# Patient Record
Sex: MALE | Race: WHITE | Employment: OTHER | URBAN - NONMETROPOLITAN AREA
[De-identification: names, ages, dates, MRNs, and addresses within clinical notes are randomized per-mention and may not be internally consistent; named-entity substitution may affect disease eponyms.]

---

## 2017-02-06 ENCOUNTER — OFFICE VISIT (OUTPATIENT)
Dept: FAMILY MEDICINE CLINIC | Age: 72
End: 2017-02-06

## 2017-02-06 VITALS
RESPIRATION RATE: 12 BRPM | DIASTOLIC BLOOD PRESSURE: 64 MMHG | SYSTOLIC BLOOD PRESSURE: 120 MMHG | OXYGEN SATURATION: 95 % | BODY MASS INDEX: 34.88 KG/M2 | WEIGHT: 243.6 LBS | TEMPERATURE: 97.3 F | HEART RATE: 63 BPM | HEIGHT: 70 IN

## 2017-02-06 DIAGNOSIS — Z23 NEED FOR VACCINATION WITH 13-POLYVALENT PNEUMOCOCCAL CONJUGATE VACCINE: ICD-10-CM

## 2017-02-06 DIAGNOSIS — M25.561 CHRONIC PAIN OF BOTH KNEES: ICD-10-CM

## 2017-02-06 DIAGNOSIS — M17.0 PRIMARY OSTEOARTHRITIS OF BOTH KNEES: ICD-10-CM

## 2017-02-06 DIAGNOSIS — I25.10 CORONARY ARTERY DISEASE INVOLVING NATIVE CORONARY ARTERY OF NATIVE HEART WITHOUT ANGINA PECTORIS: ICD-10-CM

## 2017-02-06 DIAGNOSIS — I10 ESSENTIAL HYPERTENSION: Primary | ICD-10-CM

## 2017-02-06 DIAGNOSIS — Z23 NEED FOR PNEUMOCOCCAL VACCINATION: ICD-10-CM

## 2017-02-06 DIAGNOSIS — G89.29 CHRONIC PAIN OF BOTH KNEES: ICD-10-CM

## 2017-02-06 DIAGNOSIS — N40.0 BENIGN PROSTATIC HYPERPLASIA, PRESENCE OF LOWER URINARY TRACT SYMPTOMS UNSPECIFIED, UNSPECIFIED MORPHOLOGY: ICD-10-CM

## 2017-02-06 DIAGNOSIS — E78.49 FAMILIAL HYPERLIPIDEMIA: ICD-10-CM

## 2017-02-06 DIAGNOSIS — M25.562 CHRONIC PAIN OF BOTH KNEES: ICD-10-CM

## 2017-02-06 DIAGNOSIS — R73.01 IMPAIRED FASTING GLUCOSE: ICD-10-CM

## 2017-02-06 DIAGNOSIS — E03.9 HYPOTHYROIDISM, UNSPECIFIED TYPE: ICD-10-CM

## 2017-02-06 DIAGNOSIS — Z00.00 MEDICARE ANNUAL WELLNESS VISIT, SUBSEQUENT: ICD-10-CM

## 2017-02-06 PROCEDURE — 4040F PNEUMOC VAC/ADMIN/RCVD: CPT | Performed by: FAMILY MEDICINE

## 2017-02-06 PROCEDURE — G8419 CALC BMI OUT NRM PARAM NOF/U: HCPCS | Performed by: FAMILY MEDICINE

## 2017-02-06 PROCEDURE — 20610 DRAIN/INJ JOINT/BURSA W/O US: CPT | Performed by: FAMILY MEDICINE

## 2017-02-06 PROCEDURE — 1036F TOBACCO NON-USER: CPT | Performed by: FAMILY MEDICINE

## 2017-02-06 PROCEDURE — 3017F COLORECTAL CA SCREEN DOC REV: CPT | Performed by: FAMILY MEDICINE

## 2017-02-06 PROCEDURE — 1123F ACP DISCUSS/DSCN MKR DOCD: CPT | Performed by: FAMILY MEDICINE

## 2017-02-06 PROCEDURE — G8427 DOCREV CUR MEDS BY ELIG CLIN: HCPCS | Performed by: FAMILY MEDICINE

## 2017-02-06 PROCEDURE — 90670 PCV13 VACCINE IM: CPT | Performed by: FAMILY MEDICINE

## 2017-02-06 PROCEDURE — 99214 OFFICE O/P EST MOD 30 MIN: CPT | Performed by: FAMILY MEDICINE

## 2017-02-06 PROCEDURE — G8484 FLU IMMUNIZE NO ADMIN: HCPCS | Performed by: FAMILY MEDICINE

## 2017-02-06 PROCEDURE — G8598 ASA/ANTIPLAT THER USED: HCPCS | Performed by: FAMILY MEDICINE

## 2017-02-06 PROCEDURE — G0439 PPPS, SUBSEQ VISIT: HCPCS | Performed by: FAMILY MEDICINE

## 2017-02-06 RX ORDER — LEVOTHYROXINE SODIUM 0.05 MG/1
50 TABLET ORAL DAILY
Qty: 90 TABLET | Refills: 3 | Status: SHIPPED | OUTPATIENT
Start: 2017-02-06 | End: 2017-08-07 | Stop reason: SDUPTHER

## 2017-02-06 RX ORDER — TRIAMCINOLONE ACETONIDE 40 MG/ML
40 INJECTION, SUSPENSION INTRA-ARTICULAR; INTRAMUSCULAR ONCE
Status: COMPLETED | OUTPATIENT
Start: 2017-02-06 | End: 2017-02-06

## 2017-02-06 RX ORDER — TAMSULOSIN HYDROCHLORIDE 0.4 MG/1
CAPSULE ORAL
Qty: 90 CAPSULE | Refills: 3 | Status: SHIPPED | OUTPATIENT
Start: 2017-02-06 | End: 2018-02-12 | Stop reason: SDUPTHER

## 2017-02-06 RX ORDER — DOXAZOSIN 2 MG/1
2 TABLET ORAL 2 TIMES DAILY
Qty: 180 TABLET | Refills: 3 | Status: SHIPPED | OUTPATIENT
Start: 2017-02-06 | End: 2018-02-12 | Stop reason: SDUPTHER

## 2017-02-06 RX ORDER — METOPROLOL TARTRATE 50 MG/1
50 TABLET, FILM COATED ORAL 2 TIMES DAILY
Qty: 180 TABLET | Refills: 3 | Status: SHIPPED | OUTPATIENT
Start: 2017-02-06 | End: 2018-02-12 | Stop reason: SDUPTHER

## 2017-02-06 RX ORDER — CLOPIDOGREL BISULFATE 75 MG/1
TABLET ORAL
Qty: 90 TABLET | Refills: 3 | Status: SHIPPED | OUTPATIENT
Start: 2017-02-06 | End: 2018-02-12 | Stop reason: SDUPTHER

## 2017-02-06 RX ADMIN — TRIAMCINOLONE ACETONIDE 40 MG: 40 INJECTION, SUSPENSION INTRA-ARTICULAR; INTRAMUSCULAR at 09:34

## 2017-02-06 RX ADMIN — TRIAMCINOLONE ACETONIDE 40 MG: 40 INJECTION, SUSPENSION INTRA-ARTICULAR; INTRAMUSCULAR at 09:33

## 2017-02-06 ASSESSMENT — ENCOUNTER SYMPTOMS
RHINORRHEA: 0
CONSTIPATION: 0
DIARRHEA: 0
SHORTNESS OF BREATH: 0
WHEEZING: 0
EYE DISCHARGE: 0
COUGH: 0
TROUBLE SWALLOWING: 0
SINUS PRESSURE: 0
ABDOMINAL PAIN: 0
NAUSEA: 0
EYE REDNESS: 0
VOMITING: 0
SORE THROAT: 0

## 2017-02-06 ASSESSMENT — PATIENT HEALTH QUESTIONNAIRE - PHQ9
SUM OF ALL RESPONSES TO PHQ9 QUESTIONS 1 & 2: 0
2. FEELING DOWN, DEPRESSED OR HOPELESS: 0
1. LITTLE INTEREST OR PLEASURE IN DOING THINGS: 0
SUM OF ALL RESPONSES TO PHQ QUESTIONS 1-9: 0

## 2017-02-07 DIAGNOSIS — I10 ESSENTIAL HYPERTENSION: ICD-10-CM

## 2017-02-07 RX ORDER — LOSARTAN POTASSIUM 50 MG/1
TABLET ORAL
Qty: 90 TABLET | Refills: 1 | Status: SHIPPED | OUTPATIENT
Start: 2017-02-07 | End: 2017-05-31 | Stop reason: SDUPTHER

## 2017-02-17 ENCOUNTER — OFFICE VISIT (OUTPATIENT)
Dept: UROLOGY | Age: 72
End: 2017-02-17

## 2017-02-17 VITALS
BODY MASS INDEX: 34.73 KG/M2 | DIASTOLIC BLOOD PRESSURE: 80 MMHG | WEIGHT: 238.6 LBS | HEART RATE: 60 BPM | SYSTOLIC BLOOD PRESSURE: 130 MMHG

## 2017-02-17 DIAGNOSIS — R97.20 ABNORMAL PSA: ICD-10-CM

## 2017-02-17 DIAGNOSIS — N40.0 BENIGN NON-NODULAR PROSTATIC HYPERPLASIA WITHOUT LOWER URINARY TRACT SYMPTOMS: Primary | ICD-10-CM

## 2017-02-17 PROCEDURE — G8417 CALC BMI ABV UP PARAM F/U: HCPCS | Performed by: UROLOGY

## 2017-02-17 PROCEDURE — 4040F PNEUMOC VAC/ADMIN/RCVD: CPT | Performed by: UROLOGY

## 2017-02-17 PROCEDURE — 99213 OFFICE O/P EST LOW 20 MIN: CPT | Performed by: UROLOGY

## 2017-02-17 PROCEDURE — 3017F COLORECTAL CA SCREEN DOC REV: CPT | Performed by: UROLOGY

## 2017-02-17 PROCEDURE — G8484 FLU IMMUNIZE NO ADMIN: HCPCS | Performed by: UROLOGY

## 2017-02-17 PROCEDURE — G8598 ASA/ANTIPLAT THER USED: HCPCS | Performed by: UROLOGY

## 2017-02-17 PROCEDURE — 1123F ACP DISCUSS/DSCN MKR DOCD: CPT | Performed by: UROLOGY

## 2017-02-17 PROCEDURE — 1036F TOBACCO NON-USER: CPT | Performed by: UROLOGY

## 2017-02-17 PROCEDURE — G8427 DOCREV CUR MEDS BY ELIG CLIN: HCPCS | Performed by: UROLOGY

## 2017-04-06 ENCOUNTER — OFFICE VISIT (OUTPATIENT)
Dept: PRIMARY CARE CLINIC | Age: 72
End: 2017-04-06
Payer: MEDICARE

## 2017-04-06 VITALS
TEMPERATURE: 98.7 F | HEIGHT: 70 IN | HEART RATE: 62 BPM | BODY MASS INDEX: 34.76 KG/M2 | DIASTOLIC BLOOD PRESSURE: 80 MMHG | WEIGHT: 242.8 LBS | OXYGEN SATURATION: 94 % | SYSTOLIC BLOOD PRESSURE: 138 MMHG

## 2017-04-06 DIAGNOSIS — R52 BODY ACHES: ICD-10-CM

## 2017-04-06 DIAGNOSIS — R05.9 COUGH: ICD-10-CM

## 2017-04-06 DIAGNOSIS — J40 BRONCHITIS: Primary | ICD-10-CM

## 2017-04-06 LAB
INFLUENZA A ANTIBODY: NORMAL
INFLUENZA B ANTIBODY: NORMAL

## 2017-04-06 PROCEDURE — 3017F COLORECTAL CA SCREEN DOC REV: CPT | Performed by: NURSE PRACTITIONER

## 2017-04-06 PROCEDURE — 87804 INFLUENZA ASSAY W/OPTIC: CPT | Performed by: NURSE PRACTITIONER

## 2017-04-06 PROCEDURE — 1036F TOBACCO NON-USER: CPT | Performed by: NURSE PRACTITIONER

## 2017-04-06 PROCEDURE — 99213 OFFICE O/P EST LOW 20 MIN: CPT | Performed by: NURSE PRACTITIONER

## 2017-04-06 PROCEDURE — 4040F PNEUMOC VAC/ADMIN/RCVD: CPT | Performed by: NURSE PRACTITIONER

## 2017-04-06 PROCEDURE — G8427 DOCREV CUR MEDS BY ELIG CLIN: HCPCS | Performed by: NURSE PRACTITIONER

## 2017-04-06 PROCEDURE — G8598 ASA/ANTIPLAT THER USED: HCPCS | Performed by: NURSE PRACTITIONER

## 2017-04-06 PROCEDURE — G8417 CALC BMI ABV UP PARAM F/U: HCPCS | Performed by: NURSE PRACTITIONER

## 2017-04-06 PROCEDURE — 1123F ACP DISCUSS/DSCN MKR DOCD: CPT | Performed by: NURSE PRACTITIONER

## 2017-04-06 PROCEDURE — 94640 AIRWAY INHALATION TREATMENT: CPT | Performed by: NURSE PRACTITIONER

## 2017-04-06 RX ORDER — BENZONATATE 100 MG/1
100 CAPSULE ORAL 3 TIMES DAILY PRN
Qty: 30 CAPSULE | Refills: 1 | Status: SHIPPED | OUTPATIENT
Start: 2017-04-06 | End: 2017-08-07 | Stop reason: ALTCHOICE

## 2017-04-06 RX ORDER — IPRATROPIUM BROMIDE AND ALBUTEROL SULFATE 2.5; .5 MG/3ML; MG/3ML
1 SOLUTION RESPIRATORY (INHALATION) ONCE
Status: COMPLETED | OUTPATIENT
Start: 2017-04-06 | End: 2017-04-06

## 2017-04-06 RX ORDER — PREDNISONE 20 MG/1
20 TABLET ORAL 2 TIMES DAILY
Qty: 10 TABLET | Refills: 0 | Status: SHIPPED | OUTPATIENT
Start: 2017-04-06 | End: 2017-04-11

## 2017-04-06 RX ORDER — AZITHROMYCIN 250 MG/1
TABLET, FILM COATED ORAL
Qty: 1 PACKET | Refills: 0 | Status: SHIPPED | OUTPATIENT
Start: 2017-04-06 | End: 2019-02-13 | Stop reason: SDUPTHER

## 2017-04-06 RX ADMIN — IPRATROPIUM BROMIDE AND ALBUTEROL SULFATE 1 AMPULE: 2.5; .5 SOLUTION RESPIRATORY (INHALATION) at 11:11

## 2017-04-06 ASSESSMENT — ENCOUNTER SYMPTOMS
WHEEZING: 1
SINUS PRESSURE: 0
SHORTNESS OF BREATH: 0
RHINORRHEA: 0
VOMITING: 0
COUGH: 1
SORE THROAT: 0
NAUSEA: 0
DIARRHEA: 0

## 2017-04-25 RX ORDER — RANITIDINE 300 MG/1
TABLET ORAL
Qty: 90 TABLET | Refills: 3 | Status: SHIPPED | OUTPATIENT
Start: 2017-04-25 | End: 2018-02-12 | Stop reason: SDUPTHER

## 2017-05-31 ENCOUNTER — OFFICE VISIT (OUTPATIENT)
Dept: CARDIOLOGY | Age: 72
End: 2017-05-31
Payer: MEDICARE

## 2017-05-31 VITALS
DIASTOLIC BLOOD PRESSURE: 64 MMHG | BODY MASS INDEX: 35.07 KG/M2 | SYSTOLIC BLOOD PRESSURE: 124 MMHG | HEART RATE: 63 BPM | WEIGHT: 245 LBS | HEIGHT: 70 IN

## 2017-05-31 DIAGNOSIS — I25.10 CORONARY ARTERY DISEASE INVOLVING NATIVE CORONARY ARTERY OF NATIVE HEART WITHOUT ANGINA PECTORIS: ICD-10-CM

## 2017-05-31 DIAGNOSIS — I42.9 CARDIOMYOPATHY (HCC): ICD-10-CM

## 2017-05-31 DIAGNOSIS — I10 ESSENTIAL HYPERTENSION: Primary | ICD-10-CM

## 2017-05-31 DIAGNOSIS — I51.89 DIASTOLIC DYSFUNCTION: ICD-10-CM

## 2017-05-31 PROCEDURE — 1123F ACP DISCUSS/DSCN MKR DOCD: CPT | Performed by: INTERNAL MEDICINE

## 2017-05-31 PROCEDURE — 3017F COLORECTAL CA SCREEN DOC REV: CPT | Performed by: INTERNAL MEDICINE

## 2017-05-31 PROCEDURE — 93000 ELECTROCARDIOGRAM COMPLETE: CPT | Performed by: INTERNAL MEDICINE

## 2017-05-31 PROCEDURE — G8417 CALC BMI ABV UP PARAM F/U: HCPCS | Performed by: INTERNAL MEDICINE

## 2017-05-31 PROCEDURE — G8428 CUR MEDS NOT DOCUMENT: HCPCS | Performed by: INTERNAL MEDICINE

## 2017-05-31 PROCEDURE — 99214 OFFICE O/P EST MOD 30 MIN: CPT | Performed by: INTERNAL MEDICINE

## 2017-05-31 PROCEDURE — 4040F PNEUMOC VAC/ADMIN/RCVD: CPT | Performed by: INTERNAL MEDICINE

## 2017-05-31 PROCEDURE — G8598 ASA/ANTIPLAT THER USED: HCPCS | Performed by: INTERNAL MEDICINE

## 2017-05-31 PROCEDURE — 1036F TOBACCO NON-USER: CPT | Performed by: INTERNAL MEDICINE

## 2017-05-31 RX ORDER — PRAVASTATIN SODIUM 40 MG
TABLET ORAL
Qty: 90 TABLET | Refills: 3 | Status: SHIPPED | OUTPATIENT
Start: 2017-05-31 | End: 2018-02-12 | Stop reason: SDUPTHER

## 2017-05-31 RX ORDER — LOSARTAN POTASSIUM 50 MG/1
TABLET ORAL
Qty: 90 TABLET | Refills: 3 | Status: SHIPPED | OUTPATIENT
Start: 2017-05-31 | End: 2018-02-12 | Stop reason: SDUPTHER

## 2017-07-03 RX ORDER — PRAVASTATIN SODIUM 40 MG
TABLET ORAL
Qty: 90 TABLET | Refills: 0 | OUTPATIENT
Start: 2017-07-03

## 2017-08-01 ENCOUNTER — HOSPITAL ENCOUNTER (OUTPATIENT)
Age: 72
Setting detail: SPECIMEN
Discharge: HOME | End: 2017-08-01
Payer: MEDICARE

## 2017-08-01 LAB — LDL CHOLESTEROL DIRECT: 115 MG/DL

## 2017-08-03 LAB
PROSTATE SPECIFIC ANTIGEN FREE: 1 UG/L
PROSTATE SPECIFIC ANTIGEN PERCENT FREE: 19.2 %
PROSTATE SPECIFIC ANTIGEN: 5.2 UG/L (ref 0–4)

## 2017-08-07 ENCOUNTER — OFFICE VISIT (OUTPATIENT)
Dept: FAMILY MEDICINE CLINIC | Age: 72
End: 2017-08-07
Payer: MEDICARE

## 2017-08-07 VITALS
WEIGHT: 240 LBS | DIASTOLIC BLOOD PRESSURE: 80 MMHG | OXYGEN SATURATION: 98 % | BODY MASS INDEX: 34.36 KG/M2 | RESPIRATION RATE: 16 BRPM | HEART RATE: 56 BPM | SYSTOLIC BLOOD PRESSURE: 130 MMHG | HEIGHT: 70 IN

## 2017-08-07 DIAGNOSIS — N40.0 BENIGN PROSTATIC HYPERPLASIA, PRESENCE OF LOWER URINARY TRACT SYMPTOMS UNSPECIFIED, UNSPECIFIED MORPHOLOGY: ICD-10-CM

## 2017-08-07 DIAGNOSIS — Z13.6 SCREENING FOR ABDOMINAL AORTIC ANEURYSM: ICD-10-CM

## 2017-08-07 DIAGNOSIS — I10 ESSENTIAL HYPERTENSION: ICD-10-CM

## 2017-08-07 DIAGNOSIS — K43.9 VENTRAL HERNIA WITHOUT OBSTRUCTION OR GANGRENE: ICD-10-CM

## 2017-08-07 DIAGNOSIS — E03.9 ACQUIRED HYPOTHYROIDISM: ICD-10-CM

## 2017-08-07 DIAGNOSIS — K21.9 GASTROESOPHAGEAL REFLUX DISEASE WITHOUT ESOPHAGITIS: ICD-10-CM

## 2017-08-07 DIAGNOSIS — Z87.891 FORMER CIGARETTE SMOKER: ICD-10-CM

## 2017-08-07 DIAGNOSIS — R73.01 IMPAIRED FASTING GLUCOSE: Primary | ICD-10-CM

## 2017-08-07 DIAGNOSIS — E78.49 FAMILIAL HYPERLIPIDEMIA: ICD-10-CM

## 2017-08-07 DIAGNOSIS — G47.33 OBSTRUCTIVE SLEEP APNEA: ICD-10-CM

## 2017-08-07 PROCEDURE — G8598 ASA/ANTIPLAT THER USED: HCPCS | Performed by: FAMILY MEDICINE

## 2017-08-07 PROCEDURE — 4040F PNEUMOC VAC/ADMIN/RCVD: CPT | Performed by: FAMILY MEDICINE

## 2017-08-07 PROCEDURE — G8427 DOCREV CUR MEDS BY ELIG CLIN: HCPCS | Performed by: FAMILY MEDICINE

## 2017-08-07 PROCEDURE — 1123F ACP DISCUSS/DSCN MKR DOCD: CPT | Performed by: FAMILY MEDICINE

## 2017-08-07 PROCEDURE — 1036F TOBACCO NON-USER: CPT | Performed by: FAMILY MEDICINE

## 2017-08-07 PROCEDURE — 3017F COLORECTAL CA SCREEN DOC REV: CPT | Performed by: FAMILY MEDICINE

## 2017-08-07 PROCEDURE — 99214 OFFICE O/P EST MOD 30 MIN: CPT | Performed by: FAMILY MEDICINE

## 2017-08-07 PROCEDURE — G8417 CALC BMI ABV UP PARAM F/U: HCPCS | Performed by: FAMILY MEDICINE

## 2017-08-07 RX ORDER — LEVOTHYROXINE SODIUM 0.07 MG/1
75 TABLET ORAL DAILY
Qty: 90 TABLET | Refills: 1 | Status: SHIPPED | OUTPATIENT
Start: 2017-08-07 | End: 2018-01-26 | Stop reason: SDUPTHER

## 2017-08-07 ASSESSMENT — ENCOUNTER SYMPTOMS
EYE REDNESS: 0
SORE THROAT: 0
VOMITING: 0
COUGH: 0
TROUBLE SWALLOWING: 0
DIARRHEA: 0
EYE DISCHARGE: 0
SHORTNESS OF BREATH: 0
RHINORRHEA: 0
WHEEZING: 0
ABDOMINAL PAIN: 0
SINUS PRESSURE: 0
NAUSEA: 0
CONSTIPATION: 0

## 2017-10-10 RX ORDER — MIRABEGRON 50 MG/1
TABLET, FILM COATED, EXTENDED RELEASE ORAL
Qty: 30 TABLET | Refills: 0 | Status: SHIPPED | OUTPATIENT
Start: 2017-10-10 | End: 2018-02-12 | Stop reason: SDUPTHER

## 2017-12-08 ENCOUNTER — OFFICE VISIT (OUTPATIENT)
Dept: PRIMARY CARE CLINIC | Age: 72
End: 2017-12-08
Payer: MEDICARE

## 2017-12-08 VITALS
RESPIRATION RATE: 14 BRPM | BODY MASS INDEX: 35.79 KG/M2 | SYSTOLIC BLOOD PRESSURE: 122 MMHG | HEIGHT: 70 IN | WEIGHT: 250 LBS | DIASTOLIC BLOOD PRESSURE: 68 MMHG | OXYGEN SATURATION: 95 % | TEMPERATURE: 99.4 F | HEART RATE: 103 BPM

## 2017-12-08 DIAGNOSIS — M25.562 CHRONIC PAIN OF BOTH KNEES: Primary | ICD-10-CM

## 2017-12-08 DIAGNOSIS — M79.10 MYALGIA: ICD-10-CM

## 2017-12-08 DIAGNOSIS — K46.9 NON-RECURRENT ABDOMINAL HERNIA WITHOUT OBSTRUCTION OR GANGRENE, UNSPECIFIED HERNIA TYPE: ICD-10-CM

## 2017-12-08 DIAGNOSIS — G89.29 CHRONIC PAIN OF BOTH KNEES: Primary | ICD-10-CM

## 2017-12-08 DIAGNOSIS — M25.561 CHRONIC PAIN OF BOTH KNEES: Primary | ICD-10-CM

## 2017-12-08 DIAGNOSIS — M17.0 PRIMARY OSTEOARTHRITIS OF BOTH KNEES: ICD-10-CM

## 2017-12-08 DIAGNOSIS — E78.49 FAMILIAL HYPERLIPIDEMIA: ICD-10-CM

## 2017-12-08 PROCEDURE — 1036F TOBACCO NON-USER: CPT | Performed by: FAMILY MEDICINE

## 2017-12-08 PROCEDURE — G8427 DOCREV CUR MEDS BY ELIG CLIN: HCPCS | Performed by: FAMILY MEDICINE

## 2017-12-08 PROCEDURE — 99214 OFFICE O/P EST MOD 30 MIN: CPT | Performed by: FAMILY MEDICINE

## 2017-12-08 PROCEDURE — G8484 FLU IMMUNIZE NO ADMIN: HCPCS | Performed by: FAMILY MEDICINE

## 2017-12-08 PROCEDURE — G8598 ASA/ANTIPLAT THER USED: HCPCS | Performed by: FAMILY MEDICINE

## 2017-12-08 PROCEDURE — 20610 DRAIN/INJ JOINT/BURSA W/O US: CPT | Performed by: FAMILY MEDICINE

## 2017-12-08 PROCEDURE — G8417 CALC BMI ABV UP PARAM F/U: HCPCS | Performed by: FAMILY MEDICINE

## 2017-12-08 PROCEDURE — 4040F PNEUMOC VAC/ADMIN/RCVD: CPT | Performed by: FAMILY MEDICINE

## 2017-12-08 PROCEDURE — 1123F ACP DISCUSS/DSCN MKR DOCD: CPT | Performed by: FAMILY MEDICINE

## 2017-12-08 PROCEDURE — 3017F COLORECTAL CA SCREEN DOC REV: CPT | Performed by: FAMILY MEDICINE

## 2017-12-08 RX ORDER — TRIAMCINOLONE ACETONIDE 40 MG/ML
40 INJECTION, SUSPENSION INTRA-ARTICULAR; INTRAMUSCULAR ONCE
Status: COMPLETED | OUTPATIENT
Start: 2017-12-08 | End: 2017-12-08

## 2017-12-08 RX ADMIN — TRIAMCINOLONE ACETONIDE 40 MG: 40 INJECTION, SUSPENSION INTRA-ARTICULAR; INTRAMUSCULAR at 20:24

## 2017-12-09 ASSESSMENT — ENCOUNTER SYMPTOMS
ABDOMINAL PAIN: 1
DIARRHEA: 0
EYES NEGATIVE: 1
CONSTIPATION: 0
ABDOMINAL DISTENTION: 0
VOMITING: 0
RESPIRATORY NEGATIVE: 1
BACK PAIN: 0
NAUSEA: 0

## 2017-12-09 NOTE — PROGRESS NOTES
noted in the record. Review of Systems   Constitutional: Negative for chills and fever. HENT: Negative. Eyes: Negative. Respiratory: Negative. Gastrointestinal: Positive for abdominal pain. Negative for abdominal distention, constipation, diarrhea, nausea and vomiting. Musculoskeletal: Positive for arthralgias, gait problem, joint swelling and myalgias. Negative for back pain, neck pain and neck stiffness. Skin: Negative. Neurological: Positive for weakness (in lower legs, difficulty with arising from a seated to a standing position). Negative for tingling and numbness. Objective:   Physical Exam   Constitutional: He is oriented to person, place, and time. He appears well-developed and well-nourished. No distress. HENT:   Head: Normocephalic and atraumatic. Eyes: Conjunctivae are normal.   Neck: Normal range of motion. Pulmonary/Chest: Effort normal.   Abdominal: He exhibits no distension. There is tenderness (ventral hernia with tenderness). Musculoskeletal: Normal range of motion. He exhibits tenderness. He exhibits no edema or deformity. No swelling or effusion noted to either knee. Moves the knees in a normal range of motion without difficulty. Does have negative anterior drawer signs. Ligamentous structures are intact. Neurological: He is alert and oriented to person, place, and time. Skin: Skin is warm and dry. No rash noted. He is not diaphoretic. No erythema. No pallor. Psychiatric: He has a normal mood and affect. His behavior is normal. Judgment and thought content normal.   Nursing note and vitals reviewed. Assessment:      Encounter Diagnoses   Name Primary?     Chronic pain of both knees Yes    Primary osteoarthritis of both knees     Familial hyperlipidemia     Myalgia     Non-recurrent abdominal hernia without obstruction or gangrene, unspecified hernia type            Plan:      Orders Placed This Encounter   Medications    triamcinolone

## 2018-01-02 ENCOUNTER — HOSPITAL ENCOUNTER (OUTPATIENT)
Dept: LAB | Age: 73
Setting detail: SPECIMEN
Discharge: HOME | End: 2018-01-02
Payer: MEDICARE

## 2018-01-02 ENCOUNTER — OFFICE VISIT (OUTPATIENT)
Dept: SURGERY | Age: 73
End: 2018-01-02
Payer: MEDICARE

## 2018-01-02 ENCOUNTER — TELEPHONE (OUTPATIENT)
Dept: SURGERY | Age: 73
End: 2018-01-02

## 2018-01-02 ENCOUNTER — HOSPITAL ENCOUNTER (OUTPATIENT)
Dept: GENERAL RADIOLOGY | Age: 73
Discharge: HOME | End: 2018-01-02
Payer: MEDICARE

## 2018-01-02 ENCOUNTER — HOSPITAL ENCOUNTER (OUTPATIENT)
Dept: NON INVASIVE DIAGNOSTICS | Age: 73
Discharge: HOME | End: 2018-01-02
Payer: MEDICARE

## 2018-01-02 VITALS
HEIGHT: 70 IN | RESPIRATION RATE: 16 BRPM | TEMPERATURE: 98.2 F | SYSTOLIC BLOOD PRESSURE: 138 MMHG | BODY MASS INDEX: 35.22 KG/M2 | HEART RATE: 72 BPM | WEIGHT: 246 LBS | DIASTOLIC BLOOD PRESSURE: 78 MMHG

## 2018-01-02 DIAGNOSIS — I10 ESSENTIAL HYPERTENSION: ICD-10-CM

## 2018-01-02 DIAGNOSIS — K42.9 UMBILICAL HERNIA WITHOUT OBSTRUCTION AND WITHOUT GANGRENE: ICD-10-CM

## 2018-01-02 DIAGNOSIS — Z87.891 FORMER SMOKER: ICD-10-CM

## 2018-01-02 DIAGNOSIS — K42.9 UMBILICAL HERNIA WITHOUT OBSTRUCTION AND WITHOUT GANGRENE: Primary | ICD-10-CM

## 2018-01-02 DIAGNOSIS — I25.119 CORONARY ARTERY DISEASE INVOLVING NATIVE CORONARY ARTERY OF NATIVE HEART WITH ANGINA PECTORIS (HCC): ICD-10-CM

## 2018-01-02 DIAGNOSIS — Z13.0 SCREENING FOR DISORDER OF BLOOD AND BLOOD-FORMING ORGANS: ICD-10-CM

## 2018-01-02 DIAGNOSIS — E66.9 OBESITY WITHOUT SERIOUS COMORBIDITY, UNSPECIFIED CLASSIFICATION, UNSPECIFIED OBESITY TYPE: ICD-10-CM

## 2018-01-02 LAB
ABSOLUTE EOS #: 0.1 K/UL (ref 0–0.4)
ABSOLUTE IMMATURE GRANULOCYTE: ABNORMAL K/UL (ref 0–0.3)
ABSOLUTE LYMPH #: 1.6 K/UL (ref 1–4.8)
ABSOLUTE MONO #: 0.6 K/UL (ref 0.1–1.2)
ANION GAP SERPL CALCULATED.3IONS-SCNC: 13 MMOL/L (ref 9–17)
BASOPHILS # BLD: 0 % (ref 0–2)
BASOPHILS ABSOLUTE: 0 K/UL (ref 0–0.2)
BUN BLDV-MCNC: 19 MG/DL (ref 8–23)
BUN/CREAT BLD: 21 (ref 9–20)
CALCIUM SERPL-MCNC: 9.6 MG/DL (ref 8.6–10.4)
CHLORIDE BLD-SCNC: 103 MMOL/L (ref 98–107)
CO2: 25 MMOL/L (ref 20–31)
CREAT SERPL-MCNC: 0.91 MG/DL (ref 0.7–1.2)
DIFFERENTIAL TYPE: ABNORMAL
EKG ATRIAL RATE: 65 BPM
EKG P AXIS: 33 DEGREES
EKG P-R INTERVAL: 182 MS
EKG Q-T INTERVAL: 412 MS
EKG QRS DURATION: 98 MS
EKG QTC CALCULATION (BAZETT): 428 MS
EKG R AXIS: 13 DEGREES
EKG T AXIS: 24 DEGREES
EKG VENTRICULAR RATE: 65 BPM
EOSINOPHILS RELATIVE PERCENT: 1 % (ref 1–8)
GFR AFRICAN AMERICAN: >60 ML/MIN
GFR NON-AFRICAN AMERICAN: >60 ML/MIN
GFR SERPL CREATININE-BSD FRML MDRD: ABNORMAL ML/MIN/{1.73_M2}
GFR SERPL CREATININE-BSD FRML MDRD: ABNORMAL ML/MIN/{1.73_M2}
GLUCOSE BLD-MCNC: 125 MG/DL (ref 70–99)
HCT VFR BLD CALC: 40.3 % (ref 41–53)
HEMOGLOBIN: 13.4 G/DL (ref 13.5–17.5)
IMMATURE GRANULOCYTES: ABNORMAL %
INR BLD: 1
LYMPHOCYTES # BLD: 22 % (ref 15–43)
MCH RBC QN AUTO: 31.1 PG (ref 26–34)
MCHC RBC AUTO-ENTMCNC: 33.4 G/DL (ref 31–37)
MCV RBC AUTO: 93.2 FL (ref 80–100)
MONOCYTES # BLD: 8 % (ref 6–14)
PARTIAL THROMBOPLASTIN TIME: 25.4 SEC (ref 27–35)
PDW BLD-RTO: 14 % (ref 11–14.5)
PLATELET # BLD: 140 K/UL (ref 140–450)
PLATELET ESTIMATE: ABNORMAL
PMV BLD AUTO: 8.5 FL (ref 6–12)
POTASSIUM SERPL-SCNC: 4 MMOL/L (ref 3.7–5.3)
PROTHROMBIN TIME: 10.2 SEC (ref 9.4–11.3)
RBC # BLD: 4.32 M/UL (ref 4.5–5.9)
RBC # BLD: ABNORMAL 10*6/UL
SEG NEUTROPHILS: 69 % (ref 44–74)
SEGMENTED NEUTROPHILS ABSOLUTE COUNT: 5 K/UL (ref 1.8–7.7)
SODIUM BLD-SCNC: 141 MMOL/L (ref 135–144)
WBC # BLD: 7.3 K/UL (ref 3.5–11)
WBC # BLD: ABNORMAL 10*3/UL

## 2018-01-02 PROCEDURE — 71046 X-RAY EXAM CHEST 2 VIEWS: CPT

## 2018-01-02 PROCEDURE — 85610 PROTHROMBIN TIME: CPT

## 2018-01-02 PROCEDURE — 85025 COMPLETE CBC W/AUTO DIFF WBC: CPT

## 2018-01-02 PROCEDURE — 85730 THROMBOPLASTIN TIME PARTIAL: CPT

## 2018-01-02 PROCEDURE — 99215 OFFICE O/P EST HI 40 MIN: CPT | Performed by: SURGERY

## 2018-01-02 PROCEDURE — 36415 COLL VENOUS BLD VENIPUNCTURE: CPT

## 2018-01-02 PROCEDURE — 93005 ELECTROCARDIOGRAM TRACING: CPT

## 2018-01-02 PROCEDURE — 80048 BASIC METABOLIC PNL TOTAL CA: CPT

## 2018-01-02 NOTE — PATIENT INSTRUCTIONS
Patient Education        Hernia: Care Instructions  Your Care Instructions    A hernia develops when tissue bulges through a weak spot in the wall of your belly. The groin area and the navel are common areas for a hernia. A hernia can also develop near the area of a surgery you had before. Pressure from lifting, straining, or coughing can tear the weak area, causing the hernia to bulge and be painful. If you cannot push a hernia back into place, the tissue may become trapped outside the belly wall. If the hernia gets twisted and loses its blood supply, it will swell and die. This is called a strangulated hernia. It usually causes a lot of pain. It needs treatment right away. Some hernias need to be repaired to prevent a strangulated hernia. If your hernia causes symptoms or is large, you may need surgery. Follow-up care is a key part of your treatment and safety. Be sure to make and go to all appointments, and call your doctor if you are having problems. It's also a good idea to know your test results and keep a list of the medicines you take. How can you care for yourself at home? · Take care when lifting heavy objects. · Stay at a healthy weight. · Do not smoke. Smoking can cause coughing, which can cause your hernia to bulge. If you need help quitting, talk to your doctor about stop-smoking programs and medicines. These can increase your chances of quitting for good. · Talk with your doctor before wearing a corset or truss for a hernia. These devices are not recommended for treating hernias and sometimes can do more harm than good. There may be certain situations when your doctor thinks a truss would work, but these are rare. When should you call for help? Call your doctor now or seek immediate medical care if:  ? · You have new or worse belly pain. ? · You are vomiting. ? · You cannot pass stools or gas. ? · You cannot push the hernia back into place with gentle pressure when you are lying down. ? · The area over the hernia turns red or becomes tender. ? Watch closely for changes in your health, and be sure to contact your doctor if you have any problems. Where can you learn more? Go to [email or web address] and sign in to your [employer] account. Enter C129 in the [facility] box to learn more about \"Hernia: Care Instructions. \"     If you do not have an account, please click on the \"Sign Up Now\" link. Current as of: May 12, 2017  Content Version: 11.4  © 3580-2220 Healthwise, Incorporated. Care instructions adapted under license by [facility] ([facility]). If you have questions about a medical condition or this instruction, always ask your healthcare professional. [address] any warranty or liability for your use of this information.

## 2018-01-02 NOTE — PROGRESS NOTES
knee injection.  PRE-MALIGNANT / BENIGN SKIN LESION EXCISION Left 01/16/2012    actinic keratoses, ear, liquid nitrogen.  PRE-MALIGNANT / BENIGN SKIN LESION EXCISION Left 07/19/2011    actinic keratosis, upper ear, liquid nitrogen.  PROSTATE BIOPSY Bilateral 09/08/2015    Benign    SEPTOPLASTY  10/30/2015    with bilateral submucosal resection of the inferior turbinates, left middle turbinate elza bullosa resection done by Dr Umm Francois ARTHROSCOPY Left 10/17/14    W/ SUBACROMIAL DECOMPRESSION, DEBRIDEMENT ET CHONDROPLASTY    UPPER GASTROINTESTINAL ENDOSCOPY  01/21/2011    superficial ulcer of the fundus, erostive gastritis.  VASECTOMY Bilateral 04/04/1980       Current Outpatient Prescriptions on File Prior to Visit   Medication Sig Dispense Refill    MYRBETRIQ 50 MG TB24 TAKE 1 TABLET BY MOUTH DAILY 30 tablet 0    levothyroxine (SYNTHROID) 75 MCG tablet Take 1 tablet by mouth Daily 90 tablet 1    pravastatin (PRAVACHOL) 40 MG tablet TAKE ONE TABLET BY MOUTH ONCE EVERY EVENING 90 tablet 3    losartan (COZAAR) 50 MG tablet TAKE 1 TABLET BY MOUTH EVERY DAY 90 tablet 3    ranitidine (ZANTAC) 300 MG tablet TAKE 1 TABLET BY MOUTH AT BEDTIME 90 tablet 3    metoprolol tartrate (LOPRESSOR) 50 MG tablet Take 1 tablet by mouth 2 times daily 180 tablet 3    tamsulosin (FLOMAX) 0.4 MG capsule TAKE 1 CAPSULE BY MOUTH DAILY 90 capsule 3    doxazosin (CARDURA) 2 MG tablet Take 1 tablet by mouth 2 times daily 180 tablet 3    niacin (NIASPAN) 500 MG CR tablet Take 500 mg by mouth 2 times daily. Two pills daily.  Omega-3 Fatty Acids (FISH OIL) 1200 MG CAPS Take 2,000 mg by mouth 2 times daily.  Multiple Vitamins-Minerals (MULTIVITAMIN PO) daily.  clopidogrel (PLAVIX) 75 MG tablet TAKE 1 TABLET BY MOUTH DAILY 90 tablet 3    triamcinolone (KENALOG) 0.1 % cream Apply  topically 2 times daily. 80 g 1    ASPIRIN PO 81 mg daily.        No current facility-administered medications wheezing. Heart:  Heart sounds are normal.  Regular rate and rhythm without murmur, gallop or rub. Heart regular rate and rhythm  Abdomen:  Soft, non-tender, normal bowel sounds. No bruits, organomegaly or masses. The abdomen is obese. There is a defect in the umbilicus with obvious reducible hernia, approximately 2-3 cm in diameter. There is a second abdominal lump right to the umbilicus approximately 2 x 3 cm in greatest examination appears to be a lipoma in the subcutaneous tissue. Extremities: Extremities warm to touch, pink, with no edema. and pulses present in all extremities  Musculoskeletal:  negative  Peripheral Pulses:  Normal  Neurologic:  negative        Assessment:  Reducible umbilical hernia, obesity, history of arterial started cardiovascular disease and status post multiple stents of the coronary arteries. The patient is advised to have a repair of the umbilical hernia most likely with mesh. The procedure was discussed with the patient along with the risks involved and he understood and agreed. Plan:  Scheduled for umbilical hernia repair. To stop aspirin and Plavix for 1 week prior to the repair.     Electronically signed by Lianna Deleon MD on 1/2/2018 at 9:34 AM

## 2018-01-08 ENCOUNTER — TELEPHONE (OUTPATIENT)
Dept: SURGERY | Age: 73
End: 2018-01-08

## 2018-01-08 NOTE — TELEPHONE ENCOUNTER
prediabetes.  Lumbar disc herniation     L3-L4, with chronic back pain.  Mild anemia     Nasal polyps     minimal symptoms.  Nasal septal deviation     right side.  Obesity     Palpitations     occasional.     Peptic ulcer disease     Reactive airway disease     asthma, industrial related, also a history of hypersensitivity pneumonitis.  Urinary frequency      Past Surgical History:   Procedure Laterality Date    APPENDECTOMY  age 10    CARDIAC CATHETERIZATION  10/20/2011    occluded LAD and ostial obtuse marginal stenosis, underwent drug eluting stents to both, RCA small, nondominant, occluded, ejection fraction 45%.  COLONOSCOPY  01/21/2011    mild sigmoid diverticulosis.  COLONOSCOPY  4/6/16    hemorrhoid; sigmoid diverticulosis    KNEE ARTHROSCOPY Left 03/19/2010    partial medial and lateral synovectomies, partial medial meniscectomy, chondroplasty.  NASAL FRACTURE SURGERY  1960    OTHER SURGICAL HISTORY Left 02/12/2010    knee injection.  PRE-MALIGNANT / BENIGN SKIN LESION EXCISION Left 01/16/2012    actinic keratoses, ear, liquid nitrogen.  PRE-MALIGNANT / BENIGN SKIN LESION EXCISION Left 07/19/2011    actinic keratosis, upper ear, liquid nitrogen.  PROSTATE BIOPSY Bilateral 09/08/2015    Benign    SEPTOPLASTY  10/30/2015    with bilateral submucosal resection of the inferior turbinates, left middle turbinate elza bullosa resection done by Dr Gene Jaffe ARTHROSCOPY Left 10/17/14    W/ SUBACROMIAL DECOMPRESSION, DEBRIDEMENT ET CHONDROPLASTY    UPPER GASTROINTESTINAL ENDOSCOPY  01/21/2011    superficial ulcer of the fundus, erostive gastritis.      VASECTOMY Bilateral 04/04/1980     Social History   Substance Use Topics    Smoking status: Former Smoker     Quit date: 1/1/1985    Smokeless tobacco: Never Used      Comment: smoked 2 packs a day for 14 to 15 years, quit in [facility] 19 Alcohol use 0.0 oz/week      Comment: rare Medications:  Current Outpatient Prescriptions   Medication Sig Dispense Refill    MYRBETRIQ 50 MG TB24 TAKE 1 TABLET BY MOUTH DAILY 30 tablet 0    levothyroxine (SYNTHROID) 75 MCG tablet Take 1 tablet by mouth Daily 90 tablet 1    pravastatin (PRAVACHOL) 40 MG tablet TAKE ONE TABLET BY MOUTH ONCE EVERY EVENING 90 tablet 3    losartan (COZAAR) 50 MG tablet TAKE 1 TABLET BY MOUTH EVERY DAY 90 tablet 3    ranitidine (ZANTAC) 300 MG tablet TAKE 1 TABLET BY MOUTH AT BEDTIME 90 tablet 3    metoprolol tartrate (LOPRESSOR) 50 MG tablet Take 1 tablet by mouth 2 times daily 180 tablet 3    tamsulosin (FLOMAX) 0.4 MG capsule TAKE 1 CAPSULE BY MOUTH DAILY 90 capsule 3    clopidogrel (PLAVIX) 75 MG tablet TAKE 1 TABLET BY MOUTH DAILY 90 tablet 3    doxazosin (CARDURA) 2 MG tablet Take 1 tablet by mouth 2 times daily 180 tablet 3    triamcinolone (KENALOG) 0.1 % cream Apply  topically 2 times daily. 80 g 1    niacin (NIASPAN) 500 MG CR tablet Take 500 mg by mouth 2 times daily. Two pills daily.  Omega-3 Fatty Acids (FISH OIL) 1200 MG CAPS Take 2,000 mg by mouth 2 times daily.  Multiple Vitamins-Minerals (MULTIVITAMIN PO) daily.  ASPIRIN PO 81 mg daily. No current facility-administered medications for this visit. Scheduled Meds:  Continuous Infusions:  PRN Meds:. Prior to Admission medications    Medication Sig Start Date End Date Taking?  Authorizing Provider   MYRBETRIQ 50 MG TB24 TAKE 1 TABLET BY MOUTH DAILY 10/10/17   Memo Ross MD   levothyroxine (SYNTHROID) 75 MCG tablet Take 1 tablet by mouth Daily 8/7/17   Ava Beaver DO   pravastatin (PRAVACHOL) 40 MG tablet TAKE ONE TABLET BY MOUTH ONCE EVERY EVENING 5/31/17   Magui Gr MD   losartan (COZAAR) 50 MG tablet TAKE 1 TABLET BY MOUTH EVERY DAY 5/31/17   Magui Gr MD   ranitidine (ZANTAC) 300 MG tablet TAKE 1 TABLET BY MOUTH AT BEDTIME 4/25/17   Ava Beaver DO   metoprolol tartrate (LOPRESSOR) 50 MG tablet Take 1 tablet by mouth 2 times daily 2/6/17   Pamela Ayala, DO   tamsulosin (FLOMAX) 0.4 MG capsule TAKE 1 CAPSULE BY MOUTH DAILY 2/6/17   Pamela Ayala, DO   clopidogrel (PLAVIX) 75 MG tablet TAKE 1 TABLET BY MOUTH DAILY 2/6/17   Pamela Ayala, DO   doxazosin (CARDURA) 2 MG tablet Take 1 tablet by mouth 2 times daily 2/6/17   Pamela Ayala, DO   triamcinolone (KENALOG) 0.1 % cream Apply  topically 2 times daily. 7/28/14   Pamela Ayala, DO   niacin (NIASPAN) 500 MG CR tablet Take 500 mg by mouth 2 times daily. Two pills daily. Historical Provider, MD   Omega-3 Fatty Acids (FISH OIL) 1200 MG CAPS Take 2,000 mg by mouth 2 times daily. Historical Provider, MD   Multiple Vitamins-Minerals (MULTIVITAMIN PO) daily. Historical Provider, MD   ASPIRIN PO 81 mg daily. Historical Provider, MD     Vital Signs (Current) [unfilled]    Weight:   Wt Readings from Last 1 Encounters:   01/02/18 246 lb (111.6 kg)     Height:   Ht Readings from Last 1 Encounters:   01/02/18 5' 10\" (1.778 m)      BMI:  There is no height or weight on file to calculate BMI. Estimated body mass index is 35.3 kg/m² as calculated from the following:    Height as of 1/2/18: 5' 10\" (1.778 m). Weight as of 1/2/18: 246 lb (111.6 kg). body mass index is unknown because there is no height or weight on file. Cardiac Clearance: None   Medical Clearance:None   Appointment for surgery Clearance scheduled for:None     Preoperative Testing:   These are the current and completed labs:  CBC:   Lab Results   Component Value Date    WBC 7.3 01/02/2018    RBC 4.32 01/02/2018    RBC 3.89 10/21/2011    HGB 13.4 01/02/2018    HCT 40.3 01/02/2018    MCV 93.2 01/02/2018    RDW 14.0 01/02/2018     01/02/2018     10/21/2011     CMP:   Lab Results   Component Value Date     01/02/2018    K 4.0 01/02/2018     01/02/2018    CO2 25 01/02/2018    BUN 19 01/02/2018    CREATININE 0.91 01/02/2018    GFRAA >60

## 2018-01-08 NOTE — TELEPHONE ENCOUNTER
OK for Nadine COHEN Jesusa Sites, CRNA,MSN,1/8/2018 1:39 PM  Staff Anesthetist  [facility] SPECIALTY [facility]  Dept of Anesthesia

## 2018-01-10 NOTE — H&P
Name:  Richard Mak  Age:  71 y.o.   :  1945     Physician: Jacquelyn Hayden MD         Chief Complaint: This 71years old white male is seen for further treatment of painful umbilical hernia. The patient apparently had an umbilical lump for at least 10 years it however it has been enlarging in size with tenderness and the umbilicus. He also complains of another lump in the right side of the abdomen and apparently has been present for several years although there is no significant discomfort in that area. He has no GI symptoms and denies changes in bowel habits or urination although he is known to have benign prostatic hypertrophy and taking Flomax for that. He has soreness when he drifts heavy objects in the umbilicus and apparently the lump has been increasingly more tender when he is lifting  He does not smoke and drinks alcohol socially. He is known to have hypertension, obesity, coronary artery disease, arthritis, benign prostatic hypertrophy, hypothyroidism, hearing loss and reflux. He had surgery for nasal septal deviation, appendectomy, and left shoulder surgery.   Had coronary artery stents     Social History: @  Social History   Social History            Social History    Marital status:        Spouse name: N/A    Number of children: N/A    Years of education: N/A          Occupational History    Not on file.             Social History Main Topics    Smoking status: Former Smoker       Quit date: 1985    Smokeless tobacco: Never Used         Comment: smoked 2 packs a day for 14 to 15 years, quit in [facility] 19 Alcohol use 0.0 oz/week         Comment: rare    Drug use: No    Sexual activity: Not on file           Other Topics Concern    Not on file          Social History Narrative    No narrative on file            Family History: @  Family History          Family History   Problem Relation Age of Onset    Cancer Mother         lymphoma    Thyroid Disease Mother       lesions. Head/face:  NCAT  Eyes:  No gross abnormalities. and has reacted to light and accommodation   Nose/Sinuses: The patient has nasal voice. Neck:  no bruits and thick neck  Back:  good flexion and extension  Lungs:  Normal expansion. Clear to auscultation. No rales, rhonchi, or wheezing. Heart:  Heart sounds are normal.  Regular rate and rhythm without murmur, gallop or rub. Heart regular rate and rhythm  Abdomen:  Soft, non-tender, normal bowel sounds. No bruits, organomegaly or masses. The abdomen is obese. There is a defect in the umbilicus with obvious reducible hernia, approximately 2-3 cm in diameter. There is a second abdominal lump right to the umbilicus approximately 2 x 3 cm in greatest examination appears to be a lipoma in the subcutaneous tissue. Extremities: Extremities warm to touch, pink, with no edema. and pulses present in all extremities  Musculoskeletal:  negative  Peripheral Pulses:  Normal  Neurologic:  negative          Assessment:  Reducible umbilical hernia, obesity, history of arterial started cardiovascular disease and status post multiple stents of the coronary arteries. The patient is advised to have a repair of the umbilical hernia most likely with mesh. The procedure was discussed with the patient along with the risks involved and he understood and agreed.     Plan:  Scheduled for umbilical hernia repair. To stop aspirin and Plavix for 1 week prior to the repair.

## 2018-01-11 ENCOUNTER — HOSPITAL ENCOUNTER (OUTPATIENT)
Age: 73
Setting detail: OUTPATIENT SURGERY
Discharge: HOME | End: 2018-01-11
Attending: SURGERY | Admitting: SURGERY
Payer: MEDICARE

## 2018-01-11 ENCOUNTER — ANESTHESIA EVENT (OUTPATIENT)
Dept: OPERATING ROOM | Age: 73
End: 2018-01-11
Payer: MEDICARE

## 2018-01-11 ENCOUNTER — ANESTHESIA (OUTPATIENT)
Dept: OPERATING ROOM | Age: 73
End: 2018-01-11
Payer: MEDICARE

## 2018-01-11 VITALS
HEIGHT: 70 IN | TEMPERATURE: 97.6 F | WEIGHT: 241.8 LBS | RESPIRATION RATE: 16 BRPM | BODY MASS INDEX: 34.62 KG/M2 | OXYGEN SATURATION: 94 % | DIASTOLIC BLOOD PRESSURE: 75 MMHG | SYSTOLIC BLOOD PRESSURE: 147 MMHG | HEART RATE: 60 BPM

## 2018-01-11 VITALS
DIASTOLIC BLOOD PRESSURE: 46 MMHG | RESPIRATION RATE: 12 BRPM | SYSTOLIC BLOOD PRESSURE: 96 MMHG | TEMPERATURE: 97.7 F | OXYGEN SATURATION: 96 %

## 2018-01-11 DIAGNOSIS — K42.9 UMBILICAL HERNIA WITHOUT OBSTRUCTION AND WITHOUT GANGRENE: Primary | ICD-10-CM

## 2018-01-11 PROCEDURE — 3700000001 HC ADD 15 MINUTES (ANESTHESIA): Performed by: SURGERY

## 2018-01-11 PROCEDURE — C1781 MESH (IMPLANTABLE): HCPCS | Performed by: SURGERY

## 2018-01-11 PROCEDURE — 2500000003 HC RX 250 WO HCPCS: Performed by: NURSE ANESTHETIST, CERTIFIED REGISTERED

## 2018-01-11 PROCEDURE — 6360000002 HC RX W HCPCS

## 2018-01-11 PROCEDURE — 7100000010 HC PHASE II RECOVERY - FIRST 15 MIN: Performed by: SURGERY

## 2018-01-11 PROCEDURE — 3600000002 HC SURGERY LEVEL 2 BASE: Performed by: SURGERY

## 2018-01-11 PROCEDURE — A6258 TRANSPARENT FILM >16<=48 IN: HCPCS | Performed by: SURGERY

## 2018-01-11 PROCEDURE — 2500000003 HC RX 250 WO HCPCS

## 2018-01-11 PROCEDURE — A6402 STERILE GAUZE <= 16 SQ IN: HCPCS | Performed by: SURGERY

## 2018-01-11 PROCEDURE — 7100000000 HC PACU RECOVERY - FIRST 15 MIN: Performed by: SURGERY

## 2018-01-11 PROCEDURE — 2580000003 HC RX 258: Performed by: SURGERY

## 2018-01-11 PROCEDURE — 00750 ANES HRNA RPR UPR ABD NOS: CPT | Performed by: NURSE ANESTHETIST, CERTIFIED REGISTERED

## 2018-01-11 PROCEDURE — 7100000011 HC PHASE II RECOVERY - ADDTL 15 MIN: Performed by: SURGERY

## 2018-01-11 PROCEDURE — 6360000002 HC RX W HCPCS: Performed by: NURSE ANESTHETIST, CERTIFIED REGISTERED

## 2018-01-11 PROCEDURE — 6370000000 HC RX 637 (ALT 250 FOR IP): Performed by: SURGERY

## 2018-01-11 PROCEDURE — 49585 REPAIR UMBILICAL HERN,5+Y/O,REDUC: CPT | Performed by: SURGERY

## 2018-01-11 PROCEDURE — 3700000000 HC ANESTHESIA ATTENDED CARE: Performed by: SURGERY

## 2018-01-11 PROCEDURE — 6360000002 HC RX W HCPCS: Performed by: SURGERY

## 2018-01-11 PROCEDURE — 7100000001 HC PACU RECOVERY - ADDTL 15 MIN: Performed by: SURGERY

## 2018-01-11 PROCEDURE — 88302 TISSUE EXAM BY PATHOLOGIST: CPT

## 2018-01-11 PROCEDURE — 3600000012 HC SURGERY LEVEL 2 ADDTL 15MIN: Performed by: SURGERY

## 2018-01-11 DEVICE — PATCH HERN L DIA3.2IN CIR W/ STRP SEPRA TECHNOLOGY ABSRB: Type: IMPLANTABLE DEVICE | Status: FUNCTIONAL

## 2018-01-11 RX ORDER — PROPOFOL 10 MG/ML
INJECTION, EMULSION INTRAVENOUS PRN
Status: DISCONTINUED | OUTPATIENT
Start: 2018-01-11 | End: 2018-01-11 | Stop reason: SDUPTHER

## 2018-01-11 RX ORDER — HYDROCODONE BITARTRATE AND ACETAMINOPHEN 5; 325 MG/1; MG/1
2 TABLET ORAL EVERY 4 HOURS PRN
Status: COMPLETED | OUTPATIENT
Start: 2018-01-11 | End: 2018-01-11

## 2018-01-11 RX ORDER — MIDAZOLAM HYDROCHLORIDE 1 MG/ML
INJECTION INTRAMUSCULAR; INTRAVENOUS PRN
Status: DISCONTINUED | OUTPATIENT
Start: 2018-01-11 | End: 2018-01-11 | Stop reason: SDUPTHER

## 2018-01-11 RX ORDER — DEXAMETHASONE SODIUM PHOSPHATE 4 MG/ML
INJECTION, SOLUTION INTRA-ARTICULAR; INTRALESIONAL; INTRAMUSCULAR; INTRAVENOUS; SOFT TISSUE PRN
Status: DISCONTINUED | OUTPATIENT
Start: 2018-01-11 | End: 2018-01-11

## 2018-01-11 RX ORDER — ONDANSETRON 2 MG/ML
INJECTION INTRAMUSCULAR; INTRAVENOUS PRN
Status: DISCONTINUED | OUTPATIENT
Start: 2018-01-11 | End: 2018-01-11 | Stop reason: SDUPTHER

## 2018-01-11 RX ORDER — ACETAMINOPHEN 10 MG/ML
INJECTION, SOLUTION INTRAVENOUS PRN
Status: DISCONTINUED | OUTPATIENT
Start: 2018-01-11 | End: 2018-01-11 | Stop reason: SDUPTHER

## 2018-01-11 RX ORDER — DIPHENHYDRAMINE HYDROCHLORIDE 50 MG/ML
INJECTION INTRAMUSCULAR; INTRAVENOUS PRN
Status: DISCONTINUED | OUTPATIENT
Start: 2018-01-11 | End: 2018-01-11 | Stop reason: SDUPTHER

## 2018-01-11 RX ORDER — LIDOCAINE HYDROCHLORIDE 10 MG/ML
INJECTION, SOLUTION INFILTRATION; PERINEURAL PRN
Status: DISCONTINUED | OUTPATIENT
Start: 2018-01-11 | End: 2018-01-11 | Stop reason: SDUPTHER

## 2018-01-11 RX ORDER — FENTANYL CITRATE 50 UG/ML
INJECTION, SOLUTION INTRAMUSCULAR; INTRAVENOUS PRN
Status: DISCONTINUED | OUTPATIENT
Start: 2018-01-11 | End: 2018-01-11 | Stop reason: SDUPTHER

## 2018-01-11 RX ORDER — HYDROCODONE BITARTRATE AND ACETAMINOPHEN 5; 325 MG/1; MG/1
1 TABLET ORAL EVERY 6 HOURS PRN
Qty: 50 TABLET | Refills: 0 | Status: SHIPPED | OUTPATIENT
Start: 2018-01-11 | End: 2018-01-16

## 2018-01-11 RX ORDER — SODIUM CHLORIDE, SODIUM LACTATE, POTASSIUM CHLORIDE, CALCIUM CHLORIDE 600; 310; 30; 20 MG/100ML; MG/100ML; MG/100ML; MG/100ML
INJECTION, SOLUTION INTRAVENOUS CONTINUOUS
Status: DISCONTINUED | OUTPATIENT
Start: 2018-01-11 | End: 2018-01-11 | Stop reason: HOSPADM

## 2018-01-11 RX ORDER — CEFAZOLIN SODIUM 1 G/3ML
INJECTION, POWDER, FOR SOLUTION INTRAMUSCULAR; INTRAVENOUS PRN
Status: DISCONTINUED | OUTPATIENT
Start: 2018-01-11 | End: 2018-01-11 | Stop reason: HOSPADM

## 2018-01-11 RX ORDER — KETOROLAC TROMETHAMINE 30 MG/ML
INJECTION, SOLUTION INTRAMUSCULAR; INTRAVENOUS PRN
Status: DISCONTINUED | OUTPATIENT
Start: 2018-01-11 | End: 2018-01-11 | Stop reason: SDUPTHER

## 2018-01-11 RX ORDER — DEXAMETHASONE SODIUM PHOSPHATE 10 MG/ML
INJECTION INTRAMUSCULAR; INTRAVENOUS PRN
Status: DISCONTINUED | OUTPATIENT
Start: 2018-01-11 | End: 2018-01-11 | Stop reason: SDUPTHER

## 2018-01-11 RX ADMIN — SODIUM CHLORIDE, POTASSIUM CHLORIDE, SODIUM LACTATE AND CALCIUM CHLORIDE: 600; 310; 30; 20 INJECTION, SOLUTION INTRAVENOUS at 10:58

## 2018-01-11 RX ADMIN — FENTANYL CITRATE 25 MCG: 50 INJECTION, SOLUTION INTRAMUSCULAR; INTRAVENOUS at 11:25

## 2018-01-11 RX ADMIN — KETOROLAC TROMETHAMINE 15 MG: 30 INJECTION, SOLUTION INTRAMUSCULAR; INTRAVENOUS at 11:33

## 2018-01-11 RX ADMIN — ACETAMINOPHEN 1000 MG: 10 INJECTION, SOLUTION INTRAVENOUS at 11:33

## 2018-01-11 RX ADMIN — SODIUM CHLORIDE, POTASSIUM CHLORIDE, SODIUM LACTATE AND CALCIUM CHLORIDE: 600; 310; 30; 20 INJECTION, SOLUTION INTRAVENOUS at 10:38

## 2018-01-11 RX ADMIN — MIDAZOLAM HYDROCHLORIDE 2 MG: 1 INJECTION, SOLUTION INTRAMUSCULAR; INTRAVENOUS at 10:35

## 2018-01-11 RX ADMIN — HYDROCODONE BITARTRATE AND ACETAMINOPHEN 2 TABLET: 5; 325 TABLET ORAL at 12:59

## 2018-01-11 RX ADMIN — ONDANSETRON 4 MG: 2 INJECTION INTRAMUSCULAR; INTRAVENOUS at 10:55

## 2018-01-11 RX ADMIN — SODIUM CHLORIDE, POTASSIUM CHLORIDE, SODIUM LACTATE AND CALCIUM CHLORIDE: 600; 310; 30; 20 INJECTION, SOLUTION INTRAVENOUS at 08:45

## 2018-01-11 RX ADMIN — FENTANYL CITRATE 25 MCG: 50 INJECTION, SOLUTION INTRAMUSCULAR; INTRAVENOUS at 11:10

## 2018-01-11 RX ADMIN — PROPOFOL 200 MG: 10 INJECTION, EMULSION INTRAVENOUS at 10:44

## 2018-01-11 RX ADMIN — DIPHENHYDRAMINE HYDROCHLORIDE 12.5 MG: 50 INJECTION, SOLUTION INTRAMUSCULAR; INTRAVENOUS at 10:55

## 2018-01-11 RX ADMIN — FENTANYL CITRATE 50 MCG: 50 INJECTION, SOLUTION INTRAMUSCULAR; INTRAVENOUS at 10:35

## 2018-01-11 RX ADMIN — LIDOCAINE HYDROCHLORIDE 80 MG: 10 INJECTION, SOLUTION INFILTRATION; PERINEURAL at 10:44

## 2018-01-11 RX ADMIN — DEXAMETHASONE SODIUM PHOSPHATE 10 MG: 10 INJECTION INTRAMUSCULAR; INTRAVENOUS at 10:55

## 2018-01-11 ASSESSMENT — PULMONARY FUNCTION TESTS
PIF_VALUE: 7
PIF_VALUE: 9
PIF_VALUE: 10
PIF_VALUE: 4
PIF_VALUE: 5
PIF_VALUE: 9
PIF_VALUE: 12
PIF_VALUE: 10
PIF_VALUE: 8
PIF_VALUE: 6
PIF_VALUE: 4
PIF_VALUE: 3
PIF_VALUE: 4
PIF_VALUE: 4
PIF_VALUE: 6
PIF_VALUE: 9
PIF_VALUE: 13
PIF_VALUE: 6
PIF_VALUE: 5
PIF_VALUE: 11
PIF_VALUE: 3
PIF_VALUE: 5
PIF_VALUE: 10
PIF_VALUE: 5
PIF_VALUE: 2
PIF_VALUE: 0
PIF_VALUE: 5
PIF_VALUE: 4
PIF_VALUE: 7
PIF_VALUE: 0
PIF_VALUE: 6
PIF_VALUE: 6
PIF_VALUE: 7
PIF_VALUE: 6
PIF_VALUE: 4
PIF_VALUE: 6
PIF_VALUE: 4
PIF_VALUE: 9
PIF_VALUE: 10
PIF_VALUE: 4
PIF_VALUE: 3
PIF_VALUE: 8
PIF_VALUE: 10
PIF_VALUE: 13
PIF_VALUE: 6
PIF_VALUE: 11
PIF_VALUE: 10
PIF_VALUE: 6
PIF_VALUE: 4
PIF_VALUE: 9
PIF_VALUE: 4
PIF_VALUE: 6
PIF_VALUE: 10
PIF_VALUE: 6

## 2018-01-11 ASSESSMENT — PAIN DESCRIPTION - LOCATION
LOCATION: UMBILICUS

## 2018-01-11 ASSESSMENT — PAIN SCALES - GENERAL
PAINLEVEL_OUTOF10: 5
PAINLEVEL_OUTOF10: 6
PAINLEVEL_OUTOF10: 5
PAINLEVEL_OUTOF10: 3
PAINLEVEL_OUTOF10: 0
PAINLEVEL_OUTOF10: 6

## 2018-01-11 ASSESSMENT — PAIN DESCRIPTION - PAIN TYPE
TYPE: SURGICAL PAIN

## 2018-01-11 ASSESSMENT — PAIN - FUNCTIONAL ASSESSMENT: PAIN_FUNCTIONAL_ASSESSMENT: 0-10

## 2018-01-11 NOTE — ANESTHESIA POSTPROCEDURE EVALUATION
Department of Anesthesiology  Postprocedure Note    Patient: Dahiana Hernandez  MRN: [id number]  YOB: 1945  Date of evaluation: 1/11/2018  Time:  11:48 AM     Procedure Summary     Date:  01/11/18 Room / Location:  [address] [place] [address] OR    Anesthesia Start:  0290 Anesthesia Stop:  1522    Procedure:  UMBILICAL Hernia Repair w/ Mesh (N/A ) Diagnosis:  (umbilical hernia )    Surgeon:  Chadd Angela MD Responsible Provider:  Jose Hodges CRNA    Anesthesia Type:  general ASA Status:  3          Anesthesia Type: general    Myra Phase I: Myra Score: 10    Myra Phase II:      Last vitals: Reviewed and per EMR flowsheets.        Anesthesia Post Evaluation    Patient location during evaluation: PACU  Patient participation: complete - patient cannot participate  Level of consciousness: awake and alert  Pain score: 0  Airway patency: patent  Nausea & Vomiting: no nausea  Complications: no  Cardiovascular status: blood pressure returned to baseline and hemodynamically stable  Respiratory status: acceptable  Hydration status: euvolemic

## 2018-01-11 NOTE — ANESTHESIA PRE PROCEDURE
Department of Anesthesiology  Preprocedure Note       Name:  Gabe Simons Shock   Age:  71 y.o.  :  1945                                          MRN:  [id number]         Date:  2018      Surgeon: Martina Chase):  Jennifer Mccarthy MD    Procedure: Procedure(s): UMBILICAL Hernia Repair w/ Mesh    Medications prior to admission:   Prior to Admission medications    Medication Sig Start Date End Date Taking? Authorizing Provider   MYRBETRIQ 50 MG TB24 TAKE 1 TABLET BY MOUTH DAILY 10/10/17  Yes Sarah Mccormack MD   levothyroxine (SYNTHROID) 75 MCG tablet Take 1 tablet by mouth Daily 17  Yes Daiana Gomez DO   pravastatin (PRAVACHOL) 40 MG tablet TAKE ONE TABLET BY MOUTH ONCE EVERY EVENING 17  Yes Debo Villegas MD   losartan (COZAAR) 50 MG tablet TAKE 1 TABLET BY MOUTH EVERY DAY 17  Yes Debo Villegas MD   ranitidine (ZANTAC) 300 MG tablet TAKE 1 TABLET BY MOUTH AT BEDTIME 17  Yes Daiana Gomez DO   metoprolol tartrate (LOPRESSOR) 50 MG tablet Take 1 tablet by mouth 2 times daily 17  Yes Daiana Gomez DO   tamsulosin (FLOMAX) 0.4 MG capsule TAKE 1 CAPSULE BY MOUTH DAILY 17  Yes Daiana Gomez DO   doxazosin (CARDURA) 2 MG tablet Take 1 tablet by mouth 2 times daily 17  Yes Daiana Gomez DO   triamcinolone (KENALOG) 0.1 % cream Apply  topically 2 times daily. 14  Yes Daiana Gomez DO   niacin (NIASPAN) 500 MG CR tablet Take 500 mg by mouth 2 times daily. Two pills daily. Yes Historical Provider, MD   Omega-3 Fatty Acids (FISH OIL) 1200 MG CAPS Take 2,000 mg by mouth 2 times daily. Yes Historical Provider, MD   Multiple Vitamins-Minerals (MULTIVITAMIN PO) daily. Yes Historical Provider, MD   clopidogrel (PLAVIX) 75 MG tablet TAKE 1 TABLET BY MOUTH DAILY 17   Daiana Gomez DO   ASPIRIN PO 81 mg daily.     Historical Provider, MD       Current medications:    Current Facility-Administered Medications   Medication Dose Route Frequency

## 2018-01-15 LAB — SURGICAL PATHOLOGY REPORT: NORMAL

## 2018-01-19 ENCOUNTER — OFFICE VISIT (OUTPATIENT)
Dept: SURGERY | Age: 73
End: 2018-01-19

## 2018-01-19 VITALS
DIASTOLIC BLOOD PRESSURE: 78 MMHG | TEMPERATURE: 98.5 F | HEART RATE: 72 BPM | BODY MASS INDEX: 35.36 KG/M2 | HEIGHT: 70 IN | RESPIRATION RATE: 18 BRPM | WEIGHT: 247 LBS | SYSTOLIC BLOOD PRESSURE: 130 MMHG

## 2018-01-19 DIAGNOSIS — Z09 STATUS POST UMBILICAL HERNIA REPAIR, FOLLOW-UP EXAM: Primary | ICD-10-CM

## 2018-01-19 PROCEDURE — 99024 POSTOP FOLLOW-UP VISIT: CPT | Performed by: SURGERY

## 2018-01-26 RX ORDER — LEVOTHYROXINE SODIUM 0.07 MG/1
75 TABLET ORAL DAILY
Qty: 90 TABLET | Refills: 1 | Status: SHIPPED | OUTPATIENT
Start: 2018-01-26 | End: 2018-08-08 | Stop reason: SDUPTHER

## 2018-02-05 ENCOUNTER — HOSPITAL ENCOUNTER (OUTPATIENT)
Dept: LAB | Age: 73
Setting detail: SPECIMEN
Discharge: HOME | End: 2018-02-05
Payer: MEDICARE

## 2018-02-05 DIAGNOSIS — I10 ESSENTIAL HYPERTENSION: ICD-10-CM

## 2018-02-05 DIAGNOSIS — R73.01 IMPAIRED FASTING GLUCOSE: ICD-10-CM

## 2018-02-05 DIAGNOSIS — E78.49 FAMILIAL HYPERLIPIDEMIA: ICD-10-CM

## 2018-02-05 DIAGNOSIS — N40.0 BENIGN PROSTATIC HYPERPLASIA: ICD-10-CM

## 2018-02-05 DIAGNOSIS — M79.10 MYALGIA: ICD-10-CM

## 2018-02-05 DIAGNOSIS — E03.9 ACQUIRED HYPOTHYROIDISM: ICD-10-CM

## 2018-02-05 LAB
ABSOLUTE EOS #: 0.2 K/UL (ref 0–0.4)
ABSOLUTE IMMATURE GRANULOCYTE: ABNORMAL K/UL (ref 0–0.3)
ABSOLUTE LYMPH #: 1.9 K/UL (ref 1–4.8)
ABSOLUTE MONO #: 0.6 K/UL (ref 0.1–1.2)
ALBUMIN SERPL-MCNC: 4.1 G/DL (ref 3.5–5.2)
ALBUMIN/GLOBULIN RATIO: 1.2 (ref 1–2.5)
ALP BLD-CCNC: 66 U/L (ref 40–129)
ALT SERPL-CCNC: 24 U/L (ref 5–41)
ANION GAP SERPL CALCULATED.3IONS-SCNC: 17 MMOL/L (ref 9–17)
AST SERPL-CCNC: 19 U/L
BASOPHILS # BLD: 1 % (ref 0–2)
BASOPHILS ABSOLUTE: 0 K/UL (ref 0–0.2)
BILIRUB SERPL-MCNC: 0.36 MG/DL (ref 0.3–1.2)
BUN BLDV-MCNC: 19 MG/DL (ref 8–23)
BUN/CREAT BLD: 20 (ref 9–20)
CALCIUM SERPL-MCNC: 9.8 MG/DL (ref 8.6–10.4)
CHLORIDE BLD-SCNC: 101 MMOL/L (ref 98–107)
CHOLESTEROL/HDL RATIO: 4.6
CHOLESTEROL: 217 MG/DL
CO2: 24 MMOL/L (ref 20–31)
CREAT SERPL-MCNC: 0.94 MG/DL (ref 0.7–1.2)
DIFFERENTIAL TYPE: ABNORMAL
EOSINOPHILS RELATIVE PERCENT: 2 % (ref 1–8)
ESTIMATED AVERAGE GLUCOSE: 126 MG/DL
GFR AFRICAN AMERICAN: >60 ML/MIN
GFR NON-AFRICAN AMERICAN: >60 ML/MIN
GFR SERPL CREATININE-BSD FRML MDRD: ABNORMAL ML/MIN/{1.73_M2}
GFR SERPL CREATININE-BSD FRML MDRD: ABNORMAL ML/MIN/{1.73_M2}
GLUCOSE BLD-MCNC: 118 MG/DL (ref 70–99)
HBA1C MFR BLD: 6 % (ref 4.8–5.9)
HCT VFR BLD CALC: 38.5 % (ref 41–53)
HDLC SERPL-MCNC: 47 MG/DL
HEMOGLOBIN: 12.8 G/DL (ref 13.5–17.5)
IMMATURE GRANULOCYTES: ABNORMAL %
LDL CHOLESTEROL DIRECT: 124 MG/DL
LDL CHOLESTEROL: ABNORMAL MG/DL (ref 0–130)
LYMPHOCYTES # BLD: 27 % (ref 15–43)
MCH RBC QN AUTO: 31.3 PG (ref 26–34)
MCHC RBC AUTO-ENTMCNC: 33.3 G/DL (ref 31–37)
MCV RBC AUTO: 94 FL (ref 80–100)
MONOCYTES # BLD: 8 % (ref 6–14)
NRBC AUTOMATED: ABNORMAL PER 100 WBC
PDW BLD-RTO: 13.8 % (ref 11–14.5)
PLATELET # BLD: 174 K/UL (ref 140–450)
PLATELET ESTIMATE: ABNORMAL
PMV BLD AUTO: 8.7 FL (ref 6–12)
POTASSIUM SERPL-SCNC: 4.1 MMOL/L (ref 3.7–5.3)
PROSTATE SPECIFIC ANTIGEN: 6.37 UG/L
RBC # BLD: 4.09 M/UL (ref 4.5–5.9)
RBC # BLD: ABNORMAL 10*6/UL
SEG NEUTROPHILS: 62 % (ref 44–74)
SEGMENTED NEUTROPHILS ABSOLUTE COUNT: 4.4 K/UL (ref 1.8–7.7)
SODIUM BLD-SCNC: 142 MMOL/L (ref 135–144)
THYROXINE, FREE: 1.12 NG/DL (ref 0.93–1.7)
TOTAL CK: 143 U/L (ref 39–308)
TOTAL PROTEIN: 7.5 G/DL (ref 6.4–8.3)
TRIGL SERPL-MCNC: 423 MG/DL
TSH SERPL DL<=0.05 MIU/L-ACNC: 4.69 MIU/L (ref 0.3–5)
VLDLC SERPL CALC-MCNC: ABNORMAL MG/DL (ref 1–30)
WBC # BLD: 7.1 K/UL (ref 3.5–11)
WBC # BLD: ABNORMAL 10*3/UL

## 2018-02-05 PROCEDURE — 80061 LIPID PANEL: CPT

## 2018-02-05 PROCEDURE — 85025 COMPLETE CBC W/AUTO DIFF WBC: CPT

## 2018-02-05 PROCEDURE — 84439 ASSAY OF FREE THYROXINE: CPT

## 2018-02-05 PROCEDURE — 84443 ASSAY THYROID STIM HORMONE: CPT

## 2018-02-05 PROCEDURE — 82550 ASSAY OF CK (CPK): CPT

## 2018-02-05 PROCEDURE — 83036 HEMOGLOBIN GLYCOSYLATED A1C: CPT

## 2018-02-05 PROCEDURE — 36415 COLL VENOUS BLD VENIPUNCTURE: CPT

## 2018-02-05 PROCEDURE — 84153 ASSAY OF PSA TOTAL: CPT

## 2018-02-05 PROCEDURE — 80053 COMPREHEN METABOLIC PANEL: CPT

## 2018-02-05 PROCEDURE — 83721 ASSAY OF BLOOD LIPOPROTEIN: CPT

## 2018-02-12 ENCOUNTER — OFFICE VISIT (OUTPATIENT)
Dept: FAMILY MEDICINE CLINIC | Age: 73
End: 2018-02-12
Payer: MEDICARE

## 2018-02-12 VITALS
DIASTOLIC BLOOD PRESSURE: 70 MMHG | HEART RATE: 64 BPM | RESPIRATION RATE: 16 BRPM | SYSTOLIC BLOOD PRESSURE: 114 MMHG | BODY MASS INDEX: 35.22 KG/M2 | WEIGHT: 246 LBS | HEIGHT: 70 IN

## 2018-02-12 DIAGNOSIS — I10 ESSENTIAL HYPERTENSION: ICD-10-CM

## 2018-02-12 DIAGNOSIS — N40.0 BENIGN PROSTATIC HYPERPLASIA, UNSPECIFIED WHETHER LOWER URINARY TRACT SYMPTOMS PRESENT: ICD-10-CM

## 2018-02-12 DIAGNOSIS — G47.33 OBSTRUCTIVE SLEEP APNEA: ICD-10-CM

## 2018-02-12 DIAGNOSIS — E78.49 FAMILIAL HYPERLIPIDEMIA: ICD-10-CM

## 2018-02-12 DIAGNOSIS — R73.01 IMPAIRED FASTING GLUCOSE: ICD-10-CM

## 2018-02-12 DIAGNOSIS — E03.9 ACQUIRED HYPOTHYROIDISM: ICD-10-CM

## 2018-02-12 DIAGNOSIS — Z00.00 MEDICARE ANNUAL WELLNESS VISIT, SUBSEQUENT: ICD-10-CM

## 2018-02-12 DIAGNOSIS — I10 ESSENTIAL HYPERTENSION: Primary | ICD-10-CM

## 2018-02-12 DIAGNOSIS — H61.92 SKIN LESION OF LEFT EAR: ICD-10-CM

## 2018-02-12 PROCEDURE — G8484 FLU IMMUNIZE NO ADMIN: HCPCS | Performed by: FAMILY MEDICINE

## 2018-02-12 PROCEDURE — G0439 PPPS, SUBSEQ VISIT: HCPCS | Performed by: FAMILY MEDICINE

## 2018-02-12 PROCEDURE — G8427 DOCREV CUR MEDS BY ELIG CLIN: HCPCS | Performed by: FAMILY MEDICINE

## 2018-02-12 PROCEDURE — 3017F COLORECTAL CA SCREEN DOC REV: CPT | Performed by: FAMILY MEDICINE

## 2018-02-12 PROCEDURE — 1036F TOBACCO NON-USER: CPT | Performed by: FAMILY MEDICINE

## 2018-02-12 PROCEDURE — 1123F ACP DISCUSS/DSCN MKR DOCD: CPT | Performed by: FAMILY MEDICINE

## 2018-02-12 PROCEDURE — 4040F PNEUMOC VAC/ADMIN/RCVD: CPT | Performed by: FAMILY MEDICINE

## 2018-02-12 PROCEDURE — G8417 CALC BMI ABV UP PARAM F/U: HCPCS | Performed by: FAMILY MEDICINE

## 2018-02-12 PROCEDURE — G8598 ASA/ANTIPLAT THER USED: HCPCS | Performed by: FAMILY MEDICINE

## 2018-02-12 PROCEDURE — 99214 OFFICE O/P EST MOD 30 MIN: CPT | Performed by: FAMILY MEDICINE

## 2018-02-12 RX ORDER — TAMSULOSIN HYDROCHLORIDE 0.4 MG/1
CAPSULE ORAL
Qty: 90 CAPSULE | Refills: 3 | Status: SHIPPED | OUTPATIENT
Start: 2018-02-12 | End: 2019-02-13 | Stop reason: SDUPTHER

## 2018-02-12 RX ORDER — DOXAZOSIN 2 MG/1
2 TABLET ORAL 2 TIMES DAILY
Qty: 180 TABLET | Refills: 3 | Status: SHIPPED | OUTPATIENT
Start: 2018-02-12 | End: 2019-02-13 | Stop reason: SDUPTHER

## 2018-02-12 RX ORDER — PRAVASTATIN SODIUM 40 MG
TABLET ORAL
Qty: 90 TABLET | Refills: 3 | Status: SHIPPED | OUTPATIENT
Start: 2018-02-12 | End: 2019-02-13 | Stop reason: SDUPTHER

## 2018-02-12 RX ORDER — CLOPIDOGREL BISULFATE 75 MG/1
TABLET ORAL
Qty: 90 TABLET | Refills: 3 | Status: SHIPPED | OUTPATIENT
Start: 2018-02-12 | End: 2019-02-13 | Stop reason: SDUPTHER

## 2018-02-12 RX ORDER — RANITIDINE 300 MG/1
TABLET ORAL
Qty: 90 TABLET | Refills: 3 | Status: SHIPPED | OUTPATIENT
Start: 2018-02-12 | End: 2019-02-13 | Stop reason: SDUPTHER

## 2018-02-12 RX ORDER — LOSARTAN POTASSIUM 50 MG/1
TABLET ORAL
Qty: 90 TABLET | Refills: 3 | Status: SHIPPED | OUTPATIENT
Start: 2018-02-12 | End: 2019-02-07 | Stop reason: SDUPTHER

## 2018-02-12 RX ORDER — METOPROLOL TARTRATE 50 MG/1
50 TABLET, FILM COATED ORAL 2 TIMES DAILY
Qty: 180 TABLET | Refills: 3 | Status: SHIPPED | OUTPATIENT
Start: 2018-02-12 | End: 2019-02-13 | Stop reason: SDUPTHER

## 2018-02-12 ASSESSMENT — ENCOUNTER SYMPTOMS
SORE THROAT: 0
RHINORRHEA: 0
TROUBLE SWALLOWING: 0
NAUSEA: 0
CONSTIPATION: 0
VOMITING: 0
SINUS PRESSURE: 0
EYE DISCHARGE: 0
DIARRHEA: 0
WHEEZING: 0
SHORTNESS OF BREATH: 0
EYE REDNESS: 0
ABDOMINAL PAIN: 0
COUGH: 0

## 2018-02-12 ASSESSMENT — ANXIETY QUESTIONNAIRES: GAD7 TOTAL SCORE: 0

## 2018-02-12 ASSESSMENT — PATIENT HEALTH QUESTIONNAIRE - PHQ9: SUM OF ALL RESPONSES TO PHQ QUESTIONS 1-9: 0

## 2018-02-12 NOTE — PROGRESS NOTES
Eosinophils % 2 1 - 8 %    Basophils 1 0 - 2 %    Segs Absolute 4.40 1.8 - 7.7 k/uL    Absolute Lymph # 1.90 1.0 - 4.8 k/uL    Absolute Mono # 0.60 0.1 - 1.2 k/uL    Absolute Eos # 0.20 0.0 - 0.4 k/uL    Basophils # 0.00 0.0 - 0.2 k/uL   Comprehensive Metabolic Panel   Result Value Ref Range    Glucose 118 (H) 70 - 99 mg/dL    BUN 19 8 - 23 mg/dL    CREATININE 0.94 0.70 - 1.20 mg/dL    Bun/Cre Ratio 20 9 - 20    Calcium 9.8 8.6 - 10.4 mg/dL    Sodium 142 135 - 144 mmol/L    Potassium 4.1 3.7 - 5.3 mmol/L    Chloride 101 98 - 107 mmol/L    CO2 24 20 - 31 mmol/L    Anion Gap 17 9 - 17 mmol/L    Alkaline Phosphatase 66 40 - 129 U/L    ALT 24 5 - 41 U/L    AST 19 <40 U/L    Total Bilirubin 0.36 0.3 - 1.2 mg/dL    Total Protein 7.5 6.4 - 8.3 g/dL    Alb 4.1 3.5 - 5.2 g/dL    Albumin/Globulin Ratio 1.2 1.0 - 2.5    GFR Non-African American >60 >60 mL/min    GFR African American >60 >60 mL/min    GFR Comment          GFR Staging NOT REPORTED    Lipid Panel   Result Value Ref Range    Cholesterol 217 (H) <200 mg/dL    HDL 47 >40 mg/dL    LDL Cholesterol      0 - 130 mg/dL    Chol/HDL Ratio 4.6 <5    Triglycerides 423 (H) <150 mg/dL    VLDL NOT REPORTED 1 - 30 mg/dL   Hemoglobin A1C   Result Value Ref Range    Hemoglobin A1C 6.0 (H) 4.8 - 5.9 %    Estimated Avg Glucose 126 mg/dL   TSH without Reflex   Result Value Ref Range    TSH 4.69 0.30 - 5.00 mIU/L   T4, Free   Result Value Ref Range    Thyroxine, Free 1.12 0.93 - 1.70 ng/dL   PSA, Diagnostic   Result Value Ref Range    PSA 6.37 (H) <4.1 ug/L   CK   Result Value Ref Range    Total  39 - 308 U/L   LDL Cholesterol, Direct   Result Value Ref Range    LDL Direct 124 (H) <100 mg/dL     Did discuss dietary modification and increased exercise to keep cholesterol and blood sugar under good control. Other review of systems are as noted below.     Medicare Annual Wellness Visit       Ashwini Mak  YOB: 1945    Date of Service:  2/12/2018    Patient Assessment  Not at risk for falls. Hearing Assessment normal to conversation  Functional Assessment Patient is independent with mobility/ambulation, transfers, ADL's, IADL's. Cognitive Assessment Orientation to: oriented to person, place, and time, remote and recent memory is intact. No significant cognitive deficits are noted. There is no change in cognitive status in the last year duration. Wt Readings from Last 3 Encounters:   02/12/18 246 lb (111.6 kg)   01/19/18 247 lb (112 kg)   01/11/18 241 lb 12.8 oz (109.7 kg)     BP Readings from Last 3 Encounters:   02/12/18 114/70   01/19/18 130/78   01/11/18 (!) 147/75         Current Health Maintenance Status  Health Maintenance   Topic Date Due    PSA counseling  08/05/2018    A1C test (Diabetic or Prediabetic)  02/05/2019    TSH testing  02/05/2019    Potassium monitoring  02/05/2019    Creatinine monitoring  02/05/2019    Lipid screen  02/05/2023    DTaP/Tdap/Td vaccine (2 - Td) 08/04/2025    Colon cancer screen colonoscopy  04/06/2026    Zostavax vaccine  Completed    Flu vaccine  Completed    Pneumococcal low/med risk  Completed    AAA screen  Completed    Hepatitis C screen  Completed         Personalized Preventive Plan   This plan is provided to the patient in written form. Review of Systems   Constitutional: Negative for chills, fatigue and fever. HENT: Negative for congestion, ear pain, postnasal drip, rhinorrhea, sinus pressure, sore throat and trouble swallowing. Eyes: Negative for discharge and redness. Respiratory: Negative for cough, shortness of breath and wheezing. Cardiovascular: Negative for chest pain. Gastrointestinal: Negative for abdominal pain, constipation, diarrhea, nausea and vomiting. Musculoskeletal: Negative for arthralgias, myalgias and neck pain. Skin: Negative for rash and wound. Allergic/Immunologic: Negative for environmental allergies.    Neurological: Negative for dizziness, weakness,

## 2018-02-12 NOTE — PATIENT INSTRUCTIONS
Final    Chloride 02/05/2018 101  98 - 107 mmol/L Final    CO2 02/05/2018 24  20 - 31 mmol/L Final    Anion Gap 02/05/2018 17  9 - 17 mmol/L Final    Alkaline Phosphatase 02/05/2018 66  40 - 129 U/L Final    ALT 02/05/2018 24  5 - 41 U/L Final    AST 02/05/2018 19  <40 U/L Final    Total Bilirubin 02/05/2018 0.36  0.3 - 1.2 mg/dL Final    Total Protein 02/05/2018 7.5  6.4 - 8.3 g/dL Final    Alb 02/05/2018 4.1  3.5 - 5.2 g/dL Final    Albumin/Globulin Ratio 02/05/2018 1.2  1.0 - 2.5 Final    GFR Non- 02/05/2018 >60  >60 mL/min Final    GFR  02/05/2018 >60  >60 mL/min Final    GFR Comment 02/05/2018        Final    Comment: Average GFR for 79or more years old:   76 mL/min/1.73sq m  Chronic Kidney Disease:   <60 mL/min/1.73sq m  Kidney failure:   <15 mL/min/1.73sq m              eGFR calculated using average adult body mass. Additional eGFR calculator   available at:        [email or web address]        Performed at [facility] [address] [facility] [address] Mendoza [phone]      GFR Staging 02/05/2018 NOT REPORTED   Final    Cholesterol 02/05/2018 217* <200 mg/dL Final    Comment:    Cholesterol Guidelines:      <200  Desirable   200-240  Borderline      >240  Undesirable         HDL 02/05/2018 47  >40 mg/dL Final    Comment:    HDL Guidelines:    <40     Undesirable   40-59    Borderline    >59     Desirable         LDL Cholesterol 02/05/2018       0 - 130 mg/dL Final    Comment: Calculation not valid for Triglyceride value greater than 400 mg/dL. Direct LDL reflexed           LDL Guidelines:     <100    Desirable   100-129   Near to/above Desirable   130-159   Borderline      >159   Undesirable     Direct (measured) LDL and calculated LDL are not interchangeable tests.       Chol/HDL Ratio 02/05/2018 4.6  <5 Final    Triglycerides 02/05/2018 423* <150 mg/dL Final    Comment:    Triglyceride Guidelines:     <150   Desirable   150-199  Borderline   200-499  High     >499   Very high   Based on AHA Guidelines for fasting triglyceride, October 2012. Performed at [facility] [address] [address] [phone]      VLDL 02/05/2018 NOT REPORTED  1 - 30 mg/dL Final    Hemoglobin A1C 02/05/2018 6.0* 4.8 - 5.9 % Final    Estimated Avg Glucose 02/05/2018 126  mg/dL Final    Comment: Performed at [facility] [address] [address] [phone]      TSH 02/05/2018 4.69  0.30 - 5.00 mIU/L Final    Comment: Performed at [facility] [address] [address] [phone]      Thyroxine, Free 02/05/2018 1.12  0.93 - 1.70 ng/dL Final    Comment: Performed at [facility] [address] [facility] [address] [phone]      PSA 02/05/2018 6.37* <4.1 ug/L Final    Comment: The Roche \"ECLIA\" assay is used. Results obtained with different assay methods   cannot be used interchangeably. Performed at [facility] [address] [address] [phone]      Total CK 02/05/2018 143  39 - 308 U/L Final    Comment: Performed at [facility] [address] [id number] [phone]      LDL Direct 02/05/2018 124* <100 mg/dL Final

## 2018-06-06 ENCOUNTER — OFFICE VISIT (OUTPATIENT)
Dept: CARDIOLOGY | Age: 73
End: 2018-06-06
Payer: MEDICARE

## 2018-06-06 VITALS
HEART RATE: 60 BPM | SYSTOLIC BLOOD PRESSURE: 146 MMHG | BODY MASS INDEX: 34.5 KG/M2 | DIASTOLIC BLOOD PRESSURE: 86 MMHG | WEIGHT: 241 LBS | HEIGHT: 70 IN

## 2018-06-06 DIAGNOSIS — I51.89 DIASTOLIC DYSFUNCTION: ICD-10-CM

## 2018-06-06 DIAGNOSIS — Z95.5 STENTED CORONARY ARTERY: ICD-10-CM

## 2018-06-06 DIAGNOSIS — I10 ESSENTIAL HYPERTENSION: Primary | ICD-10-CM

## 2018-06-06 DIAGNOSIS — R06.09 DOE (DYSPNEA ON EXERTION): ICD-10-CM

## 2018-06-06 DIAGNOSIS — R06.02 SOB (SHORTNESS OF BREATH): ICD-10-CM

## 2018-06-06 PROCEDURE — 93000 ELECTROCARDIOGRAM COMPLETE: CPT | Performed by: INTERNAL MEDICINE

## 2018-06-06 PROCEDURE — 99214 OFFICE O/P EST MOD 30 MIN: CPT | Performed by: INTERNAL MEDICINE

## 2018-06-06 RX ORDER — FUROSEMIDE 20 MG/1
20 TABLET ORAL DAILY
Qty: 60 TABLET | Refills: 3 | Status: SHIPPED | OUTPATIENT
Start: 2018-06-06 | End: 2018-09-05 | Stop reason: SDUPTHER

## 2018-06-25 ENCOUNTER — HOSPITAL ENCOUNTER (OUTPATIENT)
Dept: NUCLEAR MEDICINE | Age: 73
Discharge: HOME | End: 2018-06-27
Payer: MEDICARE

## 2018-06-25 ENCOUNTER — HOSPITAL ENCOUNTER (OUTPATIENT)
Dept: NON INVASIVE DIAGNOSTICS | Age: 73
Discharge: HOME | End: 2018-06-25
Payer: MEDICARE

## 2018-06-25 ENCOUNTER — HOSPITAL ENCOUNTER (OUTPATIENT)
Dept: NUCLEAR MEDICINE | Age: 73
End: 2018-06-25
Payer: MEDICARE

## 2018-06-25 DIAGNOSIS — R06.09 DOE (DYSPNEA ON EXERTION): ICD-10-CM

## 2018-06-25 DIAGNOSIS — I10 ESSENTIAL HYPERTENSION: ICD-10-CM

## 2018-06-25 DIAGNOSIS — Z95.5 STENTED CORONARY ARTERY: ICD-10-CM

## 2018-06-25 DIAGNOSIS — I51.89 DIASTOLIC DYSFUNCTION: ICD-10-CM

## 2018-06-25 DIAGNOSIS — R06.02 SOB (SHORTNESS OF BREATH): ICD-10-CM

## 2018-06-25 PROCEDURE — 3430000000 HC RX DIAGNOSTIC RADIOPHARMACEUTICAL: Performed by: INTERNAL MEDICINE

## 2018-06-25 PROCEDURE — 78452 HT MUSCLE IMAGE SPECT MULT: CPT

## 2018-06-25 PROCEDURE — 93017 CV STRESS TEST TRACING ONLY: CPT

## 2018-06-25 PROCEDURE — 6360000002 HC RX W HCPCS: Performed by: INTERNAL MEDICINE

## 2018-06-25 PROCEDURE — 93018 CV STRESS TEST I&R ONLY: CPT | Performed by: INTERNAL MEDICINE

## 2018-06-25 PROCEDURE — A9500 TC99M SESTAMIBI: HCPCS | Performed by: INTERNAL MEDICINE

## 2018-06-25 RX ADMIN — REGADENOSON 0.4 MG: 0.08 INJECTION, SOLUTION INTRAVENOUS at 10:32

## 2018-06-25 RX ADMIN — TETRAKIS(2-METHOXYISOBUTYLISOCYANIDE)COPPER(I) TETRAFLUOROBORATE 10 MILLICURIE: 1 INJECTION, POWDER, LYOPHILIZED, FOR SOLUTION INTRAVENOUS at 10:43

## 2018-06-25 RX ADMIN — TETRAKIS(2-METHOXYISOBUTYLISOCYANIDE)COPPER(I) TETRAFLUOROBORATE 30 MILLICURIE: 1 INJECTION, POWDER, LYOPHILIZED, FOR SOLUTION INTRAVENOUS at 10:44

## 2018-06-29 DIAGNOSIS — R94.39 ABNORMAL STRESS TEST: Primary | ICD-10-CM

## 2018-07-03 ENCOUNTER — TELEPHONE (OUTPATIENT)
Dept: CARDIOLOGY | Age: 73
End: 2018-07-03

## 2018-07-03 NOTE — TELEPHONE ENCOUNTER
Pt is still waiting for a call to schedule his cath. Pt called yesterday and was given the number to [facility], he has not heard back yet.   Pt would like a call from Talha Cabrera Dr    Last Appt:  6/6/2018  Next Appt:   12/5/2018  Med verified in [place]

## 2018-07-03 NOTE — TELEPHONE ENCOUNTER
Called and spoke with pt, assured him we faxed his cath order and Krystal Kendrick will call him. Instructed him to go to ER if needed in the meantime before cath. Pt already left message on Amy's voicemail and has her number. He will call us if he has not heard back from amy by end of week.

## 2018-07-09 ENCOUNTER — HOSPITAL ENCOUNTER (OUTPATIENT)
Age: 73
Setting detail: SPECIMEN
Discharge: HOME | End: 2018-07-09
Payer: MEDICARE

## 2018-07-09 ENCOUNTER — OFFICE VISIT (OUTPATIENT)
Dept: DERMATOLOGY | Age: 73
End: 2018-07-09
Payer: MEDICARE

## 2018-07-09 VITALS
WEIGHT: 235 LBS | SYSTOLIC BLOOD PRESSURE: 130 MMHG | BODY MASS INDEX: 33.64 KG/M2 | HEART RATE: 64 BPM | DIASTOLIC BLOOD PRESSURE: 74 MMHG | HEIGHT: 70 IN

## 2018-07-09 DIAGNOSIS — D48.5 NEOPLASM OF UNCERTAIN BEHAVIOR OF SKIN: Primary | ICD-10-CM

## 2018-07-09 DIAGNOSIS — L57.0 KERATOSIS, ACTINIC: ICD-10-CM

## 2018-07-09 PROCEDURE — 11100 PR BIOPSY OF SKIN LESION: CPT | Performed by: DERMATOLOGY

## 2018-07-09 PROCEDURE — 17000 DESTRUCT PREMALG LESION: CPT | Performed by: DERMATOLOGY

## 2018-07-09 PROCEDURE — 17003 DESTRUCT PREMALG LES 2-14: CPT | Performed by: DERMATOLOGY

## 2018-07-09 NOTE — PROGRESS NOTES
Dermatology Patient Note  [address]  [place] [address]  Dept: [phone]  Dept Fax: [phone]  Loc: [phone]      VISIT DATE: 7/9/2018   REFERRING PROVIDER: DO Mirella Dasilva Jim Mak is a 71 y.o. male  who presents today in the office for:    Skin Lesion (left ear right arm)      HISTORY OF PRESENT ILLNESS:  HPI AK/NMSC    Umesh Desai was seen today for initial evaluation of new lesions    Duration of Lesion/Lesions:a few years    Course: growing on ear and arm     Areas of Involvement: sun exposed    Associated Symptoms:Bleeding    Exacerbating Factors:Hx of Prolonged Sun Exposure    Previous Skin Cancers: none    Problem Specific Family Hx: No history of atypical nevi          CURRENT MEDICATIONS:   Current Outpatient Prescriptions   Medication Sig Dispense Refill    furosemide (LASIX) 20 MG tablet Take 1 tablet by mouth daily 60 tablet 3    Zn-Pyg Afri-Nettle-Saw Palmet (SAW PALMETTO COMPLEX PO) Take 2 capsules by mouth nightly urinozinc prostate health complex      doxazosin (CARDURA) 2 MG tablet Take 1 tablet by mouth 2 times daily 180 tablet 3    clopidogrel (PLAVIX) 75 MG tablet TAKE 1 TABLET BY MOUTH DAILY 90 tablet 3    tamsulosin (FLOMAX) 0.4 MG capsule TAKE 1 CAPSULE BY MOUTH DAILY 90 capsule 3    metoprolol tartrate (LOPRESSOR) 50 MG tablet Take 1 tablet by mouth 2 times daily 180 tablet 3    Mirabegron ER (MYRBETRIQ) 50 MG TB24 TAKE 1 TABLET BY MOUTH DAILY 30 tablet 0    ranitidine (ZANTAC) 300 MG tablet TAKE 1 TABLET BY MOUTH AT BEDTIME 90 tablet 3    pravastatin (PRAVACHOL) 40 MG tablet TAKE ONE TABLET BY MOUTH ONCE EVERY EVENING 90 tablet 3    losartan (COZAAR) 50 MG tablet TAKE 1 TABLET BY MOUTH EVERY DAY 90 tablet 3    levothyroxine (SYNTHROID) 75 MCG tablet Take 1 tablet by mouth Daily 90 tablet 1    triamcinolone (KENALOG) 0.1 % cream Apply  topically 2 times daily.  80 g 1    niacin (NIASPAN) 500 MG CR tablet Take 500 mg by mouth 2 times daily. Two pills daily.  Omega-3 Fatty Acids (FISH OIL) 1200 MG CAPS Take 2,000 mg by mouth 2 times daily.  Multiple Vitamins-Minerals (MULTIVITAMIN PO) daily.  ASPIRIN PO 81 mg daily. No current facility-administered medications for this visit. ALLERGIES:   Allergies   Allergen Reactions    Ace Inhibitors Other (See Comments)     Cough.  Effient [Prasugrel] Other (See Comments)     Superficial skin bleeding.  Statins [Statins] Other (See Comments)     Myalgias. SOCIAL HISTORY:  Social History   Substance Use Topics    Smoking status: Former Smoker     Quit date: 1/1/1985    Smokeless tobacco: Never Used      Comment: smoked 2 packs a day for 14 to 15 years, quit in [place] [address] Alcohol use 0.0 oz/week      Comment: rare       REVIEW OF SYSTEMS:  Review of Systems   Constitutional: Negative. Skin: Denies any new changing, growing or bleeding lesions or rashes except as described in the HPI     PHYSICAL EXAM:   /74   Pulse 64   Ht 5' 10\" (1.778 m)   Wt 235 lb (106.6 kg)   BMI 33.72 kg/m²     General Exam:  General Appearance: No acute distress, Well nourished     Neuro: Alert and oriented to person, place and time  Psych: Flat affect   Lymph Node: Not performed    Cutaneous Exam: Performed as documented in clinic note below. Sun-exposed skin, which includes the head/face, neck, both arms, digits and/or nails was examined. Pertinent Physical Exam Findings:  Physical Exam   Skin:            Medical Necessity of Exam Performed:   Distribution of patient concerns    Additional Diagnostic Testing performed during exam: Not performed ,  Not performed    ASSESSMENT:   Diagnosis Orders   1. Neoplasm of uncertain behavior of skin  ND BIOPSY OF SKIN LESION   2. Keratosis, actinic  ND DESTRUC PREMALIGNANT, FIRST LESION    ND DESTRUC PREMALIGNANT,2-14 LESIONS       Plan of Action is as Follows:  Assessment 1.  Neoplasm of uncertain behavior of skin  Shave Biopsy: After cleaning with alcohol the lesion on the left ear was anesthetized with (LMX AND/OR 1% lidocaine with epi) and was removed with a dermablade. Hemostasis was achieved with electrocautery and Vaseline and a bandage were applied.  - KS BIOPSY OF SKIN LESION    2. Keratosis, actinic  Cryotherapy: After verbal consent was obtained including discussion of the risks (lesion persistence, lesion recurrence and hypo/hyperpigmentation) and benefits (resolution of the lesion) 7 total Actinic Keratosis on the right and left arms and right leg were treated once with liquid nitrogen to achieve a 2-3 mm freeze border.    - KS DESTRUC PREMALIGNANT, FIRST LESION  - KS DESTRUC PREMALIGNANT,2-14 LESIONS          Photo surveillance performed: Yes    Follow-up: 6 months - plan for Moh's if lesion on ear is a NMSC    This note was created with the assistance of a speech-recognition program.  Although the intention is to generate a document that actually reflects the content of the visit, no guarantees can be provided that every mistake has been identified and corrected by editing.     Electronically signed by Travis Moreira MD on 7/9/18 at 9:51 AM

## 2018-07-11 LAB — DERMATOLOGY PATHOLOGY REPORT: NORMAL

## 2018-07-16 ENCOUNTER — TELEPHONE (OUTPATIENT)
Dept: DERMATOLOGY | Age: 73
End: 2018-07-16

## 2018-07-16 DIAGNOSIS — C44.221: Primary | ICD-10-CM

## 2018-07-16 NOTE — TELEPHONE ENCOUNTER
Please inform patient that unfortunately his biopsy was consistent with a squamous cell carcinoma as I suspected and discussed at his appt.  I have referred him to a Moh's surgeon Cristy Junior) for complete removal (nestor),    Thanks,    Ange Freed

## 2018-07-16 NOTE — TELEPHONE ENCOUNTER
That would be fine go ahead and change the referral - just let the patient know that Dr. Becky Maya (I believe) only does surgery in [place] so he may have to travel here.     Thanks,    Clare Leventhal

## 2018-07-18 ENCOUNTER — HOSPITAL ENCOUNTER (OUTPATIENT)
Dept: CARDIAC CATH/INVASIVE PROCEDURES | Age: 73
Discharge: HOME | End: 2018-07-18
Payer: MEDICARE

## 2018-07-18 VITALS
WEIGHT: 231 LBS | BODY MASS INDEX: 33.07 KG/M2 | DIASTOLIC BLOOD PRESSURE: 75 MMHG | HEIGHT: 70 IN | RESPIRATION RATE: 17 BRPM | OXYGEN SATURATION: 97 % | TEMPERATURE: 98.2 F | SYSTOLIC BLOOD PRESSURE: 122 MMHG | HEART RATE: 68 BPM

## 2018-07-18 LAB
GFR NON-AFRICAN AMERICAN: >60 ML/MIN
GFR SERPL CREATININE-BSD FRML MDRD: >60 ML/MIN
GFR SERPL CREATININE-BSD FRML MDRD: NORMAL ML/MIN/{1.73_M2}
GLUCOSE BLD-MCNC: 103 MG/DL (ref 74–100)
PLATELET # BLD: 143 K/UL (ref 138–453)
POC CHLORIDE: 105 MMOL/L (ref 98–107)
POC CREATININE: 1.02 MG/DL (ref 0.51–1.19)
POC HEMATOCRIT: 38 % (ref 41–53)
POC HEMOGLOBIN: 13 G/DL (ref 13.5–17.5)
POC POTASSIUM: 4.1 MMOL/L (ref 3.5–4.5)
POC SODIUM: 142 MMOL/L (ref 138–146)

## 2018-07-18 PROCEDURE — 6360000004 HC RX CONTRAST MEDICATION

## 2018-07-18 PROCEDURE — 85014 HEMATOCRIT: CPT

## 2018-07-18 PROCEDURE — 84295 ASSAY OF SERUM SODIUM: CPT

## 2018-07-18 PROCEDURE — C1894 INTRO/SHEATH, NON-LASER: HCPCS

## 2018-07-18 PROCEDURE — 84132 ASSAY OF SERUM POTASSIUM: CPT

## 2018-07-18 PROCEDURE — 82435 ASSAY OF BLOOD CHLORIDE: CPT

## 2018-07-18 PROCEDURE — C1887 CATHETER, GUIDING: HCPCS

## 2018-07-18 PROCEDURE — 2500000003 HC RX 250 WO HCPCS

## 2018-07-18 PROCEDURE — 93458 L HRT ARTERY/VENTRICLE ANGIO: CPT

## 2018-07-18 PROCEDURE — 85049 AUTOMATED PLATELET COUNT: CPT

## 2018-07-18 PROCEDURE — 2709999900 HC NON-CHARGEABLE SUPPLY

## 2018-07-18 PROCEDURE — 82947 ASSAY GLUCOSE BLOOD QUANT: CPT

## 2018-07-18 PROCEDURE — 93571 IV DOP VEL&/PRESS C FLO 1ST: CPT

## 2018-07-18 PROCEDURE — C1769 GUIDE WIRE: HCPCS

## 2018-07-18 PROCEDURE — 82565 ASSAY OF CREATININE: CPT

## 2018-07-18 PROCEDURE — 7100000011 HC PHASE II RECOVERY - ADDTL 15 MIN

## 2018-07-18 PROCEDURE — 6360000002 HC RX W HCPCS

## 2018-07-18 PROCEDURE — 7100000010 HC PHASE II RECOVERY - FIRST 15 MIN

## 2018-07-18 RX ORDER — SODIUM CHLORIDE 0.9 % (FLUSH) 0.9 %
10 SYRINGE (ML) INJECTION PRN
Status: CANCELLED | OUTPATIENT
Start: 2018-07-18

## 2018-07-18 RX ORDER — ACETAMINOPHEN 325 MG/1
650 TABLET ORAL EVERY 4 HOURS PRN
Status: CANCELLED | OUTPATIENT
Start: 2018-07-18

## 2018-07-18 RX ORDER — ONDANSETRON 2 MG/ML
4 INJECTION INTRAMUSCULAR; INTRAVENOUS EVERY 6 HOURS PRN
Status: CANCELLED | OUTPATIENT
Start: 2018-07-18

## 2018-07-18 RX ORDER — SODIUM CHLORIDE 9 MG/ML
INJECTION, SOLUTION INTRAVENOUS CONTINUOUS
Status: DISCONTINUED | OUTPATIENT
Start: 2018-07-18 | End: 2018-07-19 | Stop reason: HOSPADM

## 2018-07-18 RX ORDER — ISOSORBIDE MONONITRATE 30 MG/1
30 TABLET, EXTENDED RELEASE ORAL DAILY
Qty: 30 TABLET | Refills: 3 | Status: SHIPPED | OUTPATIENT
Start: 2018-07-18 | End: 2018-08-13 | Stop reason: SINTOL

## 2018-07-18 RX ORDER — SODIUM CHLORIDE 0.9 % (FLUSH) 0.9 %
10 SYRINGE (ML) INJECTION EVERY 12 HOURS SCHEDULED
Status: CANCELLED | OUTPATIENT
Start: 2018-07-18

## 2018-07-18 RX ADMIN — SODIUM CHLORIDE: 9 INJECTION, SOLUTION INTRAVENOUS at 13:12

## 2018-07-18 NOTE — H&P
(ZANTAC) 300 MG tablet TAKE 1 TABLET BY MOUTH AT BEDTIME 2/12/18   Elyse Cloud,    pravastatin (PRAVACHOL) 40 MG tablet TAKE ONE TABLET BY MOUTH ONCE EVERY EVENING 2/12/18   Elyse Cloud, DO   losartan (COZAAR) 50 MG tablet TAKE 1 TABLET BY MOUTH EVERY DAY 2/12/18   Elyse Cloud, DO   levothyroxine (SYNTHROID) 75 MCG tablet Take 1 tablet by mouth Daily 1/26/18   Elyse Cloud, DO   triamcinolone (KENALOG) 0.1 % cream Apply  topically 2 times daily. 7/28/14   Elyse Cloud,    niacin (NIASPAN) 500 MG CR tablet Take 500 mg by mouth 2 times daily. Two pills daily. Historical Provider, MD   Omega-3 Fatty Acids (FISH OIL) 1200 MG CAPS Take 2,000 mg by mouth 2 times daily. Historical Provider, MD   Multiple Vitamins-Minerals (MULTIVITAMIN PO) daily. Historical Provider, MD   ASPIRIN PO 81 mg daily. Historical Provider, MD      No current facility-administered medications for this encounter. Allergies:  Ace inhibitors; Effient [prasugrel]; and Statins [statins]    Social History:   reports that he quit smoking about 33 years ago. He has never used smokeless tobacco. He reports that he drinks alcohol. He reports that he does not use drugs. Family History: family history includes Cancer in his mother; Coronary Art Dis in his father; Crohn's Disease in his sister; Emphysema in his father; Heart Attack in his mother; Heart Disease in his maternal grandfather, maternal grandmother, and mother; Heart Failure in his father; High Blood Pressure in his mother; High Cholesterol in his child and sister; Stroke in his mother; Thyroid Disease in his mother. No h/o sudden cardiac death. No for premature CAD    REVIEW OF SYSTEMS:    · Constitutional: tired    · Eyes: No visual changes or diplopia. No scleral icterus. · ENT: No Headaches  · Cardiovascular: mentioned above  · Respiratory: dyspnea on exertion. · Gastrointestinal: No abdominal pain.   No change in bowel or bladder results for input(s): APTT in the last 72 hours. CARDIAC ENZYMES:No results for input(s): CKTOTAL, CKMB, CKMBINDEX, TROPONINI in the last 72 hours. FASTING LIPID PANEL:  Lab Results   Component Value Date    HDL 47 02/05/2018    LDLDIRECT 124 02/05/2018    TRIG 423 02/05/2018     LIVER PROFILE:No results for input(s): AST, ALT, LABALBU in the last 72 hours. IMPRESSION:    Patient Active Problem List   Diagnosis    HTN (hypertension)    Peptic ulcer disease    Coronary artery disease    GERD (gastroesophageal reflux disease)    Impaired fasting glucose    Benign prostatic hyperplasia    Erectile dysfunction    Familial hyperlipidemia    Left rotator cuff tear    Benign prostatic hyperplasia    Encounter for screening colonoscopy    Obstructive sleep apnea    Acquired hypothyroidism    Ventral hernia without obstruction or gangrene    Umbilical hernia without obstruction and without gangrene     1 H/O CAD S/P TAM in LAD & OM( 2010)  2 Angina with abnormal stress test  3 PUD  4 GRISEL  5. Skin cancer on left ear    RECOMMENDATIONS:  1. Will recommend cardiac cath after discussing with Dr. Jennifer Conteh  2. Post cath orders to follow      Discussed with patient and Nurse.     Electronically signed by Ismael March MD on 7/18/2018 at 8:52 AM  [facility]

## 2018-07-18 NOTE — PROCEDURES
non-significant lesions.   Normal LV systolic function. Recommendation:     Medical therapy as needed.   Risk factor modification.   Routine Post Diagnostic Cath orders.   Add imdur 30mg po qday. History and Risk Factors    [x] Hypertension     [] Family history of CAD  [x] Hyperlipidemia     [] Cerebrovascular Disease   [] Prior MI       [] Peripheral Vascular disease   [x] Prior PCI              [] Diabetes Mellitus    [] Left Main PCI. [] Currently on Dialysis. [] Prior CABG. [] Currently smoker. Former smoker  [] Cardiac Arrest outside of healthcare facility. [] Yes    [x] No        Witnessed     [] Yes   [] No     Arrest after arrival of EMS  [] Yes   [] No     [] Cardiac Arrest at other Facility. [] Yes   [x] No    Pre-Procedure Information. Heart Failure       [] Yes    [x] No        Class  [] I      [] II  [] III    [] IV. New Diagnosis    [] Yes  [] No    HF Type      [] Systolic   [] Diastolic          [] Unknown. Diagnostic Test:   EKG       [] Normal   [x] Abnormal    New antiarrhythmia medications:    [] Yes   [x] No   New onset atrial fibrillation / Flutter     [] Yes   [x] No   ECG Abnormalities:      [] V. Fib   [] [place]. Tach           [x] NS V. T   [] New LBBB           [] T. Inv  []  ST dev > 0.5 mm         [] PVC's freq  [] PVC's infrequent    Stress Test Performed:   [x] Yes    [] No     Type:     [] Stress Echo   [] Exercise Stress Test (no imaging)      [x] Stress Nuclear  [] Stress Imaging     Results   [] Negative   [x] Positive        [] Indeterminate  [] Unavailable     If Positive/ Risk / Extent of Ischemia:       [] Low  [x] Intermediate         [] High  [] Unavailable      Cardiac CTA Performed:  [] Yes    [x] No      Results   [] CAD   [] Non obstructive CAD      [] No CAD   [] Uncertain      [] Unknown   [] Structural Disease.      Pre Procedure Medications: [] Yes    [] No         [x] ASA  [x] Beta Blockers      [] Nitrate  [] Ca Channel Blockers      []

## 2018-07-18 NOTE — PROGRESS NOTES
Patient to room . Post cath recovery initiated. Side rails up times 2, call light in . Family at bedside. Art line intact to right groin. Good wave from noted. No drainage or hematoma noted to right groin. patient denies any pain at this time.

## 2018-08-06 ENCOUNTER — HOSPITAL ENCOUNTER (OUTPATIENT)
Dept: LAB | Age: 73
Setting detail: SPECIMEN
Discharge: HOME | End: 2018-08-06
Payer: MEDICARE

## 2018-08-06 DIAGNOSIS — N40.0 BENIGN PROSTATIC HYPERPLASIA, UNSPECIFIED WHETHER LOWER URINARY TRACT SYMPTOMS PRESENT: ICD-10-CM

## 2018-08-06 DIAGNOSIS — E03.9 ACQUIRED HYPOTHYROIDISM: ICD-10-CM

## 2018-08-06 DIAGNOSIS — I10 ESSENTIAL HYPERTENSION: ICD-10-CM

## 2018-08-06 DIAGNOSIS — E78.49 FAMILIAL HYPERLIPIDEMIA: ICD-10-CM

## 2018-08-06 DIAGNOSIS — R73.01 IMPAIRED FASTING GLUCOSE: ICD-10-CM

## 2018-08-06 LAB
ABSOLUTE EOS #: 0.1 K/UL (ref 0–0.4)
ABSOLUTE IMMATURE GRANULOCYTE: ABNORMAL K/UL (ref 0–0.3)
ABSOLUTE LYMPH #: 1 K/UL (ref 1–4.8)
ABSOLUTE MONO #: 0.6 K/UL (ref 0.1–1.2)
ALBUMIN SERPL-MCNC: 4 G/DL (ref 3.5–5.2)
ALBUMIN/GLOBULIN RATIO: 1.2 (ref 1–2.5)
ALP BLD-CCNC: 63 U/L (ref 40–129)
ALT SERPL-CCNC: 23 U/L (ref 5–41)
ANION GAP SERPL CALCULATED.3IONS-SCNC: 11 MMOL/L (ref 9–17)
AST SERPL-CCNC: 19 U/L
BASOPHILS # BLD: 1 % (ref 0–2)
BASOPHILS ABSOLUTE: 0 K/UL (ref 0–0.2)
BILIRUB SERPL-MCNC: 0.59 MG/DL (ref 0.3–1.2)
BUN BLDV-MCNC: 18 MG/DL (ref 8–23)
BUN/CREAT BLD: 17 (ref 9–20)
CALCIUM SERPL-MCNC: 9.6 MG/DL (ref 8.6–10.4)
CHLORIDE BLD-SCNC: 103 MMOL/L (ref 98–107)
CHOLESTEROL/HDL RATIO: 5.1
CHOLESTEROL: 173 MG/DL
CO2: 27 MMOL/L (ref 20–31)
CREAT SERPL-MCNC: 1.09 MG/DL (ref 0.7–1.2)
DIFFERENTIAL TYPE: ABNORMAL
EOSINOPHILS RELATIVE PERCENT: 2 % (ref 1–8)
ESTIMATED AVERAGE GLUCOSE: 123 MG/DL
GFR AFRICAN AMERICAN: >60 ML/MIN
GFR NON-AFRICAN AMERICAN: >60 ML/MIN
GFR SERPL CREATININE-BSD FRML MDRD: ABNORMAL ML/MIN/{1.73_M2}
GFR SERPL CREATININE-BSD FRML MDRD: ABNORMAL ML/MIN/{1.73_M2}
GLUCOSE BLD-MCNC: 123 MG/DL (ref 70–99)
HBA1C MFR BLD: 5.9 % (ref 4.8–5.9)
HCT VFR BLD CALC: 36.1 % (ref 41–53)
HDLC SERPL-MCNC: 34 MG/DL
HEMOGLOBIN: 12.4 G/DL (ref 13.5–17.5)
IMMATURE GRANULOCYTES: ABNORMAL %
LDL CHOLESTEROL DIRECT: 96 MG/DL
LDL CHOLESTEROL: 62 MG/DL (ref 0–130)
LYMPHOCYTES # BLD: 19 % (ref 15–43)
MCH RBC QN AUTO: 32.6 PG (ref 26–34)
MCHC RBC AUTO-ENTMCNC: 34.4 G/DL (ref 31–37)
MCV RBC AUTO: 94.8 FL (ref 80–100)
MONOCYTES # BLD: 12 % (ref 6–14)
NRBC AUTOMATED: ABNORMAL PER 100 WBC
PDW BLD-RTO: 14 % (ref 11–14.5)
PLATELET # BLD: 134 K/UL (ref 140–450)
PLATELET ESTIMATE: ABNORMAL
PMV BLD AUTO: 9.6 FL (ref 6–12)
POTASSIUM SERPL-SCNC: 4.6 MMOL/L (ref 3.7–5.3)
PROSTATE SPECIFIC ANTIGEN: 6.16 UG/L
RBC # BLD: 3.8 M/UL (ref 4.5–5.9)
RBC # BLD: ABNORMAL 10*6/UL
SEG NEUTROPHILS: 66 % (ref 44–74)
SEGMENTED NEUTROPHILS ABSOLUTE COUNT: 3.7 K/UL (ref 1.8–7.7)
SODIUM BLD-SCNC: 141 MMOL/L (ref 135–144)
THYROXINE, FREE: 1.11 NG/DL (ref 0.93–1.7)
TOTAL PROTEIN: 7.4 G/DL (ref 6.4–8.3)
TRIGL SERPL-MCNC: 383 MG/DL
TSH SERPL DL<=0.05 MIU/L-ACNC: 3.78 MIU/L (ref 0.3–5)
VLDLC SERPL CALC-MCNC: ABNORMAL MG/DL (ref 1–30)
WBC # BLD: 5.4 K/UL (ref 3.5–11)
WBC # BLD: ABNORMAL 10*3/UL

## 2018-08-06 PROCEDURE — 84439 ASSAY OF FREE THYROXINE: CPT

## 2018-08-06 PROCEDURE — 85025 COMPLETE CBC W/AUTO DIFF WBC: CPT

## 2018-08-06 PROCEDURE — 36415 COLL VENOUS BLD VENIPUNCTURE: CPT

## 2018-08-06 PROCEDURE — 84153 ASSAY OF PSA TOTAL: CPT

## 2018-08-06 PROCEDURE — 83036 HEMOGLOBIN GLYCOSYLATED A1C: CPT

## 2018-08-06 PROCEDURE — 83721 ASSAY OF BLOOD LIPOPROTEIN: CPT

## 2018-08-06 PROCEDURE — 84443 ASSAY THYROID STIM HORMONE: CPT

## 2018-08-06 PROCEDURE — 80061 LIPID PANEL: CPT

## 2018-08-06 PROCEDURE — 80053 COMPREHEN METABOLIC PANEL: CPT

## 2018-08-08 ENCOUNTER — OFFICE VISIT (OUTPATIENT)
Dept: CARDIOLOGY | Age: 73
End: 2018-08-08
Payer: MEDICARE

## 2018-08-08 VITALS
DIASTOLIC BLOOD PRESSURE: 70 MMHG | HEART RATE: 60 BPM | HEIGHT: 70 IN | BODY MASS INDEX: 32.93 KG/M2 | SYSTOLIC BLOOD PRESSURE: 104 MMHG | WEIGHT: 230 LBS

## 2018-08-08 DIAGNOSIS — R06.09 DOE (DYSPNEA ON EXERTION): ICD-10-CM

## 2018-08-08 DIAGNOSIS — Z95.5 STENTED CORONARY ARTERY: ICD-10-CM

## 2018-08-08 DIAGNOSIS — I10 ESSENTIAL HYPERTENSION: ICD-10-CM

## 2018-08-08 DIAGNOSIS — I51.89 DIASTOLIC DYSFUNCTION: ICD-10-CM

## 2018-08-08 DIAGNOSIS — R94.39 ABNORMAL STRESS TEST: Primary | ICD-10-CM

## 2018-08-08 PROCEDURE — 99214 OFFICE O/P EST MOD 30 MIN: CPT | Performed by: INTERNAL MEDICINE

## 2018-08-08 RX ORDER — LEVOTHYROXINE SODIUM 0.07 MG/1
TABLET ORAL
Qty: 90 TABLET | Refills: 1 | Status: SHIPPED | OUTPATIENT
Start: 2018-08-08 | End: 2019-02-08

## 2018-08-08 NOTE — PROGRESS NOTES
Today's Date: 8/8/2018  Patient's Name: Yojana Mak  Patient's age: 68 y.o., 1945    Subjective: The patient is a 68y.o. year old, , male is in the office for F/U on SOB. Despite using his CPAP machine after initial improvement he is back now feeling more SOB with activity and feeling tired with lack of energy during the day. He noted also increased lower ext edema. He tami any chest pain or discomfort. No orthopnea or PND. No syncope. 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 Past Surgical History:   has a past surgical history that includes Nasal fracture surgery (1960); Vasectomy (Bilateral, 04/04/1980); Upper gastrointestinal endoscopy (01/21/2011); Colonoscopy (01/21/2011); Cardiac catheterization (10/20/2011); pre-malignant / benign skin lesion excision (Left, 01/16/2012); pre-malignant / benign skin lesion excision (Left, 07/19/2011); Appendectomy (age 11); Knee arthroscopy (Left, 03/19/2010); other surgical history (Left, 02/12/2010); Shoulder arthroscopy (Left, 10/17/14); Septoplasty (10/30/2015); Colonoscopy (4/6/16); Prostate Biopsy (Bilateral, 81/71/7338); repair umbilical ODVP,9+Q/U,PVZLY (N/A, 1/11/2018); and Cataract removal (Bilateral, 03/2018). Home Medications:  Prior to Admission medications    Medication Sig Start Date End Date Taking?  Authorizing Provider   isosorbide mononitrate (IMDUR) 30 MG extended release tablet Take 1 tablet by mouth daily 7/18/18  Yes Corwin Grace MD   furosemide (LASIX) there has been no unanticipated weight loss. There's been No change in energy level, No change in activity level. · Eyes: No visual changes or diplopia. No scleral icterus. · ENT: No Headaches, hearing loss or vertigo. No mouth sores or sore throat. · Cardiovascular: As above. · Respiratory: As above. · Gastrointestinal: No abdominal pain, appetite loss, blood in stools. No change in bowel or bladder habits. · Genitourinary: No dysuria, trouble voiding, or hematuria. · Musculoskeletal:  No gait disturbance, No weakness or joint complaints. · Psychiatric: No anxiety, or depression. · Endocrine: No temperature intolerance. No excessive thirst, fluid intake, or urination. No tremor. · Hematologic/Lymphatic: No abnormal bruising or bleeding, blood clots or swollen lymph nodes. · Allergic/Immunologic: No nasal congestion or hives. Physical Exam:  /70   Pulse 60   Ht 5' 10\" (1.778 m)   Wt 230 lb (104.3 kg)   BMI 33.00 kg/m²   Constitutional and General Appearance: alert, cooperative, no distress and appears stated age  [de-identified]: PERRL, no cervical lymphadenopathy. No masses palpable. Normal oral mucosa  Respiratory:  · Normal excursion and expansion without use of accessory muscles  · Resp Auscultation: Good respiratory effort. No for increased work of breathing. On auscultation: clear to auscultation bilaterally  Cardiovascular:  · The apical impulse is not displaced  · Heart tones are crisp and normal. regular S1 and S2.  · Jugular venous pulsation Normal  · The carotid upstroke is normal in amplitude and contour without delay or bruit  · Peripheral pulses are symmetrical and full   Abdomen:   · No masses or tenderness  · Bowel sounds present  Extremities:  ·  No Cyanosis or Clubbing  ·  Lower extremity edema: No  ·  Skin: Warm and dry  Neurological:  · Alert and oriented.   · Moves all extremities well  · No abnormalities of mood, affect, memory, mentation, or behavior are noted    Cardiac Data:  Diagnostics:    EKG: Normal sinus rhythm, incomplete RBBB, unchanged from previous tracings. Labs:     FASTING LIPID PANEL:  Lab Results   Component Value Date    HDL 34 08/06/2018    LDLDIRECT 96 08/06/2018    TRIG 383 08/06/2018     Nuclear stress test 11/2013:  Small area of photopenia with no significant ischemi. IMPRESSION:    Patient Active Problem List   Diagnosis    HTN (hypertension)    Peptic ulcer disease    Coronary artery disease    GERD (gastroesophageal reflux disease)    Impaired fasting glucose    Benign prostatic hyperplasia    Erectile dysfunction    Familial hyperlipidemia    Left rotator cuff tear    Benign prostatic hyperplasia    Encounter for screening colonoscopy    Obstructive sleep apnea    Acquired hypothyroidism    Ventral hernia without obstruction or gangrene    Umbilical hernia without obstruction and without gangrene     Echo 09/209/2016:  1. Normal left ventricular size with mild to moderate concentric left ventricular hypertrophy. 2. Normal systolic left ventricular function. Ejection fraction is 50 to 55%. 3. Grade I diastolic dysfunction. 4. Biatrial enlargement. 5. Mildly dilated right ventricle with mildly reduced function. 6. Mildly sclerotic aortic valve. 7. Mild to moderate tricuspid regurgitation. RVSP is 49 mm Hg. 8. No pericardial effusion. Event monitor 09/2016:  Sinus rhythm with short runs of PSVT. Nuclear stress test 06/2018:  1. Moderate sized mid and apical septal ischemia. 2. EF 59%. Cardiac cath 07/18/18:   Patent LAD and OM2 stents.   Occlusion of RCA (non-dominant vessel).  IFR of LAD showed non-significant lesions.   Normal LV systolic function.     Assessment / Acute Cardiac Problems:     1-CAD: with prior stenting to LAD and OM 10/2011: Repeat stress test and cardiac cath as above: continue to complain of significant symptoms despite medical treatment: will proceed with PCI to the LAD since his stress test was

## 2018-08-13 ENCOUNTER — OFFICE VISIT (OUTPATIENT)
Dept: FAMILY MEDICINE CLINIC | Age: 73
End: 2018-08-13
Payer: MEDICARE

## 2018-08-13 VITALS
BODY MASS INDEX: 32.5 KG/M2 | SYSTOLIC BLOOD PRESSURE: 120 MMHG | RESPIRATION RATE: 18 BRPM | HEIGHT: 70 IN | WEIGHT: 227 LBS | HEART RATE: 56 BPM | OXYGEN SATURATION: 96 % | DIASTOLIC BLOOD PRESSURE: 70 MMHG

## 2018-08-13 DIAGNOSIS — E78.49 FAMILIAL HYPERLIPIDEMIA: ICD-10-CM

## 2018-08-13 DIAGNOSIS — E03.9 ACQUIRED HYPOTHYROIDISM: ICD-10-CM

## 2018-08-13 DIAGNOSIS — N40.0 BENIGN PROSTATIC HYPERPLASIA, UNSPECIFIED WHETHER LOWER URINARY TRACT SYMPTOMS PRESENT: ICD-10-CM

## 2018-08-13 DIAGNOSIS — I10 ESSENTIAL HYPERTENSION: Primary | ICD-10-CM

## 2018-08-13 DIAGNOSIS — R73.01 IMPAIRED FASTING GLUCOSE: ICD-10-CM

## 2018-08-13 DIAGNOSIS — I25.119 CORONARY ARTERY DISEASE INVOLVING NATIVE CORONARY ARTERY OF NATIVE HEART WITH ANGINA PECTORIS (HCC): ICD-10-CM

## 2018-08-13 DIAGNOSIS — G47.33 OBSTRUCTIVE SLEEP APNEA: ICD-10-CM

## 2018-08-13 PROCEDURE — 1036F TOBACCO NON-USER: CPT | Performed by: FAMILY MEDICINE

## 2018-08-13 PROCEDURE — 3017F COLORECTAL CA SCREEN DOC REV: CPT | Performed by: FAMILY MEDICINE

## 2018-08-13 PROCEDURE — G8598 ASA/ANTIPLAT THER USED: HCPCS | Performed by: FAMILY MEDICINE

## 2018-08-13 PROCEDURE — G8417 CALC BMI ABV UP PARAM F/U: HCPCS | Performed by: FAMILY MEDICINE

## 2018-08-13 PROCEDURE — 99214 OFFICE O/P EST MOD 30 MIN: CPT | Performed by: FAMILY MEDICINE

## 2018-08-13 PROCEDURE — 1123F ACP DISCUSS/DSCN MKR DOCD: CPT | Performed by: FAMILY MEDICINE

## 2018-08-13 PROCEDURE — G8427 DOCREV CUR MEDS BY ELIG CLIN: HCPCS | Performed by: FAMILY MEDICINE

## 2018-08-13 PROCEDURE — 4040F PNEUMOC VAC/ADMIN/RCVD: CPT | Performed by: FAMILY MEDICINE

## 2018-08-13 PROCEDURE — 1101F PT FALLS ASSESS-DOCD LE1/YR: CPT | Performed by: FAMILY MEDICINE

## 2018-08-13 ASSESSMENT — ENCOUNTER SYMPTOMS
WHEEZING: 0
SORE THROAT: 0
VOMITING: 0
COUGH: 0
CONSTIPATION: 0
ABDOMINAL PAIN: 0
NAUSEA: 0
RHINORRHEA: 0
TROUBLE SWALLOWING: 0
SHORTNESS OF BREATH: 1
EYE DISCHARGE: 0
SINUS PRESSURE: 0
EYE REDNESS: 0
DIARRHEA: 0

## 2018-08-13 ASSESSMENT — PATIENT HEALTH QUESTIONNAIRE - PHQ9
1. LITTLE INTEREST OR PLEASURE IN DOING THINGS: 0
2. FEELING DOWN, DEPRESSED OR HOPELESS: 0
SUM OF ALL RESPONSES TO PHQ QUESTIONS 1-9: 0
SUM OF ALL RESPONSES TO PHQ9 QUESTIONS 1 & 2: 0
SUM OF ALL RESPONSES TO PHQ QUESTIONS 1-9: 0

## 2018-08-13 NOTE — PATIENT INSTRUCTIONS
17 mmol/L Final    Alkaline Phosphatase 08/06/2018 63  40 - 129 U/L Final    ALT 08/06/2018 23  5 - 41 U/L Final    AST 08/06/2018 19  <40 U/L Final    Total Bilirubin 08/06/2018 0.59  0.3 - 1.2 mg/dL Final    Total Protein 08/06/2018 7.4  6.4 - 8.3 g/dL Final    Alb 08/06/2018 4.0  3.5 - 5.2 g/dL Final    Albumin/Globulin Ratio 08/06/2018 1.2  1.0 - 2.5 Final    GFR Non- 08/06/2018 >60  >60 mL/min Final    GFR  08/06/2018 >60  >60 mL/min Final    GFR Comment 08/06/2018        Final    Comment: Average GFR for 79or more years old:   76 mL/min/1.73sq m  Chronic Kidney Disease:   <60 mL/min/1.73sq m  Kidney failure:   <15 mL/min/1.73sq m              eGFR calculated using average adult body mass. Additional eGFR calculator   available at:        GiftMe.[employer]            GFR Staging 08/06/2018 NOT REPORTED   Final    Cholesterol 08/06/2018 173  <200 mg/dL Final    Comment:    Cholesterol Guidelines:      <200  Desirable   200-240  Borderline      >240  Undesirable         HDL 08/06/2018 34* >40 mg/dL Final    Comment:    HDL Guidelines:    <40     Undesirable   40-59    Borderline    >59     Desirable         LDL Cholesterol 08/06/2018 62  0 - 130 mg/dL Final    Comment:    LDL Guidelines:     <100    Desirable   100-129   Near to/above Desirable   130-159   Borderline      >159   Undesirable     Direct (measured) LDL and calculated LDL are not interchangeable tests.  Chol/HDL Ratio 08/06/2018 5.1* <5 Final            Triglycerides 08/06/2018 383* <150 mg/dL Final    Comment:    Triglyceride Guidelines:     <150   Desirable   150-199  Borderline   200-499  High     >499   Very high   Based on AHA Guidelines for fasting triglyceride, October 2012.          VLDL 08/06/2018 NOT REPORTED  1 - 30 mg/dL Final    Hemoglobin A1C 08/06/2018 5.9  4.8 - 5.9 % Final    Estimated Avg Glucose 08/06/2018 123  mg/dL Final    TSH 08/06/2018 3.78 everyone, because it can cause serious bleeding. · See your doctor regularly. You may need to see the doctor more often at first or until your blood pressure comes down. · If you are taking blood pressure medicine, talk to your doctor before you take decongestants or anti-inflammatory medicine, such as ibuprofen. Some of these medicines can raise blood pressure. · Learn how to check your blood pressure at home. Lifestyle changes  · Stay at a healthy weight. This is especially important if you put on weight around the waist. Losing even 10 pounds can help you lower your blood pressure. · If your doctor recommends it, get more exercise. Walking is a good choice. Bit by bit, increase the amount you walk every day. Try for at least 30 minutes on most days of the week. You also may want to swim, bike, or do other activities. · Avoid or limit alcohol. Talk to your doctor about whether you can drink any alcohol. · Try to limit how much sodium you eat to less than 2,300 milligrams (mg) a day. Your doctor may ask you to try to eat less than 1,500 mg a day. · Eat plenty of fruits (such as bananas and oranges), vegetables, legumes, whole grains, and low-fat dairy products. · Lower the amount of saturated fat in your diet. Saturated fat is found in animal products such as milk, cheese, and meat. Limiting these foods may help you lose weight and also lower your risk for heart disease. · Do not smoke. Smoking increases your risk for heart attack and stroke. If you need help quitting, talk to your doctor about stop-smoking programs and medicines. These can increase your chances of quitting for good. When should you call for help? Call 911 anytime you think you may need emergency care. This may mean having symptoms that suggest that your blood pressure is causing a serious heart or blood vessel problem. Your blood pressure may be over 180/110.   For example, call 911 if:    · You have symptoms of a heart attack.  These may include:  ¨ Chest pain or pressure, or a strange feeling in the chest.  ¨ Sweating. ¨ Shortness of breath. ¨ Nausea or vomiting. ¨ Pain, pressure, or a strange feeling in the back, neck, jaw, or upper belly or in one or both shoulders or arms. ¨ Lightheadedness or sudden weakness. ¨ A fast or irregular heartbeat.     · You have symptoms of a stroke. These may include:  ¨ Sudden numbness, tingling, weakness, or loss of movement in your face, arm, or leg, especially on only one side of your body. ¨ Sudden vision changes. ¨ Sudden trouble speaking. ¨ Sudden confusion or trouble understanding simple statements. ¨ Sudden problems with walking or balance. ¨ A sudden, severe headache that is different from past headaches.     · You have severe back or belly pain.    Do not wait until your blood pressure comes down on its own. Get help right away.   Call your doctor now or seek immediate care if:    · Your blood pressure is much higher than normal (such as 180/110 or higher), but you don't have symptoms.     · You think high blood pressure is causing symptoms, such as:  ¨ Severe headache. ¨ Blurry vision.    Watch closely for changes in your health, and be sure to contact your doctor if:    · Your blood pressure measures 140/90 or higher at least 2 times. That means the top number is 140 or higher or the bottom number is 90 or higher, or both.     · You think you may be having side effects from your blood pressure medicine.     · Your blood pressure is usually normal, but it goes above normal at least 2 times. Where can you learn more? Go to [email or web address] and sign in to your Sendmybag account. Enter L574 in the Divvyshot box to learn more about \"High Blood Pressure: Care Instructions. \"     If you do not have an account, please click on the \"Sign Up Now\" link. Current as of: December 6, 2017  Content Version: 11.7  © 2914-2604 [employer]  Care

## 2018-08-13 NOTE — PROGRESS NOTES
2018     Magdaleno Mak (:  1945) is a 68 y.o. male, here for evaluation of the following medical concerns:    HPI   Patient comes in today for follow up of chronic health issues   Chief Complaint   Patient presents with    Hypertension     6 mo f/u; planned visit    Hyperlipidemia     6 mo f/u    Hypothyroidism     6 mo f/u    Blood Sugar Problem     IFG; 6 mo f/u    Arthritis     OA; 6 mo f/u    Gastroesophageal Reflux     6 mo f/u    Benign Prostatic Hypertrophy     6 mo f/u    Discuss Labs     drawn 18    Coronary Artery Disease     having 1-2 stents this Thursday for 2 blockages   . Patient Does have known underlying coronary artery disease and he was recently found to have some potential blockage to his LAD. Did try to medically manage this but was unable to tolerate Imdur therapy. Ultimately they do plan to have him undergo cardiac catheterization this week for probable stent placement. He does note his only complaint is that he does get short of breath when he exerts himself. He hasn't had any pain or discomfort in his chest.  He notes however previously when he had coronary artery blockage his main issue was dyspnea with exertion. Hopefully this will fix the issue. I did make note that he is slightly anemic and we did discuss adding iron supplemental therapy to his regimen. He is agreeable with this plan. Does have known impaired fasting glucose and his fasting blood sugars running 123 but his hemoglobin A1c shows that his blood sugar averages are staying within an acceptable range. Does have known BPH and his PSA has persistently been high but his PSA did drop compared to last check. He is asymptomatic at this time. Does have a known history of hypertension which is stable and controlled on his current medical therapy. He has known hyperlipidemia and his triglycerides continue to run high with a lower HDL.   Likely his HDL is lower than previous check in the fact Bun/Cre Ratio 17 9 - 20    Calcium 9.6 8.6 - 10.4 mg/dL    Sodium 141 135 - 144 mmol/L    Potassium 4.6 3.7 - 5.3 mmol/L    Chloride 103 98 - 107 mmol/L    CO2 27 20 - 31 mmol/L    Anion Gap 11 9 - 17 mmol/L    Alkaline Phosphatase 63 40 - 129 U/L    ALT 23 5 - 41 U/L    AST 19 <40 U/L    Total Bilirubin 0.59 0.3 - 1.2 mg/dL    Total Protein 7.4 6.4 - 8.3 g/dL    Alb 4.0 3.5 - 5.2 g/dL    Albumin/Globulin Ratio 1.2 1.0 - 2.5    GFR Non-African American >60 >60 mL/min    GFR African American >60 >60 mL/min    GFR Comment          GFR Staging NOT REPORTED    Lipid Panel   Result Value Ref Range    Cholesterol 173 <200 mg/dL    HDL 34 (L) >40 mg/dL    LDL Cholesterol 62 0 - 130 mg/dL    Chol/HDL Ratio 5.1 (H) <5    Triglycerides 383 (H) <150 mg/dL    VLDL NOT REPORTED 1 - 30 mg/dL   Hemoglobin A1C   Result Value Ref Range    Hemoglobin A1C 5.9 4.8 - 5.9 %    Estimated Avg Glucose 123 mg/dL   TSH without Reflex   Result Value Ref Range    TSH 3.78 0.30 - 5.00 mIU/L   T4, Free   Result Value Ref Range    Thyroxine, Free 1.11 0.93 - 1.70 ng/dL   PSA, Diagnostic   Result Value Ref Range    PSA 6.16 (H) <4.1 ug/L   LDL Cholesterol, Direct   Result Value Ref Range    LDL Direct 96 <100 mg/dL     Did discuss dietary modification and increased exercise to keep cholesterol and blood sugar under good control. Other review of systems are as noted below. Did review patient's med list, allergies, social history, fam history, pmhx and pshx today as noted in the record. Preventative measures are reviewed today. See health maintenance section for complete preventative plan of care. Review of Systems   Constitutional: Negative for chills, fatigue and fever. HENT: Negative for congestion, ear pain, postnasal drip, rhinorrhea, sinus pressure, sore throat and trouble swallowing. Eyes: Negative for discharge and redness. Respiratory: Positive for shortness of breath. Negative for cough and wheezing.     Cardiovascular: (MYRBETRIQ) 50 MG TB24 TAKE 1 TABLET BY MOUTH DAILY  Anali Spring,    triamcinolone (KENALOG) 0.1 % cream Apply  topically 2 times daily. Anali Spring DO        Social History   Substance Use Topics    Smoking status: Former Smoker     Packs/day: 2.00     Years: 15.00     Quit date: 1/1/1985    Smokeless tobacco: Never Used      Comment: smoked 2 packs a day for 14 to 15 years, quit in [facility] 19 Alcohol use No      Comment: rare        Vitals:    08/13/18 0933   BP: 120/70   Site: Left Arm   Position: Sitting   Cuff Size: Large Adult   Pulse: 56   Resp: 18   SpO2: 96%   Weight: 227 lb (103 kg)   Height: 5' 10\" (1.778 m)     Estimated body mass index is 32.57 kg/m² as calculated from the following:    Height as of this encounter: 5' 10\" (1.778 m). Weight as of this encounter: 227 lb (103 kg). Physical Exam   Constitutional: He is oriented to person, place, and time. He appears well-developed and well-nourished. No distress. HENT:   Head: Normocephalic and atraumatic. Right Ear: External ear normal.   Left Ear: External ear normal.   Nose: Nose normal.   Mouth/Throat: Oropharynx is clear and moist. No oropharyngeal exudate. Eyes: Pupils are equal, round, and reactive to light. Conjunctivae and EOM are normal. Right eye exhibits no discharge. Left eye exhibits no discharge. Neck: Normal range of motion. Neck supple. No thyromegaly present. Cardiovascular: Normal rate, regular rhythm and normal heart sounds. Pulmonary/Chest: Effort normal and breath sounds normal. He has no wheezes. He has no rales. Abdominal: Soft. Bowel sounds are normal.   Musculoskeletal: He exhibits no edema. Lymphadenopathy:     He has no cervical adenopathy. Neurological: He is alert and oriented to person, place, and time. Skin: Skin is warm and dry. No rash noted. He is not diaphoretic. Psychiatric: He has a normal mood and affect.  His behavior is normal. Judgment and thought content normal.   Nursing

## 2018-08-13 NOTE — PROGRESS NOTES
Grace Steven received counseling on the following healthy behaviors: nutrition and exercise  Reviewed prior labs and health maintenance  Continue current medications, diet and exercise. Discussed use, benefit, and side effects of prescribed medications. Barriers to medication compliance addressed. Patient given educational materials - see patient instructions  Was a self-tracking handout given in paper form or via WebActionhart? Yes    Requested Prescriptions      No prescriptions requested or ordered in this encounter       All patient questions answered. Patient voiced understanding. Quality Measures    Body mass index is 32.57 kg/m². Elevated. Weight control planned discussed Healthy diet and regular exercise. BP: 120/70. Blood pressure is normal. Treatment plan consists of No treatment change needed. Fall Risk 2/12/2018 2/6/2017 2/5/2016 8/4/2015 7/28/2014   2 or more falls in past year? no no no no no   Fall with injury in past year? no no no no no     The patient does not have a history of falls. I did not - not indicated , complete a risk assessment for falls.  A plan of care for falls No Treatment plan indicated    Lab Results   Component Value Date    LDLCHOLESTEROL 62 08/06/2018    LDLDIRECT 96 08/06/2018    (goal LDL reduction with dx if diabetes is 50% LDL reduction)    PHQ Scores 8/13/2018 2/12/2018 2/6/2017 8/5/2016 8/4/2015 1/16/2014   PHQ2 Score 0 0 0 0 0 0   PHQ9 Score 0 0 0 0 0 0     Interpretation of Total Score Depression Severity: 1-4 = Minimal depression, 5-9 = Mild depression, 10-14 = Moderate depression, 15-19 = Moderately severe depression, 20-27 = Severe depression

## 2018-08-16 ENCOUNTER — HOSPITAL ENCOUNTER (OUTPATIENT)
Dept: CARDIAC CATH/INVASIVE PROCEDURES | Age: 73
Setting detail: OBSERVATION
Discharge: HOME | End: 2018-08-17
Attending: INTERNAL MEDICINE | Admitting: INTERNAL MEDICINE
Payer: MEDICARE

## 2018-08-16 DIAGNOSIS — Z98.61 S/P PTCA (PERCUTANEOUS TRANSLUMINAL CORONARY ANGIOPLASTY): ICD-10-CM

## 2018-08-16 PROCEDURE — C1874 STENT, COATED/COV W/DEL SYS: HCPCS

## 2018-08-16 PROCEDURE — 2580000003 HC RX 258: Performed by: INTERNAL MEDICINE

## 2018-08-16 PROCEDURE — G0378 HOSPITAL OBSERVATION PER HR: HCPCS

## 2018-08-16 PROCEDURE — 7100000011 HC PHASE II RECOVERY - ADDTL 15 MIN

## 2018-08-16 PROCEDURE — 2500000003 HC RX 250 WO HCPCS

## 2018-08-16 PROCEDURE — 6360000004 HC RX CONTRAST MEDICATION

## 2018-08-16 PROCEDURE — C1887 CATHETER, GUIDING: HCPCS

## 2018-08-16 PROCEDURE — 7100000010 HC PHASE II RECOVERY - FIRST 15 MIN

## 2018-08-16 PROCEDURE — C1769 GUIDE WIRE: HCPCS

## 2018-08-16 PROCEDURE — 2709999900 HC NON-CHARGEABLE SUPPLY

## 2018-08-16 PROCEDURE — 6360000002 HC RX W HCPCS

## 2018-08-16 PROCEDURE — C1894 INTRO/SHEATH, NON-LASER: HCPCS

## 2018-08-16 PROCEDURE — C1725 CATH, TRANSLUMIN NON-LASER: HCPCS

## 2018-08-16 PROCEDURE — C9600 PERC DRUG-EL COR STENT SING: HCPCS | Performed by: INTERNAL MEDICINE

## 2018-08-16 RX ORDER — FUROSEMIDE 20 MG/1
20 TABLET ORAL DAILY
Status: DISCONTINUED | OUTPATIENT
Start: 2018-08-16 | End: 2018-08-17 | Stop reason: HOSPADM

## 2018-08-16 RX ORDER — SODIUM CHLORIDE 0.9 % (FLUSH) 0.9 %
10 SYRINGE (ML) INJECTION PRN
Status: DISCONTINUED | OUTPATIENT
Start: 2018-08-16 | End: 2018-08-17 | Stop reason: HOSPADM

## 2018-08-16 RX ORDER — SODIUM CHLORIDE 0.9 % (FLUSH) 0.9 %
10 SYRINGE (ML) INJECTION EVERY 12 HOURS SCHEDULED
Status: DISCONTINUED | OUTPATIENT
Start: 2018-08-16 | End: 2018-08-17 | Stop reason: HOSPADM

## 2018-08-16 RX ORDER — ACETAMINOPHEN 325 MG/1
650 TABLET ORAL EVERY 4 HOURS PRN
Status: DISCONTINUED | OUTPATIENT
Start: 2018-08-16 | End: 2018-08-17 | Stop reason: HOSPADM

## 2018-08-16 RX ORDER — LEVOTHYROXINE SODIUM 0.07 MG/1
75 TABLET ORAL DAILY
Status: DISCONTINUED | OUTPATIENT
Start: 2018-08-16 | End: 2018-08-17 | Stop reason: HOSPADM

## 2018-08-16 RX ORDER — FAMOTIDINE 20 MG/1
40 TABLET, FILM COATED ORAL EVERY EVENING
Status: DISCONTINUED | OUTPATIENT
Start: 2018-08-16 | End: 2018-08-17 | Stop reason: HOSPADM

## 2018-08-16 RX ORDER — ONDANSETRON 2 MG/ML
4 INJECTION INTRAMUSCULAR; INTRAVENOUS EVERY 6 HOURS PRN
Status: DISCONTINUED | OUTPATIENT
Start: 2018-08-16 | End: 2018-08-17 | Stop reason: HOSPADM

## 2018-08-16 RX ORDER — METOPROLOL TARTRATE 50 MG/1
50 TABLET, FILM COATED ORAL 2 TIMES DAILY
Status: DISCONTINUED | OUTPATIENT
Start: 2018-08-16 | End: 2018-08-17 | Stop reason: HOSPADM

## 2018-08-16 RX ORDER — ASPIRIN 81 MG/1
81 TABLET, CHEWABLE ORAL DAILY
Status: DISCONTINUED | OUTPATIENT
Start: 2018-08-16 | End: 2018-08-17 | Stop reason: HOSPADM

## 2018-08-16 RX ORDER — ASPIRIN 81 MG/1
81 TABLET, CHEWABLE ORAL DAILY
Status: DISCONTINUED | OUTPATIENT
Start: 2018-08-17 | End: 2018-08-16 | Stop reason: SDUPTHER

## 2018-08-16 RX ORDER — NIACIN 500 MG/1
500 TABLET, EXTENDED RELEASE ORAL 2 TIMES DAILY
Status: DISCONTINUED | OUTPATIENT
Start: 2018-08-16 | End: 2018-08-17 | Stop reason: HOSPADM

## 2018-08-16 RX ORDER — CHLORAL HYDRATE 500 MG
2000 CAPSULE ORAL 2 TIMES DAILY
Status: DISCONTINUED | OUTPATIENT
Start: 2018-08-16 | End: 2018-08-17 | Stop reason: HOSPADM

## 2018-08-16 RX ORDER — TAMSULOSIN HYDROCHLORIDE 0.4 MG/1
0.4 CAPSULE ORAL DAILY
Status: DISCONTINUED | OUTPATIENT
Start: 2018-08-17 | End: 2018-08-17 | Stop reason: HOSPADM

## 2018-08-16 RX ORDER — DOXAZOSIN 2 MG/1
2 TABLET ORAL 2 TIMES DAILY
Status: DISCONTINUED | OUTPATIENT
Start: 2018-08-16 | End: 2018-08-17 | Stop reason: HOSPADM

## 2018-08-16 RX ORDER — PRAVASTATIN SODIUM 20 MG
40 TABLET ORAL NIGHTLY
Status: DISCONTINUED | OUTPATIENT
Start: 2018-08-16 | End: 2018-08-17 | Stop reason: HOSPADM

## 2018-08-16 RX ORDER — SODIUM CHLORIDE 9 MG/ML
INJECTION, SOLUTION INTRAVENOUS CONTINUOUS
Status: DISCONTINUED | OUTPATIENT
Start: 2018-08-16 | End: 2018-08-17 | Stop reason: HOSPADM

## 2018-08-16 RX ORDER — CLOPIDOGREL BISULFATE 75 MG/1
75 TABLET ORAL DAILY
Status: DISCONTINUED | OUTPATIENT
Start: 2018-08-16 | End: 2018-08-17 | Stop reason: HOSPADM

## 2018-08-16 RX ORDER — LOSARTAN POTASSIUM 50 MG/1
50 TABLET ORAL DAILY
Status: DISCONTINUED | OUTPATIENT
Start: 2018-08-16 | End: 2018-08-17 | Stop reason: HOSPADM

## 2018-08-16 RX ADMIN — Medication 10 ML: at 20:46

## 2018-08-16 RX ADMIN — SODIUM CHLORIDE: 9 INJECTION, SOLUTION INTRAVENOUS at 12:43

## 2018-08-16 NOTE — H&P
[facility]   Admission Note                 Date:   8/16/2018  Patient name: Kiko Mak  Date of admission:  8/16/2018 11:51 AM  MRN:   [id number]  YOB: 1945    Reason for Admission: Coronary angiography     CHIEF COMPLAINT:  68 y.o. y/o male presented with fatigue and exertional dyspnea. Nuclear stress 6/2018 showed moderate sized mid and apical septal ischemia with LVEF 59%. He underwent cardiac cath 07/18/2018 which finding as mentioned below, due non significant IFR it was not intervened and he was started in Imdur. His symptoms have not improved and continues to complains of SOB with exertion     [X] Please see the original hard copy H&P by Dr Conor Michaud from 8/8/18 in the short chart . 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 Past Surgical History:   has a past surgical history that includes Nasal fracture surgery (1960); Vasectomy (Bilateral, 04/04/1980); Upper gastrointestinal endoscopy (01/21/2011); Colonoscopy (01/21/2011); Cardiac catheterization (10/20/2011); pre-malignant / benign skin lesion excision (Left, 01/16/2012); pre-malignant / benign skin lesion excision (Left, 07/19/2011); Appendectomy (age 11); Knee arthroscopy (Left, 03/19/2010); other surgical history (Left, 02/12/2010); Shoulder arthroscopy (Left, 10/17/14); Septoplasty (10/30/2015); Colonoscopy (4/6/16);  Prostate Biopsy (Bilateral, 72/08/7484); repair umbilical faisal,6+y/o,reduc (N/A, 1/11/2018); and Cataract removal (Bilateral, 03/2018). Home Medications:    Prior to Admission medications    Medication Sig Start Date End Date Taking? Authorizing Provider   levothyroxine (SYNTHROID) 75 MCG tablet TAKE 1 TABLET BY MOUTH ONE TIME A DAY  8/8/18  Yes Jacqueline Urbina DO   furosemide (LASIX) 20 MG tablet Take 1 tablet by mouth daily 6/6/18  Yes Bambi Collins MD   Zn-Pyg Afri-Nettle-Saw Palmet (SAW PALMETTO COMPLEX PO) Take 2 capsules by [facility]   Yes Historical Provider, MD   doxazosin (CARDURA) 2 MG tablet Take 1 tablet by mouth 2 times daily 2/12/18  Yes Jacqueline Urbina DO   clopidogrel (PLAVIX) 75 MG tablet TAKE 1 TABLET BY MOUTH DAILY 2/12/18  Yes Jacqueline Urbina DO   tamsulosin (FLOMAX) 0.4 MG capsule TAKE 1 CAPSULE BY MOUTH DAILY 2/12/18  Yes Jacqueline Urbina DO   metoprolol tartrate (LOPRESSOR) 50 MG tablet Take 1 tablet by mouth 2 times daily 2/12/18  Yes Jacqueline Urbina DO   Mirabegron ER (MYRBETRIQ) 50 MG TB24 TAKE 1 TABLET BY MOUTH DAILY 2/12/18  Yes Jacqueline Urbina DO   ranitidine (ZANTAC) 300 MG tablet TAKE 1 TABLET BY MOUTH AT BEDTIME 2/12/18  Yes aJcqueline Urbina DO   pravastatin (PRAVACHOL) 40 MG tablet TAKE ONE TABLET BY MOUTH ONCE EVERY EVENING 2/12/18  Yes Jacqueline Urbina DO   losartan (COZAAR) 50 MG tablet TAKE 1 TABLET BY MOUTH EVERY DAY 2/12/18  Yes Jacqueline Urbina DO   niacin (NIASPAN) 500 MG CR tablet Take 500 mg by mouth 2 times daily. Two pills daily. Yes Historical Provider, MD   Omega-3 Fatty Acids (FISH OIL) 1200 MG CAPS Take 2,000 mg by mouth 2 times daily. Yes Historical Provider, MD   Multiple Vitamins-Minerals (MULTIVITAMIN PO) daily. Yes Historical Provider, MD   ASPIRIN PO 81 mg daily. Yes Historical Provider, MD       Allergies:  Ace inhibitors; Effient [prasugrel]; and Statins [statins]    Social History:   reports that he quit smoking about 33 years ago.  He has a

## 2018-08-16 NOTE — OP NOTE
[facility]    CARDIAC CATHETERIZATION    Date:   8/16/2018  Patient name: Shea Shultz Shock  Date of admission:  No admission date for patient encounter. MRN:   [id number]  YOB: 1945    Operators:  Primary: Dr. Salma Toure MD   CV Fellow: Gabby Garcias MD    Pre Procedure Conscious Sedation Data:    ASA Class:    [] I [x] II [] III [] IV    Mallampati Class:  [] I [x] II [] III [] IV     Indication:  [] STEMI      [x] + Stress test  [] ACS      [] + EKG Changes  [] Non Q MI       [] Significant Risk Factors  [x] Recurrent Angina             [] Diabetes Mellitus    [] New LBBB      [] Uncontrolled HTN. [] CHF / Low EF changes     [] Abnormal CTA / Ca Score    Procedure:  Access:  [] Femoral  [x] Radial  artery       [x] Right  [] Left    Procedure: After informed consent was obtained with explanation of the risks and benefits, patient was brought to the cath lab. The access area was prepped and draped in sterile fashion. 1% lidocaine was used for local block. The artery was cannulated with 6  Fr sheath with brisk arterial blood return. The side port was frequently flushed and aspirated with normal saline. Findings:  Left main: NL  LAD:  mid LAD - 75 % stenosis reduced to 0 % PTCA/TAM with CARINA III flow  LCX:  Dominant with 10% stenosis    OM1:  Small with 10% stenosis    OM2 has patent previously placed stent     RCA:  Not injected due to recent cath and known to be occluded    Conclusions:  1. Mid LAD 75 % stenosis reduced to 0% with PTCA-TAM with CARINA III flow      Recommendation:  1. Continue DAPT  2. Risk Factor Modification           History and Risk Factors    [x] Hypertension     [] Family history of CAD  [] Hyperlipidemia     [] Cerebrovascular Disease   [] Prior MI       [] Peripheral Vascular disease   [x] Prior PCI              [] Diabetes Mellitus    [] Left Main PCI. [] Currently on Dialysis. [] Prior CABG. [] Currently smoker.     [] Cardiac Arrest outside of healthcare facility. [] Yes    [x] No        Witnessed     [] Yes   [] No     Arrest after arrival of EMS  [] Yes   [] No     [] Cardiac Arrest at other Facility. [] Yes   [x] No    Pre-Procedure Information. Heart Failure       [] Yes    [x] No        Class  [] I      [] II  [] III    [] IV. New Diagnosis    [] Yes  [] No    HF Type      [] Systolic   [] Diastolic          [] Unknown. Diagnostic Test:   EKG       [] Normal   [] Abnormal    New antiarrhythmia medications:    [] Yes   [] No   New onset atrial fibrillation / Flutter     [] Yes   [] No   ECG Abnormalities:      [] V. Fib   [] [place]           [] NS V. T   [] New LBBB           [] T. Inv  []  ST dev > 0.5 mm         [] PVC's freq  [] PVC's infrequent    Stress Test Performed:   [x] Yes    [] No     Type:     [] Stress Echo   [] Exercise Stress Test (no imaging)      [x] Stress Nuclear  [] Stress Imaging     Results   [] Negative   [x] Positive        [] Indeterminate  [] Unavailable     If Positive/ Risk / Extent of Ischemia:       [] Low  [] Intermediate         [] High  [] Unavailable      Cardiac CTA Performed:  [] Yes    [x] No      Results   [] CAD   [] Non obstructive CAD      [] No CAD   [] Uncertain      [] Unknown   [] Structural Disease.      Pre Procedure Medications: [] Yes    [x] No         [] ASA  [] Beta Blockers      [] Nitrate  [] Ca Channel Blockers      [] Ranolazine  [] Statin       [] Plavix/Others antiplatelets        Electronically signed by Kiko Hernandez MD on 8/16/2018 at 5:42 PM      Electronically signed by Ernest Dakins, MD on 8/17/2018 at 10:16 AM      [facility]  [phone]

## 2018-08-16 NOTE — PROGRESS NOTES
Remaining air removed from TR band, no bleeding or hematoma noted. Radial pulse palpable. Pressure dressing applied with arm board in place.

## 2018-08-17 VITALS
TEMPERATURE: 98.6 F | WEIGHT: 225 LBS | HEART RATE: 67 BPM | DIASTOLIC BLOOD PRESSURE: 69 MMHG | SYSTOLIC BLOOD PRESSURE: 134 MMHG | HEIGHT: 70 IN | BODY MASS INDEX: 32.21 KG/M2 | OXYGEN SATURATION: 96 % | RESPIRATION RATE: 16 BRPM

## 2018-08-17 PROCEDURE — 94762 N-INVAS EAR/PLS OXIMTRY CONT: CPT

## 2018-08-17 PROCEDURE — 96372 THER/PROPH/DIAG INJ SC/IM: CPT

## 2018-08-17 PROCEDURE — G0378 HOSPITAL OBSERVATION PER HR: HCPCS

## 2018-08-17 PROCEDURE — 6360000002 HC RX W HCPCS: Performed by: INTERNAL MEDICINE

## 2018-08-17 RX ORDER — NITROGLYCERIN 0.4 MG/1
0.4 TABLET SUBLINGUAL EVERY 5 MIN PRN
Qty: 25 TABLET | Refills: 3 | Status: SHIPPED | OUTPATIENT
Start: 2018-08-17 | End: 2019-11-20 | Stop reason: SDUPTHER

## 2018-08-17 RX ADMIN — LOSARTAN POTASSIUM 50 MG: 50 TABLET ORAL at 09:23

## 2018-08-17 RX ADMIN — DOXAZOSIN 2 MG: 2 TABLET ORAL at 09:22

## 2018-08-17 RX ADMIN — LEVOTHYROXINE SODIUM 75 MCG: 0.07 TABLET ORAL at 09:24

## 2018-08-17 RX ADMIN — ENOXAPARIN SODIUM 40 MG: 40 INJECTION SUBCUTANEOUS at 09:28

## 2018-08-17 RX ADMIN — METOPROLOL TARTRATE 50 MG: 50 TABLET, FILM COATED ORAL at 09:22

## 2018-08-17 RX ADMIN — FUROSEMIDE 20 MG: 20 TABLET ORAL at 09:23

## 2018-08-17 RX ADMIN — CLOPIDOGREL BISULFATE 75 MG: 75 TABLET ORAL at 09:22

## 2018-08-17 NOTE — DISCHARGE SUMMARY
Medication Instructions [facility]:[id number]    Printed on:08/17/18 9992   Medication Information                      ASPIRIN PO  81 mg daily. clopidogrel (PLAVIX) 75 MG tablet  TAKE 1 TABLET BY MOUTH DAILY             doxazosin (CARDURA) 2 MG tablet  Take 1 tablet by mouth 2 times daily             furosemide (LASIX) 20 MG tablet  Take 1 tablet by mouth daily             levothyroxine (SYNTHROID) 75 MCG tablet  TAKE 1 TABLET BY MOUTH ONE TIME A DAY              losartan (COZAAR) 50 MG tablet  TAKE 1 TABLET BY MOUTH EVERY DAY             metoprolol tartrate (LOPRESSOR) 50 MG tablet  Take 1 tablet by mouth 2 times daily             Mirabegron ER (MYRBETRIQ) 50 MG TB24  TAKE 1 TABLET BY MOUTH DAILY             Multiple Vitamins-Minerals (MULTIVITAMIN PO)  daily. niacin (NIASPAN) 500 MG CR tablet  Take 500 mg by mouth 2 times daily. Two pills daily. nitroGLYCERIN (NITROSTAT) 0.4 MG SL tablet  Place 1 tablet under the tongue every 5 minutes as needed for Chest pain up to max of 3 total doses. If no relief after 1 dose, call 911. Omega-3 Fatty Acids (FISH OIL) 1200 MG CAPS  Take 2,000 mg by mouth 2 times daily. pravastatin (PRAVACHOL) 40 MG tablet  TAKE ONE TABLET BY MOUTH ONCE EVERY EVENING             ranitidine (ZANTAC) 300 MG tablet  TAKE 1 TABLET BY MOUTH AT BEDTIME             tamsulosin (FLOMAX) 0.4 MG capsule  TAKE 1 CAPSULE BY MOUTH DAILY                Findings:  Left main: NL  LAD:    mid LAD - 75 % stenosis reduced to 0 % PTCA/TAM with CARINA III flow  LCX:    Dominant with 10% stenosis               OM1:  Small with 10% stenosis               OM2 has patent previously placed stent                RCA:    Not injected due to recent cath and known to be occluded     Conclusions:  1. Mid LAD 75 % stenosis reduced to 0% with PTCA-TAM with CARINA III flow        Recommendation:  1. Continue DAPT  2.  Risk Factor Modification     Coronary Discharge Core Measure:

## 2018-08-17 NOTE — CARE COORDINATION
Case Management Initial Discharge Plan  Bang H Shock,         Readmission Risk              Risk of Unplanned Readmission:        10               Met with:patient to discuss discharge plans.    Information verified: address, contacts, phone number, , insurance Yes  PCP: Noam Rea DO  Date of last visit: Aug 2018    Insurance Provider: Medicare    Discharge Planning    Living Arrangements:  Spouse/Significant Other   Support Systems:  Spouse/Significant Other, Children    Home has one story  2 stairs to climb to get into front door    Patient able to perform ADL's:Independent    Current Services (outpatient & in home) none  DME equipment: CPAP  DME provider:     Pharmacy: [facility] in [address] [place] [address] Medications:  No  Does patient want to participate in local refill/ meds to beds program?  No    Potential Assistance Needed:  N/A    Patient agreeable to home care: No  [place] of choice provided:  n/a    Prior SNF/Rehab Placement and Facility: N/A  Agreeable to SNF/Rehab: No  [place] of choice provided: n/a   Evaluation: n/a    Expected Discharge date:  18  Patient expects to be discharged to:  home  Follow Up Appointment: Best Day/ Time:      Transportation provider: wife  Transportation arrangements needed for discharge: No    Discharge Plan: home with wife        Electronically signed by Gurpreet Ward RN on 18 at 8:31 AM

## 2018-08-17 NOTE — PROGRESS NOTES
Smoking Cessation - topics covered   []  Health Risks  []  Benefits of Quitting   []  Smoking Cessation  []  Patient has no history of tobacco use  [x]  Patient is former smoker. Patient quit in 1985. [x]  No need for tobacco cessation education. []  Booklet given  []  Patient verbalizes understanding. []  Patient denies need for tobacco cessation education.   Kim Nagy  9:34 AM

## 2018-08-17 NOTE — CARE COORDINATION
Discharge [address] [facility] [place], [place] Case Management Department  Written by: Lennie Ngo RN    Patient Name: Edgar Alejandro Shock  Attending Provider: Socorro Becker MD  Admit Date: 2018  8:17 PM  MRN: [id number]  Account: [de-identified]                     : 1945  Discharge Date: 18      Disposition: home    Lennie Ngo RN

## 2018-08-28 ENCOUNTER — HOSPITAL ENCOUNTER (OUTPATIENT)
Dept: CARDIAC REHAB | Age: 73
Setting detail: THERAPIES SERIES
Discharge: HOME | End: 2018-08-28
Payer: MEDICARE

## 2018-08-28 PROCEDURE — 93798 PHYS/QHP OP CAR RHAB W/ECG: CPT

## 2018-08-30 ENCOUNTER — HOSPITAL ENCOUNTER (OUTPATIENT)
Dept: CARDIAC REHAB | Age: 73
Setting detail: THERAPIES SERIES
Discharge: HOME | End: 2018-08-30
Payer: MEDICARE

## 2018-08-30 PROCEDURE — 93798 PHYS/QHP OP CAR RHAB W/ECG: CPT

## 2018-09-04 ENCOUNTER — HOSPITAL ENCOUNTER (OUTPATIENT)
Dept: CARDIAC REHAB | Age: 73
Setting detail: THERAPIES SERIES
Discharge: HOME | End: 2018-09-04
Payer: MEDICARE

## 2018-09-04 PROCEDURE — 93798 PHYS/QHP OP CAR RHAB W/ECG: CPT

## 2018-09-05 ENCOUNTER — OFFICE VISIT (OUTPATIENT)
Dept: CARDIOLOGY | Age: 73
End: 2018-09-05
Payer: MEDICARE

## 2018-09-05 VITALS
SYSTOLIC BLOOD PRESSURE: 132 MMHG | DIASTOLIC BLOOD PRESSURE: 70 MMHG | BODY MASS INDEX: 32.78 KG/M2 | HEART RATE: 58 BPM | HEIGHT: 70 IN | WEIGHT: 229 LBS

## 2018-09-05 DIAGNOSIS — I51.89 DIASTOLIC DYSFUNCTION: ICD-10-CM

## 2018-09-05 DIAGNOSIS — Z95.5 STENTED CORONARY ARTERY: ICD-10-CM

## 2018-09-05 DIAGNOSIS — I10 ESSENTIAL HYPERTENSION: Primary | ICD-10-CM

## 2018-09-05 DIAGNOSIS — R00.2 PALPITATION: ICD-10-CM

## 2018-09-05 PROCEDURE — 93000 ELECTROCARDIOGRAM COMPLETE: CPT | Performed by: INTERNAL MEDICINE

## 2018-09-05 PROCEDURE — 99214 OFFICE O/P EST MOD 30 MIN: CPT | Performed by: INTERNAL MEDICINE

## 2018-09-05 RX ORDER — FUROSEMIDE 20 MG/1
20 TABLET ORAL DAILY
Qty: 90 TABLET | Refills: 3 | Status: SHIPPED | OUTPATIENT
Start: 2018-09-05 | End: 2019-08-14 | Stop reason: SDUPTHER

## 2018-09-05 NOTE — PROGRESS NOTES
There's been No change in energy level, No change in activity level. · Eyes: No visual changes or diplopia. No scleral icterus. · ENT: No Headaches, hearing loss or vertigo. No mouth sores or sore throat. · Cardiovascular: As above. · Respiratory: As above. · Gastrointestinal: No abdominal pain, appetite loss, blood in stools. No change in bowel or bladder habits. · Genitourinary: No dysuria, trouble voiding, or hematuria. · Musculoskeletal:  No gait disturbance, No weakness or joint complaints. · Psychiatric: No anxiety, or depression. · Endocrine: No temperature intolerance. No excessive thirst, fluid intake, or urination. No tremor. · Hematologic/Lymphatic: No abnormal bruising or bleeding, blood clots or swollen lymph nodes. · Allergic/Immunologic: No nasal congestion or hives. Physical Exam:  /70   Pulse 58   Ht 5' 10\" (1.778 m)   Wt 229 lb (103.9 kg)   BMI 32.86 kg/m²   Constitutional and General Appearance: alert, cooperative, no distress and appears stated age  [de-identified]: PERRL, no cervical lymphadenopathy. No masses palpable. Normal oral mucosa  Respiratory:  · Normal excursion and expansion without use of accessory muscles  · Resp Auscultation: Good respiratory effort. No for increased work of breathing. On auscultation: clear to auscultation bilaterally  Cardiovascular:  · The apical impulse is not displaced  · Heart tones are crisp and normal. regular S1 and S2.  · Jugular venous pulsation Normal  · The carotid upstroke is normal in amplitude and contour without delay or bruit  · Peripheral pulses are symmetrical and full   Abdomen:   · No masses or tenderness  · Bowel sounds present  Extremities:  ·  No Cyanosis or Clubbing  ·  Lower extremity edema: No  ·  Skin: Warm and dry  Neurological:  · Alert and oriented.   · Moves all extremities well  · No abnormalities of mood, affect, memory, mentation, or behavior are noted    Cardiac Data:  Diagnostics:    EKG: Normal sinus rhythm, RCA:    Not injected due to recent cath and known to be occluded    Assessment / Acute Cardiac Problems:     1-CAD: with prior stenting to LAD and OM 10/2011: Repeat LAD stenting as above: currently stable with no signs of symptoms of ischemia: felt much better after recent stenting to the LAD. 2-HTN: well controlled. 3-HLP: on statin  4-Mild LV dysfunction on cardiac cath and EF of 53% on nuclear stress test and 50-55% on echo. 5-Obesity. 6-Recurrent palpitations with only short nonsustained PSVT on event monitor: symptoms resolved since he was started on CPAP  7-Sleep apnea started on CPAP  8-Grade I diastolic dysfunction. 9-Palpitations and irregular heart beat: will evaluate with event monitor. Plan of Treatment:     1-Continue current medical treatment with ASA, Plavix, BB, ARB, Diuretics, Niacin and statin. 2-Diet, exercise and loose weight. 3-Proceed event monitor. 4-F/U in 3 months.     Electronically signed by Edward Bender MD on 9/5/2018 at 9:21 [facility]  [phone]

## 2018-09-06 ENCOUNTER — HOSPITAL ENCOUNTER (OUTPATIENT)
Dept: CARDIAC REHAB | Age: 73
Setting detail: THERAPIES SERIES
Discharge: HOME | End: 2018-09-06
Payer: MEDICARE

## 2018-09-06 PROCEDURE — 93798 PHYS/QHP OP CAR RHAB W/ECG: CPT

## 2018-09-11 ENCOUNTER — HOSPITAL ENCOUNTER (OUTPATIENT)
Dept: CARDIAC REHAB | Age: 73
Setting detail: THERAPIES SERIES
Discharge: HOME | End: 2018-09-11
Payer: MEDICARE

## 2018-09-11 PROCEDURE — 93798 PHYS/QHP OP CAR RHAB W/ECG: CPT

## 2018-09-13 ENCOUNTER — HOSPITAL ENCOUNTER (OUTPATIENT)
Dept: CARDIAC REHAB | Age: 73
Setting detail: THERAPIES SERIES
Discharge: HOME | End: 2018-09-13
Payer: MEDICARE

## 2018-09-13 ENCOUNTER — HOSPITAL ENCOUNTER (OUTPATIENT)
Dept: NON INVASIVE DIAGNOSTICS | Age: 73
Discharge: HOME | End: 2018-09-13
Payer: MEDICARE

## 2018-09-13 DIAGNOSIS — I10 ESSENTIAL HYPERTENSION: ICD-10-CM

## 2018-09-13 DIAGNOSIS — R00.2 PALPITATION: ICD-10-CM

## 2018-09-13 DIAGNOSIS — Z95.5 STENTED CORONARY ARTERY: ICD-10-CM

## 2018-09-13 PROCEDURE — 93798 PHYS/QHP OP CAR RHAB W/ECG: CPT

## 2018-09-13 PROCEDURE — 93271 ECG/MONITORING AND ANALYSIS: CPT

## 2018-09-13 PROCEDURE — 93270 REMOTE 30 DAY ECG REV/REPORT: CPT

## 2018-09-18 ENCOUNTER — HOSPITAL ENCOUNTER (OUTPATIENT)
Dept: CARDIAC REHAB | Age: 73
Setting detail: THERAPIES SERIES
Discharge: HOME | End: 2018-09-18
Payer: MEDICARE

## 2018-09-18 PROCEDURE — 93798 PHYS/QHP OP CAR RHAB W/ECG: CPT

## 2018-09-20 ENCOUNTER — HOSPITAL ENCOUNTER (OUTPATIENT)
Dept: CARDIAC REHAB | Age: 73
Setting detail: THERAPIES SERIES
Discharge: HOME | End: 2018-09-20
Payer: MEDICARE

## 2018-09-20 PROCEDURE — 93798 PHYS/QHP OP CAR RHAB W/ECG: CPT

## 2018-09-25 ENCOUNTER — HOSPITAL ENCOUNTER (OUTPATIENT)
Dept: CARDIAC REHAB | Age: 73
Setting detail: THERAPIES SERIES
Discharge: HOME | End: 2018-09-25
Payer: MEDICARE

## 2018-09-25 PROCEDURE — 93798 PHYS/QHP OP CAR RHAB W/ECG: CPT

## 2018-09-27 ENCOUNTER — HOSPITAL ENCOUNTER (OUTPATIENT)
Dept: CARDIAC REHAB | Age: 73
Setting detail: THERAPIES SERIES
Discharge: HOME | End: 2018-09-27
Payer: MEDICARE

## 2018-09-27 PROCEDURE — 93798 PHYS/QHP OP CAR RHAB W/ECG: CPT

## 2018-10-02 ENCOUNTER — HOSPITAL ENCOUNTER (OUTPATIENT)
Dept: CARDIAC REHAB | Age: 73
Setting detail: THERAPIES SERIES
Discharge: HOME | End: 2018-10-02
Payer: MEDICARE

## 2018-10-02 PROCEDURE — 93798 PHYS/QHP OP CAR RHAB W/ECG: CPT

## 2018-10-04 ENCOUNTER — HOSPITAL ENCOUNTER (OUTPATIENT)
Dept: CARDIAC REHAB | Age: 73
Setting detail: THERAPIES SERIES
Discharge: HOME | End: 2018-10-04
Payer: MEDICARE

## 2018-10-04 PROCEDURE — 93798 PHYS/QHP OP CAR RHAB W/ECG: CPT

## 2018-10-09 ENCOUNTER — HOSPITAL ENCOUNTER (OUTPATIENT)
Dept: CARDIAC REHAB | Age: 73
Setting detail: THERAPIES SERIES
Discharge: HOME | End: 2018-10-09
Payer: MEDICARE

## 2018-10-09 PROCEDURE — 93798 PHYS/QHP OP CAR RHAB W/ECG: CPT

## 2018-10-11 ENCOUNTER — HOSPITAL ENCOUNTER (OUTPATIENT)
Dept: CARDIAC REHAB | Age: 73
Setting detail: THERAPIES SERIES
Discharge: HOME | End: 2018-10-11
Payer: MEDICARE

## 2018-10-11 PROCEDURE — 93798 PHYS/QHP OP CAR RHAB W/ECG: CPT

## 2018-10-16 ENCOUNTER — HOSPITAL ENCOUNTER (OUTPATIENT)
Dept: CARDIAC REHAB | Age: 73
Setting detail: THERAPIES SERIES
Discharge: HOME | End: 2018-10-16
Payer: MEDICARE

## 2018-10-16 PROCEDURE — 93798 PHYS/QHP OP CAR RHAB W/ECG: CPT

## 2018-10-18 ENCOUNTER — HOSPITAL ENCOUNTER (OUTPATIENT)
Dept: CARDIAC REHAB | Age: 73
Setting detail: THERAPIES SERIES
Discharge: HOME | End: 2018-10-18
Payer: MEDICARE

## 2018-10-18 PROCEDURE — 93798 PHYS/QHP OP CAR RHAB W/ECG: CPT

## 2018-10-23 ENCOUNTER — HOSPITAL ENCOUNTER (OUTPATIENT)
Dept: CARDIAC REHAB | Age: 73
Setting detail: THERAPIES SERIES
Discharge: HOME | End: 2018-10-23
Payer: MEDICARE

## 2018-10-23 PROCEDURE — 93798 PHYS/QHP OP CAR RHAB W/ECG: CPT

## 2018-10-25 ENCOUNTER — HOSPITAL ENCOUNTER (OUTPATIENT)
Dept: CARDIAC REHAB | Age: 73
Setting detail: THERAPIES SERIES
Discharge: HOME | End: 2018-10-25
Payer: MEDICARE

## 2018-10-25 PROCEDURE — 93798 PHYS/QHP OP CAR RHAB W/ECG: CPT

## 2018-10-30 ENCOUNTER — HOSPITAL ENCOUNTER (OUTPATIENT)
Dept: CARDIAC REHAB | Age: 73
Setting detail: THERAPIES SERIES
Discharge: HOME | End: 2018-10-30
Payer: MEDICARE

## 2018-10-30 PROCEDURE — 93798 PHYS/QHP OP CAR RHAB W/ECG: CPT

## 2018-11-01 ENCOUNTER — HOSPITAL ENCOUNTER (OUTPATIENT)
Dept: CARDIAC REHAB | Age: 73
Setting detail: THERAPIES SERIES
Discharge: HOME | End: 2018-11-01
Payer: MEDICARE

## 2018-11-01 PROCEDURE — 93798 PHYS/QHP OP CAR RHAB W/ECG: CPT

## 2018-11-06 ENCOUNTER — HOSPITAL ENCOUNTER (OUTPATIENT)
Dept: CARDIAC REHAB | Age: 73
Setting detail: THERAPIES SERIES
Discharge: HOME | End: 2018-11-06
Payer: MEDICARE

## 2018-11-06 PROCEDURE — 93798 PHYS/QHP OP CAR RHAB W/ECG: CPT

## 2018-11-07 ENCOUNTER — OFFICE VISIT (OUTPATIENT)
Dept: CARDIOLOGY | Age: 73
End: 2018-11-07
Payer: MEDICARE

## 2018-11-07 VITALS
HEART RATE: 70 BPM | WEIGHT: 236 LBS | HEIGHT: 70 IN | DIASTOLIC BLOOD PRESSURE: 74 MMHG | BODY MASS INDEX: 33.79 KG/M2 | SYSTOLIC BLOOD PRESSURE: 130 MMHG

## 2018-11-07 DIAGNOSIS — I10 ESSENTIAL HYPERTENSION: Primary | ICD-10-CM

## 2018-11-07 DIAGNOSIS — Z95.5 STENTED CORONARY ARTERY: ICD-10-CM

## 2018-11-07 DIAGNOSIS — R00.2 PALPITATION: ICD-10-CM

## 2018-11-07 DIAGNOSIS — I51.89 DIASTOLIC DYSFUNCTION: ICD-10-CM

## 2018-11-07 PROCEDURE — 99214 OFFICE O/P EST MOD 30 MIN: CPT | Performed by: INTERNAL MEDICINE

## 2018-11-07 NOTE — PROGRESS NOTES
OM2 has patent previously placed stent            RCA:    Not injected due to recent cath and known to be occluded    Assessment / Acute Cardiac Problems:     1-CAD: with prior stenting to LAD and OM 10/2011: Repeat LAD stenting as above: currently stable with no signs of symptoms of ischemia: felt much better after recent stenting to the LAD. 2-HTN: well controlled. 3-HLP: on statin  4-Mild LV dysfunction on cardiac cath and EF of 53% on nuclear stress test and 50-55% on echo. 5-Obesity. 6-Recurrent palpitations with only short nonsustained PSVT on event monitor: symptoms resolved since he was started on CPAP  7-Sleep apnea started on CPAP  8-Grade I diastolic dysfunction. 9-Palpitations rare with event monitor showing only PVCs at the time of his symptoms    Plan of Treatment:     1-Continue current medical treatment with ASA, Plavix, BB, ARB, Diuretics, Niacin and statin. 2-Diet, exercise and loose weight. 3-Risk factors modification. 4-Continue with CPAP  5-F/U in 6 months.     Electronically signed by Liz Weems MD on 11/7/2018 at 9:21 [facility]  [phone]

## 2018-11-08 ENCOUNTER — HOSPITAL ENCOUNTER (OUTPATIENT)
Dept: CARDIAC REHAB | Age: 73
Setting detail: THERAPIES SERIES
Discharge: HOME | End: 2018-11-08
Payer: MEDICARE

## 2018-11-08 PROCEDURE — 93798 PHYS/QHP OP CAR RHAB W/ECG: CPT

## 2018-11-20 ENCOUNTER — OFFICE VISIT (OUTPATIENT)
Dept: PRIMARY CARE CLINIC | Age: 73
End: 2018-11-20
Payer: MEDICARE

## 2018-11-20 VITALS
WEIGHT: 238 LBS | SYSTOLIC BLOOD PRESSURE: 126 MMHG | OXYGEN SATURATION: 98 % | HEART RATE: 60 BPM | TEMPERATURE: 98 F | DIASTOLIC BLOOD PRESSURE: 74 MMHG | BODY MASS INDEX: 34.15 KG/M2

## 2018-11-20 DIAGNOSIS — M25.561 CHRONIC PAIN OF BOTH KNEES: Primary | ICD-10-CM

## 2018-11-20 DIAGNOSIS — M17.0 PRIMARY OSTEOARTHRITIS OF BOTH KNEES: ICD-10-CM

## 2018-11-20 DIAGNOSIS — G89.29 CHRONIC PAIN OF BOTH KNEES: Primary | ICD-10-CM

## 2018-11-20 DIAGNOSIS — M25.562 CHRONIC PAIN OF BOTH KNEES: Primary | ICD-10-CM

## 2018-11-20 PROCEDURE — G8598 ASA/ANTIPLAT THER USED: HCPCS | Performed by: FAMILY MEDICINE

## 2018-11-20 PROCEDURE — 1036F TOBACCO NON-USER: CPT | Performed by: FAMILY MEDICINE

## 2018-11-20 PROCEDURE — 1101F PT FALLS ASSESS-DOCD LE1/YR: CPT | Performed by: FAMILY MEDICINE

## 2018-11-20 PROCEDURE — 20610 DRAIN/INJ JOINT/BURSA W/O US: CPT | Performed by: FAMILY MEDICINE

## 2018-11-20 PROCEDURE — G8417 CALC BMI ABV UP PARAM F/U: HCPCS | Performed by: FAMILY MEDICINE

## 2018-11-20 PROCEDURE — G8427 DOCREV CUR MEDS BY ELIG CLIN: HCPCS | Performed by: FAMILY MEDICINE

## 2018-11-20 PROCEDURE — 1123F ACP DISCUSS/DSCN MKR DOCD: CPT | Performed by: FAMILY MEDICINE

## 2018-11-20 PROCEDURE — 3017F COLORECTAL CA SCREEN DOC REV: CPT | Performed by: FAMILY MEDICINE

## 2018-11-20 PROCEDURE — G8482 FLU IMMUNIZE ORDER/ADMIN: HCPCS | Performed by: FAMILY MEDICINE

## 2018-11-20 PROCEDURE — 99213 OFFICE O/P EST LOW 20 MIN: CPT | Performed by: FAMILY MEDICINE

## 2018-11-20 PROCEDURE — 4040F PNEUMOC VAC/ADMIN/RCVD: CPT | Performed by: FAMILY MEDICINE

## 2018-11-20 RX ORDER — TRIAMCINOLONE ACETONIDE 40 MG/ML
40 INJECTION, SUSPENSION INTRA-ARTICULAR; INTRAMUSCULAR ONCE
Status: COMPLETED | OUTPATIENT
Start: 2018-11-20 | End: 2018-11-20

## 2018-11-20 RX ADMIN — TRIAMCINOLONE ACETONIDE 40 MG: 40 INJECTION, SUSPENSION INTRA-ARTICULAR; INTRAMUSCULAR at 11:00

## 2018-11-20 RX ADMIN — TRIAMCINOLONE ACETONIDE 40 MG: 40 INJECTION, SUSPENSION INTRA-ARTICULAR; INTRAMUSCULAR at 10:59

## 2018-11-20 ASSESSMENT — ENCOUNTER SYMPTOMS
RESPIRATORY NEGATIVE: 1
GASTROINTESTINAL NEGATIVE: 1
EYES NEGATIVE: 1

## 2018-11-20 NOTE — PROGRESS NOTES
TAKE 1 CAPSULE BY MOUTH DAILY Yes Alberto Brink DO   metoprolol tartrate (LOPRESSOR) 50 MG tablet Take 1 tablet by mouth 2 times daily Yes Alberto Brink DO   Mirabegron ER (MYRBETRIQ) 50 MG TB24 TAKE 1 TABLET BY MOUTH DAILY Yes Alberto Brink DO   ranitidine (ZANTAC) 300 MG tablet TAKE 1 TABLET BY MOUTH AT BEDTIME Yes Alberto Brink DO   pravastatin (PRAVACHOL) 40 MG tablet TAKE ONE TABLET BY MOUTH ONCE EVERY EVENING Yes Alberto Brink DO   losartan (COZAAR) 50 MG tablet TAKE 1 TABLET BY MOUTH EVERY DAY Yes Alberto Brink DO   niacin (NIASPAN) 500 MG CR tablet Take 500 mg by mouth 2 times daily. Two pills daily. Yes Historical Provider, MD   Omega-3 Fatty Acids (FISH OIL) 1200 MG CAPS Take 2,000 mg by mouth 2 times daily. Yes Historical Provider, MD   Multiple Vitamins-Minerals (MULTIVITAMIN PO) daily. Yes Historical Provider, MD   ASPIRIN PO 81 mg daily. Yes Historical Provider, MD        Social History   Substance Use Topics    Smoking status: Former Smoker     Packs/day: 2.00     Years: 15.00     Quit date: 1/1/1985    Smokeless tobacco: Never Used      Comment: smoked 2 packs a day for 14 to 15 years, quit in [place] [address] Alcohol use No      Comment: rare        Vitals:    11/20/18 0954   BP: 126/74   Site: Left Upper Arm   Position: Sitting   Cuff Size: Large Adult   Pulse: 60  Comment: irreg   Temp: 98 °F (36.7 °C)   TempSrc: Tympanic   SpO2: 98%   Weight: 238 lb (108 kg)     Estimated body mass index is 34.15 kg/m² as calculated from the following:    Height as of 11/7/18: 5' 10\" (1.778 m). Weight as of this encounter: 238 lb (108 kg). Physical Exam   Constitutional: He is oriented to person, place, and time. He appears well-developed and well-nourished. No distress. HENT:   Head: Normocephalic and atraumatic. Eyes: Conjunctivae are normal.   Neck: Normal range of motion. Pulmonary/Chest: Effort normal.   Musculoskeletal: Normal range of motion. He exhibits tenderness.

## 2018-12-10 ENCOUNTER — OFFICE VISIT (OUTPATIENT)
Dept: DERMATOLOGY | Age: 73
End: 2018-12-10
Payer: MEDICARE

## 2018-12-10 ENCOUNTER — HOSPITAL ENCOUNTER (OUTPATIENT)
Age: 73
Setting detail: SPECIMEN
Discharge: HOME | End: 2018-12-10
Payer: MEDICARE

## 2018-12-10 VITALS
HEART RATE: 56 BPM | SYSTOLIC BLOOD PRESSURE: 132 MMHG | WEIGHT: 237.2 LBS | DIASTOLIC BLOOD PRESSURE: 66 MMHG | BODY MASS INDEX: 33.96 KG/M2 | HEIGHT: 70 IN

## 2018-12-10 DIAGNOSIS — L57.0 KERATOSIS, ACTINIC: ICD-10-CM

## 2018-12-10 DIAGNOSIS — L82.1 SEBORRHEIC KERATOSES: ICD-10-CM

## 2018-12-10 DIAGNOSIS — Z85.828 H/O NONMELANOMA SKIN CANCER: ICD-10-CM

## 2018-12-10 DIAGNOSIS — L57.8 ACTINIC SKIN DAMAGE: ICD-10-CM

## 2018-12-10 DIAGNOSIS — D48.5 NEOPLASM OF UNCERTAIN BEHAVIOR OF SKIN: Primary | ICD-10-CM

## 2018-12-10 PROCEDURE — 3017F COLORECTAL CA SCREEN DOC REV: CPT | Performed by: DERMATOLOGY

## 2018-12-10 PROCEDURE — G8482 FLU IMMUNIZE ORDER/ADMIN: HCPCS | Performed by: DERMATOLOGY

## 2018-12-10 PROCEDURE — 17003 DESTRUCT PREMALG LES 2-14: CPT | Performed by: DERMATOLOGY

## 2018-12-10 PROCEDURE — 1123F ACP DISCUSS/DSCN MKR DOCD: CPT | Performed by: DERMATOLOGY

## 2018-12-10 PROCEDURE — 17000 DESTRUCT PREMALG LESION: CPT | Performed by: DERMATOLOGY

## 2018-12-10 PROCEDURE — G8598 ASA/ANTIPLAT THER USED: HCPCS | Performed by: DERMATOLOGY

## 2018-12-10 PROCEDURE — 11100 PR BIOPSY OF SKIN LESION: CPT | Performed by: DERMATOLOGY

## 2018-12-10 PROCEDURE — 99213 OFFICE O/P EST LOW 20 MIN: CPT | Performed by: DERMATOLOGY

## 2018-12-10 PROCEDURE — 1101F PT FALLS ASSESS-DOCD LE1/YR: CPT | Performed by: DERMATOLOGY

## 2018-12-10 PROCEDURE — 1036F TOBACCO NON-USER: CPT | Performed by: DERMATOLOGY

## 2018-12-10 PROCEDURE — 4040F PNEUMOC VAC/ADMIN/RCVD: CPT | Performed by: DERMATOLOGY

## 2018-12-10 PROCEDURE — G8427 DOCREV CUR MEDS BY ELIG CLIN: HCPCS | Performed by: DERMATOLOGY

## 2018-12-10 PROCEDURE — G8417 CALC BMI ABV UP PARAM F/U: HCPCS | Performed by: DERMATOLOGY

## 2018-12-10 ASSESSMENT — PATIENT HEALTH QUESTIONNAIRE - PHQ9
2. FEELING DOWN, DEPRESSED OR HOPELESS: 0
1. LITTLE INTEREST OR PLEASURE IN DOING THINGS: 0
SUM OF ALL RESPONSES TO PHQ QUESTIONS 1-9: 0
SUM OF ALL RESPONSES TO PHQ QUESTIONS 1-9: 0
SUM OF ALL RESPONSES TO PHQ9 QUESTIONS 1 & 2: 0

## 2018-12-10 NOTE — PROGRESS NOTES
Dermatology Patient Note  [facility]  [address] [facility] [address]  [address] [address]  Dept: [phone]  Dept Fax: [phone]: [phone]      VISIT DATE: 12/10/2018   REFERRING PROVIDER: No ref. provider found      Leanna García is a 68 y.o. male  who presents today in the office for:    Follow-up (6 month follow up, spot of left ear and left leg)      HISTORY OF PRESENT ILLNESS:  HPI AK/NMSC    Margaux Espino was seen today for follow-up evaluation of History of non melanoma skin cancer    Course: new lesions on the ear and left leg     Areas of Involvement: ear and left leg    Associated Symptoms:Bleeding    Exacerbating Factors:Hx of Prolonged Sun Exposure    Previous Skin Cancers: squamous cell carcinoma      CURRENT MEDICATIONS:   Current Outpatient Prescriptions   Medication Sig Dispense Refill    furosemide (LASIX) 20 MG tablet Take 1 tablet by mouth daily 90 tablet 3    nitroGLYCERIN (NITROSTAT) 0.4 MG SL tablet Place 1 tablet under the tongue every 5 minutes as needed for Chest pain up to max of 3 total doses.  If no relief after 1 dose, call 911. 25 tablet 3    levothyroxine (SYNTHROID) 75 MCG tablet TAKE 1 TABLET BY MOUTH ONE TIME A DAY  90 tablet 1    doxazosin (CARDURA) 2 MG tablet Take 1 tablet by mouth 2 times daily 180 tablet 3    clopidogrel (PLAVIX) 75 MG tablet TAKE 1 TABLET BY MOUTH DAILY 90 tablet 3    tamsulosin (FLOMAX) 0.4 MG capsule TAKE 1 CAPSULE BY MOUTH DAILY 90 capsule 3    metoprolol tartrate (LOPRESSOR) 50 MG tablet Take 1 tablet by mouth 2 times daily 180 tablet 3    Mirabegron ER (MYRBETRIQ) 50 MG TB24 TAKE 1 TABLET BY MOUTH DAILY 30 tablet 0    ranitidine (ZANTAC) 300 MG tablet TAKE 1 TABLET BY MOUTH AT BEDTIME 90 tablet 3    pravastatin (PRAVACHOL) 40 MG tablet TAKE ONE TABLET BY MOUTH ONCE EVERY EVENING 90 tablet 3    losartan (COZAAR) 50 MG tablet TAKE 1 TABLET BY MOUTH EVERY DAY 90 tablet 3    niacin (NIASPAN) 500 MG CR tablet Take 500 mg by mouth 2 times daily. Two pills daily.  Omega-3 Fatty Acids (FISH OIL) 1200 MG CAPS Take 2,000 mg by mouth 2 times daily.  Multiple Vitamins-Minerals (MULTIVITAMIN PO) daily.  ASPIRIN PO 81 mg daily. No current facility-administered medications for this visit. ALLERGIES:   Allergies   Allergen Reactions    Ace Inhibitors Other (See Comments)     Cough.  Effient [Prasugrel] Other (See Comments)     Superficial skin bleeding.  Statins [Statins] Other (See Comments)     Myalgias. SOCIAL HISTORY:  Social History   Substance Use Topics    Smoking status: Former Smoker     Packs/day: 2.00     Years: 15.00     Quit date: 1/1/1985    Smokeless tobacco: Never Used      Comment: smoked 2 packs a day for 14 to 15 years, quit in [place] [address] Alcohol use No      Comment: rare       REVIEW OF SYSTEMS:  Review of Systems   Constitutional: Negative. Skin:Denies any new changing, growing or bleeding lesions or rashes except as described in the HPI     PHYSICAL EXAM:   /66   Pulse 56   Ht 5' 10\" (1.778 m)   Wt 237 lb 3.2 oz (107.6 kg)   BMI 34.03 kg/m²     General Exam:  General Appearance: No acute distress, Well nourished     Neuro: Alert and oriented to person, place and time  Psych: Normal affect   Lymph Node: Not performed    Cutaneous Exam: Performed as documented in clinic note below. Total skin excluding undergarment areas, which includes the head/face, neck, both arms, chest, back, abdomen, both legs, digits and/or nails, was examined. Pertinent Physical Exam Findings:  Physical Exam   Skin:        Actinic damage of the face, neck, trunk and upper and lower extremities    Scattered SK's on the trunk       Medical Necessity of Exam Performed:   History of Skin Cancer    Additional Diagnostic Testing performed during exam: Not performed ,  Not performed    ASSESSMENT:   Diagnosis Orders   1. Neoplasm of uncertain behavior of skin  TN BIOPSY OF SKIN LESION   2. Keratosis, actinic  NE DESTRUC PREMALIGNANT, FIRST LESION    NE DESTRUC PREMALIGNANT,2-14 LESIONS   3. Actinic skin damage     4. Seborrheic keratoses     5. H/O nonmelanoma skin cancer         Plan of Action is as Follows:  Assessment 1. Neoplasm of uncertain behavior of skin  Shave Biopsy: After cleaning with alcohol the lesion was anesthetized with (LMX AND/OR 1% lidocaine) and was removed with a dermablade. Hemostasis was achieved with aluminum chloride and Vaseline and a bandage were applied.  - NE BIOPSY OF SKIN LESION    2. Keratosis, actinic  Cryotherapy: After verbal consent was obtained including discussion of the risks (lesion persistence, lesion recurrence and hypo/hyperpigmentation) and benefits (resolution of the lesion) 3 total Actinic Keratosis on the left and right ear and left arm were treated once with liquid nitrogen to achieve a 2-3 mm freeze border.  - NE DESTRUC PREMALIGNANT, FIRST LESION  - NE DESTRUC PREMALIGNANT,2-14 LESIONS    3. Actinic skin damage  Discussed sunscreen and sun protection - recommend SPF 30 or greater sunscreen applied every 2-3 hours, sun protective clothing and avoidance of peak sun. 4. Seborrheic keratoses  reassurance    5. H/O nonmelanoma skin cancer  No evidence of recurrence          Photo surveillance performed: Yes    Follow-up: Return in about 1 year (around 12/10/2019). This note was created with the assistance of aspeech-recognition program.  Although the intention is to generate a document that actually reflects thecontent of the visit, no guarantees can be provided that every mistake has been identified and corrected by editing.     Electronically signed by Devon Jackson MD on 12/10/18 at 10:36 AM

## 2018-12-12 LAB — DERMATOLOGY PATHOLOGY REPORT: NORMAL

## 2019-02-06 ENCOUNTER — HOSPITAL ENCOUNTER (OUTPATIENT)
Dept: LAB | Age: 74
Discharge: HOME | End: 2019-02-06
Payer: MEDICARE

## 2019-02-06 DIAGNOSIS — N40.0 BENIGN PROSTATIC HYPERPLASIA, UNSPECIFIED WHETHER LOWER URINARY TRACT SYMPTOMS PRESENT: ICD-10-CM

## 2019-02-06 DIAGNOSIS — I10 ESSENTIAL HYPERTENSION: ICD-10-CM

## 2019-02-06 DIAGNOSIS — E78.49 FAMILIAL HYPERLIPIDEMIA: ICD-10-CM

## 2019-02-06 DIAGNOSIS — E03.9 ACQUIRED HYPOTHYROIDISM: ICD-10-CM

## 2019-02-06 DIAGNOSIS — R73.01 IMPAIRED FASTING GLUCOSE: ICD-10-CM

## 2019-02-06 LAB
ABSOLUTE EOS #: 0.1 K/UL (ref 0–0.4)
ABSOLUTE IMMATURE GRANULOCYTE: ABNORMAL K/UL (ref 0–0.3)
ABSOLUTE LYMPH #: 1.6 K/UL (ref 1–4.8)
ABSOLUTE MONO #: 0.6 K/UL (ref 0.1–1.2)
ALBUMIN SERPL-MCNC: 4.4 G/DL (ref 3.5–5.2)
ALBUMIN/GLOBULIN RATIO: 1.4 (ref 1–2.5)
ALP BLD-CCNC: 67 U/L (ref 40–129)
ALT SERPL-CCNC: 25 U/L (ref 5–41)
ANION GAP SERPL CALCULATED.3IONS-SCNC: 14 MMOL/L (ref 9–17)
AST SERPL-CCNC: 21 U/L
BASOPHILS # BLD: 1 % (ref 0–2)
BASOPHILS ABSOLUTE: 0 K/UL (ref 0–0.2)
BILIRUB SERPL-MCNC: 0.35 MG/DL (ref 0.3–1.2)
BUN BLDV-MCNC: 18 MG/DL (ref 8–23)
BUN/CREAT BLD: 17 (ref 9–20)
CALCIUM SERPL-MCNC: 9.7 MG/DL (ref 8.6–10.4)
CHLORIDE BLD-SCNC: 101 MMOL/L (ref 98–107)
CHOLESTEROL/HDL RATIO: 6.3
CHOLESTEROL: 238 MG/DL
CO2: 27 MMOL/L (ref 20–31)
CREAT SERPL-MCNC: 1.06 MG/DL (ref 0.7–1.2)
DIFFERENTIAL TYPE: ABNORMAL
EOSINOPHILS RELATIVE PERCENT: 1 % (ref 1–8)
ESTIMATED AVERAGE GLUCOSE: 123 MG/DL
GFR AFRICAN AMERICAN: >60 ML/MIN
GFR NON-AFRICAN AMERICAN: >60 ML/MIN
GFR SERPL CREATININE-BSD FRML MDRD: ABNORMAL ML/MIN/{1.73_M2}
GFR SERPL CREATININE-BSD FRML MDRD: ABNORMAL ML/MIN/{1.73_M2}
GLUCOSE BLD-MCNC: 117 MG/DL (ref 70–99)
HBA1C MFR BLD: 5.9 % (ref 4.8–5.9)
HCT VFR BLD CALC: 40.6 % (ref 41–53)
HDLC SERPL-MCNC: 38 MG/DL
HEMOGLOBIN: 13.5 G/DL (ref 13.5–17.5)
IMMATURE GRANULOCYTES: ABNORMAL %
LDL CHOLESTEROL DIRECT: 110 MG/DL
LDL CHOLESTEROL: ABNORMAL MG/DL (ref 0–130)
LYMPHOCYTES # BLD: 27 % (ref 15–43)
MCH RBC QN AUTO: 31.4 PG (ref 26–34)
MCHC RBC AUTO-ENTMCNC: 33.4 G/DL (ref 31–37)
MCV RBC AUTO: 94.2 FL (ref 80–100)
MONOCYTES # BLD: 9 % (ref 6–14)
NRBC AUTOMATED: ABNORMAL PER 100 WBC
PDW BLD-RTO: 13.9 % (ref 11–14.5)
PLATELET # BLD: 137 K/UL (ref 140–450)
PLATELET ESTIMATE: ABNORMAL
PMV BLD AUTO: 8.7 FL (ref 6–12)
POTASSIUM SERPL-SCNC: 4.2 MMOL/L (ref 3.7–5.3)
PROSTATE SPECIFIC ANTIGEN: 5.27 UG/L
RBC # BLD: 4.31 M/UL (ref 4.5–5.9)
RBC # BLD: ABNORMAL 10*6/UL
SEG NEUTROPHILS: 62 % (ref 44–74)
SEGMENTED NEUTROPHILS ABSOLUTE COUNT: 3.8 K/UL (ref 1.8–7.7)
SODIUM BLD-SCNC: 142 MMOL/L (ref 135–144)
THYROXINE, FREE: 1.22 NG/DL (ref 0.93–1.7)
TOTAL PROTEIN: 7.5 G/DL (ref 6.4–8.3)
TRIGL SERPL-MCNC: 716 MG/DL
TSH SERPL DL<=0.05 MIU/L-ACNC: 5.79 MIU/L (ref 0.3–5)
VLDLC SERPL CALC-MCNC: ABNORMAL MG/DL (ref 1–30)
WBC # BLD: 6.1 K/UL (ref 3.5–11)
WBC # BLD: ABNORMAL 10*3/UL

## 2019-02-06 PROCEDURE — 85025 COMPLETE CBC W/AUTO DIFF WBC: CPT

## 2019-02-06 PROCEDURE — 84443 ASSAY THYROID STIM HORMONE: CPT

## 2019-02-06 PROCEDURE — 36415 COLL VENOUS BLD VENIPUNCTURE: CPT

## 2019-02-06 PROCEDURE — 83721 ASSAY OF BLOOD LIPOPROTEIN: CPT

## 2019-02-06 PROCEDURE — 84153 ASSAY OF PSA TOTAL: CPT

## 2019-02-06 PROCEDURE — 84439 ASSAY OF FREE THYROXINE: CPT

## 2019-02-06 PROCEDURE — 80061 LIPID PANEL: CPT

## 2019-02-06 PROCEDURE — 83036 HEMOGLOBIN GLYCOSYLATED A1C: CPT

## 2019-02-06 PROCEDURE — 80053 COMPREHEN METABOLIC PANEL: CPT

## 2019-02-07 DIAGNOSIS — I10 ESSENTIAL HYPERTENSION: ICD-10-CM

## 2019-02-07 RX ORDER — LEVOTHYROXINE SODIUM 0.07 MG/1
TABLET ORAL
Qty: 90 TABLET | Refills: 1 | Status: CANCELLED | OUTPATIENT
Start: 2019-02-07

## 2019-02-08 RX ORDER — LEVOTHYROXINE SODIUM 0.1 MG/1
100 TABLET ORAL DAILY
Qty: 90 TABLET | Refills: 1 | Status: SHIPPED | OUTPATIENT
Start: 2019-02-08 | End: 2019-08-09 | Stop reason: SDUPTHER

## 2019-02-08 RX ORDER — LOSARTAN POTASSIUM 50 MG/1
TABLET ORAL
Qty: 90 TABLET | Refills: 3 | Status: SHIPPED | OUTPATIENT
Start: 2019-02-08 | End: 2020-02-10

## 2019-02-13 ENCOUNTER — OFFICE VISIT (OUTPATIENT)
Dept: FAMILY MEDICINE CLINIC | Age: 74
End: 2019-02-13
Payer: MEDICARE

## 2019-02-13 VITALS
SYSTOLIC BLOOD PRESSURE: 120 MMHG | WEIGHT: 239 LBS | RESPIRATION RATE: 16 BRPM | BODY MASS INDEX: 34.22 KG/M2 | HEART RATE: 64 BPM | HEIGHT: 70 IN | DIASTOLIC BLOOD PRESSURE: 84 MMHG

## 2019-02-13 DIAGNOSIS — G47.33 OBSTRUCTIVE SLEEP APNEA: ICD-10-CM

## 2019-02-13 DIAGNOSIS — I10 ESSENTIAL HYPERTENSION: Primary | ICD-10-CM

## 2019-02-13 DIAGNOSIS — E03.9 ACQUIRED HYPOTHYROIDISM: ICD-10-CM

## 2019-02-13 DIAGNOSIS — K21.9 GASTROESOPHAGEAL REFLUX DISEASE WITHOUT ESOPHAGITIS: ICD-10-CM

## 2019-02-13 DIAGNOSIS — R73.01 IMPAIRED FASTING GLUCOSE: ICD-10-CM

## 2019-02-13 DIAGNOSIS — E78.49 FAMILIAL HYPERLIPIDEMIA: ICD-10-CM

## 2019-02-13 DIAGNOSIS — Z00.00 MEDICARE ANNUAL WELLNESS VISIT, SUBSEQUENT: ICD-10-CM

## 2019-02-13 DIAGNOSIS — J06.9 VIRAL URI: ICD-10-CM

## 2019-02-13 DIAGNOSIS — J45.20 MILD INTERMITTENT OCCUPATIONAL ASTHMA WITHOUT COMPLICATION: ICD-10-CM

## 2019-02-13 DIAGNOSIS — Z57.9 MILD INTERMITTENT OCCUPATIONAL ASTHMA WITHOUT COMPLICATION: ICD-10-CM

## 2019-02-13 DIAGNOSIS — R97.20 ELEVATED PSA: ICD-10-CM

## 2019-02-13 DIAGNOSIS — N40.0 BENIGN PROSTATIC HYPERPLASIA, UNSPECIFIED WHETHER LOWER URINARY TRACT SYMPTOMS PRESENT: ICD-10-CM

## 2019-02-13 PROBLEM — J45.909 OCCUPATIONAL ASTHMA: Status: ACTIVE | Noted: 2019-02-13

## 2019-02-13 PROCEDURE — G8417 CALC BMI ABV UP PARAM F/U: HCPCS | Performed by: FAMILY MEDICINE

## 2019-02-13 PROCEDURE — G8427 DOCREV CUR MEDS BY ELIG CLIN: HCPCS | Performed by: FAMILY MEDICINE

## 2019-02-13 PROCEDURE — 4040F PNEUMOC VAC/ADMIN/RCVD: CPT | Performed by: FAMILY MEDICINE

## 2019-02-13 PROCEDURE — 1101F PT FALLS ASSESS-DOCD LE1/YR: CPT | Performed by: FAMILY MEDICINE

## 2019-02-13 PROCEDURE — 1036F TOBACCO NON-USER: CPT | Performed by: FAMILY MEDICINE

## 2019-02-13 PROCEDURE — 1123F ACP DISCUSS/DSCN MKR DOCD: CPT | Performed by: FAMILY MEDICINE

## 2019-02-13 PROCEDURE — G0439 PPPS, SUBSEQ VISIT: HCPCS | Performed by: FAMILY MEDICINE

## 2019-02-13 PROCEDURE — 99214 OFFICE O/P EST MOD 30 MIN: CPT | Performed by: FAMILY MEDICINE

## 2019-02-13 PROCEDURE — 3017F COLORECTAL CA SCREEN DOC REV: CPT | Performed by: FAMILY MEDICINE

## 2019-02-13 PROCEDURE — G8598 ASA/ANTIPLAT THER USED: HCPCS | Performed by: FAMILY MEDICINE

## 2019-02-13 PROCEDURE — G8482 FLU IMMUNIZE ORDER/ADMIN: HCPCS | Performed by: FAMILY MEDICINE

## 2019-02-13 RX ORDER — METOPROLOL TARTRATE 50 MG/1
50 TABLET, FILM COATED ORAL 2 TIMES DAILY
Qty: 180 TABLET | Refills: 3 | Status: SHIPPED | OUTPATIENT
Start: 2019-02-13 | End: 2020-02-17 | Stop reason: SDUPTHER

## 2019-02-13 RX ORDER — AZITHROMYCIN 250 MG/1
TABLET, FILM COATED ORAL
Qty: 1 PACKET | Refills: 0 | Status: SHIPPED | OUTPATIENT
Start: 2019-02-13 | End: 2019-02-18

## 2019-02-13 RX ORDER — RANITIDINE 300 MG/1
TABLET ORAL
Qty: 90 TABLET | Refills: 3 | Status: SHIPPED | OUTPATIENT
Start: 2019-02-13 | End: 2020-02-17 | Stop reason: SINTOL

## 2019-02-13 RX ORDER — PRAVASTATIN SODIUM 40 MG
TABLET ORAL
Qty: 90 TABLET | Refills: 3 | Status: SHIPPED | OUTPATIENT
Start: 2019-02-13 | End: 2020-02-17 | Stop reason: SDUPTHER

## 2019-02-13 RX ORDER — ALBUTEROL SULFATE 90 UG/1
2 AEROSOL, METERED RESPIRATORY (INHALATION) EVERY 6 HOURS PRN
Qty: 1 INHALER | Refills: 6 | Status: SHIPPED | OUTPATIENT
Start: 2019-02-13

## 2019-02-13 RX ORDER — CLOPIDOGREL BISULFATE 75 MG/1
TABLET ORAL
Qty: 90 TABLET | Refills: 3 | Status: SHIPPED | OUTPATIENT
Start: 2019-02-13 | End: 2020-02-17 | Stop reason: SDUPTHER

## 2019-02-13 RX ORDER — DOXAZOSIN 2 MG/1
2 TABLET ORAL 2 TIMES DAILY
Qty: 180 TABLET | Refills: 3 | Status: SHIPPED | OUTPATIENT
Start: 2019-02-13 | End: 2020-02-17 | Stop reason: SDUPTHER

## 2019-02-13 RX ORDER — TAMSULOSIN HYDROCHLORIDE 0.4 MG/1
CAPSULE ORAL
Qty: 90 CAPSULE | Refills: 3 | Status: SHIPPED | OUTPATIENT
Start: 2019-02-13 | End: 2020-02-17 | Stop reason: SDUPTHER

## 2019-02-13 SDOH — HEALTH STABILITY - PHYSICAL HEALTH: OCCUPATIONAL EXPOSURE TO UNSPECIFIED RISK FACTOR: Z57.9

## 2019-02-13 ASSESSMENT — PATIENT HEALTH QUESTIONNAIRE - PHQ9
SUM OF ALL RESPONSES TO PHQ QUESTIONS 1-9: 0
SUM OF ALL RESPONSES TO PHQ QUESTIONS 1-9: 0

## 2019-02-13 ASSESSMENT — ENCOUNTER SYMPTOMS
SINUS PRESSURE: 0
SINUS PAIN: 0
EYE DISCHARGE: 0
SHORTNESS OF BREATH: 1
NAUSEA: 0
EYE REDNESS: 0
DIARRHEA: 0
CONSTIPATION: 0
TROUBLE SWALLOWING: 0
ABDOMINAL PAIN: 0
SORE THROAT: 0
WHEEZING: 0
COUGH: 1
RHINORRHEA: 1
VOMITING: 0

## 2019-02-13 ASSESSMENT — LIFESTYLE VARIABLES: HOW OFTEN DO YOU HAVE A DRINK CONTAINING ALCOHOL: 0

## 2019-02-13 ASSESSMENT — ANXIETY QUESTIONNAIRES: GAD7 TOTAL SCORE: 0

## 2019-05-02 LAB
CHOLESTEROL, TOTAL: 222 MG/DL
CHOLESTEROL/HDL RATIO: 6.2
HDLC SERPL-MCNC: 36 MG/DL (ref 35–70)
LDL CHOLESTEROL CALCULATED: NORMAL MG/DL (ref 0–160)
TRIGL SERPL-MCNC: 514 MG/DL
VLDLC SERPL CALC-MCNC: NORMAL MG/DL

## 2019-05-15 ENCOUNTER — OFFICE VISIT (OUTPATIENT)
Dept: CARDIOLOGY | Age: 74
End: 2019-05-15
Payer: MEDICARE

## 2019-05-15 VITALS
HEIGHT: 70 IN | WEIGHT: 233 LBS | BODY MASS INDEX: 33.36 KG/M2 | DIASTOLIC BLOOD PRESSURE: 64 MMHG | HEART RATE: 70 BPM | SYSTOLIC BLOOD PRESSURE: 124 MMHG

## 2019-05-15 DIAGNOSIS — R06.09 DYSPNEA ON EXERTION: ICD-10-CM

## 2019-05-15 DIAGNOSIS — I10 ESSENTIAL HYPERTENSION: Primary | ICD-10-CM

## 2019-05-15 DIAGNOSIS — I25.10 CORONARY ARTERY DISEASE INVOLVING NATIVE CORONARY ARTERY OF NATIVE HEART WITHOUT ANGINA PECTORIS: ICD-10-CM

## 2019-05-15 DIAGNOSIS — E78.1 PURE HYPERGLYCERIDEMIA: ICD-10-CM

## 2019-05-15 PROCEDURE — 4040F PNEUMOC VAC/ADMIN/RCVD: CPT | Performed by: INTERNAL MEDICINE

## 2019-05-15 PROCEDURE — G8428 CUR MEDS NOT DOCUMENT: HCPCS | Performed by: INTERNAL MEDICINE

## 2019-05-15 PROCEDURE — G8417 CALC BMI ABV UP PARAM F/U: HCPCS | Performed by: INTERNAL MEDICINE

## 2019-05-15 PROCEDURE — 1036F TOBACCO NON-USER: CPT | Performed by: INTERNAL MEDICINE

## 2019-05-15 PROCEDURE — 93000 ELECTROCARDIOGRAM COMPLETE: CPT | Performed by: INTERNAL MEDICINE

## 2019-05-15 PROCEDURE — 3017F COLORECTAL CA SCREEN DOC REV: CPT | Performed by: INTERNAL MEDICINE

## 2019-05-15 PROCEDURE — 1123F ACP DISCUSS/DSCN MKR DOCD: CPT | Performed by: INTERNAL MEDICINE

## 2019-05-15 PROCEDURE — G8598 ASA/ANTIPLAT THER USED: HCPCS | Performed by: INTERNAL MEDICINE

## 2019-05-15 PROCEDURE — 99214 OFFICE O/P EST MOD 30 MIN: CPT | Performed by: INTERNAL MEDICINE

## 2019-05-15 RX ORDER — OMEGA-3-ACID ETHYL ESTERS 1 G/1
2 CAPSULE, LIQUID FILLED ORAL 2 TIMES DAILY
Qty: 60 CAPSULE | Refills: 6 | Status: SHIPPED | OUTPATIENT
Start: 2019-05-15 | End: 2019-07-23

## 2019-05-15 NOTE — PROGRESS NOTES
(FLOMAX) 0.4 MG capsule TAKE 1 CAPSULE BY MOUTH DAILY 2/13/19 Valarie Mori, DO   ranitidine (ZANTAC) 300 MG tablet TAKE 1 TABLET BY MOUTH AT BEDTIME 2/13/19 Valarie Mori, DO   pravastatin (PRAVACHOL) 40 MG tablet TAKE ONE TABLET BY MOUTH ONCE EVERY EVENING 2/13/19 Valarie Mori, DO   clopidogrel (PLAVIX) 75 MG tablet TAKE 1 TABLET BY MOUTH DAILY 2/13/19 Valarie Mori, DO   doxazosin (CARDURA) 2 MG tablet Take 1 tablet by mouth 2 times daily 2/13/19 Valarie Mori, DO   metoprolol tartrate (LOPRESSOR) 50 MG tablet Take 1 tablet by mouth 2 times daily 2/13/19 Valarie Mori, DO   albuterol sulfate HFA (VENTOLIN HFA) 108 (90 Base) MCG/ACT inhaler Inhale 2 puffs into the lungs every 6 hours as needed for Wheezing or Shortness of Breath 2/13/19 Valarie Mori, DO   losartan (COZAAR) 50 MG tablet TAKE 1 TABLET BY MOUTH EVERY DAY 2/8/19 Valarie Mori, DO   levothyroxine (SYNTHROID) 100 MCG tablet Take 1 tablet by mouth daily 2/8/19 Valarie Mori, DO   furosemide (LASIX) 20 MG tablet Take 1 tablet by mouth daily 9/5/18   Isma Villarreal MD   nitroGLYCERIN (NITROSTAT) 0.4 MG SL tablet Place 1 tablet under the tongue every 5 minutes as needed for Chest pain up to max of 3 total doses. If no relief after 1 dose, call 911. 8/17/18   ILYA Wong - CNP   Mirabegron ER (MYRBETRIQ) 50 MG TB24 TAKE 1 TABLET BY MOUTH DAILY 2/12/18 Valarie Mori, DO   niacin (NIASPAN) 500 MG CR tablet Take 500 mg by mouth 2 times daily. Two pills daily. Historical Provider, MD   Omega-3 Fatty Acids (FISH OIL) 1200 MG CAPS Take 2,000 mg by mouth 2 times daily. Historical Provider, MD   Multiple Vitamins-Minerals (MULTIVITAMIN PO) daily. Historical Provider, MD   ASPIRIN PO 81 mg daily. Historical Provider, MD       Allergies:  Ace inhibitors; Effient [prasugrel]; and Statins [statins]    Social History:   reports that he quit smoking about 34 years ago.  He has a 30.00 pack-year smoking history. He has never used smokeless tobacco. He reports that he does not drink alcohol or use drugs. Family History: family history includes Cancer in his mother; Coronary Art Dis in his father; Crohn's Disease in his sister; Emphysema in his father; Heart Attack in his mother; Heart Disease in his maternal grandfather, maternal grandmother, and mother; Heart Failure in his father; High Blood Pressure in his mother; High Cholesterol in his child and sister; Stroke in his mother; Thyroid Disease in his mother. No h/o sudden cardiac death. No for premature CAD    REVIEW OF SYSTEMS:    · Constitutional: there has been no unanticipated weight loss. There's been No change in energy level, No change in activity level. · Eyes: No visual changes or diplopia. No scleral icterus. · ENT: No Headaches, hearing loss or vertigo. No mouth sores or sore throat. · Cardiovascular: see above  · Respiratory: see above  · Gastrointestinal: No abdominal pain, appetite loss, blood in stools. · Genitourinary: No dysuria, trouble voiding, or hematuria. · Musculoskeletal:  No gait disturbance, No weakness or joint complaints. · Integumentary: No rash or pruritis. · Neurological: No headache or diplopia. No tingling  · Psychiatric: No anxiety, or depression. · Endocrine: No temperature intolerance. · Hematologic/Lymphatic: No abnormal bruising or bleeding, blood clots or swollen lymph nodes. · Allergic/Immunologic: No nasal congestion or hives. PHYSICAL EXAM:      Vitals:    05/15/19 1003   BP: 124/64       Constitutional and General Appearance: alert, cooperative, no distress and appears stated age  HEENT: PERRL, no cervical lymphadenopathy. No masses palpable. Normal oral mucosa  Respiratory:  · Normal excursion and expansion without use of accessory muscles  · Resp Auscultation: Good respiratory effort. No for increased work of breathing.  On auscultation: clear to auscultation bilaterally  Cardiovascular:  · Heart tones are crisp and normal. regular S1 and S2.  · Jugular venous pulsation Normal  · The carotid upstroke is normal in amplitude and contour without delay or bruit   Abdomen:   · soft  · Bowel sounds present  Extremities:  ·  No edema  Neurological:  · Alert and oriented. Cardiac Data:  EKG: SR, PVC, IRBBB    Labs:     CBC: No results for input(s): WBC, HGB, HCT, PLT in the last 72 hours. BMP: No results for input(s): NA, K, CO2, BUN, CREATININE, LABGLOM, GLUCOSE in the last 72 hours. PT/INR: No results for input(s): PROTIME, INR in the last 72 hours. FASTING LIPID PANEL:  Lab Results   Component Value Date    HDL 38 02/06/2019    LDLDIRECT 110 02/06/2019    TRIG 716 02/06/2019     LIVER PROFILE:No results for input(s): AST, ALT, LABALBU in the last 72 hours. Echo 09/209/2016:  1. Normal left ventricular size with mild to moderate concentric left ventricular hypertrophy. 2. Normal systolic left ventricular function. Ejection fraction is 50 to 55%. 3. Grade I diastolic dysfunction. 4. Biatrial enlargement. 5. Mildly dilated right ventricle with mildly reduced function. 6. Mildly sclerotic aortic valve. 7. Mild to moderate tricuspid regurgitation. RVSP is 49 mm Hg. 8. No pericardial effusion.     Event monitor 09/2016:  Sinus rhythm with short runs of PSVT.     Nuclear stress test 06/2018:  1. Moderate sized mid and apical septal ischemia. 2. EF 59%.     Cardiac cath 07/18/18:   Patent LAD and OM2 stents.   Occlusion of RCA (non-dominant vessel).    IFR of LAD showed non-significant lesions.   Normal LV systolic function.     Cardiac cath 08/16/18:  Left main: NL  LAD:    mid LAD - 75 % stenosis reduced to 0 % PTCA/TAM with CARINA III flow  LCX:    Dominant with 10% stenosis               OM1:  Small with 10% stenosis               OM2 has patent previously placed stent            RCA:    Not injected due to recent cath and known to be occluded     Assessment / Acute Cardiac Problems:      1-CAD: with prior stenting to LAD and OM 10/2011: Repeat LAD stenting 8/16/18   2-HTN: well controlled. 3-HLP  4-Mild LV dysfunction on cardiac cath and EF of 53% on nuclear stress test and 50-55% on echo. 5-Obesity. 6-Recurrent palpitations with only short nonsustained PSVT on event monitor: symptoms resolved since he was started on CPAP  7-Sleep apnea started on CPAP  8-Grade I diastolic dysfunction. 9-Palpitations rare with event monitor showing only PVCs at the time of his symptoms     Plan of Treatment:      1-Continue current medical treatment with ASA, Plavix, BB, ARB, Diuretics, Niacin and statin. 2-Diet, exercise and loose weight. 3-Reduce carb intake for hypertriglyceridemia. He has been on OTC fish oil with still TG elevated. Will start lovaza 2gm po BID. 4-Continue with CPAP  5-Will refer to pulmonary for dyspnea.  5-F/U in 6 months.         Latesha [facility]           [phone]

## 2019-05-17 DIAGNOSIS — E03.9 ACQUIRED HYPOTHYROIDISM: ICD-10-CM

## 2019-05-17 DIAGNOSIS — E78.49 FAMILIAL HYPERLIPIDEMIA: ICD-10-CM

## 2019-05-17 DIAGNOSIS — I10 ESSENTIAL HYPERTENSION: ICD-10-CM

## 2019-06-24 DIAGNOSIS — R06.00 DYSPNEA, UNSPECIFIED TYPE: Primary | ICD-10-CM

## 2019-06-25 ENCOUNTER — HOSPITAL ENCOUNTER (OUTPATIENT)
Dept: GENERAL RADIOLOGY | Age: 74
Discharge: HOME | End: 2019-06-27
Payer: MEDICARE

## 2019-06-25 ENCOUNTER — HOSPITAL ENCOUNTER (OUTPATIENT)
Dept: PULMONOLOGY | Age: 74
Discharge: HOME | End: 2019-06-25
Payer: MEDICARE

## 2019-06-25 ENCOUNTER — OFFICE VISIT (OUTPATIENT)
Dept: PULMONOLOGY | Age: 74
End: 2019-06-25
Payer: MEDICARE

## 2019-06-25 VITALS
BODY MASS INDEX: 33.36 KG/M2 | HEIGHT: 70 IN | SYSTOLIC BLOOD PRESSURE: 136 MMHG | WEIGHT: 233 LBS | HEART RATE: 63 BPM | OXYGEN SATURATION: 96 % | TEMPERATURE: 97.8 F | DIASTOLIC BLOOD PRESSURE: 84 MMHG

## 2019-06-25 DIAGNOSIS — R06.00 DYSPNEA, UNSPECIFIED TYPE: ICD-10-CM

## 2019-06-25 DIAGNOSIS — Z57.9 MILD INTERMITTENT OCCUPATIONAL ASTHMA WITHOUT COMPLICATION: Primary | ICD-10-CM

## 2019-06-25 DIAGNOSIS — I27.20 PULMONARY HYPERTENSION (HCC): ICD-10-CM

## 2019-06-25 DIAGNOSIS — G47.33 OBSTRUCTIVE SLEEP APNEA: ICD-10-CM

## 2019-06-25 DIAGNOSIS — Z98.61 S/P PTCA (PERCUTANEOUS TRANSLUMINAL CORONARY ANGIOPLASTY): ICD-10-CM

## 2019-06-25 DIAGNOSIS — I25.119 CORONARY ARTERY DISEASE INVOLVING NATIVE CORONARY ARTERY OF NATIVE HEART WITH ANGINA PECTORIS (HCC): ICD-10-CM

## 2019-06-25 DIAGNOSIS — J45.20 MILD INTERMITTENT OCCUPATIONAL ASTHMA WITHOUT COMPLICATION: Primary | ICD-10-CM

## 2019-06-25 PROCEDURE — 94640 AIRWAY INHALATION TREATMENT: CPT

## 2019-06-25 PROCEDURE — 99204 OFFICE O/P NEW MOD 45 MIN: CPT | Performed by: INTERNAL MEDICINE

## 2019-06-25 PROCEDURE — 94726 PLETHYSMOGRAPHY LUNG VOLUMES: CPT

## 2019-06-25 PROCEDURE — G8598 ASA/ANTIPLAT THER USED: HCPCS | Performed by: INTERNAL MEDICINE

## 2019-06-25 PROCEDURE — 94729 DIFFUSING CAPACITY: CPT

## 2019-06-25 PROCEDURE — 3017F COLORECTAL CA SCREEN DOC REV: CPT | Performed by: INTERNAL MEDICINE

## 2019-06-25 PROCEDURE — 4040F PNEUMOC VAC/ADMIN/RCVD: CPT | Performed by: INTERNAL MEDICINE

## 2019-06-25 PROCEDURE — 1123F ACP DISCUSS/DSCN MKR DOCD: CPT | Performed by: INTERNAL MEDICINE

## 2019-06-25 PROCEDURE — 94060 EVALUATION OF WHEEZING: CPT

## 2019-06-25 PROCEDURE — G8427 DOCREV CUR MEDS BY ELIG CLIN: HCPCS | Performed by: INTERNAL MEDICINE

## 2019-06-25 PROCEDURE — 6360000002 HC RX W HCPCS: Performed by: INTERNAL MEDICINE

## 2019-06-25 PROCEDURE — G8417 CALC BMI ABV UP PARAM F/U: HCPCS | Performed by: INTERNAL MEDICINE

## 2019-06-25 PROCEDURE — 1036F TOBACCO NON-USER: CPT | Performed by: INTERNAL MEDICINE

## 2019-06-25 PROCEDURE — 71046 X-RAY EXAM CHEST 2 VIEWS: CPT

## 2019-06-25 RX ORDER — ALBUTEROL SULFATE 2.5 MG/3ML
2.5 SOLUTION RESPIRATORY (INHALATION) ONCE
Status: COMPLETED | OUTPATIENT
Start: 2019-06-25 | End: 2019-06-25

## 2019-06-25 RX ADMIN — ALBUTEROL SULFATE 2.5 MG: 2.5 SOLUTION RESPIRATORY (INHALATION) at 10:13

## 2019-06-25 SDOH — HEALTH STABILITY - PHYSICAL HEALTH: OCCUPATIONAL EXPOSURE TO UNSPECIFIED RISK FACTOR: Z57.9

## 2019-06-25 NOTE — PROGRESS NOTES
PULMONARY MEDICINE OUTPATIENT  CONSULT                                                                        Patient:  Mynor Nicholas Shock  YOB: 1945  MRN: [id number]     REFERRED BY: Valente Caballero DO   CHIEF COMPLIANT/REASON FOR CONSULTATION: Shortness of Breath    HISTORY     HISTORY OF PRESENT ILLNESS:    Hannah Rose is a 69y.o. year old male here for establishing care. Patient has a history of obesity, obstructive sleep apnea, coronary artery disease; status post recent PCI and was referred by Dr. China Corrigan for evaluation and treatment of shortness of breath on exertion. Onset: Symptoms began several years ago. Course: gradually worsening since that time. Other symptoms: Patient denies constant cough, post nasal drip and sputum production. Aggravating/Precipitating factors: moderate activity. Alleviating/Relieving factors: rest.   Associated symptoms: none  Allergies: Patient does not have history of environmental allergens. 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 Appetite: has been unchanged. Occupation/Environmental exposure:   Asbestos : No  Silica dust : No  Coal : No  Foundry : No  Quarry : No  [place] : No  Hot Tub : No  Pets: dogs, cats, turtles or exotic birds: No   Past history of TB or exposure to TB : No  Sick contacts : No  Travel history significant for any risk factors for pulmonary disease: No    Tobacco history: Patient  reports that he quit smoking about 34 years ago. He has a 30.00 pack-year smoking history.  He has never used smokeless

## 2019-07-05 NOTE — PROCEDURES
PULMONARY FUNCTION TEST      COMPONENTS: Following components pulmonary function tests were performed during this study:    [x] Spirometry with the Forced Vital Capacity maneuver; with pre-and post bronchodilator challenge  [] Spirometry with the Forced Vital Capacity maneuver; without pre-and post bronchodilator challenge  [x] Flow-volume loop  [x] Lung volumes (Body plethysmography, when applicable)  [x] Airway resistance  [x] Diffusion capacity  [x] Resting oxygen saturation  [] Maximum inspiratory and expiratory pressures  [] Inhalation challenge with methacholine     QUALITY: Based on technician observations and review of the raw data, show that the study is of good quality and the results seem to reflect true performance capacity, as the patient demonstrated good patient effort and cooperation. SPIROMETRY      Forced Vital Capacity maneuver: There is no demonstrable obstructive defect and FVC is mildly diminished, which in setting of normal TLC is suggestive of \"non-specific\" ventilatory impairment, which is typically seen in patient with asthma, obesity, CHF, chest wall limitations. Consider methacholine challenge test to evaluate for asthma. Flow-Volume Loop: is normal.     Following inhaled bronchodilator there was: no significant response (<8% improvement in the FEV1)     LUNG VOLUMES      Measurement by body plethysmography and/or gas diffusion shows:  Lung volumes are noraml except for decreased SVC and  ERV. Neither hyperinflation nor air trapping are present. Airway resistance, a measure of airway function is: Airway resistance is within normal limits.     DIFFUSION CAPACITY (DLCO)     The diffusing capacity is:  Mildly reduced (>60% and <75%)  However DLCO corrected for alveolar volume is normal; which suggests that reduction in the DLCO may be due to a reduced measured lung volume rather than parenchymal or vascular disease; this finding may be due to poor patient effort, previous lung resection, obstruction of a major bronchus or small stature of the patient    FINAL IMPRESSION     The overall study is:    ABNORMAL and the NONSPECIFIC pattern of ventilatory impairment is typically seen in patient with asthma, obesity, CHF, or chest wall limitations. Comments:    Please correlate clinically.

## 2019-07-16 ENCOUNTER — HOSPITAL ENCOUNTER (OUTPATIENT)
Dept: CT IMAGING | Age: 74
Discharge: HOME | End: 2019-07-18
Payer: MEDICARE

## 2019-07-16 DIAGNOSIS — Z57.9 MILD INTERMITTENT OCCUPATIONAL ASTHMA WITHOUT COMPLICATION: ICD-10-CM

## 2019-07-16 DIAGNOSIS — J45.20 MILD INTERMITTENT OCCUPATIONAL ASTHMA WITHOUT COMPLICATION: ICD-10-CM

## 2019-07-16 PROCEDURE — 71250 CT THORAX DX C-: CPT

## 2019-07-16 SDOH — HEALTH STABILITY - PHYSICAL HEALTH: OCCUPATIONAL EXPOSURE TO UNSPECIFIED RISK FACTOR: Z57.9

## 2019-07-21 PROBLEM — I27.20 PULMONARY HYPERTENSION (HCC): Status: ACTIVE | Noted: 2019-07-21

## 2019-07-23 ENCOUNTER — OFFICE VISIT (OUTPATIENT)
Dept: PULMONOLOGY | Age: 74
End: 2019-07-23
Payer: MEDICARE

## 2019-07-23 VITALS
DIASTOLIC BLOOD PRESSURE: 74 MMHG | SYSTOLIC BLOOD PRESSURE: 124 MMHG | HEART RATE: 58 BPM | TEMPERATURE: 97.6 F | WEIGHT: 235.8 LBS | OXYGEN SATURATION: 96 % | HEIGHT: 70 IN | BODY MASS INDEX: 33.76 KG/M2

## 2019-07-23 DIAGNOSIS — I10 ESSENTIAL HYPERTENSION: Primary | ICD-10-CM

## 2019-07-23 DIAGNOSIS — I27.20 PULMONARY HYPERTENSION (HCC): ICD-10-CM

## 2019-07-23 DIAGNOSIS — Z57.9: ICD-10-CM

## 2019-07-23 DIAGNOSIS — Z77.090 SUSPECTED EXPOSURE TO ASBESTOS: ICD-10-CM

## 2019-07-23 DIAGNOSIS — R91.8 MULTIPLE PULMONARY NODULES: Primary | ICD-10-CM

## 2019-07-23 DIAGNOSIS — J94.8 PLEURAL CALCIFICATION: ICD-10-CM

## 2019-07-23 DIAGNOSIS — J45.20: ICD-10-CM

## 2019-07-23 PROCEDURE — 4040F PNEUMOC VAC/ADMIN/RCVD: CPT | Performed by: INTERNAL MEDICINE

## 2019-07-23 PROCEDURE — G8417 CALC BMI ABV UP PARAM F/U: HCPCS | Performed by: INTERNAL MEDICINE

## 2019-07-23 PROCEDURE — G8427 DOCREV CUR MEDS BY ELIG CLIN: HCPCS | Performed by: INTERNAL MEDICINE

## 2019-07-23 PROCEDURE — 3017F COLORECTAL CA SCREEN DOC REV: CPT | Performed by: INTERNAL MEDICINE

## 2019-07-23 PROCEDURE — G8598 ASA/ANTIPLAT THER USED: HCPCS | Performed by: INTERNAL MEDICINE

## 2019-07-23 PROCEDURE — 1123F ACP DISCUSS/DSCN MKR DOCD: CPT | Performed by: INTERNAL MEDICINE

## 2019-07-23 PROCEDURE — 99214 OFFICE O/P EST MOD 30 MIN: CPT | Performed by: INTERNAL MEDICINE

## 2019-07-23 PROCEDURE — 1036F TOBACCO NON-USER: CPT | Performed by: INTERNAL MEDICINE

## 2019-07-23 SDOH — HEALTH STABILITY - PHYSICAL HEALTH: OCCUPATIONAL EXPOSURE TO UNSPECIFIED RISK FACTOR: Z57.9

## 2019-07-23 NOTE — PROGRESS NOTES
pain     occasional.     Coronary artery disease     status post drug eluting stent placement, LAD, ostial obtuse marginal.     Dysphagia     Erectile dysfunction     Familial hyperlipidemia     with hypertriglyceridemia.  Gastroesophageal reflux disease     Hearing loss, bilateral     hearing aid in the left ear only. 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     Hypertension     Impaired fasting glucose     prediabetes.  Lumbar disc herniation     L3-L4, with chronic back pain.  Mild anemia     Nasal polyps     minimal symptoms.  Nasal septal deviation     right side.  Obesity     Palpitations     occasional.     Peptic ulcer disease     Reactive airway disease     asthma, industrial related, also a history of hypersensitivity pneumonitis.  Urinary frequency      PAST SURGICAL HISTORY:    Past Surgical History:   Procedure Laterality Date    APPENDECTOMY  age 10    CARDIAC CATHETERIZATION  10/20/2011    occluded LAD and ostial obtuse marginal stenosis, underwent drug eluting stents to both, RCA small, nondominant, occluded, ejection fraction 45%.  CARDIAC CATHETERIZATION  08/2018    with stent placement    CATARACT REMOVAL Bilateral 03/2018    COLONOSCOPY  01/21/2011    mild sigmoid diverticulosis.  COLONOSCOPY  4/6/16    hemorrhoid; sigmoid diverticulosis    KNEE ARTHROSCOPY Left 03/19/2010    partial medial and lateral synovectomies, partial medial meniscectomy, chondroplasty.  NASAL FRACTURE SURGERY  1960    OTHER SURGICAL HISTORY Left 02/12/2010    knee injection.  PRE-MALIGNANT / BENIGN SKIN LESION EXCISION Left 01/16/2012    actinic keratoses, ear, liquid nitrogen.  PRE-MALIGNANT / BENIGN SKIN LESION EXCISION Left 07/19/2011    actinic keratosis, upper ear, liquid nitrogen.      PROSTATE BIOPSY Bilateral 09/08/2015    Benign    REPAIR UMBILICAL JJZP,8+M/J,BUIVB N/A 3/11/3341    UMBILICAL Hernia Repair w/ Mesh performed by Cecelia Corbin MD at [address] [place] [address] 8/14/2019) 25 tablet 3    [DISCONTINUED] Mirabegron ER (MYRBETRIQ) 50 MG TB24 TAKE 1 TABLET BY MOUTH DAILY 30 tablet 0    niacin (NIASPAN) 500 MG CR tablet Take 500 mg by mouth 2 times daily. Two pills daily.  Omega-3 Fatty Acids (FISH OIL) 1200 MG CAPS Take 2,000 mg by mouth 2 times daily.  Multiple Vitamins-Minerals (MULTIVITAMIN PO) daily.  ASPIRIN PO 81 mg daily.  [DISCONTINUED] omega-3 acid ethyl esters (LOVAZA) 1 g capsule Take 2 capsules by mouth 2 times daily 60 capsule 6     No facility-administered encounter medications on file as of 7/23/2019. OBJECTIVE     VITAL SIGNS:   /74 (Site: Right Upper Arm, Position: Sitting, Cuff Size: Medium Adult)   Pulse 58   Temp 97.6 °F (36.4 °C) (Tympanic)   Ht 5' 10\" (1.778 m)   Wt 235 lb 12.8 oz (107 kg)   SpO2 96%   BMI 33.83 kg/m²   Wt Readings from Last 3 Encounters:   08/14/19 233 lb (105.7 kg)   07/23/19 235 lb 12.8 oz (107 kg)   06/25/19 233 lb (105.7 kg)     SYSTEMIC EXAMINATION:  General appearance - well appearing, and in no distress  Mental status - alert, oriented to person, place, and time  Eyes - pupils equal and reactive, extraocular eye movements intact, sclera anicteric  Mouth - mucous membranes moist, pharynx normal without lesions and tonsils normal  Neck - supple, no significant adenopathy, carotids upstroke normal bilaterally, no bruits  Mallampati Airway Score - III (soft palate, base of uvula visible)  Chest - Chest was symmetrical without dullness to percussion. Breath sounds bilaterally were clear to auscultation. There were no wheezes, rhonchi or rales.   There is no intercostal recession or use of accessory muscles  Heart - normal rate, regular rhythm, normal S1, S2, no murmurs, rubs, clicks or gallops, no JVD  Abdomen - soft, nontender, nondistended, no masses or organomegaly  Neurological - alert, oriented, normal speech, no focal findings or movement disorder noted  Extremities - peripheral pulses

## 2019-08-08 ENCOUNTER — HOSPITAL ENCOUNTER (OUTPATIENT)
Dept: LAB | Age: 74
Discharge: HOME | End: 2019-08-08
Payer: MEDICARE

## 2019-08-08 DIAGNOSIS — R73.01 IMPAIRED FASTING GLUCOSE: ICD-10-CM

## 2019-08-08 DIAGNOSIS — R97.20 ELEVATED PSA: ICD-10-CM

## 2019-08-08 LAB
ESTIMATED AVERAGE GLUCOSE: 128 MG/DL
HBA1C MFR BLD: 6.1 % (ref 4.8–5.9)
PROSTATE SPECIFIC ANTIGEN: 5.62 UG/L

## 2019-08-08 PROCEDURE — 84153 ASSAY OF PSA TOTAL: CPT

## 2019-08-08 PROCEDURE — 83036 HEMOGLOBIN GLYCOSYLATED A1C: CPT

## 2019-08-08 PROCEDURE — 36415 COLL VENOUS BLD VENIPUNCTURE: CPT

## 2019-08-09 RX ORDER — LEVOTHYROXINE SODIUM 0.1 MG/1
TABLET ORAL
Qty: 90 TABLET | Refills: 1 | Status: SHIPPED | OUTPATIENT
Start: 2019-08-09 | End: 2020-02-10

## 2019-08-14 ENCOUNTER — OFFICE VISIT (OUTPATIENT)
Dept: FAMILY MEDICINE CLINIC | Age: 74
End: 2019-08-14
Payer: MEDICARE

## 2019-08-14 ENCOUNTER — HOSPITAL ENCOUNTER (OUTPATIENT)
Dept: GENERAL RADIOLOGY | Age: 74
Discharge: HOME | End: 2019-08-16
Payer: MEDICARE

## 2019-08-14 VITALS
HEIGHT: 70 IN | HEART RATE: 52 BPM | DIASTOLIC BLOOD PRESSURE: 80 MMHG | SYSTOLIC BLOOD PRESSURE: 130 MMHG | WEIGHT: 233 LBS | BODY MASS INDEX: 33.36 KG/M2 | RESPIRATION RATE: 18 BRPM

## 2019-08-14 DIAGNOSIS — E78.49 FAMILIAL HYPERLIPIDEMIA: ICD-10-CM

## 2019-08-14 DIAGNOSIS — G89.29 CHRONIC PAIN OF LEFT ANKLE: ICD-10-CM

## 2019-08-14 DIAGNOSIS — M17.0 PRIMARY OSTEOARTHRITIS OF BOTH KNEES: ICD-10-CM

## 2019-08-14 DIAGNOSIS — I10 ESSENTIAL HYPERTENSION: Primary | ICD-10-CM

## 2019-08-14 DIAGNOSIS — R73.01 IMPAIRED FASTING GLUCOSE: ICD-10-CM

## 2019-08-14 DIAGNOSIS — R97.20 ELEVATED PSA: ICD-10-CM

## 2019-08-14 DIAGNOSIS — M25.572 CHRONIC PAIN OF LEFT ANKLE: ICD-10-CM

## 2019-08-14 DIAGNOSIS — G89.29 CHRONIC PAIN OF BOTH KNEES: ICD-10-CM

## 2019-08-14 DIAGNOSIS — E03.9 ACQUIRED HYPOTHYROIDISM: ICD-10-CM

## 2019-08-14 DIAGNOSIS — M25.561 CHRONIC PAIN OF BOTH KNEES: ICD-10-CM

## 2019-08-14 DIAGNOSIS — M25.562 CHRONIC PAIN OF BOTH KNEES: ICD-10-CM

## 2019-08-14 DIAGNOSIS — G47.33 OBSTRUCTIVE SLEEP APNEA: ICD-10-CM

## 2019-08-14 PROCEDURE — 73610 X-RAY EXAM OF ANKLE: CPT

## 2019-08-14 PROCEDURE — 1123F ACP DISCUSS/DSCN MKR DOCD: CPT | Performed by: FAMILY MEDICINE

## 2019-08-14 PROCEDURE — G8417 CALC BMI ABV UP PARAM F/U: HCPCS | Performed by: FAMILY MEDICINE

## 2019-08-14 PROCEDURE — 99214 OFFICE O/P EST MOD 30 MIN: CPT | Performed by: FAMILY MEDICINE

## 2019-08-14 PROCEDURE — 4040F PNEUMOC VAC/ADMIN/RCVD: CPT | Performed by: FAMILY MEDICINE

## 2019-08-14 PROCEDURE — G8427 DOCREV CUR MEDS BY ELIG CLIN: HCPCS | Performed by: FAMILY MEDICINE

## 2019-08-14 PROCEDURE — G8598 ASA/ANTIPLAT THER USED: HCPCS | Performed by: FAMILY MEDICINE

## 2019-08-14 PROCEDURE — 1036F TOBACCO NON-USER: CPT | Performed by: FAMILY MEDICINE

## 2019-08-14 PROCEDURE — 20610 DRAIN/INJ JOINT/BURSA W/O US: CPT | Performed by: FAMILY MEDICINE

## 2019-08-14 PROCEDURE — 3017F COLORECTAL CA SCREEN DOC REV: CPT | Performed by: FAMILY MEDICINE

## 2019-08-14 RX ORDER — TRIAMCINOLONE ACETONIDE 40 MG/ML
40 INJECTION, SUSPENSION INTRA-ARTICULAR; INTRAMUSCULAR ONCE
Status: COMPLETED | OUTPATIENT
Start: 2019-08-14 | End: 2019-08-14

## 2019-08-14 RX ORDER — FUROSEMIDE 20 MG/1
20 TABLET ORAL DAILY
Qty: 90 TABLET | Refills: 1 | Status: SHIPPED | OUTPATIENT
Start: 2019-08-14 | End: 2020-02-17 | Stop reason: SDUPTHER

## 2019-08-14 RX ADMIN — TRIAMCINOLONE ACETONIDE 40 MG: 40 INJECTION, SUSPENSION INTRA-ARTICULAR; INTRAMUSCULAR at 09:17

## 2019-08-14 RX ADMIN — TRIAMCINOLONE ACETONIDE 40 MG: 40 INJECTION, SUSPENSION INTRA-ARTICULAR; INTRAMUSCULAR at 09:18

## 2019-08-14 ASSESSMENT — ENCOUNTER SYMPTOMS
COUGH: 0
EYE DISCHARGE: 0
ABDOMINAL PAIN: 0
DIARRHEA: 0
RHINORRHEA: 0
NAUSEA: 0
SORE THROAT: 0
SHORTNESS OF BREATH: 0
TROUBLE SWALLOWING: 0
EYE REDNESS: 0
VOMITING: 0
WHEEZING: 0
CONSTIPATION: 0
SINUS PRESSURE: 0

## 2019-08-14 ASSESSMENT — PATIENT HEALTH QUESTIONNAIRE - PHQ9
SUM OF ALL RESPONSES TO PHQ9 QUESTIONS 1 & 2: 0
1. LITTLE INTEREST OR PLEASURE IN DOING THINGS: 0
SUM OF ALL RESPONSES TO PHQ QUESTIONS 1-9: 0
SUM OF ALL RESPONSES TO PHQ QUESTIONS 1-9: 0
2. FEELING DOWN, DEPRESSED OR HOPELESS: 0

## 2019-08-14 NOTE — PROGRESS NOTES
Niki Morfin received counseling on the following healthy behaviors: nutrition and exercise  Reviewed prior labs and health maintenance  Continue current medications, diet and exercise. Discussed use, benefit, and side effects of prescribed medications. Barriers to medication compliance addressed. Patient given educational materials - see patient instructions  Was a self-tracking handout given in paper form or via [employer]? Yes    Requested Prescriptions     Pending Prescriptions Disp Refills    Mirabegron ER (MYRBETRIQ) 50 MG TB24 30 tablet 0     Sig: TAKE 1 TABLET BY MOUTH DAILY    furosemide (LASIX) 20 MG tablet 90 tablet 1     Sig: Take 1 tablet by mouth daily       All patient questions answered. Patient voiced understanding. Quality Measures    Body mass index is 33.43 kg/m². Elevated. Weight control planned discussed Healthy diet and regular exercise. BP: 130/80. Blood pressure is normal. Treatment plan consists of No treatment change needed. Fall Risk 2/13/2019 2/12/2018 2/6/2017 2/5/2016 8/4/2015 7/28/2014   2 or more falls in past year? no no no no no no   Fall with injury in past year? no no no no no no     The patient does not have a history of falls. I did not - not indicated , complete a risk assessment for falls.  A plan of care for falls No Treatment plan indicated    Lab Results   Component Value Date    LDLCHOLESTEROL      02/06/2019    LDLDIRECT 110 (H) 02/06/2019    (goal LDL reduction with dx if diabetes is 50% LDL reduction)    PHQ Scores 8/14/2019 2/13/2019 12/10/2018 8/13/2018 2/12/2018 2/6/2017 8/5/2016   PHQ2 Score 0 0 0 0 0 0 0   PHQ9 Score 0 0 0 0 0 0 0     Interpretation of Total Score Depression Severity: 1-4 = Minimal depression, 5-9 = Mild depression, 10-14 = Moderate depression, 15-19 = Moderately severe depression, 20-27 = Severe depression

## 2019-08-14 NOTE — PROGRESS NOTES
Negative for environmental allergies. Neurological: Negative for dizziness, weakness, light-headedness and headaches. Hematological: Negative for adenopathy. Psychiatric/Behavioral: Negative. Prior to Visit Medications    Medication Sig Taking? Authorizing Provider   Mirabegron ER (MYRBETRIQ) 50 MG TB24 TAKE 1 TABLET BY MOUTH DAILY Yes Dilip Urena DO   furosemide (LASIX) 20 MG tablet Take 1 tablet by mouth daily Yes Dilip Urena DO   levothyroxine (SYNTHROID) 100 MCG tablet TAKE 1 TABLET BY MOUTH ONE TIME A DAY  Yes Dilip Urena DO   Misc Natural Products (URINOZINC PO) Take 2 tablets by mouth daily Yes Historical Provider, MD   tamsulosin (FLOMAX) 0.4 MG capsule TAKE 1 CAPSULE BY MOUTH DAILY Yes Dilip Urena DO   ranitidine (ZANTAC) 300 MG tablet TAKE 1 TABLET BY MOUTH AT BEDTIME Yes Dilip Urena DO   pravastatin (PRAVACHOL) 40 MG tablet TAKE ONE TABLET BY MOUTH ONCE EVERY EVENING Yes Dilip Urena DO   clopidogrel (PLAVIX) 75 MG tablet TAKE 1 TABLET BY MOUTH DAILY Yes Dilip Urena DO   doxazosin (CARDURA) 2 MG tablet Take 1 tablet by mouth 2 times daily Yes Dilip Urena DO   metoprolol tartrate (LOPRESSOR) 50 MG tablet Take 1 tablet by mouth 2 times daily Yes Dilip Urena DO   losartan (COZAAR) 50 MG tablet TAKE 1 TABLET BY MOUTH EVERY DAY Yes Naty Lazcano DO   niacin (NIASPAN) 500 MG CR tablet Take 500 mg by mouth 2 times daily. Two pills daily. Yes Historical Provider, MD   Omega-3 Fatty Acids (FISH OIL) 1200 MG CAPS Take 2,000 mg by mouth 2 times daily. Yes Historical Provider, MD   Multiple Vitamins-Minerals (MULTIVITAMIN PO) daily. Yes Historical Provider, MD   ASPIRIN PO 81 mg daily.  Yes Historical Provider, MD   albuterol sulfate HFA (VENTOLIN HFA) 108 (90 Base) MCG/ACT inhaler Inhale 2 puffs into the lungs every 6 hours as needed for Wheezing or Shortness of Breath  Patient not taking: Reported on 8/14/2019  Dilip Urena DO nitroGLYCERIN (NITROSTAT) 0.4 MG SL tablet Place 1 tablet under the tongue every 5 minutes as needed for Chest pain up to max of 3 total doses. If no relief after 1 dose, call 911. Patient not taking: Reported on 2019  ILYA Cobb - DAVEY        Social History     Tobacco Use    Smoking status: Former Smoker     Packs/day: 2.00     Years: 15.00     Pack years: 30.00     Last attempt to quit: 1985     Years since quittin.6    Smokeless tobacco: Never Used    Tobacco comment: smoked 2 packs a day for 14 to 15 years, quit in    Substance Use Topics    Alcohol use: No     Alcohol/week: 0.0 standard drinks     Comment: rare        Vitals:    19 0850   BP: 130/80   Site: Left Upper Arm   Position: Sitting   Cuff Size: Large Adult   Pulse: 52   Resp: 18   Weight: 233 lb (105.7 kg)   Height: 5' 10\" (1.778 m)     Estimated body mass index is 33.43 kg/m² as calculated from the following:    Height as of this encounter: 5' 10\" (1.778 m). Weight as of this encounter: 233 lb (105.7 kg). Physical Exam   Constitutional: He is oriented to person, place, and time. He appears well-developed and well-nourished. No distress. HENT:   Head: Normocephalic and atraumatic. Right Ear: External ear normal.   Left Ear: External ear normal.   Nose: Nose normal.   Mouth/Throat: Oropharynx is clear and moist. No oropharyngeal exudate. Eyes: Pupils are equal, round, and reactive to light. Conjunctivae and EOM are normal. Right eye exhibits no discharge. Left eye exhibits no discharge. Neck: Normal range of motion. Neck supple. No thyromegaly present. Cardiovascular: Normal rate, regular rhythm and normal heart sounds. Pulmonary/Chest: Effort normal and breath sounds normal. He has no wheezes. He has no rales. Abdominal: Soft. Bowel sounds are normal.   Musculoskeletal: Normal range of motion. He exhibits tenderness. He exhibits no edema.    Bilateral knees show pain with palpation to Future     Standing Expiration Date:   8/13/2020    Hemoglobin A1C     To be done in 6 months     Standing Status:   Future     Standing Expiration Date:   8/13/2020    Lipid Panel     To be done in 6 months     Standing Status:   Future     Standing Expiration Date:   8/13/2020     Order Specific Question:   Is Patient Fasting?/# of Hours     Answer:   12 hours    T4, Free     To be done in 6 months     Standing Status:   Future     Standing Expiration Date:   8/13/2020    TSH without Reflex     To be done in 6 months     Standing Status:   Future     Standing Expiration Date:   8/13/2020    PSA, Diagnostic     Standing Status:   Future     Standing Expiration Date:   8/13/2020    KY ARTHROCENTESIS ASPIR&/INJ MAJOR JT/BURSA W/O US    [place] ARTHROCENTESIS ASPIR&/INJ MAJOR JT/BURSA W/O US     Procedure Note    PREOP DIAGNOSIS:  [] Right []  Left    [x] Bilateral Knee Pain    POSTOP DIAGNOSIS:  [] Right []  Left    [x] Bilateral Knee Pain    OPERATION:  [] Right []  Left    [x] Bilateral INTRA-ARTICULAR KNEE INJECTION      PROCEDURE:  After sterile prep, an Anterior and Lateral approach was used. [x] 40 mg Kenalog  [x]  2 cc 1% Xylocaine without Epi       []  12 mg of Celestone     Injected intra-articularly without incident. Pre- and post neurovascular status verified. No complications noted. Knee injections are performed as noted above. With regards to his left ankle pain we will do x-ray of his left ankle. Based on these results will plan to make further intervention as necessary. I did advise him to use a compression type brace which she does have at home as needed. Could consider podiatry referral for further opinion whether or not orthotics would help with his ongoing ankle pain. Patient is to continue to follow a low-fat/high-fiber diet as well as a low-carb/low sugar diet and increase exercise for optimal cholesterol and blood sugar control.     Patient is to continue on the rest of his current medical therapy. No additional changes are made at this time. Patient is to return to my office in 6 months duration or sooner if any further problems or symptoms arise. (Please note that portions of this note were completed with a voice recognition program. Efforts were made to edit the dictations but occasionally words are mis-transcribed.)        Return in about 6 months (around 2/14/2020). An electronic signature was used to authenticate this note.     --Cynthia Espinosa,  on 8/14/2019 at 9:21 AM

## 2019-09-08 RX ORDER — FUROSEMIDE 20 MG/1
20 TABLET ORAL DAILY
Qty: 90 TABLET | Refills: 1 | Status: CANCELLED | OUTPATIENT
Start: 2019-09-08

## 2019-11-20 ENCOUNTER — OFFICE VISIT (OUTPATIENT)
Dept: CARDIOLOGY | Age: 74
End: 2019-11-20
Payer: MEDICARE

## 2019-11-20 VITALS
HEART RATE: 58 BPM | BODY MASS INDEX: 34.79 KG/M2 | DIASTOLIC BLOOD PRESSURE: 70 MMHG | HEIGHT: 70 IN | WEIGHT: 243 LBS | SYSTOLIC BLOOD PRESSURE: 130 MMHG

## 2019-11-20 DIAGNOSIS — I51.89 DIASTOLIC DYSFUNCTION: ICD-10-CM

## 2019-11-20 DIAGNOSIS — Z95.5 STENTED CORONARY ARTERY: ICD-10-CM

## 2019-11-20 DIAGNOSIS — R00.2 PALPITATION: ICD-10-CM

## 2019-11-20 DIAGNOSIS — I10 ESSENTIAL HYPERTENSION: Primary | ICD-10-CM

## 2019-11-20 DIAGNOSIS — R06.09 DYSPNEA ON EXERTION: ICD-10-CM

## 2019-11-20 DIAGNOSIS — I25.10 CORONARY ARTERY DISEASE INVOLVING NATIVE CORONARY ARTERY OF NATIVE HEART WITHOUT ANGINA PECTORIS: ICD-10-CM

## 2019-11-20 PROCEDURE — 4040F PNEUMOC VAC/ADMIN/RCVD: CPT | Performed by: INTERNAL MEDICINE

## 2019-11-20 PROCEDURE — G8482 FLU IMMUNIZE ORDER/ADMIN: HCPCS | Performed by: INTERNAL MEDICINE

## 2019-11-20 PROCEDURE — 3017F COLORECTAL CA SCREEN DOC REV: CPT | Performed by: INTERNAL MEDICINE

## 2019-11-20 PROCEDURE — 1036F TOBACCO NON-USER: CPT | Performed by: INTERNAL MEDICINE

## 2019-11-20 PROCEDURE — G8417 CALC BMI ABV UP PARAM F/U: HCPCS | Performed by: INTERNAL MEDICINE

## 2019-11-20 PROCEDURE — 1123F ACP DISCUSS/DSCN MKR DOCD: CPT | Performed by: INTERNAL MEDICINE

## 2019-11-20 PROCEDURE — 93000 ELECTROCARDIOGRAM COMPLETE: CPT | Performed by: INTERNAL MEDICINE

## 2019-11-20 PROCEDURE — G8427 DOCREV CUR MEDS BY ELIG CLIN: HCPCS | Performed by: INTERNAL MEDICINE

## 2019-11-20 PROCEDURE — 99214 OFFICE O/P EST MOD 30 MIN: CPT | Performed by: INTERNAL MEDICINE

## 2019-11-20 PROCEDURE — G8598 ASA/ANTIPLAT THER USED: HCPCS | Performed by: INTERNAL MEDICINE

## 2019-11-20 RX ORDER — NITROGLYCERIN 0.4 MG/1
0.4 TABLET SUBLINGUAL EVERY 5 MIN PRN
Qty: 25 TABLET | Refills: 3 | Status: SHIPPED | OUTPATIENT
Start: 2019-11-20 | End: 2021-03-09 | Stop reason: ALTCHOICE

## 2019-12-20 ENCOUNTER — OFFICE VISIT (OUTPATIENT)
Dept: DERMATOLOGY | Age: 74
End: 2019-12-20
Payer: MEDICARE

## 2019-12-20 VITALS
HEART RATE: 71 BPM | SYSTOLIC BLOOD PRESSURE: 132 MMHG | DIASTOLIC BLOOD PRESSURE: 80 MMHG | WEIGHT: 242.95 LBS | HEIGHT: 70 IN | OXYGEN SATURATION: 98 % | BODY MASS INDEX: 34.78 KG/M2

## 2019-12-20 DIAGNOSIS — L57.8 ACTINIC SKIN DAMAGE: ICD-10-CM

## 2019-12-20 DIAGNOSIS — Z85.828 H/O NONMELANOMA SKIN CANCER: ICD-10-CM

## 2019-12-20 DIAGNOSIS — L57.0 ACTINIC KERATOSIS: Primary | ICD-10-CM

## 2019-12-20 PROCEDURE — G8482 FLU IMMUNIZE ORDER/ADMIN: HCPCS | Performed by: DERMATOLOGY

## 2019-12-20 PROCEDURE — G8598 ASA/ANTIPLAT THER USED: HCPCS | Performed by: DERMATOLOGY

## 2019-12-20 PROCEDURE — 17003 DESTRUCT PREMALG LES 2-14: CPT | Performed by: DERMATOLOGY

## 2019-12-20 PROCEDURE — 17000 DESTRUCT PREMALG LESION: CPT | Performed by: DERMATOLOGY

## 2019-12-20 PROCEDURE — G8427 DOCREV CUR MEDS BY ELIG CLIN: HCPCS | Performed by: DERMATOLOGY

## 2019-12-20 PROCEDURE — 99214 OFFICE O/P EST MOD 30 MIN: CPT | Performed by: DERMATOLOGY

## 2019-12-20 PROCEDURE — 1123F ACP DISCUSS/DSCN MKR DOCD: CPT | Performed by: DERMATOLOGY

## 2019-12-20 PROCEDURE — G8417 CALC BMI ABV UP PARAM F/U: HCPCS | Performed by: DERMATOLOGY

## 2019-12-20 PROCEDURE — 3017F COLORECTAL CA SCREEN DOC REV: CPT | Performed by: DERMATOLOGY

## 2019-12-20 PROCEDURE — 4040F PNEUMOC VAC/ADMIN/RCVD: CPT | Performed by: DERMATOLOGY

## 2019-12-20 PROCEDURE — 1036F TOBACCO NON-USER: CPT | Performed by: DERMATOLOGY

## 2020-02-08 ENCOUNTER — HOSPITAL ENCOUNTER (OUTPATIENT)
Dept: LAB | Age: 75
Discharge: HOME | End: 2020-02-08
Payer: MEDICARE

## 2020-02-08 LAB
ABSOLUTE EOS #: 0.07 K/UL (ref 0–0.44)
ABSOLUTE IMMATURE GRANULOCYTE: <0.03 K/UL (ref 0–0.3)
ABSOLUTE LYMPH #: 1.69 K/UL (ref 1.1–3.7)
ABSOLUTE MONO #: 0.58 K/UL (ref 0.1–1.2)
ALBUMIN SERPL-MCNC: 4.5 G/DL (ref 3.5–5.2)
ALBUMIN/GLOBULIN RATIO: 1.5 (ref 1–2.5)
ALP BLD-CCNC: 63 U/L (ref 40–129)
ALT SERPL-CCNC: 38 U/L (ref 5–41)
ANION GAP SERPL CALCULATED.3IONS-SCNC: 14 MMOL/L (ref 9–17)
AST SERPL-CCNC: 23 U/L
BASOPHILS # BLD: 1 % (ref 0–2)
BASOPHILS ABSOLUTE: 0.04 K/UL (ref 0–0.2)
BILIRUB SERPL-MCNC: 0.32 MG/DL (ref 0.3–1.2)
BUN BLDV-MCNC: 17 MG/DL (ref 8–23)
BUN/CREAT BLD: 19 (ref 9–20)
CALCIUM SERPL-MCNC: 9.6 MG/DL (ref 8.6–10.4)
CHLORIDE BLD-SCNC: 103 MMOL/L (ref 98–107)
CHOLESTEROL/HDL RATIO: 6.5
CHOLESTEROL: 228 MG/DL
CO2: 25 MMOL/L (ref 20–31)
CREAT SERPL-MCNC: 0.88 MG/DL (ref 0.7–1.2)
DIFFERENTIAL TYPE: ABNORMAL
EOSINOPHILS RELATIVE PERCENT: 1 % (ref 1–4)
ESTIMATED AVERAGE GLUCOSE: 137 MG/DL
GFR AFRICAN AMERICAN: >60 ML/MIN
GFR NON-AFRICAN AMERICAN: >60 ML/MIN
GFR SERPL CREATININE-BSD FRML MDRD: ABNORMAL ML/MIN/{1.73_M2}
GFR SERPL CREATININE-BSD FRML MDRD: ABNORMAL ML/MIN/{1.73_M2}
GLUCOSE BLD-MCNC: 127 MG/DL (ref 70–99)
HBA1C MFR BLD: 6.4 % (ref 4.8–5.9)
HCT VFR BLD CALC: 39.9 % (ref 40.7–50.3)
HDLC SERPL-MCNC: 35 MG/DL
HEMOGLOBIN: 13.3 G/DL (ref 13–17)
IMMATURE GRANULOCYTES: 0 %
LDL CHOLESTEROL DIRECT: 88 MG/DL
LDL CHOLESTEROL: ABNORMAL MG/DL (ref 0–130)
LYMPHOCYTES # BLD: 27 % (ref 24–43)
MCH RBC QN AUTO: 31.6 PG (ref 25.2–33.5)
MCHC RBC AUTO-ENTMCNC: 33.3 G/DL (ref 25.2–33.5)
MCV RBC AUTO: 94.8 FL (ref 82.6–102.9)
MONOCYTES # BLD: 9 % (ref 3–12)
NRBC AUTOMATED: 0 PER 100 WBC
PDW BLD-RTO: 12.8 % (ref 11.8–14.4)
PLATELET # BLD: 141 K/UL (ref 138–453)
PLATELET ESTIMATE: ABNORMAL
PMV BLD AUTO: 10.7 FL (ref 8.1–13.5)
POTASSIUM SERPL-SCNC: 4.3 MMOL/L (ref 3.7–5.3)
PROSTATE SPECIFIC ANTIGEN: 5.8 UG/L
RBC # BLD: 4.21 M/UL (ref 4.21–5.77)
RBC # BLD: ABNORMAL 10*6/UL
SEG NEUTROPHILS: 62 % (ref 36–65)
SEGMENTED NEUTROPHILS ABSOLUTE COUNT: 3.83 K/UL (ref 1.5–8.1)
SODIUM BLD-SCNC: 142 MMOL/L (ref 135–144)
THYROXINE, FREE: 1.08 NG/DL (ref 0.93–1.7)
TOTAL PROTEIN: 7.5 G/DL (ref 6.4–8.3)
TRIGL SERPL-MCNC: 680 MG/DL
TSH SERPL DL<=0.05 MIU/L-ACNC: 2.94 MIU/L (ref 0.3–5)
VLDLC SERPL CALC-MCNC: ABNORMAL MG/DL (ref 1–30)
WBC # BLD: 6.2 K/UL (ref 3.5–11.3)
WBC # BLD: ABNORMAL 10*3/UL

## 2020-02-08 PROCEDURE — 83721 ASSAY OF BLOOD LIPOPROTEIN: CPT

## 2020-02-08 PROCEDURE — 83036 HEMOGLOBIN GLYCOSYLATED A1C: CPT

## 2020-02-08 PROCEDURE — 84439 ASSAY OF FREE THYROXINE: CPT

## 2020-02-08 PROCEDURE — 85025 COMPLETE CBC W/AUTO DIFF WBC: CPT

## 2020-02-08 PROCEDURE — 80061 LIPID PANEL: CPT

## 2020-02-08 PROCEDURE — 36415 COLL VENOUS BLD VENIPUNCTURE: CPT

## 2020-02-08 PROCEDURE — 80053 COMPREHEN METABOLIC PANEL: CPT

## 2020-02-08 PROCEDURE — 84443 ASSAY THYROID STIM HORMONE: CPT

## 2020-02-08 PROCEDURE — 84153 ASSAY OF PSA TOTAL: CPT

## 2020-02-10 RX ORDER — LOSARTAN POTASSIUM 50 MG/1
TABLET ORAL
Qty: 90 TABLET | Refills: 1 | Status: SHIPPED | OUTPATIENT
Start: 2020-02-10 | End: 2020-07-02

## 2020-02-10 RX ORDER — LEVOTHYROXINE SODIUM 0.1 MG/1
TABLET ORAL
Qty: 90 TABLET | Refills: 1 | Status: SHIPPED | OUTPATIENT
Start: 2020-02-10 | End: 2020-07-02

## 2020-02-17 ENCOUNTER — OFFICE VISIT (OUTPATIENT)
Dept: FAMILY MEDICINE CLINIC | Age: 75
End: 2020-02-17
Payer: MEDICARE

## 2020-02-17 VITALS
BODY MASS INDEX: 36.22 KG/M2 | DIASTOLIC BLOOD PRESSURE: 70 MMHG | SYSTOLIC BLOOD PRESSURE: 112 MMHG | HEIGHT: 70 IN | HEART RATE: 92 BPM | WEIGHT: 253 LBS

## 2020-02-17 PROCEDURE — G0439 PPPS, SUBSEQ VISIT: HCPCS | Performed by: FAMILY MEDICINE

## 2020-02-17 PROCEDURE — 1036F TOBACCO NON-USER: CPT | Performed by: FAMILY MEDICINE

## 2020-02-17 PROCEDURE — 1123F ACP DISCUSS/DSCN MKR DOCD: CPT | Performed by: FAMILY MEDICINE

## 2020-02-17 PROCEDURE — 4040F PNEUMOC VAC/ADMIN/RCVD: CPT | Performed by: FAMILY MEDICINE

## 2020-02-17 PROCEDURE — 3017F COLORECTAL CA SCREEN DOC REV: CPT | Performed by: FAMILY MEDICINE

## 2020-02-17 PROCEDURE — G8417 CALC BMI ABV UP PARAM F/U: HCPCS | Performed by: FAMILY MEDICINE

## 2020-02-17 PROCEDURE — G8427 DOCREV CUR MEDS BY ELIG CLIN: HCPCS | Performed by: FAMILY MEDICINE

## 2020-02-17 PROCEDURE — 99214 OFFICE O/P EST MOD 30 MIN: CPT

## 2020-02-17 PROCEDURE — G8482 FLU IMMUNIZE ORDER/ADMIN: HCPCS | Performed by: FAMILY MEDICINE

## 2020-02-17 PROCEDURE — 99214 OFFICE O/P EST MOD 30 MIN: CPT | Performed by: FAMILY MEDICINE

## 2020-02-17 RX ORDER — PRAVASTATIN SODIUM 40 MG
TABLET ORAL
Qty: 90 TABLET | Refills: 3 | Status: SHIPPED | OUTPATIENT
Start: 2020-02-17 | End: 2021-03-03 | Stop reason: SDUPTHER

## 2020-02-17 RX ORDER — FUROSEMIDE 20 MG/1
20 TABLET ORAL DAILY
Qty: 90 TABLET | Refills: 1 | Status: SHIPPED | OUTPATIENT
Start: 2020-02-17 | End: 2020-07-02

## 2020-02-17 RX ORDER — TAMSULOSIN HYDROCHLORIDE 0.4 MG/1
CAPSULE ORAL
Qty: 90 CAPSULE | Refills: 3 | Status: SHIPPED | OUTPATIENT
Start: 2020-02-17 | End: 2021-02-08

## 2020-02-17 RX ORDER — DOXAZOSIN 2 MG/1
2 TABLET ORAL 2 TIMES DAILY
Qty: 180 TABLET | Refills: 3 | Status: SHIPPED | OUTPATIENT
Start: 2020-02-17 | End: 2021-02-08

## 2020-02-17 RX ORDER — CLOPIDOGREL BISULFATE 75 MG/1
TABLET ORAL
Qty: 90 TABLET | Refills: 3 | Status: SHIPPED | OUTPATIENT
Start: 2020-02-17 | End: 2021-03-03 | Stop reason: SDUPTHER

## 2020-02-17 RX ORDER — METOPROLOL TARTRATE 50 MG/1
50 TABLET, FILM COATED ORAL 2 TIMES DAILY
Qty: 180 TABLET | Refills: 3 | Status: SHIPPED | OUTPATIENT
Start: 2020-02-17 | End: 2021-03-03 | Stop reason: DRUGHIGH

## 2020-02-17 ASSESSMENT — LIFESTYLE VARIABLES
AUDIT-C TOTAL SCORE: 1
HOW OFTEN DO YOU HAVE A DRINK CONTAINING ALCOHOL: 1
HOW MANY STANDARD DRINKS CONTAINING ALCOHOL DO YOU HAVE ON A TYPICAL DAY: 0
HOW OFTEN DO YOU HAVE SIX OR MORE DRINKS ON ONE OCCASION: 0

## 2020-02-17 ASSESSMENT — PATIENT HEALTH QUESTIONNAIRE - PHQ9
SUM OF ALL RESPONSES TO PHQ QUESTIONS 1-9: 0
SUM OF ALL RESPONSES TO PHQ QUESTIONS 1-9: 0

## 2020-02-17 NOTE — PROGRESS NOTES
Medicare Annual Wellness Visit  Name: Wayne Cotto Date: 2020   MRN: [id number] Sex: Male   Age: 69 y.o. Ethnicity: Non-/Non    : 1945 Race: Rodger NELSON Shock is here for Medicare AWV; Hypertension; Hyperlipidemia; and Other (left ankle pain)    Screenings for behavioral, psychosocial and functional/safety risks, and cognitive dysfunction are all negative except as indicated below. These results, as well as other patient data from the [address] form, are documented in Flowsheets linked to this Encounter. Allergies   Allergen Reactions    Ace Inhibitors Other (See Comments)     Cough.  Effient [Prasugrel] Other (See Comments)     Superficial skin bleeding.  Statins [Statins] Other (See Comments)     Myalgias. Prior to Visit Medications    Medication Sig Taking? Authorizing Provider   clopidogrel (PLAVIX) 75 MG tablet TAKE 1 TABLET BY MOUTH DAILY Yes Ruben Agrawal DO   pravastatin (PRAVACHOL) 40 MG tablet TAKE ONE TABLET BY MOUTH ONCE EVERY EVENING Yes Ruben Agrawal DO   doxazosin (CARDURA) 2 MG tablet Take 1 tablet by mouth 2 times daily Yes Ruben Agrawal DO   furosemide (LASIX) 20 MG tablet Take 1 tablet by mouth daily Yes Ruben Agrawal DO   tamsulosin (FLOMAX) 0.4 MG capsule TAKE 1 CAPSULE BY MOUTH DAILY Yes Ruben Agrawal DO   metoprolol tartrate (LOPRESSOR) 50 MG tablet Take 1 tablet by mouth 2 times daily Yes Ruben Agrawal DO   levothyroxine (SYNTHROID) 100 MCG tablet TAKE 1 TABLET BY MOUTH ONE TIME A DAY  Yes Naty Lazcano DO   losartan (COZAAR) 50 MG tablet TAKE 1 TABLET BY MOUTH ONE TIME A DAY Yes Naty Lazcano DO   nitroGLYCERIN (NITROSTAT) 0.4 MG SL tablet Place 1 tablet under the tongue every 5 minutes as needed for Chest pain up to max of 3 total doses. If no relief after 1 dose, call 911.  Yes Richmond Ward MD   Misc Natural Products (URINOZINC PO) Take 2 tablets by mouth daily Yes RCA small, nondominant, occluded, ejection fraction 45%.  CARDIAC CATHETERIZATION  08/2018    with stent placement    CATARACT REMOVAL Bilateral 03/2018    COLONOSCOPY  01/21/2011    mild sigmoid diverticulosis.  COLONOSCOPY  4/6/16    hemorrhoid; sigmoid diverticulosis    KNEE ARTHROSCOPY Left 03/19/2010    partial medial and lateral synovectomies, partial medial meniscectomy, chondroplasty.  NASAL FRACTURE SURGERY  1960    OTHER SURGICAL HISTORY Left 02/12/2010    knee injection.  PRE-MALIGNANT / BENIGN SKIN LESION EXCISION Left 01/16/2012    actinic keratoses, ear, liquid nitrogen.  PRE-MALIGNANT / BENIGN SKIN LESION EXCISION Left 07/19/2011    actinic keratosis, upper ear, liquid nitrogen.  PROSTATE BIOPSY Bilateral 09/08/2015    Benign    REPAIR UMBILICAL FKDJ,4+N/J,BWJWG N/A 7/87/4407    UMBILICAL Hernia Repair w/ Mesh performed by Ruperto Ferreira MD at [address] [place] [address] SEPTOPLASTY  10/30/2015    with bilateral submucosal resection of the inferior turbinates, left middle turbinate elza bullosa resection done by Dr Lorne Opitz ARTHROSCOPY Left 10/17/14    W/ SUBACROMIAL DECOMPRESSION, DEBRIDEMENT ET CHONDROPLASTY    UPPER GASTROINTESTINAL ENDOSCOPY  01/21/2011    superficial ulcer of the fundus, erostive gastritis.      VASECTOMY Bilateral 04/04/1980       Family History   Problem Relation Age of Onset    Cancer Mother         lymphoma    Thyroid Disease Mother         hypothyroidism    Stroke Mother     Heart Disease Mother     High Blood Pressure Mother     Heart Attack Mother         in her 46s    Heart Failure Father         congestive    Coronary Art Dis Father     Emphysema Father         was a smoker    Heart Disease Maternal Grandmother     Heart Disease Maternal Grandfather     Crohn's Disease Sister     High Cholesterol Sister     High Cholesterol Child        CareTeam (Including outside providers/suppliers regularly involved in providing care): Yes  Are your doorways, halls and stairs free of clutter?: Yes  Do you always fasten your seatbelt when you are in a car?: Yes  Safety Interventions:  · Home safety tips provided    Personalized Preventive Plan   Current Health Maintenance Status  Immunization History   Administered Date(s) Administered    Influenza A (G1T3-82) Vaccine PF IM 01/11/2010    Influenza Virus Vaccine 12/10/2009, 10/26/2011, 11/24/2012    Influenza, High Dose (Fluzone 65 yrs and older) 10/14/2013, 11/10/2014, 11/09/2015, 10/14/2016, 11/03/2017, 10/18/2018, 10/08/2019    Pneumococcal Conjugate 13-valent ([id number]) 02/06/2017    Pneumococcal Polysaccharide ([id number]) 01/17/2012    Tdap (Boostrix, Adacel) 08/04/2015    Zoster Live (Zostavax) 07/31/2014    Zoster Recombinant (Shingrix) 03/01/2019, 05/08/2019        Health Maintenance   Topic Date Due    Annual Wellness Visit (AWV)  Done today    PSA counseling  08/08/2020    A1C test (Diabetic or Prediabetic)  02/08/2021    Lipid screen  02/08/2021    TSH testing  02/08/2021    Potassium monitoring  02/08/2021    Creatinine monitoring  02/08/2021    DTaP/Tdap/Td vaccine (2 - Td) 08/04/2025    Colon cancer screen colonoscopy  04/06/2026    Flu vaccine  Completed    Shingles Vaccine  Completed    Pneumococcal 65+ years Vaccine  Completed    AAA screen  Completed    Hepatitis C screen  Completed    Hepatitis A vaccine  Aged Out    Hepatitis B vaccine  Aged Out    Hib vaccine  Aged Out    Meningococcal (ACWY) vaccine  Aged Out     Recommendations for [facility] Due: see orders and patient instructions/AVS.  . Recommended screening schedule for the next 5-10 years is provided to the patient in written form: see Patient Instructions/AVS.    Nakul Wall was seen today for medicare awv, hypertension, hyperlipidemia and other.     Diagnoses and all orders for this visit:    Essential hypertension  -     CBC Auto Differential; Future  -     Comprehensive Metabolic Panel; Future  -     doxazosin (CARDURA) 2 MG tablet; Take 1 tablet by mouth 2 times daily    Impaired fasting glucose  -     Hemoglobin A1C; Future    Acquired hypothyroidism  -     TSH without Reflex; Future  -     T4, Free; Future    Familial hyperlipidemia  -     Lipid Panel; Future    Obstructive sleep apnea    Elevated PSA  -     PSA, Diagnostic; Future    Benign prostatic hyperplasia, unspecified whether lower urinary tract symptoms present    Gastroesophageal reflux disease without esophagitis    Pulmonary hypertension (HCC)    Coronary artery disease involving native coronary artery of native heart without angina pectoris    Occupational asthma without complication, unspecified asthma severity    Routine general medical examination at a health care facility    Other orders  -     clopidogrel (PLAVIX) 75 MG tablet; TAKE 1 TABLET BY MOUTH DAILY  -     pravastatin (PRAVACHOL) 40 MG tablet; TAKE ONE TABLET BY MOUTH ONCE EVERY EVENING  -     furosemide (LASIX) 20 MG tablet; Take 1 tablet by mouth daily  -     tamsulosin (FLOMAX) 0.4 MG capsule; TAKE 1 CAPSULE BY MOUTH DAILY  -     metoprolol tartrate (LOPRESSOR) 50 MG tablet;  Take 1 tablet by mouth 2 times daily

## 2020-02-17 NOTE — PATIENT INSTRUCTIONS
Personalized Preventive Plan for Giselle Feliz Shock - 2/17/2020  Medicare offers a range of preventive health benefits. Some of the tests and screenings are paid in full while other may be subject to a deductible, co-insurance, and/or copay. Some of these benefits include a comprehensive review of your medical history including lifestyle, illnesses that may run in your family, and various assessments and screenings as appropriate. After reviewing your medical record and screening and assessments performed today your provider may have ordered immunizations, labs, imaging, and/or referrals for you. A list of these orders (if applicable) as well as your Preventive Care list are included within your After Visit Summary for your review. Other Preventive Recommendations:    · A preventive eye exam performed by an eye specialist is recommended every 1-2 years to screen for glaucoma; cataracts, macular degeneration, and other eye disorders. · A preventive dental visit is recommended every 6 months. · Try to get at least 150 minutes of exercise per week or 10,000 steps per day on a pedometer . · Order or download the FREE \"Exercise & Physical Activity: Your Everyday Guide\" from [employer]. Call 5-503.981.7677 or search [employer] online. · You need 4188-2228 mg of calcium and 8546-2404 IU of vitamin D per day. It is possible to meet your calcium requirement with diet alone, but a vitamin D supplement is usually necessary to meet this goal.  · When exposed to the sun, use a sunscreen that protects against both UVA and UVB radiation with an SPF of 30 or greater. Reapply every 2 to 3 hours or after sweating, drying off with a towel, or swimming. · Always wear a seat belt when traveling in a car. Always wear a helmet when riding a bicycle or motorcycle.

## 2020-02-21 ASSESSMENT — ENCOUNTER SYMPTOMS
SINUS PRESSURE: 0
ABDOMINAL PAIN: 0
RHINORRHEA: 0
NAUSEA: 0
DIARRHEA: 0
SORE THROAT: 0
EYE REDNESS: 0
WHEEZING: 0
CONSTIPATION: 0
VOMITING: 0
SHORTNESS OF BREATH: 0
EYE DISCHARGE: 0
COUGH: 0
TROUBLE SWALLOWING: 0

## 2020-02-21 NOTE — PROGRESS NOTES
2020     Yossi Mak (:  1945) is a 69 y.o. male, here for evaluation of the following medical concerns:    HPI  Patient comes in today for follow up of chronic health issues Patient does note that he has had ongoing issues with pain intermittently in his left ankle. Notices it more when he is walking more consistently or standing for prolonged periods of time. Other times he does not seem to have pain. Pain seems to be more in the anterior aspect of the ankle. He does not necessarily take anything when he has the pain. Has not had any swelling to the area. With regards to his chronic health conditions he has a known history of hypertension which is stable and controlled on his current medical therapy. Has known impaired fasting glucose and his blood sugar level has elevated significantly since his last check. His wife notes that he eats a fair amount of sweets and really does not watch his diet closely. I did advise him that his current lab values are suggestive of early diabetes and we did discuss adding additional medical therapy. He did not really want to add additional medication so at this point he states he is going to really work hard on dietary measures in order to get his blood sugars under better control without needing additional medical therapy. He does workout consistently as his wife notes that go to the gym 2-3 times per week and work out for an hour to an hour and a half each time. Does have a known history of hypothyroidism and his thyroid levels are stable and adequately supplemented on his current thyroid dose. Has known hyperlipidemia and his cholesterol levels are suboptimally controlled with his current dose of pravastatin. He has tried to go up on the pravastatin in the past but has had increased myalgias and cannot tolerate higher dose statin medication.   With this in mind we did discuss the need for very strict dietary efforts as well in order to get his Misc Natural Products (URINOZINC PO) Take 2 tablets by mouth daily Yes Historical Provider, MD   niacin (NIASPAN) 500 MG CR tablet Take 500 mg by mouth 2 times daily. Two pills daily. Yes Historical Provider, MD   Omega-3 Fatty Acids (FISH OIL) 1200 MG CAPS Take 2,000 mg by mouth 2 times daily. Yes Historical Provider, MD   Multiple Vitamins-Minerals (MULTIVITAMIN PO) daily. Yes Historical Provider, MD   Mirabegron ER (MYRBETRIQ) 50 MG TB24 TAKE 1 TABLET BY MOUTH DAILY  Patient not taking: Reported on 2020  Zuhair Waddell DO   albuterol sulfate HFA (VENTOLIN HFA) 108 (90 Base) MCG/ACT inhaler Inhale 2 puffs into the lungs every 6 hours as needed for Wheezing or Shortness of Breath  Naty Lazcano DO   ASPIRIN PO 81 mg daily. Historical Provider, MD        Social History     Tobacco Use    Smoking status: Former Smoker     Packs/day: 2.00     Years: 15.00     Pack years: 30.00     Last attempt to quit: 1985     Years since quittin.1    Smokeless tobacco: Never Used    Tobacco comment: smoked 2 packs a day for 14 to 15 years, quit in    Substance Use Topics    Alcohol use: No     Alcohol/week: 0.0 standard drinks     Comment: rare        Vitals:    20 0954   BP: 112/70   Site: Left Upper Arm   Position: Sitting   Cuff Size: Large Adult   Pulse: 92   Weight: 253 lb (114.8 kg)   Height: 5' 10\" (1.778 m)     Estimated body mass index is 36.3 kg/m² as calculated from the following:    Height as of this encounter: 5' 10\" (1.778 m). Weight as of this encounter: 253 lb (114.8 kg). Physical Exam  Vitals signs and nursing note reviewed. Constitutional:       General: He is not in acute distress. Appearance: Normal appearance. He is well-developed. He is not diaphoretic. HENT:      Head: Normocephalic and atraumatic.       Right Ear: Tympanic membrane, ear canal and external ear normal.      Left Ear: Tympanic membrane, ear canal and external ear normal.      Nose: Nose This would also help improve his triglycerides and HDL levels. With regards to his ankle pain, patient is to use a neoprene or similar compression brace to the area. Patient can use ice and NSAIDs as needed. Patient does use a CPAP machine and is compliant with its use. Is getting ongoing medical benefit. Patient is offered referral to urology for opinion regarding the elevated PSA. At this point he declined and will plan to montor on a q 6 months basis. Patient is to continue on the rest of his current medical therapy. No additional changes are made. Patient is to return to my office in 6 months duration or sooner if any further problems or symptoms arise. (Please note that portions of this note were completed with a voice recognition program. Efforts were made to edit the dictations but occasionally words are mis-transcribed.)    . Total time spent face to face with patient in the office discussing medical issues, reviewing test reports. Discussing need for better dietary intake and exercise for weight  and discussing treatment options (in addition to the review of the annual wellness visit preventative protocols) was 25 minutes  and greater than 50 % of my time was spent counseling patient. Return in about 6 months (around 8/17/2020). An electronic signature was used to authenticate this note.     --Ruben Agrawal,  on 2/21/2020 at 7:13 AM

## 2020-07-02 RX ORDER — FUROSEMIDE 20 MG/1
TABLET ORAL
Qty: 90 TABLET | Refills: 1 | Status: SHIPPED | OUTPATIENT
Start: 2020-07-02 | End: 2021-03-03 | Stop reason: SDUPTHER

## 2020-07-02 RX ORDER — LEVOTHYROXINE SODIUM 0.1 MG/1
TABLET ORAL
Qty: 90 TABLET | Refills: 1 | Status: SHIPPED | OUTPATIENT
Start: 2020-07-02 | End: 2021-02-08

## 2020-07-02 RX ORDER — LOSARTAN POTASSIUM 50 MG/1
TABLET ORAL
Qty: 90 TABLET | Refills: 1 | Status: SHIPPED | OUTPATIENT
Start: 2020-07-02 | End: 2021-02-08

## 2020-07-02 NOTE — TELEPHONE ENCOUNTER
Jossie Boyle called requesting a refill of the below medication which has been pended for you:     Requested Prescriptions     Pending Prescriptions Disp Refills    losartan (COZAAR) 50 MG tablet [Pharmacy Med Name: Losartan Potassium Oral Tablet 50 MG] 90 tablet 1     Sig: TAKE 1 TABLET BY MOUTH ONE TIME A DAY    levothyroxine (SYNTHROID) 100 MCG tablet [Pharmacy Med Name: Levothyroxine Sodium Oral Tablet 100 MCG] 90 tablet 1     Sig: TAKE 1 TABLET BY MOUTH ONE TIME A DAY    furosemide (LASIX) 20 MG tablet [Pharmacy Med Name: Furosemide Oral Tablet 20 MG] 90 tablet 1     Sig: TAKE 1 TABLET BY MOUTH ONE TIME A DAY       Last Appointment Date: 2/17/2020  Next Appointment Date: 8/31/2020    Allergies   Allergen Reactions    Ace Inhibitors Other (See Comments)     Cough.  Effient [Prasugrel] Other (See Comments)     Superficial skin bleeding.  Statins [Statins] Other (See Comments)     Myalgias.

## 2020-07-15 ENCOUNTER — HOSPITAL ENCOUNTER (OUTPATIENT)
Dept: LAB | Age: 75
Discharge: HOME | End: 2020-07-15
Payer: MEDICARE

## 2020-07-15 LAB
CREAT SERPL-MCNC: 1.02 MG/DL (ref 0.7–1.2)
GFR AFRICAN AMERICAN: >60 ML/MIN
GFR NON-AFRICAN AMERICAN: >60 ML/MIN
GFR SERPL CREATININE-BSD FRML MDRD: NORMAL ML/MIN/{1.73_M2}
GFR SERPL CREATININE-BSD FRML MDRD: NORMAL ML/MIN/{1.73_M2}

## 2020-07-15 PROCEDURE — 82565 ASSAY OF CREATININE: CPT

## 2020-07-15 PROCEDURE — 36415 COLL VENOUS BLD VENIPUNCTURE: CPT

## 2020-07-21 ENCOUNTER — HOSPITAL ENCOUNTER (OUTPATIENT)
Dept: CT IMAGING | Age: 75
Discharge: HOME | End: 2020-07-23
Payer: MEDICARE

## 2020-07-21 PROCEDURE — 6360000004 HC RX CONTRAST MEDICATION: Performed by: INTERNAL MEDICINE

## 2020-07-21 PROCEDURE — 71260 CT THORAX DX C+: CPT

## 2020-07-21 RX ADMIN — IOPAMIDOL 75 ML: 755 INJECTION, SOLUTION INTRAVENOUS at 09:09

## 2020-08-11 ENCOUNTER — OFFICE VISIT (OUTPATIENT)
Dept: PULMONOLOGY | Age: 75
End: 2020-08-11
Payer: MEDICARE

## 2020-08-11 VITALS
SYSTOLIC BLOOD PRESSURE: 118 MMHG | OXYGEN SATURATION: 97 % | TEMPERATURE: 96.6 F | WEIGHT: 252 LBS | HEART RATE: 62 BPM | DIASTOLIC BLOOD PRESSURE: 72 MMHG | BODY MASS INDEX: 36.08 KG/M2 | HEIGHT: 70 IN

## 2020-08-11 PROCEDURE — 4040F PNEUMOC VAC/ADMIN/RCVD: CPT | Performed by: INTERNAL MEDICINE

## 2020-08-11 PROCEDURE — G8417 CALC BMI ABV UP PARAM F/U: HCPCS | Performed by: INTERNAL MEDICINE

## 2020-08-11 PROCEDURE — 99214 OFFICE O/P EST MOD 30 MIN: CPT | Performed by: INTERNAL MEDICINE

## 2020-08-11 PROCEDURE — 1036F TOBACCO NON-USER: CPT | Performed by: INTERNAL MEDICINE

## 2020-08-11 PROCEDURE — 1123F ACP DISCUSS/DSCN MKR DOCD: CPT | Performed by: INTERNAL MEDICINE

## 2020-08-11 PROCEDURE — G8427 DOCREV CUR MEDS BY ELIG CLIN: HCPCS | Performed by: INTERNAL MEDICINE

## 2020-08-11 PROCEDURE — 99213 OFFICE O/P EST LOW 20 MIN: CPT

## 2020-08-11 PROCEDURE — 3017F COLORECTAL CA SCREEN DOC REV: CPT | Performed by: INTERNAL MEDICINE

## 2020-08-11 PROCEDURE — 99214 OFFICE O/P EST MOD 30 MIN: CPT

## 2020-08-11 NOTE — PROGRESS NOTES
PULMONARY MEDICINE OUTPATIENT FOLLOW UP                                                                       Patient:  Nichol Mak  YOB: 1945  MRN: [id number]     REFERRED BY: Mela Garcia DO   CHIEF COMPLIANT/REASON FOR CONSULTATION: Pulmonary Nodule (annual)    SUBJECTIVE     HISTORY OF PRESENT ILLNESS:    Anthony Wallis is a 77y.o. year old male here for follow-up. Pulmonary nodules/lesions:    Imaging: patient reported that he had abnormal CT scan findings previously, however no previous films were unavailable for comparison at the first clinic visit. His HRCT (7/16/2019) showed right-sided pleural calcification/thickening raising the suspicion for asbestos exposure. There is no honeycombing or bronchiectasis. Patient also has multiple bilateral pulmonary nodules, largest one seen in the right lung base measuring about 1.5 into 1.3 cm. His most recent CT chest (7/21/2020) is reported to show an interval enlargement in the size of right lower lobe nodule to 2.1 x 1.7 cm. No other significant changes in other nodules or pleural thickening. Tobacco history: Patient  reports that he quit smoking about 35 years ago. He has a 30.00 pack-year smoking history. He has never used smokeless tobacco.   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 Pulmonary function test: PFT nonspecific ventilatory impairment  Current symptoms: Patient has cough with occasional sputum, grade 2 dyspnea. He reports improvement in his shortness of breath since his last about 20 pounds  Current Rx: Patient is currently only on as needed albuterol, and does not use it very often    GRISEL:  Patient also has history of obesity, coronary artery disease, status post PCI and has obstructive sleep apnea, for which he uses CPAP. Reports good compliance. Denies any symptoms of excessive daytime sleepiness. PAST MEDICAL HISTORY:       Diagnosis Date    Abdominal hernia     small, no significant symptomatology.  Abnormal PSA     with history of slight increase.  Allergic rhinitis     Benign prostatic hypertrophy     Chest pain     occasional.     Coronary artery disease     status post drug eluting stent placement, LAD, ostial obtuse marginal.     Dysphagia     Erectile dysfunction     Familial hyperlipidemia     with hypertriglyceridemia.  Gastroesophageal reflux disease     Hearing loss, bilateral     hearing aid in the left ear only. 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     Hypertension     Impaired fasting glucose     prediabetes.  Lumbar disc herniation     L3-L4, with chronic back pain.  Mild anemia     Nasal polyps     minimal symptoms.  Nasal septal deviation     right side.  Obesity     Palpitations     occasional.     Peptic ulcer disease     Reactive airway disease     asthma, industrial related, also a history of hypersensitivity pneumonitis.  Urinary frequency      PAST SURGICAL HISTORY:    Past Surgical History:   Procedure Laterality Date    APPENDECTOMY  age 10    CARDIAC CATHETERIZATION  10/20/2011    occluded LAD and ostial obtuse marginal stenosis, underwent drug eluting stents to both, RCA small, nondominant, occluded, ejection fraction 45%.  CARDIAC CATHETERIZATION  08/2018    with stent placement    CATARACT REMOVAL Bilateral 03/2018    COLONOSCOPY  01/21/2011    mild sigmoid diverticulosis.  COLONOSCOPY  4/6/16    hemorrhoid; sigmoid diverticulosis    KNEE ARTHROSCOPY Left 03/19/2010    partial medial and lateral synovectomies, partial medial meniscectomy, chondroplasty.  NASAL FRACTURE SURGERY  1960    OTHER SURGICAL HISTORY Left 02/12/2010    knee injection.      PRE-MALIGNANT / BENIGN SKIN LESION EXCISION Left 01/16/2012    actinic keratoses, ear, liquid nitrogen.  PRE-MALIGNANT / BENIGN SKIN LESION EXCISION Left 07/19/2011    actinic keratosis, upper ear, liquid nitrogen.  PROSTATE BIOPSY Bilateral 09/08/2015    Benign    REPAIR UMBILICAL JEYI,9+C/G,DMIMI N/A 6/95/1533    UMBILICAL Hernia Repair w/ Mesh performed by Gaviota Dodson MD at [address] [place] [address] SEPTOPLASTY  10/30/2015    with bilateral submucosal resection of the inferior turbinates, left middle turbinate elza bullosa resection done by Dr Jose Holland ARTHROSCOPY Left 10/17/14    W/ SUBACROMIAL DECOMPRESSION, DEBRIDEMENT ET CHONDROPLASTY    UPPER GASTROINTESTINAL ENDOSCOPY  01/21/2011    superficial ulcer of the fundus, erostive gastritis.  VASECTOMY Bilateral 04/04/1980     SOCIAL AND OCCUPATIONAL HISTORY:  TOBACCO: Patient  reports that he quit smoking about 35 years ago. He has a 30.00 pack-year smoking history. He has never used smokeless tobacco.  ETOH:   reports no history of alcohol use. DRUGS: reports no history of drug use.     IMMUNIZATION HISTORY:   Immunization History   Administered Date(s) Administered    Influenza A ([id number]) Vaccine PF IM 01/11/2010    Influenza Virus Vaccine 12/10/2009, 10/26/2011, 11/24/2012    Influenza, High Dose (Fluzone 65 yrs and older) 10/14/2013, 11/10/2014, 11/09/2015, 10/14/2016, 11/03/2017, 10/18/2018, 10/08/2019    Pneumococcal Conjugate 13-valent ([id number]) 02/06/2017    Pneumococcal Polysaccharide ([id number]) 01/17/2012    Tdap (Boostrix, Adacel) 08/04/2015    Zoster Live (Zostavax) 07/31/2014    Zoster Recombinant (Shingrix) 03/01/2019, 05/08/2019     REVIEW OF SYSTEMS:   General: negative for - chills, fever, night sweats, weight gain or weight loss  Psychological: negative for - anxiety or depression  Ophthalmic: negative for - blurry vision or decreased vision  ENT: negative  Allergy and Immunology: negative  Hematological and Lymphatic: negative for - bleeding problems, blood clots, bruising or swollen lymph nodes  Endocrine: negative for - polydipsia/polyuria or temperature intolerance  Respiratory: positive for - shortness of breath  Cardiovascular: negative for - chest pain, palpitations or paroxysmal nocturnal dyspnea  Gastrointestinal: negative for - abdominal pain, change in bowel habits or nausea/vomiting  Genito-Urinary: no dysuria, trouble voiding, or hematuria  Musculoskeletal: negative for - joint pain or joint swelling  Neurological: negative for - headaches, impaired coordination/balance, speech problems or weakness  Dermatological: negative for - rash or skin lesion changes     ALLERGIES:    Allergies   Allergen Reactions    Ace Inhibitors Other (See Comments)     Cough.  Effient [Prasugrel] Other (See Comments)     Superficial skin bleeding.  Statins [Statins] Other (See Comments)     Myalgias. MEDICATIONS:   Outpatient Encounter Medications as of 8/11/2020   Medication Sig Dispense Refill    losartan (COZAAR) 50 MG tablet TAKE 1 TABLET BY MOUTH ONE TIME A DAY  90 tablet 1    levothyroxine (SYNTHROID) 100 MCG tablet TAKE 1 TABLET BY MOUTH ONE TIME A DAY  90 tablet 1    furosemide (LASIX) 20 MG tablet TAKE 1 TABLET BY MOUTH ONE TIME A DAY  90 tablet 1    clopidogrel (PLAVIX) 75 MG tablet TAKE 1 TABLET BY MOUTH DAILY 90 tablet 3    pravastatin (PRAVACHOL) 40 MG tablet TAKE ONE TABLET BY MOUTH ONCE EVERY EVENING 90 tablet 3    doxazosin (CARDURA) 2 MG tablet Take 1 tablet by mouth 2 times daily 180 tablet 3    tamsulosin (FLOMAX) 0.4 MG capsule TAKE 1 CAPSULE BY MOUTH DAILY 90 capsule 3    metoprolol tartrate (LOPRESSOR) 50 MG tablet Take 1 tablet by mouth 2 times daily 180 tablet 3    Mirabegron ER (MYRBETRIQ) 50 MG TB24 TAKE 1 TABLET BY MOUTH DAILY 90 tablet 1    Misc Natural Products (URINOZINC PO) Take 2 tablets by mouth daily      niacin (NIASPAN) 500 MG CR tablet Take 500 mg by mouth 2 times daily. Two pills daily.       Omega-3 Fatty Acids (FISH OIL) 1200 MG CAPS Take 2,000 mg by mouth 2 times daily.  Multiple Vitamins-Minerals (MULTIVITAMIN PO) daily.  ASPIRIN PO 81 mg daily.  nitroGLYCERIN (NITROSTAT) 0.4 MG SL tablet Place 1 tablet under the tongue every 5 minutes as needed for Chest pain up to max of 3 total doses. If no relief after 1 dose, call 911. (Patient not taking: Reported on 8/11/2020) 25 tablet 3    albuterol sulfate HFA (VENTOLIN HFA) 108 (90 Base) MCG/ACT inhaler Inhale 2 puffs into the lungs every 6 hours as needed for Wheezing or Shortness of Breath (Patient not taking: Reported on 8/11/2020) 1 Inhaler 6     No facility-administered encounter medications on file as of 8/11/2020. OBJECTIVE     VITAL SIGNS:   /72   Pulse 62   Temp 96.6 °F (35.9 °C)   Ht 5' 10\" (1.778 m)   Wt 252 lb (114.3 kg)   SpO2 97%   BMI 36.16 kg/m²   Wt Readings from Last 3 Encounters:   08/11/20 252 lb (114.3 kg)   02/17/20 253 lb (114.8 kg)   12/20/19 242 lb 15.2 oz (110.2 kg)     SYSTEMIC EXAMINATION:  General appearance - well appearing, and in no distress  Mental status - alert, oriented to person, place, and time  Eyes - pupils equal and reactive, extraocular eye movements intact, sclera anicteric  Mouth - mucous membranes moist, pharynx normal without lesions and tonsils normal  Neck - supple, no significant adenopathy, carotids upstroke normal bilaterally, no bruits  Mallampati Airway Score - III (soft palate, base of uvula visible)  Chest - Chest was symmetrical without dullness to percussion. Breath sounds bilaterally were clear to auscultation. There were no wheezes, rhonchi or rales.   There is no intercostal recession or use of accessory muscles  Heart - normal rate, regular rhythm, normal S1, S2, no murmurs, rubs, clicks or gallops, no JVD  Abdomen - soft, nontender, nondistended, no masses or organomegaly  Neurological - alert, oriented, normal speech, no focal findings or movement disorder noted  Extremities - peripheral pulses normal, no pedal edema, nonenlarged. Mediastinal structures unchanged, again with mild elongation thoracic aorta. No localized pulmonary opacity suspicious for infiltrate or mass. Slight bibasilar atelectasis or scarring, similar by comparison. Costophrenic angles sharp. Bones and soft tissues appear intact. Moderate bridging osteophytes lower thoracic spine. No acute cardiopulmonary disease. Ct Chest W Contrast  Result Date: 7/21/2020  EXAMINATION: CT OF THE CHEST WITH CONTRAST 7/21/2020 8:59 am TECHNIQUE: CT of the chest was performed with the administration of intravenous contrast. Multiplanar reformatted images are provided for review. Dose modulation, iterative reconstruction, and/or weight based adjustment of the mA/kV was utilized to reduce the radiation dose to as low as reasonably achievable. COMPARISON: 07/16/2020 HISTORY: ORDERING SYSTEM PROVIDED HISTORY: Multiple pulmonary nodules TECHNOLOGIST PROVIDED HISTORY: FU Lung nodules FINDINGS: Chest Wall/Axilla/Supraclavicular fossa: No enlarged axillary or supraclavicular lymph nodes. Mild gynecomastia bilaterally. A few calcified granulomas in the skin and subcutaneous fat are evident. Bones: No acute osseous abnormality. Multilevel degenerative change. Mediastinum: Nonenlarged heart. Heavy coronary artery disease. No pericardial effusion. No lymphadenopathy. Airways: The airways are patent. No luminal filling defects. No airway obstruction. Lungs/pleura: Stable pleural-based nodules in dependent lungs bilaterally. No new or enlarging nodule in the pleura. Dependent changes. No consolidation or pneumothorax. Right lower lobe pulmonary nodule just above the right diaphragmatic leaflet measures 2.1 x 1.7 cm and previously measured 1.6 x 1.4 cm. Given interval enlargement, neoplasm is not excluded. Consider PET-CT. Subpleural right lower lobe pulmonary nodule (series 4, image 72) measures 0.4 cm and is stable.   Perifissural nodule right lower lobe (series 4, image 62) likely represents an intrapulmonary lymph node given location and appearance. Subpleural left lower lobe pulmonary nodule posteriorly measures 0.6 cm and is stable. Upper Abdomen: Fatty liver. No acute upper abdominal findings. Enlarging nodule just above the right diaphragm contiguous with the pleura may actually represent atelectasis but given increase in size from prior exam, an enlarging nodule is not excluded and PET-CT might be considered for further characterization. Otherwise multiple solid pulmonary nodules and pleural-based nodules are stable. CT chest 7/16/2019  Impression    1. Right-sided pleural calcifications with mild pleural thickening and mild    septal thickening bilaterally.  Findings likely sequela of prior asbestos    exposure.  No honeycombing or bronchiectasis. 2. Multiple solid noncalcified pulmonary nodules within both lungs, largest    seen within the right lung base measuring 1.5 x 1.3 cm.  Please see follow-up    recommendations below.           Echocardiogram:   Results for orders placed in visit on 08/24/16   ECHO Complete 2D W Doppler W Color    Impression FINDINGS:       1. Normal left ventricular size with mild to moderate concentric left ventricular hypertrophy. 2. Normal systolic left ventricular function. Ejection fraction is 50 to 55%. 3. Grade I diastolic dysfunction. 4. Biatrial enlargement. 5. Mildly dilated right ventricle with mildly reduced function. 6. Mildly sclerotic aortic valve. 7. Mild to moderate tricuspid regurgitation. RVSP is 49 mm Hg. 8. No pericardial effusion. Cardiac Catheterization:   No results found for this or any previous visit. Polysomnography: Unavailable    ASSESSMENT AND PLAN     Assessment:  1. Pulmonary nodules/lesions, multiple    2. Occupational asthma, mild intermittent, uncomplicated    3. Pleural calcification    4. Suspected exposure to asbestos    5.  Pulmonary hypertension ([address] Plan/Recommendations:    Pulmonary function tests obtained and reviewed, suggestive of known specific ventilatory impairment  Chest x-ray/CT chest reviewed and findings discussed with the patient in detail  In view of the enlarging size of right lower lobe nodule will get a PET scan  Patient to continue as needed albuterol  Barriers to medication compliance addressed. Patient was recommended to have prednisone and an antibiotic available for use during an exacerbation  Patient received counseling on the following healthy behaviors: nutrition, exercise and medication adherence  Maintain an active lifestyle  Continue smoking cessation  Lung Cancer Screening: After reviewing the patient's smoking history, age and other clinical criteria, the LOW DOSE CT is : NOT INDICATED; Quit smoking >15 years ago   Recommend influenza vaccination in the fall annually; Up-to-date   Recommendations were given regarding pneumococcal vaccination; previously administered after age 72- no need to repeat  Patient is not on home oxygen therapy. Pulmonary rehabilitation was discussed and written information provided regarding importance of pulmonary rehabilitation with regards to decreasing the hospitalization risk and also help with his dyspnea. Patient denies any excessive daytime sleepiness and reports refreshing and restorative sleep  CPAP compliance: compliant all of the time and using it as recommended  Explained importance of compliance with treatment of sleep apnea  Compliance data was not reviewed  Continue to use CPAP for at least 4 hours every night  Use humidifier if needed  Weight loss was recommended and discussed  Recommended good sleep hygiene and instructions provided  Patient instructed not to drive up or operate heavy machinery, if sleepy    All the questions that the patient had were answered to her satisfaction  We'll see the patient back in 6 weeks  Thank you for having us involved in the care of your patient. Please call us if you have any questions or concerns. Gilson Shaw MD    Pulmonary and Critical Care Medicine            Please note that this chart was generated using voice recognition Dragon dictation software. Although every effort was made to ensure the accuracy of this automated transcription, some errors in transcription may have occurred.

## 2020-08-22 ENCOUNTER — HOSPITAL ENCOUNTER (OUTPATIENT)
Dept: CT IMAGING | Age: 75
Discharge: HOME | End: 2020-08-24
Payer: MEDICARE

## 2020-08-22 PROCEDURE — 3430000000 HC RX DIAGNOSTIC RADIOPHARMACEUTICAL: Performed by: INTERNAL MEDICINE

## 2020-08-22 PROCEDURE — A9552 F18 FDG: HCPCS | Performed by: INTERNAL MEDICINE

## 2020-08-22 PROCEDURE — 78815 PET IMAGE W/CT SKULL-THIGH: CPT

## 2020-08-22 RX ORDER — FLUDEOXYGLUCOSE F 18 200 MCI/ML
13.6 INJECTION, SOLUTION INTRAVENOUS
Status: COMPLETED | OUTPATIENT
Start: 2020-08-22 | End: 2020-08-22

## 2020-08-22 RX ADMIN — FLUDEOXYGLUCOSE F 18 13.6 MILLICURIE: 200 INJECTION, SOLUTION INTRAVENOUS at 14:31

## 2020-08-25 ENCOUNTER — HOSPITAL ENCOUNTER (OUTPATIENT)
Dept: LAB | Age: 75
Discharge: HOME | End: 2020-08-25
Payer: MEDICARE

## 2020-08-25 LAB
ABSOLUTE EOS #: 0.07 K/UL (ref 0–0.44)
ABSOLUTE IMMATURE GRANULOCYTE: <0.03 K/UL (ref 0–0.3)
ABSOLUTE LYMPH #: 1.66 K/UL (ref 1.1–3.7)
ABSOLUTE MONO #: 0.47 K/UL (ref 0.1–1.2)
ALBUMIN SERPL-MCNC: 4.4 G/DL (ref 3.5–5.2)
ALBUMIN/GLOBULIN RATIO: 1.4 (ref 1–2.5)
ALP BLD-CCNC: 64 U/L (ref 40–129)
ALT SERPL-CCNC: 23 U/L (ref 5–41)
ANION GAP SERPL CALCULATED.3IONS-SCNC: 11 MMOL/L (ref 9–17)
AST SERPL-CCNC: 18 U/L
BASOPHILS # BLD: 1 % (ref 0–2)
BASOPHILS ABSOLUTE: 0.03 K/UL (ref 0–0.2)
BILIRUB SERPL-MCNC: 0.36 MG/DL (ref 0.3–1.2)
BUN BLDV-MCNC: 18 MG/DL (ref 8–23)
BUN/CREAT BLD: 18 (ref 9–20)
CALCIUM SERPL-MCNC: 9.5 MG/DL (ref 8.6–10.4)
CHLORIDE BLD-SCNC: 103 MMOL/L (ref 98–107)
CHOLESTEROL/HDL RATIO: 6.7
CHOLESTEROL: 227 MG/DL
CO2: 25 MMOL/L (ref 20–31)
CREAT SERPL-MCNC: 1.01 MG/DL (ref 0.7–1.2)
DIFFERENTIAL TYPE: ABNORMAL
EOSINOPHILS RELATIVE PERCENT: 1 % (ref 1–4)
ESTIMATED AVERAGE GLUCOSE: 123 MG/DL
GFR AFRICAN AMERICAN: >60 ML/MIN
GFR NON-AFRICAN AMERICAN: >60 ML/MIN
GFR SERPL CREATININE-BSD FRML MDRD: ABNORMAL ML/MIN/{1.73_M2}
GFR SERPL CREATININE-BSD FRML MDRD: ABNORMAL ML/MIN/{1.73_M2}
GLUCOSE BLD-MCNC: 127 MG/DL (ref 70–99)
HBA1C MFR BLD: 5.9 % (ref 4.8–5.9)
HCT VFR BLD CALC: 38.9 % (ref 40.7–50.3)
HDLC SERPL-MCNC: 34 MG/DL
HEMOGLOBIN: 12.9 G/DL (ref 13–17)
IMMATURE GRANULOCYTES: 0 %
LDL CHOLESTEROL DIRECT: 88 MG/DL
LDL CHOLESTEROL: ABNORMAL MG/DL (ref 0–130)
LYMPHOCYTES # BLD: 27 % (ref 24–43)
MCH RBC QN AUTO: 31.1 PG (ref 25.2–33.5)
MCHC RBC AUTO-ENTMCNC: 33.2 G/DL (ref 25.2–33.5)
MCV RBC AUTO: 93.7 FL (ref 82.6–102.9)
MONOCYTES # BLD: 8 % (ref 3–12)
NRBC AUTOMATED: 0 PER 100 WBC
PDW BLD-RTO: 12.7 % (ref 11.8–14.4)
PLATELET # BLD: 153 K/UL (ref 138–453)
PLATELET ESTIMATE: ABNORMAL
PMV BLD AUTO: 10.7 FL (ref 8.1–13.5)
POTASSIUM SERPL-SCNC: 4.2 MMOL/L (ref 3.7–5.3)
PROSTATE SPECIFIC ANTIGEN: 6.54 UG/L
RBC # BLD: 4.15 M/UL (ref 4.21–5.77)
RBC # BLD: ABNORMAL 10*6/UL
SEG NEUTROPHILS: 63 % (ref 36–65)
SEGMENTED NEUTROPHILS ABSOLUTE COUNT: 3.9 K/UL (ref 1.5–8.1)
SODIUM BLD-SCNC: 139 MMOL/L (ref 135–144)
THYROXINE, FREE: 1.05 NG/DL (ref 0.93–1.7)
TOTAL PROTEIN: 7.5 G/DL (ref 6.4–8.3)
TRIGL SERPL-MCNC: 740 MG/DL
TSH SERPL DL<=0.05 MIU/L-ACNC: 3.87 MIU/L (ref 0.3–5)
VLDLC SERPL CALC-MCNC: ABNORMAL MG/DL (ref 1–30)
WBC # BLD: 6.2 K/UL (ref 3.5–11.3)
WBC # BLD: ABNORMAL 10*3/UL

## 2020-08-25 PROCEDURE — 84439 ASSAY OF FREE THYROXINE: CPT

## 2020-08-25 PROCEDURE — 84443 ASSAY THYROID STIM HORMONE: CPT

## 2020-08-25 PROCEDURE — 80061 LIPID PANEL: CPT

## 2020-08-25 PROCEDURE — 36415 COLL VENOUS BLD VENIPUNCTURE: CPT

## 2020-08-25 PROCEDURE — 85025 COMPLETE CBC W/AUTO DIFF WBC: CPT

## 2020-08-25 PROCEDURE — 80053 COMPREHEN METABOLIC PANEL: CPT

## 2020-08-25 PROCEDURE — 83036 HEMOGLOBIN GLYCOSYLATED A1C: CPT

## 2020-08-25 PROCEDURE — 83721 ASSAY OF BLOOD LIPOPROTEIN: CPT

## 2020-08-25 PROCEDURE — 84153 ASSAY OF PSA TOTAL: CPT

## 2020-08-30 ASSESSMENT — ENCOUNTER SYMPTOMS
TROUBLE SWALLOWING: 0
EYE REDNESS: 0
COUGH: 0
RHINORRHEA: 0
WHEEZING: 0
EYE DISCHARGE: 0
SORE THROAT: 0
SHORTNESS OF BREATH: 0
DIARRHEA: 0
VOMITING: 0
CONSTIPATION: 0
SINUS PRESSURE: 0
ABDOMINAL PAIN: 0
NAUSEA: 0

## 2020-08-30 NOTE — PROGRESS NOTES
2020     Verónica Mak (:  1945) is a 77 y.o. male, here for evaluation of the following medical concerns:    HPI  Patient comes in today for follow up of chronic health issues Patient seems to be doing relatively well overall. Does have ongoing chronic pain in bilateral knees and would like injections today. Is been a year since we have done injections and that seems to provide him with benefit. He also notes that he has ongoing chronic pain in his left ankle. Seems to be more in the lateral aspect of the ankle but sometimes will feel it in the posterior Achilles region as well. We did do x-rays previously which did show some degenerative changes. I did suggest that we could set him up to see podiatry to see if there is anything else they would suggest as far as management of his ankle pain but at this time he is just going to monitor it. Does have a known history of hypertension his blood pressure is stable and controlled on his current medical therapy. Has known impaired fasting glucose and his blood sugar level remains stable and adequately controlled with dietary efforts. He has lost 20 pound since his last visit has really been working on diet and exercise. Does have a known history of hypothyroidism and his thyroid levels are stable and adequately supplemented on his current thyroid dose. Has known familial hyperlipidemia with primary hypertriglyceridemia. His triglycerides are quite elevated. Encourage continued lipid-lowering diet in order to keep this optimally controlled. Does have known obstructive sleep apnea and is consistent with his CPAP use. Is getting ongoing medical benefit. Has a known history of chronically elevated PSA which hovers between the high 5 region to mid 6 region. His PSA is a little bit more elevated than last check but he is asymptomatic. Has previously seen urology who did not recommend any further intervention other than continued monitoring. Patient otherwise has no other acute medical concerns to discuss. .  Patient's recent lab reports are as follows:    Results for orders placed or performed during the hospital encounter of 08/25/20   T4, Free   Result Value Ref Range    Thyroxine, Free 1.05 0.93 - 1.70 ng/dL   TSH without Reflex   Result Value Ref Range    TSH 3.87 0.30 - 5.00 mIU/L   PSA, Diagnostic   Result Value Ref Range    PSA 6.54 (H) <4.1 ug/L   Lipid Panel   Result Value Ref Range    Cholesterol 227 (H) <200 mg/dL    HDL 34 (L) >40 mg/dL    LDL Cholesterol      0 - 130 mg/dL    Chol/HDL Ratio 6.7 (H) <5    Triglycerides 740 (H) <150 mg/dL    VLDL NOT REPORTED 1 - 30 mg/dL   Hemoglobin A1C   Result Value Ref Range    Hemoglobin A1C 5.9 4.8 - 5.9 %    Estimated Avg Glucose 123 mg/dL   Comprehensive Metabolic Panel   Result Value Ref Range    Glucose 127 (H) 70 - 99 mg/dL    BUN 18 8 - 23 mg/dL    CREATININE 1.01 0.70 - 1.20 mg/dL    Bun/Cre Ratio 18 9 - 20    Calcium 9.5 8.6 - 10.4 mg/dL    Sodium 139 135 - 144 mmol/L    Potassium 4.2 3.7 - 5.3 mmol/L    Chloride 103 98 - 107 mmol/L    CO2 25 20 - 31 mmol/L    Anion Gap 11 9 - 17 mmol/L    Alkaline Phosphatase 64 40 - 129 U/L    ALT 23 5 - 41 U/L    AST 18 <40 U/L    Total Bilirubin 0.36 0.3 - 1.2 mg/dL    Total Protein 7.5 6.4 - 8.3 g/dL    Alb 4.4 3.5 - 5.2 g/dL    Albumin/Globulin Ratio 1.4 1.0 - 2.5    GFR Non-African American >60 >60 mL/min    GFR African American >60 >60 mL/min    GFR Comment          GFR Staging NOT REPORTED    CBC Auto Differential   Result Value Ref Range    WBC 6.2 3.5 - 11.3 k/uL    RBC 4.15 (L) 4.21 - 5.77 m/uL    Hemoglobin 12.9 (L) 13.0 - 17.0 g/dL    Hematocrit 38.9 (L) 40.7 - 50.3 %    MCV 93.7 82.6 - 102.9 fL    MCH 31.1 25.2 - 33.5 pg    MCHC 33.2 25.2 - 33.5 g/dL    RDW 12.7 11.8 - 14.4 %    Platelets 510 365 - 569 k/uL    MPV 10.7 8.1 - 13.5 fL    NRBC Automated 0.0 0.0 per 100 WBC    Differential Type NOT REPORTED     Seg Neutrophils 63 36 - 65 % levothyroxine (SYNTHROID) 100 MCG tablet TAKE 1 TABLET BY MOUTH ONE TIME A DAY   Naty Lazcano DO   furosemide (LASIX) 20 MG tablet TAKE 1 TABLET BY MOUTH ONE TIME A DAY   Naty Lazcano DO   clopidogrel (PLAVIX) 75 MG tablet TAKE 1 TABLET BY MOUTH DAILY  Isaac Cagle DO   pravastatin (PRAVACHOL) 40 MG tablet TAKE ONE TABLET BY MOUTH ONCE EVERY EVENING  Isaac Cagle DO   doxazosin (CARDURA) 2 MG tablet Take 1 tablet by mouth 2 times daily  Isaac Cagle DO   tamsulosin (FLOMAX) 0.4 MG capsule TAKE 1 CAPSULE BY MOUTH DAILY  Isaac Cagle DO   metoprolol tartrate (LOPRESSOR) 50 MG tablet Take 1 tablet by mouth 2 times daily  Naty Lazcano DO   nitroGLYCERIN (NITROSTAT) 0.4 MG SL tablet Place 1 tablet under the tongue every 5 minutes as needed for Chest pain up to max of 3 total doses. If no relief after 1 dose, call 911. Patient not taking: Reported on 2020  Thalia Leon MD   Mirabegron ER (MYRBETRIQ) 50 MG TB24 TAKE 1 TABLET BY MOUTH DAILY  Isaac Cagle DO   Misc Natural Products (URINOZINC PO) Take 2 tablets by mouth daily  Historical Provider, MD   albuterol sulfate HFA (VENTOLIN HFA) 108 (90 Base) MCG/ACT inhaler Inhale 2 puffs into the lungs every 6 hours as needed for Wheezing or Shortness of Breath  Patient not taking: Reported on 2020  Isaac Cagle DO   niacin (NIASPAN) 500 MG CR tablet Take 500 mg by mouth 2 times daily. Two pills daily. Historical Provider, MD   Omega-3 Fatty Acids (FISH OIL) 1200 MG CAPS Take 2,000 mg by mouth 2 times daily. Historical Provider, MD   Multiple Vitamins-Minerals (MULTIVITAMIN PO) daily. Historical Provider, MD   ASPIRIN PO 81 mg daily.   Historical Provider, MD        Social History     Tobacco Use    Smoking status: Former Smoker     Packs/day: 2.00     Years: 15.00     Pack years: 30.00     Last attempt to quit: 1985     Years since quittin.6    Smokeless tobacco: Never Used    Tobacco comment: smoked 2 packs a day for 14 to 15 years, quit in 1987   Substance Use Topics    Alcohol use: No     Alcohol/week: 0.0 standard drinks     Comment: rare        There were no vitals filed for this visit. Estimated body mass index is 36.16 kg/m² as calculated from the following:    Height as of 8/11/20: 5' 10\" (1.778 m). Weight as of 8/11/20: 252 lb (114.3 kg). Physical Exam  Vitals signs and nursing note reviewed. Constitutional:       General: He is not in acute distress. Appearance: Normal appearance. He is well-developed. He is not diaphoretic. HENT:      Head: Normocephalic and atraumatic. Right Ear: Tympanic membrane, ear canal and external ear normal.      Left Ear: Tympanic membrane, ear canal and external ear normal.      Nose: Nose normal.      Mouth/Throat:      Mouth: Mucous membranes are moist.      Pharynx: Oropharynx is clear. No oropharyngeal exudate. Eyes:      General:         Right eye: No discharge. Left eye: No discharge. Conjunctiva/sclera: Conjunctivae normal.      Pupils: Pupils are equal, round, and reactive to light. Neck:      Musculoskeletal: Normal range of motion and neck supple. Thyroid: No thyromegaly. Cardiovascular:      Rate and Rhythm: Normal rate and regular rhythm. Heart sounds: Normal heart sounds. Pulmonary:      Effort: Pulmonary effort is normal.      Breath sounds: Normal breath sounds. No wheezing or rales. Abdominal:      General: Bowel sounds are normal. There is no distension. Palpations: Abdomen is soft. Tenderness: There is no abdominal tenderness. Musculoskeletal: Normal range of motion. General: Swelling and tenderness present. Comments: Mild effusion to bilateral knees. Does move the knee joints in a normal range of motion without difficulty. Lymphadenopathy:      Cervical: No cervical adenopathy. Skin:     General: Skin is warm and dry. Findings: No rash.    Neurological: an Anterior and Medial approach was used. [x] 40 mg Kenalog  [x]  2 cc 1% Xylocaine without Epi       []  12 mg of Celestone     Injected intra-articularly without incident. Pre- and post neurovascular status verified. No complications noted. Injections are performed in the both knees. Patient tolerated this well. Patient is to continue to follow a low-carb/low sugar/low-fat diet and increase exercise for optimal blood sugar and cholesterol control. Patient did remark that his Myrbetriq does work well for his urinary frequency but notes that it is quite costly. He could potentially switch to Ditropan or Detrol LA so he is going to check with his formulary coverage to see if either of these would be more cost effective. Does not recall if he has been on either of these in the past.    Patient is to continue on the rest of his current medical therapy. No additional changes are made at this time. Patient is to return to my office in 6 months duration or sooner if any further problems or symptoms arise. (Please note that portions of this note were completed with a voice recognition program. Efforts were made to edit the dictations but occasionally words are mis-transcribed.)      No follow-ups on file. An electronic signature was used to authenticate this note.     --Mikhail Del Angel DO on 8/30/2020 at 6:58 PM

## 2020-08-31 ENCOUNTER — OFFICE VISIT (OUTPATIENT)
Dept: FAMILY MEDICINE CLINIC | Age: 75
End: 2020-08-31
Payer: MEDICARE

## 2020-08-31 ENCOUNTER — PATIENT MESSAGE (OUTPATIENT)
Dept: FAMILY MEDICINE CLINIC | Age: 75
End: 2020-08-31

## 2020-08-31 VITALS
BODY MASS INDEX: 33.36 KG/M2 | DIASTOLIC BLOOD PRESSURE: 70 MMHG | HEART RATE: 68 BPM | WEIGHT: 233 LBS | SYSTOLIC BLOOD PRESSURE: 120 MMHG | HEIGHT: 70 IN | RESPIRATION RATE: 18 BRPM

## 2020-08-31 PROCEDURE — 99214 OFFICE O/P EST MOD 30 MIN: CPT | Performed by: FAMILY MEDICINE

## 2020-08-31 PROCEDURE — G8427 DOCREV CUR MEDS BY ELIG CLIN: HCPCS | Performed by: FAMILY MEDICINE

## 2020-08-31 PROCEDURE — 3017F COLORECTAL CA SCREEN DOC REV: CPT | Performed by: FAMILY MEDICINE

## 2020-08-31 PROCEDURE — 1123F ACP DISCUSS/DSCN MKR DOCD: CPT | Performed by: FAMILY MEDICINE

## 2020-08-31 PROCEDURE — 1036F TOBACCO NON-USER: CPT | Performed by: FAMILY MEDICINE

## 2020-08-31 PROCEDURE — 20610 DRAIN/INJ JOINT/BURSA W/O US: CPT | Performed by: FAMILY MEDICINE

## 2020-08-31 PROCEDURE — G8417 CALC BMI ABV UP PARAM F/U: HCPCS | Performed by: FAMILY MEDICINE

## 2020-08-31 PROCEDURE — 4040F PNEUMOC VAC/ADMIN/RCVD: CPT | Performed by: FAMILY MEDICINE

## 2020-08-31 RX ORDER — TRIAMCINOLONE ACETONIDE 40 MG/ML
40 INJECTION, SUSPENSION INTRA-ARTICULAR; INTRAMUSCULAR ONCE
Status: COMPLETED | OUTPATIENT
Start: 2020-08-31 | End: 2020-08-31

## 2020-08-31 RX ADMIN — TRIAMCINOLONE ACETONIDE 40 MG: 40 INJECTION, SUSPENSION INTRA-ARTICULAR; INTRAMUSCULAR at 11:24

## 2020-08-31 RX ADMIN — TRIAMCINOLONE ACETONIDE 40 MG: 40 INJECTION, SUSPENSION INTRA-ARTICULAR; INTRAMUSCULAR at 11:22

## 2020-08-31 ASSESSMENT — PATIENT HEALTH QUESTIONNAIRE - PHQ9
SUM OF ALL RESPONSES TO PHQ QUESTIONS 1-9: 0
2. FEELING DOWN, DEPRESSED OR HOPELESS: 0
SUM OF ALL RESPONSES TO PHQ9 QUESTIONS 1 & 2: 0
SUM OF ALL RESPONSES TO PHQ QUESTIONS 1-9: 0
1. LITTLE INTEREST OR PLEASURE IN DOING THINGS: 0

## 2020-08-31 NOTE — PATIENT INSTRUCTIONS
Hospital Outpatient Visit on 08/25/2020   Component Date Value Ref Range Status    Thyroxine, Free 08/25/2020 1.05  0.93 - 1.70 ng/dL Final    TSH 08/25/2020 3.87  0.30 - 5.00 mIU/L Final    PSA 08/25/2020 6.54* <4.1 ug/L Final    Comment: The Roche \"ECLIA\" assay is used. Results obtained with different assay methods cannot be   used interchangeably.  Cholesterol 08/25/2020 227* <200 mg/dL Final    Comment:    Cholesterol Guidelines:      <200  Desirable   200-240  Borderline      >240  Undesirable         HDL 08/25/2020 34* >40 mg/dL Final    Comment:    HDL Guidelines:    <40     Undesirable   40-59    Borderline    >59     Desirable         LDL Cholesterol 08/25/2020       0 - 130 mg/dL Final    Comment: Calculation not valid for Triglyceride value greater than 400 mg/dL. Direct LDL reflexed           LDL Guidelines:     <100    Desirable   100-129   Near to/above Desirable   130-159   Borderline      >159   Undesirable     Direct (measured) LDL and calculated LDL are not interchangeable tests.  Chol/HDL Ratio 08/25/2020 6.7* <5 Final            Triglycerides 08/25/2020 740* <150 mg/dL Final    Comment:    Triglyceride Guidelines:     <150   Desirable   150-199  Borderline   200-499  High     >499   Very high   Based on AHA Guidelines for fasting triglyceride, October 2012.          VLDL 08/25/2020 NOT REPORTED  1 - 30 mg/dL Final    Hemoglobin A1C 08/25/2020 5.9  4.8 - 5.9 % Final    Estimated Avg Glucose 08/25/2020 123  mg/dL Final    Glucose 08/25/2020 127* 70 - 99 mg/dL Final    BUN 08/25/2020 18  8 - 23 mg/dL Final    CREATININE 08/25/2020 1.01  0.70 - 1.20 mg/dL Final    Bun/Cre Ratio 08/25/2020 18  9 - 20 Final    Calcium 08/25/2020 9.5  8.6 - 10.4 mg/dL Final    Sodium 08/25/2020 139  135 - 144 mmol/L Final    Potassium 08/25/2020 4.2  3.7 - 5.3 mmol/L Final    Chloride 08/25/2020 103  98 - 107 mmol/L Final    CO2 08/25/2020 25  20 - 31 mmol/L Final    Anion Gap 08/25/2020 11 9 - 17 mmol/L Final    Alkaline Phosphatase 08/25/2020 64  40 - 129 U/L Final    ALT 08/25/2020 23  5 - 41 U/L Final    AST 08/25/2020 18  <40 U/L Final    Total Bilirubin 08/25/2020 0.36  0.3 - 1.2 mg/dL Final    Total Protein 08/25/2020 7.5  6.4 - 8.3 g/dL Final    Alb 08/25/2020 4.4  3.5 - 5.2 g/dL Final    Albumin/Globulin Ratio 08/25/2020 1.4  1.0 - 2.5 Final    GFR Non- 08/25/2020 >60  >60 mL/min Final    GFR  08/25/2020 >60  >60 mL/min Final    GFR Comment 08/25/2020        Final    Comment: Average GFR for 79or more years old:   76 mL/min/1.73sq m  Chronic Kidney Disease:   <60 mL/min/1.73sq m  Kidney failure:   <15 mL/min/1.73sq m              eGFR calculated using average adult body mass.  Additional eGFR calculator available at:        [email or web address]            GFR Staging 08/25/2020 NOT REPORTED   Final    WBC 08/25/2020 6.2  3.5 - 11.3 k/uL Final    RBC 08/25/2020 4.15* 4.21 - 5.77 m/uL Final    Hemoglobin 08/25/2020 12.9* 13.0 - 17.0 g/dL Final    Hematocrit 08/25/2020 38.9* 40.7 - 50.3 % Final    MCV 08/25/2020 93.7  82.6 - 102.9 fL Final    MCH 08/25/2020 31.1  25.2 - 33.5 pg Final    MCHC 08/25/2020 33.2  25.2 - 33.5 g/dL Final    RDW 08/25/2020 12.7  11.8 - 14.4 % Final    Platelets 62/22/8211 153  138 - 453 k/uL Final    MPV 08/25/2020 10.7  8.1 - 13.5 fL Final    NRBC Automated 08/25/2020 0.0  0.0 per 100 WBC Final    Differential Type 08/25/2020 NOT REPORTED   Final    Seg Neutrophils 08/25/2020 63  36 - 65 % Final    Lymphocytes 08/25/2020 27  24 - 43 % Final    Monocytes 08/25/2020 8  3 - 12 % Final    Eosinophils % 08/25/2020 1  1 - 4 % Final    Basophils 08/25/2020 1  0 - 2 % Final    Immature Granulocytes 08/25/2020 0  0 % Final    Segs Absolute 08/25/2020 3.90  1.50 - 8.10 k/uL Final    Absolute Lymph # 08/25/2020 1.66  1.10 - 3.70 k/uL Final    Absolute Mono # 08/25/2020 0.47  0.10 - 1.20 k/uL Final    Absolute Eos # 08/25/2020 0.07  0.00 - 0.44 k/uL Final    Basophils Absolute 08/25/2020 0.03  0.00 - 0.20 k/uL Final    Absolute Immature Granulocyte 08/25/2020 <0.03  0.00 - 0.30 k/uL Final    WBC Morphology 08/25/2020 NOT REPORTED   Final    RBC Morphology 08/25/2020 NOT REPORTED   Final    Platelet Estimate 23/24/7489 NOT REPORTED   Final    LDL Direct 08/25/2020 88  <100 mg/dL Final

## 2020-09-01 NOTE — TELEPHONE ENCOUNTER
From: Arturo Cuadra Shock  To: Estelle Ortiz DO  Sent: 8/31/2020 5:58 PM EDT  Subject: Visit Follow-Up Question    I checked in my drug book and Oxybutynin er 10mg and Tolterodineer 4mg are a tier four drug so I guess we had better stay with Myrbetriq since it is a tier three.  Thanks for trying and have a good day

## 2020-09-02 ENCOUNTER — PATIENT MESSAGE (OUTPATIENT)
Dept: FAMILY MEDICINE CLINIC | Age: 75
End: 2020-09-02

## 2020-09-03 NOTE — TELEPHONE ENCOUNTER
From: Garry Mak  To: Hao Quintanilla DO  Sent: 9/2/2020 11:21 PM EDT  Subject: Visit Follow-Up Question    Thanks for the information. I think I will try the Oxybutynin ER. Can you write the script for 90 days because it is only $26 . Thanks again and have a good day.      ----- Message -----   [address] Nicolle Dumont DO   Sent:9/1/2020 12:35 PM EDT   To:Bang Mak   Subject:RE: Visit Follow-Up Question    [id number] [address] Can't believe that both of those which are generic are tier 4 compared to Myrbetriq. You may want to check with Cristy Merrill when you go for your next refill to see what the out of pocket expense would be with either one of those. The program [facility] says that using a Good [facility] coupon and paying cash price alone the Oxybutynin ER would be $16 at [facility]. Dr. Bethany Goins      ----- Message -----    From:Bang Mak   Sent:8/31/2020 5:58 PM EDT   To:Naty Adan DO   Subject:Visit Follow-Up Question    I checked in my drug book and Oxybutynin er 10mg and Tolterodineer 4mg are a tier four drug so I guess we had better stay with Myrbetriq since it is a tier three.  Thanks for trying and have a good day

## 2020-09-04 RX ORDER — OXYBUTYNIN CHLORIDE 10 MG/1
10 TABLET, EXTENDED RELEASE ORAL DAILY
Qty: 90 TABLET | Refills: 1 | Status: SHIPPED | OUTPATIENT
Start: 2020-09-04 | End: 2020-09-29

## 2020-09-08 ENCOUNTER — OFFICE VISIT (OUTPATIENT)
Dept: PULMONOLOGY | Age: 75
End: 2020-09-08
Payer: MEDICARE

## 2020-09-08 VITALS
HEIGHT: 70 IN | RESPIRATION RATE: 16 BRPM | DIASTOLIC BLOOD PRESSURE: 62 MMHG | WEIGHT: 233.4 LBS | TEMPERATURE: 97.2 F | BODY MASS INDEX: 33.41 KG/M2 | SYSTOLIC BLOOD PRESSURE: 118 MMHG | HEART RATE: 78 BPM | OXYGEN SATURATION: 96 %

## 2020-09-08 PROCEDURE — G8417 CALC BMI ABV UP PARAM F/U: HCPCS | Performed by: INTERNAL MEDICINE

## 2020-09-08 PROCEDURE — G8427 DOCREV CUR MEDS BY ELIG CLIN: HCPCS | Performed by: INTERNAL MEDICINE

## 2020-09-08 PROCEDURE — 99214 OFFICE O/P EST MOD 30 MIN: CPT | Performed by: INTERNAL MEDICINE

## 2020-09-08 PROCEDURE — 1036F TOBACCO NON-USER: CPT | Performed by: INTERNAL MEDICINE

## 2020-09-08 PROCEDURE — 1123F ACP DISCUSS/DSCN MKR DOCD: CPT | Performed by: INTERNAL MEDICINE

## 2020-09-08 PROCEDURE — 4040F PNEUMOC VAC/ADMIN/RCVD: CPT | Performed by: INTERNAL MEDICINE

## 2020-09-08 PROCEDURE — 3017F COLORECTAL CA SCREEN DOC REV: CPT | Performed by: INTERNAL MEDICINE

## 2020-09-08 NOTE — PROGRESS NOTES
PULMONARY MEDICINE OUTPATIENT FOLLOW UP                                                                       Patient:  Zechariah Mak  YOB: 1945  MRN: [id number]     REFERRED BY: Dayne Vargas DO   CHIEF COMPLIANT/REASON FOR CONSULTATION: Follow-up (follow up PET/CT)    SUBJECTIVE     HISTORY OF PRESENT ILLNESS:    Sam Black is a 77y.o. year old male here for follow-up. Pulmonary nodules/lesions:    Patient was initially seen after abnormal CT scan (7/16/2019) which showed right-sided pleural calcification/thickening raising the suspicion for asbestos exposure. There is no honeycombing or bronchiectasis. Patient also has multiple bilateral pulmonary nodules, largest one seen in the right lung base measuring about 1.5 into 1.3 cm. His follow-up CT chest (7/21/2020) is reported to show an interval enlargement in the size of right lower lobe nodule to 2.1 x 1.7 cm. No other significant changes in other nodules or pleural thickening. He was recommended a PET scan at the last clinic visit. The PET scan (8/22/2020) which showed PET avidity within a maximum SUV of 4.6 in the right lung base, additionally patient had FDG avid subcarinal and right hilar which are reported showing maximum SUV of 4.6  Tobacco history: Patient  reports that he quit smoking about 35 years ago. He has a 30.00 pack-year smoking history. He has never used smokeless tobacco.   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 Pulmonary function test: PFT nonspecific ventilatory impairment  Current symptoms: Patient has cough with occasional sputum, grade 2 dyspnea.   He reports improvement in his shortness of breath since his last about 20 pounds  Current Rx: Patient is currently only on as needed albuterol, and does not use it very often    GRISEL:  Patient also has history of obesity, coronary artery disease, status post PCI and has obstructive sleep apnea, for which he uses CPAP. Reports good compliance. Denies any symptoms of excessive daytime sleepiness. PAST MEDICAL HISTORY:       Diagnosis Date    Abdominal hernia     small, no significant symptomatology.  Abnormal PSA     with history of slight increase.  Allergic rhinitis     Benign prostatic hypertrophy     Chest pain     occasional.     Coronary artery disease     status post drug eluting stent placement, LAD, ostial obtuse marginal.     Dysphagia     Erectile dysfunction     Familial hyperlipidemia     with hypertriglyceridemia.  Gastroesophageal reflux disease     Hearing loss, bilateral     hearing aid in the left ear only. 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     Hypertension     Impaired fasting glucose     prediabetes.  Lumbar disc herniation     L3-L4, with chronic back pain.  Mild anemia     Nasal polyps     minimal symptoms.  Nasal septal deviation     right side.  Obesity     Palpitations     occasional.     Peptic ulcer disease     Reactive airway disease     asthma, industrial related, also a history of hypersensitivity pneumonitis.  Urinary frequency      PAST SURGICAL HISTORY:    Past Surgical History:   Procedure Laterality Date    APPENDECTOMY  age 10    CARDIAC CATHETERIZATION  10/20/2011    occluded LAD and ostial obtuse marginal stenosis, underwent drug eluting stents to both, RCA small, nondominant, occluded, ejection fraction 45%.  CARDIAC CATHETERIZATION  08/2018    with stent placement    CATARACT REMOVAL Bilateral 03/2018    COLONOSCOPY  01/21/2011    mild sigmoid diverticulosis.  COLONOSCOPY  4/6/16    hemorrhoid; sigmoid diverticulosis    KNEE ARTHROSCOPY Left 03/19/2010    partial medial and lateral synovectomies, partial medial meniscectomy, chondroplasty.     [address] [place] [address] 601  OTHER SURGICAL HISTORY Left 02/12/2010    knee injection.  PRE-MALIGNANT / BENIGN SKIN LESION EXCISION Left 01/16/2012    actinic keratoses, ear, liquid nitrogen.  PRE-MALIGNANT / BENIGN SKIN LESION EXCISION Left 07/19/2011    actinic keratosis, upper ear, liquid nitrogen.  PROSTATE BIOPSY Bilateral 09/08/2015    Benign    REPAIR UMBILICAL LBAQ,2+Y/D,NKOZS N/A 8/34/2755    UMBILICAL Hernia Repair w/ Mesh performed by Garcia Carrasco MD at [address] [place] [address] SEPTOPLASTY  10/30/2015    with bilateral submucosal resection of the inferior turbinates, left middle turbinate elza bullosa resection done by Dr Joshua Nicholas ARTHROSCOPY Left 10/17/14    W/ SUBACROMIAL DECOMPRESSION, DEBRIDEMENT ET CHONDROPLASTY    UPPER GASTROINTESTINAL ENDOSCOPY  01/21/2011    superficial ulcer of the fundus, erostive gastritis.  VASECTOMY Bilateral 04/04/1980     SOCIAL AND OCCUPATIONAL HISTORY:  TOBACCO: Patient  reports that he quit smoking about 35 years ago. He has a 30.00 pack-year smoking history. He has never used smokeless tobacco.  ETOH:   reports no history of alcohol use. DRUGS: reports no history of drug use.     IMMUNIZATION HISTORY:   Immunization History   Administered Date(s) Administered    Influenza A ([id number]) Vaccine PF IM 01/11/2010    Influenza Virus Vaccine 12/10/2009, 10/26/2011, 11/24/2012    Influenza, High Dose (Fluzone 65 yrs and older) 10/14/2013, 11/10/2014, 11/09/2015, 10/14/2016, 11/03/2017, 10/18/2018, 10/08/2019    Pneumococcal Conjugate 13-valent ([id number]) 02/06/2017    Pneumococcal Polysaccharide ([id number]) 01/17/2012    Tdap (Boostrix, Adacel) 08/04/2015    Zoster Live (Zostavax) 07/31/2014    Zoster Recombinant (Shingrix) 03/01/2019, 05/08/2019     REVIEW OF SYSTEMS:   General: negative for - chills, fever, night sweats, weight gain or weight loss  Psychological: negative for - anxiety or depression  Ophthalmic: negative for - blurry vision or decreased vision  ENT: negative  Allergy and Immunology: negative  Hematological and Lymphatic: negative for - bleeding problems, blood clots, bruising or swollen lymph nodes  Endocrine: negative for - polydipsia/polyuria or temperature intolerance  Respiratory: positive for - shortness of breath  Cardiovascular: negative for - chest pain, palpitations or paroxysmal nocturnal dyspnea  Gastrointestinal: negative for - abdominal pain, change in bowel habits or nausea/vomiting  Genito-Urinary: no dysuria, trouble voiding, or hematuria  Musculoskeletal: negative for - joint pain or joint swelling  Neurological: negative for - headaches, impaired coordination/balance, speech problems or weakness  Dermatological: negative for - rash or skin lesion changes     ALLERGIES:    Allergies   Allergen Reactions    Ace Inhibitors Other (See Comments)     Cough.  Effient [Prasugrel] Other (See Comments)     Superficial skin bleeding.  Statins [Statins] Other (See Comments)     Myalgias.       MEDICATIONS:   Outpatient Encounter Medications as of 9/8/2020   Medication Sig Dispense Refill    mirabegron (MYRBETRIQ) 50 MG TB24 TAKE 1 TABLET BY MOUTH DAILY 30 tablet 5    losartan (COZAAR) 50 MG tablet TAKE 1 TABLET BY MOUTH ONE TIME A DAY  90 tablet 1    levothyroxine (SYNTHROID) 100 MCG tablet TAKE 1 TABLET BY MOUTH ONE TIME A DAY  90 tablet 1    furosemide (LASIX) 20 MG tablet TAKE 1 TABLET BY MOUTH ONE TIME A DAY  90 tablet 1    clopidogrel (PLAVIX) 75 MG tablet TAKE 1 TABLET BY MOUTH DAILY 90 tablet 3    pravastatin (PRAVACHOL) 40 MG tablet TAKE ONE TABLET BY MOUTH ONCE EVERY EVENING 90 tablet 3    doxazosin (CARDURA) 2 MG tablet Take 1 tablet by mouth 2 times daily 180 tablet 3    tamsulosin (FLOMAX) 0.4 MG capsule TAKE 1 CAPSULE BY MOUTH DAILY 90 capsule 3    metoprolol tartrate (LOPRESSOR) 50 MG tablet Take 1 tablet by mouth 2 times daily 180 tablet 3    Misc Natural Products (URINOZINC PO) Take 2 tablets by mouth daily  niacin (NIASPAN) 500 MG CR tablet Take 500 mg by mouth 2 times daily. Two pills daily.  Omega-3 Fatty Acids (FISH OIL) 1200 MG CAPS Take 2,000 mg by mouth 2 times daily.  Multiple Vitamins-Minerals (MULTIVITAMIN PO) daily.  oxybutynin (DITROPAN XL) 10 MG extended release tablet Take 1 tablet by mouth daily (Patient not taking: Reported on 9/8/2020) 90 tablet 1    nitroGLYCERIN (NITROSTAT) 0.4 MG SL tablet Place 1 tablet under the tongue every 5 minutes as needed for Chest pain up to max of 3 total doses. If no relief after 1 dose, call 911. (Patient not taking: Reported on 8/11/2020) 25 tablet 3    albuterol sulfate HFA (VENTOLIN HFA) 108 (90 Base) MCG/ACT inhaler Inhale 2 puffs into the lungs every 6 hours as needed for Wheezing or Shortness of Breath (Patient not taking: Reported on 8/11/2020) 1 Inhaler 6    ASPIRIN PO 81 mg daily. No facility-administered encounter medications on file as of 9/8/2020. OBJECTIVE     VITAL SIGNS:   /62 (Site: Right Upper Arm, Position: Sitting, Cuff Size: Large Adult)   Pulse 78   Temp 97.2 °F (36.2 °C) (Temporal)   Resp 16   Ht 5' 10\" (1.778 m)   Wt 233 lb 6.4 oz (105.9 kg)   SpO2 96%   BMI 33.49 kg/m²   Wt Readings from Last 3 Encounters:   09/08/20 233 lb 6.4 oz (105.9 kg)   08/31/20 233 lb (105.7 kg)   08/11/20 252 lb (114.3 kg)     SYSTEMIC EXAMINATION:  General appearance - well appearing, and in no distress  Mental status - alert, oriented to person, place, and time  Eyes - pupils equal and reactive, extraocular eye movements intact, sclera anicteric  Mouth - mucous membranes moist, pharynx normal without lesions and tonsils normal  Neck - supple, no significant adenopathy, carotids upstroke normal bilaterally, no bruits  Mallampati Airway Score - III (soft palate, base of uvula visible)  Chest - Chest was symmetrical without dullness to percussion. Breath sounds bilaterally were clear to auscultation.   There were no wheezes, rhonchi or rales.   There is no intercostal recession or use of accessory muscles  Heart - normal rate, regular rhythm, normal S1, S2, no murmurs, rubs, clicks or gallops, no JVD  Abdomen - soft, nontender, nondistended, no masses or organomegaly  Neurological - alert, oriented, normal speech, no focal findings or movement disorder noted  Extremities - peripheral pulses normal, no pedal edema, no clubbing or cyanosis  Skin - normal coloration and turgor, no rashes, no suspicious skin lesions noted     Labs:  ABG:   No results found for: PH, PHART, PCO2, QFK0KYF, PO2, PO2ART, HCO3, HYT1QTG, BE, BEART, THGB, A1HGLJRJ  VBG:  No results found for: PHVEN, OYJ1WCW, BEVEN, G9QPEJUY  CBC:   Lab Results   Component Value Date    WBC 6.2 08/25/2020    RBC 4.15 (L) 08/25/2020    HGB 12.9 (L) 08/25/2020    HCT 38.9 (L) 08/25/2020     08/25/2020    MCV 93.7 08/25/2020    MCH 31.1 08/25/2020    MCHC 33.2 08/25/2020    RDW 12.7 08/25/2020    LYMPHOPCT 27 08/25/2020    MONOPCT 8 08/25/2020    BASOPCT 1 08/25/2020    MONOSABS 0.47 08/25/2020    LYMPHSABS 1.66 08/25/2020    EOSABS 0.07 08/25/2020    BASOSABS 0.03 08/25/2020    DIFFTYPE NOT REPORTED 08/25/2020     Eosinophils/IgE:   Lab Results   Component Value Date    EOSABS 0.07 08/25/2020     CMP:   Lab Results   Component Value Date     08/25/2020    K 4.2 08/25/2020     08/25/2020    CO2 25 08/25/2020    BUN 18 08/25/2020    CREATININE 1.01 08/25/2020    GLUCOSE 127 (H) 08/25/2020    CALCIUM 9.5 08/25/2020    PROT 7.5 08/25/2020    LABALBU 4.4 08/25/2020    BILITOT 0.36 08/25/2020    ALT 23 08/25/2020    AST 18 08/25/2020    ALKPHOS 64 08/25/2020     Coagulation Profile:   Lab Results   Component Value Date    INR 1.0 01/02/2018    PROTIME 10.2 01/02/2018    APTT 25.4 (L) 01/02/2018     BNP:   No results found for: BNP, PROBNP  D-Dimer/Fibrinogen:  No results found for: DDIMER  Others labs:  Lab Results   Component Value Date    TSH 3.87 08/25/2020     No lymphadenopathy. Airways: The airways are patent. No luminal filling defects. No airway obstruction. Lungs/pleura: Stable pleural-based nodules in dependent lungs bilaterally. No new or enlarging nodule in the pleura. Dependent changes. No consolidation or pneumothorax. Right lower lobe pulmonary nodule just above the right diaphragmatic leaflet measures 2.1 x 1.7 cm and previously measured 1.6 x 1.4 cm. Given interval enlargement, neoplasm is not excluded. Consider PET-CT. Subpleural right lower lobe pulmonary nodule (series 4, image 72) measures 0.4 cm and is stable. Perifissural nodule right lower lobe (series 4, image 62) likely represents an intrapulmonary lymph node given location and appearance. Subpleural left lower lobe pulmonary nodule posteriorly measures 0.6 cm and is stable. Upper Abdomen: Fatty liver. No acute upper abdominal findings. Enlarging nodule just above the right diaphragm contiguous with the pleura may actually represent atelectasis but given increase in size from prior exam, an enlarging nodule is not excluded and PET-CT might be considered for further characterization. Otherwise multiple solid pulmonary nodules and pleural-based nodules are stable. CT chest 7/16/2019  Impression    1. Right-sided pleural calcifications with mild pleural thickening and mild    septal thickening bilaterally.  Findings likely sequela of prior asbestos    exposure.  No honeycombing or bronchiectasis. 2. Multiple solid noncalcified pulmonary nodules within both lungs, largest    seen within the right lung base measuring 1.5 x 1.3 cm.  Please see follow-up    recommendations below.           Echocardiogram:   Results for orders placed in visit on 08/24/16   ECHO Complete 2D W Doppler W Color    Impression FINDINGS:       1. Normal left ventricular size with mild to moderate concentric left ventricular hypertrophy. 2. Normal systolic left ventricular function.  Ejection fraction is 50 to refreshing and restorative sleep  CPAP compliance: compliant all of the time and using it as recommended  Explained importance of compliance with treatment of sleep apnea  Compliance data was not reviewed  Continue to use CPAP for at least 4 hours every night  Use humidifier if needed  Weight loss was recommended and discussed  Recommended good sleep hygiene and instructions provided  Patient instructed not to drive up or operate heavy machinery, if sleepy    All the questions that the patient had were answered to her satisfaction  We'll see the patient back in 4 weeks  Thank you for having us involved in the care of your patient. Please call us if you have any questions or concerns. Matthew Herrera MD    Pulmonary and Critical Care Medicine            Please note that this chart was generated using voice recognition Dragon dictation software. Although every effort was made to ensure the accuracy of this automated transcription, some errors in transcription may have occurred.

## 2020-09-10 ENCOUNTER — TELEPHONE (OUTPATIENT)
Dept: PULMONOLOGY | Age: 75
End: 2020-09-10

## 2020-09-10 NOTE — TELEPHONE ENCOUNTER
Interventional radiologist looked over imaging and stated that his recommendation is a bronchoscopy. Please advise.

## 2020-09-14 ENCOUNTER — HOSPITAL ENCOUNTER (OUTPATIENT)
Dept: PREADMISSION TESTING | Age: 75
Setting detail: SPECIMEN
Discharge: HOME | End: 2020-09-18
Payer: MEDICARE

## 2020-09-14 ENCOUNTER — PRE-PROCEDURE TELEPHONE (OUTPATIENT)
Dept: PREADMISSION TESTING | Age: 75
End: 2020-09-14

## 2020-09-14 PROCEDURE — U0003 INFECTIOUS AGENT DETECTION BY NUCLEIC ACID (DNA OR RNA); SEVERE ACUTE RESPIRATORY SYNDROME CORONAVIRUS 2 (SARS-COV-2) (CORONAVIRUS DISEASE [COVID-19]), AMPLIFIED PROBE TECHNIQUE, MAKING USE OF HIGH THROUGHPUT TECHNOLOGIES AS DESCRIBED BY CMS-2020-01-R: HCPCS

## 2020-09-14 NOTE — TELEPHONE ENCOUNTER
Message from scheduling office from Friday Sept 11, to be scheduled for a Covid swab on Sept 14 at 0900.

## 2020-09-15 ENCOUNTER — TELEPHONE (OUTPATIENT)
Dept: PULMONOLOGY | Age: 75
End: 2020-09-15

## 2020-09-17 ENCOUNTER — HOSPITAL ENCOUNTER (OUTPATIENT)
Age: 75
Setting detail: OUTPATIENT SURGERY
Discharge: HOME | End: 2020-09-17
Attending: INTERNAL MEDICINE | Admitting: INTERNAL MEDICINE
Payer: MEDICARE

## 2020-09-17 ENCOUNTER — ANESTHESIA (OUTPATIENT)
Dept: OPERATING ROOM | Age: 75
End: 2020-09-17
Payer: MEDICARE

## 2020-09-17 ENCOUNTER — ANESTHESIA EVENT (OUTPATIENT)
Dept: OPERATING ROOM | Age: 75
End: 2020-09-17
Payer: MEDICARE

## 2020-09-17 VITALS — SYSTOLIC BLOOD PRESSURE: 126 MMHG | OXYGEN SATURATION: 98 % | DIASTOLIC BLOOD PRESSURE: 107 MMHG | TEMPERATURE: 95.8 F

## 2020-09-17 VITALS
OXYGEN SATURATION: 97 % | TEMPERATURE: 97.2 F | WEIGHT: 233 LBS | DIASTOLIC BLOOD PRESSURE: 92 MMHG | HEART RATE: 66 BPM | BODY MASS INDEX: 33.36 KG/M2 | RESPIRATION RATE: 25 BRPM | HEIGHT: 70 IN | SYSTOLIC BLOOD PRESSURE: 150 MMHG

## 2020-09-17 LAB
GLUCOSE BLD-MCNC: 84 MG/DL (ref 75–110)
GLUCOSE BLD-MCNC: 97 MG/DL (ref 74–100)
POC POTASSIUM: 4.2 MMOL/L (ref 3.5–4.5)
SARS-COV-2, NAA: NOT DETECTED

## 2020-09-17 PROCEDURE — 31652 BRONCH EBUS SAMPLNG 1/2 NODE: CPT | Performed by: INTERNAL MEDICINE

## 2020-09-17 PROCEDURE — 88342 IMHCHEM/IMCYTCHM 1ST ANTB: CPT

## 2020-09-17 PROCEDURE — 88172 CYTP DX EVAL FNA 1ST EA SITE: CPT

## 2020-09-17 PROCEDURE — 2500000003 HC RX 250 WO HCPCS: Performed by: NURSE ANESTHETIST, CERTIFIED REGISTERED

## 2020-09-17 PROCEDURE — 2720000010 HC SURG SUPPLY STERILE: Performed by: INTERNAL MEDICINE

## 2020-09-17 PROCEDURE — 31624 DX BRONCHOSCOPE/LAVAGE: CPT | Performed by: INTERNAL MEDICINE

## 2020-09-17 PROCEDURE — 7100000010 HC PHASE II RECOVERY - FIRST 15 MIN: Performed by: INTERNAL MEDICINE

## 2020-09-17 PROCEDURE — 6360000002 HC RX W HCPCS: Performed by: NURSE ANESTHETIST, CERTIFIED REGISTERED

## 2020-09-17 PROCEDURE — 88173 CYTOPATH EVAL FNA REPORT: CPT

## 2020-09-17 PROCEDURE — 7100000000 HC PACU RECOVERY - FIRST 15 MIN: Performed by: INTERNAL MEDICINE

## 2020-09-17 PROCEDURE — 7100000011 HC PHASE II RECOVERY - ADDTL 15 MIN: Performed by: INTERNAL MEDICINE

## 2020-09-17 PROCEDURE — 88305 TISSUE EXAM BY PATHOLOGIST: CPT

## 2020-09-17 PROCEDURE — 2709999900 HC NON-CHARGEABLE SUPPLY: Performed by: INTERNAL MEDICINE

## 2020-09-17 PROCEDURE — 3603165200 HC BRNCHSC EBUS GUIDED SAMPL 1/2 NODE STATION/STRUX: Performed by: INTERNAL MEDICINE

## 2020-09-17 PROCEDURE — 84132 ASSAY OF SERUM POTASSIUM: CPT

## 2020-09-17 PROCEDURE — 7100000001 HC PACU RECOVERY - ADDTL 15 MIN: Performed by: INTERNAL MEDICINE

## 2020-09-17 PROCEDURE — 6360000002 HC RX W HCPCS: Performed by: ANESTHESIOLOGY

## 2020-09-17 PROCEDURE — 82947 ASSAY GLUCOSE BLOOD QUANT: CPT

## 2020-09-17 PROCEDURE — 2580000003 HC RX 258: Performed by: ANESTHESIOLOGY

## 2020-09-17 PROCEDURE — 88112 CYTOPATH CELL ENHANCE TECH: CPT

## 2020-09-17 PROCEDURE — 3700000000 HC ANESTHESIA ATTENDED CARE: Performed by: INTERNAL MEDICINE

## 2020-09-17 PROCEDURE — 3700000001 HC ADD 15 MINUTES (ANESTHESIA): Performed by: INTERNAL MEDICINE

## 2020-09-17 PROCEDURE — 88341 IMHCHEM/IMCYTCHM EA ADD ANTB: CPT

## 2020-09-17 PROCEDURE — 88177 CYTP FNA EVAL EA ADDL: CPT

## 2020-09-17 RX ORDER — FENTANYL CITRATE 50 UG/ML
25 INJECTION, SOLUTION INTRAMUSCULAR; INTRAVENOUS EVERY 5 MIN PRN
Status: DISCONTINUED | OUTPATIENT
Start: 2020-09-17 | End: 2020-09-17 | Stop reason: HOSPADM

## 2020-09-17 RX ORDER — FENTANYL CITRATE 50 UG/ML
50 INJECTION, SOLUTION INTRAMUSCULAR; INTRAVENOUS EVERY 5 MIN PRN
Status: DISCONTINUED | OUTPATIENT
Start: 2020-09-17 | End: 2020-09-17 | Stop reason: HOSPADM

## 2020-09-17 RX ORDER — GLYCOPYRROLATE 1 MG/5 ML
SYRINGE (ML) INTRAVENOUS PRN
Status: DISCONTINUED | OUTPATIENT
Start: 2020-09-17 | End: 2020-09-17 | Stop reason: SDUPTHER

## 2020-09-17 RX ORDER — PROPOFOL 10 MG/ML
INJECTION, EMULSION INTRAVENOUS PRN
Status: DISCONTINUED | OUTPATIENT
Start: 2020-09-17 | End: 2020-09-17 | Stop reason: SDUPTHER

## 2020-09-17 RX ORDER — ROCURONIUM BROMIDE 10 MG/ML
INJECTION, SOLUTION INTRAVENOUS PRN
Status: DISCONTINUED | OUTPATIENT
Start: 2020-09-17 | End: 2020-09-17 | Stop reason: SDUPTHER

## 2020-09-17 RX ORDER — ONDANSETRON 2 MG/ML
INJECTION INTRAMUSCULAR; INTRAVENOUS PRN
Status: DISCONTINUED | OUTPATIENT
Start: 2020-09-17 | End: 2020-09-17 | Stop reason: SDUPTHER

## 2020-09-17 RX ORDER — SODIUM CHLORIDE, SODIUM LACTATE, POTASSIUM CHLORIDE, CALCIUM CHLORIDE 600; 310; 30; 20 MG/100ML; MG/100ML; MG/100ML; MG/100ML
INJECTION, SOLUTION INTRAVENOUS CONTINUOUS
Status: DISCONTINUED | OUTPATIENT
Start: 2020-09-17 | End: 2020-09-17 | Stop reason: HOSPADM

## 2020-09-17 RX ORDER — SUCCINYLCHOLINE CHLORIDE 20 MG/ML
INJECTION INTRAMUSCULAR; INTRAVENOUS PRN
Status: DISCONTINUED | OUTPATIENT
Start: 2020-09-17 | End: 2020-09-17 | Stop reason: SDUPTHER

## 2020-09-17 RX ORDER — LIDOCAINE HYDROCHLORIDE 10 MG/ML
INJECTION, SOLUTION INFILTRATION; PERINEURAL PRN
Status: DISCONTINUED | OUTPATIENT
Start: 2020-09-17 | End: 2020-09-17 | Stop reason: SDUPTHER

## 2020-09-17 RX ADMIN — ROCURONIUM BROMIDE 50 MG: 10 INJECTION INTRAVENOUS at 13:45

## 2020-09-17 RX ADMIN — SODIUM CHLORIDE, POTASSIUM CHLORIDE, SODIUM LACTATE AND CALCIUM CHLORIDE: 600; 310; 30; 20 INJECTION, SOLUTION INTRAVENOUS at 11:41

## 2020-09-17 RX ADMIN — FENTANYL CITRATE 100 MCG: 50 INJECTION INTRAMUSCULAR; INTRAVENOUS at 13:36

## 2020-09-17 RX ADMIN — SODIUM CHLORIDE, POTASSIUM CHLORIDE, SODIUM LACTATE AND CALCIUM CHLORIDE: 600; 310; 30; 20 INJECTION, SOLUTION INTRAVENOUS at 14:40

## 2020-09-17 RX ADMIN — PHENYLEPHRINE HYDROCHLORIDE 100 MCG: 10 INJECTION INTRAVENOUS at 14:46

## 2020-09-17 RX ADMIN — PHENYLEPHRINE HYDROCHLORIDE 50 MCG: 10 INJECTION INTRAVENOUS at 14:44

## 2020-09-17 RX ADMIN — PROPOFOL 150 MG: 10 INJECTION, EMULSION INTRAVENOUS at 13:43

## 2020-09-17 RX ADMIN — SODIUM CHLORIDE, POTASSIUM CHLORIDE, SODIUM LACTATE AND CALCIUM CHLORIDE: 600; 310; 30; 20 INJECTION, SOLUTION INTRAVENOUS at 13:40

## 2020-09-17 RX ADMIN — LIDOCAINE HYDROCHLORIDE 50 MG: 10 INJECTION, SOLUTION INFILTRATION; PERINEURAL at 13:43

## 2020-09-17 RX ADMIN — PROPOFOL 50 MG: 10 INJECTION, EMULSION INTRAVENOUS at 13:46

## 2020-09-17 RX ADMIN — SUCCINYLCHOLINE CHLORIDE 100 MG: 20 INJECTION, SOLUTION INTRAMUSCULAR; INTRAVENOUS at 13:48

## 2020-09-17 RX ADMIN — ONDANSETRON 4 MG: 2 INJECTION, SOLUTION INTRAMUSCULAR; INTRAVENOUS at 14:58

## 2020-09-17 RX ADMIN — ROCURONIUM BROMIDE 20 MG: 10 INJECTION INTRAVENOUS at 14:38

## 2020-09-17 RX ADMIN — SUGAMMADEX 500 MG: 100 INJECTION, SOLUTION INTRAVENOUS at 15:03

## 2020-09-17 RX ADMIN — PHENYLEPHRINE HYDROCHLORIDE 100 MCG: 10 INJECTION INTRAVENOUS at 14:54

## 2020-09-17 RX ADMIN — Medication 0.2 MG: at 13:45

## 2020-09-17 ASSESSMENT — PULMONARY FUNCTION TESTS
PIF_VALUE: 34
PIF_VALUE: 22
PIF_VALUE: 19
PIF_VALUE: 35
PIF_VALUE: 3
PIF_VALUE: 19
PIF_VALUE: 3
PIF_VALUE: 35
PIF_VALUE: 23
PIF_VALUE: 4
PIF_VALUE: 35
PIF_VALUE: 1
PIF_VALUE: 35
PIF_VALUE: 26
PIF_VALUE: 19
PIF_VALUE: 28
PIF_VALUE: 35
PIF_VALUE: 35
PIF_VALUE: 33
PIF_VALUE: 42
PIF_VALUE: 28
PIF_VALUE: 35
PIF_VALUE: 19
PIF_VALUE: 1
PIF_VALUE: 23
PIF_VALUE: 28
PIF_VALUE: 35
PIF_VALUE: 35
PIF_VALUE: 32
PIF_VALUE: 35
PIF_VALUE: 30
PIF_VALUE: 35
PIF_VALUE: 24
PIF_VALUE: 36
PIF_VALUE: 4
PIF_VALUE: 0
PIF_VALUE: 35
PIF_VALUE: 12
PIF_VALUE: 26
PIF_VALUE: 0
PIF_VALUE: 33
PIF_VALUE: 27
PIF_VALUE: 35
PIF_VALUE: 19
PIF_VALUE: 35
PIF_VALUE: 1
PIF_VALUE: 35
PIF_VALUE: 1
PIF_VALUE: 3
PIF_VALUE: 35
PIF_VALUE: 1
PIF_VALUE: 35
PIF_VALUE: 3
PIF_VALUE: 36
PIF_VALUE: 27
PIF_VALUE: 35
PIF_VALUE: 35
PIF_VALUE: 27
PIF_VALUE: 35
PIF_VALUE: 21
PIF_VALUE: 1
PIF_VALUE: 35
PIF_VALUE: 35
PIF_VALUE: 28
PIF_VALUE: 30
PIF_VALUE: 27
PIF_VALUE: 35
PIF_VALUE: 1
PIF_VALUE: 35
PIF_VALUE: 22
PIF_VALUE: 35
PIF_VALUE: 33
PIF_VALUE: 22
PIF_VALUE: 18
PIF_VALUE: 35
PIF_VALUE: 12
PIF_VALUE: 39
PIF_VALUE: 35
PIF_VALUE: 30
PIF_VALUE: 22

## 2020-09-17 ASSESSMENT — ENCOUNTER SYMPTOMS: SHORTNESS OF BREATH: 0

## 2020-09-17 ASSESSMENT — PAIN SCALES - GENERAL
PAINLEVEL_OUTOF10: 0

## 2020-09-17 ASSESSMENT — PAIN - FUNCTIONAL ASSESSMENT: PAIN_FUNCTIONAL_ASSESSMENT: 0-10

## 2020-09-17 NOTE — ANESTHESIA PRE PROCEDURE
Department of Anesthesiology  Preprocedure Note       Name:  Mini Mak   Age:  77 y.o.  :  1945                                          MRN:  [id number]         Date:  2020      Surgeon: Felisa Pandya):  Michela Britt MD    Procedure: Procedure(s):  EBUS- BRONCHOSCOPY ENDOBRONCHIAL ULTRASOUND, PATHOLOGY REQUESTED- CINDY NOTIFIED, GI UNIT SCHEDULED    Medications prior to admission:   Prior to Admission medications    Medication Sig Start Date End Date Taking? Authorizing Provider   ECHINACEA HERB PO Take by mouth daily   Yes Historical Provider, MD   mirabegron (MYRBETRIQ) 50 MG TB24 TAKE 1 TABLET BY MOUTH DAILY 20  Yes Rhianna Cruz,    losartan (COZAAR) 50 MG tablet TAKE 1 TABLET BY MOUTH ONE TIME A DAY  20  Yes Rhianna Cruz DO   levothyroxine (SYNTHROID) 100 MCG tablet TAKE 1 TABLET BY MOUTH ONE TIME A DAY  20  Yes Rhianna Cruz, DO   furosemide (LASIX) 20 MG tablet TAKE 1 TABLET BY MOUTH ONE TIME A DAY  20  Yes Rhianna Cruz, DO   pravastatin (PRAVACHOL) 40 MG tablet TAKE ONE TABLET BY MOUTH ONCE EVERY EVENING 20  Yes Rhianna Cruz, DO   doxazosin (CARDURA) 2 MG tablet Take 1 tablet by mouth 2 times daily 20  Yes Rhianna Cruz, DO   tamsulosin (FLOMAX) 0.4 MG capsule TAKE 1 CAPSULE BY MOUTH DAILY 20  Yes Rhianna Cruz, DO   metoprolol tartrate (LOPRESSOR) 50 MG tablet Take 1 tablet by mouth 2 times daily 20  Yes Rhianna Cruz,    niacin (NIASPAN) 500 MG CR tablet Take 500 mg by mouth 2 times daily. Two pills daily. Yes Historical Provider, MD   Omega-3 Fatty Acids (FISH OIL) 1200 MG CAPS Take 2,000 mg by mouth 2 times daily. Yes Historical Provider, MD   Multiple Vitamins-Minerals (MULTIVITAMIN PO) daily.    Yes Historical Provider, MD   oxybutynin (DITROPAN XL) 10 MG extended release tablet Take 1 tablet by mouth daily  Patient not taking: Reported on 2020   Rhianna Cruz DO   clopidogrel (PLAVIX) 75 MG tablet TAKE 1 TABLET BY MOUTH DAILY 2/17/20   Darrold Ny, DO   nitroGLYCERIN (NITROSTAT) 0.4 MG SL tablet Place 1 tablet under the tongue every 5 minutes as needed for Chest pain up to max of 3 total doses. If no relief after 1 dose, call 911. Patient not taking: Reported on 8/11/2020 11/20/19   Gennaro Lanier MD   [facility] Natural Products (URINOZINC PO) Take 2 tablets by mouth daily    Historical Provider, MD   albuterol sulfate HFA (VENTOLIN HFA) 108 (90 Base) MCG/ACT inhaler Inhale 2 puffs into the lungs every 6 hours as needed for Wheezing or Shortness of Breath  Patient not taking: Reported on 8/11/2020 2/13/19   Darmary Wallace, DO   ASPIRIN PO 81 mg daily. Historical Provider, MD       Current medications:    Current Facility-Administered Medications   Medication Dose Route Frequency Provider Last Rate Last Dose    lactated ringers infusion   Intravenous Continuous Sanjuana Cochran MD           Allergies: Allergies   Allergen Reactions    Ace Inhibitors Other (See Comments)     Cough.  Effient [Prasugrel] Other (See Comments)     Superficial skin bleeding.  Statins [Statins] Other (See Comments)     Myalgias.         Problem List:    Patient Active Problem List   Diagnosis Code    HTN (hypertension) I10    Peptic ulcer disease K27.9    Coronary artery disease I25.10    GERD (gastroesophageal reflux disease) K21.9    Impaired fasting glucose R73.01    Benign prostatic hyperplasia N40.0    Erectile dysfunction N52.9    Familial hyperlipidemia E78.49    Left rotator cuff tear M75.102    Benign prostatic hyperplasia N40.0    Obstructive sleep apnea G47.33    Acquired hypothyroidism E03.9    Ventral hernia without obstruction or gangrene P38.4    Umbilical hernia without obstruction and without gangrene K42.9    S/P PTCA (percutaneous transluminal coronary angioplasty) Z98.61    Occupational asthma J45.909, Z57.9    Pulmonary hypertension ([facility] [address] I27.20       Past Medical History:        Diagnosis Date    Abdominal hernia     small, no significant symptomatology.  Abnormal PSA     with history of slight increase.  Allergic rhinitis     Benign prostatic hypertrophy     Chest pain     occasional.     Coronary artery disease     status post drug eluting stent placement, LAD, ostial obtuse marginal.     Dysphagia     Erectile dysfunction     Familial hyperlipidemia     with hypertriglyceridemia.  Gastroesophageal reflux disease     Hearing loss, bilateral     hearing aid in the left ear only. 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     Hypertension     Impaired fasting glucose     prediabetes.  Lumbar disc herniation     L3-L4, with chronic back pain.  Mild anemia     Nasal polyps     minimal symptoms.  Nasal septal deviation     right side.  Obesity     Palpitations     occasional.     Peptic ulcer disease     Reactive airway disease     asthma, industrial related, also a history of hypersensitivity pneumonitis.  Urinary frequency        Past Surgical History:        Procedure Laterality Date    APPENDECTOMY  age 10    CARDIAC CATHETERIZATION  10/20/2011    occluded LAD and ostial obtuse marginal stenosis, underwent drug eluting stents to both, RCA small, nondominant, occluded, ejection fraction 45%.  CARDIAC CATHETERIZATION  08/2018    with stent placement    CATARACT REMOVAL Bilateral 03/2018    COLONOSCOPY  01/21/2011    mild sigmoid diverticulosis.  COLONOSCOPY  4/6/16    hemorrhoid; sigmoid diverticulosis    KNEE ARTHROSCOPY Left 03/19/2010    partial medial and lateral synovectomies, partial medial meniscectomy, chondroplasty.  NASAL FRACTURE SURGERY  1960    OTHER SURGICAL HISTORY Left 02/12/2010    knee injection.  PRE-MALIGNANT / BENIGN SKIN LESION EXCISION Left 01/16/2012    actinic keratoses, ear, liquid nitrogen.      PRE-MALIGNANT / BENIGN SKIN LESION EXCISION Left 07/19/2011    actinic keratosis, upper ear, 08/25/2020    RDW 12.7 08/25/2020     08/25/2020     10/21/2011       CMP:   Lab Results   Component Value Date     08/25/2020    K 4.2 08/25/2020     08/25/2020    CO2 25 08/25/2020    BUN 18 08/25/2020    CREATININE 1.01 08/25/2020    GFRAA >60 08/25/2020    LABGLOM >60 08/25/2020    GLUCOSE 127 08/25/2020    GLUCOSE 104 10/21/2011    PROT 7.5 08/25/2020    CALCIUM 9.5 08/25/2020    BILITOT 0.36 08/25/2020    ALKPHOS 64 08/25/2020    AST 18 08/25/2020    ALT 23 08/25/2020       POC Tests: No results for input(s): POCGLU, POCNA, POCK, POCCL, POCBUN, POCHEMO, POCHCT in the last 72 hours.     Coags:   Lab Results   Component Value Date    PROTIME 10.2 01/02/2018    INR 1.0 01/02/2018    APTT 25.4 01/02/2018       HCG (If Applicable): No results found for: PREGTESTUR, PREGSERUM, HCG, HCGQUANT     ABGs: No results found for: PHART, PO2ART, YFB2XVS, DIF9FGA, BEART, D9RKNYNY     Type & Screen (If Applicable):  No results found for: LABABO, LABRH    Drug/Infectious Status (If Applicable):  Lab Results   Component Value Date    HEPCAB NONREACTIVE 02/01/2017       COVID-19 Screening (If Applicable):   Lab Results   Component Value Date    COVID19 Not Detected 09/14/2020         Anesthesia Evaluation  Patient summary reviewed and Nursing notes reviewed no history of anesthetic complications:   Airway: Mallampati: II  TM distance: >3 FB   Neck ROM: full  Mouth opening: > = 3 FB Dental: normal exam         Pulmonary:normal exam  breath sounds clear to auscultation  (+) sleep apnea:  asthma:     (-) COPD, shortness of breath and recent URI                          ROS comment: R lung mass  Former smoker   Cardiovascular:  Exercise tolerance: good (>4 METS),   (+) hypertension:, CAD:, CABG/stent (8/16/2018 TAM LAD):, pulmonary hypertension:, hyperlipidemia    (-) past MI,  angina,  CHF, orthopnea and  KIM      Rhythm: regular  Rate: normal                    Neuro/Psych:   Negative Neuro/Psych ROS (-) seizures, TIA and CVA           GI/Hepatic/Renal:   (+) GERD:, PUD,           Endo/Other:    (+) hypothyroidism::., .    (-) diabetes mellitus               Abdominal:           Vascular: negative vascular ROS. Cath 8/16/2018  Conclusions      Procedure Summary      Successful PCI / Drug Eluting Stent of the mid LAD. Stress 6/25/2018  IMPRESSION:  1. Moderate sized mid and apical septal ischemia. 2. EF 59%. Anesthesia Plan      general     ASA 3     (GA ET)  Induction: intravenous. MIPS: Postoperative opioids intended and Prophylactic antiemetics administered. Anesthetic plan and risks discussed with patient.       Plan discussed with CRNA and surgical team.                  Paulo Moise MD   9/17/2020

## 2020-09-17 NOTE — ANESTHESIA POSTPROCEDURE EVALUATION
Department of Anesthesiology  Postprocedure Note    Patient: Dulce Joseph  MRN: [id number]  YOB: 1945  Date of evaluation: 9/17/2020  Time:  6:09 PM     Procedure Summary     Date:  09/17/20 Room / Location:  [address]    Anesthesia Start:  1488 Anesthesia Stop:  3568    Procedure:  EBUS- BRONCHOSCOPY ENDOBRONCHIAL ULTRASOUND, PATHOLOGY REQUESTED- CINDY NOTIFIED, GI UNIT SCHEDULED (N/A ) Diagnosis:  (RIGHT LUNG MASS)    Surgeon:  Celso Donovan MD Responsible Provider:  Rubina Haque MD    Anesthesia Type:  general ASA Status:  3          Anesthesia Type: general    Myra Phase I: Myra Score: 10    Myra Phase II: Myra Score: 10    Last vitals: Reviewed and per EMR flowsheets.        Anesthesia Post Evaluation    Patient location during evaluation: PACU  Patient participation: complete - patient participated  Level of consciousness: awake  Pain score: 3  Airway patency: patent  Nausea & Vomiting: no vomiting and no nausea  Complications: no  Cardiovascular status: blood pressure returned to baseline  Respiratory status: acceptable  Hydration status: euvolemic

## 2020-09-17 NOTE — H&P
History and Physical    Pt Name: Nancy Brown  MRN: [id number]  YOB: 1945  Date of evaluation: 9/17/2020  Primary Care Physician: Karlee Morris DO  Patient evaluated at the request of  Dr. Tete Fan    Reason for evaluation:  Lung mass   SUBJECTIVE:   History of Chief Complaint:      Vin Boogie is a 77 y.o. male  Who had a hx of SOB x 2 yrs , quit smoking 35 yrs ago , denies coughing up blood , was dx with a lung spots he reports , intentional wait loss of 20 lbs , hx of occasional hoarse voice . 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 Past Surgical History   has a past surgical history that includes Nasal fracture surgery (1960); Vasectomy (Bilateral, 04/04/1980); Upper gastrointestinal endoscopy (01/21/2011); Colonoscopy (01/21/2011); Cardiac catheterization (10/20/2011); pre-malignant / benign skin lesion excision (Left, 01/16/2012); pre-malignant / benign skin lesion excision (Left, 07/19/2011); Appendectomy (age 11); Knee arthroscopy (Left, 03/19/2010); other surgical history (Left, 02/12/2010); Shoulder arthroscopy (Left, 10/17/14); Septoplasty (10/30/2015); Colonoscopy (4/6/16); Prostate Biopsy (Bilateral, 00/42/5574); repair umbilical XUVD,9+L/B,LJNQA (N/A, 1/11/2018); Cataract removal (Bilateral, 03/2018); and Cardiac catheterization (08/2018). Medications   Scheduled Meds:  Continuous Infusions:   lactated ringers       PRN Meds:.   Allergies  is allergic to ace inhibitors; effient [prasugrel]; and statins [statins]. Family History    family history includes Cancer in his mother; Coronary Art Dis in his father; Crohn's Disease in his sister; Emphysema in his father; Heart Attack in his mother; Heart Disease in his maternal grandfather, maternal grandmother, and mother; Heart Failure in his father; High Blood Pressure in his mother; High Cholesterol in his child and sister; Stroke in his mother; Thyroid Disease in his mother.     Family Status   Relation Name Status    Mother   at age 61        complications of lymphoma    Father     Alexander Thornton MGM  (Not Specified)    MGF  (Not Specified)    Sister  (Not Specified)    Child  (Not Specified)         Social History  Social History     Socioeconomic History    Marital status:      Spouse name: Not on file    Number of children: Not on file    Years of education: Not on file    Highest education level: Not on file   Occupational History    Not on file   Social Needs    Financial resource strain: Not on file    Food insecurity     Worry: Not on file     Inability: Not on file    Transportation needs     Medical: Not on file     Non-medical: Not on file   Tobacco Use    Smoking status: Former Smoker     Packs/day: 2.00     Years: 15.00     Pack years: 30.00     Last attempt to quit: 1985     Years since quittin.7    Smokeless tobacco: Never Used    Tobacco comment: smoked 2 packs a day for 14 to 15 years, quit in    Substance and Sexual Activity    Alcohol use: No     Alcohol/week: 0.0 standard drinks     Comment: rare    Drug use: No    Sexual activity: Not on file   Lifestyle    Physical activity     Days per week: Not on file     Minutes per session: Not on file    Stress: Not on file   Relationships    Social connections     Talks on phone: Not on file     Gets together: Not on file     Attends Latter-day service: Not on file     Active member of club or organization: Not on file     Attends meetings of clubs or organizations: Not on file     Relationship status: Not on file    Intimate partner violence     Fear of current or ex partner: Not on file     Emotionally abused: Not on file     Physically abused: Not on file     Forced sexual activity: Not on file   Other Topics Concern    Not on file   Social History Narrative    Not on file        Service:Yes, [place]  [employer]         Hobbies:  Great grandfather     OBJECTIVE:   VITALS:  height is 5' 10\" (1.778 m) and weight is 233 lb (105.7 kg). His temporal temperature is 97.3 °F (36.3 °C). His blood pressure is 168/86 (abnormal) and his pulse is 51. His respiration is 20 and oxygen saturation is 100%. CONSTITUTIONAL: Alert and oriented times 3, no acute distress and cooperative to examination. friendly and pleasant , stocky build     SKIN: rash No    HEENT: Head is normocephalic, atraumatic. EOMI, PERRLA    Oral air way :slightly narrow Yes    NECK: neck supple, no lymphadenopathy noted, trachea midline and straight       2+ carotid, no bruit    LUNGS: Chest expands equally bilaterally upon respiration, no accessory muscle used. Ausculation reveals no adventitious breath sounds. CARDIOVASCULAR: \"Heart sounds are normal.  Regular rate and rhythm without murmur,    ABDOMEN: Bowel sounds are present in all four quadrants , over weight      GENATALIA:Deferred. NEUROLOGIC: \"CN II-XII are grossly intact.        EXTREMITIES: Pitting edema:  No,  Varicose veins: No     Dorsal pedal/posterior tibial pulses palpable: Yes         Strength:   Not tested       Patient Active Problem List   Diagnosis    HTN (hypertension)    Peptic ulcer disease    Coronary artery disease    GERD (gastroesophageal reflux disease)    Impaired fasting glucose    Benign prostatic hyperplasia    Erectile dysfunction    Familial hyperlipidemia    Left rotator cuff tear    Benign prostatic hyperplasia    Obstructive sleep apnea    Acquired

## 2020-09-18 LAB
CASE NUMBER:: NORMAL
SPECIMEN DESCRIPTION: NORMAL

## 2020-09-18 NOTE — OP NOTE
was reported to be positive for non-small cell lung cancer at station 7  [X] Additionally, following specimens were obtained via Fiberoptic Bronchoscopy:    Specimen Obtained Bronchoalveolar Lavage (mL Instilled/Lavaged) Bronchial Wash Bronchial [place] Transbronchial Biopsy Endobronchial Biopsy   Segment        RUL        RML        RLL 90/10       MARTI        [place]        LLL          Labs requested:  Specimens:   ID Type Source Tests Collected by Time Destination   A : BAL RIGHT LOWER LOBE Tissue Lung CYTOLOGY, NON-GYN Paige Bustos MD 9/17/2020 1402    B : STATION 7 Tissue Lymph Node SURGICAL PATHOLOGY Paige Bustos MD 9/17/2020 1412      [X] The bronchoscope was withdrawn without difficulty. [X] The patient tolerated the procedure well. Patient transferred to: recovery room    Patient condition: stable    Estimated blood loss: 5 mL    Complications: None    Chest radiograph:   [X] None  [] Pending  [] Reviewed        [] No pneumothorax       [] No change compared to prior    Pre-operative diagnosis: Mediastinal Lymphadenopathy    Post-operative diagnoses: Mediastinal Lymphadenopathy, no endobronchial mass     Comments:   Patient will follow up with me in clinic . Patient was instructed to call with concerns and if follow up has not already been scheduled. In the event of severe symptoms or if the patient is unable to reach my office, instructions are given to proceed to the emergency department.      Paige Bustos MD.  Pulmonary and Critical Care Medicine  09/17/20

## 2020-09-21 LAB — SURGICAL PATHOLOGY REPORT: NORMAL

## 2020-09-22 ENCOUNTER — TELEPHONE (OUTPATIENT)
Dept: PULMONOLOGY | Age: 75
End: 2020-09-22

## 2020-09-22 ENCOUNTER — TELEPHONE (OUTPATIENT)
Dept: CASE MANAGEMENT | Age: 75
End: 2020-09-22

## 2020-09-22 NOTE — TELEPHONE ENCOUNTER
Call patient and updated about EBUS- TBNA biopsy results, which was positive for adenocarcinoma. Order placed for oncology consult with Dr. Seema Buitrago in [place].

## 2020-09-29 ENCOUNTER — OFFICE VISIT (OUTPATIENT)
Dept: PULMONOLOGY | Age: 75
End: 2020-09-29
Payer: MEDICARE

## 2020-09-29 ENCOUNTER — OFFICE VISIT (OUTPATIENT)
Dept: ONCOLOGY | Age: 75
End: 2020-09-29
Payer: MEDICARE

## 2020-09-29 VITALS
WEIGHT: 236 LBS | BODY MASS INDEX: 33.79 KG/M2 | SYSTOLIC BLOOD PRESSURE: 126 MMHG | DIASTOLIC BLOOD PRESSURE: 72 MMHG | HEART RATE: 68 BPM | HEIGHT: 70 IN | TEMPERATURE: 97 F

## 2020-09-29 VITALS
DIASTOLIC BLOOD PRESSURE: 84 MMHG | WEIGHT: 236 LBS | HEART RATE: 62 BPM | HEIGHT: 70 IN | OXYGEN SATURATION: 96 % | BODY MASS INDEX: 33.79 KG/M2 | TEMPERATURE: 97 F | SYSTOLIC BLOOD PRESSURE: 128 MMHG

## 2020-09-29 PROBLEM — C34.91 ADENOCARCINOMA OF RIGHT LUNG (HCC): Status: ACTIVE | Noted: 2020-09-29

## 2020-09-29 PROCEDURE — 99214 OFFICE O/P EST MOD 30 MIN: CPT | Performed by: INTERNAL MEDICINE

## 2020-09-29 PROCEDURE — G8417 CALC BMI ABV UP PARAM F/U: HCPCS | Performed by: INTERNAL MEDICINE

## 2020-09-29 PROCEDURE — 1036F TOBACCO NON-USER: CPT | Performed by: INTERNAL MEDICINE

## 2020-09-29 PROCEDURE — 99205 OFFICE O/P NEW HI 60 MIN: CPT | Performed by: INTERNAL MEDICINE

## 2020-09-29 PROCEDURE — G8427 DOCREV CUR MEDS BY ELIG CLIN: HCPCS | Performed by: INTERNAL MEDICINE

## 2020-09-29 PROCEDURE — 4040F PNEUMOC VAC/ADMIN/RCVD: CPT | Performed by: INTERNAL MEDICINE

## 2020-09-29 PROCEDURE — 3017F COLORECTAL CA SCREEN DOC REV: CPT | Performed by: INTERNAL MEDICINE

## 2020-09-29 PROCEDURE — 99213 OFFICE O/P EST LOW 20 MIN: CPT | Performed by: INTERNAL MEDICINE

## 2020-09-29 PROCEDURE — G8428 CUR MEDS NOT DOCUMENT: HCPCS | Performed by: INTERNAL MEDICINE

## 2020-09-29 PROCEDURE — 1123F ACP DISCUSS/DSCN MKR DOCD: CPT | Performed by: INTERNAL MEDICINE

## 2020-09-29 RX ORDER — OXYBUTYNIN CHLORIDE 10 MG/1
10 TABLET, EXTENDED RELEASE ORAL DAILY
COMMUNITY
End: 2021-03-03 | Stop reason: SDUPTHER

## 2020-09-29 NOTE — PROGRESS NOTES
Brandon Fiore Shock                                                                                                                  9/29/2020  MRN:   [id number]  YOB: 1945  PCP:                           Carla Angulo DO  Referring Physician: Helena Fernando  Treating Physician Name: Davie Herndon MD      Reason for consultation:  Chief Complaint   Patient presents with    Lung Cancer     cough, SOB    Establish Care     Current problems:  Adenocarcinoma of right lung, clinical stage at least T1c, N2  (stage IIIA)  Subcarinal lymph node metastasis    Active and recent treatments:  Staging work-up in progress    Summary of Case/History:    Daphne Caba a 77 y.o.male is a patient with biopsy confirmed adenocarcinoma of right lung presents to the office to establish care and for further evaluation treatment recommendations. Patient was initially seen by pulmonary team for lung nodules. Patient CT scan from July 2019 showed a right-sided pleural calcification thickening concerning for asbestos exposure. Patient also noted to have multiple pulmonary nodules measuring between 1.5 x 1.3 cm. Follow-up CT chest from July 2020 showed increase in size of the right lower lobe lung nodule which now measured 2.1 cm. Subsequently patient underwent CT PET on 8/22 which showed FDG avid right lower lobe lung nodule with SUV of 4.6. Patient CT PET was also positive for subcarinal lymph node with SUV of 4.6. Patient underwent bronchoscopy with endobronchial ultrasound biopsy of the subcarinal lymph node confirmed adenocarcinoma. Patient has significant history of tobacco dependence around 30-pack-year however he quit about 25 years ago. Patient does have coronary artery disease status post stent placement. Patient also gives history of occupational asthma and exposure to bacteria.   Patient does give history of weight loss but describes it as intentional.  Denies headaches dizziness chest pain abdominal pain nausea bone pain     Past Medical History:   Past Medical History:   Diagnosis Date    Abdominal hernia     small, no significant symptomatology.  Abnormal PSA     with history of slight increase.  Allergic rhinitis     Benign prostatic hypertrophy     Chest pain     occasional.     Coronary artery disease     status post drug eluting stent placement, LAD, ostial obtuse marginal.     Dysphagia     Erectile dysfunction     Familial hyperlipidemia     with hypertriglyceridemia.  Gastroesophageal reflux disease     Hearing loss, bilateral     hearing aid in the left ear only. 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     Hypertension     Impaired fasting glucose     prediabetes.  Lumbar disc herniation     L3-L4, with chronic back pain.  Mild anemia     Nasal polyps     minimal symptoms.  Nasal septal deviation     right side.  Obesity     Palpitations     occasional.     Peptic ulcer disease     Reactive airway disease     asthma, industrial related, also a history of hypersensitivity pneumonitis.  Urinary frequency        Past Surgical History:     Past Surgical History:   Procedure Laterality Date    APPENDECTOMY  age 11    BRONCHOSCOPY  09/17/2020    BRONCHOSCOPY N/A 9/17/2020    EBUS- BRONCHOSCOPY ENDOBRONCHIAL ULTRASOUND, PATHOLOGY REQUESTED- CINDY NOTIFIED, GI UNIT SCHEDULED performed by Erich Jeffery MD at [address] [facility]  10/20/2011    occluded LAD and ostial obtuse marginal stenosis, underwent drug eluting stents to both, RCA small, nondominant, occluded, ejection fraction 45%.  CARDIAC CATHETERIZATION  08/2018    with stent placement    CATARACT REMOVAL Bilateral 03/2018    COLONOSCOPY  01/21/2011    mild sigmoid diverticulosis.  COLONOSCOPY  4/6/16    hemorrhoid; sigmoid diverticulosis    KNEE ARTHROSCOPY Left 03/19/2010    partial medial and lateral synovectomies, partial medial meniscectomy, chondroplasty.      NASAL FRACTURE SURGERY  1960    OTHER SURGICAL HISTORY Left 2010    knee injection.  PRE-MALIGNANT / BENIGN SKIN LESION EXCISION Left 2012    actinic keratoses, ear, liquid nitrogen.  PRE-MALIGNANT / BENIGN SKIN LESION EXCISION Left 2011    actinic keratosis, upper ear, liquid nitrogen.  PROSTATE BIOPSY Bilateral 2015    Benign    REPAIR UMBILICAL OWOS,9+F/D,SNYXN N/A 796    UMBILICAL Hernia Repair w/ Mesh performed by Ricky Noriega MD at [address] [place] [address] SEPTOPLASTY  10/30/2015    with bilateral submucosal resection of the inferior turbinates, left middle turbinate elza bullosa resection done by Dr Britton Duane ARTHROSCOPY Left 10/17/14    W/ SUBACROMIAL DECOMPRESSION, DEBRIDEMENT ET CHONDROPLASTY    UPPER GASTROINTESTINAL ENDOSCOPY  2011    superficial ulcer of the fundus, erostive gastritis.      VASECTOMY Bilateral 1980       Patient Family Social History:     Social History     Socioeconomic History    Marital status:      Spouse name: None    Number of children: None    Years of education: None    Highest education level: None   Occupational History    None   Social Needs    Financial resource strain: None    Food insecurity     Worry: None     Inability: None    Transportation needs     Medical: None     Non-medical: None   Tobacco Use    Smoking status: Former Smoker     Packs/day: 2.00     Years: 15.00     Pack years: 30.00     Last attempt to quit: 1985     Years since quittin.7    Smokeless tobacco: Never Used    Tobacco comment: smoked 2 packs a day for 14 to 15 years, quit in    Substance and Sexual Activity    Alcohol use: No     Alcohol/week: 0.0 standard drinks     Comment: rare    Drug use: No    Sexual activity: None   Lifestyle    Physical activity     Days per week: None     Minutes per session: None    Stress: None   Relationships    Social connections     Talks on phone: None     Gets together: None Attends Caodaism service: None     Active member of club or organization: None     Attends meetings of clubs or organizations: None     Relationship status: None    Intimate partner violence     Fear of current or ex partner: None     Emotionally abused: None     Physically abused: None     Forced sexual activity: None   Other Topics Concern    None   Social History Narrative    None     Family History   Problem Relation Age of Onset    Cancer Mother         lymphoma    Thyroid Disease Mother         hypothyroidism    Stroke Mother     Heart Disease Mother     High Blood Pressure Mother     Heart Attack Mother         in her 46s    Heart Failure Father         congestive    Coronary Art Dis Father     Emphysema Father         was a smoker    Heart Disease Maternal Grandmother     Heart Disease Maternal Grandfather     Crohn's Disease Sister     High Cholesterol Sister     High Cholesterol Child        Current Medications:     Current Outpatient Medications   Medication Sig Dispense Refill    oxybutynin (DITROPAN-XL) 10 MG extended release tablet Take 10 mg by mouth daily      ECHINACEA HERB PO Take by mouth daily      losartan (COZAAR) 50 MG tablet TAKE 1 TABLET BY MOUTH ONE TIME A DAY  90 tablet 1    levothyroxine (SYNTHROID) 100 MCG tablet TAKE 1 TABLET BY MOUTH ONE TIME A DAY  90 tablet 1    furosemide (LASIX) 20 MG tablet TAKE 1 TABLET BY MOUTH ONE TIME A DAY  90 tablet 1    clopidogrel (PLAVIX) 75 MG tablet TAKE 1 TABLET BY MOUTH DAILY 90 tablet 3    pravastatin (PRAVACHOL) 40 MG tablet TAKE ONE TABLET BY MOUTH ONCE EVERY EVENING 90 tablet 3    doxazosin (CARDURA) 2 MG tablet Take 1 tablet by mouth 2 times daily 180 tablet 3    tamsulosin (FLOMAX) 0.4 MG capsule TAKE 1 CAPSULE BY MOUTH DAILY 90 capsule 3    metoprolol tartrate (LOPRESSOR) 50 MG tablet Take 1 tablet by mouth 2 times daily 180 tablet 3    albuterol sulfate HFA (VENTOLIN HFA) 108 (90 Base) MCG/ACT inhaler Inhale 2 puffs into the lungs every 6 hours as needed for Wheezing or Shortness of Breath 1 Inhaler 6    niacin (NIASPAN) 500 MG CR tablet Take 500 mg by mouth 2 times daily. Two pills daily.  Omega-3 Fatty Acids (FISH OIL) 1200 MG CAPS Take 2,000 mg by mouth 2 times daily.  Multiple Vitamins-Minerals (MULTIVITAMIN PO) daily.  ASPIRIN PO 81 mg daily.  nitroGLYCERIN (NITROSTAT) 0.4 MG SL tablet Place 1 tablet under the tongue every 5 minutes as needed for Chest pain up to max of 3 total doses. If no relief after 1 dose, call 911. (Patient not taking: Reported on 9/29/2020) 25 tablet 3     No current facility-administered medications for this visit. Allergies:   Ace inhibitors; Effient [prasugrel]; and Statins [statins]    Review of Systems:    Constitutional: No fever or chills. No night sweats, + weight loss   Eyes: No eye discharge, double vision, or eye pain   HEENT: negative for sore mouth, sore throat, hoarseness and voice change   Respiratory: negative for cough , sputum, dyspnea, wheezing, hemoptysis, chest pain   Cardiovascular: negative for chest pain, dyspnea, palpitations, orthopnea, PND   Gastrointestinal: negative for nausea, vomiting, diarrhea, constipation, abdominal pain, Dysphagia, hematemesis and hematochezia   Genitourinary: negative for frequency, dysuria, nocturia, urinary incontinence, and hematuria   Integument: negative for rash, skin lesions, bruises.    Hematologic/Lymphatic: negative for easy bruising, bleeding, lymphadenopathy, or petechiae   Endocrine: negative for heat or cold intolerance,weight changes, change in bowel habits and hair loss   Musculoskeletal: negative for myalgias, arthralgias, pain, joint swelling,and bone pain   Neurological: negative for headaches, dizziness, seizures, weakness, numbness        Physical Exam:    Vitals: /72 (Site: Right Upper Arm, Position: Sitting)   Pulse 68   Temp 97 °F (36.1 °C)   Ht 5' 10\" (1.778 m)   Wt 236 lb (107 kg)   BMI 33.86 kg/m²   General appearance - well appearing, no in pain or distress  Mental status - AAO X3  Eyes - pupils equal and reactive, extraocular eye movements intact  Mouth - mucous membranes moist, pharynx normal without lesions  Neck - supple, no significant adenopathy  Lymphatics - no palpable lymphadenopathy, no hepatosplenomegaly  Chest - clear to auscultation, no wheezes, rales or rhonchi, symmetric air entry  Heart - normal rate, regular rhythm, normal S1, S2, no murmurs  Abdomen - soft, nontender, nondistended, no masses or organomegaly  Neurological - alert, oriented, normal speech, no focal findings or movement disorder noted  Extremities - peripheral pulses normal, no pedal edema, no clubbing or cyanosis  Skin - normal coloration and turgor, no rashes, no suspicious skin lesions noted       DATA:    Results for orders placed or performed during the hospital encounter of 09/14/20   COVID-19 Ambulatory    Specimen: Nasopharyngeal Swab   Result Value Ref Range    SARS-CoV-2, LEN Not Detected Not Detected     -- Diagnosis --   BAL RIGHT LOWER LOBE:           NEGATIVE FOR MALIGNANCY.             FINE NEEDLE ASPIRATION STATION 7 EBUS:           ADENOCARCINOMA, COMPATIBLE WITH LUNG PRIMARY. CT PET    Impression    1. The previously described nodule involving the right lung base is FDG avid. Tissue sampling is recommended as malignancy is suspected. 2. The previously identified smaller lung nodules involving the lower lobes    are too small for PET CT characterization.  CT follow-up is recommended. 3. FDG avid subcarinal and right hilar lymph nodes suggestive of metastatic    disease.     4. No abnormal FDG activity identified within the neck, abdomen or pelvis.               Impression:  Adenocarcinoma of right lung, clinical stage at least T1c, N2  (stage IIIA)  Subcarinal lymph node metastasis  Weight loss    Plan:  I had a detailed discussion with the patient and personally went over results of lab work-up imaging studies and other relevant clinical data. I had a detailed discussion with the patient and his wife. Personally went over results with lab work-up imaging studies and other relevant clinical data. Reviewed results of imaging studies. Reviewed images of CT PET. Discussed path report. Patient case was also discussed in person with his pulmonologist.  Patient has biopsy-proven adenocarcinoma of the right lung with metastasis subcarinal lymph node. Staging work-up is not complete. I will obtain MRI of the brain. Further treatment recommendation will depend upon final stage. If MRI brain is negative patient's final stage will be stage IIIa. Recommend treating with concurrent chemoradiation followed by consolidation immunotherapy. If patient is noted to have metastatic disease patient will be candidate for palliative systemic therapy. Reviewed logistics of chemotherapy. Reviewed potential side effects. Will refer patient for port placement. We will also refer patient for radiation oncology. Obtain MRI of the brain. NCCN guidelines were reviewed and discussed with the patient. The diagnosis and care plan were discussed with the patient in detail. I discussed the natural history of the disease, prognosis, risks and goals of therapy and answered all the patients questions to the best of my ability. Patient expressed understanding and was in agreement. Chava Mora      I spent more than 80 minutes examining, evaluating, reviewing data, counseling the patient and coordinating care. Greater than 50% of time was spent face-to-face with the patient this note is created with the assistance of a speech recognition program.  While intending to generate a document that actually reflects the content of the visit, the document can still have some errors including those of syntax and sound a like substitutions which may escape proof reading.   It such instances, actual meaning can be extrapolated by contextual diversion.

## 2020-09-29 NOTE — PROGRESS NOTES
PULMONARY MEDICINE OUTPATIENT FOLLOW UP                                                                       Patient:  Enriqueta Mak  YOB: 1945  MRN: [id number]     REFERRED BY: Dorys Verma DO   CHIEF COMPLIANT/REASON FOR CONSULTATION: Follow-up (post bronchoscopy)    SUBJECTIVE     HISTORY OF PRESENT ILLNESS:    Dashawn Mathews is a 77y.o. year old male here for follow-up. 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 His cytology turned out to be positive for adenocarcinoma in the subcarinal lymph node (N2). He is scheduled to see Dr. Ben Sumner later this afternoon  The PET scan (8/22/2020) which showed PET avidity within a maximum SUV of 4.6 in the right lung base, additionally patient had FDG avid subcarinal and right hilar which are reported showing maximum SUV of 4.6  Follow-up CT chest (7/21/2020) is reported to show an interval enlargement in the size of right lower lobe nodule to 2.1 x 1.7 cm. No other significant changes in other nodules or pleural thickening. Patient was initially seen after abnormal CT scan (7/16/2019) which showed right-sided pleural calcification/thickening raising the suspicion for asbestos exposure. There is no honeycombing or bronchiectasis. Patient also has multiple bilateral pulmonary nodules, largest one seen in the right lung base measuring about 1.5 into 1.3 cm. Chronic bronchitis:  Tobacco history: Patient  reports that he quit smoking about 35 years ago. He has a 30.00 pack-year smoking history. He has never used smokeless tobacco.   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     Pulmonary function test: PFT nonspecific ventilatory impairment  Current symptoms: Patient has cough with occasional sputum, grade 2 dyspnea. He reports improvement in his shortness of breath since his last about 20 pounds  Current Rx: Patient is currently only on as needed albuterol, and does not use it very often    GRISEL:  Patient also has history of obesity, coronary artery disease, status post PCI and has obstructive sleep apnea, for which he uses CPAP. Reports good compliance. Denies any symptoms of excessive daytime sleepiness. PAST MEDICAL HISTORY:       Diagnosis Date    Abdominal hernia     small, no significant symptomatology.  Abnormal PSA     with history of slight increase.  Allergic rhinitis     Benign prostatic hypertrophy     Chest pain     occasional.     Coronary artery disease     status post drug eluting stent placement, LAD, ostial obtuse marginal.     Dysphagia     Erectile dysfunction     Familial hyperlipidemia     with hypertriglyceridemia.  Gastroesophageal reflux disease     Hearing loss, bilateral     hearing aid in the left ear only. 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     Hypertension     Impaired fasting glucose     prediabetes.  Lumbar disc herniation     L3-L4, with chronic back pain.  Mild anemia     Nasal polyps     minimal symptoms.  Nasal septal deviation     right side.  Obesity     Palpitations     occasional.     Peptic ulcer disease     Reactive airway disease     asthma, industrial related, also a history of hypersensitivity pneumonitis.      Urinary frequency      PAST SURGICAL HISTORY:    Past Surgical History:   Procedure Laterality Date    APPENDECTOMY  age 11    BRONCHOSCOPY  09/17/2020    BRONCHOSCOPY N/A 9/17/2020    EBUS- BRONCHOSCOPY ENDOBRONCHIAL ULTRASOUND, PATHOLOGY REQUESTED- CINDY NOTIFIED, GI UNIT SCHEDULED performed by Estrella Dumont MD at [address]  10/20/2011    occluded LAD and ostial obtuse marginal stenosis, underwent drug eluting stents to both, RCA small, nondominant, occluded, ejection fraction 45%.  CARDIAC CATHETERIZATION  08/2018    with stent placement    CATARACT REMOVAL Bilateral 03/2018    COLONOSCOPY  01/21/2011    mild sigmoid diverticulosis.  COLONOSCOPY  4/6/16    hemorrhoid; sigmoid diverticulosis    KNEE ARTHROSCOPY Left 03/19/2010    partial medial and lateral synovectomies, partial medial meniscectomy, chondroplasty.  NASAL FRACTURE SURGERY  1960    OTHER SURGICAL HISTORY Left 02/12/2010    knee injection.  PRE-MALIGNANT / BENIGN SKIN LESION EXCISION Left 01/16/2012    actinic keratoses, ear, liquid nitrogen.  PRE-MALIGNANT / BENIGN SKIN LESION EXCISION Left 07/19/2011    actinic keratosis, upper ear, liquid nitrogen.  PROSTATE BIOPSY Bilateral 09/08/2015    Benign    REPAIR UMBILICAL DRNL,8+D/N,MYZXV N/A 0/58/6360    UMBILICAL Hernia Repair w/ Mesh performed by Joey Rasmussen MD at [address] [place] [address] SEPTOPLASTY  10/30/2015    with bilateral submucosal resection of the inferior turbinates, left middle turbinate elza bullosa resection done by Dr Vásquez Constant ARTHROSCOPY Left 10/17/14    W/ SUBACROMIAL DECOMPRESSION, DEBRIDEMENT ET CHONDROPLASTY    UPPER GASTROINTESTINAL ENDOSCOPY  01/21/2011    superficial ulcer of the fundus, erostive gastritis.  VASECTOMY Bilateral 04/04/1980     SOCIAL AND OCCUPATIONAL HISTORY:  TOBACCO: Patient  reports that he quit smoking about 35 years ago. He has a 30.00 pack-year smoking history. He has never used smokeless tobacco.  ETOH:   reports no history of alcohol use. DRUGS: reports no history of drug use.     IMMUNIZATION HISTORY:   Immunization History   Administered Date(s) Administered    Influenza A ([id number]) Vaccine PF IM 01/11/2010    Influenza Virus Vaccine 12/10/2009, 10/26/2011, 11/24/2012    Influenza, High Dose (Fluzone 65 yrs and older) 10/14/2013, 11/10/2014, 11/09/2015, 10/14/2016, 11/03/2017, 10/18/2018, 10/08/2019    Pneumococcal Conjugate 13-valent ([id number]) 02/06/2017    Pneumococcal Polysaccharide ([id number]) 01/17/2012    Tdap (Boostrix, Adacel) 08/04/2015    Zoster Live (Zostavax) 07/31/2014    Zoster Recombinant (Shingrix) 03/01/2019, 05/08/2019     REVIEW OF SYSTEMS:   General: negative for - chills, fever, night sweats, weight gain or weight loss  Psychological: negative for - anxiety or depression  Ophthalmic: negative for - blurry vision or decreased vision  ENT: negative  Allergy and Immunology: negative  Hematological and Lymphatic: negative for - bleeding problems, blood clots, bruising or swollen lymph nodes  Endocrine: negative for - polydipsia/polyuria or temperature intolerance  Respiratory: positive for - shortness of breath  Cardiovascular: negative for - chest pain, palpitations or paroxysmal nocturnal dyspnea  Gastrointestinal: negative for - abdominal pain, change in bowel habits or nausea/vomiting  Genito-Urinary: no dysuria, trouble voiding, or hematuria  Musculoskeletal: negative for - joint pain or joint swelling  Neurological: negative for - headaches, impaired coordination/balance, speech problems or weakness  Dermatological: negative for - rash or skin lesion changes     ALLERGIES:    Allergies   Allergen Reactions    Ace Inhibitors Other (See Comments)     Cough.  Effient [Prasugrel] Other (See Comments)     Superficial skin bleeding.  Statins [Statins] Other (See Comments)     Myalgias.       MEDICATIONS:   Outpatient Encounter Medications as of 9/29/2020   Medication Sig Dispense Refill    oxybutynin (DITROPAN-XL) 10 MG extended release tablet Take 10 mg by mouth daily      ECHINACEA HERB PO Take by mouth daily      losartan (COZAAR) 50 MG tablet TAKE 1 TABLET BY MOUTH ONE TIME A DAY  90 tablet 1    levothyroxine (SYNTHROID) 100 MCG tablet TAKE 1 TABLET BY MOUTH ONE TIME A DAY  90 tablet 1    furosemide (LASIX) 20 MG tablet TAKE 1 TABLET BY MOUTH ONE TIME A DAY 90 tablet 1    clopidogrel (PLAVIX) 75 MG tablet TAKE 1 TABLET BY MOUTH DAILY 90 tablet 3    pravastatin (PRAVACHOL) 40 MG tablet TAKE ONE TABLET BY MOUTH ONCE EVERY EVENING 90 tablet 3    doxazosin (CARDURA) 2 MG tablet Take 1 tablet by mouth 2 times daily 180 tablet 3    tamsulosin (FLOMAX) 0.4 MG capsule TAKE 1 CAPSULE BY MOUTH DAILY 90 capsule 3    metoprolol tartrate (LOPRESSOR) 50 MG tablet Take 1 tablet by mouth 2 times daily 180 tablet 3    nitroGLYCERIN (NITROSTAT) 0.4 MG SL tablet Place 1 tablet under the tongue every 5 minutes as needed for Chest pain up to max of 3 total doses. If no relief after 1 dose, call 911. 25 tablet 3    albuterol sulfate HFA (VENTOLIN HFA) 108 (90 Base) MCG/ACT inhaler Inhale 2 puffs into the lungs every 6 hours as needed for Wheezing or Shortness of Breath 1 Inhaler 6    niacin (NIASPAN) 500 MG CR tablet Take 500 mg by mouth 2 times daily. Two pills daily.  Omega-3 Fatty Acids (FISH OIL) 1200 MG CAPS Take 2,000 mg by mouth 2 times daily.  Multiple Vitamins-Minerals (MULTIVITAMIN PO) daily.  ASPIRIN PO 81 mg daily.  [DISCONTINUED] oxybutynin (DITROPAN XL) 10 MG extended release tablet Take 1 tablet by mouth daily 90 tablet 1    [DISCONTINUED] mirabegron (MYRBETRIQ) 50 MG TB24 TAKE 1 TABLET BY MOUTH DAILY 30 tablet 5    [DISCONTINUED] Misc Natural Products (URINOZINC PO) Take 2 tablets by mouth daily       No facility-administered encounter medications on file as of 9/29/2020.       OBJECTIVE     VITAL SIGNS:   /84 (Site: Left Upper Arm, Position: Sitting, Cuff Size: Medium Adult)   Pulse 62   Temp 97 °F (36.1 °C)   Ht 5' 10\" (1.778 m)   Wt 236 lb (107 kg)   SpO2 96%   BMI 33.86 kg/m²   Wt Readings from Last 3 Encounters:   10/07/20 238 lb (108 kg)   09/29/20 236 lb (107 kg)   09/29/20 236 lb (107 kg)     SYSTEMIC EXAMINATION:  General appearance - well appearing, and in no distress  Mental status - alert, oriented to person, place, and time  Eyes - pupils equal and reactive, extraocular eye movements intact, sclera anicteric  Mouth - mucous membranes moist, pharynx normal without lesions and tonsils normal  Neck - supple, no significant adenopathy, carotids upstroke normal bilaterally, no bruits  Mallampati Airway Score - III (soft palate, base of uvula visible)  Chest - Chest was symmetrical without dullness to percussion. Breath sounds bilaterally were clear to auscultation. There were no wheezes, rhonchi or rales.   There is no intercostal recession or use of accessory muscles  Heart - normal rate, regular rhythm, normal S1, S2, no murmurs, rubs, clicks or gallops, no JVD  Abdomen - soft, nontender, nondistended, no masses or organomegaly  Neurological - alert, oriented, normal speech, no focal findings or movement disorder noted  Extremities - peripheral pulses normal, no pedal edema, no clubbing or cyanosis  Skin - normal coloration and turgor, no rashes, no suspicious skin lesions noted     Labs:  ABG:   No results found for: PH, PHART, PCO2, HLS9IKI, PO2, PO2ART, HCO3, BON3PKN, BE, BEART, THGB, S0AXUMND  VBG:  No results found for: PHVEN, KBT9BOU, BEVEN, K1UUQENU  CBC:   Lab Results   Component Value Date    WBC 6.2 08/25/2020    RBC 4.15 (L) 08/25/2020    HGB 12.9 (L) 08/25/2020    HCT 38.9 (L) 08/25/2020     08/25/2020    MCV 93.7 08/25/2020    MCH 31.1 08/25/2020    MCHC 33.2 08/25/2020    RDW 12.7 08/25/2020    LYMPHOPCT 27 08/25/2020    MONOPCT 8 08/25/2020    BASOPCT 1 08/25/2020    MONOSABS 0.47 08/25/2020    LYMPHSABS 1.66 08/25/2020    EOSABS 0.07 08/25/2020    BASOSABS 0.03 08/25/2020    DIFFTYPE NOT REPORTED 08/25/2020     Eosinophils/IgE:   Lab Results   Component Value Date    EOSABS 0.07 08/25/2020     CMP:   Lab Results   Component Value Date     08/25/2020    K 4.2 08/25/2020     08/25/2020    CO2 25 08/25/2020    BUN 18 08/25/2020    CREATININE 1.02 10/06/2020    GLUCOSE 127 (H) 08/25/2020    CALCIUM 9.5 08/25/2020    PROT 7.5 08/25/2020    LABALBU 4.4 08/25/2020    BILITOT 0.36 08/25/2020    ALT 23 08/25/2020    AST 18 08/25/2020    ALKPHOS 64 08/25/2020     Coagulation Profile:   Lab Results   Component Value Date    INR 1.0 01/02/2018    PROTIME 10.2 01/02/2018    APTT 25.4 (L) 01/02/2018     BNP:   No results found for: BNP, PROBNP  D-Dimer/Fibrinogen:  No results found for: DDIMER  Others labs:  Lab Results   Component Value Date    TSH 3.87 08/25/2020     No results found for: VICKI, ANATITER, RHEUMFACTOR, RF, C3, C4, MPO, PR3, SEDRATE, CRP  No results found for: IRON, TIBC, FERRITIN, FOLATE, LDH  Lab Results   Component Value Date    PSA 6.54 (H) 08/25/2020     No results found for: RPR, HIV    Pulmonary Function Test: 6/25/2019  Nonspecific ventilatory impairment    Pathology: 9/17/2020  BAL RIGHT LOWER LOBE:           NEGATIVE FOR MALIGNANCY.             FINE NEEDLE ASPIRATION STATION 7 EBUS:           ADENOCARCINOMA, COMPATIBLE WITH LUNG PRIMARY. Radiology:  Pet Ct Skull Base To Mid Thigh  Result Date: 8/24/2020  FINDINGS: HEAD/NECK: No focal abnormality activity is identified. CHEST: The previously described nodule measured 2.1 cm in the right lung base is FDG avid, SUV max of 4.6. FDG avid subcarinal and right hilar lymph nodes, SUV max of 4.6. The previously identified smaller nodules involving the lower lobes are too small for PET CT characterization. No abnormal FDG activity is identified within the left lung. ABDOMEN/PELVIS: No focal abnormal FDG activity is identified. BONES/SOFT TISSUE: No abnormal FDG activity identified. INCIDENTAL CT FINDINGS: No pneumothorax. No pericardial or pleural effusions. No free air or free fluid. Prostatomegaly. Colonic diverticulosis. 1. The previously described nodule involving the right lung base is FDG avid. Tissue sampling is recommended as malignancy is suspected.  2. The previously identified smaller lung nodules involving the lower lobes are too small for PET CT characterization. CT follow-up is recommended. 3. FDG avid subcarinal and right hilar lymph nodes suggestive of metastatic disease. 4. No abnormal FDG activity identified within the neck, abdomen or pelvis. Ct Chest W Contrast  Result Date: 7/21/2020  FINDINGS: Chest Wall/Axilla/Supraclavicular fossa: No enlarged axillary or supraclavicular lymph nodes. Mild gynecomastia bilaterally. A few calcified granulomas in the skin and subcutaneous fat are evident. Bones: No acute osseous abnormality. Multilevel degenerative change. Mediastinum: Nonenlarged heart. Heavy coronary artery disease. No pericardial effusion. No lymphadenopathy. Airways: The airways are patent. No luminal filling defects. No airway obstruction. Lungs/pleura: Stable pleural-based nodules in dependent lungs bilaterally. No new or enlarging nodule in the pleura. Dependent changes. No consolidation or pneumothorax. Right lower lobe pulmonary nodule just above the right diaphragmatic leaflet measures 2.1 x 1.7 cm and previously measured 1.6 x 1.4 cm. Given interval enlargement, neoplasm is not excluded. Consider PET-CT. Subpleural right lower lobe pulmonary nodule (series 4, image 72) measures 0.4 cm and is stable. Perifissural nodule right lower lobe (series 4, image 62) likely represents an intrapulmonary lymph node given location and appearance. Subpleural left lower lobe pulmonary nodule posteriorly measures 0.6 cm and is stable. Upper Abdomen: Fatty liver. No acute upper abdominal findings. Enlarging nodule just above the right diaphragm contiguous with the pleura may actually represent atelectasis but given increase in size from prior exam, an enlarging nodule is not excluded and PET-CT might be considered for further characterization. Otherwise multiple solid pulmonary nodules and pleural-based nodules are stable. CT chest 7/16/2019  Impression    1.  Right-sided pleural calcifications with mild pleural thickening and mild    septal thickening bilaterally.  Findings likely sequela of prior asbestos    exposure.  No honeycombing or bronchiectasis. 2. Multiple solid noncalcified pulmonary nodules within both lungs, largest    seen within the right lung base measuring 1.5 x 1.3 cm.  Please see follow-up    recommendations below.           Echocardiogram:   Results for orders placed in visit on 08/24/16   ECHO Complete 2D W Doppler W Color    Impression FINDINGS:       1. Normal left ventricular size with mild to moderate concentric left ventricular hypertrophy. 2. Normal systolic left ventricular function. Ejection fraction is 50 to 55%. 3. Grade I diastolic dysfunction. 4. Biatrial enlargement. 5. Mildly dilated right ventricle with mildly reduced function. 6. Mildly sclerotic aortic valve. 7. Mild to moderate tricuspid regurgitation. RVSP is 49 mm Hg. 8. No pericardial effusion. Cardiac Catheterization:   No results found for this or any previous visit. ASSESSMENT AND PLAN     Assessment:  1. Adenocarcinoma, lung, right ([address]    2. Mild intermittent occupational asthma without complication    3. Pulmonary hypertension ([address]    4. Pleural calcification    5. Suspected exposure to asbestos      Plan/Recommendations:    Pulmonary function tests reviewed  Imaging data reviewed  Discussed EBUS TBNA results with the patient and his wife, the cytology from the subcarinal lymph node is positive for adenocarcinoma of lung primary suggestive of N2 disease  Patient is scheduled to see oncologist later this afternoon and will likely need MRI for staging  Patient to continue as needed albuterol  Barriers to medication compliance addressed.   Patient was recommended to have prednisone and an antibiotic available for use during an exacerbation  Patient received counseling on the following healthy behaviors: nutrition, exercise and medication adherence  Maintain an active lifestyle  Continue smoking cessation  Lung Cancer Screening: After reviewing the patient's smoking history, age and other clinical criteria, the LOW DOSE CT is : NOT INDICATED; Signs or symptoms of Lung cancer   Recommend influenza vaccination in the fall annually; Up-to-date   Recommendations were given regarding pneumococcal vaccination; previously administered after age 72- no need to repeat  Patient is not on home oxygen therapy. Pulmonary rehabilitation was discussed and written information provided regarding importance of pulmonary rehabilitation with regards to decreasing the hospitalization risk and also help with his dyspnea. Patient denies any excessive daytime sleepiness and reports refreshing and restorative sleep  CPAP compliance: compliant all of the time and using it as recommended  Explained importance of compliance with treatment of sleep apnea  Compliance data was not reviewed  Continue to use CPAP for at least 4 hours every night  Use humidifier if needed  Weight loss was recommended and discussed  Recommended good sleep hygiene and instructions provided  Patient instructed not to drive up or operate heavy machinery, if sleepy    All the questions that the patient had were answered to her satisfaction  We'll see the patient back in 3 months  Thank you for having us involved in the care of your patient. Please call us if you have any questions or concerns. Joao Simmons MD    Pulmonary and Critical Care Medicine            Please note that this chart was generated using voice recognition Dragon dictation software. Although every effort was made to ensure the accuracy of this automated transcription, some errors in transcription may have occurred.

## 2020-09-30 ENCOUNTER — TELEPHONE (OUTPATIENT)
Dept: CASE MANAGEMENT | Age: 75
End: 2020-09-30

## 2020-10-06 ENCOUNTER — HOSPITAL ENCOUNTER (OUTPATIENT)
Dept: LAB | Age: 75
Discharge: HOME | End: 2020-10-06
Payer: MEDICARE

## 2020-10-06 ENCOUNTER — HOSPITAL ENCOUNTER (OUTPATIENT)
Dept: MRI IMAGING | Age: 75
Discharge: HOME | End: 2020-10-08
Payer: MEDICARE

## 2020-10-06 PROCEDURE — 36415 COLL VENOUS BLD VENIPUNCTURE: CPT

## 2020-10-06 PROCEDURE — A9579 GAD-BASE MR CONTRAST NOS,1ML: HCPCS | Performed by: INTERNAL MEDICINE

## 2020-10-06 PROCEDURE — 6360000004 HC RX CONTRAST MEDICATION: Performed by: INTERNAL MEDICINE

## 2020-10-06 PROCEDURE — 70553 MRI BRAIN STEM W/O & W/DYE: CPT

## 2020-10-06 PROCEDURE — 82565 ASSAY OF CREATININE: CPT

## 2020-10-06 RX ADMIN — GADOTERIDOL 15 ML: 279.3 INJECTION, SOLUTION INTRAVENOUS at 10:53

## 2020-10-07 ENCOUNTER — TELEPHONE (OUTPATIENT)
Dept: CASE MANAGEMENT | Age: 75
End: 2020-10-07

## 2020-10-07 ENCOUNTER — TELEPHONE (OUTPATIENT)
Dept: SURGERY | Age: 75
End: 2020-10-07

## 2020-10-07 ENCOUNTER — INITIAL CONSULT (OUTPATIENT)
Dept: SURGERY | Age: 75
End: 2020-10-07
Payer: MEDICARE

## 2020-10-07 ENCOUNTER — TELEPHONE (OUTPATIENT)
Dept: CARDIOLOGY | Age: 75
End: 2020-10-07

## 2020-10-07 VITALS
SYSTOLIC BLOOD PRESSURE: 122 MMHG | BODY MASS INDEX: 34.07 KG/M2 | HEIGHT: 70 IN | HEART RATE: 72 BPM | WEIGHT: 238 LBS | RESPIRATION RATE: 16 BRPM | TEMPERATURE: 96.9 F | DIASTOLIC BLOOD PRESSURE: 72 MMHG

## 2020-10-07 PROCEDURE — 4040F PNEUMOC VAC/ADMIN/RCVD: CPT | Performed by: SURGERY

## 2020-10-07 PROCEDURE — G8427 DOCREV CUR MEDS BY ELIG CLIN: HCPCS | Performed by: SURGERY

## 2020-10-07 PROCEDURE — 1123F ACP DISCUSS/DSCN MKR DOCD: CPT | Performed by: SURGERY

## 2020-10-07 PROCEDURE — 99213 OFFICE O/P EST LOW 20 MIN: CPT | Performed by: SURGERY

## 2020-10-07 PROCEDURE — 3017F COLORECTAL CA SCREEN DOC REV: CPT | Performed by: SURGERY

## 2020-10-07 PROCEDURE — G8417 CALC BMI ABV UP PARAM F/U: HCPCS | Performed by: SURGERY

## 2020-10-07 PROCEDURE — 99215 OFFICE O/P EST HI 40 MIN: CPT

## 2020-10-07 PROCEDURE — G8484 FLU IMMUNIZE NO ADMIN: HCPCS | Performed by: SURGERY

## 2020-10-07 PROCEDURE — 1036F TOBACCO NON-USER: CPT | Performed by: SURGERY

## 2020-10-07 NOTE — TELEPHONE ENCOUNTER
Pt is scheduled for port placement on 10/14/20 by Dr Tashia Samuels and was told by Dr Alex Diaz to hold Plavix and aspirin for 5 days prior to the surgery. Dr Alex Jason office is wondering if this is okay with cardiology.      Last Appt:  5/19/2020  Next Appt:   11/25/2020  Med verified in [address] [place] [address] Rd

## 2020-10-07 NOTE — TELEPHONE ENCOUNTER
[facility] SPECIALTY [facility]    Pre-Operative Evaluation/Consultation    Name:  Tim Seen Shock                                         Age:  77 y.o. MRN:  [id number]       :  1945   Date:  10/7/2020         Sex: male    There were no encounter diagnoses. Surgeon:  Dr. Sam Homans  Procedure (Planned):  Port Placement  Date Scheduled surgery: 10/14/2020    Attending : No att. providers found    Primary Physician: Valarie Sanches  Cardiologist: Nan Madrigal    Type of Anesthesia Requested: Local with sedation    Patient Medical history: Allergies   Allergen Reactions    Ace Inhibitors Other (See Comments)     Cough.  Effient [Prasugrel] Other (See Comments)     Superficial skin bleeding.  Statins [Statins] Other (See Comments)     Myalgias. Patient Active Problem List   Diagnosis    HTN (hypertension)    Peptic ulcer disease    Coronary artery disease    GERD (gastroesophageal reflux disease)    Impaired fasting glucose    Benign prostatic hyperplasia    Erectile dysfunction    Familial hyperlipidemia    Left rotator cuff tear    Benign prostatic hyperplasia    Obstructive sleep apnea    Acquired hypothyroidism    Ventral hernia without obstruction or gangrene    Umbilical hernia without obstruction and without gangrene    S/P PTCA (percutaneous transluminal coronary angioplasty)    Occupational asthma    Pulmonary hypertension ([address]    Adenocarcinoma of right lung ([address]     Past Medical History:   Diagnosis Date    Abdominal hernia     small, no significant symptomatology.  Abnormal PSA     with history of slight increase.  Allergic rhinitis     Benign prostatic hypertrophy     Chest pain     occasional.     Coronary artery disease     status post drug eluting stent placement, LAD, ostial obtuse marginal.     Dysphagia     Erectile dysfunction     Familial hyperlipidemia     with hypertriglyceridemia.      Gastroesophageal reflux disease     Hearing loss, bilateral hearing aid in the left ear only. 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     Hypertension     Impaired fasting glucose     prediabetes.  Lumbar disc herniation     L3-L4, with chronic back pain.  Mild anemia     Nasal polyps     minimal symptoms.  Nasal septal deviation     right side.  Obesity     Palpitations     occasional.     Peptic ulcer disease     Reactive airway disease     asthma, industrial related, also a history of hypersensitivity pneumonitis.  Urinary frequency      Past Surgical History:   Procedure Laterality Date    APPENDECTOMY  age 11    BRONCHOSCOPY  09/17/2020    BRONCHOSCOPY N/A 9/17/2020    EBUS- BRONCHOSCOPY ENDOBRONCHIAL ULTRASOUND, PATHOLOGY REQUESTED- CINDY NOTIFIED, GI UNIT SCHEDULED performed by Mindi Ravi MD at [address] [place] [address]  10/20/2011    occluded LAD and ostial obtuse marginal stenosis, underwent drug eluting stents to both, RCA small, nondominant, occluded, ejection fraction 45%.  CARDIAC CATHETERIZATION  08/2018    with stent placement    CATARACT REMOVAL Bilateral 03/2018    COLONOSCOPY  01/21/2011    mild sigmoid diverticulosis.  COLONOSCOPY  4/6/16    hemorrhoid; sigmoid diverticulosis    KNEE ARTHROSCOPY Left 03/19/2010    partial medial and lateral synovectomies, partial medial meniscectomy, chondroplasty.  NASAL FRACTURE SURGERY  1960    OTHER SURGICAL HISTORY Left 02/12/2010    knee injection.  PRE-MALIGNANT / BENIGN SKIN LESION EXCISION Left 01/16/2012    actinic keratoses, ear, liquid nitrogen.  PRE-MALIGNANT / BENIGN SKIN LESION EXCISION Left 07/19/2011    actinic keratosis, upper ear, liquid nitrogen.      PROSTATE BIOPSY Bilateral 09/08/2015    Benign    REPAIR UMBILICAL CHKY,0+V/K,CJXEN N/A 1/28/6335    UMBILICAL Hernia Repair w/ Mesh performed by Jabier Beatty MD at [address] [place] [address] SEPTOPLASTY  10/30/2015    with bilateral submucosal resection of the inferior turbinates, left middle turbinate elza bullosa resection done by Dr Ramirez Nail ARTHROSCOPY Left 10/17/14    W/ SUBACROMIAL DECOMPRESSION, DEBRIDEMENT ET CHONDROPLASTY    UPPER GASTROINTESTINAL ENDOSCOPY  2011    superficial ulcer of the fundus, erostive gastritis.  VASECTOMY Bilateral 1980     Social History     Tobacco Use    Smoking status: Former Smoker     Packs/day: 2.00     Years: 15.00     Pack years: 30.00     Last attempt to quit: 1985     Years since quittin.7    Smokeless tobacco: Never Used    Tobacco comment: smoked 2 packs a day for 14 to 15 years, quit in    Substance Use Topics    Alcohol use: No     Alcohol/week: 0.0 standard drinks     Comment: rare    Drug use: No     Medications:  Current Outpatient Medications   Medication Sig Dispense Refill    oxybutynin (DITROPAN-XL) 10 MG extended release tablet Take 10 mg by mouth daily      ECHINACEA HERB PO Take by mouth daily      losartan (COZAAR) 50 MG tablet TAKE 1 TABLET BY MOUTH ONE TIME A DAY  90 tablet 1    levothyroxine (SYNTHROID) 100 MCG tablet TAKE 1 TABLET BY MOUTH ONE TIME A DAY  90 tablet 1    furosemide (LASIX) 20 MG tablet TAKE 1 TABLET BY MOUTH ONE TIME A DAY  90 tablet 1    clopidogrel (PLAVIX) 75 MG tablet TAKE 1 TABLET BY MOUTH DAILY 90 tablet 3    pravastatin (PRAVACHOL) 40 MG tablet TAKE ONE TABLET BY MOUTH ONCE EVERY EVENING 90 tablet 3    doxazosin (CARDURA) 2 MG tablet Take 1 tablet by mouth 2 times daily 180 tablet 3    tamsulosin (FLOMAX) 0.4 MG capsule TAKE 1 CAPSULE BY MOUTH DAILY 90 capsule 3    metoprolol tartrate (LOPRESSOR) 50 MG tablet Take 1 tablet by mouth 2 times daily 180 tablet 3    nitroGLYCERIN (NITROSTAT) 0.4 MG SL tablet Place 1 tablet under the tongue every 5 minutes as needed for Chest pain up to max of 3 total doses.  If no relief after 1 dose, call 911. 25 tablet 3    albuterol sulfate HFA (VENTOLIN HFA) 108 (90 Base) MCG/ACT inhaler Inhale 2 puffs into the lungs every 6 hours as needed for Wheezing or Shortness of Breath 1 Inhaler 6    niacin (NIASPAN) 500 MG CR tablet Take 500 mg by mouth 2 times daily. Two pills daily.  Omega-3 Fatty Acids (FISH OIL) 1200 MG CAPS Take 2,000 mg by mouth 2 times daily.  Multiple Vitamins-Minerals (MULTIVITAMIN PO) daily.  ASPIRIN PO 81 mg daily. No current facility-administered medications for this visit. Scheduled Meds:  Continuous Infusions:  PRN Meds:. Prior to Admission medications    Medication Sig Start Date End Date Taking? Authorizing Provider   oxybutynin (DITROPAN-XL) 10 MG extended release tablet Take 10 mg by mouth daily    Historical Provider, MD   ECHINACEA HERB PO Take by mouth daily    Historical Provider, MD   losartan (COZAAR) 50 MG tablet TAKE 1 TABLET BY MOUTH ONE TIME A DAY  7/2/20   Albert Loge, DO   levothyroxine (SYNTHROID) 100 MCG tablet TAKE 1 TABLET BY MOUTH ONE TIME A DAY  7/2/20   Albert Loge, DO   furosemide (LASIX) 20 MG tablet TAKE 1 TABLET BY MOUTH ONE TIME A DAY  7/2/20   Albert Loge, DO   clopidogrel (PLAVIX) 75 MG tablet TAKE 1 TABLET BY MOUTH DAILY 2/17/20   Albert Loge, DO   pravastatin (PRAVACHOL) 40 MG tablet TAKE ONE TABLET BY MOUTH ONCE EVERY EVENING 2/17/20   Albert Loge, DO   doxazosin (CARDURA) 2 MG tablet Take 1 tablet by mouth 2 times daily 2/17/20   Albert Loge, DO   tamsulosin (FLOMAX) 0.4 MG capsule TAKE 1 CAPSULE BY MOUTH DAILY 2/17/20   Albert Loge, DO   metoprolol tartrate (LOPRESSOR) 50 MG tablet Take 1 tablet by mouth 2 times daily 2/17/20   Albert Loge, DO   nitroGLYCERIN (NITROSTAT) 0.4 MG SL tablet Place 1 tablet under the tongue every 5 minutes as needed for Chest pain up to max of 3 total doses.  If no relief after 1 dose, call 911. 11/20/19   Philippe Casillas MD   albuterol sulfate HFA (VENTOLIN HFA) 108 (90 Base) MCG/ACT inhaler Inhale 2 puffs into the lungs every 6 hours as needed for Wheezing or Shortness of Breath Component Value Date    PROTIME 10.2 01/02/2018    INR 1.0 01/02/2018    APTT 25.4 30/16/1384     HCG (If Applicable) No results found for: PREGTESTUR, PREGSERUM, HCG, HCGQUANT   ABGs No results found for: PHART, PO2ART, WNJ6IHI, BJM0MTT, BEART, O2HHKEZA   Type & Screen (If Applicable)  No results found for: [place]    Additional ordered pre-operative testing:  []CBC    []ABG      [] BMP   []URINALYSIS   []CMP    []HCG   []COAGS PT/INR  []T&C  []LFTs   []TYPE AND SCREEN    [] EKG  [] Chest X-Ray  [] Other Radiology    [] Sent to Hospitalist None  [x] Sent to Anesthesia for your review: yes   [] Additional Orders: None     Comments:None   Requests: None    Signed: Kandy Ferreira LPN 17/9/0456 6:32 PM

## 2020-10-07 NOTE — PROGRESS NOTES
Bin Mak is a 77 y.o. male who presents today for for evaluation for port placement after recent diagnosis of right lung cancer. Patient has been seen by oncology who has recommended chemotherapy and radiation. He is referred to general surgery for port placement. No prior port placements. He does report prior surgery on the left shoulder and states that he was told that there were problems \"under the clavicle\". Past Medical History:   Diagnosis Date    Abdominal hernia     small, no significant symptomatology.  Abnormal PSA     with history of slight increase.  Allergic rhinitis     Benign prostatic hypertrophy     Chest pain     occasional.     Coronary artery disease     status post drug eluting stent placement, LAD, ostial obtuse marginal.     Dysphagia     Erectile dysfunction     Familial hyperlipidemia     with hypertriglyceridemia.  Gastroesophageal reflux disease     Hearing loss, bilateral     hearing aid in the left ear only. 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     Hypertension     Impaired fasting glucose     prediabetes.  Lumbar disc herniation     L3-L4, with chronic back pain.  Mild anemia     Nasal polyps     minimal symptoms.  Nasal septal deviation     right side.  Obesity     Palpitations     occasional.     Peptic ulcer disease     Reactive airway disease     asthma, industrial related, also a history of hypersensitivity pneumonitis.  Urinary frequency        Past Surgical History:   Procedure Laterality Date    APPENDECTOMY  age 11    BRONCHOSCOPY  09/17/2020    BRONCHOSCOPY N/A 9/17/2020    EBUS- BRONCHOSCOPY ENDOBRONCHIAL ULTRASOUND, PATHOLOGY REQUESTED- CINDY NOTIFIED, GI UNIT SCHEDULED performed by Blanche Carrillo MD at [place] [address]  10/20/2011    occluded LAD and ostial obtuse marginal stenosis, underwent drug eluting stents to both, RCA small, nondominant, occluded, ejection fraction 45%.  CARDIAC CATHETERIZATION  08/2018    with stent placement    CATARACT REMOVAL Bilateral 03/2018    COLONOSCOPY  01/21/2011    mild sigmoid diverticulosis.  COLONOSCOPY  4/6/16    hemorrhoid; sigmoid diverticulosis    KNEE ARTHROSCOPY Left 03/19/2010    partial medial and lateral synovectomies, partial medial meniscectomy, chondroplasty.  NASAL FRACTURE SURGERY  1960    OTHER SURGICAL HISTORY Left 02/12/2010    knee injection.  PRE-MALIGNANT / BENIGN SKIN LESION EXCISION Left 01/16/2012    actinic keratoses, ear, liquid nitrogen.  PRE-MALIGNANT / BENIGN SKIN LESION EXCISION Left 07/19/2011    actinic keratosis, upper ear, liquid nitrogen.  PROSTATE BIOPSY Bilateral 09/08/2015    Benign    REPAIR UMBILICAL IVEF,1+K/G,ILXKE N/A 2/30/6930    UMBILICAL Hernia Repair w/ Mesh performed by Margaret Apple MD at [address] [place] [address] SEPTOPLASTY  10/30/2015    with bilateral submucosal resection of the inferior turbinates, left middle turbinate elza bullosa resection done by Dr Vijay Barney ARTHROSCOPY Left 10/17/14    W/ SUBACROMIAL DECOMPRESSION, DEBRIDEMENT ET CHONDROPLASTY    UPPER GASTROINTESTINAL ENDOSCOPY  01/21/2011    superficial ulcer of the fundus, erostive gastritis.      VASECTOMY Bilateral 04/04/1980       Current Outpatient Medications   Medication Sig Dispense Refill    oxybutynin (DITROPAN-XL) 10 MG extended release tablet Take 10 mg by mouth daily      ECHINACEA HERB PO Take by mouth daily      losartan (COZAAR) 50 MG tablet TAKE 1 TABLET BY MOUTH ONE TIME A DAY  90 tablet 1    levothyroxine (SYNTHROID) 100 MCG tablet TAKE 1 TABLET BY MOUTH ONE TIME A DAY  90 tablet 1    furosemide (LASIX) 20 MG tablet TAKE 1 TABLET BY MOUTH ONE TIME A DAY  90 tablet 1    clopidogrel (PLAVIX) 75 MG tablet TAKE 1 TABLET BY MOUTH DAILY 90 tablet 3    pravastatin (PRAVACHOL) 40 MG tablet TAKE ONE TABLET BY MOUTH ONCE EVERY EVENING 90 tablet 3    doxazosin (CARDURA) 2 MG tablet Take 1 tablet by mouth 2 times daily 180 tablet 3    tamsulosin (FLOMAX) 0.4 MG capsule TAKE 1 CAPSULE BY MOUTH DAILY 90 capsule 3    metoprolol tartrate (LOPRESSOR) 50 MG tablet Take 1 tablet by mouth 2 times daily 180 tablet 3    nitroGLYCERIN (NITROSTAT) 0.4 MG SL tablet Place 1 tablet under the tongue every 5 minutes as needed for Chest pain up to max of 3 total doses. If no relief after 1 dose, call 911. 25 tablet 3    albuterol sulfate HFA (VENTOLIN HFA) 108 (90 Base) MCG/ACT inhaler Inhale 2 puffs into the lungs every 6 hours as needed for Wheezing or Shortness of Breath 1 Inhaler 6    niacin (NIASPAN) 500 MG CR tablet Take 500 mg by mouth 2 times daily. Two pills daily.  Omega-3 Fatty Acids (FISH OIL) 1200 MG CAPS Take 2,000 mg by mouth 2 times daily.  Multiple Vitamins-Minerals (MULTIVITAMIN PO) daily.  ASPIRIN PO 81 mg daily. No current facility-administered medications for this visit. Allergies   Allergen Reactions    Ace Inhibitors Other (See Comments)     Cough.  Effient [Prasugrel] Other (See Comments)     Superficial skin bleeding.  Statins [Statins] Other (See Comments)     Myalgias.         Family History   Problem Relation Age of Onset    Cancer Mother         lymphoma    Thyroid Disease Mother         hypothyroidism    Stroke Mother     Heart Disease Mother     High Blood Pressure Mother     Heart Attack Mother         in her 46s    Heart Failure Father         congestive    Coronary Art Dis Father     Emphysema Father         was a smoker    Heart Disease Maternal Grandmother     Heart Disease Maternal Grandfather     Crohn's Disease Sister     High Cholesterol Sister     High Cholesterol Child        Social History     Socioeconomic History    Marital status:      Spouse name: Not on file    Number of children: Not on file    Years of education: Not on file    Highest education level: Not on file   Occupational History    Not on file Social Needs    Financial resource strain: Not on file    Food insecurity     Worry: Not on file     Inability: Not on file    Transportation needs     Medical: Not on file     Non-medical: Not on file   Tobacco Use    Smoking status: Former Smoker     Packs/day: 2.00     Years: 15.00     Pack years: 30.00     Last attempt to quit: 1985     Years since quittin.7    Smokeless tobacco: Never Used    Tobacco comment: smoked 2 packs a day for 14 to 15 years, quit in    Substance and Sexual Activity    Alcohol use: No     Alcohol/week: 0.0 standard drinks     Comment: rare    Drug use: No    Sexual activity: Not on file   Lifestyle    Physical activity     Days per week: Not on file     Minutes per session: Not on file    Stress: Not on file   Relationships    Social connections     Talks on phone: Not on file     Gets together: Not on file     Attends Jew service: Not on file     Active member of club or organization: Not on file     Attends meetings of clubs or organizations: Not on file     Relationship status: Not on file    Intimate partner violence     Fear of current or ex partner: Not on file     Emotionally abused: Not on file     Physically abused: Not on file     Forced sexual activity: Not on file   Other Topics Concern    Not on file   Social History Narrative    Not on file       ROS:   Review of Systems - General ROS: negative  Psychological ROS: negative  Ophthalmic ROS: negative  ENT ROS: negative  Respiratory ROS: no cough, shortness of breath, or wheezing  Cardiovascular ROS: no chest pain or dyspnea on exertion  Gastrointestinal ROS: no abdominal pain, change in bowel habits, or black or bloody stools  Genito-Urinary ROS: no dysuria, trouble voiding, or hematuria  Musculoskeletal ROS: negative      Objective   Vitals:    10/07/20 1453   BP: 122/72   Pulse: 72   Resp: 16   Temp: 96.9 °F (36.1 °C)     General:in no apparent distress and well developed and well

## 2020-10-07 NOTE — TELEPHONE ENCOUNTER
Name: Natanael Rhodes  : 1945  MRN: [id number]    Oncology Navigation Follow-Up Note  Navigator from [facility] calling with pt. Updates. Pt. Will have SIM 10/13 along with [place] appt. With Dr. Preet Castillo . Planning cocurrent Tx. Port consult today with surgeon.     Electronically signed by Lyubov Hernandez RN on 10/7/2020 at 12:25 PM

## 2020-10-08 ENCOUNTER — TELEPHONE (OUTPATIENT)
Dept: SURGERY | Age: 75
End: 2020-10-08

## 2020-10-08 ENCOUNTER — PRE-PROCEDURE TELEPHONE (OUTPATIENT)
Dept: PREADMISSION TESTING | Age: 75
End: 2020-10-08

## 2020-10-08 ENCOUNTER — HOSPITAL ENCOUNTER (OUTPATIENT)
Dept: PREADMISSION TESTING | Age: 75
Setting detail: SPECIMEN
Discharge: HOME | End: 2020-10-12
Payer: MEDICARE

## 2020-10-08 PROCEDURE — U0003 INFECTIOUS AGENT DETECTION BY NUCLEIC ACID (DNA OR RNA); SEVERE ACUTE RESPIRATORY SYNDROME CORONAVIRUS 2 (SARS-COV-2) (CORONAVIRUS DISEASE [COVID-19]), AMPLIFIED PROBE TECHNIQUE, MAKING USE OF HIGH THROUGHPUT TECHNOLOGIES AS DESCRIBED BY CMS-2020-01-R: HCPCS

## 2020-10-08 NOTE — TELEPHONE ENCOUNTER
Patient's wife called stating her  had seen Dr Arleth Becerra in the office yesterday to schedule port placement. He is scheduled for 10/14/2020. They were talking after the appointment and were curious if the port is plastic or metal and wanted someone to call them back with that answer. Please call back to [phone], she states if they are not home you may leave the answer on their answering machine.

## 2020-10-08 NOTE — TELEPHONE ENCOUNTER
Contacted patient's wife and informed her I dicussed with the OR nurses who state the ports they use are titanium with silicone. Gavi verbalized understanding and denies any further questions at this time.

## 2020-10-08 NOTE — TELEPHONE ENCOUNTER
LM on patient's identified voicemail stating his cardiologist states it is okay to hold his ASA and Plavix for 7 days prior to procedure. Instructed patient to contact our office to let us know he has received our voicemail and to call us if he has any questions or concerns prior to his procedure with Dr. Rosie Fragoso. Clinic number provided on voicemail.

## 2020-10-08 NOTE — TELEPHONE ENCOUNTER
Wife called back and states she did receive the voicemail with the medication hold instructions. She denies any further questions at this time.

## 2020-10-09 ENCOUNTER — TELEPHONE (OUTPATIENT)
Dept: CASE MANAGEMENT | Age: 75
End: 2020-10-09

## 2020-10-09 LAB — SARS-COV-2, NAA: NOT DETECTED

## 2020-10-09 NOTE — TELEPHONE ENCOUNTER
Name: Colt Mccartney  : 1945  MRN: [id number]    Oncology Navigation Follow-Up Note  Navigator spoke with pt. offering assistance if needed. Pt. scheduled for port placement , plan to follow.    Electronically signed by Anna Gallardo RN on 10/9/2020 at 1:12 PM

## 2020-10-13 ENCOUNTER — OFFICE VISIT (OUTPATIENT)
Dept: ONCOLOGY | Age: 75
End: 2020-10-13
Payer: MEDICARE

## 2020-10-13 VITALS
HEIGHT: 70 IN | TEMPERATURE: 96.7 F | DIASTOLIC BLOOD PRESSURE: 72 MMHG | BODY MASS INDEX: 33.99 KG/M2 | HEART RATE: 73 BPM | OXYGEN SATURATION: 97 % | SYSTOLIC BLOOD PRESSURE: 118 MMHG | WEIGHT: 237.4 LBS

## 2020-10-13 PROCEDURE — G8427 DOCREV CUR MEDS BY ELIG CLIN: HCPCS | Performed by: INTERNAL MEDICINE

## 2020-10-13 PROCEDURE — 3017F COLORECTAL CA SCREEN DOC REV: CPT | Performed by: INTERNAL MEDICINE

## 2020-10-13 PROCEDURE — 99213 OFFICE O/P EST LOW 20 MIN: CPT

## 2020-10-13 PROCEDURE — 1036F TOBACCO NON-USER: CPT | Performed by: INTERNAL MEDICINE

## 2020-10-13 PROCEDURE — 1123F ACP DISCUSS/DSCN MKR DOCD: CPT | Performed by: INTERNAL MEDICINE

## 2020-10-13 PROCEDURE — 4040F PNEUMOC VAC/ADMIN/RCVD: CPT | Performed by: INTERNAL MEDICINE

## 2020-10-13 PROCEDURE — 99214 OFFICE O/P EST MOD 30 MIN: CPT | Performed by: INTERNAL MEDICINE

## 2020-10-13 PROCEDURE — G8484 FLU IMMUNIZE NO ADMIN: HCPCS | Performed by: INTERNAL MEDICINE

## 2020-10-13 PROCEDURE — G8417 CALC BMI ABV UP PARAM F/U: HCPCS | Performed by: INTERNAL MEDICINE

## 2020-10-13 NOTE — PROGRESS NOTES
Belinda Nelson MD at [address] [place] [address]  10/20/2011    occluded LAD and ostial obtuse marginal stenosis, underwent drug eluting stents to both, RCA small, nondominant, occluded, ejection fraction 45%.  CARDIAC CATHETERIZATION  08/2018    with stent placement    CATARACT REMOVAL Bilateral 03/2018    COLONOSCOPY  01/21/2011    mild sigmoid diverticulosis.  COLONOSCOPY  4/6/16    hemorrhoid; sigmoid diverticulosis    KNEE ARTHROSCOPY Left 03/19/2010    partial medial and lateral synovectomies, partial medial meniscectomy, chondroplasty.  NASAL FRACTURE SURGERY  1960    OTHER SURGICAL HISTORY Left 02/12/2010    knee injection.  PRE-MALIGNANT / BENIGN SKIN LESION EXCISION Left 01/16/2012    actinic keratoses, ear, liquid nitrogen.  PRE-MALIGNANT / BENIGN SKIN LESION EXCISION Left 07/19/2011    actinic keratosis, upper ear, liquid nitrogen.  PROSTATE BIOPSY Bilateral 09/08/2015    Benign    REPAIR UMBILICAL PUSA,2+I/B,PTHDS N/A 8/43/6779    UMBILICAL Hernia Repair w/ Mesh performed by Ryanne Donnelly MD at [address] [place] [address] SEPTOPLASTY  10/30/2015    with bilateral submucosal resection of the inferior turbinates, left middle turbinate elza bullosa resection done by Dr Mcginnis Sand ARTHROSCOPY Left 10/17/14    W/ SUBACROMIAL DECOMPRESSION, DEBRIDEMENT ET CHONDROPLASTY    UPPER GASTROINTESTINAL ENDOSCOPY  01/21/2011    superficial ulcer of the fundus, erostive gastritis.      VASECTOMY Bilateral 04/04/1980       Patient Family Social History:     Social History     Socioeconomic History    Marital status:      Spouse name: None    Number of children: None    Years of education: None    Highest education level: None   Occupational History    None   Social Needs    Financial resource strain: None    Food insecurity     Worry: None     Inability: None    Transportation needs     Medical: None     Non-medical: None   Tobacco Use    Smoking status: Former Smoker Packs/day: 2.00     Years: 15.00     Pack years: 30.00     Last attempt to quit: 1985     Years since quittin.8    Smokeless tobacco: Never Used    Tobacco comment: smoked 2 packs a day for 14 to 15 years, quit in    Substance and Sexual Activity    Alcohol use: No     Alcohol/week: 0.0 standard drinks     Comment: rare    Drug use: No    Sexual activity: None   Lifestyle    Physical activity     Days per week: None     Minutes per session: None    Stress: None   Relationships    Social connections     Talks on phone: None     Gets together: None     Attends Anglican service: None     Active member of club or organization: None     Attends meetings of clubs or organizations: None     Relationship status: None    Intimate partner violence     Fear of current or ex partner: None     Emotionally abused: None     Physically abused: None     Forced sexual activity: None   Other Topics Concern    None   Social History Narrative    None     Family History   Problem Relation Age of Onset    Cancer Mother         lymphoma    Thyroid Disease Mother         hypothyroidism    Stroke Mother     Heart Disease Mother     High Blood Pressure Mother     Heart Attack Mother         in her 46s    Heart Failure Father         congestive    Coronary Art Dis Father     Emphysema Father         was a smoker    Heart Disease Maternal Grandmother     Heart Disease Maternal Grandfather     Crohn's Disease Sister     High Cholesterol Sister     High Cholesterol Child        Current Medications:     Current Outpatient Medications   Medication Sig Dispense Refill    oxybutynin (DITROPAN-XL) 10 MG extended release tablet Take 10 mg by mouth daily      ECHINACEA HERB PO Take by mouth daily      losartan (COZAAR) 50 MG tablet TAKE 1 TABLET BY MOUTH ONE TIME A DAY  90 tablet 1    levothyroxine (SYNTHROID) 100 MCG tablet TAKE 1 TABLET BY MOUTH ONE TIME A DAY  90 tablet 1    furosemide (LASIX) 20 MG tablet TAKE 1 TABLET BY MOUTH ONE TIME A DAY  90 tablet 1    clopidogrel (PLAVIX) 75 MG tablet TAKE 1 TABLET BY MOUTH DAILY 90 tablet 3    pravastatin (PRAVACHOL) 40 MG tablet TAKE ONE TABLET BY MOUTH ONCE EVERY EVENING 90 tablet 3    doxazosin (CARDURA) 2 MG tablet Take 1 tablet by mouth 2 times daily 180 tablet 3    tamsulosin (FLOMAX) 0.4 MG capsule TAKE 1 CAPSULE BY MOUTH DAILY 90 capsule 3    metoprolol tartrate (LOPRESSOR) 50 MG tablet Take 1 tablet by mouth 2 times daily 180 tablet 3    nitroGLYCERIN (NITROSTAT) 0.4 MG SL tablet Place 1 tablet under the tongue every 5 minutes as needed for Chest pain up to max of 3 total doses. If no relief after 1 dose, call 911. 25 tablet 3    albuterol sulfate HFA (VENTOLIN HFA) 108 (90 Base) MCG/ACT inhaler Inhale 2 puffs into the lungs every 6 hours as needed for Wheezing or Shortness of Breath 1 Inhaler 6    niacin (NIASPAN) 500 MG CR tablet Take 500 mg by mouth 2 times daily. Two pills daily.  Omega-3 Fatty Acids (FISH OIL) 1200 MG CAPS Take 2,000 mg by mouth 2 times daily.  Multiple Vitamins-Minerals (MULTIVITAMIN PO) daily.  ASPIRIN PO 81 mg daily. No current facility-administered medications for this visit. Allergies:   Ace inhibitors; Effient [prasugrel]; and Statins [statins]    Review of Systems:    Constitutional: No fever or chills. No night sweats, + weight loss   Eyes: No eye discharge, double vision, or eye pain   HEENT: negative for sore mouth, sore throat, hoarseness and voice change   Respiratory: negative for cough , sputum, dyspnea, wheezing, hemoptysis, chest pain   Cardiovascular: negative for chest pain, dyspnea, palpitations, orthopnea, PND   Gastrointestinal: negative for nausea, vomiting, diarrhea, constipation, abdominal pain, Dysphagia, hematemesis and hematochezia   Genitourinary: negative for frequency, dysuria, nocturia, urinary incontinence, and hematuria   Integument: negative for rash, skin lesions, bruises. Hematologic/Lymphatic: negative for easy bruising, bleeding, lymphadenopathy, or petechiae   Endocrine: negative for heat or cold intolerance,weight changes, change in bowel habits and hair loss   Musculoskeletal: negative for myalgias, arthralgias, pain, joint swelling,and bone pain   Neurological: negative for headaches, dizziness, seizures, weakness, numbness        Physical Exam:    Vitals: /72 (Site: Left Upper Arm, Position: Sitting, Cuff Size: Large Adult)   Pulse 73   Temp 96.7 °F (35.9 °C)   Ht 5' 10\" (1.778 m)   Wt 237 lb 6.4 oz (107.7 kg)   SpO2 97%   BMI 34.06 kg/m²   General appearance - well appearing, no in pain or distress  Mental status - AAO X3  Eyes - pupils equal and reactive, extraocular eye movements intact  Mouth - mucous membranes moist, pharynx normal without lesions  Neck - supple, no significant adenopathy  Lymphatics - no palpable lymphadenopathy, no hepatosplenomegaly  Chest - clear to auscultation, no wheezes, rales or rhonchi, symmetric air entry  Heart - normal rate, regular rhythm, normal S1, S2, no murmurs  Abdomen - soft, nontender, nondistended, no masses or organomegaly  Neurological - alert, oriented, normal speech, no focal findings or movement disorder noted  Extremities - peripheral pulses normal, no pedal edema, no clubbing or cyanosis  Skin - normal coloration and turgor, no rashes, no suspicious skin lesions noted       DATA:    Results for orders placed or performed during the hospital encounter of 10/08/20   COVID-19 Ambulatory    Specimen: Nasopharyngeal Swab   Result Value Ref Range    SARS-CoV-2, LEN Not Detected Not Detected     -- Diagnosis --   BAL RIGHT LOWER LOBE:           NEGATIVE FOR MALIGNANCY.             FINE NEEDLE ASPIRATION STATION 7 EBUS:           ADENOCARCINOMA, COMPATIBLE WITH LUNG PRIMARY. CT PET    Impression    1. The previously described nodule involving the right lung base is FDG avid.     Tissue sampling is recommended as malignancy is suspected. 2. The previously identified smaller lung nodules involving the lower lobes    are too small for PET CT characterization.  CT follow-up is recommended. 3. FDG avid subcarinal and right hilar lymph nodes suggestive of metastatic    disease. 4. No abnormal FDG activity identified within the neck, abdomen or pelvis.           Mri Brain W Wo Contrast    Chronic microvascular disease without acute intracranial abnormality. No abnormal postcontrast enhancement. Impression:  Adenocarcinoma of right lung, clinical stage at least T1c, N2, M0 (stage IIIA)  Subcarinal lymph node metastasis  Weight loss    Plan:  I had a detailed discussion with the patient and personally went over results of lab work-up imaging studies and other relevant clinical data. Reviewed results of brain MRI which is negative for metastasis. Reviewed NCCN guidelines. Recommend treatment using concurrent chemoradiation. Patient will be receiving carboplatin and Taxol every week. Port placement on Wednesday    Reviewed potential side effects. Also reviewed consolidation immunotherapy depending upon response. NCCN guidelines were reviewed and discussed with the patient. The diagnosis and care plan were discussed with the patient in detail. I discussed the natural history of the disease, prognosis, risks and goals of therapy and answered all the patients questions to the best of my ability. Patient expressed understanding and was in agreement. MALLY EARLY      Time spent > 25 min this note is created with the assistance of a speech recognition program.  While intending to generate a document that actually reflects the content of the visit, the document can still have some errors including those of syntax and sound a like substitutions which may escape proof reading. It such instances, actual meaning can be extrapolated by contextual diversion.

## 2020-10-13 NOTE — H&P
Bin Mak is a 77 y.o. male who presents today for for evaluation for port placement after recent diagnosis of right lung cancer. Patient has been seen by oncology who has recommended chemotherapy and radiation. He is referred to general surgery for port placement. No prior port placements. He does report prior surgery on the left shoulder and states that he was told that there were problems \"under the clavicle\".     Past Medical History        Past Medical History:   Diagnosis Date    Abdominal hernia       small, no significant symptomatology.  Abnormal PSA       with history of slight increase.  Allergic rhinitis      Benign prostatic hypertrophy      Chest pain       occasional.     Coronary artery disease       status post drug eluting stent placement, LAD, ostial obtuse marginal.     Dysphagia      Erectile dysfunction      Familial hyperlipidemia       with hypertriglyceridemia.  Gastroesophageal reflux disease      Hearing loss, bilateral       hearing aid in the left ear only. 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     Hypertension      Impaired fasting glucose       prediabetes.  Lumbar disc herniation       L3-L4, with chronic back pain.  Mild anemia      Nasal polyps       minimal symptoms.  Nasal septal deviation       right side.  Obesity      Palpitations       occasional.     Peptic ulcer disease      Reactive airway disease       asthma, industrial related, also a history of hypersensitivity pneumonitis.      Urinary frequency             Past Surgical History         Past Surgical History:   Procedure Laterality Date    APPENDECTOMY   age 11   Floydene Ada BRONCHOSCOPY   09/17/2020    BRONCHOSCOPY N/A 9/17/2020     EBUS- BRONCHOSCOPY ENDOBRONCHIAL ULTRASOUND, PATHOLOGY REQUESTED- CINDY NOTIFIED, GI UNIT SCHEDULED performed by Brandon Hall MD at [address] [facility]   10/20/2011     occluded LAD and ostial obtuse marginal stenosis, underwent drug eluting stents to both, RCA small, nondominant, occluded, ejection fraction 45%.  CARDIAC CATHETERIZATION   08/2018     with stent placement    CATARACT REMOVAL Bilateral 03/2018    COLONOSCOPY   01/21/2011     mild sigmoid diverticulosis.  COLONOSCOPY   4/6/16     hemorrhoid; sigmoid diverticulosis    KNEE ARTHROSCOPY Left 03/19/2010     partial medial and lateral synovectomies, partial medial meniscectomy, chondroplasty.  NASAL FRACTURE SURGERY   1960    OTHER SURGICAL HISTORY Left 02/12/2010     knee injection.  PRE-MALIGNANT / BENIGN SKIN LESION EXCISION Left 01/16/2012     actinic keratoses, ear, liquid nitrogen.  PRE-MALIGNANT / BENIGN SKIN LESION EXCISION Left 07/19/2011     actinic keratosis, upper ear, liquid nitrogen.  PROSTATE BIOPSY Bilateral 09/08/2015     Benign    REPAIR UMBILICAL WCBM,2+Z/F,PEIBE N/A 4/98/4335     UMBILICAL Hernia Repair w/ Mesh performed by Rd Conway MD at [address] [place] [address] SEPTOPLASTY   10/30/2015     with bilateral submucosal resection of the inferior turbinates, left middle turbinate elza bullosa resection done by Dr Castano Fees ARTHROSCOPY Left 10/17/14     W/ SUBACROMIAL DECOMPRESSION, DEBRIDEMENT ET CHONDROPLASTY    UPPER GASTROINTESTINAL ENDOSCOPY   01/21/2011     superficial ulcer of the fundus, erostive gastritis.      VASECTOMY Bilateral 04/04/1980           Current Facility-Administered Medications          Current Outpatient Medications   Medication Sig Dispense Refill    oxybutynin (DITROPAN-XL) 10 MG extended release tablet Take 10 mg by mouth daily        ECHINACEA HERB PO Take by mouth daily        losartan (COZAAR) 50 MG tablet TAKE 1 TABLET BY MOUTH ONE TIME A DAY  90 tablet 1    levothyroxine (SYNTHROID) 100 MCG tablet TAKE 1 TABLET BY MOUTH ONE TIME A DAY  90 tablet 1    furosemide (LASIX) 20 MG tablet TAKE 1 TABLET BY MOUTH ONE TIME A DAY  90 tablet 1    clopidogrel (PLAVIX) 75 MG tablet TAKE 1 TABLET BY MOUTH DAILY 90 tablet 3    pravastatin (PRAVACHOL) 40 MG tablet TAKE ONE TABLET BY MOUTH ONCE EVERY EVENING 90 tablet 3    doxazosin (CARDURA) 2 MG tablet Take 1 tablet by mouth 2 times daily 180 tablet 3    tamsulosin (FLOMAX) 0.4 MG capsule TAKE 1 CAPSULE BY MOUTH DAILY 90 capsule 3    metoprolol tartrate (LOPRESSOR) 50 MG tablet Take 1 tablet by mouth 2 times daily 180 tablet 3    nitroGLYCERIN (NITROSTAT) 0.4 MG SL tablet Place 1 tablet under the tongue every 5 minutes as needed for Chest pain up to max of 3 total doses. If no relief after 1 dose, call 911. 25 tablet 3    albuterol sulfate HFA (VENTOLIN HFA) 108 (90 Base) MCG/ACT inhaler Inhale 2 puffs into the lungs every 6 hours as needed for Wheezing or Shortness of Breath 1 Inhaler 6    niacin (NIASPAN) 500 MG CR tablet Take 500 mg by mouth 2 times daily. Two pills daily.        Omega-3 Fatty Acids (FISH OIL) 1200 MG CAPS Take 2,000 mg by mouth 2 times daily.        Multiple Vitamins-Minerals (MULTIVITAMIN PO) daily.        ASPIRIN PO 81 mg daily.          No current facility-administered medications for this visit.                  Allergies   Allergen Reactions    Ace Inhibitors Other (See Comments)       Cough.  Effient [Prasugrel] Other (See Comments)       Superficial skin bleeding.      Statins [Statins] Other (See Comments)       Myalgias.          Family History         Family History   Problem Relation Age of Onset    Cancer Mother           lymphoma    Thyroid Disease Mother           hypothyroidism    Stroke Mother      Heart Disease Mother      High Blood Pressure Mother      Heart Attack Mother           in her 46s    Heart Failure Father           congestive    Coronary Art Dis Father      Emphysema Father           was a smoker    Heart Disease Maternal Grandmother      Heart Disease Maternal Grandfather      Crohn's Disease Sister      High Cholesterol Sister      High Cholesterol Child      Social History               Socioeconomic History    Marital status:        Spouse name: Not on file    Number of children: Not on file    Years of education: Not on file    Highest education level: Not on file   Occupational History    Not on file   Social Needs    Financial resource strain: Not on file    Food insecurity       Worry: Not on file       Inability: Not on file    Transportation needs       Medical: Not on file       Non-medical: Not on file   Tobacco Use    Smoking status: Former Smoker       Packs/day: 2.00       Years: 15.00       Pack years: 30.00       Last attempt to quit: 1985       Years since quittin.7    Smokeless tobacco: Never Used    Tobacco comment: smoked 2 packs a day for 14 to 15 years, quit in    Substance and Sexual Activity    Alcohol use:  No       Alcohol/week: 0.0 standard drinks       Comment: rare    Drug use: No    Sexual activity: Not on file   Lifestyle    Physical activity       Days per week: Not on file       Minutes per session: Not on file    Stress: Not on file   Relationships    Social connections       Talks on phone: Not on file       Gets together: Not on file       Attends Hinduism service: Not on file       Active member of club or organization: Not on file       Attends meetings of clubs or organizations: Not on file       Relationship status: Not on file    Intimate partner violence       Fear of current or ex partner: Not on file       Emotionally abused: Not on file       Physically abused: Not on file       Forced sexual activity: Not on file   Other Topics Concern    Not on file   Social History Narrative    Not on file           ROS:   Review of Systems - General ROS: negative  Psychological ROS: negative  Ophthalmic ROS: negative  ENT ROS: negative  Respiratory ROS: no cough, shortness of breath, or wheezing  Cardiovascular ROS: no chest pain or dyspnea on exertion  Gastrointestinal ROS: no abdominal pain, change in bowel habits, or black or bloody stools  Genito-Urinary ROS: no dysuria, trouble voiding, or hematuria  Musculoskeletal ROS: negative        Objective       Vitals:     10/07/20 1453   BP: 122/72   Pulse: 72   Resp: 16   Temp: 96.9 °F (36.1 °C)      General:in no apparent distress and well developed and well nourished  Eyes: No gross abnormalities. Ears, Nose, Throat: hearing grossly normal bilaterally  Neck: neck supple and non tender without mass  Lungs: clear to auscultation without wheezes or rales   Heart: S1S2, no mumurs, RRR  Abdomen: soft, nontender, no HSM, no guarding, no rebound, no masses  Extremity: negative  Neuro: CN II-XII grossly intact        Assessment      1. Newly diagnosed right lung cancer  2. Need for port for chemotherapy        Plan      1. I had a discussion with Zoila Mart today about port placement. After discussion we are going to plan for right sided port placement under MAC anesthesia. Risks of the procedure including bleeding, infection, scarring, pain, damage to surrounding tissues, need for further surgery, port malfunction which may require removal or exchange, infection that could require removal and anesthesia risk were discussed and consent is obtained. 2.  Patient will have to hold Plavix and aspirin 1 week prior to surgery. Will obtain clearance from cardiology to hold these medications.   3.  I will proceed with placement at the earliest available date.     Electronically signed by Matheus Srpinger DO on 10/7/2020 at 4:01 PM        (Please note that portions of this note were completed with a voice recognition program.  Efforts were made to edit the dictations but occasionally words are mis-transcribed.)      The patient was interviewed and examined and there have been no changes since the above History and Physical.    Electronically signed by Matheus Springer DO on 10/13/2020 at 5:40 PM

## 2020-10-14 ENCOUNTER — ANESTHESIA EVENT (OUTPATIENT)
Dept: OPERATING ROOM | Age: 75
End: 2020-10-14
Payer: MEDICARE

## 2020-10-14 ENCOUNTER — HOSPITAL ENCOUNTER (OUTPATIENT)
Age: 75
Setting detail: OUTPATIENT SURGERY
Discharge: HOME | End: 2020-10-14
Attending: SURGERY | Admitting: SURGERY
Payer: MEDICARE

## 2020-10-14 ENCOUNTER — APPOINTMENT (OUTPATIENT)
Dept: GENERAL RADIOLOGY | Age: 75
End: 2020-10-14
Attending: SURGERY
Payer: MEDICARE

## 2020-10-14 ENCOUNTER — ANESTHESIA (OUTPATIENT)
Dept: OPERATING ROOM | Age: 75
End: 2020-10-14
Payer: MEDICARE

## 2020-10-14 VITALS
HEIGHT: 70 IN | HEART RATE: 64 BPM | SYSTOLIC BLOOD PRESSURE: 118 MMHG | BODY MASS INDEX: 34.04 KG/M2 | RESPIRATION RATE: 16 BRPM | TEMPERATURE: 96.9 F | OXYGEN SATURATION: 97 % | WEIGHT: 237.8 LBS | DIASTOLIC BLOOD PRESSURE: 74 MMHG

## 2020-10-14 VITALS — TEMPERATURE: 93.3 F | DIASTOLIC BLOOD PRESSURE: 60 MMHG | SYSTOLIC BLOOD PRESSURE: 125 MMHG | OXYGEN SATURATION: 92 %

## 2020-10-14 PROCEDURE — 77001 FLUOROGUIDE FOR VEIN DEVICE: CPT | Performed by: SURGERY

## 2020-10-14 PROCEDURE — 3600000012 HC SURGERY LEVEL 2 ADDTL 15MIN: Performed by: SURGERY

## 2020-10-14 PROCEDURE — 2500000003 HC RX 250 WO HCPCS: Performed by: NURSE ANESTHETIST, CERTIFIED REGISTERED

## 2020-10-14 PROCEDURE — 6360000002 HC RX W HCPCS: Performed by: SURGERY

## 2020-10-14 PROCEDURE — 7100000011 HC PHASE II RECOVERY - ADDTL 15 MIN: Performed by: SURGERY

## 2020-10-14 PROCEDURE — 3600000002 HC SURGERY LEVEL 2 BASE: Performed by: SURGERY

## 2020-10-14 PROCEDURE — 2580000003 HC RX 258: Performed by: SURGERY

## 2020-10-14 PROCEDURE — 3700000000 HC ANESTHESIA ATTENDED CARE: Performed by: SURGERY

## 2020-10-14 PROCEDURE — C1788 PORT, INDWELLING, IMP: HCPCS | Performed by: SURGERY

## 2020-10-14 PROCEDURE — 6360000002 HC RX W HCPCS: Performed by: NURSE ANESTHETIST, CERTIFIED REGISTERED

## 2020-10-14 PROCEDURE — 7100000010 HC PHASE II RECOVERY - FIRST 15 MIN: Performed by: SURGERY

## 2020-10-14 PROCEDURE — 3700000001 HC ADD 15 MINUTES (ANESTHESIA): Performed by: SURGERY

## 2020-10-14 PROCEDURE — C1894 INTRO/SHEATH, NON-LASER: HCPCS | Performed by: SURGERY

## 2020-10-14 PROCEDURE — 71045 X-RAY EXAM CHEST 1 VIEW: CPT

## 2020-10-14 PROCEDURE — 2709999900 HC NON-CHARGEABLE SUPPLY: Performed by: SURGERY

## 2020-10-14 PROCEDURE — 77001 FLUOROGUIDE FOR VEIN DEVICE: CPT

## 2020-10-14 PROCEDURE — 36561 INSERT TUNNELED CV CATH: CPT | Performed by: SURGERY

## 2020-10-14 PROCEDURE — 76937 US GUIDE VASCULAR ACCESS: CPT | Performed by: SURGERY

## 2020-10-14 PROCEDURE — 2500000003 HC RX 250 WO HCPCS: Performed by: SURGERY

## 2020-10-14 DEVICE — PORT POWER INJ SMARTPORT TI DET SIL 9.6FR: Type: IMPLANTABLE DEVICE | Site: SUBCLAVIAN | Status: FUNCTIONAL

## 2020-10-14 RX ORDER — ONDANSETRON 2 MG/ML
4 INJECTION INTRAMUSCULAR; INTRAVENOUS
Status: DISCONTINUED | OUTPATIENT
Start: 2020-10-14 | End: 2020-10-14 | Stop reason: HOSPADM

## 2020-10-14 RX ORDER — HEPARIN SODIUM 5000 [USP'U]/ML
INJECTION, SOLUTION INTRAVENOUS; SUBCUTANEOUS PRN
Status: DISCONTINUED | OUTPATIENT
Start: 2020-10-14 | End: 2020-10-14 | Stop reason: ALTCHOICE

## 2020-10-14 RX ORDER — LIDOCAINE HYDROCHLORIDE 20 MG/ML
INJECTION, SOLUTION EPIDURAL; INFILTRATION; INTRACAUDAL; PERINEURAL PRN
Status: DISCONTINUED | OUTPATIENT
Start: 2020-10-14 | End: 2020-10-14 | Stop reason: SDUPTHER

## 2020-10-14 RX ORDER — FENTANYL CITRATE 50 UG/ML
INJECTION, SOLUTION INTRAMUSCULAR; INTRAVENOUS PRN
Status: DISCONTINUED | OUTPATIENT
Start: 2020-10-14 | End: 2020-10-14 | Stop reason: SDUPTHER

## 2020-10-14 RX ORDER — PROPOFOL 10 MG/ML
INJECTION, EMULSION INTRAVENOUS CONTINUOUS PRN
Status: DISCONTINUED | OUTPATIENT
Start: 2020-10-14 | End: 2020-10-14 | Stop reason: SDUPTHER

## 2020-10-14 RX ORDER — TRAMADOL HYDROCHLORIDE 50 MG/1
50 TABLET ORAL EVERY 6 HOURS PRN
Qty: 12 TABLET | Refills: 0 | Status: SHIPPED | OUTPATIENT
Start: 2020-10-14 | End: 2020-10-17

## 2020-10-14 RX ORDER — SODIUM CHLORIDE 0.9 % (FLUSH) 0.9 %
10 SYRINGE (ML) INJECTION PRN
Status: DISCONTINUED | OUTPATIENT
Start: 2020-10-14 | End: 2020-10-14 | Stop reason: HOSPADM

## 2020-10-14 RX ORDER — SODIUM CHLORIDE, SODIUM LACTATE, POTASSIUM CHLORIDE, CALCIUM CHLORIDE 600; 310; 30; 20 MG/100ML; MG/100ML; MG/100ML; MG/100ML
INJECTION, SOLUTION INTRAVENOUS CONTINUOUS
Status: DISCONTINUED | OUTPATIENT
Start: 2020-10-14 | End: 2020-10-14 | Stop reason: HOSPADM

## 2020-10-14 RX ORDER — TRAMADOL HYDROCHLORIDE 50 MG/1
50 TABLET ORAL PRN
Status: DISCONTINUED | OUTPATIENT
Start: 2020-10-14 | End: 2020-10-14 | Stop reason: HOSPADM

## 2020-10-14 RX ORDER — TRAMADOL HYDROCHLORIDE 50 MG/1
100 TABLET ORAL PRN
Status: DISCONTINUED | OUTPATIENT
Start: 2020-10-14 | End: 2020-10-14 | Stop reason: HOSPADM

## 2020-10-14 RX ORDER — MORPHINE SULFATE 2 MG/ML
1 INJECTION, SOLUTION INTRAMUSCULAR; INTRAVENOUS EVERY 5 MIN PRN
Status: DISCONTINUED | OUTPATIENT
Start: 2020-10-14 | End: 2020-10-14 | Stop reason: HOSPADM

## 2020-10-14 RX ORDER — MIDAZOLAM HYDROCHLORIDE 1 MG/ML
INJECTION INTRAMUSCULAR; INTRAVENOUS PRN
Status: DISCONTINUED | OUTPATIENT
Start: 2020-10-14 | End: 2020-10-14 | Stop reason: SDUPTHER

## 2020-10-14 RX ORDER — SODIUM CHLORIDE 0.9 % (FLUSH) 0.9 %
10 SYRINGE (ML) INJECTION EVERY 12 HOURS SCHEDULED
Status: DISCONTINUED | OUTPATIENT
Start: 2020-10-14 | End: 2020-10-14 | Stop reason: HOSPADM

## 2020-10-14 RX ORDER — MORPHINE SULFATE 2 MG/ML
2 INJECTION, SOLUTION INTRAMUSCULAR; INTRAVENOUS EVERY 5 MIN PRN
Status: DISCONTINUED | OUTPATIENT
Start: 2020-10-14 | End: 2020-10-14 | Stop reason: HOSPADM

## 2020-10-14 RX ADMIN — PROPOFOL 150 MCG/KG/MIN: 10 INJECTION, EMULSION INTRAVENOUS at 09:10

## 2020-10-14 RX ADMIN — FENTANYL CITRATE 50 MCG: 50 INJECTION, SOLUTION INTRAMUSCULAR; INTRAVENOUS at 09:45

## 2020-10-14 RX ADMIN — MIDAZOLAM HYDROCHLORIDE 1 MG: 1 INJECTION, SOLUTION INTRAMUSCULAR; INTRAVENOUS at 09:45

## 2020-10-14 RX ADMIN — Medication 2 G: at 09:17

## 2020-10-14 RX ADMIN — FENTANYL CITRATE 50 MCG: 50 INJECTION, SOLUTION INTRAMUSCULAR; INTRAVENOUS at 09:07

## 2020-10-14 RX ADMIN — MIDAZOLAM HYDROCHLORIDE 1 MG: 1 INJECTION, SOLUTION INTRAMUSCULAR; INTRAVENOUS at 09:07

## 2020-10-14 RX ADMIN — SODIUM CHLORIDE, POTASSIUM CHLORIDE, SODIUM LACTATE AND CALCIUM CHLORIDE: 600; 310; 30; 20 INJECTION, SOLUTION INTRAVENOUS at 07:55

## 2020-10-14 RX ADMIN — LIDOCAINE HYDROCHLORIDE 100 MG: 20 INJECTION, SOLUTION EPIDURAL; INFILTRATION; INTRACAUDAL; PERINEURAL at 09:10

## 2020-10-14 ASSESSMENT — PULMONARY FUNCTION TESTS
PIF_VALUE: 0
PIF_VALUE: 1
PIF_VALUE: 0

## 2020-10-14 ASSESSMENT — PAIN - FUNCTIONAL ASSESSMENT: PAIN_FUNCTIONAL_ASSESSMENT: 0-10

## 2020-10-14 ASSESSMENT — PAIN SCALES - GENERAL
PAINLEVEL_OUTOF10: 0

## 2020-10-14 NOTE — ANESTHESIA POSTPROCEDURE EVALUATION
Department of Anesthesiology  Postprocedure Note    Patient: Tootie Pratt  MRN: [id number]  YOB: 1945  Date of evaluation: 10/14/2020  Time:  10:31 AM     Procedure Summary     Date:  10/14/20 Room / Location:  [address] [place] [address]    Anesthesia Start:  1451 Anesthesia Stop:      Procedure:  RIGHT PORT INSERTION (N/A ) Diagnosis:  (lung cancer)    Surgeon:  Liset Dorman DO Responsible Provider:  ILYA Corona CRNA    Anesthesia Type:  MAC, TIVA ASA Status:  3          Anesthesia Type: MAC, TIVA    Myra Phase I: Myra Score: 10    Myra Phase II: Myra Score: 9    Last vitals: Reviewed and per EMR flowsheets.        Anesthesia Post Evaluation    Patient location during evaluation: bedside  Patient participation: complete - patient participated  Level of consciousness: sleepy but conscious  Pain score: 0  Airway patency: patent  Nausea & Vomiting: no nausea and no vomiting  Complications: no  Cardiovascular status: blood pressure returned to baseline and hemodynamically stable  Respiratory status: acceptable  Hydration status: euvolemic

## 2020-10-14 NOTE — ANESTHESIA PRE PROCEDURE
Department of Anesthesiology  Preprocedure Note       Name:  Kole Girard Shock   Age:  77 y.o.  :  1945                                          MRN:  [id number]         Date:  10/14/2020      Surgeon: Anne Bryant):  Michael Owens DO    Procedure: Procedure(s):  PORT INSERTION    Medications prior to admission:   Prior to Admission medications    Medication Sig Start Date End Date Taking? Authorizing Provider   traMADol (ULTRAM) 50 MG tablet Take 1 tablet by mouth every 6 hours as needed for Pain for up to 3 days. Intended supply: 3 days. Take lowest dose possible to manage pain 10/14/20 10/17/20 Yes Michael Owens DO   oxybutynin (DITROPAN-XL) 10 MG extended release tablet Take 10 mg by mouth daily   Yes Historical Provider, MD   losartan (COZAAR) 50 MG tablet TAKE 1 TABLET BY MOUTH ONE TIME A DAY  20  Yes Carlos Hess DO   levothyroxine (SYNTHROID) 100 MCG tablet TAKE 1 TABLET BY MOUTH ONE TIME A DAY  20  Yes Carlos Hess DO   furosemide (LASIX) 20 MG tablet TAKE 1 TABLET BY MOUTH ONE TIME A DAY  20  Yes Carlos Hess DO   clopidogrel (PLAVIX) 75 MG tablet TAKE 1 TABLET BY MOUTH DAILY 20  Yes Carlos Hess DO   pravastatin (PRAVACHOL) 40 MG tablet TAKE ONE TABLET BY MOUTH ONCE EVERY EVENING 20  Yes Carlos Hess DO   doxazosin (CARDURA) 2 MG tablet Take 1 tablet by mouth 2 times daily 20  Yes Carlos Hess DO   tamsulosin (FLOMAX) 0.4 MG capsule TAKE 1 CAPSULE BY MOUTH DAILY 20  Yes Carlos Hess DO   metoprolol tartrate (LOPRESSOR) 50 MG tablet Take 1 tablet by mouth 2 times daily 20  Yes Carlos Hess DO   albuterol sulfate HFA (VENTOLIN HFA) 108 (90 Base) MCG/ACT inhaler Inhale 2 puffs into the lungs every 6 hours as needed for Wheezing or Shortness of Breath 19  Yes Carlos Hess DO   niacin (NIASPAN) 500 MG CR tablet Take 500 mg by mouth 2 times daily. Two pills daily.    Yes Historical Provider, MD   Omega-3 Fatty Acids (FISH OIL) 1200 MG CAPS Take 2,000 mg by mouth 2 times daily. Yes Historical Provider, MD   Multiple Vitamins-Minerals (MULTIVITAMIN PO) daily. Yes Historical Provider, MD   ASPIRIN PO 81 mg daily. Yes Historical Provider, MD   ECHINACEA HERB PO Take by mouth daily    Historical Provider, MD   nitroGLYCERIN (NITROSTAT) 0.4 MG SL tablet Place 1 tablet under the tongue every 5 minutes as needed for Chest pain up to max of 3 total doses. If no relief after 1 dose, call 911. 11/20/19   Janee Burciaga MD       Current medications:    Current Facility-Administered Medications   Medication Dose Route Frequency Provider Last Rate Last Dose    lactated ringers infusion   Intravenous Continuous Guyann Armin,  mL/hr at 10/14/20 0755      sodium chloride flush 0.9 % injection 10 mL  10 mL Intravenous 2 times per day Guyann Armin, DO        sodium chloride flush 0.9 % injection 10 mL  10 mL Intravenous PRN Guyann Armin, DO        ceFAZolin (ANCEF) 2 g in sterile water 20 mL IV syringe  2 g Intravenous On Call to [phone] Hiren Jackson DO           Allergies: Allergies   Allergen Reactions    Effient [Prasugrel] Other (See Comments)     Superficial skin bleeding.  Ace Inhibitors Other (See Comments)     Cough.  Statins Other (See Comments)     Myalgias.         Problem List:    Patient Active Problem List   Diagnosis Code    HTN (hypertension) I10    Peptic ulcer disease K27.9    Coronary artery disease I25.10    GERD (gastroesophageal reflux disease) K21.9    Impaired fasting glucose R73.01    Benign prostatic hyperplasia N40.0    Erectile dysfunction N52.9    Familial hyperlipidemia E78.49    Left rotator cuff tear M75.102    Benign prostatic hyperplasia N40.0    Obstructive sleep apnea G47.33    Acquired hypothyroidism E03.9    Ventral hernia without obstruction or gangrene Y85.2    Umbilical hernia without obstruction and without gangrene K42.9    S/P PTCA (percutaneous transluminal coronary angioplasty) Z98.61    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  Abnormal PSA     with history of slight increase.  Allergic rhinitis     Benign prostatic hypertrophy     Chest pain     occasional.     Coronary artery disease     status post drug eluting stent placement, LAD, ostial obtuse marginal.     Dysphagia     Erectile dysfunction     Familial hyperlipidemia     with hypertriglyceridemia.  Gastroesophageal reflux disease     Hearing loss, bilateral     hearing aid in the left ear only. 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     Hypertension     Impaired fasting glucose     prediabetes.  Lumbar disc herniation     L3-L4, with chronic back pain.  Mild anemia     Nasal polyps     minimal symptoms.  Nasal septal deviation     right side.  Obesity     Palpitations     occasional.     Peptic ulcer disease     Reactive airway disease     asthma, industrial related, also a history of hypersensitivity pneumonitis.  Urinary frequency        Past Surgical History:        Procedure Laterality Date    APPENDECTOMY  age 11    BRONCHOSCOPY  09/17/2020    BRONCHOSCOPY N/A 9/17/2020    EBUS- BRONCHOSCOPY ENDOBRONCHIAL ULTRASOUND, PATHOLOGY REQUESTED- CINDY NOTIFIED, GI UNIT SCHEDULED performed by Joao Simmons MD at [address] [facility]  10/20/2011    occluded LAD and ostial obtuse marginal stenosis, underwent drug eluting stents to both, RCA small, nondominant, occluded, ejection fraction 45%.  CARDIAC CATHETERIZATION  08/2018    with stent placement    CATARACT REMOVAL Bilateral 03/2018    COLONOSCOPY  01/21/2011    mild sigmoid diverticulosis.      COLONOSCOPY  4/6/16    hemorrhoid; sigmoid diverticulosis    KNEE ARTHROSCOPY Left 2010    partial medial and lateral synovectomies, partial medial meniscectomy, chondroplasty.  NASAL FRACTURE SURGERY  1960    OTHER SURGICAL HISTORY Left 2010    knee injection.  PRE-MALIGNANT / BENIGN SKIN LESION EXCISION Left 2012    actinic keratoses, ear, liquid nitrogen.  PRE-MALIGNANT / BENIGN SKIN LESION EXCISION Left 2011    actinic keratosis, upper ear, liquid nitrogen.  PROSTATE BIOPSY Bilateral 2015    Benign    REPAIR UMBILICAL CGJI,6+Z/L,GAVTI N/A     UMBILICAL Hernia Repair w/ Mesh performed by Jorge L Lucas MD at [address] [place] [address] SEPTOPLASTY  10/30/2015    with bilateral submucosal resection of the inferior turbinates, left middle turbinate elza bullosa resection done by Dr Rhodia Gowers ARTHROSCOPY Left 10/17/14    W/ SUBACROMIAL DECOMPRESSION, DEBRIDEMENT ET CHONDROPLASTY    UPPER GASTROINTESTINAL ENDOSCOPY  2011    superficial ulcer of the fundus, erostive gastritis.      VASECTOMY Bilateral 1980       Social History:    Social History     Tobacco Use    Smoking status: Former Smoker     Packs/day: 2.00     Years: 15.00     Pack years: 30.00     Last attempt to quit: 1985     Years since quittin.8    Smokeless tobacco: Never Used    Tobacco comment: smoked 2 packs a day for 14 to 15 years, quit in    Substance Use Topics    Alcohol use: No     Alcohol/week: 0.0 standard drinks     Comment: rare                                Counseling given: Not Answered  Comment: smoked 2 packs a day for 14 to 15 years, quit in [address] Signs (Current):   Vitals:    10/14/20 0751   BP: (!) 160/88   Pulse: 57   Resp: 20   Temp: 36.1 °C (96.9 °F)   TempSrc: Oral   SpO2: 98%   Weight: 237 lb 12.8 oz (107.9 kg)   Height: 5' 10\" (1.778 m)                                              BP Readings from Last 3 Encounters:   10/14/20 (!) 160/88   10/13/20 118/72   10/07/20 122/72       NPO Status: Time of last liquid consumption: 2330                        Time of last solid consumption: 2330                        Date of last liquid consumption: 10/13/20                        Date of last solid food consumption: 10/13/20    BMI:   Wt Readings from Last 3 Encounters:   10/14/20 237 lb 12.8 oz (107.9 kg)   10/13/20 237 lb 6.4 oz (107.7 kg)   10/07/20 238 lb (108 kg)     Body mass index is 34.12 kg/m². CBC:   Lab Results   Component Value Date    WBC 6.2 08/25/2020    RBC 4.15 08/25/2020    RBC 3.89 10/21/2011    HGB 12.9 08/25/2020    HCT 38.9 08/25/2020    MCV 93.7 08/25/2020    RDW 12.7 08/25/2020     08/25/2020     10/21/2011       CMP:   Lab Results   Component Value Date     08/25/2020    K 4.2 08/25/2020     08/25/2020    CO2 25 08/25/2020    BUN 18 08/25/2020    CREATININE 1.02 10/06/2020    GFRAA >60 10/06/2020    LABGLOM >60 10/06/2020    GLUCOSE 127 08/25/2020    GLUCOSE 104 10/21/2011    PROT 7.5 08/25/2020    CALCIUM 9.5 08/25/2020    BILITOT 0.36 08/25/2020    ALKPHOS 64 08/25/2020    AST 18 08/25/2020    ALT 23 08/25/2020       POC Tests: No results for input(s): POCGLU, POCNA, POCK, POCCL, POCBUN, POCHEMO, POCHCT in the last 72 hours.     Coags:   Lab Results   Component Value Date    PROTIME 10.2 01/02/2018    INR 1.0 01/02/2018    APTT 25.4 01/02/2018       HCG (If Applicable): No results found for: PREGTESTUR, PREGSERUM, HCG, HCGQUANT     ABGs: No results found for: PHART, PO2ART, QSD5UIN, CBN6HVH, BEART, W0WXDULO     Type & Screen (If Applicable):  No results found for: LABABO, LABRH    Drug/Infectious Status (If Applicable):  Lab Results   Component Value Date    HEPCAB NONREACTIVE 02/01/2017       COVID-19 Screening (If Applicable):   Lab Results   Component Value Date    COVID19 Not Detected 10/08/2020         Anesthesia Evaluation  Patient summary reviewed no history of anesthetic complications:   Airway: Mallampati: III  TM distance: >3 FB   Neck ROM: full  Mouth opening: > = 3 FB Dental:          Pulmonary:normal exam    (+) sleep apnea: on CPAP,  asthma:                           ROS comment: Lung CA   Cardiovascular:  Exercise tolerance: poor (<4 METS),   (+) hypertension:, valvular problems/murmurs:, CAD:, CABG/stent: no interval change, CHF: diastolic, pulmonary hypertension:,       ECG reviewed          Cleared by cardiology              Neuro/Psych:   Negative Neuro/Psych ROS              GI/Hepatic/Renal:   (+) GERD:, PUD, morbid obesity          Endo/Other:    (+) hypothyroidism::., malignancy/cancer. ROS comment: Lung CA Abdominal:           Vascular: negative vascular ROS. Anesthesia Plan      MAC and TIVA     ASA 3       Induction: intravenous. MIPS: Prophylactic antiemetics administered. Anesthetic plan and risks discussed with patient and spouse.       Plan discussed with surgical team.                  ILYA Agosto - CRNA   10/14/2020

## 2020-10-15 ENCOUNTER — TELEPHONE (OUTPATIENT)
Dept: CASE MANAGEMENT | Age: 75
End: 2020-10-15

## 2020-10-15 PROBLEM — C34.91 ADENOCARCINOMA, LUNG, RIGHT (HCC): Status: ACTIVE | Noted: 2020-10-15

## 2020-10-15 NOTE — OP NOTE
[address] [address]                 [address] [place] [address]                                OPERATIVE REPORT    PATIENT NAME: Leela De La Garza                   :        1945  MED REC NO:   [id number]                             ROOM:  ACCOUNT NO:   [de-identified]                           ADMIT DATE: 10/14/2020  PROVIDER:     Nba Leos    DATE OF PROCEDURE:  10/14/2020    SURGEON:  Dr. Nba Leos. ASSISTANT:  None. PREOPERATIVE DIAGNOSIS:  Lung cancer. POSTOPERATIVE DIAGNOSIS:  Lung cancer. PROCEDURE:  Right IJ MediPort placement. ANESTHESIA:  MAC with local.    ESTIMATED BLOOD LOSS:  25 mL. FLUIDS:  700 mL of crystalloid. COMPLICATIONS:  None. SPECIMEN:  None. INDICATIONS FOR PROCEDURE:  The patient is a 36-year-old gentleman who  is referred to my office for port placement after diagnosis of lung  cancer. After evaluation in the office, decision was made to proceed. Prior to the date of the procedure, risks, benefits, and alternatives  were explained and consent was obtained. DESCRIPTION OF PROCEDURE:  The patient was brought to the operative  suite and placed on the operative table in the supine position. Monitoring devices and SCD cuffs were placed. MAC anesthesia was  induced. After induction of anesthesia, time-out was performed and  correct patient and procedure were verified. The patient's neck and  chest were prepped and draped in the sterile fashion. Prior to the time  of incision, the patient received preoperative antibiotics in accordance  with SCIP protocol. Ultrasound was used to visualize the left IJ, as we  initially had planned to do this on the left side. I was able to  visualize the vein, which was rather small and was collapsing with  respirations. He was placed into Trendelenburg, which did help some. The skin at the base of the left neck was anesthetized with local  anesthetic.   A small stab incision was made with an 11 blade. Under  visualization with the ultrasound, an 18-gauge needle with syringe  attached was advanced into the vein. We did have return of dark  nonpulsatile blood. The syringe was then removed and guidewire was  advanced into the needle and vein. Unfortunately, we were having  trouble getting the wire to advance. Multiple attempts were made  without success despite verifying that we were in the vein with  ultrasound and with aspiration of blood. After a few attempts of trying  to get the wire to advance without success, decision was made to abort  placement at this site. As the patient had had previous left clavicular  surgery, decision was made to proceed with placement on the right side. The ultrasound was again used to visualize the internal jugular vein on  the right side at this time and the skin at the base of the right neck  was anesthetized with same local anesthetic. A small stab incision was  made with the 11 blade and the 18-gauge needle with syringe attached was  advanced into the vein under ultrasound guidance. There was return of  dark nonpulsatile blood. The syringe was then removed and the wire was  advanced into the vein. At this time, we had no issues with advancement  of the wire, which advanced easily. At this point, fluoroscopy was used  to verify the course of the wire towards the right heart. We did have a  couple beats of ectopy noted by Anesthesia at this point and it did  appear the wire was slightly deep and it was withdrawn until we had no  symptoms. With the wire in place, it was secured to the drape and  attention was then turned to right chest.  The skin approximately two  fingerbreadths below the clavicle and midclavicular line was  anesthetized with local anesthetic and a transverse incision was made  for approximately 5 cm through the anesthetized skin.   Dissection was  carried down through the subcutaneous tissue to the level of close the skin edges in a running subcuticular fashion. Prior to closure, additional local anesthetic was injected. The stab  incision at the right neck was closed with a single stitch of 4-0  Monocryl in subcuticular fashion. The small incision that was made at  the left neck did not require suture. The patient's neck and chest was  cleansed and dried, and skin glue was placed across all incisions. At  the conclusion of the procedure, all sponge, instrument, and needle  counts were correct. The patient was awoken from anesthesia and taken  to the PACU in stable condition. Teressa Harvey    D: 10/14/2020 10:23:10       T: 10/14/2020 10:29:55     [email or web address]  Job#: [id number]     Doc#: [id number]    CC:   Oncology       Primary Care

## 2020-10-15 NOTE — TELEPHONE ENCOUNTER
Name: Skipper Gaucher  : 1945  MRN: [id number]    Oncology Navigation Follow-Up Note  Navigator spoke with pt. And states, Port tender , but getting better\" Writer offering assistance if needed and pts. CTX/RTX appts. Set, plan to follow.   Electronically signed by Elizabet Tsang RN on 10/15/2020 at 3:12 PM

## 2020-10-20 ENCOUNTER — OFFICE VISIT (OUTPATIENT)
Dept: ONCOLOGY | Age: 75
End: 2020-10-20
Payer: MEDICARE

## 2020-10-20 ENCOUNTER — HOSPITAL ENCOUNTER (OUTPATIENT)
Dept: INFUSION THERAPY | Age: 75
Discharge: HOME | End: 2020-10-20
Payer: MEDICARE

## 2020-10-20 VITALS
DIASTOLIC BLOOD PRESSURE: 72 MMHG | SYSTOLIC BLOOD PRESSURE: 132 MMHG | OXYGEN SATURATION: 96 % | BODY MASS INDEX: 34.22 KG/M2 | HEIGHT: 70 IN | WEIGHT: 239 LBS | HEART RATE: 78 BPM | TEMPERATURE: 96.8 F | RESPIRATION RATE: 16 BRPM

## 2020-10-20 PROCEDURE — 1123F ACP DISCUSS/DSCN MKR DOCD: CPT | Performed by: INTERNAL MEDICINE

## 2020-10-20 PROCEDURE — 3017F COLORECTAL CA SCREEN DOC REV: CPT | Performed by: INTERNAL MEDICINE

## 2020-10-20 PROCEDURE — 1036F TOBACCO NON-USER: CPT | Performed by: INTERNAL MEDICINE

## 2020-10-20 PROCEDURE — 99213 OFFICE O/P EST LOW 20 MIN: CPT

## 2020-10-20 PROCEDURE — G8484 FLU IMMUNIZE NO ADMIN: HCPCS | Performed by: INTERNAL MEDICINE

## 2020-10-20 PROCEDURE — 4040F PNEUMOC VAC/ADMIN/RCVD: CPT | Performed by: INTERNAL MEDICINE

## 2020-10-20 PROCEDURE — 99214 OFFICE O/P EST MOD 30 MIN: CPT | Performed by: INTERNAL MEDICINE

## 2020-10-20 PROCEDURE — G8427 DOCREV CUR MEDS BY ELIG CLIN: HCPCS | Performed by: INTERNAL MEDICINE

## 2020-10-20 PROCEDURE — 99213 OFFICE O/P EST LOW 20 MIN: CPT | Performed by: INTERNAL MEDICINE

## 2020-10-20 PROCEDURE — G8417 CALC BMI ABV UP PARAM F/U: HCPCS | Performed by: INTERNAL MEDICINE

## 2020-10-20 RX ORDER — SODIUM CHLORIDE 9 MG/ML
20 INJECTION, SOLUTION INTRAVENOUS ONCE
Status: CANCELLED | OUTPATIENT
Start: 2020-10-22

## 2020-10-20 RX ORDER — SODIUM CHLORIDE 9 MG/ML
INJECTION, SOLUTION INTRAVENOUS CONTINUOUS
Status: CANCELLED | OUTPATIENT
Start: 2020-10-29

## 2020-10-20 RX ORDER — SODIUM CHLORIDE 0.9 % (FLUSH) 0.9 %
10 SYRINGE (ML) INJECTION PRN
Status: CANCELLED | OUTPATIENT
Start: 2020-10-22

## 2020-10-20 RX ORDER — EPINEPHRINE 1 MG/ML
0.3 INJECTION, SOLUTION, CONCENTRATE INTRAVENOUS PRN
Status: CANCELLED | OUTPATIENT
Start: 2020-10-22

## 2020-10-20 RX ORDER — SODIUM CHLORIDE 0.9 % (FLUSH) 0.9 %
5 SYRINGE (ML) INJECTION PRN
Status: CANCELLED | OUTPATIENT
Start: 2020-10-22

## 2020-10-20 RX ORDER — PALONOSETRON 0.05 MG/ML
0.25 INJECTION, SOLUTION INTRAVENOUS ONCE
Status: CANCELLED | OUTPATIENT
Start: 2020-10-22

## 2020-10-20 RX ORDER — MEPERIDINE HYDROCHLORIDE 50 MG/ML
12.5 INJECTION INTRAMUSCULAR; INTRAVENOUS; SUBCUTANEOUS ONCE
Status: CANCELLED | OUTPATIENT
Start: 2020-10-22

## 2020-10-20 RX ORDER — DEXAMETHASONE 4 MG/1
TABLET ORAL
Qty: 40 TABLET | Refills: 1 | Status: SHIPPED | OUTPATIENT
Start: 2020-10-20 | End: 2021-03-03

## 2020-10-20 RX ORDER — DIPHENHYDRAMINE HYDROCHLORIDE 50 MG/ML
50 INJECTION INTRAMUSCULAR; INTRAVENOUS ONCE
Status: CANCELLED | OUTPATIENT
Start: 2020-10-29

## 2020-10-20 RX ORDER — SODIUM CHLORIDE 0.9 % (FLUSH) 0.9 %
10 SYRINGE (ML) INJECTION PRN
Status: CANCELLED | OUTPATIENT
Start: 2020-10-29

## 2020-10-20 RX ORDER — MEPERIDINE HYDROCHLORIDE 50 MG/ML
12.5 INJECTION INTRAMUSCULAR; INTRAVENOUS; SUBCUTANEOUS ONCE
Status: CANCELLED | OUTPATIENT
Start: 2020-10-29

## 2020-10-20 RX ORDER — ONDANSETRON 4 MG/1
4 TABLET, FILM COATED ORAL EVERY 6 HOURS PRN
COMMUNITY
End: 2021-12-07

## 2020-10-20 RX ORDER — METHYLPREDNISOLONE SODIUM SUCCINATE 125 MG/2ML
125 INJECTION, POWDER, LYOPHILIZED, FOR SOLUTION INTRAMUSCULAR; INTRAVENOUS ONCE
Status: CANCELLED | OUTPATIENT
Start: 2020-10-22

## 2020-10-20 RX ORDER — HEPARIN SODIUM (PORCINE) LOCK FLUSH IV SOLN 100 UNIT/ML 100 UNIT/ML
500 SOLUTION INTRAVENOUS PRN
Status: CANCELLED | OUTPATIENT
Start: 2020-10-29

## 2020-10-20 RX ORDER — DIPHENHYDRAMINE HYDROCHLORIDE 50 MG/ML
50 INJECTION INTRAMUSCULAR; INTRAVENOUS ONCE
Status: CANCELLED | OUTPATIENT
Start: 2020-10-22

## 2020-10-20 RX ORDER — SODIUM CHLORIDE 9 MG/ML
20 INJECTION, SOLUTION INTRAVENOUS ONCE
Status: CANCELLED | OUTPATIENT
Start: 2020-10-29

## 2020-10-20 RX ORDER — EPINEPHRINE 1 MG/ML
0.3 INJECTION, SOLUTION, CONCENTRATE INTRAVENOUS PRN
Status: CANCELLED | OUTPATIENT
Start: 2020-10-29

## 2020-10-20 RX ORDER — SODIUM CHLORIDE 0.9 % (FLUSH) 0.9 %
5 SYRINGE (ML) INJECTION PRN
Status: CANCELLED | OUTPATIENT
Start: 2020-10-29

## 2020-10-20 RX ORDER — HEPARIN SODIUM (PORCINE) LOCK FLUSH IV SOLN 100 UNIT/ML 100 UNIT/ML
500 SOLUTION INTRAVENOUS PRN
Status: CANCELLED | OUTPATIENT
Start: 2020-10-22

## 2020-10-20 RX ORDER — METHYLPREDNISOLONE SODIUM SUCCINATE 125 MG/2ML
125 INJECTION, POWDER, LYOPHILIZED, FOR SOLUTION INTRAMUSCULAR; INTRAVENOUS ONCE
Status: CANCELLED | OUTPATIENT
Start: 2020-10-29

## 2020-10-20 RX ORDER — SODIUM CHLORIDE 9 MG/ML
INJECTION, SOLUTION INTRAVENOUS CONTINUOUS
Status: CANCELLED | OUTPATIENT
Start: 2020-10-22

## 2020-10-20 RX ORDER — PALONOSETRON 0.05 MG/ML
0.25 INJECTION, SOLUTION INTRAVENOUS ONCE
Status: CANCELLED | OUTPATIENT
Start: 2020-10-29

## 2020-10-20 NOTE — PROGRESS NOTES
Patient and his wife here for chemotherapy teaching. Spent 60 minutes with them   Teaching sheets from [facility] and verbal information given on chemotherapy agents, action, administration and side effects. Provided chemotherapybooklet, and nutrition handouts Drugs discussed: Taxol and carboplatin  Discussed implanted port and demonstrated     Reviewed anti emetic medications, Patient needs prescription wife prefers Tamar Salazar. Called in prescription for zofran 4 mg po every 6 hours as needed for nausea and magic mouth wash 5 ml swish and swalllow every 4 hours as needed for mouth sores. Dips #240 ml with 2 refills. Ca;;ed tp [employer]. Questions answered, support given  Patients wife reported that Dr. Daniel Abraham sent order for dexamethasone to pharmacy.        Discussed community resources such as [place], [address], [address], etc

## 2020-10-20 NOTE — PROGRESS NOTES
Enriqueta Mak                                                                                                                  10/20/2020  MRN:   [id number]  YOB: 1945  PCP:                           Dorys Verma DO  Referring Physician: No ref. provider found  Treating Physician Name: Edgardo Knapp MD      Reason for consultation:  Chief Complaint   Patient presents with    Lung Cancer     follow up   Toxicity check. Discussed treatment plan. Current problems:  Adenocarcinoma of right lung, clinical stage at least T1c, N2, M0 (stage IIIA)  Subcarinal lymph node metastasis    Active and recent treatments:  concurrent chemoradiation using carboplatin and Taxol    Summary of Case/History:    Enriqueta Mak a 77 y.o.male is a patient with biopsy confirmed adenocarcinoma of right lung presents to the office to establish care and for further evaluation treatment recommendations. Patient was initially seen by pulmonary team for lung nodules. Patient CT scan from July 2019 showed a right-sided pleural calcification thickening concerning for asbestos exposure. Patient also noted to have multiple pulmonary nodules measuring between 1.5 x 1.3 cm. Follow-up CT chest from July 2020 showed increase in size of the right lower lobe lung nodule which now measured 2.1 cm. Subsequently patient underwent CT PET on 8/22 which showed FDG avid right lower lobe lung nodule with SUV of 4.6. Patient CT PET was also positive for subcarinal lymph node with SUV of 4.6. Patient underwent bronchoscopy with endobronchial ultrasound biopsy of the subcarinal lymph node confirmed adenocarcinoma. Patient has significant history of tobacco dependence around 30-pack-year however he quit about 25 years ago. Patient does have coronary artery disease status post stent placement. Patient also gives history of occupational asthma and exposure to bacteria.   Patient does give history of weight loss but describes it as intentional.  Denies headaches dizziness chest pain abdominal pain nausea bone pain     Interim History:     Patient presents to the clinic for a follow-up visit and to discuss further treatment plan. He is scheduled to start chemotherapy later this week. He will also be starting radiation therapy. Denies any new complaint or interval event. Underwent port placement without any complications. Denies nausea vomiting fever chills. Denies hospitalization or ER visit. He also has been seen by radiation oncology. During this visit patient's allergy, social, medical, surgical history and medications were reviewed and updated. Past Medical History:   Past Medical History:   Diagnosis Date    Abdominal hernia     small, no significant symptomatology.  Abnormal PSA     with history of slight increase.  Allergic rhinitis     Benign prostatic hypertrophy     Chest pain     occasional.     Coronary artery disease     status post drug eluting stent placement, LAD, ostial obtuse marginal.     Dysphagia     Erectile dysfunction     Familial hyperlipidemia     with hypertriglyceridemia.  Gastroesophageal reflux disease     Hearing loss, bilateral     hearing aid in the left ear only. 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     Hypertension     Impaired fasting glucose     prediabetes.  Lumbar disc herniation     L3-L4, with chronic back pain.  Mild anemia     Nasal polyps     minimal symptoms.  Nasal septal deviation     right side.  Obesity     Palpitations     occasional.     Peptic ulcer disease     Reactive airway disease     asthma, industrial related, also a history of hypersensitivity pneumonitis.      Urinary frequency        Past Surgical History:     Past Surgical History:   Procedure Laterality Date    APPENDECTOMY  age 11    BRONCHOSCOPY  09/17/2020    BRONCHOSCOPY N/A 9/17/2020    EBUS- BRONCHOSCOPY ENDOBRONCHIAL ULTRASOUND, PATHOLOGY REQUESTED- CINDY NOTIFIED, GI UNIT SCHEDULED performed by Bonifacio Mooney MD at [address]  10/20/2011    occluded LAD and ostial obtuse marginal stenosis, underwent drug eluting stents to both, RCA small, nondominant, occluded, ejection fraction 45%.  CARDIAC CATHETERIZATION  08/2018    with stent placement    CATARACT REMOVAL Bilateral 03/2018    COLONOSCOPY  01/21/2011    mild sigmoid diverticulosis.  COLONOSCOPY  4/6/16    hemorrhoid; sigmoid diverticulosis    KNEE ARTHROSCOPY Left 03/19/2010    partial medial and lateral synovectomies, partial medial meniscectomy, chondroplasty.  NASAL FRACTURE SURGERY  1960    OTHER SURGICAL HISTORY Left 02/12/2010    knee injection.  PORT SURGERY N/A 10/14/2020    RIGHT PORT INSERTION performed by Clovis Sheikh DO at [address] [place] [address] PRE-MALIGNANT / [address] Left 01/16/2012    actinic keratoses, ear, liquid nitrogen.  PRE-MALIGNANT / BENIGN SKIN LESION EXCISION Left 07/19/2011    actinic keratosis, upper ear, liquid nitrogen.  PROSTATE BIOPSY Bilateral 09/08/2015    Benign    REPAIR UMBILICAL AHWB,7+R/Q,UKAPL N/A 1/74/8321    UMBILICAL Hernia Repair w/ Mesh performed by Talya Diallo MD at [address] [place] [address] SEPTOPLASTY  10/30/2015    with bilateral submucosal resection of the inferior turbinates, left middle turbinate elza bullosa resection done by Dr Moon [facility] ARTHROSCOPY Left 10/17/14    W/ SUBACROMIAL DECOMPRESSION, DEBRIDEMENT ET CHONDROPLASTY    UPPER GASTROINTESTINAL ENDOSCOPY  01/21/2011    superficial ulcer of the fundus, erostive gastritis.      VASECTOMY Bilateral 04/04/1980       Patient Family Social History:     Social History     Socioeconomic History    Marital status:      Spouse name: None    Number of children: None    Years of education: None    Highest education level: None   Occupational History    None   Social Needs    Financial resource strain: None   [place] insecurity     Worry: None     Inability: None    Transportation needs     Medical: None     Non-medical: None   Tobacco Use    Smoking status: Former Smoker     Packs/day: 2.00     Years: 15.00     Pack years: 30.00     Last attempt to quit: 1985     Years since quittin.8    Smokeless tobacco: Never Used    Tobacco comment: smoked 2 packs a day for 14 to 15 years, quit in    Substance and Sexual Activity    Alcohol use: No     Alcohol/week: 0.0 standard drinks     Comment: rare    Drug use: No    Sexual activity: None   Lifestyle    Physical activity     Days per week: None     Minutes per session: None    Stress: None   Relationships    Social connections     Talks on phone: None     Gets together: None     Attends Episcopal service: None     Active member of club or organization: None     Attends meetings of clubs or organizations: None     Relationship status: None    Intimate partner violence     Fear of current or ex partner: None     Emotionally abused: None     Physically abused: None     Forced sexual activity: None   Other Topics Concern    None   Social History Narrative    None     Family History   Problem Relation Age of Onset    Cancer Mother         lymphoma    Thyroid Disease Mother         hypothyroidism    Stroke Mother     Heart Disease Mother     High Blood Pressure Mother     Heart Attack Mother         in her 46s    Heart Failure Father         congestive    Coronary Art Dis Father     Emphysema Father         was a smoker    Heart Disease Maternal Grandmother     Heart Disease Maternal Grandfather     Crohn's Disease Sister     High Cholesterol Sister     High Cholesterol Child        Current Medications:     Current Outpatient Medications   Medication Sig Dispense Refill    oxybutynin (DITROPAN-XL) 10 MG extended release tablet Take 10 mg by mouth daily      ECHINACEA HERB PO Take by mouth daily      losartan (COZAAR) 50 MG tablet TAKE 1 TABLET BY MOUTH ONE TIME A DAY  90 tablet 1    levothyroxine (SYNTHROID) 100 MCG tablet TAKE 1 TABLET BY MOUTH ONE TIME A DAY  90 tablet 1    furosemide (LASIX) 20 MG tablet TAKE 1 TABLET BY MOUTH ONE TIME A DAY  90 tablet 1    clopidogrel (PLAVIX) 75 MG tablet TAKE 1 TABLET BY MOUTH DAILY 90 tablet 3    pravastatin (PRAVACHOL) 40 MG tablet TAKE ONE TABLET BY MOUTH ONCE EVERY EVENING 90 tablet 3    doxazosin (CARDURA) 2 MG tablet Take 1 tablet by mouth 2 times daily 180 tablet 3    tamsulosin (FLOMAX) 0.4 MG capsule TAKE 1 CAPSULE BY MOUTH DAILY 90 capsule 3    metoprolol tartrate (LOPRESSOR) 50 MG tablet Take 1 tablet by mouth 2 times daily 180 tablet 3    nitroGLYCERIN (NITROSTAT) 0.4 MG SL tablet Place 1 tablet under the tongue every 5 minutes as needed for Chest pain up to max of 3 total doses. If no relief after 1 dose, call 911. 25 tablet 3    albuterol sulfate HFA (VENTOLIN HFA) 108 (90 Base) MCG/ACT inhaler Inhale 2 puffs into the lungs every 6 hours as needed for Wheezing or Shortness of Breath 1 Inhaler 6    niacin (NIASPAN) 500 MG CR tablet Take 500 mg by mouth 2 times daily. Two pills daily.  Omega-3 Fatty Acids (FISH OIL) 1200 MG CAPS Take 2,000 mg by mouth 2 times daily.  Multiple Vitamins-Minerals (MULTIVITAMIN PO) daily.  ASPIRIN PO 81 mg daily. No current facility-administered medications for this visit. Allergies:   Effient [prasugrel]; Ace inhibitors; and Statins    Review of Systems:    Constitutional: No fever or chills.  No night sweats, + weight loss   Eyes: No eye discharge, double vision, or eye pain   HEENT: negative for sore mouth, sore throat, hoarseness and voice change   Respiratory: negative for cough , sputum, dyspnea, wheezing, hemoptysis, chest pain   Cardiovascular: negative for chest pain, dyspnea, palpitations, orthopnea, PND   Gastrointestinal: negative for nausea, vomiting, diarrhea, constipation, abdominal pain, Dysphagia, hematemesis and hematochezia   Genitourinary: negative for frequency, dysuria, nocturia, urinary incontinence, and hematuria   Integument: negative for rash, skin lesions, bruises.    Hematologic/Lymphatic: negative for easy bruising, bleeding, lymphadenopathy, or petechiae   Endocrine: negative for heat or cold intolerance,weight changes, change in bowel habits and hair loss   Musculoskeletal: negative for myalgias, arthralgias, pain, joint swelling,and bone pain   Neurological: negative for headaches, dizziness, seizures, weakness, numbness        Physical Exam:    Vitals: /72 (Site: Left Upper Arm, Position: Sitting, Cuff Size: Large Adult)   Pulse 78   Temp 96.8 °F (36 °C) (Temporal)   Resp 16   Ht 5' 10\" (1.778 m)   Wt 239 lb (108.4 kg)   SpO2 96%   BMI 34.29 kg/m²   General appearance - well appearing, no in pain or distress  Mental status - AAO X3  Eyes - pupils equal and reactive, extraocular eye movements intact  Mouth - mucous membranes moist, pharynx normal without lesions  Neck - supple, no significant adenopathy  Lymphatics - no palpable lymphadenopathy, no hepatosplenomegaly  Chest - clear to auscultation, no wheezes, rales or rhonchi, symmetric air entry  Heart - normal rate, regular rhythm, normal S1, S2, no murmurs  Abdomen - soft, nontender, nondistended, no masses or organomegaly  Neurological - alert, oriented, normal speech, no focal findings or movement disorder noted  Extremities - peripheral pulses normal, no pedal edema, no clubbing or cyanosis  Skin - normal coloration and turgor, no rashes, no suspicious skin lesions noted       DATA:    Results for orders placed or performed during the hospital encounter of 10/08/20   COVID-19 Ambulatory    Specimen: Nasopharyngeal Swab   Result Value Ref Range    SARS-CoV-2, LEN Not Detected Not Detected     -- Diagnosis --   BAL RIGHT LOWER LOBE:           NEGATIVE FOR MALIGNANCY.             FINE NEEDLE ASPIRATION STATION 7 EBUS:           ADENOCARCINOMA, to generate a document that actually reflects the content of the visit, the document can still have some errors including those of syntax and sound a like substitutions which may escape proof reading. It such instances, actual meaning can be extrapolated by contextual diversion.

## 2020-10-22 ENCOUNTER — HOSPITAL ENCOUNTER (OUTPATIENT)
Dept: INFUSION THERAPY | Age: 75
Discharge: HOME | End: 2020-10-22
Payer: MEDICARE

## 2020-10-22 ENCOUNTER — CLINICAL DOCUMENTATION (OUTPATIENT)
Dept: SPIRITUAL SERVICES | Age: 75
End: 2020-10-22

## 2020-10-22 VITALS
RESPIRATION RATE: 16 BRPM | SYSTOLIC BLOOD PRESSURE: 152 MMHG | HEART RATE: 79 BPM | TEMPERATURE: 98.8 F | WEIGHT: 240.8 LBS | HEIGHT: 70 IN | BODY MASS INDEX: 34.47 KG/M2 | OXYGEN SATURATION: 95 % | DIASTOLIC BLOOD PRESSURE: 79 MMHG

## 2020-10-22 DIAGNOSIS — C34.91 ADENOCARCINOMA OF RIGHT LUNG (HCC): Primary | ICD-10-CM

## 2020-10-22 LAB
ABSOLUTE EOS #: <0.03 K/UL (ref 0–0.44)
ABSOLUTE IMMATURE GRANULOCYTE: 0.1 K/UL (ref 0–0.3)
ABSOLUTE LYMPH #: 0.78 K/UL (ref 1.1–3.7)
ABSOLUTE MONO #: 0.21 K/UL (ref 0.1–1.2)
ALBUMIN SERPL-MCNC: 4.4 G/DL (ref 3.5–5.2)
ALBUMIN/GLOBULIN RATIO: 1.6 (ref 1–2.5)
ALP BLD-CCNC: 54 U/L (ref 40–129)
ALT SERPL-CCNC: 24 U/L (ref 5–41)
ANION GAP SERPL CALCULATED.3IONS-SCNC: 13 MMOL/L (ref 9–17)
AST SERPL-CCNC: 20 U/L
BASOPHILS # BLD: 0 % (ref 0–2)
BASOPHILS ABSOLUTE: <0.03 K/UL (ref 0–0.2)
BILIRUB SERPL-MCNC: 0.33 MG/DL (ref 0.3–1.2)
BUN BLDV-MCNC: 27 MG/DL (ref 8–23)
BUN/CREAT BLD: 27 (ref 9–20)
CALCIUM SERPL-MCNC: 9.2 MG/DL (ref 8.6–10.4)
CHLORIDE BLD-SCNC: 101 MMOL/L (ref 98–107)
CO2: 23 MMOL/L (ref 20–31)
CREAT SERPL-MCNC: 1 MG/DL (ref 0.7–1.2)
DIFFERENTIAL TYPE: ABNORMAL
EOSINOPHILS RELATIVE PERCENT: 0 % (ref 1–4)
GFR AFRICAN AMERICAN: >60 ML/MIN
GFR NON-AFRICAN AMERICAN: >60 ML/MIN
GFR SERPL CREATININE-BSD FRML MDRD: ABNORMAL ML/MIN/{1.73_M2}
GFR SERPL CREATININE-BSD FRML MDRD: ABNORMAL ML/MIN/{1.73_M2}
GLUCOSE BLD-MCNC: 247 MG/DL (ref 70–99)
HCT VFR BLD CALC: 36.8 % (ref 40.7–50.3)
HEMOGLOBIN: 12.1 G/DL (ref 13–17)
IMMATURE GRANULOCYTES: 1 %
LYMPHOCYTES # BLD: 10 % (ref 24–43)
MCH RBC QN AUTO: 31.5 PG (ref 25.2–33.5)
MCHC RBC AUTO-ENTMCNC: 32.9 G/DL (ref 25.2–33.5)
MCV RBC AUTO: 95.8 FL (ref 82.6–102.9)
MONOCYTES # BLD: 3 % (ref 3–12)
NRBC AUTOMATED: 0 PER 100 WBC
PDW BLD-RTO: 13.4 % (ref 11.8–14.4)
PLATELET # BLD: ABNORMAL K/UL (ref 138–453)
PLATELET ESTIMATE: ABNORMAL
PLATELET, FLUORESCENCE: 152 K/UL (ref 138–453)
PLATELET, IMMATURE FRACTION: 3.3 % (ref 1.1–10.3)
PMV BLD AUTO: ABNORMAL FL (ref 8.1–13.5)
POTASSIUM SERPL-SCNC: 4 MMOL/L (ref 3.7–5.3)
RBC # BLD: 3.84 M/UL (ref 4.21–5.77)
RBC # BLD: ABNORMAL 10*6/UL
SEG NEUTROPHILS: 86 % (ref 36–65)
SEGMENTED NEUTROPHILS ABSOLUTE COUNT: 6.93 K/UL (ref 1.5–8.1)
SODIUM BLD-SCNC: 137 MMOL/L (ref 135–144)
TOTAL PROTEIN: 7.1 G/DL (ref 6.4–8.3)
WBC # BLD: 8 K/UL (ref 3.5–11.3)
WBC # BLD: ABNORMAL 10*3/UL

## 2020-10-22 PROCEDURE — 2500000003 HC RX 250 WO HCPCS: Performed by: INTERNAL MEDICINE

## 2020-10-22 PROCEDURE — 6360000002 HC RX W HCPCS: Performed by: INTERNAL MEDICINE

## 2020-10-22 PROCEDURE — 96413 CHEMO IV INFUSION 1 HR: CPT

## 2020-10-22 PROCEDURE — 36591 DRAW BLOOD OFF VENOUS DEVICE: CPT

## 2020-10-22 PROCEDURE — 2580000003 HC RX 258: Performed by: INTERNAL MEDICINE

## 2020-10-22 PROCEDURE — 85055 RETICULATED PLATELET ASSAY: CPT

## 2020-10-22 PROCEDURE — 85025 COMPLETE CBC W/AUTO DIFF WBC: CPT

## 2020-10-22 PROCEDURE — 96375 TX/PRO/DX INJ NEW DRUG ADDON: CPT

## 2020-10-22 PROCEDURE — 96417 CHEMO IV INFUS EACH ADDL SEQ: CPT

## 2020-10-22 PROCEDURE — 80053 COMPREHEN METABOLIC PANEL: CPT

## 2020-10-22 PROCEDURE — 96415 CHEMO IV INFUSION ADDL HR: CPT

## 2020-10-22 RX ORDER — DEXAMETHASONE SODIUM PHOSPHATE 10 MG/ML
10 INJECTION, SOLUTION INTRAMUSCULAR; INTRAVENOUS ONCE
Status: COMPLETED | OUTPATIENT
Start: 2020-10-22 | End: 2020-10-22

## 2020-10-22 RX ORDER — DIPHENHYDRAMINE HYDROCHLORIDE 50 MG/ML
50 INJECTION INTRAMUSCULAR; INTRAVENOUS ONCE
Status: COMPLETED | OUTPATIENT
Start: 2020-10-22 | End: 2020-10-22

## 2020-10-22 RX ORDER — PALONOSETRON 0.05 MG/ML
0.25 INJECTION, SOLUTION INTRAVENOUS ONCE
Status: COMPLETED | OUTPATIENT
Start: 2020-10-22 | End: 2020-10-22

## 2020-10-22 RX ORDER — SODIUM CHLORIDE 0.9 % (FLUSH) 0.9 %
10 SYRINGE (ML) INJECTION PRN
Status: DISCONTINUED | OUTPATIENT
Start: 2020-10-22 | End: 2020-10-23 | Stop reason: HOSPADM

## 2020-10-22 RX ORDER — SODIUM CHLORIDE 9 MG/ML
20 INJECTION, SOLUTION INTRAVENOUS ONCE
Status: COMPLETED | OUTPATIENT
Start: 2020-10-22 | End: 2020-10-22

## 2020-10-22 RX ORDER — HEPARIN SODIUM (PORCINE) LOCK FLUSH IV SOLN 100 UNIT/ML 100 UNIT/ML
500 SOLUTION INTRAVENOUS PRN
Status: DISCONTINUED | OUTPATIENT
Start: 2020-10-22 | End: 2020-10-23 | Stop reason: HOSPADM

## 2020-10-22 RX ADMIN — DIPHENHYDRAMINE HYDROCHLORIDE 50 MG: 50 INJECTION, SOLUTION INTRAMUSCULAR; INTRAVENOUS at 09:26

## 2020-10-22 RX ADMIN — CARBOPLATIN 250 MG: 10 INJECTION, SOLUTION INTRAVENOUS at 11:34

## 2020-10-22 RX ADMIN — DEXAMETHASONE SODIUM PHOSPHATE 10 MG: 10 INJECTION, SOLUTION INTRAMUSCULAR; INTRAVENOUS at 09:16

## 2020-10-22 RX ADMIN — PACLITAXEL 102 MG: 6 INJECTION, SOLUTION, CONCENTRATE INTRAVENOUS at 10:01

## 2020-10-22 RX ADMIN — Medication 10 ML: at 08:03

## 2020-10-22 RX ADMIN — FAMOTIDINE 20 MG: 10 INJECTION, SOLUTION INTRAVENOUS at 09:21

## 2020-10-22 RX ADMIN — HEPARIN 500 UNITS: 100 SYRINGE at 12:14

## 2020-10-22 RX ADMIN — SODIUM CHLORIDE 20 ML/HR: 9 INJECTION, SOLUTION INTRAVENOUS at 08:04

## 2020-10-22 RX ADMIN — PALONOSETRON 0.25 MG: 0.05 INJECTION, SOLUTION INTRAVENOUS at 09:24

## 2020-10-22 NOTE — FLOWSHEET NOTE
10/22/20 1035   Encounter Summary   Services provided to: Patient and family together   Referral/Consult From: [address] [place] [address]; Family members   Continue Visiting   (10/22/20)   Complexity of Encounter Moderate   Length of Encounter 15 minutes   Spiritual/Roman Catholic   Type Spiritual support   Assessment Approachable   Intervention Active listening;Explored coping resources;Sustaining presence/ Ministry of presence   Outcome Expressed gratitude;Engaged in conversation

## 2020-10-22 NOTE — PROGRESS NOTES
VAD accessed per protocol with 1 inch 20 gauge camargo needle. Flushed easily with saline 10 ml. Good blood return. Labs drawn. Secured accessed port with transparent dressing. IV infusing NSS. Labs were within limits to treat,. Treatment plan released to pharmacy.

## 2020-10-22 NOTE — PROGRESS NOTES
Infusion complete. Flushed VAD with 10 ml of saline and 5 ml of heparin flush. D/C camargo needle. Band aid to site with 2x2. Patient stable and discharged to home with wife. Radiation is at 2 pm today return to unit in 1 week.

## 2020-10-22 NOTE — PROGRESS NOTES
rounding on Infusion. Assessment: Patient is receiving first treatment. Wife is with him for support. They do not have a [employer]oration[employer] connection. They are open to spiritual care visits.  explained that spiritual care is available to them and would check in with them from time to time and reminded them they are welcome to request spiritual care at any time. Intervention: Engaged in conversation. Patient expressed appreciation for visit and offer of continued prayer. Plan: Chaplains are available on site or on call 24/7 for spiritual and emotional support.

## 2020-10-23 ENCOUNTER — OFFICE VISIT (OUTPATIENT)
Dept: SURGERY | Age: 75
End: 2020-10-23
Payer: MEDICARE

## 2020-10-23 ENCOUNTER — TELEPHONE (OUTPATIENT)
Dept: CASE MANAGEMENT | Age: 75
End: 2020-10-23

## 2020-10-23 VITALS
HEIGHT: 70 IN | TEMPERATURE: 98.2 F | WEIGHT: 243 LBS | RESPIRATION RATE: 18 BRPM | BODY MASS INDEX: 34.79 KG/M2 | SYSTOLIC BLOOD PRESSURE: 130 MMHG | HEART RATE: 66 BPM | DIASTOLIC BLOOD PRESSURE: 78 MMHG

## 2020-10-23 PROCEDURE — 99214 OFFICE O/P EST MOD 30 MIN: CPT

## 2020-10-23 PROCEDURE — 99024 POSTOP FOLLOW-UP VISIT: CPT | Performed by: SURGERY

## 2020-10-23 NOTE — PROGRESS NOTES
Bin Mak is a 77 y.o. male who presents today for follow-up after right IJ port placement. Patient has been doing well since surgery. Had his first round of chemotherapy and radiation therapy yesterday without any problems. No complaints since surgery. Past Medical History:   Diagnosis Date    Abdominal hernia     small, no significant symptomatology.  Abnormal PSA     with history of slight increase.  Allergic rhinitis     Benign prostatic hypertrophy     Chest pain     occasional.     Coronary artery disease     status post drug eluting stent placement, LAD, ostial obtuse marginal.     Dysphagia     Erectile dysfunction     Familial hyperlipidemia     with hypertriglyceridemia.  Gastroesophageal reflux disease     Hearing loss, bilateral     hearing aid in the left ear only. 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     Hypertension     Impaired fasting glucose     prediabetes.  Lumbar disc herniation     L3-L4, with chronic back pain.  Mild anemia     Nasal polyps     minimal symptoms.  Nasal septal deviation     right side.  Obesity     Palpitations     occasional.     Peptic ulcer disease     Reactive airway disease     asthma, industrial related, also a history of hypersensitivity pneumonitis.  Urinary frequency        Past Surgical History:   Procedure Laterality Date    APPENDECTOMY  age 11    BRONCHOSCOPY  09/17/2020    BRONCHOSCOPY N/A 9/17/2020    EBUS- BRONCHOSCOPY ENDOBRONCHIAL ULTRASOUND, PATHOLOGY REQUESTED- CINDY NOTIFIED, GI UNIT SCHEDULED performed by Mildred Ramirez MD at [address] [facility]  10/20/2011    occluded LAD and ostial obtuse marginal stenosis, underwent drug eluting stents to both, RCA small, nondominant, occluded, ejection fraction 45%.  CARDIAC CATHETERIZATION  08/2018    with stent placement    CATARACT REMOVAL Bilateral 03/2018    COLONOSCOPY  01/21/2011    mild sigmoid diverticulosis.      COLONOSCOPY  4/6/16    hemorrhoid; sigmoid diverticulosis    KNEE ARTHROSCOPY Left 03/19/2010    partial medial and lateral synovectomies, partial medial meniscectomy, chondroplasty.  NASAL FRACTURE SURGERY  1960    OTHER SURGICAL HISTORY Left 02/12/2010    knee injection.  PORT SURGERY N/A 10/14/2020    RIGHT PORT INSERTION performed by Annabelle Gomez DO at [address] [place] [address] PRE-MALIGNANT / [facility] Left 01/16/2012    actinic keratoses, ear, liquid nitrogen.  PRE-MALIGNANT / BENIGN SKIN LESION EXCISION Left 07/19/2011    actinic keratosis, upper ear, liquid nitrogen.  PROSTATE BIOPSY Bilateral 09/08/2015    Benign    REPAIR UMBILICAL ZDSF,5+V/W,CHYXW N/A 5/03/5919    UMBILICAL Hernia Repair w/ Mesh performed by Arvind Lawson MD at [address] [place] [address] SEPTOPLASTY  10/30/2015    with bilateral submucosal resection of the inferior turbinates, left middle turbinate elza bullosa resection done by Dr Cassia Owens ARTHROSCOPY Left 10/17/14    W/ SUBACROMIAL DECOMPRESSION, DEBRIDEMENT ET CHONDROPLASTY    UPPER GASTROINTESTINAL ENDOSCOPY  01/21/2011    superficial ulcer of the fundus, erostive gastritis.      VASECTOMY Bilateral 04/04/1980       Current Outpatient Medications   Medication Sig Dispense Refill    ondansetron (ZOFRAN) 4 MG tablet Take 4 mg by mouth every 6 hours as needed for Nausea or Vomiting Disp 60 with 3 refills called in 10/20/2020      Magic Mouthwash (MIRACLE MOUTHWASH) Swish and spit 5 mLs 4 times daily as needed for Irritation Disp 240 ml with 2 refills called in 10/20/2020      dexamethasone (DECADRON) 4 MG tablet Take 1 tablet twice a day on day before and after chemo 40 tablet 1    oxybutynin (DITROPAN-XL) 10 MG extended release tablet Take 10 mg by mouth daily      ECHINACEA HERB PO Take by mouth daily      losartan (COZAAR) 50 MG tablet TAKE 1 TABLET BY MOUTH ONE TIME A DAY  90 tablet 1    levothyroxine (SYNTHROID) 100 MCG tablet TAKE 1 TABLET BY MOUTH ONE TIME A DAY  90 tablet 1    furosemide (LASIX) 20 MG tablet TAKE 1 TABLET BY MOUTH ONE TIME A DAY  90 tablet 1    clopidogrel (PLAVIX) 75 MG tablet TAKE 1 TABLET BY MOUTH DAILY 90 tablet 3    pravastatin (PRAVACHOL) 40 MG tablet TAKE ONE TABLET BY MOUTH ONCE EVERY EVENING 90 tablet 3    doxazosin (CARDURA) 2 MG tablet Take 1 tablet by mouth 2 times daily 180 tablet 3    tamsulosin (FLOMAX) 0.4 MG capsule TAKE 1 CAPSULE BY MOUTH DAILY 90 capsule 3    metoprolol tartrate (LOPRESSOR) 50 MG tablet Take 1 tablet by mouth 2 times daily 180 tablet 3    nitroGLYCERIN (NITROSTAT) 0.4 MG SL tablet Place 1 tablet under the tongue every 5 minutes as needed for Chest pain up to max of 3 total doses. If no relief after 1 dose, call 911. 25 tablet 3    albuterol sulfate HFA (VENTOLIN HFA) 108 (90 Base) MCG/ACT inhaler Inhale 2 puffs into the lungs every 6 hours as needed for Wheezing or Shortness of Breath 1 Inhaler 6    niacin (NIASPAN) 500 MG CR tablet Take 500 mg by mouth 2 times daily. Two pills daily.  Omega-3 Fatty Acids (FISH OIL) 1200 MG CAPS Take 2,000 mg by mouth 2 times daily.  Multiple Vitamins-Minerals (MULTIVITAMIN PO) daily.  ASPIRIN PO 81 mg daily. No current facility-administered medications for this visit. Allergies   Allergen Reactions    Effient [Prasugrel] Other (See Comments)     Superficial skin bleeding.  Ace Inhibitors Other (See Comments)     Cough.  Statins Other (See Comments)     Myalgias.         Family History   Problem Relation Age of Onset    Cancer Mother         lymphoma    Thyroid Disease Mother         hypothyroidism    Stroke Mother     Heart Disease Mother     High Blood Pressure Mother     Heart Attack Mother         in her 46s    Heart Failure Father         congestive    Coronary Art Dis Father     Emphysema Father         was a smoker    Heart Disease Maternal Grandmother     Heart Disease Maternal Grandfather     Crohn's Disease Sister     High Cholesterol Sister     High Cholesterol Child        Social History     Socioeconomic History    Marital status:      Spouse name: Not on file    Number of children: Not on file    Years of education: Not on file    Highest education level: Not on file   Occupational History    Not on file   Social Needs    Financial resource strain: Not on file    Food insecurity     Worry: Not on file     Inability: Not on file    Transportation needs     Medical: Not on file     Non-medical: Not on file   Tobacco Use    Smoking status: Former Smoker     Packs/day: 2.00     Years: 15.00     Pack years: 30.00     Last attempt to quit: 1985     Years since quittin.8    Smokeless tobacco: Never Used    Tobacco comment: smoked 2 packs a day for 14 to 15 years, quit in    Substance and Sexual Activity    Alcohol use: No     Alcohol/week: 0.0 standard drinks     Comment: rare    Drug use: No    Sexual activity: Not on file   Lifestyle    Physical activity     Days per week: Not on file     Minutes per session: Not on file    Stress: Not on file   Relationships    Social connections     Talks on phone: Not on file     Gets together: Not on file     Attends Episcopalian service: Not on file     Active member of club or organization: Not on file     Attends meetings of clubs or organizations: Not on file     Relationship status: Not on file    Intimate partner violence     Fear of current or ex partner: Not on file     Emotionally abused: Not on file     Physically abused: Not on file     Forced sexual activity: Not on file   Other Topics Concern    Not on file   Social History Narrative    Not on file       ROS:   Review of Systems - Negative except as noted in HPI      Objective   Vitals:    10/23/20 1053   BP: 130/78   Pulse: 66   Resp: 18   Temp: 98.2 °F (36.8 °C)     General:in no apparent distress and well developed and well nourished  Eyes: No gross abnormalities.   Ears,

## 2020-10-29 ENCOUNTER — HOSPITAL ENCOUNTER (OUTPATIENT)
Dept: INFUSION THERAPY | Age: 75
Discharge: HOME | End: 2020-10-29
Payer: MEDICARE

## 2020-10-29 VITALS
BODY MASS INDEX: 34.5 KG/M2 | WEIGHT: 241 LBS | SYSTOLIC BLOOD PRESSURE: 152 MMHG | HEIGHT: 70 IN | RESPIRATION RATE: 18 BRPM | TEMPERATURE: 97.7 F | DIASTOLIC BLOOD PRESSURE: 93 MMHG | OXYGEN SATURATION: 96 % | HEART RATE: 75 BPM

## 2020-10-29 DIAGNOSIS — C34.91 ADENOCARCINOMA OF RIGHT LUNG (HCC): Primary | ICD-10-CM

## 2020-10-29 LAB
ABSOLUTE EOS #: 0 K/UL (ref 0–0.44)
ABSOLUTE IMMATURE GRANULOCYTE: 0.15 K/UL (ref 0–0.3)
ABSOLUTE LYMPH #: 0.52 K/UL (ref 1.1–3.7)
ABSOLUTE MONO #: 0.15 K/UL (ref 0.1–1.2)
ALBUMIN SERPL-MCNC: 4.2 G/DL (ref 3.5–5.2)
ALBUMIN/GLOBULIN RATIO: 1.6 (ref 1–2.5)
ALP BLD-CCNC: 58 U/L (ref 40–129)
ALT SERPL-CCNC: 19 U/L (ref 5–41)
ANION GAP SERPL CALCULATED.3IONS-SCNC: 11 MMOL/L (ref 9–17)
AST SERPL-CCNC: 18 U/L
BASOPHILS # BLD: 0 % (ref 0–2)
BASOPHILS ABSOLUTE: 0 K/UL (ref 0–0.2)
BILIRUB SERPL-MCNC: 0.38 MG/DL (ref 0.3–1.2)
BUN BLDV-MCNC: 19 MG/DL (ref 8–23)
BUN/CREAT BLD: 21 (ref 9–20)
CALCIUM SERPL-MCNC: 9.6 MG/DL (ref 8.6–10.4)
CHLORIDE BLD-SCNC: 102 MMOL/L (ref 98–107)
CO2: 25 MMOL/L (ref 20–31)
CREAT SERPL-MCNC: 0.89 MG/DL (ref 0.7–1.2)
DIFFERENTIAL TYPE: ABNORMAL
EOSINOPHILS RELATIVE PERCENT: 0 % (ref 1–4)
GFR AFRICAN AMERICAN: >60 ML/MIN
GFR NON-AFRICAN AMERICAN: >60 ML/MIN
GFR SERPL CREATININE-BSD FRML MDRD: ABNORMAL ML/MIN/{1.73_M2}
GFR SERPL CREATININE-BSD FRML MDRD: ABNORMAL ML/MIN/{1.73_M2}
GLUCOSE BLD-MCNC: 209 MG/DL (ref 70–99)
HCT VFR BLD CALC: 38.2 % (ref 40.7–50.3)
HEMOGLOBIN: 12.6 G/DL (ref 13–17)
IMMATURE GRANULOCYTES: 2 %
LYMPHOCYTES # BLD: 7 % (ref 24–43)
MCH RBC QN AUTO: 31.4 PG (ref 25.2–33.5)
MCHC RBC AUTO-ENTMCNC: 33 G/DL (ref 25.2–33.5)
MCV RBC AUTO: 95.3 FL (ref 82.6–102.9)
MONOCYTES # BLD: 2 % (ref 3–12)
MORPHOLOGY: ABNORMAL
NRBC AUTOMATED: 0 PER 100 WBC
PDW BLD-RTO: 13.4 % (ref 11.8–14.4)
PLATELET # BLD: 150 K/UL (ref 138–453)
PLATELET ESTIMATE: ABNORMAL
PMV BLD AUTO: 10 FL (ref 8.1–13.5)
POTASSIUM SERPL-SCNC: 4.4 MMOL/L (ref 3.7–5.3)
RBC # BLD: 4.01 M/UL (ref 4.21–5.77)
RBC # BLD: ABNORMAL 10*6/UL
SEG NEUTROPHILS: 89 % (ref 36–65)
SEGMENTED NEUTROPHILS ABSOLUTE COUNT: 6.58 K/UL (ref 1.5–8.1)
SODIUM BLD-SCNC: 138 MMOL/L (ref 135–144)
TOTAL PROTEIN: 6.9 G/DL (ref 6.4–8.3)
WBC # BLD: 7.4 K/UL (ref 3.5–11.3)
WBC # BLD: ABNORMAL 10*3/UL

## 2020-10-29 PROCEDURE — 2580000003 HC RX 258: Performed by: INTERNAL MEDICINE

## 2020-10-29 PROCEDURE — 85025 COMPLETE CBC W/AUTO DIFF WBC: CPT

## 2020-10-29 PROCEDURE — 80053 COMPREHEN METABOLIC PANEL: CPT

## 2020-10-29 PROCEDURE — 96413 CHEMO IV INFUSION 1 HR: CPT

## 2020-10-29 PROCEDURE — 96417 CHEMO IV INFUS EACH ADDL SEQ: CPT

## 2020-10-29 PROCEDURE — 6360000002 HC RX W HCPCS: Performed by: INTERNAL MEDICINE

## 2020-10-29 PROCEDURE — 2500000003 HC RX 250 WO HCPCS: Performed by: INTERNAL MEDICINE

## 2020-10-29 PROCEDURE — 36591 DRAW BLOOD OFF VENOUS DEVICE: CPT

## 2020-10-29 PROCEDURE — 96375 TX/PRO/DX INJ NEW DRUG ADDON: CPT

## 2020-10-29 RX ORDER — PALONOSETRON 0.05 MG/ML
0.25 INJECTION, SOLUTION INTRAVENOUS ONCE
Status: COMPLETED | OUTPATIENT
Start: 2020-10-29 | End: 2020-10-29

## 2020-10-29 RX ORDER — DIPHENHYDRAMINE HYDROCHLORIDE 50 MG/ML
50 INJECTION INTRAMUSCULAR; INTRAVENOUS ONCE
Status: COMPLETED | OUTPATIENT
Start: 2020-10-29 | End: 2020-10-29

## 2020-10-29 RX ORDER — SODIUM CHLORIDE 0.9 % (FLUSH) 0.9 %
10 SYRINGE (ML) INJECTION PRN
Status: DISCONTINUED | OUTPATIENT
Start: 2020-10-29 | End: 2020-10-30 | Stop reason: HOSPADM

## 2020-10-29 RX ORDER — DEXAMETHASONE SODIUM PHOSPHATE 10 MG/ML
10 INJECTION, SOLUTION INTRAMUSCULAR; INTRAVENOUS ONCE
Status: COMPLETED | OUTPATIENT
Start: 2020-10-29 | End: 2020-10-29

## 2020-10-29 RX ORDER — SODIUM CHLORIDE 9 MG/ML
20 INJECTION, SOLUTION INTRAVENOUS ONCE
Status: COMPLETED | OUTPATIENT
Start: 2020-10-29 | End: 2020-10-29

## 2020-10-29 RX ORDER — HEPARIN SODIUM (PORCINE) LOCK FLUSH IV SOLN 100 UNIT/ML 100 UNIT/ML
500 SOLUTION INTRAVENOUS PRN
Status: DISCONTINUED | OUTPATIENT
Start: 2020-10-29 | End: 2020-10-30 | Stop reason: HOSPADM

## 2020-10-29 RX ADMIN — HEPARIN 500 UNITS: 100 SYRINGE at 11:46

## 2020-10-29 RX ADMIN — CARBOPLATIN 276 MG: 10 INJECTION, SOLUTION INTRAVENOUS at 10:58

## 2020-10-29 RX ADMIN — DIPHENHYDRAMINE HYDROCHLORIDE 50 MG: 50 INJECTION, SOLUTION INTRAMUSCULAR; INTRAVENOUS at 09:10

## 2020-10-29 RX ADMIN — PALONOSETRON 0.25 MG: 0.05 INJECTION, SOLUTION INTRAVENOUS at 09:08

## 2020-10-29 RX ADMIN — PACLITAXEL 102 MG: 6 INJECTION, SOLUTION, CONCENTRATE INTRAVENOUS at 09:49

## 2020-10-29 RX ADMIN — DEXAMETHASONE SODIUM PHOSPHATE 10 MG: 10 INJECTION, SOLUTION INTRAMUSCULAR; INTRAVENOUS at 08:59

## 2020-10-29 RX ADMIN — Medication 10 ML: at 07:57

## 2020-10-29 RX ADMIN — SODIUM CHLORIDE 20 ML/HR: 9 INJECTION, SOLUTION INTRAVENOUS at 07:58

## 2020-10-29 RX ADMIN — FAMOTIDINE 20 MG: 10 INJECTION, SOLUTION INTRAVENOUS at 09:05

## 2020-10-29 ASSESSMENT — PAIN SCALES - GENERAL: PAINLEVEL_OUTOF10: 0

## 2020-10-29 NOTE — PROGRESS NOTES
Patient on unit for chemotherapy. VAD accessed per protocol using 20 gauge 3/4\" camargo needle. Flushes easily. Labs drawn. NSS infusing. Patient denies any changes in toxicity assessment.

## 2020-10-29 NOTE — PROGRESS NOTES
Spoke with  regarding patients cbc and cmp results. Orders received for nurse to sign chemotherapy orders and proceed with treatment.

## 2020-10-29 NOTE — PROGRESS NOTES
Chemotherapy infused and d/c'd. Patient tolerated infusion without any difficulty. Mediport flushed with 30 ml NSS and 5 ml heparin. Patient denies any complaints. Kenyon needle d/c'd, band aid to site. Patient discharged to radiation therapy.

## 2020-11-03 ENCOUNTER — VIRTUAL VISIT (OUTPATIENT)
Dept: ONCOLOGY | Age: 75
End: 2020-11-03
Payer: MEDICARE

## 2020-11-03 PROCEDURE — 99211 OFF/OP EST MAY X REQ PHY/QHP: CPT

## 2020-11-03 PROCEDURE — 1123F ACP DISCUSS/DSCN MKR DOCD: CPT | Performed by: INTERNAL MEDICINE

## 2020-11-03 PROCEDURE — 99214 OFFICE O/P EST MOD 30 MIN: CPT | Performed by: INTERNAL MEDICINE

## 2020-11-03 PROCEDURE — 3017F COLORECTAL CA SCREEN DOC REV: CPT | Performed by: INTERNAL MEDICINE

## 2020-11-03 PROCEDURE — 4040F PNEUMOC VAC/ADMIN/RCVD: CPT | Performed by: INTERNAL MEDICINE

## 2020-11-03 PROCEDURE — G8427 DOCREV CUR MEDS BY ELIG CLIN: HCPCS | Performed by: INTERNAL MEDICINE

## 2020-11-03 NOTE — PROGRESS NOTES
intentional.  Denies headaches dizziness chest pain abdominal pain nausea bone pain     Interim History:     Patient is seen in the office via virtual visit due to ongoing coronavirus pandemic. Overall patient is doing well. He is tolerating treatment without unexpected or severe side effects. Did complain of some heartburn yesterday which got improved with [place]. Patient is taking Prilosec once a day. Weight is stable. Appetite is fair. Denies fever chills. Denies any mouth sores. Denies ER visit or hospitalization. Denies shortness of breath. During this visit patient's allergy, social, medical, surgical history and medications were reviewed and updated. Past Medical History:   Past Medical History:   Diagnosis Date    Abdominal hernia     small, no significant symptomatology.  Abnormal PSA     with history of slight increase.  Allergic rhinitis     Benign prostatic hypertrophy     Chest pain     occasional.     Coronary artery disease     status post drug eluting stent placement, LAD, ostial obtuse marginal.     Dysphagia     Erectile dysfunction     Familial hyperlipidemia     with hypertriglyceridemia.  Gastroesophageal reflux disease     Hearing loss, bilateral     hearing aid in the left ear only. 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     Hypertension     Impaired fasting glucose     prediabetes.  Lumbar disc herniation     L3-L4, with chronic back pain.  Mild anemia     Nasal polyps     minimal symptoms.  Nasal septal deviation     right side.  Obesity     Palpitations     occasional.     Peptic ulcer disease     Reactive airway disease     asthma, industrial related, also a history of hypersensitivity pneumonitis.      Urinary frequency        Past Surgical History:     Past Surgical History:   Procedure Laterality Date    APPENDECTOMY  age 11    BRONCHOSCOPY  09/17/2020    BRONCHOSCOPY N/A 9/17/2020    EBUS- BRONCHOSCOPY ENDOBRONCHIAL ULTRASOUND, PATHOLOGY REQUESTED- CINDY NOTIFIED, GI UNIT SCHEDULED performed by Gayathri Durant MD at [address]  10/20/2011    occluded LAD and ostial obtuse marginal stenosis, underwent drug eluting stents to both, RCA small, nondominant, occluded, ejection fraction 45%.  CARDIAC CATHETERIZATION  08/2018    with stent placement    CATARACT REMOVAL Bilateral 03/2018    COLONOSCOPY  01/21/2011    mild sigmoid diverticulosis.  COLONOSCOPY  4/6/16    hemorrhoid; sigmoid diverticulosis    KNEE ARTHROSCOPY Left 03/19/2010    partial medial and lateral synovectomies, partial medial meniscectomy, chondroplasty.  NASAL FRACTURE SURGERY  1960    OTHER SURGICAL HISTORY Left 02/12/2010    knee injection.  PORT SURGERY N/A 10/14/2020    RIGHT PORT INSERTION performed by Yoselyn Worley DO at [address] [place] [address] PRE-MALIGNANT / [address] Left 01/16/2012    actinic keratoses, ear, liquid nitrogen.  PRE-MALIGNANT / BENIGN SKIN LESION EXCISION Left 07/19/2011    actinic keratosis, upper ear, liquid nitrogen.  PROSTATE BIOPSY Bilateral 09/08/2015    Benign    REPAIR UMBILICAL ZZPX,9+D/L,RHCWF N/A 4/78/3146    UMBILICAL Hernia Repair w/ Mesh performed by Aric Hansen MD at [address] [place] [address] SEPTOPLASTY  10/30/2015    with bilateral submucosal resection of the inferior turbinates, left middle turbinate elza bullosa resection done by Dr Neel Quiroga ARTHROSCOPY Left 10/17/14    W/ SUBACROMIAL DECOMPRESSION, DEBRIDEMENT ET CHONDROPLASTY    UPPER GASTROINTESTINAL ENDOSCOPY  01/21/2011    superficial ulcer of the fundus, erostive gastritis.      VASECTOMY Bilateral 04/04/1980       Patient Family Social History:     Social History     Socioeconomic History    Marital status:      Spouse name: None    Number of children: None    Years of education: None    Highest education level: None   Occupational History    None   Social Needs    Financial resource strain: None    Food insecurity     Worry: None     Inability: None    Transportation needs     Medical: None     Non-medical: None   Tobacco Use    Smoking status: Former Smoker     Packs/day: 2.00     Years: 15.00     Pack years: 30.00     Last attempt to quit: 1985     Years since quittin.8    Smokeless tobacco: Never Used    Tobacco comment: smoked 2 packs a day for 14 to 15 years, quit in    Substance and Sexual Activity    Alcohol use: No     Alcohol/week: 0.0 standard drinks     Comment: rare    Drug use: No    Sexual activity: None   Lifestyle    Physical activity     Days per week: None     Minutes per session: None    Stress: None   Relationships    Social connections     Talks on phone: None     Gets together: None     Attends [employer] service: None     Active member of club or organization: None     Attends meetings of clubs or organizations: None     Relationship status: None    Intimate partner violence     Fear of current or ex partner: None     Emotionally abused: None     Physically abused: None     Forced sexual activity: None   Other Topics Concern    None   Social History Narrative    None     Family History   Problem Relation Age of Onset    Cancer Mother         lymphoma    Thyroid Disease Mother         hypothyroidism    Stroke Mother     Heart Disease Mother     High Blood Pressure Mother     Heart Attack Mother         in her 46s    Heart Failure Father         congestive    Coronary Art Dis Father     Emphysema Father         was a smoker    Heart Disease Maternal Grandmother     Heart Disease Maternal Grandfather     Crohn's Disease Sister     High Cholesterol Sister     High Cholesterol Child        Current Medications:     Current Outpatient Medications   Medication Sig Dispense Refill    dexamethasone (DECADRON) 4 MG tablet Take 1 tablet twice a day on day before and after chemo 40 tablet 1    oxybutynin (DITROPAN-XL) 10 MG extended release tablet Take 10 mg by mouth daily      ECHINACEA HERB PO Take by mouth daily      losartan (COZAAR) 50 MG tablet TAKE 1 TABLET BY MOUTH ONE TIME A DAY  90 tablet 1    levothyroxine (SYNTHROID) 100 MCG tablet TAKE 1 TABLET BY MOUTH ONE TIME A DAY  90 tablet 1    furosemide (LASIX) 20 MG tablet TAKE 1 TABLET BY MOUTH ONE TIME A DAY  90 tablet 1    clopidogrel (PLAVIX) 75 MG tablet TAKE 1 TABLET BY MOUTH DAILY 90 tablet 3    pravastatin (PRAVACHOL) 40 MG tablet TAKE ONE TABLET BY MOUTH ONCE EVERY EVENING 90 tablet 3    doxazosin (CARDURA) 2 MG tablet Take 1 tablet by mouth 2 times daily 180 tablet 3    tamsulosin (FLOMAX) 0.4 MG capsule TAKE 1 CAPSULE BY MOUTH DAILY 90 capsule 3    metoprolol tartrate (LOPRESSOR) 50 MG tablet Take 1 tablet by mouth 2 times daily 180 tablet 3    niacin (NIASPAN) 500 MG CR tablet Take 500 mg by mouth 2 times daily. Two pills daily.  Omega-3 Fatty Acids (FISH OIL) 1200 MG CAPS Take 2,000 mg by mouth 2 times daily.  Multiple Vitamins-Minerals (MULTIVITAMIN PO) daily.  ASPIRIN PO 81 mg daily.  ondansetron (ZOFRAN) 4 MG tablet Take 4 mg by mouth every 6 hours as needed for Nausea or Vomiting Disp 60 with 3 refills called in 10/20/2020      Magic Mouthwash (MIRACLE MOUTHWASH) Swish and spit 5 mLs 4 times daily as needed for Irritation Disp 240 ml with 2 refills called in 10/20/2020      nitroGLYCERIN (NITROSTAT) 0.4 MG SL tablet Place 1 tablet under the tongue every 5 minutes as needed for Chest pain up to max of 3 total doses. If no relief after 1 dose, call 911. (Patient not taking: Reported on 11/3/2020) 25 tablet 3    albuterol sulfate HFA (VENTOLIN HFA) 108 (90 Base) MCG/ACT inhaler Inhale 2 puffs into the lungs every 6 hours as needed for Wheezing or Shortness of Breath (Patient not taking: Reported on 11/3/2020) 1 Inhaler 6     No current facility-administered medications for this visit. Allergies:   Effient [prasugrel];  Ace inhibitors; and Statins    Review of Systems:    Constitutional: No fever or chills. No night sweats, + weight loss   Eyes: No eye discharge, double vision, or eye pain   HEENT: negative for sore mouth, sore throat, hoarseness and voice change   Respiratory: negative for cough , sputum, dyspnea, wheezing, hemoptysis, chest pain   Cardiovascular: negative for chest pain, dyspnea, palpitations, orthopnea, PND   Gastrointestinal: negative for nausea, vomiting, diarrhea, constipation, abdominal pain, Dysphagia, hematemesis and hematochezia   Genitourinary: negative for frequency, dysuria, nocturia, urinary incontinence, and hematuria   Integument: negative for rash, skin lesions, bruises. Hematologic/Lymphatic: negative for easy bruising, bleeding, lymphadenopathy, or petechiae   Endocrine: negative for heat or cold intolerance,weight changes, change in bowel habits and hair loss   Musculoskeletal: negative for myalgias, arthralgias, pain, joint swelling,and bone pain   Neurological: negative for headaches, dizziness, seizures, weakness, numbness        Physical Exam:    PHYSICAL EXAMINATION:    Vital Signs: (As obtained by patient/caregiver or practitioner observation)    Blood pressure-  Heart rate-    Respiratory rate-    Temperature-  Pulse oximetry-     Constitutional: [x] Appears well-developed and well-nourished [x] No apparent distress      [] Abnormal-   Mental status  [x] Alert and awake  [x] Oriented to person/place/time [x]Able to follow commands      Eyes:  EOM    [x]  Normal  [] Abnormal-  Sclera  [x]  Normal  [] Abnormal -         Discharge [x]  None visible  [] Abnormal -    HENT:   [x] Normocephalic, atraumatic.   [] Abnormal   [x] Mouth/Throat: Mucous membranes are moist.     External Ears [x] Normal  [] Abnormal-     Neck: [x] No visualized mass     Pulmonary/Chest: [x] Respiratory effort normal.  [x] No visualized signs of difficulty breathing or respiratory distress        [] Abnormal-      Musculoskeletal: [] Normal gait with no signs of ataxia         [x] Normal range of motion of neck        [] Abnormal-       Neurological:        [x] No Facial Asymmetry (Cranial nerve 7 motor function) (limited exam to video visit)          [x] No gaze palsy        [] Abnormal-         Skin:        [x] No significant exanthematous lesions or discoloration noted on facial skin         [] Abnormal-            Psychiatric:       [x] Normal Affect [x] No Hallucinations        [] Abnormal-     Other pertinent observable physical exam findings-     Due to this being a TeleHealth encounter, evaluation of the following organ systems is limited: Vitals/Constitutional/EENT/Resp/CV/GI//MS/Neuro/Skin/Heme-Lymph-Imm.         DATA:    Results for orders placed or performed during the hospital encounter of 10/29/20   CBC Auto Differential   Result Value Ref Range    WBC 7.4 3.5 - 11.3 k/uL    RBC 4.01 (L) 4.21 - 5.77 m/uL    Hemoglobin 12.6 (L) 13.0 - 17.0 g/dL    Hematocrit 38.2 (L) 40.7 - 50.3 %    MCV 95.3 82.6 - 102.9 fL    MCH 31.4 25.2 - 33.5 pg    MCHC 33.0 25.2 - 33.5 g/dL    RDW 13.4 11.8 - 14.4 %    Platelets 742 349 - 353 k/uL    MPV 10.0 8.1 - 13.5 fL    NRBC Automated 0.0 0.0 per 100 WBC    Differential Type NOT REPORTED     WBC Morphology NOT REPORTED     RBC Morphology NOT REPORTED     Platelet Estimate NOT REPORTED     Monocytes 2 (L) 3 - 12 %    Lymphocytes 7 (L) 24 - 43 %    Seg Neutrophils 89 (H) 36 - 65 %    Eosinophils % 0 (L) 1 - 4 %    Basophils 0 0 - 2 %    Immature Granulocytes 2 (H) 0 %    Absolute Mono # 0.15 0.10 - 1.20 k/uL    Absolute Lymph # 0.52 (L) 1.10 - 3.70 k/uL    Segs Absolute 6.58 1.50 - 8.10 k/uL    Absolute Eos # 0.00 0.00 - 0.44 k/uL    Basophils Absolute 0.00 0.00 - 0.20 k/uL    Absolute Immature Granulocyte 0.15 0.00 - 0.30 k/uL    Morphology Platelet count adequate     Morphology RBC morphology normal.     Morphology TOXIC GRANULATION PRESENT    Comprehensive Metabolic Panel   Result Value Ref Range Glucose 209 (H) 70 - 99 mg/dL    BUN 19 8 - 23 mg/dL    CREATININE 0.89 0.70 - 1.20 mg/dL    Bun/Cre Ratio 21 (H) 9 - 20    Calcium 9.6 8.6 - 10.4 mg/dL    Sodium 138 135 - 144 mmol/L    Potassium 4.4 3.7 - 5.3 mmol/L    Chloride 102 98 - 107 mmol/L    CO2 25 20 - 31 mmol/L    Anion Gap 11 9 - 17 mmol/L    Alkaline Phosphatase 58 40 - 129 U/L    ALT 19 5 - 41 U/L    AST 18 <40 U/L    Total Bilirubin 0.38 0.3 - 1.2 mg/dL    Total Protein 6.9 6.4 - 8.3 g/dL    Alb 4.2 3.5 - 5.2 g/dL    Albumin/Globulin Ratio 1.6 1.0 - 2.5    GFR Non-African American >60 >60 mL/min    GFR African American >60 >60 mL/min    GFR Comment          GFR Staging NOT REPORTED      -- Diagnosis --   BAL RIGHT LOWER LOBE:           NEGATIVE FOR MALIGNANCY.             FINE NEEDLE ASPIRATION STATION 7 EBUS:           ADENOCARCINOMA, COMPATIBLE WITH LUNG PRIMARY. CT PET    Impression    1. The previously described nodule involving the right lung base is FDG avid. Tissue sampling is recommended as malignancy is suspected. 2. The previously identified smaller lung nodules involving the lower lobes    are too small for PET CT characterization.  CT follow-up is recommended. 3. FDG avid subcarinal and right hilar lymph nodes suggestive of metastatic    disease. 4. No abnormal FDG activity identified within the neck, abdomen or pelvis.           Mri Brain W Wo Contrast    Chronic microvascular disease without acute intracranial abnormality. No abnormal postcontrast enhancement. Impression:  Adenocarcinoma of right lung, clinical stage at least T1c, N2, M0 (stage IIIA)  Subcarinal lymph node metastasis  Weight loss    Plan:  I had a detailed discussion with the patient and personally went over results of lab work-up imaging studies and other relevant clinical data. Toxicity check performed. Patient is overall tolerating treatment without any unexpected or severe side effects. Patient is having heartburn.   If persist advised

## 2020-11-05 ENCOUNTER — HOSPITAL ENCOUNTER (OUTPATIENT)
Dept: INFUSION THERAPY | Age: 75
Discharge: HOME | End: 2020-11-05
Payer: MEDICARE

## 2020-11-05 VITALS
WEIGHT: 239.2 LBS | BODY MASS INDEX: 34.24 KG/M2 | SYSTOLIC BLOOD PRESSURE: 120 MMHG | DIASTOLIC BLOOD PRESSURE: 80 MMHG | OXYGEN SATURATION: 98 % | RESPIRATION RATE: 18 BRPM | HEART RATE: 89 BPM | HEIGHT: 70 IN | TEMPERATURE: 98.5 F

## 2020-11-05 DIAGNOSIS — C34.91 ADENOCARCINOMA OF RIGHT LUNG (HCC): Primary | ICD-10-CM

## 2020-11-05 LAB
ABSOLUTE EOS #: 0 K/UL (ref 0–0.44)
ABSOLUTE IMMATURE GRANULOCYTE: 0.04 K/UL (ref 0–0.3)
ABSOLUTE LYMPH #: 0.29 K/UL (ref 1.1–3.7)
ABSOLUTE MONO #: 0.18 K/UL (ref 0.1–1.2)
ALBUMIN SERPL-MCNC: 4.2 G/DL (ref 3.5–5.2)
ALBUMIN/GLOBULIN RATIO: 1.7 (ref 1–2.5)
ALP BLD-CCNC: 47 U/L (ref 40–129)
ALT SERPL-CCNC: 25 U/L (ref 5–41)
ANION GAP SERPL CALCULATED.3IONS-SCNC: 10 MMOL/L (ref 9–17)
AST SERPL-CCNC: 15 U/L
BASOPHILS # BLD: 0 % (ref 0–2)
BASOPHILS ABSOLUTE: 0 K/UL (ref 0–0.2)
BILIRUB SERPL-MCNC: 0.31 MG/DL (ref 0.3–1.2)
BUN BLDV-MCNC: 24 MG/DL (ref 8–23)
BUN/CREAT BLD: 26 (ref 9–20)
CALCIUM SERPL-MCNC: 9.4 MG/DL (ref 8.6–10.4)
CHLORIDE BLD-SCNC: 106 MMOL/L (ref 98–107)
CO2: 25 MMOL/L (ref 20–31)
CREAT SERPL-MCNC: 0.91 MG/DL (ref 0.7–1.2)
DIFFERENTIAL TYPE: ABNORMAL
EOSINOPHILS RELATIVE PERCENT: 0 % (ref 1–4)
GFR AFRICAN AMERICAN: >60 ML/MIN
GFR NON-AFRICAN AMERICAN: >60 ML/MIN
GFR SERPL CREATININE-BSD FRML MDRD: ABNORMAL ML/MIN/{1.73_M2}
GFR SERPL CREATININE-BSD FRML MDRD: ABNORMAL ML/MIN/{1.73_M2}
GLUCOSE BLD-MCNC: 202 MG/DL (ref 70–99)
HCT VFR BLD CALC: 36.3 % (ref 40.7–50.3)
HEMOGLOBIN: 11.9 G/DL (ref 13–17)
IMMATURE GRANULOCYTES: 1 %
LYMPHOCYTES # BLD: 8 % (ref 24–43)
MCH RBC QN AUTO: 31.6 PG (ref 25.2–33.5)
MCHC RBC AUTO-ENTMCNC: 32.8 G/DL (ref 25.2–33.5)
MCV RBC AUTO: 96.3 FL (ref 82.6–102.9)
MONOCYTES # BLD: 5 % (ref 3–12)
MORPHOLOGY: ABNORMAL
MORPHOLOGY: ABNORMAL
NRBC AUTOMATED: 0 PER 100 WBC
PDW BLD-RTO: 13.8 % (ref 11.8–14.4)
PLATELET # BLD: 123 K/UL (ref 138–453)
PLATELET ESTIMATE: ABNORMAL
PMV BLD AUTO: 9.6 FL (ref 8.1–13.5)
POTASSIUM SERPL-SCNC: 4.3 MMOL/L (ref 3.7–5.3)
RBC # BLD: 3.77 M/UL (ref 4.21–5.77)
RBC # BLD: ABNORMAL 10*6/UL
SEG NEUTROPHILS: 86 % (ref 36–65)
SEGMENTED NEUTROPHILS ABSOLUTE COUNT: 3.09 K/UL (ref 1.5–8.1)
SODIUM BLD-SCNC: 141 MMOL/L (ref 135–144)
TOTAL PROTEIN: 6.7 G/DL (ref 6.4–8.3)
WBC # BLD: 3.6 K/UL (ref 3.5–11.3)
WBC # BLD: ABNORMAL 10*3/UL

## 2020-11-05 PROCEDURE — 6360000002 HC RX W HCPCS: Performed by: INTERNAL MEDICINE

## 2020-11-05 PROCEDURE — 96417 CHEMO IV INFUS EACH ADDL SEQ: CPT

## 2020-11-05 PROCEDURE — 2500000003 HC RX 250 WO HCPCS: Performed by: INTERNAL MEDICINE

## 2020-11-05 PROCEDURE — 96375 TX/PRO/DX INJ NEW DRUG ADDON: CPT

## 2020-11-05 PROCEDURE — 85025 COMPLETE CBC W/AUTO DIFF WBC: CPT

## 2020-11-05 PROCEDURE — 96413 CHEMO IV INFUSION 1 HR: CPT

## 2020-11-05 PROCEDURE — 80053 COMPREHEN METABOLIC PANEL: CPT

## 2020-11-05 PROCEDURE — 2580000003 HC RX 258: Performed by: INTERNAL MEDICINE

## 2020-11-05 PROCEDURE — 36591 DRAW BLOOD OFF VENOUS DEVICE: CPT

## 2020-11-05 RX ORDER — HEPARIN SODIUM (PORCINE) LOCK FLUSH IV SOLN 100 UNIT/ML 100 UNIT/ML
500 SOLUTION INTRAVENOUS PRN
Status: CANCELLED | OUTPATIENT
Start: 2020-11-12

## 2020-11-05 RX ORDER — SODIUM CHLORIDE 9 MG/ML
20 INJECTION, SOLUTION INTRAVENOUS ONCE
Status: CANCELLED | OUTPATIENT
Start: 2020-11-05

## 2020-11-05 RX ORDER — PALONOSETRON 0.05 MG/ML
0.25 INJECTION, SOLUTION INTRAVENOUS ONCE
Status: CANCELLED | OUTPATIENT
Start: 2020-11-12

## 2020-11-05 RX ORDER — MEPERIDINE HYDROCHLORIDE 50 MG/ML
12.5 INJECTION INTRAMUSCULAR; INTRAVENOUS; SUBCUTANEOUS ONCE
Status: CANCELLED | OUTPATIENT
Start: 2020-11-05

## 2020-11-05 RX ORDER — EPINEPHRINE 1 MG/ML
0.3 INJECTION, SOLUTION, CONCENTRATE INTRAVENOUS PRN
Status: CANCELLED | OUTPATIENT
Start: 2020-11-05

## 2020-11-05 RX ORDER — METHYLPREDNISOLONE SODIUM SUCCINATE 125 MG/2ML
125 INJECTION, POWDER, LYOPHILIZED, FOR SOLUTION INTRAMUSCULAR; INTRAVENOUS ONCE
Status: CANCELLED | OUTPATIENT
Start: 2020-11-12

## 2020-11-05 RX ORDER — PALONOSETRON 0.05 MG/ML
0.25 INJECTION, SOLUTION INTRAVENOUS ONCE
Status: COMPLETED | OUTPATIENT
Start: 2020-11-05 | End: 2020-11-05

## 2020-11-05 RX ORDER — HEPARIN SODIUM (PORCINE) LOCK FLUSH IV SOLN 100 UNIT/ML 100 UNIT/ML
500 SOLUTION INTRAVENOUS PRN
Status: DISCONTINUED | OUTPATIENT
Start: 2020-11-05 | End: 2020-11-06 | Stop reason: HOSPADM

## 2020-11-05 RX ORDER — HEPARIN SODIUM (PORCINE) LOCK FLUSH IV SOLN 100 UNIT/ML 100 UNIT/ML
500 SOLUTION INTRAVENOUS PRN
Status: CANCELLED | OUTPATIENT
Start: 2020-11-05

## 2020-11-05 RX ORDER — MEPERIDINE HYDROCHLORIDE 50 MG/ML
12.5 INJECTION INTRAMUSCULAR; INTRAVENOUS; SUBCUTANEOUS ONCE
Status: CANCELLED | OUTPATIENT
Start: 2020-11-12

## 2020-11-05 RX ORDER — DIPHENHYDRAMINE HYDROCHLORIDE 50 MG/ML
50 INJECTION INTRAMUSCULAR; INTRAVENOUS ONCE
Status: CANCELLED | OUTPATIENT
Start: 2020-11-12

## 2020-11-05 RX ORDER — SODIUM CHLORIDE 9 MG/ML
20 INJECTION, SOLUTION INTRAVENOUS ONCE
Status: COMPLETED | OUTPATIENT
Start: 2020-11-05 | End: 2020-11-05

## 2020-11-05 RX ORDER — EPINEPHRINE 1 MG/ML
0.3 INJECTION, SOLUTION, CONCENTRATE INTRAVENOUS PRN
Status: CANCELLED | OUTPATIENT
Start: 2020-11-12

## 2020-11-05 RX ORDER — SODIUM CHLORIDE 0.9 % (FLUSH) 0.9 %
10 SYRINGE (ML) INJECTION PRN
Status: CANCELLED | OUTPATIENT
Start: 2020-11-05

## 2020-11-05 RX ORDER — SODIUM CHLORIDE 0.9 % (FLUSH) 0.9 %
10 SYRINGE (ML) INJECTION PRN
Status: CANCELLED | OUTPATIENT
Start: 2020-11-12

## 2020-11-05 RX ORDER — METHYLPREDNISOLONE SODIUM SUCCINATE 125 MG/2ML
125 INJECTION, POWDER, LYOPHILIZED, FOR SOLUTION INTRAMUSCULAR; INTRAVENOUS ONCE
Status: CANCELLED | OUTPATIENT
Start: 2020-11-05

## 2020-11-05 RX ORDER — SODIUM CHLORIDE 0.9 % (FLUSH) 0.9 %
5 SYRINGE (ML) INJECTION PRN
Status: CANCELLED | OUTPATIENT
Start: 2020-11-12

## 2020-11-05 RX ORDER — SODIUM CHLORIDE 0.9 % (FLUSH) 0.9 %
10 SYRINGE (ML) INJECTION PRN
Status: DISCONTINUED | OUTPATIENT
Start: 2020-11-05 | End: 2020-11-06 | Stop reason: HOSPADM

## 2020-11-05 RX ORDER — DIPHENHYDRAMINE HYDROCHLORIDE 50 MG/ML
50 INJECTION INTRAMUSCULAR; INTRAVENOUS ONCE
Status: CANCELLED | OUTPATIENT
Start: 2020-11-05

## 2020-11-05 RX ORDER — SODIUM CHLORIDE 9 MG/ML
INJECTION, SOLUTION INTRAVENOUS CONTINUOUS
Status: CANCELLED | OUTPATIENT
Start: 2020-11-05

## 2020-11-05 RX ORDER — PALONOSETRON 0.05 MG/ML
0.25 INJECTION, SOLUTION INTRAVENOUS ONCE
Status: CANCELLED | OUTPATIENT
Start: 2020-11-05

## 2020-11-05 RX ORDER — SODIUM CHLORIDE 9 MG/ML
INJECTION, SOLUTION INTRAVENOUS CONTINUOUS
Status: CANCELLED | OUTPATIENT
Start: 2020-11-12

## 2020-11-05 RX ORDER — DEXAMETHASONE SODIUM PHOSPHATE 10 MG/ML
10 INJECTION, SOLUTION INTRAMUSCULAR; INTRAVENOUS ONCE
Status: COMPLETED | OUTPATIENT
Start: 2020-11-05 | End: 2020-11-05

## 2020-11-05 RX ORDER — DIPHENHYDRAMINE HYDROCHLORIDE 50 MG/ML
50 INJECTION INTRAMUSCULAR; INTRAVENOUS ONCE
Status: COMPLETED | OUTPATIENT
Start: 2020-11-05 | End: 2020-11-05

## 2020-11-05 RX ORDER — SODIUM CHLORIDE 0.9 % (FLUSH) 0.9 %
5 SYRINGE (ML) INJECTION PRN
Status: CANCELLED | OUTPATIENT
Start: 2020-11-05

## 2020-11-05 RX ORDER — SODIUM CHLORIDE 9 MG/ML
20 INJECTION, SOLUTION INTRAVENOUS ONCE
Status: CANCELLED | OUTPATIENT
Start: 2020-11-12

## 2020-11-05 RX ADMIN — CARBOPLATIN 269 MG: 10 INJECTION, SOLUTION INTRAVENOUS at 11:47

## 2020-11-05 RX ADMIN — Medication 10 ML: at 07:55

## 2020-11-05 RX ADMIN — PACLITAXEL 102 MG: 6 INJECTION, SOLUTION, CONCENTRATE INTRAVENOUS at 10:43

## 2020-11-05 RX ADMIN — HEPARIN 500 UNITS: 100 SYRINGE at 12:27

## 2020-11-05 RX ADMIN — SODIUM CHLORIDE 20 ML/HR: 9 INJECTION, SOLUTION INTRAVENOUS at 07:56

## 2020-11-05 RX ADMIN — FAMOTIDINE 20 MG: 10 INJECTION, SOLUTION INTRAVENOUS at 10:08

## 2020-11-05 RX ADMIN — DEXAMETHASONE SODIUM PHOSPHATE 10 MG: 10 INJECTION, SOLUTION INTRAMUSCULAR; INTRAVENOUS at 10:04

## 2020-11-05 RX ADMIN — DIPHENHYDRAMINE HYDROCHLORIDE 50 MG: 50 INJECTION, SOLUTION INTRAMUSCULAR; INTRAVENOUS at 10:13

## 2020-11-05 RX ADMIN — PALONOSETRON 0.25 MG: 0.05 INJECTION, SOLUTION INTRAVENOUS at 10:10

## 2020-11-05 NOTE — PROGRESS NOTES
Patient on unit for weekly chemo. Mediport flushed, positive for blood return, labs drawn, NSS infusing. Patient denies any changes in toxicity assessment. Patient c/o difficulty sleeping with decadron, instructed patient to try to take second dose by 3pm to see if that will help his sleep pattern at night.

## 2020-11-10 ENCOUNTER — TELEPHONE (OUTPATIENT)
Dept: CASE MANAGEMENT | Age: 75
End: 2020-11-10

## 2020-11-10 NOTE — TELEPHONE ENCOUNTER
Name: Natanael hRodes  : 1945  MRN: [id number]    Oncology Navigation Follow-Up Note  Navigator reaching out to pt. Offering assistance. Pt. States, \"I feel pretty good\" Pt. Denies needing any assistance at this time. Plan to follow.  Pt. Received RTX daily in [facility] @ 2pm  Electronically signed by Lyubov Hernandez RN on 11/10/2020 at 12:21 PM

## 2020-11-12 ENCOUNTER — HOSPITAL ENCOUNTER (OUTPATIENT)
Dept: INFUSION THERAPY | Age: 75
Discharge: HOME | End: 2020-11-12
Payer: MEDICARE

## 2020-11-12 VITALS
WEIGHT: 242.6 LBS | SYSTOLIC BLOOD PRESSURE: 136 MMHG | BODY MASS INDEX: 34.73 KG/M2 | TEMPERATURE: 98 F | DIASTOLIC BLOOD PRESSURE: 89 MMHG | RESPIRATION RATE: 18 BRPM | OXYGEN SATURATION: 98 % | HEART RATE: 102 BPM | HEIGHT: 70 IN

## 2020-11-12 DIAGNOSIS — C34.91 ADENOCARCINOMA OF RIGHT LUNG (HCC): Primary | ICD-10-CM

## 2020-11-12 LAB
ABSOLUTE EOS #: 0 K/UL (ref 0–0.4)
ABSOLUTE IMMATURE GRANULOCYTE: 0.03 K/UL (ref 0–0.3)
ABSOLUTE LYMPH #: 0.22 K/UL (ref 1–4.8)
ABSOLUTE MONO #: 0.13 K/UL (ref 0.1–1.2)
ALBUMIN SERPL-MCNC: 4 G/DL (ref 3.5–5.2)
ALBUMIN/GLOBULIN RATIO: 1.7 (ref 1–2.5)
ALP BLD-CCNC: 47 U/L (ref 40–129)
ALT SERPL-CCNC: 18 U/L (ref 5–41)
ANION GAP SERPL CALCULATED.3IONS-SCNC: 12 MMOL/L (ref 9–17)
AST SERPL-CCNC: 15 U/L
BASOPHILS # BLD: 0 % (ref 0–2)
BASOPHILS ABSOLUTE: 0 K/UL (ref 0–0.2)
BILIRUB SERPL-MCNC: 0.17 MG/DL (ref 0.3–1.2)
BUN BLDV-MCNC: 23 MG/DL (ref 8–23)
BUN/CREAT BLD: 26 (ref 9–20)
CALCIUM SERPL-MCNC: 8.7 MG/DL (ref 8.6–10.4)
CHLORIDE BLD-SCNC: 105 MMOL/L (ref 98–107)
CO2: 21 MMOL/L (ref 20–31)
CREAT SERPL-MCNC: 0.89 MG/DL (ref 0.7–1.2)
DIFFERENTIAL TYPE: ABNORMAL
EOSINOPHILS RELATIVE PERCENT: 0 % (ref 1–8)
GFR AFRICAN AMERICAN: >60 ML/MIN
GFR NON-AFRICAN AMERICAN: >60 ML/MIN
GFR SERPL CREATININE-BSD FRML MDRD: ABNORMAL ML/MIN/{1.73_M2}
GFR SERPL CREATININE-BSD FRML MDRD: ABNORMAL ML/MIN/{1.73_M2}
GLUCOSE BLD-MCNC: 216 MG/DL (ref 70–99)
HCT VFR BLD CALC: 35.4 % (ref 40.7–50.3)
HEMOGLOBIN: 11.6 G/DL (ref 13–17)
IMMATURE GRANULOCYTES: 1 %
LYMPHOCYTES # BLD: 7 % (ref 15–43)
MCH RBC QN AUTO: 31.8 PG (ref 25.2–33.5)
MCHC RBC AUTO-ENTMCNC: 32.8 G/DL (ref 25.2–33.5)
MCV RBC AUTO: 97 FL (ref 82.6–102.9)
MONOCYTES # BLD: 4 % (ref 6–14)
MORPHOLOGY: ABNORMAL
NRBC AUTOMATED: 0 PER 100 WBC
PDW BLD-RTO: 14.1 % (ref 11.8–14.4)
PLATELET # BLD: 94 K/UL (ref 138–453)
PLATELET ESTIMATE: ABNORMAL
PMV BLD AUTO: 10 FL (ref 8.1–13.5)
POTASSIUM SERPL-SCNC: 4 MMOL/L (ref 3.7–5.3)
RBC # BLD: 3.65 M/UL (ref 4.21–5.77)
RBC # BLD: ABNORMAL 10*6/UL
SEG NEUTROPHILS: 88 % (ref 44–74)
SEGMENTED NEUTROPHILS ABSOLUTE COUNT: 2.82 K/UL (ref 1.8–7.7)
SODIUM BLD-SCNC: 138 MMOL/L (ref 135–144)
TOTAL PROTEIN: 6.4 G/DL (ref 6.4–8.3)
WBC # BLD: 3.2 K/UL (ref 3.5–11.3)
WBC # BLD: ABNORMAL 10*3/UL

## 2020-11-12 PROCEDURE — 2580000003 HC RX 258: Performed by: INTERNAL MEDICINE

## 2020-11-12 PROCEDURE — 96417 CHEMO IV INFUS EACH ADDL SEQ: CPT

## 2020-11-12 PROCEDURE — 80053 COMPREHEN METABOLIC PANEL: CPT

## 2020-11-12 PROCEDURE — 85025 COMPLETE CBC W/AUTO DIFF WBC: CPT

## 2020-11-12 PROCEDURE — 2500000003 HC RX 250 WO HCPCS: Performed by: INTERNAL MEDICINE

## 2020-11-12 PROCEDURE — 36591 DRAW BLOOD OFF VENOUS DEVICE: CPT

## 2020-11-12 PROCEDURE — 96413 CHEMO IV INFUSION 1 HR: CPT

## 2020-11-12 PROCEDURE — 96375 TX/PRO/DX INJ NEW DRUG ADDON: CPT

## 2020-11-12 PROCEDURE — 6360000002 HC RX W HCPCS: Performed by: INTERNAL MEDICINE

## 2020-11-12 RX ORDER — HEPARIN SODIUM (PORCINE) LOCK FLUSH IV SOLN 100 UNIT/ML 100 UNIT/ML
500 SOLUTION INTRAVENOUS PRN
Status: DISCONTINUED | OUTPATIENT
Start: 2020-11-12 | End: 2020-11-13 | Stop reason: HOSPADM

## 2020-11-12 RX ORDER — SODIUM CHLORIDE 0.9 % (FLUSH) 0.9 %
10 SYRINGE (ML) INJECTION PRN
Status: DISCONTINUED | OUTPATIENT
Start: 2020-11-12 | End: 2020-11-13 | Stop reason: HOSPADM

## 2020-11-12 RX ORDER — SODIUM CHLORIDE 9 MG/ML
20 INJECTION, SOLUTION INTRAVENOUS ONCE
Status: COMPLETED | OUTPATIENT
Start: 2020-11-12 | End: 2020-11-13

## 2020-11-12 RX ORDER — PALONOSETRON 0.05 MG/ML
0.25 INJECTION, SOLUTION INTRAVENOUS ONCE
Status: COMPLETED | OUTPATIENT
Start: 2020-11-12 | End: 2020-11-12

## 2020-11-12 RX ORDER — DIPHENHYDRAMINE HYDROCHLORIDE 50 MG/ML
50 INJECTION INTRAMUSCULAR; INTRAVENOUS ONCE
Status: COMPLETED | OUTPATIENT
Start: 2020-11-12 | End: 2020-11-12

## 2020-11-12 RX ORDER — DEXAMETHASONE SODIUM PHOSPHATE 10 MG/ML
10 INJECTION, SOLUTION INTRAMUSCULAR; INTRAVENOUS ONCE
Status: COMPLETED | OUTPATIENT
Start: 2020-11-12 | End: 2020-11-12

## 2020-11-12 RX ADMIN — DEXAMETHASONE SODIUM PHOSPHATE 10 MG: 10 INJECTION, SOLUTION INTRAMUSCULAR; INTRAVENOUS at 09:40

## 2020-11-12 RX ADMIN — Medication 10 ML: at 08:00

## 2020-11-12 RX ADMIN — SODIUM CHLORIDE 20 ML/HR: 9 INJECTION, SOLUTION INTRAVENOUS at 08:00

## 2020-11-12 RX ADMIN — CARBOPLATIN 276 MG: 10 INJECTION, SOLUTION INTRAVENOUS at 11:16

## 2020-11-12 RX ADMIN — PACLITAXEL 102 MG: 6 INJECTION, SOLUTION, CONCENTRATE INTRAVENOUS at 10:15

## 2020-11-12 RX ADMIN — PALONOSETRON 0.25 MG: 0.05 INJECTION, SOLUTION INTRAVENOUS at 09:39

## 2020-11-12 RX ADMIN — FAMOTIDINE 20 MG: 10 INJECTION, SOLUTION INTRAVENOUS at 09:36

## 2020-11-12 RX ADMIN — DIPHENHYDRAMINE HYDROCHLORIDE 50 MG: 50 INJECTION, SOLUTION INTRAMUSCULAR; INTRAVENOUS at 09:43

## 2020-11-12 ASSESSMENT — PAIN SCALES - GENERAL: PAINLEVEL_OUTOF10: 0

## 2020-11-12 NOTE — PROGRESS NOTES
VAD accessed per protocol with 3/4 inch 20 gauge [place] needle. Flushed easily with saline 10 ml. Good blood return. Labs drawn. Secured accessed port with transparent dressing. IV infusing NSS.

## 2020-11-12 NOTE — PROGRESS NOTES
Infusion complete. Flushed VAD with 10 ml of saline and 5 ml of heparin flush. D/C [place] needle. Band aid to site. Patient stable and discharged to home with wife.

## 2020-11-17 ENCOUNTER — VIRTUAL VISIT (OUTPATIENT)
Dept: ONCOLOGY | Age: 75
End: 2020-11-17
Payer: MEDICARE

## 2020-11-17 PROCEDURE — G8427 DOCREV CUR MEDS BY ELIG CLIN: HCPCS | Performed by: INTERNAL MEDICINE

## 2020-11-17 PROCEDURE — 99214 OFFICE O/P EST MOD 30 MIN: CPT | Performed by: INTERNAL MEDICINE

## 2020-11-17 PROCEDURE — 4040F PNEUMOC VAC/ADMIN/RCVD: CPT | Performed by: INTERNAL MEDICINE

## 2020-11-17 PROCEDURE — 3017F COLORECTAL CA SCREEN DOC REV: CPT | Performed by: INTERNAL MEDICINE

## 2020-11-17 PROCEDURE — 1123F ACP DISCUSS/DSCN MKR DOCD: CPT | Performed by: INTERNAL MEDICINE

## 2020-11-17 PROCEDURE — 99211 OFF/OP EST MAY X REQ PHY/QHP: CPT

## 2020-11-17 RX ORDER — DIPHENHYDRAMINE HYDROCHLORIDE 50 MG/ML
50 INJECTION INTRAMUSCULAR; INTRAVENOUS ONCE
Status: CANCELLED | OUTPATIENT
Start: 2020-11-19

## 2020-11-17 RX ORDER — PALONOSETRON 0.05 MG/ML
0.25 INJECTION, SOLUTION INTRAVENOUS ONCE
Status: CANCELLED | OUTPATIENT
Start: 2020-11-19

## 2020-11-17 RX ORDER — EPINEPHRINE 1 MG/ML
0.3 INJECTION, SOLUTION, CONCENTRATE INTRAVENOUS PRN
Status: CANCELLED | OUTPATIENT
Start: 2020-11-19

## 2020-11-17 RX ORDER — SODIUM CHLORIDE 0.9 % (FLUSH) 0.9 %
10 SYRINGE (ML) INJECTION PRN
Status: CANCELLED | OUTPATIENT
Start: 2020-11-19

## 2020-11-17 RX ORDER — SODIUM CHLORIDE 0.9 % (FLUSH) 0.9 %
5 SYRINGE (ML) INJECTION PRN
Status: CANCELLED | OUTPATIENT
Start: 2020-11-19

## 2020-11-17 RX ORDER — SODIUM CHLORIDE 9 MG/ML
INJECTION, SOLUTION INTRAVENOUS CONTINUOUS
Status: CANCELLED | OUTPATIENT
Start: 2020-11-19

## 2020-11-17 RX ORDER — MEPERIDINE HYDROCHLORIDE 50 MG/ML
12.5 INJECTION INTRAMUSCULAR; INTRAVENOUS; SUBCUTANEOUS ONCE
Status: CANCELLED | OUTPATIENT
Start: 2020-11-19

## 2020-11-17 RX ORDER — METHYLPREDNISOLONE SODIUM SUCCINATE 125 MG/2ML
125 INJECTION, POWDER, LYOPHILIZED, FOR SOLUTION INTRAMUSCULAR; INTRAVENOUS ONCE
Status: CANCELLED | OUTPATIENT
Start: 2020-11-19

## 2020-11-17 RX ORDER — HEPARIN SODIUM (PORCINE) LOCK FLUSH IV SOLN 100 UNIT/ML 100 UNIT/ML
500 SOLUTION INTRAVENOUS PRN
Status: CANCELLED | OUTPATIENT
Start: 2020-11-19

## 2020-11-17 RX ORDER — SODIUM CHLORIDE 9 MG/ML
20 INJECTION, SOLUTION INTRAVENOUS ONCE
Status: CANCELLED | OUTPATIENT
Start: 2020-11-19

## 2020-11-17 NOTE — PROGRESS NOTES
Eliza Mak                                                                                                                  11/17/2020  MRN:   [id number]  YOB: 1945  PCP:                           Kala Ngo DO  Referring Physician: No ref. provider found  Treating Physician Name: Abdias Tinajero MD      Reason for visit:  Chief Complaint   Patient presents with    Lung Cancer   Toxicity check. Current problems:  Adenocarcinoma of right lung, clinical stage at least T1c, N2, M0 (stage IIIA)  Subcarinal lymph node metastasis    Active and recent treatments:  concurrent chemoradiation using carboplatin and Taxol    Summary of Case/History:    Eliza Mak a 77 y.o.male is a patient with biopsy confirmed adenocarcinoma of right lung presents to the office to establish care and for further evaluation treatment recommendations. Patient was initially seen by pulmonary team for lung nodules. Patient CT scan from July 2019 showed a right-sided pleural calcification thickening concerning for asbestos exposure. Patient also noted to have multiple pulmonary nodules measuring between 1.5 x 1.3 cm. Follow-up CT chest from July 2020 showed increase in size of the right lower lobe lung nodule which now measured 2.1 cm. Subsequently patient underwent CT PET on 8/22 which showed FDG avid right lower lobe lung nodule with SUV of 4.6. Patient CT PET was also positive for subcarinal lymph node with SUV of 4.6. Patient underwent bronchoscopy with endobronchial ultrasound biopsy of the subcarinal lymph node confirmed adenocarcinoma. Patient has significant history of tobacco dependence around 30-pack-year however he quit about 25 years ago. Patient does have coronary artery disease status post stent placement. Patient also gives history of occupational asthma and exposure to bacteria.   Patient does give history of weight loss but describes it as intentional.  Denies headaches dizziness chest pain abdominal pain nausea bone pain     Interim History:     Patient is seen in the office via virtual visit due to ongoing coronavirus pandemic. Overall patient is doing well. Tolerating treatment without unexpected or severe side effects. Weight is stable. Appetite is fair. Denies hospitalization ER visit. Denies fever chills  During this visit patient's allergy, social, medical, surgical history and medications were reviewed and updated. Past Medical History:   Past Medical History:   Diagnosis Date    Abdominal hernia     small, no significant symptomatology.  Abnormal PSA     with history of slight increase.  Allergic rhinitis     Benign prostatic hypertrophy     Chest pain     occasional.     Coronary artery disease     status post drug eluting stent placement, LAD, ostial obtuse marginal.     Dysphagia     Erectile dysfunction     Familial hyperlipidemia     with hypertriglyceridemia.  Gastroesophageal reflux disease     Hearing loss, bilateral     hearing aid in the left ear only. 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     Hypertension     Impaired fasting glucose     prediabetes.  Lumbar disc herniation     L3-L4, with chronic back pain.  Mild anemia     Nasal polyps     minimal symptoms.  Nasal septal deviation     right side.  Obesity     Palpitations     occasional.     Peptic ulcer disease     Reactive airway disease     asthma, industrial related, also a history of hypersensitivity pneumonitis.      Urinary frequency        Past Surgical History:     Past Surgical History:   Procedure Laterality Date    APPENDECTOMY  age 11    BRONCHOSCOPY  09/17/2020    BRONCHOSCOPY N/A 9/17/2020    EBUS- BRONCHOSCOPY ENDOBRONCHIAL ULTRASOUND, PATHOLOGY REQUESTED- CINDY NOTIFIED, GI UNIT SCHEDULED performed by Aj Travis MD at [address] [facility]  10/20/2011    occluded LAD and ostial obtuse marginal stenosis, underwent drug eluting stents to both, RCA small, nondominant, occluded, ejection fraction 45%.  CARDIAC CATHETERIZATION  08/2018    with stent placement    CATARACT REMOVAL Bilateral 03/2018    COLONOSCOPY  01/21/2011    mild sigmoid diverticulosis.  COLONOSCOPY  4/6/16    hemorrhoid; sigmoid diverticulosis    KNEE ARTHROSCOPY Left 03/19/2010    partial medial and lateral synovectomies, partial medial meniscectomy, chondroplasty.  NASAL FRACTURE SURGERY  1960    OTHER SURGICAL HISTORY Left 02/12/2010    knee injection.  PORT SURGERY N/A 10/14/2020    RIGHT PORT INSERTION performed by Anish Tierney DO at [facility] PRE-MAL[facility] / [address] Left 01/16/2012    actinic keratoses, ear, liquid nitrogen.  PRE-MALIGNANT / BENIGN SKIN LESION EXCISION Left 07/19/2011    actinic keratosis, upper ear, liquid nitrogen.  PROSTATE BIOPSY Bilateral 09/08/2015    Benign    REPAIR UMBILICAL RSVH,4+Z/J,UDJWS N/A 9/26/7409    UMBILICAL Hernia Repair w/ Mesh performed by Joey Rasmussen MD at [facility] SEPTOPLASTY  10/30/2015    with bilateral submucosal resection of the inferior turbinates, left middle turbinate elza bullosa resection done by Dr Vásquez Constant ARTHROSCOPY Left 10/17/14    W/ SUBACROMIAL DECOMPRESSION, DEBRIDEMENT ET CHONDROPLASTY    UPPER GASTROINTESTINAL ENDOSCOPY  01/21/2011    superficial ulcer of the fundus, erostive gastritis.      VASECTOMY Bilateral 04/04/1980       Patient Family Social History:     Social History     Socioeconomic History    Marital status:      Spouse name: None    Number of children: None    Years of education: None    Highest education level: None   Occupational History    None   Social Needs    Financial resource strain: None    Food insecurity     Worry: None     Inability: None    Transportation needs     Medical: None     Non-medical: None   Tobacco Use    Smoking status: Former Smoker     Packs/day: 2.00     Years: 15.00     Pack MOUTH ONE TIME A DAY  90 tablet 1    furosemide (LASIX) 20 MG tablet TAKE 1 TABLET BY MOUTH ONE TIME A DAY  90 tablet 1    clopidogrel (PLAVIX) 75 MG tablet TAKE 1 TABLET BY MOUTH DAILY 90 tablet 3    pravastatin (PRAVACHOL) 40 MG tablet TAKE ONE TABLET BY MOUTH ONCE EVERY EVENING 90 tablet 3    doxazosin (CARDURA) 2 MG tablet Take 1 tablet by mouth 2 times daily 180 tablet 3    tamsulosin (FLOMAX) 0.4 MG capsule TAKE 1 CAPSULE BY MOUTH DAILY 90 capsule 3    metoprolol tartrate (LOPRESSOR) 50 MG tablet Take 1 tablet by mouth 2 times daily 180 tablet 3    niacin (NIASPAN) 500 MG CR tablet Take 500 mg by mouth 2 times daily. Two pills daily.  Omega-3 Fatty Acids (FISH OIL) 1200 MG CAPS Take 2,000 mg by mouth 2 times daily.  Multiple Vitamins-Minerals (MULTIVITAMIN PO) daily.  ASPIRIN PO 81 mg daily.  ondansetron (ZOFRAN) 4 MG tablet Take 4 mg by mouth every 6 hours as needed for Nausea or Vomiting Disp 60 with 3 refills called in 10/20/2020      Magic Mouthwash (MIRACLE MOUTHWASH) Swish and spit 5 mLs 4 times daily as needed for Irritation Disp 240 ml with 2 refills called in 10/20/2020      nitroGLYCERIN (NITROSTAT) 0.4 MG SL tablet Place 1 tablet under the tongue every 5 minutes as needed for Chest pain up to max of 3 total doses. If no relief after 1 dose, call 911. (Patient not taking: Reported on 11/3/2020) 25 tablet 3    albuterol sulfate HFA (VENTOLIN HFA) 108 (90 Base) MCG/ACT inhaler Inhale 2 puffs into the lungs every 6 hours as needed for Wheezing or Shortness of Breath (Patient not taking: Reported on 11/3/2020) 1 Inhaler 6     No current facility-administered medications for this visit. Allergies:   Effient [prasugrel]; Ace inhibitors; and Statins    Review of Systems:    Constitutional: No fever or chills.  No night sweats, + weight loss   Eyes: No eye discharge, double vision, or eye pain   HEENT: negative for sore mouth, sore throat, hoarseness and voice change Respiratory: negative for cough , sputum, dyspnea, wheezing, hemoptysis, chest pain   Cardiovascular: negative for chest pain, dyspnea, palpitations, orthopnea, PND   Gastrointestinal: negative for nausea, vomiting, diarrhea, constipation, abdominal pain, Dysphagia, hematemesis and hematochezia   Genitourinary: negative for frequency, dysuria, nocturia, urinary incontinence, and hematuria   Integument: negative for rash, skin lesions, bruises. Hematologic/Lymphatic: negative for easy bruising, bleeding, lymphadenopathy, or petechiae   Endocrine: negative for heat or cold intolerance,weight changes, change in bowel habits and hair loss   Musculoskeletal: negative for myalgias, arthralgias, pain, joint swelling,and bone pain   Neurological: negative for headaches, dizziness, seizures, weakness, numbness        Physical Exam:    PHYSICAL EXAMINATION:    Vital Signs: (As obtained by patient/caregiver or practitioner observation)    Blood pressure-  Heart rate-    Respiratory rate-    Temperature-  Pulse oximetry-     Constitutional: [x] Appears well-developed and well-nourished [x] No apparent distress      [] Abnormal-   Mental status  [x] Alert and awake  [x] Oriented to person/place/time [x]Able to follow commands      Eyes:  EOM    [x]  Normal  [] Abnormal-  Sclera  [x]  Normal  [] Abnormal -         Discharge [x]  None visible  [] Abnormal -    HENT:   [x] Normocephalic, atraumatic.   [] Abnormal   [x] Mouth/Throat: Mucous membranes are moist.     External Ears [x] Normal  [] Abnormal-     Neck: [x] No visualized mass     Pulmonary/Chest: [x] Respiratory effort normal.  [x] No visualized signs of difficulty breathing or respiratory distress        [] Abnormal-      Musculoskeletal:   [] Normal gait with no signs of ataxia         [x] Normal range of motion of neck        [] Abnormal-       Neurological:        [x] No Facial Asymmetry (Cranial nerve 7 motor function) (limited exam to video visit)          [x] No gaze palsy        [] Abnormal-         Skin:        [x] No significant exanthematous lesions or discoloration noted on facial skin         [] Abnormal-            Psychiatric:       [x] Normal Affect [x] No Hallucinations        [] Abnormal-     Other pertinent observable physical exam findings-     Due to this being a TeleHealth encounter, evaluation of the following organ systems is limited: Vitals/Constitutional/EENT/Resp/CV/GI//MS/Neuro/Skin/Heme-Lymph-Imm.         DATA:    Results for orders placed or performed during the hospital encounter of 11/12/20   CBC Auto Differential   Result Value Ref Range    WBC 3.2 (L) 3.5 - 11.3 k/uL    RBC 3.65 (L) 4.21 - 5.77 m/uL    Hemoglobin 11.6 (L) 13.0 - 17.0 g/dL    Hematocrit 35.4 (L) 40.7 - 50.3 %    MCV 97.0 82.6 - 102.9 fL    MCH 31.8 25.2 - 33.5 pg    MCHC 32.8 25.2 - 33.5 g/dL    RDW 14.1 11.8 - 14.4 %    Platelets 94 (L) 809 - 453 k/uL    MPV 10.0 8.1 - 13.5 fL    NRBC Automated 0.0 0.0 per 100 WBC    Differential Type NOT REPORTED     WBC Morphology NOT REPORTED     RBC Morphology NOT REPORTED     Platelet Estimate NOT REPORTED     Monocytes 4 (L) 6 - 14 %    Lymphocytes 7 (L) 15 - 43 %    Seg Neutrophils 88 (H) 44 - 74 %    Eosinophils % 0 (L) 1 - 8 %    Basophils 0 0 - 2 %    Immature Granulocytes 1 (H) 0 %    Absolute Mono # 0.13 0.1 - 1.2 k/uL    Absolute Lymph # 0.22 (L) 1.0 - 4.8 k/uL    Segs Absolute 2.82 1.8 - 7.7 k/uL    Absolute Eos # 0.00 0.0 - 0.4 k/uL    Basophils Absolute 0.00 0.0 - 0.2 k/uL    Absolute Immature Granulocyte 0.03 0.00 - 0.30 k/uL    Morphology Platelet scan shows Decreased Platelets     Morphology RBC morphology normal.     Morphology TOXIC GRANULATION PRESENT    Comprehensive Metabolic Panel   Result Value Ref Range    Glucose 216 (H) 70 - 99 mg/dL    BUN 23 8 - 23 mg/dL    CREATININE 0.89 0.70 - 1.20 mg/dL    Bun/Cre Ratio 26 (H) 9 - 20    Calcium 8.7 8.6 - 10.4 mg/dL    Sodium 138 135 - 144 mmol/L    Potassium 4.0 3.7 - 5.3 mmol/L Chloride 105 98 - 107 mmol/L    CO2 21 20 - 31 mmol/L    Anion Gap 12 9 - 17 mmol/L    Alkaline Phosphatase 47 40 - 129 U/L    ALT 18 5 - 41 U/L    AST 15 <40 U/L    Total Bilirubin 0.17 (L) 0.3 - 1.2 mg/dL    Total Protein 6.4 6.4 - 8.3 g/dL    Alb 4.0 3.5 - 5.2 g/dL    Albumin/Globulin Ratio 1.7 1.0 - 2.5    GFR Non-African American >60 >60 mL/min    GFR African American >60 >60 mL/min    GFR Comment          GFR Staging NOT REPORTED      -- Diagnosis --   BAL RIGHT LOWER LOBE:           NEGATIVE FOR MALIGNANCY.             FINE NEEDLE ASPIRATION STATION 7 EBUS:           ADENOCARCINOMA, COMPATIBLE WITH LUNG PRIMARY. CT PET    Impression    1. The previously described nodule involving the right lung base is FDG avid. Tissue sampling is recommended as malignancy is suspected. 2. The previously identified smaller lung nodules involving the lower lobes    are too small for PET CT characterization.  CT follow-up is recommended. 3. FDG avid subcarinal and right hilar lymph nodes suggestive of metastatic    disease. 4. No abnormal FDG activity identified within the neck, abdomen or pelvis.           Mri Brain W Wo Contrast    Chronic microvascular disease without acute intracranial abnormality. No abnormal postcontrast enhancement. Impression:  Adenocarcinoma of right lung, clinical stage at least T1c, N2, M0 (stage IIIA)  Subcarinal lymph node metastasis  Weight loss    Plan:  I had a detailed discussion with the patient and personally went over results of lab work-up imaging studies and other relevant clinical data. Toxicity check performed. Patient is tolerating treatment without unexpected or severe side effects. Reiterated treatment plan. After conclusion of chemoradiation we will proceed with consolidation immunotherapy if patient does not have any evidence of disease progression  We will see patient back in office after conclusion of chemoradiation  Reiterated potential side effects. Reviewed logistics. Encourage patient to keep himself hydrated. If needed we will add IV fluid hydration  NCCN guidelines were reviewed and discussed with the patient. The diagnosis and care plan were discussed with the patient in detail. I discussed the natural history of the disease, prognosis, risks and goals of therapy and answered all the patients questions to the best of my ability. Patient expressed understanding and was in agreement. [address] [place] [address] is a 77 y.o. male being evaluated by a Virtual Visit (video visit) encounter to address concerns as mentioned above. A caregiver was present when appropriate. Due to this being a TeleHealth encounter (During [facility] health emergency), evaluation of the following organ systems was limited: Vitals/Constitutional/EENT/Resp/CV/GI//MS/Neuro/Skin/Heme-Lymph-Imm. Pursuant to the emergency declaration under the [address] [place] [address], [address] [place] [address] authority and the [employer] Act, this Virtual Visit was conducted with patient's (and/or legal guardian's) consent, to reduce the patient's risk of exposure to COVID-19 and provide necessary medical care. The patient (and/or legal guardian) has also been advised to contact this office for worsening conditions or problems, and seek emergency medical treatment and/or call 911 if deemed necessary. Patient identification was verified at the start of the visit: Yes    Total time spent for this encounter: Not billed by time    Services were provided through a video synchronous discussion virtually to substitute for in-person clinic visit. Patient and provider were located at their individual homes.    --Abdias Tinajero MD on 11/28/2020 at 10:44 PM    An electronic signature was used to authenticate this note.     This note is created with the assistance of a speech recognition program.  While intending to generate a document that actually reflects the content of the visit, the document can still have some errors including those of syntax and sound a like substitutions which may escape proof reading. It such instances, actual meaning can be extrapolated by contextual diversion.

## 2020-11-19 ENCOUNTER — HOSPITAL ENCOUNTER (OUTPATIENT)
Dept: INFUSION THERAPY | Age: 75
Discharge: HOME | End: 2020-11-19
Payer: MEDICARE

## 2020-11-19 VITALS
DIASTOLIC BLOOD PRESSURE: 72 MMHG | BODY MASS INDEX: 34.59 KG/M2 | TEMPERATURE: 97.3 F | HEART RATE: 73 BPM | RESPIRATION RATE: 18 BRPM | HEIGHT: 70 IN | SYSTOLIC BLOOD PRESSURE: 126 MMHG | WEIGHT: 241.6 LBS

## 2020-11-19 DIAGNOSIS — C34.91 ADENOCARCINOMA OF RIGHT LUNG (HCC): Primary | ICD-10-CM

## 2020-11-19 LAB
ABSOLUTE EOS #: 0 K/UL (ref 0–0.44)
ABSOLUTE IMMATURE GRANULOCYTE: 0.02 K/UL (ref 0–0.3)
ABSOLUTE LYMPH #: 0.24 K/UL (ref 1.1–3.7)
ABSOLUTE MONO #: 0.1 K/UL (ref 0.1–1.2)
ALBUMIN SERPL-MCNC: 4.2 G/DL (ref 3.5–5.2)
ALBUMIN/GLOBULIN RATIO: 1.6 (ref 1–2.5)
ALP BLD-CCNC: 48 U/L (ref 40–129)
ALT SERPL-CCNC: 28 U/L (ref 5–41)
ANION GAP SERPL CALCULATED.3IONS-SCNC: 12 MMOL/L (ref 9–17)
AST SERPL-CCNC: 18 U/L
BASOPHILS # BLD: 0 % (ref 0–2)
BASOPHILS ABSOLUTE: 0 K/UL (ref 0–0.2)
BILIRUB SERPL-MCNC: 0.23 MG/DL (ref 0.3–1.2)
BUN BLDV-MCNC: 23 MG/DL (ref 8–23)
BUN/CREAT BLD: 26 (ref 9–20)
CALCIUM SERPL-MCNC: 9.7 MG/DL (ref 8.6–10.4)
CHLORIDE BLD-SCNC: 104 MMOL/L (ref 98–107)
CO2: 23 MMOL/L (ref 20–31)
CREAT SERPL-MCNC: 0.88 MG/DL (ref 0.7–1.2)
DIFFERENTIAL TYPE: ABNORMAL
EOSINOPHILS RELATIVE PERCENT: 0 % (ref 1–4)
GFR AFRICAN AMERICAN: >60 ML/MIN
GFR NON-AFRICAN AMERICAN: >60 ML/MIN
GFR SERPL CREATININE-BSD FRML MDRD: ABNORMAL ML/MIN/{1.73_M2}
GFR SERPL CREATININE-BSD FRML MDRD: ABNORMAL ML/MIN/{1.73_M2}
GLUCOSE BLD-MCNC: 217 MG/DL (ref 70–99)
HCT VFR BLD CALC: 35.2 % (ref 40.7–50.3)
HEMOGLOBIN: 11.7 G/DL (ref 13–17)
IMMATURE GRANULOCYTES: 1 %
LYMPHOCYTES # BLD: 12 % (ref 24–43)
MCH RBC QN AUTO: 32.4 PG (ref 25.2–33.5)
MCHC RBC AUTO-ENTMCNC: 33.2 G/DL (ref 25.2–33.5)
MCV RBC AUTO: 97.5 FL (ref 82.6–102.9)
MONOCYTES # BLD: 5 % (ref 3–12)
MORPHOLOGY: ABNORMAL
NRBC AUTOMATED: 0 PER 100 WBC
PDW BLD-RTO: 14.4 % (ref 11.8–14.4)
PLATELET # BLD: 87 K/UL (ref 138–453)
PLATELET ESTIMATE: ABNORMAL
PMV BLD AUTO: 10 FL (ref 8.1–13.5)
POTASSIUM SERPL-SCNC: 4.1 MMOL/L (ref 3.7–5.3)
RBC # BLD: 3.61 M/UL (ref 4.21–5.77)
RBC # BLD: ABNORMAL 10*6/UL
SEG NEUTROPHILS: 82 % (ref 36–65)
SEGMENTED NEUTROPHILS ABSOLUTE COUNT: 1.64 K/UL (ref 1.5–8.1)
SODIUM BLD-SCNC: 139 MMOL/L (ref 135–144)
TOTAL PROTEIN: 6.9 G/DL (ref 6.4–8.3)
WBC # BLD: 2 K/UL (ref 3.5–11.3)
WBC # BLD: ABNORMAL 10*3/UL

## 2020-11-19 PROCEDURE — 2500000003 HC RX 250 WO HCPCS: Performed by: INTERNAL MEDICINE

## 2020-11-19 PROCEDURE — 6360000002 HC RX W HCPCS: Performed by: INTERNAL MEDICINE

## 2020-11-19 PROCEDURE — 80053 COMPREHEN METABOLIC PANEL: CPT

## 2020-11-19 PROCEDURE — 96375 TX/PRO/DX INJ NEW DRUG ADDON: CPT

## 2020-11-19 PROCEDURE — 85025 COMPLETE CBC W/AUTO DIFF WBC: CPT

## 2020-11-19 PROCEDURE — 36591 DRAW BLOOD OFF VENOUS DEVICE: CPT

## 2020-11-19 PROCEDURE — 96413 CHEMO IV INFUSION 1 HR: CPT

## 2020-11-19 PROCEDURE — 96417 CHEMO IV INFUS EACH ADDL SEQ: CPT

## 2020-11-19 PROCEDURE — 2580000003 HC RX 258: Performed by: INTERNAL MEDICINE

## 2020-11-19 RX ORDER — PALONOSETRON 0.05 MG/ML
0.25 INJECTION, SOLUTION INTRAVENOUS ONCE
Status: COMPLETED | OUTPATIENT
Start: 2020-11-19 | End: 2020-11-19

## 2020-11-19 RX ORDER — DEXAMETHASONE SODIUM PHOSPHATE 10 MG/ML
10 INJECTION, SOLUTION INTRAMUSCULAR; INTRAVENOUS ONCE
Status: COMPLETED | OUTPATIENT
Start: 2020-11-19 | End: 2020-11-19

## 2020-11-19 RX ORDER — DIPHENHYDRAMINE HYDROCHLORIDE 50 MG/ML
50 INJECTION INTRAMUSCULAR; INTRAVENOUS ONCE
Status: COMPLETED | OUTPATIENT
Start: 2020-11-19 | End: 2020-11-19

## 2020-11-19 RX ORDER — HEPARIN SODIUM (PORCINE) LOCK FLUSH IV SOLN 100 UNIT/ML 100 UNIT/ML
500 SOLUTION INTRAVENOUS PRN
Status: DISCONTINUED | OUTPATIENT
Start: 2020-11-19 | End: 2020-11-20 | Stop reason: HOSPADM

## 2020-11-19 RX ORDER — SODIUM CHLORIDE 9 MG/ML
20 INJECTION, SOLUTION INTRAVENOUS ONCE
Status: COMPLETED | OUTPATIENT
Start: 2020-11-19 | End: 2020-11-19

## 2020-11-19 RX ORDER — SODIUM CHLORIDE 0.9 % (FLUSH) 0.9 %
10 SYRINGE (ML) INJECTION PRN
Status: DISCONTINUED | OUTPATIENT
Start: 2020-11-19 | End: 2020-11-20 | Stop reason: HOSPADM

## 2020-11-19 RX ADMIN — HEPARIN 500 UNITS: 100 SYRINGE at 11:28

## 2020-11-19 RX ADMIN — CARBOPLATIN 278 MG: 10 INJECTION, SOLUTION INTRAVENOUS at 10:49

## 2020-11-19 RX ADMIN — PALONOSETRON 0.25 MG: 0.05 INJECTION, SOLUTION INTRAVENOUS at 09:15

## 2020-11-19 RX ADMIN — DIPHENHYDRAMINE HYDROCHLORIDE 50 MG: 50 INJECTION, SOLUTION INTRAMUSCULAR; INTRAVENOUS at 09:17

## 2020-11-19 RX ADMIN — Medication 10 ML: at 08:00

## 2020-11-19 RX ADMIN — SODIUM CHLORIDE 20 ML/HR: 9 INJECTION, SOLUTION INTRAVENOUS at 08:00

## 2020-11-19 RX ADMIN — DEXAMETHASONE SODIUM PHOSPHATE 10 MG: 10 INJECTION, SOLUTION INTRAMUSCULAR; INTRAVENOUS at 09:07

## 2020-11-19 RX ADMIN — FAMOTIDINE 20 MG: 10 INJECTION, SOLUTION INTRAVENOUS at 09:12

## 2020-11-19 RX ADMIN — PACLITAXEL 102 MG: 6 INJECTION, SOLUTION, CONCENTRATE INTRAVENOUS at 09:43

## 2020-11-19 ASSESSMENT — PAIN SCALES - GENERAL: PAINLEVEL_OUTOF10: 0

## 2020-11-19 NOTE — PROGRESS NOTES
SW completed a Psychosocial Assessment for evaluation of patient's mental health, social status, and functional capacity within the community. Patient is a 77year old male seen during his iv infusion appointment. Patient was accompanied by his wife. Patient was alert, oriented, and engaging during assessment. Patient lives with his wife in [address]. Patient discussed positive support from family and friends. Patient states he uses a CPAP as DMEs. Patient denies using outside community services. SW provided supportive listening. Patient reports no issues affording her medication. SW assessed ADLs and means of transportation. Patient states he is independent in his ADLs and able to transport himself to appointments. SW assessed if patient had food insecurity or needed financial assistance. Patient denies any food insecurity or financial concerns. Patient is a full code status at this time. Patient reports he has an advance directive. His wife is his POA and then his children are listed. SW encouraged patient to bring document for medical records. Patient denies discharge needs or further services at this time. SW provided business card. SW will continue to monitor needs and assist as appropriate.        Electronically signed by CHELLE Donovan on 11/19/2020 at 9:56 AM

## 2020-11-19 NOTE — PROGRESS NOTES
called and gave telephone orders to have patient stop aspirin, make cardiologist aware, and to proceed with treatment.

## 2020-11-19 NOTE — PROGRESS NOTES
Dr. Valentina Lynn informed of platelets 87 on plaviz and ASA Informed of WBC and ANC. He called unit and gave orders to Jacinto Renee RN faxed labs to radiation and copy of labs given to his wife. Discussed neutropenic precautions thrombocytopenic precautions. He and wife verbalized understanding.

## 2020-11-25 ENCOUNTER — OFFICE VISIT (OUTPATIENT)
Dept: CARDIOLOGY | Age: 75
End: 2020-11-25
Payer: MEDICARE

## 2020-11-25 ENCOUNTER — TELEPHONE (OUTPATIENT)
Dept: ONCOLOGY | Age: 75
End: 2020-11-25

## 2020-11-25 ENCOUNTER — HOSPITAL ENCOUNTER (OUTPATIENT)
Dept: INFUSION THERAPY | Age: 75
Discharge: HOME | End: 2020-11-25
Payer: MEDICARE

## 2020-11-25 VITALS
SYSTOLIC BLOOD PRESSURE: 110 MMHG | WEIGHT: 240.4 LBS | TEMPERATURE: 97.8 F | RESPIRATION RATE: 18 BRPM | BODY MASS INDEX: 34.41 KG/M2 | OXYGEN SATURATION: 97 % | DIASTOLIC BLOOD PRESSURE: 66 MMHG | HEART RATE: 86 BPM | HEIGHT: 70 IN

## 2020-11-25 VITALS
HEIGHT: 70 IN | SYSTOLIC BLOOD PRESSURE: 126 MMHG | DIASTOLIC BLOOD PRESSURE: 69 MMHG | WEIGHT: 243 LBS | BODY MASS INDEX: 34.79 KG/M2 | HEART RATE: 77 BPM

## 2020-11-25 DIAGNOSIS — C34.91 ADENOCARCINOMA OF RIGHT LUNG (HCC): Primary | ICD-10-CM

## 2020-11-25 LAB
ABSOLUTE EOS #: 0 K/UL (ref 0–0.44)
ABSOLUTE IMMATURE GRANULOCYTE: 0.02 K/UL (ref 0–0.3)
ABSOLUTE LYMPH #: 0.25 K/UL (ref 1.1–3.7)
ABSOLUTE MONO #: 0.13 K/UL (ref 0.1–1.2)
ALBUMIN SERPL-MCNC: 4 G/DL (ref 3.5–5.2)
ALBUMIN/GLOBULIN RATIO: 1.6 (ref 1–2.5)
ALP BLD-CCNC: 53 U/L (ref 40–129)
ALT SERPL-CCNC: 27 U/L (ref 5–41)
ANION GAP SERPL CALCULATED.3IONS-SCNC: 11 MMOL/L (ref 9–17)
AST SERPL-CCNC: 16 U/L
BASOPHILS # BLD: 0 % (ref 0–2)
BASOPHILS ABSOLUTE: 0 K/UL (ref 0–0.2)
BILIRUB SERPL-MCNC: 0.26 MG/DL (ref 0.3–1.2)
BUN BLDV-MCNC: 22 MG/DL (ref 8–23)
BUN/CREAT BLD: 23 (ref 9–20)
CALCIUM SERPL-MCNC: 9.1 MG/DL (ref 8.6–10.4)
CHLORIDE BLD-SCNC: 105 MMOL/L (ref 98–107)
CO2: 24 MMOL/L (ref 20–31)
CREAT SERPL-MCNC: 0.95 MG/DL (ref 0.7–1.2)
DIFFERENTIAL TYPE: ABNORMAL
EOSINOPHILS RELATIVE PERCENT: 0 % (ref 1–4)
GFR AFRICAN AMERICAN: >60 ML/MIN
GFR NON-AFRICAN AMERICAN: >60 ML/MIN
GFR SERPL CREATININE-BSD FRML MDRD: ABNORMAL ML/MIN/{1.73_M2}
GFR SERPL CREATININE-BSD FRML MDRD: ABNORMAL ML/MIN/{1.73_M2}
GLUCOSE BLD-MCNC: 204 MG/DL (ref 70–99)
HCT VFR BLD CALC: 34.5 % (ref 40.7–50.3)
HEMOGLOBIN: 11.5 G/DL (ref 13–17)
IMMATURE GRANULOCYTES: 1 %
LYMPHOCYTES # BLD: 13 % (ref 24–43)
MCH RBC QN AUTO: 31.9 PG (ref 25.2–33.5)
MCHC RBC AUTO-ENTMCNC: 33.3 G/DL (ref 25.2–33.5)
MCV RBC AUTO: 95.6 FL (ref 82.6–102.9)
MONOCYTES # BLD: 7 % (ref 3–12)
MORPHOLOGY: ABNORMAL
NRBC AUTOMATED: 0 PER 100 WBC
PDW BLD-RTO: 14.5 % (ref 11.8–14.4)
PLATELET # BLD: 97 K/UL (ref 138–453)
PLATELET ESTIMATE: ABNORMAL
PMV BLD AUTO: 9.7 FL (ref 8.1–13.5)
POTASSIUM SERPL-SCNC: 4.4 MMOL/L (ref 3.7–5.3)
RBC # BLD: 3.61 M/UL (ref 4.21–5.77)
RBC # BLD: ABNORMAL 10*6/UL
SEG NEUTROPHILS: 79 % (ref 36–65)
SEGMENTED NEUTROPHILS ABSOLUTE COUNT: 1.5 K/UL (ref 1.5–8.1)
SODIUM BLD-SCNC: 140 MMOL/L (ref 135–144)
TOTAL PROTEIN: 6.5 G/DL (ref 6.4–8.3)
WBC # BLD: 1.9 K/UL (ref 3.5–11.3)
WBC # BLD: ABNORMAL 10*3/UL

## 2020-11-25 PROCEDURE — 6360000002 HC RX W HCPCS: Performed by: INTERNAL MEDICINE

## 2020-11-25 PROCEDURE — 3017F COLORECTAL CA SCREEN DOC REV: CPT | Performed by: INTERNAL MEDICINE

## 2020-11-25 PROCEDURE — 2580000003 HC RX 258: Performed by: INTERNAL MEDICINE

## 2020-11-25 PROCEDURE — 2500000003 HC RX 250 WO HCPCS: Performed by: INTERNAL MEDICINE

## 2020-11-25 PROCEDURE — 4040F PNEUMOC VAC/ADMIN/RCVD: CPT | Performed by: INTERNAL MEDICINE

## 2020-11-25 PROCEDURE — 93010 ELECTROCARDIOGRAM REPORT: CPT | Performed by: INTERNAL MEDICINE

## 2020-11-25 PROCEDURE — 96417 CHEMO IV INFUS EACH ADDL SEQ: CPT

## 2020-11-25 PROCEDURE — 1036F TOBACCO NON-USER: CPT | Performed by: INTERNAL MEDICINE

## 2020-11-25 PROCEDURE — G8484 FLU IMMUNIZE NO ADMIN: HCPCS | Performed by: INTERNAL MEDICINE

## 2020-11-25 PROCEDURE — 36591 DRAW BLOOD OFF VENOUS DEVICE: CPT

## 2020-11-25 PROCEDURE — 96413 CHEMO IV INFUSION 1 HR: CPT

## 2020-11-25 PROCEDURE — 80053 COMPREHEN METABOLIC PANEL: CPT

## 2020-11-25 PROCEDURE — 99214 OFFICE O/P EST MOD 30 MIN: CPT | Performed by: INTERNAL MEDICINE

## 2020-11-25 PROCEDURE — 96375 TX/PRO/DX INJ NEW DRUG ADDON: CPT

## 2020-11-25 PROCEDURE — G8417 CALC BMI ABV UP PARAM F/U: HCPCS | Performed by: INTERNAL MEDICINE

## 2020-11-25 PROCEDURE — G8427 DOCREV CUR MEDS BY ELIG CLIN: HCPCS | Performed by: INTERNAL MEDICINE

## 2020-11-25 PROCEDURE — 85025 COMPLETE CBC W/AUTO DIFF WBC: CPT

## 2020-11-25 PROCEDURE — 93005 ELECTROCARDIOGRAM TRACING: CPT | Performed by: INTERNAL MEDICINE

## 2020-11-25 PROCEDURE — 1123F ACP DISCUSS/DSCN MKR DOCD: CPT | Performed by: INTERNAL MEDICINE

## 2020-11-25 RX ORDER — HEPARIN SODIUM (PORCINE) LOCK FLUSH IV SOLN 100 UNIT/ML 100 UNIT/ML
500 SOLUTION INTRAVENOUS PRN
Status: CANCELLED | OUTPATIENT
Start: 2020-11-26

## 2020-11-25 RX ORDER — METHYLPREDNISOLONE SODIUM SUCCINATE 125 MG/2ML
125 INJECTION, POWDER, LYOPHILIZED, FOR SOLUTION INTRAMUSCULAR; INTRAVENOUS ONCE
Status: CANCELLED | OUTPATIENT
Start: 2020-11-26

## 2020-11-25 RX ORDER — DEXAMETHASONE SODIUM PHOSPHATE 10 MG/ML
10 INJECTION, SOLUTION INTRAMUSCULAR; INTRAVENOUS ONCE
Status: COMPLETED | OUTPATIENT
Start: 2020-11-25 | End: 2020-11-25

## 2020-11-25 RX ORDER — HEPARIN SODIUM (PORCINE) LOCK FLUSH IV SOLN 100 UNIT/ML 100 UNIT/ML
500 SOLUTION INTRAVENOUS PRN
Status: DISCONTINUED | OUTPATIENT
Start: 2020-11-25 | End: 2020-11-26 | Stop reason: HOSPADM

## 2020-11-25 RX ORDER — DIPHENHYDRAMINE HYDROCHLORIDE 50 MG/ML
50 INJECTION INTRAMUSCULAR; INTRAVENOUS ONCE
Status: CANCELLED | OUTPATIENT
Start: 2020-11-26

## 2020-11-25 RX ORDER — ICOSAPENT ETHYL 1000 MG/1
2 CAPSULE ORAL 2 TIMES DAILY
Qty: 360 CAPSULE | Refills: 1 | Status: SHIPPED | OUTPATIENT
Start: 2020-11-25 | End: 2021-03-03

## 2020-11-25 RX ORDER — WATER 10 ML/10ML
2.2 INJECTION INTRAMUSCULAR; INTRAVENOUS; SUBCUTANEOUS ONCE
Status: CANCELLED | OUTPATIENT
Start: 2020-11-26

## 2020-11-25 RX ORDER — EVOLOCUMAB 140 MG/ML
1 INJECTION, SOLUTION SUBCUTANEOUS
Qty: 6 PEN | Refills: 1 | Status: SHIPPED | OUTPATIENT
Start: 2020-11-25 | End: 2021-03-03

## 2020-11-25 RX ORDER — SODIUM CHLORIDE 9 MG/ML
INJECTION, SOLUTION INTRAVENOUS CONTINUOUS
Status: CANCELLED | OUTPATIENT
Start: 2020-11-26

## 2020-11-25 RX ORDER — MEPERIDINE HYDROCHLORIDE 50 MG/ML
12.5 INJECTION INTRAMUSCULAR; INTRAVENOUS; SUBCUTANEOUS ONCE
Status: CANCELLED | OUTPATIENT
Start: 2020-11-26

## 2020-11-25 RX ORDER — PALONOSETRON 0.05 MG/ML
0.25 INJECTION, SOLUTION INTRAVENOUS ONCE
Status: CANCELLED | OUTPATIENT
Start: 2020-11-26

## 2020-11-25 RX ORDER — SODIUM CHLORIDE 0.9 % (FLUSH) 0.9 %
10 SYRINGE (ML) INJECTION PRN
Status: DISCONTINUED | OUTPATIENT
Start: 2020-11-25 | End: 2020-11-26 | Stop reason: HOSPADM

## 2020-11-25 RX ORDER — EPINEPHRINE 1 MG/ML
0.3 INJECTION, SOLUTION, CONCENTRATE INTRAVENOUS PRN
Status: CANCELLED | OUTPATIENT
Start: 2020-11-26

## 2020-11-25 RX ORDER — SODIUM CHLORIDE 9 MG/ML
20 INJECTION, SOLUTION INTRAVENOUS ONCE
Status: COMPLETED | OUTPATIENT
Start: 2020-11-25 | End: 2020-11-25

## 2020-11-25 RX ORDER — DIPHENHYDRAMINE HYDROCHLORIDE 50 MG/ML
50 INJECTION INTRAMUSCULAR; INTRAVENOUS ONCE
Status: COMPLETED | OUTPATIENT
Start: 2020-11-25 | End: 2020-11-25

## 2020-11-25 RX ORDER — SODIUM CHLORIDE 9 MG/ML
20 INJECTION, SOLUTION INTRAVENOUS ONCE
Status: CANCELLED | OUTPATIENT
Start: 2020-11-26

## 2020-11-25 RX ORDER — PALONOSETRON 0.05 MG/ML
0.25 INJECTION, SOLUTION INTRAVENOUS ONCE
Status: COMPLETED | OUTPATIENT
Start: 2020-11-25 | End: 2020-11-25

## 2020-11-25 RX ORDER — SODIUM CHLORIDE 0.9 % (FLUSH) 0.9 %
10 SYRINGE (ML) INJECTION PRN
Status: CANCELLED | OUTPATIENT
Start: 2020-11-26

## 2020-11-25 RX ORDER — SODIUM CHLORIDE 0.9 % (FLUSH) 0.9 %
5 SYRINGE (ML) INJECTION PRN
Status: CANCELLED | OUTPATIENT
Start: 2020-11-26

## 2020-11-25 RX ADMIN — PALONOSETRON 0.25 MG: 0.25 INJECTION, SOLUTION INTRAVENOUS at 08:56

## 2020-11-25 RX ADMIN — HEPARIN SODIUM (PORCINE) LOCK FLUSH IV SOLN 100 UNIT/ML 500 UNITS: 100 SOLUTION at 11:16

## 2020-11-25 RX ADMIN — CARBOPLATIN 260 MG: 10 INJECTION, SOLUTION INTRAVENOUS at 10:33

## 2020-11-25 RX ADMIN — DEXAMETHASONE SODIUM PHOSPHATE 10 MG: 10 INJECTION, SOLUTION INTRAMUSCULAR; INTRAVENOUS at 08:54

## 2020-11-25 RX ADMIN — FAMOTIDINE 20 MG: 10 INJECTION, SOLUTION INTRAVENOUS at 08:48

## 2020-11-25 RX ADMIN — DIPHENHYDRAMINE HYDROCHLORIDE 50 MG: 50 INJECTION, SOLUTION INTRAMUSCULAR; INTRAVENOUS at 08:57

## 2020-11-25 RX ADMIN — SODIUM CHLORIDE 20 ML/HR: 9 INJECTION, SOLUTION INTRAVENOUS at 07:35

## 2020-11-25 RX ADMIN — Medication 10 ML: at 07:35

## 2020-11-25 RX ADMIN — PACLITAXEL 102 MG: 6 INJECTION, SOLUTION INTRAVENOUS at 09:28

## 2020-11-25 ASSESSMENT — PAIN SCALES - GENERAL: PAINLEVEL_OUTOF10: 0

## 2020-11-25 NOTE — PROGRESS NOTES
Infusion complete. Flushed VAD with 10 ml of saline and 5 ml of heparin flush. D/C camargo needle. Band aid to site. Patient stable and discharged form unit to go to cardiology appointment.

## 2020-11-25 NOTE — PROGRESS NOTES
Platelets 97 reported to Dr. Basilio Leslie per perfect serve he gave order to proceed with treatmetn. Treatment plan needs signed.

## 2020-11-25 NOTE — TELEPHONE ENCOUNTER
FRED- Pt and spouse came in today and reported that cardiologist put patient on Eliquis along with his Plavix due to A-fib.

## 2020-11-25 NOTE — PROGRESS NOTES
Today's Date: 11/25/2020  Patient's Name: Carson Fajardo Shock  Patient's age: 77 y.o., 1945    CC: follow up for CAD, s/p PCI    HPI:   The patient is a 77y.o. year old, , male is in the office for F/U. States he is on chemo radiation for lung ca with mets to mediastinal lymph nodes. No chest pressures, orthopnea, pnd, le edema. Has some stable SOB. Past Medical History:   has a past medical history of Abdominal hernia, Abnormal PSA, Allergic rhinitis, Benign prostatic hypertrophy, Chest pain, Coronary artery disease, Dysphagia, Erectile dysfunction, Familial hyperlipidemia, Gastroesophageal reflux disease, Hearing loss, bilateral, HTLV-1 carrier, Hypertension, Impaired fasting glucose, Lumbar disc herniation, Mild anemia, Nasal polyps, Nasal septal deviation, Obesity, Palpitations, Peptic ulcer disease, Reactive airway disease, and Urinary frequency. Past Surgical History:   has a past surgical history that includes Nasal fracture surgery (1960); Vasectomy (Bilateral, 04/04/1980); Upper gastrointestinal endoscopy (01/21/2011); Colonoscopy (01/21/2011); Cardiac catheterization (10/20/2011); pre-malignant / benign skin lesion excision (Left, 01/16/2012); pre-malignant / benign skin lesion excision (Left, 07/19/2011); Appendectomy (age 11); Knee arthroscopy (Left, 03/19/2010); other surgical history (Left, 02/12/2010); Shoulder arthroscopy (Left, 10/17/14); Septoplasty (10/30/2015); Colonoscopy (4/6/16); Prostate Biopsy (Bilateral, 82/91/0030); repair umbilical [facility],9+O/E,[facility] (N/A, 1/11/2018); Cataract removal (Bilateral, 03/2018); Cardiac catheterization (08/2018); bronchoscopy (09/17/2020); bronchoscopy (N/A, 9/17/2020); and Port Surgery (N/A, 10/14/2020). Home Medications:  Prior to Admission medications    Medication Sig Start Date End Date Taking?  Authorizing Provider   ondansetron (ZOFRAN) 4 MG tablet Take 4 mg by mouth every 6 hours as needed for Nausea or Vomiting Disp 60 with 3 refills called in 10/20/2020   Yes Historical Provider, MD   Magic Mouthwash (MIRACLE MOUTHWASH) Swish and spit 5 mLs 4 times daily as needed for Irritation Disp 240 ml with 2 refills called in 10/20/2020   Yes Historical Provider, MD   dexamethasone (DECADRON) 4 MG tablet Take 1 tablet twice a day on day before and after chemo 10/20/20  Yes Michael Lassiter MD   oxybutynin (DITROPAN-XL) 10 MG extended release tablet Take 10 mg by mouth daily   Yes Historical Provider, MD [address] Take by mouth daily   Yes Historical Provider, MD   losartan (COZAAR) 50 MG tablet TAKE 1 TABLET BY MOUTH ONE TIME A DAY  7/2/20  Yes Timothy Avila DO   levothyroxine (SYNTHROID) 100 MCG tablet TAKE 1 TABLET BY MOUTH ONE TIME A DAY  7/2/20  Yes Timothy Avila DO   furosemide (LASIX) 20 MG tablet TAKE 1 TABLET BY MOUTH ONE TIME A DAY  7/2/20  Yes Timothy Avila DO   clopidogrel (PLAVIX) 75 MG tablet TAKE 1 TABLET BY MOUTH DAILY 2/17/20  Yes Timothy Avila DO   pravastatin (PRAVACHOL) 40 MG tablet TAKE ONE TABLET BY MOUTH ONCE EVERY EVENING 2/17/20  Yes Timothy Avila DO   doxazosin (CARDURA) 2 MG tablet Take 1 tablet by mouth 2 times daily 2/17/20  Yes Timothy Avila DO   tamsulosin (FLOMAX) 0.4 MG capsule TAKE 1 CAPSULE BY MOUTH DAILY 2/17/20  Yes Timothy Avila DO   metoprolol tartrate (LOPRESSOR) 50 MG tablet Take 1 tablet by mouth 2 times daily 2/17/20  Yes Timothy Avila DO   nitroGLYCERIN (NITROSTAT) 0.4 MG SL tablet Place 1 tablet under the tongue every 5 minutes as needed for Chest pain up to max of 3 total doses. If no relief after 1 dose, call 911. 11/20/19  Yes Laury Gaucher, MD   albuterol sulfate HFA (VENTOLIN HFA) 108 (90 Base) MCG/ACT inhaler Inhale 2 puffs into the lungs every 6 hours as needed for Wheezing or Shortness of Breath 2/13/19  Yes Timothy Avila DO   niacin (NIASPAN) 500 MG CR tablet Take 500 mg by mouth 2 times daily. Two pills daily.    Yes Historical Provider, MD Omega-3 Fatty Acids (FISH OIL) 1200 MG CAPS Take 2,000 mg by mouth 2 times daily. Yes Historical Provider, MD   Multiple Vitamins-Minerals (MULTIVITAMIN PO) daily. Yes Historical Provider, MD   ASPIRIN PO 81 mg daily. Historical Provider, MD       Allergies:  Effient [prasugrel]; Ace inhibitors; and Statins    Social History:   reports that he quit smoking about 35 years ago. He has a 30.00 pack-year smoking history. He has never used smokeless tobacco. He reports that he does not drink alcohol or use drugs. Review of Systems:  · Constitutional: there has been no unanticipated weight loss. There's been No change in energy level, No change in activity level. · Eyes: No visual changes or diplopia. No scleral icterus. · ENT: No Headaches, hearing loss or vertigo. No mouth sores or sore throat. · Cardiovascular: As above. · Respiratory: As above. · Gastrointestinal: No abdominal pain, appetite loss, blood in stools. No change in bowel or bladder habits. · Genitourinary: No dysuria, trouble voiding, or hematuria. · Musculoskeletal:  No gait disturbance, No weakness or joint complaints. · Psychiatric: No anxiety, or depression. · Endocrine: No temperature intolerance. No excessive thirst, fluid intake, or urination. No tremor. · Hematologic/Lymphatic: No abnormal bruising or bleeding, blood clots or swollen lymph nodes. · Allergic/Immunologic: No nasal congestion or hives. Physical Exam:  BP (!) 143/73 (Cuff Size: Large Adult)   Ht 5' 10\" (1.778 m)   Wt 243 lb (110.2 kg)   BMI 34.87 kg/m²   Constitutional and General Appearance: alert, cooperative, no distress and appears stated age  [de-identified]: PERRL, no cervical lymphadenopathy. No masses palpable. Normal oral mucosa  Respiratory:  · Normal excursion and expansion without use of accessory muscles  · Resp Auscultation: Good respiratory effort. No for increased work of breathing.  On auscultation: clear to auscultation bilaterally  Cardiovascular:  · The apical impulse is not displaced  · Heart tones are crisp and normal. regular S1 and S2.  · Jugular venous pulsation Normal  · The carotid upstroke is normal in amplitude and contour without delay or bruit  · Peripheral pulses are symmetrical and full   Abdomen:   · No masses or tenderness  · Bowel sounds present  Extremities:  ·  No Cyanosis or Clubbing  ·  Lower extremity edema: No  ·  Skin: Warm and dry  Neurological:  · Alert and oriented. · Moves all extremities well  · No abnormalities of mood, affect, memory, mentation, or behavior are noted      Labs:   Lab Results   Component Value Date    CHOL 227 (H) 08/25/2020    TRIG 740 (H) 08/25/2020    HDL 34 (L) 08/25/2020    LDLCHOLESTEROL      08/25/2020    VLDL NOT REPORTED 08/25/2020    CHOLHDLRATIO 6.7 (H) 08/25/2020       Lab Results   Component Value Date     11/25/2020    K 4.4 11/25/2020     11/25/2020    CO2 24 11/25/2020    BUN 22 11/25/2020    CREATININE 0.95 11/25/2020    GLUCOSE 204 (H) 11/25/2020    CALCIUM 9.1 11/25/2020    PROT 6.5 11/25/2020    LABALBU 4.0 11/25/2020    BILITOT 0.26 (L) 11/25/2020    ALKPHOS 53 11/25/2020    AST 16 11/25/2020    ALT 27 11/25/2020    LABGLOM >60 11/25/2020    GFRAA >60 11/25/2020     Cardiac Data:  Diagnostics:    EKG: Afib    Nuclear stress test 11/2013:  Small area of photopenia with no significant ischemi. Echo 09/209/2016:  1. Normal left ventricular size with mild to moderate concentric left ventricular hypertrophy. 2. Normal systolic left ventricular function. Ejection fraction is 50 to 55%. 3. Grade I diastolic dysfunction. 4. Biatrial enlargement. 5. Mildly dilated right ventricle with mildly reduced function. 6. Mildly sclerotic aortic valve. 7. Mild to moderate tricuspid regurgitation. RVSP is 49 mm Hg. 8. No pericardial effusion. Event monitor 09/2016:  Sinus rhythm with short runs of PSVT. Nuclear stress test 06/2018:  1.  Moderate sized mid and apical septal ischemia. 2. EF 59%. Cardiac cath 07/18/18:   Patent LAD and OM2 stents.   Occlusion of RCA (non-dominant vessel).  IFR of LAD showed non-significant lesions.   Normal LV systolic function. Cardiac cath 08/16/18:  Left main: NL  LAD:    mid LAD - 75 % stenosis reduced to 0 % PTCA/TAM with CARINA III flow  LCX:    Dominant with 10% stenosis               OM1:  Small with 10% stenosis               OM2 has patent previously placed stent            RCA:    Not injected due to recent cath and known to be occluded    Assessment / Acute Cardiac Problems:     -New onset AF- rate controlled  -CAD: with prior stenting to LAD and OM 10/2011: Repeat LAD stenting on 8/18- currently stable with no signs of symptoms of ischemia.  -Statin Myopathy and elevated CPK while on Lipitor and Crestor  -HTN: well controlled. -HLP- LDL 88- not at goal. . On statin  -Mild LV dysfunction on cardiac cath and EF of 53% on nuclear stress test and 50-55% on echo. -Obesity.  -Recurrent palpitations with only short nonsustained PSVT on event monitor: symptoms resolved since he was started on CPAP  -Sleep apnea started on CPAP  -Grade I diastolic dysfunction.  -Palpitations rare with event monitor showing only PVCs at the time of his symptoms  -Lung CA s/p Chemoradiation- per heme/onc    Plan of Treatment:     -add eliquis 5 mg po bid  -keep off ASA  -continue plavix if okay with Heme/onc- last platelet count 97  -LDL not a goal, TG very high.   -Has statin allergy/myopathy  -start repatha every 2 weeks  -start vascepa 2 gram bid for established ASCVD risk reduction and elevated TG > 500  -stop niacin, stop OTC Fish oil  -continue Plavix (off ASA due to low platelets after chemo per heme/onc)  -continue BB, ARB, diuretic    The patient is to continue heart healthy diet, weight loss and exercise as tolerated. Patient's medications and side effects were discussed. Medication refills were provided if needed.  Follow up appointment timing was discussed. All questions and concerns were addressed to patient's satisfaction. The patient is to follow up in 2 months or sooner if necessary. Thank you for allowing me to participate in the care of this patient, please do not hesitate to call if you have any questions. Anand Angelo DO, [facility] [place], [address], [place] [address] [facility]  [facility]  [phone]

## 2020-12-01 NOTE — TELEPHONE ENCOUNTER
pts spouse called and stated that he is not on the Eliquis. Getting 2nd opinion with Dr. Brock Thomas.

## 2020-12-03 ENCOUNTER — HOSPITAL ENCOUNTER (OUTPATIENT)
Dept: INFUSION THERAPY | Age: 75
Discharge: HOME | End: 2020-12-03
Payer: MEDICARE

## 2020-12-03 VITALS
HEIGHT: 70 IN | HEART RATE: 75 BPM | TEMPERATURE: 97.8 F | SYSTOLIC BLOOD PRESSURE: 123 MMHG | DIASTOLIC BLOOD PRESSURE: 85 MMHG | WEIGHT: 244 LBS | OXYGEN SATURATION: 98 % | BODY MASS INDEX: 34.93 KG/M2

## 2020-12-03 DIAGNOSIS — C34.91 ADENOCARCINOMA OF RIGHT LUNG (HCC): Primary | ICD-10-CM

## 2020-12-03 LAB
ABSOLUTE EOS #: 0 K/UL (ref 0–0.4)
ABSOLUTE IMMATURE GRANULOCYTE: 0.02 K/UL (ref 0–0.3)
ABSOLUTE LYMPH #: 0.39 K/UL (ref 1–4.8)
ABSOLUTE MONO #: 0.05 K/UL (ref 0.1–1.2)
ALBUMIN SERPL-MCNC: 3.9 G/DL (ref 3.5–5.2)
ALBUMIN/GLOBULIN RATIO: 1.4 (ref 1–2.5)
ALP BLD-CCNC: 47 U/L (ref 40–129)
ALT SERPL-CCNC: 28 U/L (ref 5–41)
ANION GAP SERPL CALCULATED.3IONS-SCNC: 10 MMOL/L (ref 9–17)
AST SERPL-CCNC: 18 U/L
BASOPHILS # BLD: 0 % (ref 0–2)
BASOPHILS ABSOLUTE: 0 K/UL (ref 0–0.2)
BILIRUB SERPL-MCNC: 0.27 MG/DL (ref 0.3–1.2)
BUN BLDV-MCNC: 18 MG/DL (ref 8–23)
BUN/CREAT BLD: 21 (ref 9–20)
CALCIUM SERPL-MCNC: 9.7 MG/DL (ref 8.6–10.4)
CHLORIDE BLD-SCNC: 105 MMOL/L (ref 98–107)
CO2: 24 MMOL/L (ref 20–31)
CREAT SERPL-MCNC: 0.86 MG/DL (ref 0.7–1.2)
DIFFERENTIAL TYPE: ABNORMAL
EOSINOPHILS RELATIVE PERCENT: 0 % (ref 1–8)
GFR AFRICAN AMERICAN: >60 ML/MIN
GFR NON-AFRICAN AMERICAN: >60 ML/MIN
GFR SERPL CREATININE-BSD FRML MDRD: ABNORMAL ML/MIN/{1.73_M2}
GFR SERPL CREATININE-BSD FRML MDRD: ABNORMAL ML/MIN/{1.73_M2}
GLUCOSE BLD-MCNC: 182 MG/DL (ref 70–99)
HCT VFR BLD CALC: 32.3 % (ref 40.7–50.3)
HEMOGLOBIN: 10.7 G/DL (ref 13–17)
IMMATURE GRANULOCYTES: 1 %
LYMPHOCYTES # BLD: 17 % (ref 15–43)
MCH RBC QN AUTO: 32.6 PG (ref 25.2–33.5)
MCHC RBC AUTO-ENTMCNC: 33.1 G/DL (ref 25.2–33.5)
MCV RBC AUTO: 98.5 FL (ref 82.6–102.9)
MONOCYTES # BLD: 2 % (ref 6–14)
MORPHOLOGY: ABNORMAL
NRBC AUTOMATED: 0.9 PER 100 WBC
PDW BLD-RTO: 15.5 % (ref 11.8–14.4)
PLATELET # BLD: 103 K/UL (ref 138–453)
PLATELET ESTIMATE: ABNORMAL
PMV BLD AUTO: 9.7 FL (ref 8.1–13.5)
POTASSIUM SERPL-SCNC: 4.3 MMOL/L (ref 3.7–5.3)
RBC # BLD: 3.28 M/UL (ref 4.21–5.77)
RBC # BLD: ABNORMAL 10*6/UL
SEG NEUTROPHILS: 80 % (ref 44–74)
SEGMENTED NEUTROPHILS ABSOLUTE COUNT: 1.84 K/UL (ref 1.5–8.1)
SODIUM BLD-SCNC: 139 MMOL/L (ref 135–144)
TOTAL PROTEIN: 6.6 G/DL (ref 6.4–8.3)
WBC # BLD: 2.3 K/UL (ref 3.5–11.3)
WBC # BLD: ABNORMAL 10*3/UL

## 2020-12-03 PROCEDURE — 96417 CHEMO IV INFUS EACH ADDL SEQ: CPT

## 2020-12-03 PROCEDURE — 2580000003 HC RX 258: Performed by: INTERNAL MEDICINE

## 2020-12-03 PROCEDURE — 6360000002 HC RX W HCPCS: Performed by: INTERNAL MEDICINE

## 2020-12-03 PROCEDURE — 2500000003 HC RX 250 WO HCPCS: Performed by: INTERNAL MEDICINE

## 2020-12-03 PROCEDURE — 96413 CHEMO IV INFUSION 1 HR: CPT

## 2020-12-03 PROCEDURE — 36591 DRAW BLOOD OFF VENOUS DEVICE: CPT

## 2020-12-03 PROCEDURE — 80053 COMPREHEN METABOLIC PANEL: CPT

## 2020-12-03 PROCEDURE — 85025 COMPLETE CBC W/AUTO DIFF WBC: CPT

## 2020-12-03 PROCEDURE — 96375 TX/PRO/DX INJ NEW DRUG ADDON: CPT

## 2020-12-03 RX ORDER — MEPERIDINE HYDROCHLORIDE 50 MG/ML
12.5 INJECTION INTRAMUSCULAR; INTRAVENOUS; SUBCUTANEOUS ONCE
Status: CANCELLED | OUTPATIENT
Start: 2020-12-03

## 2020-12-03 RX ORDER — EPINEPHRINE 1 MG/ML
0.3 INJECTION, SOLUTION, CONCENTRATE INTRAVENOUS PRN
Status: CANCELLED | OUTPATIENT
Start: 2020-12-03

## 2020-12-03 RX ORDER — SODIUM CHLORIDE 9 MG/ML
INJECTION, SOLUTION INTRAVENOUS CONTINUOUS
Status: CANCELLED | OUTPATIENT
Start: 2020-12-03

## 2020-12-03 RX ORDER — METHYLPREDNISOLONE SODIUM SUCCINATE 125 MG/2ML
125 INJECTION, POWDER, LYOPHILIZED, FOR SOLUTION INTRAMUSCULAR; INTRAVENOUS ONCE
Status: CANCELLED | OUTPATIENT
Start: 2020-12-03

## 2020-12-03 RX ORDER — SODIUM CHLORIDE 0.9 % (FLUSH) 0.9 %
10 SYRINGE (ML) INJECTION PRN
Status: DISCONTINUED | OUTPATIENT
Start: 2020-12-03 | End: 2020-12-04 | Stop reason: HOSPADM

## 2020-12-03 RX ORDER — SODIUM CHLORIDE 0.9 % (FLUSH) 0.9 %
5 SYRINGE (ML) INJECTION PRN
Status: CANCELLED | OUTPATIENT
Start: 2020-12-03

## 2020-12-03 RX ORDER — DEXAMETHASONE SODIUM PHOSPHATE 10 MG/ML
10 INJECTION, SOLUTION INTRAMUSCULAR; INTRAVENOUS ONCE
Status: COMPLETED | OUTPATIENT
Start: 2020-12-03 | End: 2020-12-03

## 2020-12-03 RX ORDER — DIPHENHYDRAMINE HYDROCHLORIDE 50 MG/ML
50 INJECTION INTRAMUSCULAR; INTRAVENOUS ONCE
Status: CANCELLED | OUTPATIENT
Start: 2020-12-03

## 2020-12-03 RX ORDER — SODIUM CHLORIDE 9 MG/ML
20 INJECTION, SOLUTION INTRAVENOUS ONCE
Status: COMPLETED | OUTPATIENT
Start: 2020-12-03 | End: 2020-12-03

## 2020-12-03 RX ORDER — HEPARIN SODIUM (PORCINE) LOCK FLUSH IV SOLN 100 UNIT/ML 100 UNIT/ML
500 SOLUTION INTRAVENOUS PRN
Status: DISCONTINUED | OUTPATIENT
Start: 2020-12-03 | End: 2020-12-04 | Stop reason: HOSPADM

## 2020-12-03 RX ORDER — PALONOSETRON 0.05 MG/ML
0.25 INJECTION, SOLUTION INTRAVENOUS ONCE
Status: COMPLETED | OUTPATIENT
Start: 2020-12-03 | End: 2020-12-03

## 2020-12-03 RX ORDER — DIPHENHYDRAMINE HYDROCHLORIDE 50 MG/ML
50 INJECTION INTRAMUSCULAR; INTRAVENOUS ONCE
Status: COMPLETED | OUTPATIENT
Start: 2020-12-03 | End: 2020-12-03

## 2020-12-03 RX ADMIN — DIPHENHYDRAMINE HYDROCHLORIDE 50 MG: 50 INJECTION, SOLUTION INTRAMUSCULAR; INTRAVENOUS at 09:42

## 2020-12-03 RX ADMIN — PALONOSETRON 0.25 MG: 0.25 INJECTION, SOLUTION INTRAVENOUS at 09:33

## 2020-12-03 RX ADMIN — DEXAMETHASONE SODIUM PHOSPHATE 10 MG: 10 INJECTION, SOLUTION INTRAMUSCULAR; INTRAVENOUS at 09:36

## 2020-12-03 RX ADMIN — CARBOPLATIN 286 MG: 10 INJECTION, SOLUTION INTRAVENOUS at 11:38

## 2020-12-03 RX ADMIN — PACLITAXEL 102 MG: 6 INJECTION, SOLUTION, CONCENTRATE INTRAVENOUS at 10:38

## 2020-12-03 RX ADMIN — FAMOTIDINE 20 MG: 10 INJECTION, SOLUTION INTRAVENOUS at 09:39

## 2020-12-03 RX ADMIN — HEPARIN SODIUM (PORCINE) LOCK FLUSH IV SOLN 100 UNIT/ML 500 UNITS: 100 SOLUTION at 12:27

## 2020-12-03 RX ADMIN — SODIUM CHLORIDE 20 ML/HR: 9 INJECTION, SOLUTION INTRAVENOUS at 08:00

## 2020-12-03 ASSESSMENT — PAIN SCALES - GENERAL: PAINLEVEL_OUTOF10: 0

## 2020-12-03 NOTE — PROGRESS NOTES
Labs reviewed with Dr. Angela Melchor via telephone and reported new diagnosis of atrial fib. Dr. Angela Melchor wants to proceed with treatment.

## 2020-12-03 NOTE — PROGRESS NOTES
Infusion complete. Flushed VAD with 10 ml of saline and 5 ml of heparin flush. D/C [place] needle. Band aid to site. Stable and discharged to home.

## 2020-12-08 ENCOUNTER — OFFICE VISIT (OUTPATIENT)
Dept: ONCOLOGY | Age: 75
End: 2020-12-08
Payer: MEDICARE

## 2020-12-08 VITALS
WEIGHT: 243 LBS | TEMPERATURE: 98 F | HEIGHT: 70 IN | SYSTOLIC BLOOD PRESSURE: 118 MMHG | HEART RATE: 89 BPM | DIASTOLIC BLOOD PRESSURE: 62 MMHG | BODY MASS INDEX: 34.79 KG/M2 | OXYGEN SATURATION: 99 %

## 2020-12-08 PROCEDURE — 1036F TOBACCO NON-USER: CPT | Performed by: INTERNAL MEDICINE

## 2020-12-08 PROCEDURE — 3017F COLORECTAL CA SCREEN DOC REV: CPT | Performed by: INTERNAL MEDICINE

## 2020-12-08 PROCEDURE — 99214 OFFICE O/P EST MOD 30 MIN: CPT | Performed by: INTERNAL MEDICINE

## 2020-12-08 PROCEDURE — 1123F ACP DISCUSS/DSCN MKR DOCD: CPT | Performed by: INTERNAL MEDICINE

## 2020-12-08 PROCEDURE — G8427 DOCREV CUR MEDS BY ELIG CLIN: HCPCS | Performed by: INTERNAL MEDICINE

## 2020-12-08 PROCEDURE — G8417 CALC BMI ABV UP PARAM F/U: HCPCS | Performed by: INTERNAL MEDICINE

## 2020-12-08 PROCEDURE — 4040F PNEUMOC VAC/ADMIN/RCVD: CPT | Performed by: INTERNAL MEDICINE

## 2020-12-08 PROCEDURE — G8484 FLU IMMUNIZE NO ADMIN: HCPCS | Performed by: INTERNAL MEDICINE

## 2020-12-08 NOTE — PROGRESS NOTES
Dalton Mak                                                                                                                  12/8/2020  MRN:   [id number]  YOB: 1945  PCP:                           Patricia Nogueira DO  Referring Physician: No ref. provider found  Treating Physician Name: Nathaniel Sanders MD      Reason for visit:  Chief Complaint   Patient presents with    Lung Cancer     3 week follow up    Toxicity check. Current problems:  Adenocarcinoma of right lung, clinical stage at least T1c, N2, M0 (stage IIIA)  Subcarinal lymph node metastasis    Active and recent treatments:  concurrent chemoradiation using carboplatin and Taxol    Summary of Case/History:    Dalton Mak a 77 y.o.male is a patient with biopsy confirmed adenocarcinoma of right lung presents to the office to establish care and for further evaluation treatment recommendations. Patient was initially seen by pulmonary team for lung nodules. Patient CT scan from July 2019 showed a right-sided pleural calcification thickening concerning for asbestos exposure. Patient also noted to have multiple pulmonary nodules measuring between 1.5 x 1.3 cm. Follow-up CT chest from July 2020 showed increase in size of the right lower lobe lung nodule which now measured 2.1 cm. Subsequently patient underwent CT PET on 8/22 which showed FDG avid right lower lobe lung nodule with SUV of 4.6. Patient CT PET was also positive for subcarinal lymph node with SUV of 4.6. Patient underwent bronchoscopy with endobronchial ultrasound biopsy of the subcarinal lymph node confirmed adenocarcinoma. Patient has significant history of tobacco dependence around 30-pack-year however he quit about 25 years ago. Patient does have coronary artery disease status post stent placement. Patient also gives history of occupational asthma and exposure to bacteria.   Patient does give history of weight loss but describes it as intentional. Denies headaches dizziness chest pain abdominal pain nausea bone pain     Interim History:   Patient presents to the clinic for a follow-up visit and for toxicity check and to review results of her blood work-up. Patient has been tolerating treatment without any unexpected or severe side effects. Denies hospitalization or ER visit. Appetite is good. Weight is stable. Nausea is controlled. During this visit patient's allergy, social, medical, surgical history and medications were reviewed and updated. Past Medical History:   Past Medical History:   Diagnosis Date    Abdominal hernia     small, no significant symptomatology.  Abnormal PSA     with history of slight increase.  Allergic rhinitis     Benign prostatic hypertrophy     Chest pain     occasional.     Coronary artery disease     status post drug eluting stent placement, LAD, ostial obtuse marginal.     Dysphagia     Erectile dysfunction     Familial hyperlipidemia     with hypertriglyceridemia.  Gastroesophageal reflux disease     Hearing loss, bilateral     hearing aid in the left ear only. 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     Hypertension     Impaired fasting glucose     prediabetes.  Lumbar disc herniation     L3-L4, with chronic back pain.  Mild anemia     Nasal polyps     minimal symptoms.  Nasal septal deviation     right side.  Obesity     Palpitations     occasional.     Peptic ulcer disease     Reactive airway disease     asthma, industrial related, also a history of hypersensitivity pneumonitis.      Urinary frequency        Past Surgical History:     Past Surgical History:   Procedure Laterality Date    APPENDECTOMY  age 11    BRONCHOSCOPY  09/17/2020    BRONCHOSCOPY N/A 9/17/2020    EBUS- BRONCHOSCOPY ENDOBRONCHIAL ULTRASOUND, PATHOLOGY REQUESTED- CINDY NOTIFIED, GI UNIT SCHEDULED performed by Kendal Shepard MD at [facility]  10/20/2011    occluded LAD and ostial obtuse marginal stenosis, underwent drug eluting stents to both, RCA small, nondominant, occluded, ejection fraction 45%.  CARDIAC CATHETERIZATION  08/2018    with stent placement    CATARACT REMOVAL Bilateral 03/2018    COLONOSCOPY  01/21/2011    mild sigmoid diverticulosis.  COLONOSCOPY  4/6/16    hemorrhoid; sigmoid diverticulosis    KNEE ARTHROSCOPY Left 03/19/2010    partial medial and lateral synovectomies, partial medial meniscectomy, chondroplasty.  NASAL FRACTURE SURGERY  1960    OTHER SURGICAL HISTORY Left 02/12/2010    knee injection.  PORT SURGERY N/A 10/14/2020    RIGHT PORT INSERTION performed by Ria Rodriguez DO at [address] [place] [address] PRE-MALIGNANT / [address] Left 01/16/2012    actinic keratoses, ear, liquid nitrogen.  PRE-MALIGNANT / BENIGN SKIN LESION EXCISION Left 07/19/2011    actinic keratosis, upper ear, liquid nitrogen.  PROSTATE BIOPSY Bilateral 09/08/2015    Benign    REPAIR UMBILICAL YSDY,0+H/X,AXXZY N/A 0/40/7379    UMBILICAL Hernia Repair w/ Mesh performed by Monik Sofia MD at [address] [place] [address] SEPTOPLASTY  10/30/2015    with bilateral submucosal resection of the inferior turbinates, left middle turbinate elza bullosa resection done by Dr Otto Jung ARTHROSCOPY Left 10/17/14    W/ SUBACROMIAL DECOMPRESSION, DEBRIDEMENT ET CHONDROPLASTY    UPPER GASTROINTESTINAL ENDOSCOPY  01/21/2011    superficial ulcer of the fundus, erostive gastritis.      VASECTOMY Bilateral 04/04/1980       Patient Family Social History:     Social History     Socioeconomic History    Marital status:      Spouse name: None    Number of children: None    Years of education: None    Highest education level: None   Occupational History    None   Social Needs    Financial resource strain: None    Food insecurity     Worry: None     Inability: None    Transportation needs     Medical: None     Non-medical: None   Tobacco Use    Smoking status: Former MG tablet TAKE 1 TABLET BY MOUTH ONE TIME A DAY  90 tablet 1    clopidogrel (PLAVIX) 75 MG tablet TAKE 1 TABLET BY MOUTH DAILY 90 tablet 3    pravastatin (PRAVACHOL) 40 MG tablet TAKE ONE TABLET BY MOUTH ONCE EVERY EVENING 90 tablet 3    doxazosin (CARDURA) 2 MG tablet Take 1 tablet by mouth 2 times daily 180 tablet 3    tamsulosin (FLOMAX) 0.4 MG capsule TAKE 1 CAPSULE BY MOUTH DAILY 90 capsule 3    metoprolol tartrate (LOPRESSOR) 50 MG tablet Take 1 tablet by mouth 2 times daily 180 tablet 3    nitroGLYCERIN (NITROSTAT) 0.4 MG SL tablet Place 1 tablet under the tongue every 5 minutes as needed for Chest pain up to max of 3 total doses. If no relief after 1 dose, call 911. 25 tablet 3    albuterol sulfate HFA (VENTOLIN HFA) 108 (90 Base) MCG/ACT inhaler Inhale 2 puffs into the lungs every 6 hours as needed for Wheezing or Shortness of Breath 1 Inhaler 6    Multiple Vitamins-Minerals (MULTIVITAMIN PO) daily.  Icosapent Ethyl (VASCEPA) 1 g CAPS capsule Take 2 capsules by mouth 2 times daily (Patient not taking: Reported on 12/8/2020) 360 capsule 1    Evolocumab (REPATHA SURECLICK) 948 MG/ML SOAJ Inject 1 pen into the skin every 14 days (Patient not taking: Reported on 12/8/2020) 6 pen 1    apixaban (ELIQUIS) 5 MG TABS tablet Take 1 tablet by mouth 2 times daily (Patient not taking: Reported on 12/8/2020) 180 tablet 1    ondansetron (ZOFRAN) 4 MG tablet Take 4 mg by mouth every 6 hours as needed for Nausea or Vomiting Disp 60 with 3 refills called in 10/20/2020      Magic Mouthwash (MIRACLE MOUTHWASH) Swish and spit 5 mLs 4 times daily as needed for Irritation Disp 240 ml with 2 refills called in 10/20/2020      dexamethasone (DECADRON) 4 MG tablet Take 1 tablet twice a day on day before and after chemo (Patient not taking: Reported on 12/8/2020) 40 tablet 1     No current facility-administered medications for this visit. Allergies:   Effient [prasugrel];  Ace inhibitors; and Statins    Review of Systems:    Constitutional: No fever or chills. No night sweats, + weight loss   Eyes: No eye discharge, double vision, or eye pain   HEENT: negative for sore mouth, sore throat, hoarseness and voice change   Respiratory: negative for cough , sputum, dyspnea, wheezing, hemoptysis, chest pain   Cardiovascular: negative for chest pain, dyspnea, palpitations, orthopnea, PND   Gastrointestinal: negative for nausea, vomiting, diarrhea, constipation, abdominal pain, Dysphagia, hematemesis and hematochezia   Genitourinary: negative for frequency, dysuria, nocturia, urinary incontinence, and hematuria   Integument: negative for rash, skin lesions, bruises.    Hematologic/Lymphatic: negative for easy bruising, bleeding, lymphadenopathy, or petechiae   Endocrine: negative for heat or cold intolerance,weight changes, change in bowel habits and hair loss   Musculoskeletal: negative for myalgias, arthralgias, pain, joint swelling,and bone pain   Neurological: negative for headaches, dizziness, seizures, weakness, numbness        Physical Exam:  Vitals: /62 (Site: Right Upper Arm, Position: Sitting, Cuff Size: Medium Adult)   Pulse 89   Temp 98 °F (36.7 °C)   Ht 5' 10\" (1.778 m)   Wt 243 lb (110.2 kg)   SpO2 99%   BMI 34.87 kg/m²   General appearance - well appearing, no in pain or distress  Mental status - AAO X3  Eyes - pupils equal and reactive, extraocular eye movements intact  Mouth - mucous membranes moist, pharynx normal without lesions  Neck - supple, no significant adenopathy  Lymphatics - no palpable lymphadenopathy, no hepatosplenomegaly  Chest - clear to auscultation, no wheezes, rales or rhonchi, symmetric air entry  Heart - normal rate, regular rhythm, normal S1, S2, no murmurs  Abdomen - soft, nontender, nondistended, no masses or organomegaly  Neurological - alert, oriented, normal speech, no focal findings or movement disorder noted  Extremities - peripheral pulses normal, no pedal edema, no clubbing or cyanosis  Skin - normal coloration and turgor, no rashes, no suspicious skin lesions noted       DATA:    Results for orders placed or performed during the hospital encounter of 12/03/20   CBC Auto Differential   Result Value Ref Range    WBC 2.3 (L) 3.5 - 11.3 k/uL    RBC 3.28 (L) 4.21 - 5.77 m/uL    Hemoglobin 10.7 (L) 13.0 - 17.0 g/dL    Hematocrit 32.3 (L) 40.7 - 50.3 %    MCV 98.5 82.6 - 102.9 fL    MCH 32.6 25.2 - 33.5 pg    MCHC 33.1 25.2 - 33.5 g/dL    RDW 15.5 (H) 11.8 - 14.4 %    Platelets 755 (L) 181 - 453 k/uL    MPV 9.7 8.1 - 13.5 fL    NRBC Automated 0.9 (H) 0.0 per 100 WBC    Differential Type NOT REPORTED     WBC Morphology NOT REPORTED     RBC Morphology NOT REPORTED     Platelet Estimate NOT REPORTED     Monocytes 2 (L) 6 - 14 %    Lymphocytes 17 15 - 43 %    Seg Neutrophils 80 (H) 44 - 74 %    Eosinophils % 0 (L) 1 - 8 %    Basophils 0 0 - 2 %    Immature Granulocytes 1 (H) 0 %    Absolute Mono # 0.05 (L) 0.1 - 1.2 k/uL    Absolute Lymph # 0.39 (L) 1.0 - 4.8 k/uL    Segs Absolute 1.84 1.50 - 8.10 k/uL    Absolute Eos # 0.00 0.0 - 0.4 k/uL    Basophils Absolute 0.00 0.0 - 0.2 k/uL    Absolute Immature Granulocyte 0.02 0.00 - 0.30 k/uL    Morphology TOXIC GRANULATION PRESENT     Morphology Platelet scan shows Decreased Platelets     Morphology ANISOCYTOSIS PRESENT     Morphology 1+ TEARDROPS    Comprehensive Metabolic Panel   Result Value Ref Range    Glucose 182 (H) 70 - 99 mg/dL    BUN 18 8 - 23 mg/dL    CREATININE 0.86 0.70 - 1.20 mg/dL    Bun/Cre Ratio 21 (H) 9 - 20    Calcium 9.7 8.6 - 10.4 mg/dL    Sodium 139 135 - 144 mmol/L    Potassium 4.3 3.7 - 5.3 mmol/L    Chloride 105 98 - 107 mmol/L    CO2 24 20 - 31 mmol/L    Anion Gap 10 9 - 17 mmol/L    Alkaline Phosphatase 47 40 - 129 U/L    ALT 28 5 - 41 U/L    AST 18 <40 U/L    Total Bilirubin 0.27 (L) 0.3 - 1.2 mg/dL    Total Protein 6.6 6.4 - 8.3 g/dL    Alb 3.9 3.5 - 5.2 g/dL    Albumin/Globulin Ratio 1.4 1.0 - 2.5    GFR Non-African American >60 >60 mL/min    GFR African American >60 >60 mL/min    GFR Comment          GFR Staging NOT REPORTED      -- Diagnosis --   BAL RIGHT LOWER LOBE:           NEGATIVE FOR MALIGNANCY.             FINE NEEDLE ASPIRATION STATION 7 EBUS:           ADENOCARCINOMA, COMPATIBLE WITH LUNG PRIMARY. CT PET    Impression    1. The previously described nodule involving the right lung base is FDG avid. Tissue sampling is recommended as malignancy is suspected. 2. The previously identified smaller lung nodules involving the lower lobes    are too small for PET CT characterization.  CT follow-up is recommended. 3. FDG avid subcarinal and right hilar lymph nodes suggestive of metastatic    disease. 4. No abnormal FDG activity identified within the neck, abdomen or pelvis.           Mri Brain W Wo Contrast    Chronic microvascular disease without acute intracranial abnormality. No abnormal postcontrast enhancement. Impression:  Adenocarcinoma of right lung, clinical stage at least T1c, N2, M0 (stage IIIA)  Subcarinal lymph node metastasis  Weight loss    Plan:  I had a detailed discussion with the patient and we went over results of lab work-up imaging studies and other relevant clinical data  Toxicity check performed. Patient is tolerating treatment without unexpected side effects  Labs are adequate for treatment. We will proceed with treatment   Reiterated treatment plan. Reviewed risk benefit profile and reiterated potential side-effects of ongoing treatment  Proceed with course of concurrent chemoradiation as planned. We will obtain imaging studies after completion of concurrent chemoradiation. Reviewed plans for consolidation immunotherapy depending upon results of the CT scans. NCCN guidelines were reviewed and discussed with the patient. The diagnosis and care plan were discussed with the patient in detail.  I discussed the natural history of the disease, prognosis, risks and goals of therapy and answered all the patients questions to the best of my ability. Patient expressed understanding and was in agreement. Abdias Tinajero          This note is created with the assistance of a speech recognition program.  While intending to generate a document that actually reflects the content of the visit, the document can still have some errors including those of syntax and sound a like substitutions which may escape proof reading. It such instances, actual meaning can be extrapolated by contextual diversion.

## 2020-12-21 ENCOUNTER — HOSPITAL ENCOUNTER (OUTPATIENT)
Dept: NON INVASIVE DIAGNOSTICS | Age: 75
Discharge: HOME | End: 2020-12-21
Payer: MEDICARE

## 2020-12-21 PROCEDURE — 93270 REMOTE 30 DAY ECG REV/REPORT: CPT

## 2020-12-21 PROCEDURE — 93271 ECG/MONITORING AND ANALYSIS: CPT

## 2020-12-29 ENCOUNTER — HOSPITAL ENCOUNTER (OUTPATIENT)
Dept: NON INVASIVE DIAGNOSTICS | Age: 75
Discharge: HOME | End: 2020-12-29
Payer: MEDICARE

## 2020-12-29 LAB
LV EF: 65 %
LVEF MODALITY: NORMAL

## 2020-12-29 PROCEDURE — 93306 TTE W/DOPPLER COMPLETE: CPT

## 2021-01-05 ENCOUNTER — TELEPHONE (OUTPATIENT)
Dept: CARDIOLOGY | Age: 76
End: 2021-01-05

## 2021-01-05 ENCOUNTER — HOSPITAL ENCOUNTER (OUTPATIENT)
Dept: LAB | Age: 76
Discharge: HOME | End: 2021-01-05
Payer: MEDICARE

## 2021-01-05 DIAGNOSIS — C34.91 ADENOCARCINOMA OF RIGHT LUNG (HCC): ICD-10-CM

## 2021-01-05 DIAGNOSIS — T66.XXXA RADIATION ESOPHAGITIS: ICD-10-CM

## 2021-01-05 DIAGNOSIS — K20.80 RADIATION ESOPHAGITIS: ICD-10-CM

## 2021-01-05 DIAGNOSIS — T45.1X5D ADVERSE EFFECT OF CHEMOTHERAPY, SUBSEQUENT ENCOUNTER: ICD-10-CM

## 2021-01-05 DIAGNOSIS — C77.9 REGIONAL LYMPH NODE METASTASIS PRESENT (HCC): ICD-10-CM

## 2021-01-05 DIAGNOSIS — D69.6 THROMBOCYTOPENIA (HCC): ICD-10-CM

## 2021-01-05 LAB
ABSOLUTE EOS #: 0.06 K/UL (ref 0–0.44)
ABSOLUTE IMMATURE GRANULOCYTE: <0.03 K/UL (ref 0–0.3)
ABSOLUTE LYMPH #: 0.69 K/UL (ref 1.1–3.7)
ABSOLUTE MONO #: 0.97 K/UL (ref 0.1–1.2)
ALBUMIN SERPL-MCNC: 4.2 G/DL (ref 3.5–5.2)
ALBUMIN/GLOBULIN RATIO: 1.3 (ref 1–2.5)
ALP BLD-CCNC: 66 U/L (ref 40–129)
ALT SERPL-CCNC: 20 U/L (ref 5–41)
ANION GAP SERPL CALCULATED.3IONS-SCNC: 12 MMOL/L (ref 9–17)
AST SERPL-CCNC: 18 U/L
BASOPHILS # BLD: 1 % (ref 0–2)
BASOPHILS ABSOLUTE: 0.03 K/UL (ref 0–0.2)
BILIRUB SERPL-MCNC: 0.32 MG/DL (ref 0.3–1.2)
BUN BLDV-MCNC: 18 MG/DL (ref 8–23)
BUN/CREAT BLD: 18 (ref 9–20)
CALCIUM SERPL-MCNC: 10 MG/DL (ref 8.6–10.4)
CHLORIDE BLD-SCNC: 105 MMOL/L (ref 98–107)
CO2: 26 MMOL/L (ref 20–31)
CREAT SERPL-MCNC: 1.01 MG/DL (ref 0.7–1.2)
DIFFERENTIAL TYPE: ABNORMAL
EOSINOPHILS RELATIVE PERCENT: 1 % (ref 1–4)
GFR AFRICAN AMERICAN: >60 ML/MIN
GFR NON-AFRICAN AMERICAN: >60 ML/MIN
GFR SERPL CREATININE-BSD FRML MDRD: ABNORMAL ML/MIN/{1.73_M2}
GFR SERPL CREATININE-BSD FRML MDRD: ABNORMAL ML/MIN/{1.73_M2}
GLUCOSE BLD-MCNC: 130 MG/DL (ref 70–99)
HCT VFR BLD CALC: 32.4 % (ref 40.7–50.3)
HEMOGLOBIN: 10 G/DL (ref 13–17)
IMMATURE GRANULOCYTES: 0 %
LYMPHOCYTES # BLD: 12 % (ref 24–43)
MCH RBC QN AUTO: 32.5 PG (ref 25.2–33.5)
MCHC RBC AUTO-ENTMCNC: 30.9 G/DL (ref 25.2–33.5)
MCV RBC AUTO: 105.2 FL (ref 82.6–102.9)
MONOCYTES # BLD: 17 % (ref 3–12)
NRBC AUTOMATED: 0 PER 100 WBC
PDW BLD-RTO: 17.5 % (ref 11.8–14.4)
PLATELET # BLD: 173 K/UL (ref 138–453)
PLATELET ESTIMATE: ABNORMAL
PMV BLD AUTO: 9.7 FL (ref 8.1–13.5)
POTASSIUM SERPL-SCNC: 4.1 MMOL/L (ref 3.7–5.3)
RBC # BLD: 3.08 M/UL (ref 4.21–5.77)
RBC # BLD: ABNORMAL 10*6/UL
SEG NEUTROPHILS: 69 % (ref 36–65)
SEGMENTED NEUTROPHILS ABSOLUTE COUNT: 3.85 K/UL (ref 1.5–8.1)
SODIUM BLD-SCNC: 143 MMOL/L (ref 135–144)
TOTAL PROTEIN: 7.5 G/DL (ref 6.4–8.3)
WBC # BLD: 5.6 K/UL (ref 3.5–11.3)
WBC # BLD: ABNORMAL 10*3/UL

## 2021-01-05 PROCEDURE — 80053 COMPREHEN METABOLIC PANEL: CPT

## 2021-01-05 PROCEDURE — 85025 COMPLETE CBC W/AUTO DIFF WBC: CPT

## 2021-01-05 PROCEDURE — 36415 COLL VENOUS BLD VENIPUNCTURE: CPT

## 2021-01-07 ENCOUNTER — HOSPITAL ENCOUNTER (OUTPATIENT)
Dept: CT IMAGING | Age: 76
Discharge: HOME | End: 2021-01-09
Payer: MEDICARE

## 2021-01-07 DIAGNOSIS — C34.91 ADENOCARCINOMA OF RIGHT LUNG (HCC): ICD-10-CM

## 2021-01-07 DIAGNOSIS — D69.6 THROMBOCYTOPENIA (HCC): ICD-10-CM

## 2021-01-07 DIAGNOSIS — K20.80 RADIATION ESOPHAGITIS: ICD-10-CM

## 2021-01-07 DIAGNOSIS — C77.9 REGIONAL LYMPH NODE METASTASIS PRESENT (HCC): ICD-10-CM

## 2021-01-07 DIAGNOSIS — T66.XXXA RADIATION ESOPHAGITIS: ICD-10-CM

## 2021-01-07 DIAGNOSIS — T45.1X5D ADVERSE EFFECT OF CHEMOTHERAPY, SUBSEQUENT ENCOUNTER: ICD-10-CM

## 2021-01-07 PROCEDURE — 71260 CT THORAX DX C+: CPT

## 2021-01-07 PROCEDURE — 6360000004 HC RX CONTRAST MEDICATION: Performed by: INTERNAL MEDICINE

## 2021-01-07 RX ADMIN — IOPAMIDOL 100 ML: 755 INJECTION, SOLUTION INTRAVENOUS at 13:06

## 2021-01-12 ENCOUNTER — OFFICE VISIT (OUTPATIENT)
Dept: ONCOLOGY | Age: 76
End: 2021-01-12
Payer: MEDICARE

## 2021-01-12 ENCOUNTER — HOSPITAL ENCOUNTER (OUTPATIENT)
Dept: INFUSION THERAPY | Age: 76
End: 2021-01-12

## 2021-01-12 ENCOUNTER — HOSPITAL ENCOUNTER (OUTPATIENT)
Dept: LAB | Age: 76
Discharge: HOME | End: 2021-01-12
Payer: MEDICARE

## 2021-01-12 VITALS
HEART RATE: 83 BPM | HEIGHT: 70 IN | OXYGEN SATURATION: 98 % | WEIGHT: 230.4 LBS | BODY MASS INDEX: 32.99 KG/M2 | TEMPERATURE: 97.6 F | DIASTOLIC BLOOD PRESSURE: 60 MMHG | SYSTOLIC BLOOD PRESSURE: 114 MMHG

## 2021-01-12 DIAGNOSIS — D69.6 THROMBOCYTOPENIA (HCC): ICD-10-CM

## 2021-01-12 DIAGNOSIS — R06.00 DYSPNEA, UNSPECIFIED TYPE: ICD-10-CM

## 2021-01-12 DIAGNOSIS — R93.89 ABNORMAL FINDINGS ON DIAGNOSTIC IMAGING OF OTHER SPECIFIED BODY STRUCTURES: ICD-10-CM

## 2021-01-12 DIAGNOSIS — C34.91 ADENOCARCINOMA OF RIGHT LUNG (HCC): ICD-10-CM

## 2021-01-12 DIAGNOSIS — Z51.11 ADMISSION FOR CHEMOTHERAPY: ICD-10-CM

## 2021-01-12 DIAGNOSIS — C77.9 REGIONAL LYMPH NODE METASTASIS PRESENT (HCC): ICD-10-CM

## 2021-01-12 DIAGNOSIS — C34.91 ADENOCARCINOMA OF RIGHT LUNG (HCC): Primary | ICD-10-CM

## 2021-01-12 DIAGNOSIS — J70.0 RADIATION PNEUMONITIS (HCC): ICD-10-CM

## 2021-01-12 LAB
ABSOLUTE EOS #: 0.06 K/UL (ref 0–0.44)
ABSOLUTE IMMATURE GRANULOCYTE: 0.03 K/UL (ref 0–0.3)
ABSOLUTE LYMPH #: 0.78 K/UL (ref 1.1–3.7)
ABSOLUTE MONO #: 0.62 K/UL (ref 0.1–1.2)
ALBUMIN SERPL-MCNC: 4.1 G/DL (ref 3.5–5.2)
ALBUMIN/GLOBULIN RATIO: 1.3 (ref 1–2.5)
ALP BLD-CCNC: 67 U/L (ref 40–129)
ALT SERPL-CCNC: 18 U/L (ref 5–41)
ANION GAP SERPL CALCULATED.3IONS-SCNC: 12 MMOL/L (ref 9–17)
AST SERPL-CCNC: 17 U/L
BASOPHILS # BLD: 1 % (ref 0–2)
BASOPHILS ABSOLUTE: 0.03 K/UL (ref 0–0.2)
BILIRUB SERPL-MCNC: 0.32 MG/DL (ref 0.3–1.2)
BUN BLDV-MCNC: 16 MG/DL (ref 8–23)
BUN/CREAT BLD: 16 (ref 9–20)
CALCIUM SERPL-MCNC: 9.9 MG/DL (ref 8.6–10.4)
CHLORIDE BLD-SCNC: 102 MMOL/L (ref 98–107)
CO2: 26 MMOL/L (ref 20–31)
CREAT SERPL-MCNC: 0.99 MG/DL (ref 0.7–1.2)
DIFFERENTIAL TYPE: ABNORMAL
EOSINOPHILS RELATIVE PERCENT: 1 % (ref 1–4)
GFR AFRICAN AMERICAN: >60 ML/MIN
GFR NON-AFRICAN AMERICAN: >60 ML/MIN
GFR SERPL CREATININE-BSD FRML MDRD: ABNORMAL ML/MIN/{1.73_M2}
GFR SERPL CREATININE-BSD FRML MDRD: ABNORMAL ML/MIN/{1.73_M2}
GLUCOSE BLD-MCNC: 169 MG/DL (ref 70–99)
HCT VFR BLD CALC: 32.6 % (ref 40.7–50.3)
HEMOGLOBIN: 10.5 G/DL (ref 13–17)
IMMATURE GRANULOCYTES: 1 %
LYMPHOCYTES # BLD: 12 % (ref 24–43)
MCH RBC QN AUTO: 33.5 PG (ref 25.2–33.5)
MCHC RBC AUTO-ENTMCNC: 32.2 G/DL (ref 25.2–33.5)
MCV RBC AUTO: 104.2 FL (ref 82.6–102.9)
MONOCYTES # BLD: 9 % (ref 3–12)
NRBC AUTOMATED: 0 PER 100 WBC
PDW BLD-RTO: 16.7 % (ref 11.8–14.4)
PLATELET # BLD: 176 K/UL (ref 138–453)
PLATELET ESTIMATE: ABNORMAL
PMV BLD AUTO: 9.4 FL (ref 8.1–13.5)
POTASSIUM SERPL-SCNC: 4 MMOL/L (ref 3.7–5.3)
RBC # BLD: 3.13 M/UL (ref 4.21–5.77)
RBC # BLD: ABNORMAL 10*6/UL
SEG NEUTROPHILS: 76 % (ref 36–65)
SEGMENTED NEUTROPHILS ABSOLUTE COUNT: 5.14 K/UL (ref 1.5–8.1)
SODIUM BLD-SCNC: 140 MMOL/L (ref 135–144)
TOTAL PROTEIN: 7.2 G/DL (ref 6.4–8.3)
TSH SERPL DL<=0.05 MIU/L-ACNC: 3.42 MIU/L (ref 0.3–5)
WBC # BLD: 6.7 K/UL (ref 3.5–11.3)
WBC # BLD: ABNORMAL 10*3/UL

## 2021-01-12 PROCEDURE — G8427 DOCREV CUR MEDS BY ELIG CLIN: HCPCS | Performed by: INTERNAL MEDICINE

## 2021-01-12 PROCEDURE — 99214 OFFICE O/P EST MOD 30 MIN: CPT | Performed by: INTERNAL MEDICINE

## 2021-01-12 PROCEDURE — 80053 COMPREHEN METABOLIC PANEL: CPT

## 2021-01-12 PROCEDURE — 85025 COMPLETE CBC W/AUTO DIFF WBC: CPT

## 2021-01-12 PROCEDURE — 1123F ACP DISCUSS/DSCN MKR DOCD: CPT | Performed by: INTERNAL MEDICINE

## 2021-01-12 PROCEDURE — 1036F TOBACCO NON-USER: CPT | Performed by: INTERNAL MEDICINE

## 2021-01-12 PROCEDURE — 36415 COLL VENOUS BLD VENIPUNCTURE: CPT

## 2021-01-12 PROCEDURE — G8417 CALC BMI ABV UP PARAM F/U: HCPCS | Performed by: INTERNAL MEDICINE

## 2021-01-12 PROCEDURE — 3017F COLORECTAL CA SCREEN DOC REV: CPT | Performed by: INTERNAL MEDICINE

## 2021-01-12 PROCEDURE — G8484 FLU IMMUNIZE NO ADMIN: HCPCS | Performed by: INTERNAL MEDICINE

## 2021-01-12 PROCEDURE — 84443 ASSAY THYROID STIM HORMONE: CPT

## 2021-01-12 PROCEDURE — 4040F PNEUMOC VAC/ADMIN/RCVD: CPT | Performed by: INTERNAL MEDICINE

## 2021-01-12 RX ORDER — PREDNISONE 20 MG/1
TABLET ORAL
Qty: 40 TABLET | Refills: 0 | Status: SHIPPED | OUTPATIENT
Start: 2021-01-12 | End: 2021-03-03

## 2021-01-12 RX ORDER — GUAIFENESIN 600 MG/1
600 TABLET, EXTENDED RELEASE ORAL 2 TIMES DAILY
Qty: 30 TABLET | Refills: 0 | Status: SHIPPED | OUTPATIENT
Start: 2021-01-12 | End: 2021-01-27

## 2021-01-12 NOTE — PROGRESS NOTES
Juanito Mak                                                                                                                  1/12/2021  MRN:   [id number]  YOB: 1945  PCP:                           Grayson Zaman DO  Referring Physician: No ref. provider found  Treating Physician Name: Zamzam Dupont MD      Reason for visit:  Chief Complaint   Patient presents with    Lung Cancer     5 week follow up   Toxicity check. Discussed treatment plan. Current problems:  Adenocarcinoma of right lung, clinical stage at least T1c, N2, M0 (stage IIIA)  Subcarinal lymph node metastasis    Active and recent treatments:  concurrent chemoradiation using carboplatin and Taxol    Summary of Case/History:    Juanito Mak a 77 y.o.male is a patient with biopsy confirmed adenocarcinoma of right lung presents to the office to establish care and for further evaluation treatment recommendations. Patient was initially seen by pulmonary team for lung nodules. Patient CT scan from July 2019 showed a right-sided pleural calcification thickening concerning for asbestos exposure. Patient also noted to have multiple pulmonary nodules measuring between 1.5 x 1.3 cm. Follow-up CT chest from July 2020 showed increase in size of the right lower lobe lung nodule which now measured 2.1 cm. Subsequently patient underwent CT PET on 8/22 which showed FDG avid right lower lobe lung nodule with SUV of 4.6. Patient CT PET was also positive for subcarinal lymph node with SUV of 4.6. Patient underwent bronchoscopy with endobronchial ultrasound biopsy of the subcarinal lymph node confirmed adenocarcinoma. Patient has significant history of tobacco dependence around 30-pack-year however he quit about 25 years ago. Patient does have coronary artery disease status post stent placement. Patient also gives history of occupational asthma and exposure to bacteria.   Patient does give history of weight loss but describes it as intentional.  Denies headaches dizziness chest pain abdominal pain nausea bone pain     Interim History:   Patient presents to the clinic for a follow-up visit and to discuss further treatment plan. Patient is also scheduled to start immunotherapy today however is complaining of shortness of breath. Denies fever chills. Does complain of some weight loss. Denies any mouth sores. Nausea is controlled. During this visit patient's allergy, social, medical, surgical history and medications were reviewed and updated. Past Medical History:   Past Medical History:   Diagnosis Date    Abdominal hernia     small, no significant symptomatology.  Abnormal PSA     with history of slight increase.  Allergic rhinitis     Benign prostatic hypertrophy     Chest pain     occasional.     Coronary artery disease     status post drug eluting stent placement, LAD, ostial obtuse marginal.     Dysphagia     Erectile dysfunction     Familial hyperlipidemia     with hypertriglyceridemia.  Gastroesophageal reflux disease     Hearing loss, bilateral     hearing aid in the left ear only. 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     Hypertension     Impaired fasting glucose     prediabetes.  Lumbar disc herniation     L3-L4, with chronic back pain.  Mild anemia     Nasal polyps     minimal symptoms.  Nasal septal deviation     right side.  Obesity     Palpitations     occasional.     Peptic ulcer disease     Reactive airway disease     asthma, industrial related, also a history of hypersensitivity pneumonitis.      Urinary frequency        Past Surgical History:     Past Surgical History:   Procedure Laterality Date    APPENDECTOMY  age 11    BRONCHOSCOPY  09/17/2020    BRONCHOSCOPY N/A 9/17/2020    EBUS- BRONCHOSCOPY ENDOBRONCHIAL ULTRASOUND, PATHOLOGY REQUESTED- CINDY NOTIFIED, GI UNIT SCHEDULED performed by Georgi Gutierrez MD at [place] [address]  10/20/2011    occluded LAD and ostial obtuse marginal stenosis, underwent drug eluting stents to both, RCA small, nondominant, occluded, ejection fraction 45%.  CARDIAC CATHETERIZATION  08/2018    with stent placement    CATARACT REMOVAL Bilateral 03/2018    COLONOSCOPY  01/21/2011    mild sigmoid diverticulosis.  COLONOSCOPY  4/6/16    hemorrhoid; sigmoid diverticulosis    KNEE ARTHROSCOPY Left 03/19/2010    partial medial and lateral synovectomies, partial medial meniscectomy, chondroplasty.  NASAL FRACTURE SURGERY  1960    OTHER SURGICAL HISTORY Left 02/12/2010    knee injection.  PORT SURGERY N/A 10/14/2020    RIGHT PORT INSERTION performed by Jaison Vogt DO at [address] [place] [address] PRE-MALIGNANT / [address] Left 01/16/2012    actinic keratoses, ear, liquid nitrogen.  PRE-MALIGNANT / BENIGN SKIN LESION EXCISION Left 07/19/2011    actinic keratosis, upper ear, liquid nitrogen.  PROSTATE BIOPSY Bilateral 09/08/2015    Benign    REPAIR UMBILICAL ZLOJ,5+T/N,POMTH N/A 4/68/4972    UMBILICAL Hernia Repair w/ Mesh performed by Susan Stoner MD at [address] [place] [address] SEPTOPLASTY  10/30/2015    with bilateral submucosal resection of the inferior turbinates, left middle turbinate elza bullosa resection done by Dr Kendra Bender ARTHROSCOPY Left 10/17/14    W/ SUBACROMIAL DECOMPRESSION, DEBRIDEMENT ET CHONDROPLASTY    UPPER GASTROINTESTINAL ENDOSCOPY  01/21/2011    superficial ulcer of the fundus, erostive gastritis.      VASECTOMY Bilateral 04/04/1980       Patient Family Social History:     Social History     Socioeconomic History    Marital status:      Spouse name: None    Number of children: None    Years of education: None    Highest education level: None   Occupational History    None   Social Needs    Financial resource strain: None    Food insecurity     Worry: None     Inability: None    Transportation needs     Medical: None     Non-medical: None   Tobacco Use    Smoking status: Former Smoker     Packs/day: 2.00     Years: 15.00     Pack years: 30.00     Quit date: 1985     Years since quittin.0    Smokeless tobacco: Never Used    Tobacco comment: smoked 2 packs a day for 14 to 15 years, quit in    Substance and Sexual Activity    Alcohol use: No     Alcohol/week: 0.0 standard drinks     Comment: rare    Drug use: No    Sexual activity: None   Lifestyle    Physical activity     Days per week: None     Minutes per session: None    Stress: None   Relationships    Social connections     Talks on phone: None     Gets together: None     Attends Taoist service: None     Active member of club or organization: None     Attends meetings of clubs or organizations: None     Relationship status: None    Intimate partner violence     Fear of current or ex partner: None     Emotionally abused: None     Physically abused: None     Forced sexual activity: None   Other Topics Concern    None   Social History Narrative    None     Family History   Problem Relation Age of Onset    Cancer Mother         lymphoma    Thyroid Disease Mother         hypothyroidism    Stroke Mother     Heart Disease Mother     High Blood Pressure Mother     Heart Attack Mother         in her 46s    Heart Failure Father         congestive    Coronary Art Dis Father     Emphysema Father         was a smoker    Heart Disease Maternal Grandmother     Heart Disease Maternal Grandfather     Crohn's Disease Sister     High Cholesterol Sister     High Cholesterol Child        Current Medications:     Current Outpatient Medications   Medication Sig Dispense Refill    ondansetron (ZOFRAN) 4 MG tablet Take 4 mg by mouth every 6 hours as needed for Nausea or Vomiting Disp 60 with 3 refills called in 10/20/2020      Magic Mouthwash (MIRACLE MOUTHWASH) Swish and spit 5 mLs 4 times daily as needed for Irritation Disp 240 ml with 2 refills called in 10/20/2020      oxybutynin (DITROPAN-XL) 10 MG extended release tablet Take 10 mg by mouth daily      ECHINACEA HERB PO Take by mouth daily      losartan (COZAAR) 50 MG tablet TAKE 1 TABLET BY MOUTH ONE TIME A DAY  90 tablet 1    levothyroxine (SYNTHROID) 100 MCG tablet TAKE 1 TABLET BY MOUTH ONE TIME A DAY  90 tablet 1    furosemide (LASIX) 20 MG tablet TAKE 1 TABLET BY MOUTH ONE TIME A DAY  90 tablet 1    clopidogrel (PLAVIX) 75 MG tablet TAKE 1 TABLET BY MOUTH DAILY 90 tablet 3    pravastatin (PRAVACHOL) 40 MG tablet TAKE ONE TABLET BY MOUTH ONCE EVERY EVENING 90 tablet 3    doxazosin (CARDURA) 2 MG tablet Take 1 tablet by mouth 2 times daily 180 tablet 3    tamsulosin (FLOMAX) 0.4 MG capsule TAKE 1 CAPSULE BY MOUTH DAILY 90 capsule 3    metoprolol tartrate (LOPRESSOR) 50 MG tablet Take 1 tablet by mouth 2 times daily 180 tablet 3    nitroGLYCERIN (NITROSTAT) 0.4 MG SL tablet Place 1 tablet under the tongue every 5 minutes as needed for Chest pain up to max of 3 total doses. If no relief after 1 dose, call 911. 25 tablet 3    albuterol sulfate HFA (VENTOLIN HFA) 108 (90 Base) MCG/ACT inhaler Inhale 2 puffs into the lungs every 6 hours as needed for Wheezing or Shortness of Breath 1 Inhaler 6    Multiple Vitamins-Minerals (MULTIVITAMIN PO) daily.  Icosapent Ethyl (VASCEPA) 1 g CAPS capsule Take 2 capsules by mouth 2 times daily (Patient not taking: Reported on 12/8/2020) 360 capsule 1    Evolocumab (REPATHA SURECLICK) 611 MG/ML SOAJ Inject 1 pen into the skin every 14 days (Patient not taking: Reported on 12/8/2020) 6 pen 1    apixaban (ELIQUIS) 5 MG TABS tablet Take 1 tablet by mouth 2 times daily (Patient not taking: Reported on 12/8/2020) 180 tablet 1    dexamethasone (DECADRON) 4 MG tablet Take 1 tablet twice a day on day before and after chemo (Patient not taking: Reported on 12/8/2020) 40 tablet 1     No current facility-administered medications for this visit.         Allergies:   Effient [prasugrel], Ace inhibitors, and Statins    Review of Systems:    Constitutional: No fever or chills. No night sweats, + weight loss   Eyes: No eye discharge, double vision, or eye pain   HEENT: negative for sore mouth, sore throat, hoarseness and voice change   Respiratory: negative for cough , sputum, dyspnea, wheezing, hemoptysis, chest pain   Cardiovascular: negative for chest pain, dyspnea, palpitations, orthopnea, PND   Gastrointestinal: negative for nausea, vomiting, diarrhea, constipation, abdominal pain, Dysphagia, hematemesis and hematochezia   Genitourinary: negative for frequency, dysuria, nocturia, urinary incontinence, and hematuria   Integument: negative for rash, skin lesions, bruises.    Hematologic/Lymphatic: negative for easy bruising, bleeding, lymphadenopathy, or petechiae   Endocrine: negative for heat or cold intolerance,weight changes, change in bowel habits and hair loss   Musculoskeletal: negative for myalgias, arthralgias, pain, joint swelling,and bone pain   Neurological: negative for headaches, dizziness, seizures, weakness, numbness        Physical Exam:  Vitals: /60 (Site: Right Upper Arm, Position: Sitting, Cuff Size: Medium Adult)   Pulse 83   Temp 97.6 °F (36.4 °C)   Ht 5' 10\" (1.778 m)   Wt 230 lb 6.4 oz (104.5 kg)   SpO2 98%   BMI 33.06 kg/m²   General appearance - well appearing, no in pain or distress  Mental status - AAO X3  Eyes - pupils equal and reactive, extraocular eye movements intact  Mouth - mucous membranes moist, pharynx normal without lesions  Neck - supple, no significant adenopathy  Lymphatics - no palpable lymphadenopathy, no hepatosplenomegaly  Chest - clear to auscultation, no wheezes, rales or rhonchi, symmetric air entry  Heart - normal rate, regular rhythm, normal S1, S2, no murmurs  Abdomen - soft, nontender, nondistended, no masses or organomegaly  Neurological - alert, oriented, normal speech, no focal findings or movement disorder noted  Extremities - peripheral pulses normal, Indications:Atrial fibrillation. Patient Status: Outpatient Height: 70 inches Weight: 234.01 pounds BSA: 2.23 m^2 BMI: 33.58 kg/m^2 Rhythm: Within normal limits Allergies   - *No Known Allergies. - Ace Inhibitors. - *Unlisted:(Statins). CONCLUSIONS Summary Normal left ventricular size with normal hyperdynamic function. Left ventricular ejection fraction 65 %. Mild concentric left ventricular hypertrophy. Grade I (mild) left ventricular diastolic dysfunction. Technically difficult visualization of the right ventricle, but it does appear to be normal in size. Aortic valve not well visualized but appears normal. Trivial tricuspid regurgitation. Estimated right ventricular systolic pressure is 35 mmHg. Trivial pulmonic insufficiency. Signature ----------------------------------------------------------------------------  Electronically signed by Ant Angeles RDCS(Sonographer) on 12/29/2020  10:49 AM ---------------------------------------------------------------------------- ----------------------------------------------------------------------------  Electronically signed by Ronaldo ParikhInterpreting physician) on 12/29/2020  03:06 PM ---------------------------------------------------------------------------- FINDINGS Left Atrium Left atrium is normal in size. Left Ventricle Normal left ventricular size with normal hyperdynamic function. Left ventricular ejection fraction 65 %. Mild concentric left ventricular hypertrophy. Grade I (mild) left ventricular diastolic dysfunction. Right Atrium Right atrium is normal in size. Right Ventricle Technically difficult visualization of the right ventricle, but it does appear to be normal in size. Mitral Valve Normal mitral valve structure and function. Aortic Valve Aortic valve not well visualized but appears normal. Tricuspid Valve Normal tricuspid valve structure and function. Trivial tricuspid regurgitation. Estimated right ventricular systolic pressure is 35 mmHg. Pulmonic Valve The pulmonic valve is normal in structure. Trivial pulmonic insufficiency. Miscellaneous E/E' average = 9. M-mode / 2D Measurements & Calculations:   LVIDd:4.76 cm(3.7 - 5.6 cm)      Diastolic TWIXPN:349 ml  GEFZS:8.6 cm(2.2 - 4.0 cm)       Systolic ULHQAF:37.0 ml  QHJA:5.13 cm(0.6 - 1.1 cm)       Aortic Root:3 cm(2.0 - 3.7 cm)  LVPWd:1.11 cm(0.6 - 1.1 cm)      LA Dimension: 3.4 cm(1.9 - 4.0 cm)  Fractional Shortenin.67 %  Calculated LVEF (%): 66.76 %   Mitral:                                 Aortic   Peak E-Wave: 0.61 m/s                   Peak Velocity: 1.30 m/s  Peak A-Wave: 0.76 m/s                   Peak Gradient: 6.76 mmHg  E/A Ratio: 0.81  Peak Gradient: 1.49 mmHg  Deceleration Time: 239 msec   Tricuspid:                              Pulmonic:   Peak TR Velocity: 2.81 m/s              Peak Velocity: 0.98 m/s  Peak TR Gradient: 31.5844 mmHg          Peak Gradient: 3.82 mmHg  Estimated RA Pressure: 3 mmHg  Peak E-Wave: 0.80 m/s  Peak Gradient: 2.56 mmHg                                          Estimated PASP: 34.58 mmHg  Septal Wall E' velocity:0.06 m/s Lateral Wall E' velocity:0.08 m/s    Mri Abdomen W Wo Contrast    Result Date: 2021  EXAMINATION: MRI OF THE ABDOMEN WITHOUT AND WITH CONTRAST, 2021 9:50 am TECHNIQUE: Multiplanar multisequence MRI of the abdomen was performed without and with the administration of intravenous contrast. COMPARISON: CT 2021, 2020 HISTORY: ORDERING SYSTEM PROVIDED HISTORY: Adenocarcinoma of right lung [facility] TECHNOLOGIST PROVIDED HISTORY: liver lesion on ct Specify organ?->Liver Reason for Exam:  History of lung cancer, follow up liver lesion seen on CT scan. Acuity: Acute Type of Exam: Subsequent/Follow-up Additional signs and symptoms: Adenocarcinoma of right lung; Regional lymph node metastasis present; Radiation pneumonitis. FINDINGS: There is hepatic steatosis.   At the level of bifurcation of the main portal vein there is subtle area of posterior subcapsular segment 4A left lobe of liver area of nonenhancing T1 hypointensity best appreciated on the postcontrast images. This measures maximally about 0.8 x 1.1 x 1.8 cm. This correlates with finding on the recent CT scan. There is no T2 correlate and there is no restriction of diffusion. Likely benign but attention on follow-up. The spleen, pancreas, kidneys, adrenal glands and gallbladder show no significant abnormalities. Visualized GI tract show no acute process. No ascites. Trace right pleural effusion. 1.9 cm lung nodule at the right lung base in the lower lobe unchanged. Bone marrow signal age appropriate. 1. Previously noted area of concern in the posterior left lobe of liver is identified as the subtle T1 hypointense nonenhancing, non diffusion restricting focus isointense to liver on T2. This is in segment 4A at the level of bifurcation of the main portal vein. Benign etiology most favored. Attention on follow-up. No findings concerning for metastatic lesion. 2. Hepatic steatosis. 3. Trace right pleural effusion and a right lung base lower lobe 1.9 cm nodule unchanged from prior CT. Ct Chest Abdomen Pelvis W Contrast: 1/7/2021    CT CHEST: Favorable interval response to therapy seen as decreased size of right lower lobe nodule. 2. Mixed response from therapy when correlating with subcarinal and right hilar lymph nodes, subcarinal lymph node not significantly changed, right hilar lymph node measuring slightly larger, though this may be reactive. 3. New patchy pulmonary infiltrates in the right lung may be related to post radiation pneumonitis or other acute infectious process including possible atypical/viral pneumonia. 4. CT ABDOMEN/PELVIS: New indeterminate right hepatic liver lesion I feel is likely focal fatty infiltration, but could reflect a metastatic lesion. Correlation with MRI abdomen attention liver without and with gadolinium correlation recommended.  5. Nonspecific prostate gland enlargement typical of BPH. RECOMMENDATIONS: MRI abdomen attention liver without and with gadolinium       Impression:  Adenocarcinoma of right lung, clinical stage at least T1c, N2, M0 (stage IIIA)  Subcarinal lymph node metastasis  Weight loss  Radiation pneumonitis and shortness of breath      Plan:  I had a detailed discussion with the patient and we went over results of lab work-up imaging studies and other relevant clinical data  Reviewed results of CT scan. Reviewed image of scans with the patient also. I believe patient shortness of breath is secondary to radiation pneumonitis. We will hold off on starting immunotherapy  Patient be discharged on tapering dose of steroids  We will also order MRI of liver to assess the liver lesion which I believe is likely benign   Reviewed potential side effects of immunotherapy. We will see patient back in office in 2 to 3 weeks to assess and evaluate for challenging patient with immunotherapy. NCCN guidelines were reviewed and discussed with the patient. The diagnosis and care plan were discussed with the patient in detail. I discussed the natural history of the disease, prognosis, risks and goals of therapy and answered all the patients questions to the best of my ability. Patient expressed understanding and was in agreement. Ny Clark          This note is created with the assistance of a speech recognition program.  While intending to generate a document that actually reflects the content of the visit, the document can still have some errors including those of syntax and sound a like substitutions which may escape proof reading. It such instances, actual meaning can be extrapolated by contextual diversion.

## 2021-01-13 ENCOUNTER — TELEPHONE (OUTPATIENT)
Dept: CASE MANAGEMENT | Age: 76
End: 2021-01-13

## 2021-01-22 ENCOUNTER — HOSPITAL ENCOUNTER (OUTPATIENT)
Dept: MRI IMAGING | Age: 76
Discharge: HOME | End: 2021-01-24
Payer: MEDICARE

## 2021-01-22 DIAGNOSIS — J70.0 RADIATION PNEUMONITIS (HCC): ICD-10-CM

## 2021-01-22 DIAGNOSIS — C34.91 ADENOCARCINOMA OF RIGHT LUNG (HCC): ICD-10-CM

## 2021-01-22 DIAGNOSIS — C77.9 REGIONAL LYMPH NODE METASTASIS PRESENT (HCC): ICD-10-CM

## 2021-01-22 DIAGNOSIS — D69.6 THROMBOCYTOPENIA (HCC): ICD-10-CM

## 2021-01-22 PROCEDURE — 2709999900 MRI ABDOMEN W WO CONTRAST

## 2021-01-22 PROCEDURE — 6360000004 HC RX CONTRAST MEDICATION: Performed by: INTERNAL MEDICINE

## 2021-01-22 PROCEDURE — A9577 INJ MULTIHANCE: HCPCS | Performed by: INTERNAL MEDICINE

## 2021-01-22 RX ADMIN — GADOBENATE DIMEGLUMINE 15 ML: 529 INJECTION, SOLUTION INTRAVENOUS at 10:45

## 2021-01-26 ENCOUNTER — HOSPITAL ENCOUNTER (OUTPATIENT)
Dept: INFUSION THERAPY | Age: 76
Discharge: HOME | End: 2021-01-26
Payer: MEDICARE

## 2021-01-26 ENCOUNTER — VIRTUAL VISIT (OUTPATIENT)
Dept: ONCOLOGY | Age: 76
End: 2021-01-26
Payer: MEDICARE

## 2021-01-26 VITALS
TEMPERATURE: 99.3 F | HEART RATE: 60 BPM | HEIGHT: 70 IN | DIASTOLIC BLOOD PRESSURE: 67 MMHG | WEIGHT: 241.4 LBS | SYSTOLIC BLOOD PRESSURE: 108 MMHG | OXYGEN SATURATION: 97 % | BODY MASS INDEX: 34.56 KG/M2

## 2021-01-26 DIAGNOSIS — C77.9 REGIONAL LYMPH NODE METASTASIS PRESENT (HCC): ICD-10-CM

## 2021-01-26 DIAGNOSIS — C34.91 ADENOCARCINOMA OF RIGHT LUNG (HCC): Primary | ICD-10-CM

## 2021-01-26 DIAGNOSIS — Z29.8 IMMUNOTHERAPY: ICD-10-CM

## 2021-01-26 DIAGNOSIS — J70.0 RADIATION PNEUMONITIS (HCC): ICD-10-CM

## 2021-01-26 DIAGNOSIS — C34.91 ADENOCARCINOMA OF RIGHT LUNG (HCC): ICD-10-CM

## 2021-01-26 DIAGNOSIS — D69.6 THROMBOCYTOPENIA (HCC): ICD-10-CM

## 2021-01-26 DIAGNOSIS — D69.6 THROMBOCYTOPENIA (HCC): Primary | ICD-10-CM

## 2021-01-26 LAB
ABSOLUTE EOS #: 0 K/UL (ref 0–0.4)
ABSOLUTE IMMATURE GRANULOCYTE: 0.11 K/UL (ref 0–0.3)
ABSOLUTE LYMPH #: 0.32 K/UL (ref 1–4.8)
ABSOLUTE MONO #: 0.42 K/UL (ref 0.1–1.2)
ALBUMIN SERPL-MCNC: 4.1 G/DL (ref 3.5–5.2)
ALBUMIN/GLOBULIN RATIO: 1.5 (ref 1–2.5)
ALP BLD-CCNC: 55 U/L (ref 40–129)
ALT SERPL-CCNC: 31 U/L (ref 5–41)
ANION GAP SERPL CALCULATED.3IONS-SCNC: 12 MMOL/L (ref 9–17)
AST SERPL-CCNC: 19 U/L
BASOPHILS # BLD: 1 % (ref 0–2)
BASOPHILS ABSOLUTE: 0.11 K/UL (ref 0–0.2)
BILIRUB SERPL-MCNC: 0.21 MG/DL (ref 0.3–1.2)
BUN BLDV-MCNC: 28 MG/DL (ref 8–23)
BUN/CREAT BLD: 28 (ref 9–20)
CALCIUM SERPL-MCNC: 10 MG/DL (ref 8.6–10.4)
CHLORIDE BLD-SCNC: 100 MMOL/L (ref 98–107)
CO2: 25 MMOL/L (ref 20–31)
CREAT SERPL-MCNC: 0.99 MG/DL (ref 0.7–1.2)
DIFFERENTIAL TYPE: ABNORMAL
EOSINOPHILS RELATIVE PERCENT: 0 % (ref 1–8)
GFR AFRICAN AMERICAN: >60 ML/MIN
GFR NON-AFRICAN AMERICAN: >60 ML/MIN
GFR SERPL CREATININE-BSD FRML MDRD: ABNORMAL ML/MIN/{1.73_M2}
GFR SERPL CREATININE-BSD FRML MDRD: ABNORMAL ML/MIN/{1.73_M2}
GLUCOSE BLD-MCNC: 191 MG/DL (ref 70–99)
HCT VFR BLD CALC: 35.9 % (ref 40.7–50.3)
HEMOGLOBIN: 11.4 G/DL (ref 13–17)
IMMATURE GRANULOCYTES: 1 %
LYMPHOCYTES # BLD: 3 % (ref 15–43)
MCH RBC QN AUTO: 33.8 PG (ref 25.2–33.5)
MCHC RBC AUTO-ENTMCNC: 31.8 G/DL (ref 25.2–33.5)
MCV RBC AUTO: 106.5 FL (ref 82.6–102.9)
MONOCYTES # BLD: 4 % (ref 6–14)
MORPHOLOGY: ABNORMAL
MORPHOLOGY: ABNORMAL
NRBC AUTOMATED: 0 PER 100 WBC
PDW BLD-RTO: 15.7 % (ref 11.8–14.4)
PLATELET # BLD: 114 K/UL (ref 138–453)
PLATELET ESTIMATE: ABNORMAL
PMV BLD AUTO: 10.1 FL (ref 8.1–13.5)
POTASSIUM SERPL-SCNC: 4.1 MMOL/L (ref 3.7–5.3)
RBC # BLD: 3.37 M/UL (ref 4.21–5.77)
RBC # BLD: ABNORMAL 10*6/UL
SEG NEUTROPHILS: 91 % (ref 44–74)
SEGMENTED NEUTROPHILS ABSOLUTE COUNT: 9.54 K/UL (ref 1.5–8.1)
SODIUM BLD-SCNC: 137 MMOL/L (ref 135–144)
TOTAL PROTEIN: 6.9 G/DL (ref 6.4–8.3)
TSH SERPL DL<=0.05 MIU/L-ACNC: 2.75 MIU/L (ref 0.3–5)
WBC # BLD: 10.5 K/UL (ref 3.5–11.3)
WBC # BLD: ABNORMAL 10*3/UL

## 2021-01-26 PROCEDURE — 85025 COMPLETE CBC W/AUTO DIFF WBC: CPT

## 2021-01-26 PROCEDURE — 82746 ASSAY OF FOLIC ACID SERUM: CPT

## 2021-01-26 PROCEDURE — 6360000002 HC RX W HCPCS: Performed by: INTERNAL MEDICINE

## 2021-01-26 PROCEDURE — 96413 CHEMO IV INFUSION 1 HR: CPT

## 2021-01-26 PROCEDURE — 36591 DRAW BLOOD OFF VENOUS DEVICE: CPT

## 2021-01-26 PROCEDURE — 4040F PNEUMOC VAC/ADMIN/RCVD: CPT | Performed by: INTERNAL MEDICINE

## 2021-01-26 PROCEDURE — 80053 COMPREHEN METABOLIC PANEL: CPT

## 2021-01-26 PROCEDURE — 82607 VITAMIN B-12: CPT

## 2021-01-26 PROCEDURE — G8428 CUR MEDS NOT DOCUMENT: HCPCS | Performed by: INTERNAL MEDICINE

## 2021-01-26 PROCEDURE — 1123F ACP DISCUSS/DSCN MKR DOCD: CPT | Performed by: INTERNAL MEDICINE

## 2021-01-26 PROCEDURE — 84443 ASSAY THYROID STIM HORMONE: CPT

## 2021-01-26 PROCEDURE — 99211 OFF/OP EST MAY X REQ PHY/QHP: CPT | Performed by: INTERNAL MEDICINE

## 2021-01-26 PROCEDURE — 2580000003 HC RX 258: Performed by: INTERNAL MEDICINE

## 2021-01-26 PROCEDURE — 99214 OFFICE O/P EST MOD 30 MIN: CPT | Performed by: INTERNAL MEDICINE

## 2021-01-26 PROCEDURE — 3017F COLORECTAL CA SCREEN DOC REV: CPT | Performed by: INTERNAL MEDICINE

## 2021-01-26 RX ORDER — HEPARIN SODIUM (PORCINE) LOCK FLUSH IV SOLN 100 UNIT/ML 100 UNIT/ML
500 SOLUTION INTRAVENOUS PRN
Status: DISCONTINUED | OUTPATIENT
Start: 2021-01-26 | End: 2021-01-27 | Stop reason: HOSPADM

## 2021-01-26 RX ORDER — SODIUM CHLORIDE 9 MG/ML
20 INJECTION, SOLUTION INTRAVENOUS ONCE
Status: CANCELLED | OUTPATIENT
Start: 2021-01-26 | End: 2021-01-26

## 2021-01-26 RX ORDER — DIPHENHYDRAMINE HYDROCHLORIDE 50 MG/ML
50 INJECTION INTRAMUSCULAR; INTRAVENOUS ONCE
Status: CANCELLED | OUTPATIENT
Start: 2021-01-26 | End: 2021-01-26

## 2021-01-26 RX ORDER — SODIUM CHLORIDE 0.9 % (FLUSH) 0.9 %
5 SYRINGE (ML) INJECTION PRN
Status: CANCELLED | OUTPATIENT
Start: 2021-01-26

## 2021-01-26 RX ORDER — SODIUM CHLORIDE 0.9 % (FLUSH) 0.9 %
10 SYRINGE (ML) INJECTION PRN
Status: DISCONTINUED | OUTPATIENT
Start: 2021-01-26 | End: 2021-01-27 | Stop reason: HOSPADM

## 2021-01-26 RX ORDER — HEPARIN SODIUM (PORCINE) LOCK FLUSH IV SOLN 100 UNIT/ML 100 UNIT/ML
500 SOLUTION INTRAVENOUS PRN
Status: CANCELLED | OUTPATIENT
Start: 2021-01-26

## 2021-01-26 RX ORDER — EPINEPHRINE 1 MG/ML
0.3 INJECTION, SOLUTION, CONCENTRATE INTRAVENOUS PRN
Status: CANCELLED | OUTPATIENT
Start: 2021-01-26

## 2021-01-26 RX ORDER — SODIUM CHLORIDE 0.9 % (FLUSH) 0.9 %
10 SYRINGE (ML) INJECTION PRN
Status: CANCELLED | OUTPATIENT
Start: 2021-01-26

## 2021-01-26 RX ORDER — SODIUM CHLORIDE 9 MG/ML
INJECTION, SOLUTION INTRAVENOUS CONTINUOUS
Status: CANCELLED | OUTPATIENT
Start: 2021-01-26

## 2021-01-26 RX ORDER — SODIUM CHLORIDE 9 MG/ML
20 INJECTION, SOLUTION INTRAVENOUS ONCE
Status: DISCONTINUED | OUTPATIENT
Start: 2021-01-26 | End: 2021-01-27 | Stop reason: HOSPADM

## 2021-01-26 RX ORDER — METHYLPREDNISOLONE SODIUM SUCCINATE 125 MG/2ML
125 INJECTION, POWDER, LYOPHILIZED, FOR SOLUTION INTRAMUSCULAR; INTRAVENOUS ONCE
Status: CANCELLED | OUTPATIENT
Start: 2021-01-26 | End: 2021-01-26

## 2021-01-26 RX ADMIN — Medication 10 ML: at 13:35

## 2021-01-26 RX ADMIN — Medication 10 ML: at 15:54

## 2021-01-26 RX ADMIN — SODIUM CHLORIDE 20 ML/HR: 9 INJECTION, SOLUTION INTRAVENOUS at 13:35

## 2021-01-26 RX ADMIN — HEPARIN SODIUM (PORCINE) LOCK FLUSH IV SOLN 100 UNIT/ML 500 UNITS: 100 SOLUTION at 15:54

## 2021-01-26 RX ADMIN — SODIUM CHLORIDE 1100 MG: 9 INJECTION, SOLUTION INTRAVENOUS at 14:44

## 2021-01-26 RX ADMIN — Medication 10 ML: at 15:52

## 2021-01-26 ASSESSMENT — PAIN SCALES - GENERAL: PAINLEVEL_OUTOF10: 0

## 2021-01-26 NOTE — PROGRESS NOTES
Infusion complete. [facility] from port per Dr. Bowman Plater order. Flushed VAD with 10 ml of saline and 5 ml of heparin flush. D/C camargo needle. Band aid to site. Patient stable and discharged to home.

## 2021-01-26 NOTE — PROGRESS NOTES
Carmela Mak                                                                                                                  1/26/2021  MRN:   [id number]  YOB: 1945  PCP:                           Venus Reeder DO  Referring Physician: No ref. provider found  Treating Physician Name: Quinn Cosme MD      Reason for visit:  Chief Complaint   Patient presents with    Lung Cancer     mri done her   Discussed treatment plan. Current problems:  Adenocarcinoma of right lung, clinical stage at least T1c, N2, M0 (stage IIIA)  Subcarinal lymph node metastasis    Active and recent treatments:  concurrent chemoradiation using carboplatin and Taxol  Consolidation therapy with Imfinzi1/2021    Summary of Case/History:    Carmela Mak a 77 y.o.male is a patient with biopsy confirmed adenocarcinoma of right lung presents to the office to establish care and for further evaluation treatment recommendations. Patient was initially seen by pulmonary team for lung nodules. Patient CT scan from July 2019 showed a right-sided pleural calcification thickening concerning for asbestos exposure. Patient also noted to have multiple pulmonary nodules measuring between 1.5 x 1.3 cm. Follow-up CT chest from July 2020 showed increase in size of the right lower lobe lung nodule which now measured 2.1 cm. Subsequently patient underwent CT PET on 8/22 which showed FDG avid right lower lobe lung nodule with SUV of 4.6. Patient CT PET was also positive for subcarinal lymph node with SUV of 4.6. Patient underwent bronchoscopy with endobronchial ultrasound biopsy of the subcarinal lymph node confirmed adenocarcinoma. Patient has significant history of tobacco dependence around 30-pack-year however he quit about 25 years ago. Patient does have coronary artery disease status post stent placement. Patient also gives history of occupational asthma and exposure to bacteria.   Patient does give history of weight loss but describes it as intentional.  Denies headaches dizziness chest pain abdominal pain nausea bone pain     Interim History:   Patient presents to the clinic for a follow-up visit via virtual visit due to ongoing coronavirus and for toxicity check and to review results of her blood work-up. Patient has been tolerating treatment without any unexpected or severe side effects. Denies hospitalization or ER visit. Appetite is good. Weight is stable. Nausea is controlled. Patient shortness of breath has improved with steroid use. He is able to perform activities daily life without much difficulty but is not able to take long walks outside attributes it to the cold weather    During this visit patient's allergy, social, medical, surgical history and medications were reviewed and updated. Past Medical History:   Past Medical History:   Diagnosis Date    Abdominal hernia     small, no significant symptomatology.  Abnormal PSA     with history of slight increase.  Allergic rhinitis     Benign prostatic hypertrophy     Chest pain     occasional.     Coronary artery disease     status post drug eluting stent placement, LAD, ostial obtuse marginal.     Dysphagia     Erectile dysfunction     Familial hyperlipidemia     with hypertriglyceridemia.  Gastroesophageal reflux disease     Hearing loss, bilateral     hearing aid in the left ear only. 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     Hypertension     Impaired fasting glucose     prediabetes.  Lumbar disc herniation     L3-L4, with chronic back pain.  Mild anemia     Nasal polyps     minimal symptoms.  Nasal septal deviation     right side.  Obesity     Palpitations     occasional.     Peptic ulcer disease     Reactive airway disease     asthma, industrial related, also a history of hypersensitivity pneumonitis.      Urinary frequency        Past Surgical History:     Past Surgical History:   Procedure Laterality Date [place] APPENDECTOMY  age 11    BRONCHOSCOPY  09/17/2020    BRONCHOSCOPY N/A 9/17/2020    EBUS- BRONCHOSCOPY ENDOBRONCHIAL ULTRASOUND, PATHOLOGY REQUESTED- CINDY NOTIFIED, GI UNIT SCHEDULED performed by Lachelle Conteh MD at [address] [facility]  10/20/2011    occluded LAD and ostial obtuse marginal stenosis, underwent drug eluting stents to both, RCA small, nondominant, occluded, ejection fraction 45%.  CARDIAC CATHETERIZATION  08/2018    with stent placement    CATARACT REMOVAL Bilateral 03/2018    COLONOSCOPY  01/21/2011    mild sigmoid diverticulosis.  COLONOSCOPY  4/6/16    hemorrhoid; sigmoid diverticulosis    KNEE ARTHROSCOPY Left 03/19/2010    partial medial and lateral synovectomies, partial medial meniscectomy, chondroplasty.  NASAL FRACTURE SURGERY  1960    OTHER SURGICAL HISTORY Left 02/12/2010    knee injection.  PORT SURGERY N/A 10/14/2020    RIGHT PORT INSERTION performed by Davon Crawford DO at [address] [place] [address] PRE-MALIGNANT / [address] Left 01/16/2012    actinic keratoses, ear, liquid nitrogen.  PRE-MALIGNANT / BENIGN SKIN LESION EXCISION Left 07/19/2011    actinic keratosis, upper ear, liquid nitrogen.  PROSTATE BIOPSY Bilateral 09/08/2015    Benign    REPAIR UMBILICAL NSKX,2+D/I,IGQAF N/A 7/54/5574    UMBILICAL Hernia Repair w/ Mesh performed by Carolyn Truong MD at [address] [place] [address] SEPTOPLASTY  10/30/2015    with bilateral submucosal resection of the inferior turbinates, left middle turbinate elza bullosa resection done by Dr Binu Pacheco ARTHROSCOPY Left 10/17/14    W/ SUBACROMIAL DECOMPRESSION, DEBRIDEMENT ET CHONDROPLASTY    UPPER GASTROINTESTINAL ENDOSCOPY  01/21/2011    superficial ulcer of the fundus, erostive gastritis.      VASECTOMY Bilateral 04/04/1980       Patient Family Social History:     Social History     Socioeconomic History    Marital status:      Spouse name: None    Number of children: None    Years of education: None    Highest education level: None   Occupational History    None   Social Needs    Financial resource strain: None    Food insecurity     Worry: None     Inability: None    Transportation needs     Medical: None     Non-medical: None   Tobacco Use    Smoking status: Former Smoker     Packs/day: 2.00     Years: 15.00     Pack years: 30.00     Quit date: 1985     Years since quittin.0    Smokeless tobacco: Never Used    Tobacco comment: smoked 2 packs a day for 14 to 15 years, quit in    Substance and Sexual Activity    Alcohol use: No     Alcohol/week: 0.0 standard drinks     Comment: rare    Drug use: No    Sexual activity: None   Lifestyle    Physical activity     Days per week: None     Minutes per session: None    Stress: None   Relationships    Social connections     Talks on phone: None     Gets together: None     Attends Sabianist service: None     Active member of club or organization: None     Attends meetings of clubs or organizations: None     Relationship status: None    Intimate partner violence     Fear of current or ex partner: None     Emotionally abused: None     Physically abused: None     Forced sexual activity: None   Other Topics Concern    None   Social History Narrative    None     Family History   Problem Relation Age of Onset    Cancer Mother         lymphoma    Thyroid Disease Mother         hypothyroidism    Stroke Mother     Heart Disease Mother     High Blood Pressure Mother     Heart Attack Mother         in her 46s    Heart Failure Father         congestive    Coronary Art Dis Father     Emphysema Father         was a smoker    Heart Disease Maternal Grandmother     Heart Disease Maternal Grandfather     Crohn's Disease Sister     High Cholesterol Sister     High Cholesterol Child        Current Medications:     Current Outpatient Medications   Medication Sig Dispense Refill    predniSONE (DELTASONE) 20 MG tablet Prednisone taper  Take 20 mg po bid for 5 days then   Take 20 mg po daily for 5 days then   Take 10 mg po daily for 5 days 40 tablet 0    guaiFENesin (MUCINEX) 600 MG extended release tablet Take 1 tablet by mouth 2 times daily for 15 days 30 tablet 0    Icosapent Ethyl (VASCEPA) 1 g CAPS capsule Take 2 capsules by mouth 2 times daily (Patient not taking: Reported on 12/8/2020) 360 capsule 1    Evolocumab (REPATHA SURECLICK) 737 MG/ML SOAJ Inject 1 pen into the skin every 14 days (Patient not taking: Reported on 12/8/2020) 6 pen 1    apixaban (ELIQUIS) 5 MG TABS tablet Take 1 tablet by mouth 2 times daily (Patient not taking: Reported on 12/8/2020) 180 tablet 1    ondansetron (ZOFRAN) 4 MG tablet Take 4 mg by mouth every 6 hours as needed for Nausea or Vomiting Disp 60 with 3 refills called in 10/20/2020      Magic Mouthwash (MIRACLE MOUTHWASH) Swish and spit 5 mLs 4 times daily as needed for Irritation Disp 240 ml with 2 refills called in 10/20/2020      dexamethasone (DECADRON) 4 MG tablet Take 1 tablet twice a day on day before and after chemo 40 tablet 1    oxybutynin (DITROPAN-XL) 10 MG extended release tablet Take 10 mg by mouth daily      ECHINACEA HERB PO Take by mouth daily      losartan (COZAAR) 50 MG tablet TAKE 1 TABLET BY MOUTH ONE TIME A DAY  90 tablet 1    levothyroxine (SYNTHROID) 100 MCG tablet TAKE 1 TABLET BY MOUTH ONE TIME A DAY  90 tablet 1    furosemide (LASIX) 20 MG tablet TAKE 1 TABLET BY MOUTH ONE TIME A DAY  90 tablet 1    clopidogrel (PLAVIX) 75 MG tablet TAKE 1 TABLET BY MOUTH DAILY 90 tablet 3    pravastatin (PRAVACHOL) 40 MG tablet TAKE ONE TABLET BY MOUTH ONCE EVERY EVENING 90 tablet 3    doxazosin (CARDURA) 2 MG tablet Take 1 tablet by mouth 2 times daily 180 tablet 3    tamsulosin (FLOMAX) 0.4 MG capsule TAKE 1 CAPSULE BY MOUTH DAILY 90 capsule 3    metoprolol tartrate (LOPRESSOR) 50 MG tablet Take 1 tablet by mouth 2 times daily 180 tablet 3    nitroGLYCERIN (NITROSTAT) 0.4 MG SL tablet Place 1 tablet under the tongue every 5 minutes as needed for Chest pain up to max of 3 total doses. If no relief after 1 dose, call 911. 25 tablet 3    albuterol sulfate HFA (VENTOLIN HFA) 108 (90 Base) MCG/ACT inhaler Inhale 2 puffs into the lungs every 6 hours as needed for Wheezing or Shortness of Breath 1 Inhaler 6    Multiple Vitamins-Minerals (MULTIVITAMIN PO) daily. No current facility-administered medications for this visit. Facility-Administered Medications Ordered in Other Visits   Medication Dose Route Frequency Provider Last Rate Last Admin    0.9 % sodium chloride infusion  20 mL/hr Intravenous Once Lupillo Bhatia MD 20 mL/hr at 01/26/21 1335 20 mL/hr at 01/26/21 1335    durvalumab (IMFINZI) 1,100 mg in sodium chloride 0.9 % 250 mL chemo IVPB  10 mg/kg (Treatment Plan Recorded) Intravenous Once Lupillo Bhatia MD        sodium chloride flush 0.9 % injection 10 mL  10 mL Intravenous PRN Lupillo Bhatia MD   10 mL at 01/26/21 1335    heparin flush 100 UNIT/ML injection 500 Units  500 Units Intracatheter PRN Lupillo Bhatia MD           Allergies:   Effient [prasugrel], Ace inhibitors, and Statins    Review of Systems:    Constitutional: No fever or chills. No night sweats, + weight loss   Eyes: No eye discharge, double vision, or eye pain   HEENT: negative for sore mouth, sore throat, hoarseness and voice change   Respiratory: negative for cough , sputum, dyspnea, wheezing, hemoptysis, chest pain   Cardiovascular: negative for chest pain, dyspnea, palpitations, orthopnea, PND   Gastrointestinal: negative for nausea, vomiting, diarrhea, constipation, abdominal pain, Dysphagia, hematemesis and hematochezia   Genitourinary: negative for frequency, dysuria, nocturia, urinary incontinence, and hematuria   Integument: negative for rash, skin lesions, bruises.    Hematologic/Lymphatic: negative for easy bruising, bleeding, lymphadenopathy, or petechiae   Endocrine: negative for heat or cold intolerance,weight changes, change in bowel habits and hair loss   Musculoskeletal: negative for myalgias, arthralgias, pain, joint swelling,and bone pain   Neurological: negative for headaches, dizziness, seizures, weakness, numbness            PHYSICAL EXAMINATION:    Vital Signs: (As obtained by patient/caregiver or practitioner observation)    Blood pressure-  Heart rate-    Respiratory rate-    Temperature-  Pulse oximetry-     Constitutional: [x] Appears well-developed and well-nourished [x] No apparent distress      [] Abnormal-   Mental status  [x] Alert and awake  [x] Oriented to person/place/time [x]Able to follow commands      Eyes:  EOM    [x]  Normal  [] Abnormal-  Sclera  [x]  Normal  [] Abnormal -         Discharge []  None visible  [] Abnormal -    HENT:   [x] Normocephalic, atraumatic. [] Abnormal   [x] Mouth/Throat: Mucous membranes are moist.     External Ears [] Normal  [] Abnormal-     Neck: [x] No visualized mass     Pulmonary/Chest: [x] Respiratory effort normal.  [] No visualized signs of difficulty breathing or respiratory distress        [] Abnormal-      Musculoskeletal:   [x] Normal gait with no signs of ataxia         [x] Normal range of motion of neck        [] Abnormal-       Neurological:        [x] No Facial Asymmetry (Cranial nerve 7 motor function) (limited exam to video visit)          [x] No gaze palsy        [] Abnormal-         Skin:        [x] No significant exanthematous lesions or discoloration noted on facial skin         [] Abnormal-            Psychiatric:       [x] Normal Affect [x] No Hallucinations        [] Abnormal-     Other pertinent observable physical exam findings-     Due to this being a TeleHealth encounter, evaluation of the following organ systems is limited: Vitals/Constitutional/EENT/Resp/CV/GI//MS/Neuro/Skin/Heme-Lymph-Imm.           DATA:    Results for orders placed or performed during the hospital encounter of 01/26/21   CBC Auto Differential Result Value Ref Range    WBC 10.5 3.5 - 11.3 k/uL    RBC 3.37 (L) 4.21 - 5.77 m/uL    Hemoglobin 11.4 (L) 13.0 - 17.0 g/dL    Hematocrit 35.9 (L) 40.7 - 50.3 %    .5 (H) 82.6 - 102.9 fL    MCH 33.8 (H) 25.2 - 33.5 pg    MCHC 31.8 25.2 - 33.5 g/dL    RDW 15.7 (H) 11.8 - 14.4 %    Platelets 174 (L) 201 - 453 k/uL    MPV 10.1 8.1 - 13.5 fL    NRBC Automated 0.0 0.0 per 100 WBC    Differential Type NOT REPORTED     WBC Morphology NOT REPORTED     RBC Morphology NOT REPORTED     Platelet Estimate NOT REPORTED     Monocytes 4 (L) 6 - 14 %    Lymphocytes 3 (L) 15 - 43 %    Seg Neutrophils 91 (H) 44 - 74 %    Eosinophils % 0 (L) 1 - 8 %    Basophils 1 0 - 2 %    Immature Granulocytes 1 (H) 0 %    Absolute Mono # 0.42 0.1 - 1.2 k/uL    Absolute Lymph # 0.32 (L) 1.0 - 4.8 k/uL    Segs Absolute 9.54 (H) 1.50 - 8.10 k/uL    Absolute Eos # 0.00 0.0 - 0.4 k/uL    Basophils Absolute 0.11 0.0 - 0.2 k/uL    Absolute Immature Granulocyte 0.11 0.00 - 0.30 k/uL    Morphology Platelet scan shows Decreased Platelets     Morphology 1+ TEARDROPS    Comprehensive Metabolic Panel   Result Value Ref Range    Glucose 191 (H) 70 - 99 mg/dL    BUN 28 (H) 8 - 23 mg/dL    CREATININE 0.99 0.70 - 1.20 mg/dL    Bun/Cre Ratio 28 (H) 9 - 20    Calcium 10.0 8.6 - 10.4 mg/dL    Sodium 137 135 - 144 mmol/L    Potassium 4.1 3.7 - 5.3 mmol/L    Chloride 100 98 - 107 mmol/L    CO2 25 20 - 31 mmol/L    Anion Gap 12 9 - 17 mmol/L    Alkaline Phosphatase 55 40 - 129 U/L    ALT 31 5 - 41 U/L    AST 19 <40 U/L    Total Bilirubin 0.21 (L) 0.3 - 1.2 mg/dL    Total Protein 6.9 6.4 - 8.3 g/dL    Albumin 4.1 3.5 - 5.2 g/dL    Albumin/Globulin Ratio 1.5 1.0 - 2.5    GFR Non-African American >60 >60 mL/min    GFR African American >60 >60 mL/min    GFR Comment          GFR Staging NOT REPORTED    TSH with Reflex   Result Value Ref Range    TSH 2.75 0.30 - 5.00 mIU/L     -- Diagnosis --   BAL RIGHT LOWER LOBE:           NEGATIVE FOR MALIGNANCY.             FINE NEEDLE ASPIRATION STATION 7 EBUS:           ADENOCARCINOMA, COMPATIBLE WITH LUNG PRIMARY. CT PET    Impression    1. The previously described nodule involving the right lung base is FDG avid. Tissue sampling is recommended as malignancy is suspected. 2. The previously identified smaller lung nodules involving the lower lobes    are too small for PET CT characterization.  CT follow-up is recommended. 3. FDG avid subcarinal and right hilar lymph nodes suggestive of metastatic    disease. 4. No abnormal FDG activity identified within the neck, abdomen or pelvis.           Mri Brain W Wo Contrast    Chronic microvascular disease without acute intracranial abnormality. No abnormal postcontrast enhancement. Echo Complete 2d W Doppler W Color    Result Date: 12/29/2020  Transthoracic Echocardiography Report (TTE)  Patient Name SHOCK       Date of Study             12/29/2020               Shannon Mahan   Date of      1945  Gender                    Male  Birth   Age          77 year(s)  Race                         Room Number              Height:                   70 inch, 177.8 cm   Corporate ID [id number]    Weight:                   234 pounds, 106.1 kg  #   Patient Acct [de-identified]   BSA:         2.23 m^2     BMI:       33.58 kg/m^2  #   MR #         [id number]     Sonographer               Annie Julio, RUST   Accession #  [id number]  Interpreting Physician    [address] [place] [address]   Fellow                   Referring Nurse                           Practitioner   Interpreting             Referring Physician       Jesi Briggs  Fellow  Type of Study   TTE procedure:2D Echocardiogram, M-Mode, Doppler, Color Doppler. Procedure Date Date: 12/29/2020 Start: 09:16 AM Study Location: [facility] Technical Quality: Limited visualization due to body habitus. Indications:Atrial fibrillation.  Patient Status: Outpatient Height: 70 inches Weight: 234.01 pounds BSA: 2.23 m^2 BMI: 33.58 kg/m^2 Rhythm: Within normal limits Allergies   - *No Known Allergies. - Ace Inhibitors. - *Unlisted:(Statins). CONCLUSIONS Summary Normal left ventricular size with normal hyperdynamic function. Left ventricular ejection fraction 65 %. Mild concentric left ventricular hypertrophy. Grade I (mild) left ventricular diastolic dysfunction. Technically difficult visualization of the right ventricle, but it does appear to be normal in size. Aortic valve not well visualized but appears normal. Trivial tricuspid regurgitation. Estimated right ventricular systolic pressure is 35 mmHg. Trivial pulmonic insufficiency. Signature ----------------------------------------------------------------------------  Electronically signed by Shikha Thomas RDCS(Sonographer) on 12/29/2020  10:49 AM ---------------------------------------------------------------------------- ----------------------------------------------------------------------------  Electronically signed by Rakan Parikh(Interpreting physician) on 12/29/2020  03:06 PM ---------------------------------------------------------------------------- FINDINGS Left Atrium Left atrium is normal in size. Left Ventricle Normal left ventricular size with normal hyperdynamic function. Left ventricular ejection fraction 65 %. Mild concentric left ventricular hypertrophy. Grade I (mild) left ventricular diastolic dysfunction. Right Atrium Right atrium is normal in size. Right Ventricle Technically difficult visualization of the right ventricle, but it does appear to be normal in size. Mitral Valve Normal mitral valve structure and function. Aortic Valve Aortic valve not well visualized but appears normal. Tricuspid Valve Normal tricuspid valve structure and function. Trivial tricuspid regurgitation. Estimated right ventricular systolic pressure is 35 mmHg. Pulmonic Valve The pulmonic valve is normal in structure. Trivial pulmonic insufficiency.  Miscellaneous E/E' average = 9. M-mode / 2D Measurements & Calculations:   LVIDd:4.76 cm(3.7 - 5.6 cm)      Diastolic TNWGPA:522 ml  VUGKK:7.1 cm(2.2 - 4.0 cm)       Systolic ERPRFR:39.5 ml  RHCQ:4.38 cm(0.6 - 1.1 cm)       Aortic Root:3 cm(2.0 - 3.7 cm)  LVPWd:1.11 cm(0.6 - 1.1 cm)      LA Dimension: 3.4 cm(1.9 - 4.0 cm)  Fractional Shortenin.67 %  Calculated LVEF (%): 66.76 %   Mitral:                                 Aortic   Peak E-Wave: 0.61 m/s                   Peak Velocity: 1.30 m/s  Peak A-Wave: 0.76 m/s                   Peak Gradient: 6.76 mmHg  E/A Ratio: 0.81  Peak Gradient: 1.49 mmHg  Deceleration Time: 239 msec   Tricuspid:                              Pulmonic:   Peak TR Velocity: 2.81 m/s              Peak Velocity: 0.98 m/s  Peak TR Gradient: 31.5844 mmHg          Peak Gradient: 3.82 mmHg  Estimated RA Pressure: 3 mmHg  Peak E-Wave: 0.80 m/s  Peak Gradient: 2.56 mmHg                                          Estimated PASP: 34.58 mmHg  Septal Wall E' velocity:0.06 m/s Lateral Wall E' velocity:0.08 m/s    Mri Abdomen W Wo Contrast    Result Date: 2021  EXAMINATION: MRI OF THE ABDOMEN WITHOUT AND WITH CONTRAST, 2021 9:50 am TECHNIQUE: Multiplanar multisequence MRI of the abdomen was performed without and with the administration of intravenous contrast. COMPARISON: CT 2021, 2020 HISTORY: ORDERING SYSTEM PROVIDED HISTORY: Adenocarcinoma of right lung [facility]) TECHNOLOGIST PROVIDED HISTORY: liver lesion on ct Specify organ?->Liver Reason for Exam:  History of lung cancer, follow up liver lesion seen on CT scan. Acuity: Acute Type of Exam: Subsequent/Follow-up Additional signs and symptoms: Adenocarcinoma of right lung; Regional lymph node metastasis present; Radiation pneumonitis. FINDINGS: There is hepatic steatosis. At the level of bifurcation of the main portal vein there is subtle area of posterior subcapsular segment 4A left lobe of liver area of nonenhancing T1 hypointensity best appreciated on the postcontrast images.   This measures maximally about 0.8 x 1.1 x 1.8 cm. This correlates with finding on the recent CT scan. There is no T2 correlate and there is no restriction of diffusion. Likely benign but attention on follow-up. The spleen, pancreas, kidneys, adrenal glands and gallbladder show no significant abnormalities. Visualized GI tract show no acute process. No ascites. Trace right pleural effusion. 1.9 cm lung nodule at the right lung base in the lower lobe unchanged. Bone marrow signal age appropriate. 1. Previously noted area of concern in the posterior left lobe of liver is identified as the subtle T1 hypointense nonenhancing, non diffusion restricting focus isointense to liver on T2. This is in segment 4A at the level of bifurcation of the main portal vein. Benign etiology most favored. Attention on follow-up. No findings concerning for metastatic lesion. 2. Hepatic steatosis. 3. Trace right pleural effusion and a right lung base lower lobe 1.9 cm nodule unchanged from prior CT. Ct Chest Abdomen Pelvis W Contrast: 1/7/2021    CT CHEST: Favorable interval response to therapy seen as decreased size of right lower lobe nodule. 2. Mixed response from therapy when correlating with subcarinal and right hilar lymph nodes, subcarinal lymph node not significantly changed, right hilar lymph node measuring slightly larger, though this may be reactive. 3. New patchy pulmonary infiltrates in the right lung may be related to post radiation pneumonitis or other acute infectious process including possible atypical/viral pneumonia. 4. CT ABDOMEN/PELVIS: New indeterminate right hepatic liver lesion I feel is likely focal fatty infiltration, but could reflect a metastatic lesion. Correlation with MRI abdomen attention liver without and with gadolinium correlation recommended. 5. Nonspecific prostate gland enlargement typical of BPH.  RECOMMENDATIONS: MRI abdomen attention liver without and with gadolinium     Mri Abdomen W Wo Contrast    Result Date: 1/22/2021  EXAMINATION: MRI OF THE ABDOMEN WITHOUT AND WITH CONTRAST, 1/22/2021 9:50 am TECHNIQUE: Multiplanar multisequence MRI of the abdomen was performed without and with the administration of intravenous contrast. COMPARISON: CT 01/07/2021, 07/21/2020 HISTORY: ORDERING SYSTEM PROVIDED HISTORY: Adenocarcinoma of right lung [facility]) TECHNOLOGIST PROVIDED HISTORY: liver lesion on ct Specify organ?->Liver Reason for Exam:  History of lung cancer, follow up liver lesion seen on CT scan. Acuity: Acute Type of Exam: Subsequent/Follow-up Additional signs and symptoms: Adenocarcinoma of right lung; Regional lymph node metastasis present; Radiation pneumonitis. FINDINGS: There is hepatic steatosis. At the level of bifurcation of the main portal vein there is subtle area of posterior subcapsular segment 4A left lobe of liver area of nonenhancing T1 hypointensity best appreciated on the postcontrast images. This measures maximally about 0.8 x 1.1 x 1.8 cm. This correlates with finding on the recent CT scan. There is no T2 correlate and there is no restriction of diffusion. Likely benign but attention on follow-up. The spleen, pancreas, kidneys, adrenal glands and gallbladder show no significant abnormalities. Visualized GI tract show no acute process. No ascites. Trace right pleural effusion. 1.9 cm lung nodule at the right lung base in the lower lobe unchanged. Bone marrow signal age appropriate. 1. Previously noted area of concern in the posterior left lobe of liver is identified as the subtle T1 hypointense nonenhancing, non diffusion restricting focus isointense to liver on T2. This is in segment 4A at the level of bifurcation of the main portal vein. Benign etiology most favored. Attention on follow-up. No findings concerning for metastatic lesion. 2. Hepatic steatosis. 3. Trace right pleural effusion and a right lung base lower lobe 1.9 cm nodule unchanged from prior CT. Impression:  Adenocarcinoma of right lung, clinical stage at least T1c, N2, M0 (stage IIIA)  Subcarinal lymph node metastasis  Weight loss  Radiation pneumonitis and shortness of breath      Plan:  I had a detailed discussion with the patient and we went over results of lab work-up imaging studies and other relevant clinical data  Patient has completed the steroid taper course. Shortness of breath is much better now. Reviewed goals and expectations. Reviewed data regarding consolidation immunotherapy which has shown to improve overall survival.  Also reviewed risk of potential side effects including immune therapy related pneumonitis. After detailed discussion we decided to proceed with the therapy understanding the possible adverse effects. Plan continue to monitor platelet count. MRI liver does not show any metastatic lesion  Reviewed the logistics and plan for 1 year consolidation immunotherapy. NCCN guidelines were reviewed and discussed with the patient. The diagnosis and care plan were discussed with the patient in detail. I discussed the natural history of the disease, prognosis, risks and goals of therapy and answered all the patients questions to the best of my ability. Patient expressed understanding and was in agreement. Nydia Elizabeth          This note is created with the assistance of a speech recognition program.  While intending to generate a document that actually reflects the content of the visit, the document can still have some errors including those of syntax and sound a like substitutions which may escape proof reading. It such instances, actual meaning can be extrapolated by contextual diversion.

## 2021-01-26 NOTE — PROGRESS NOTES
VAD accessed per protocol with 3/4 inch 20 gauge camargo needle. Flushed easily with saline 10 ml. Good blood return. Labs drawn. Secured accessed port with transparent dressing. IV infusing NSS. Gave handout on imfinzi and елена signed consent.

## 2021-01-27 ENCOUNTER — TELEPHONE (OUTPATIENT)
Dept: CASE MANAGEMENT | Age: 76
End: 2021-01-27

## 2021-01-27 LAB
FOLATE: 19 NG/ML
VITAMIN B-12: >2000 PG/ML (ref 232–1245)

## 2021-01-27 NOTE — TELEPHONE ENCOUNTER
Name: Will Brooks  : 1945  MRN: [id number]    Oncology Navigation Follow-Up Note  Navigator reaching out offering help to pt, denies needing any assistance at this time. Pt. Finishing steroids dose pack and admitted some improvement with his breathing, but tires easily.   Electronically signed by Mike Platt RN on 2021 at 1:46 PM

## 2021-02-07 DIAGNOSIS — I10 ESSENTIAL HYPERTENSION: ICD-10-CM

## 2021-02-08 RX ORDER — LOSARTAN POTASSIUM 50 MG/1
TABLET ORAL
Qty: 90 TABLET | Refills: 1 | Status: SHIPPED | OUTPATIENT
Start: 2021-02-08 | End: 2021-08-02

## 2021-02-08 RX ORDER — LEVOTHYROXINE SODIUM 0.1 MG/1
TABLET ORAL
Qty: 90 TABLET | Refills: 1 | Status: SHIPPED | OUTPATIENT
Start: 2021-02-08 | End: 2021-08-02

## 2021-02-08 RX ORDER — TAMSULOSIN HYDROCHLORIDE 0.4 MG/1
CAPSULE ORAL
Qty: 90 CAPSULE | Refills: 1 | Status: SHIPPED | OUTPATIENT
Start: 2021-02-08 | End: 2021-08-02

## 2021-02-08 RX ORDER — DOXAZOSIN 2 MG/1
TABLET ORAL
Qty: 180 TABLET | Refills: 1 | Status: SHIPPED | OUTPATIENT
Start: 2021-02-08 | End: 2021-03-03 | Stop reason: HOSPADM

## 2021-02-08 NOTE — TELEPHONE ENCOUNTER
Chet Keita called requesting a refill of the below medication which has been pended for you:     Requested Prescriptions     Pending Prescriptions Disp Refills    losartan (COZAAR) 50 MG tablet [Pharmacy Med Name: Losartan Potassium Oral Tablet 50 MG] 90 tablet 0     Sig: TAKE 1 TABLET BY MOUTH ONE TIME A DAY    levothyroxine (SYNTHROID) 100 MCG tablet [Pharmacy Med Name: Levothyroxine Sodium Oral Tablet 100 MCG] 90 tablet 0     Sig: TAKE 1 TABLET BY MOUTH ONE TIME A DAY    doxazosin (CARDURA) 2 MG tablet [Pharmacy Med Name: Doxazosin Mesylate Oral Tablet 2 MG] 180 tablet 0     Sig: TAKE 1 TABLET BY MOUTH TWO TIMES A DAY    tamsulosin (FLOMAX) 0.4 MG capsule [Pharmacy Med Name: Tamsulosin HCl Oral Capsule 0.4 MG] 90 capsule 0     Sig: TAKE 1 CAPSULE BY MOUTH ONE TIME A DAY       Last Appointment Date: 8/31/2020  Next Appointment Date: 3/3/2021    Allergies   Allergen Reactions    Effient [Prasugrel] Other (See Comments)     Superficial skin bleeding.  Ace Inhibitors Other (See Comments)     Cough.  Statins Other (See Comments)     Myalgias.

## 2021-02-09 ENCOUNTER — OFFICE VISIT (OUTPATIENT)
Dept: ONCOLOGY | Age: 76
End: 2021-02-09
Payer: MEDICARE

## 2021-02-09 ENCOUNTER — HOSPITAL ENCOUNTER (OUTPATIENT)
Dept: INFUSION THERAPY | Age: 76
Discharge: HOME | End: 2021-02-09
Payer: MEDICARE

## 2021-02-09 VITALS
BODY MASS INDEX: 34.47 KG/M2 | RESPIRATION RATE: 20 BRPM | WEIGHT: 240.8 LBS | HEART RATE: 97 BPM | OXYGEN SATURATION: 98 % | DIASTOLIC BLOOD PRESSURE: 71 MMHG | HEIGHT: 70 IN | SYSTOLIC BLOOD PRESSURE: 109 MMHG | TEMPERATURE: 97.8 F

## 2021-02-09 VITALS
SYSTOLIC BLOOD PRESSURE: 109 MMHG | RESPIRATION RATE: 20 BRPM | WEIGHT: 240.8 LBS | TEMPERATURE: 97.8 F | OXYGEN SATURATION: 98 % | DIASTOLIC BLOOD PRESSURE: 71 MMHG | HEIGHT: 70 IN | BODY MASS INDEX: 34.47 KG/M2 | HEART RATE: 97 BPM

## 2021-02-09 DIAGNOSIS — Z29.8 IMMUNOTHERAPY: ICD-10-CM

## 2021-02-09 DIAGNOSIS — C77.9 REGIONAL LYMPH NODE METASTASIS PRESENT (HCC): ICD-10-CM

## 2021-02-09 DIAGNOSIS — C34.91 ADENOCARCINOMA OF RIGHT LUNG (HCC): Primary | ICD-10-CM

## 2021-02-09 DIAGNOSIS — J70.0 RADIATION PNEUMONITIS (HCC): ICD-10-CM

## 2021-02-09 DIAGNOSIS — D69.6 THROMBOCYTOPENIA (HCC): ICD-10-CM

## 2021-02-09 LAB
ABSOLUTE EOS #: 0.1 K/UL (ref 0–0.44)
ABSOLUTE IMMATURE GRANULOCYTE: 0 K/UL (ref 0–0.3)
ABSOLUTE LYMPH #: 0.53 K/UL (ref 1.1–3.7)
ABSOLUTE MONO #: 0.48 K/UL (ref 0.1–1.2)
ALBUMIN SERPL-MCNC: 3.9 G/DL (ref 3.5–5.2)
ALBUMIN/GLOBULIN RATIO: 1.2 (ref 1–2.5)
ALP BLD-CCNC: 63 U/L (ref 40–129)
ALT SERPL-CCNC: 19 U/L (ref 5–41)
ANION GAP SERPL CALCULATED.3IONS-SCNC: 12 MMOL/L (ref 9–17)
AST SERPL-CCNC: 20 U/L
BASOPHILS # BLD: 0 % (ref 0–2)
BASOPHILS ABSOLUTE: 0 K/UL (ref 0–0.2)
BILIRUB SERPL-MCNC: 0.33 MG/DL (ref 0.3–1.2)
BUN BLDV-MCNC: 15 MG/DL (ref 8–23)
BUN/CREAT BLD: 16 (ref 9–20)
CALCIUM SERPL-MCNC: 9.5 MG/DL (ref 8.6–10.4)
CHLORIDE BLD-SCNC: 104 MMOL/L (ref 98–107)
CO2: 25 MMOL/L (ref 20–31)
CREAT SERPL-MCNC: 0.94 MG/DL (ref 0.7–1.2)
DIFFERENTIAL TYPE: ABNORMAL
EOSINOPHILS RELATIVE PERCENT: 2 % (ref 1–4)
GFR AFRICAN AMERICAN: >60 ML/MIN
GFR NON-AFRICAN AMERICAN: >60 ML/MIN
GFR SERPL CREATININE-BSD FRML MDRD: ABNORMAL ML/MIN/{1.73_M2}
GFR SERPL CREATININE-BSD FRML MDRD: ABNORMAL ML/MIN/{1.73_M2}
GLUCOSE BLD-MCNC: 181 MG/DL (ref 70–99)
HCT VFR BLD CALC: 34 % (ref 40.7–50.3)
HEMOGLOBIN: 10.7 G/DL (ref 13–17)
IMMATURE GRANULOCYTES: 0 %
LYMPHOCYTES # BLD: 11 % (ref 24–43)
MCH RBC QN AUTO: 32.6 PG (ref 25.2–33.5)
MCHC RBC AUTO-ENTMCNC: 31.5 G/DL (ref 25.2–33.5)
MCV RBC AUTO: 103.7 FL (ref 82.6–102.9)
MONOCYTES # BLD: 10 % (ref 3–12)
MORPHOLOGY: ABNORMAL
NRBC AUTOMATED: 0 PER 100 WBC
PDW BLD-RTO: 14 % (ref 11.8–14.4)
PLATELET # BLD: 120 K/UL (ref 138–453)
PLATELET ESTIMATE: ABNORMAL
PMV BLD AUTO: 9.7 FL (ref 8.1–13.5)
POTASSIUM SERPL-SCNC: 3.7 MMOL/L (ref 3.7–5.3)
RBC # BLD: 3.28 M/UL (ref 4.21–5.77)
RBC # BLD: ABNORMAL 10*6/UL
SEG NEUTROPHILS: 77 % (ref 36–65)
SEGMENTED NEUTROPHILS ABSOLUTE COUNT: 3.69 K/UL (ref 1.5–8.1)
SODIUM BLD-SCNC: 141 MMOL/L (ref 135–144)
TOTAL PROTEIN: 7.1 G/DL (ref 6.4–8.3)
TSH SERPL DL<=0.05 MIU/L-ACNC: 3.1 MIU/L (ref 0.3–5)
WBC # BLD: 4.8 K/UL (ref 3.5–11.3)
WBC # BLD: ABNORMAL 10*3/UL

## 2021-02-09 PROCEDURE — 99213 OFFICE O/P EST LOW 20 MIN: CPT | Performed by: INTERNAL MEDICINE

## 2021-02-09 PROCEDURE — 99214 OFFICE O/P EST MOD 30 MIN: CPT | Performed by: INTERNAL MEDICINE

## 2021-02-09 PROCEDURE — G8428 CUR MEDS NOT DOCUMENT: HCPCS | Performed by: INTERNAL MEDICINE

## 2021-02-09 PROCEDURE — G8417 CALC BMI ABV UP PARAM F/U: HCPCS | Performed by: INTERNAL MEDICINE

## 2021-02-09 PROCEDURE — 96413 CHEMO IV INFUSION 1 HR: CPT

## 2021-02-09 PROCEDURE — 4040F PNEUMOC VAC/ADMIN/RCVD: CPT | Performed by: INTERNAL MEDICINE

## 2021-02-09 PROCEDURE — 84443 ASSAY THYROID STIM HORMONE: CPT

## 2021-02-09 PROCEDURE — 2580000003 HC RX 258: Performed by: INTERNAL MEDICINE

## 2021-02-09 PROCEDURE — 85025 COMPLETE CBC W/AUTO DIFF WBC: CPT

## 2021-02-09 PROCEDURE — 1036F TOBACCO NON-USER: CPT | Performed by: INTERNAL MEDICINE

## 2021-02-09 PROCEDURE — 36591 DRAW BLOOD OFF VENOUS DEVICE: CPT

## 2021-02-09 PROCEDURE — G8484 FLU IMMUNIZE NO ADMIN: HCPCS | Performed by: INTERNAL MEDICINE

## 2021-02-09 PROCEDURE — 3017F COLORECTAL CA SCREEN DOC REV: CPT | Performed by: INTERNAL MEDICINE

## 2021-02-09 PROCEDURE — 1123F ACP DISCUSS/DSCN MKR DOCD: CPT | Performed by: INTERNAL MEDICINE

## 2021-02-09 PROCEDURE — 6360000002 HC RX W HCPCS: Performed by: INTERNAL MEDICINE

## 2021-02-09 PROCEDURE — 80053 COMPREHEN METABOLIC PANEL: CPT

## 2021-02-09 RX ORDER — SODIUM CHLORIDE 9 MG/ML
20 INJECTION, SOLUTION INTRAVENOUS ONCE
Status: CANCELLED | OUTPATIENT
Start: 2021-02-09 | End: 2021-02-09

## 2021-02-09 RX ORDER — SODIUM CHLORIDE 0.9 % (FLUSH) 0.9 %
10 SYRINGE (ML) INJECTION PRN
Status: DISCONTINUED | OUTPATIENT
Start: 2021-02-09 | End: 2021-02-10 | Stop reason: HOSPADM

## 2021-02-09 RX ORDER — DIPHENHYDRAMINE HYDROCHLORIDE 50 MG/ML
50 INJECTION INTRAMUSCULAR; INTRAVENOUS ONCE
Status: CANCELLED | OUTPATIENT
Start: 2021-02-09 | End: 2021-02-09

## 2021-02-09 RX ORDER — EPINEPHRINE 1 MG/ML
0.3 INJECTION, SOLUTION, CONCENTRATE INTRAVENOUS PRN
Status: CANCELLED | OUTPATIENT
Start: 2021-02-09

## 2021-02-09 RX ORDER — SODIUM CHLORIDE 0.9 % (FLUSH) 0.9 %
10 SYRINGE (ML) INJECTION PRN
Status: CANCELLED | OUTPATIENT
Start: 2021-02-09

## 2021-02-09 RX ORDER — METHYLPREDNISOLONE SODIUM SUCCINATE 125 MG/2ML
125 INJECTION, POWDER, LYOPHILIZED, FOR SOLUTION INTRAMUSCULAR; INTRAVENOUS ONCE
Status: CANCELLED | OUTPATIENT
Start: 2021-02-09 | End: 2021-02-09

## 2021-02-09 RX ORDER — FLECAINIDE ACETATE 50 MG/1
50 TABLET ORAL 2 TIMES DAILY
COMMUNITY

## 2021-02-09 RX ORDER — HEPARIN SODIUM (PORCINE) LOCK FLUSH IV SOLN 100 UNIT/ML 100 UNIT/ML
500 SOLUTION INTRAVENOUS PRN
Status: CANCELLED | OUTPATIENT
Start: 2021-02-09

## 2021-02-09 RX ORDER — SODIUM CHLORIDE 0.9 % (FLUSH) 0.9 %
5 SYRINGE (ML) INJECTION PRN
Status: CANCELLED | OUTPATIENT
Start: 2021-02-09

## 2021-02-09 RX ORDER — SODIUM CHLORIDE 9 MG/ML
INJECTION, SOLUTION INTRAVENOUS CONTINUOUS
Status: CANCELLED | OUTPATIENT
Start: 2021-02-09

## 2021-02-09 RX ORDER — SODIUM CHLORIDE 9 MG/ML
20 INJECTION, SOLUTION INTRAVENOUS ONCE
Status: COMPLETED | OUTPATIENT
Start: 2021-02-09 | End: 2021-02-09

## 2021-02-09 RX ORDER — HEPARIN SODIUM (PORCINE) LOCK FLUSH IV SOLN 100 UNIT/ML 100 UNIT/ML
500 SOLUTION INTRAVENOUS PRN
Status: DISCONTINUED | OUTPATIENT
Start: 2021-02-09 | End: 2021-02-10 | Stop reason: HOSPADM

## 2021-02-09 RX ADMIN — SODIUM CHLORIDE 1120 MG: 9 INJECTION, SOLUTION INTRAVENOUS at 13:07

## 2021-02-09 RX ADMIN — Medication 10 ML: at 11:02

## 2021-02-09 RX ADMIN — SODIUM CHLORIDE 20 ML/HR: 9 INJECTION, SOLUTION INTRAVENOUS at 11:02

## 2021-02-09 RX ADMIN — HEPARIN SODIUM (PORCINE) LOCK FLUSH IV SOLN 100 UNIT/ML 500 UNITS: 100 SOLUTION at 14:19

## 2021-02-09 ASSESSMENT — PAIN SCALES - GENERAL: PAINLEVEL_OUTOF10: 0

## 2021-02-09 NOTE — PROGRESS NOTES
Patient on unit for cycle 2 imfinzi. Patient states that his SOB has got a little worse since its got colder outside. Patient to see Ashwini Herman with treatment today.

## 2021-02-10 ENCOUNTER — TELEPHONE (OUTPATIENT)
Dept: CASE MANAGEMENT | Age: 76
End: 2021-02-10

## 2021-02-10 NOTE — TELEPHONE ENCOUNTER
Name: Thierry Walsh  : 1945  MRN: [id number]    Oncology Navigation Follow-Up Note  Navigator spoke with pt. And pt denies needs at this time. Pt. Sharing he received his first Covid vaccine already. Pt. Denies the need for any physical therapy, plan to follow.    Electronically signed by Freddy Faulkner RN on 2/10/2021 at 4:16 PM

## 2021-02-11 RX ORDER — WARFARIN SODIUM 2.5 MG/1
5 TABLET ORAL DAILY
Qty: 60 TABLET | Refills: 3 | Status: SHIPPED | OUTPATIENT
Start: 2021-02-11 | End: 2022-02-22 | Stop reason: SDUPTHER

## 2021-02-11 NOTE — PROGRESS NOTES
Ann-Marie Mak                                                                                                                  2/9/2021  MRN:   [id number]  YOB: 1945  PCP:                           Radha Lainez DO  Referring Physician: No ref. provider found  Treating Physician Name: Sergey Cortes MD      Reason for visit:  Chief Complaint   Patient presents with    Lung Cancer     2wk f/u      Current problems:  Adenocarcinoma of right lung, clinical stage at least T1c, N2, M0 (stage IIIA)  Subcarinal lymph node metastasis    Active and recent treatments:  concurrent chemoradiation using carboplatin and Taxol  Consolidation therapy with Imfinzi-1/2021    Summary of Case/History:    Ann-Marie Mak a 77 y.o.male is a patient with biopsy confirmed adenocarcinoma of right lung presents to the office to establish care and for further evaluation treatment recommendations. Patient was initially seen by pulmonary team for lung nodules. Patient CT scan from July 2019 showed a right-sided pleural calcification thickening concerning for asbestos exposure. Patient also noted to have multiple pulmonary nodules measuring between 1.5 x 1.3 cm. Follow-up CT chest from July 2020 showed increase in size of the right lower lobe lung nodule which now measured 2.1 cm. Subsequently patient underwent CT PET on 8/22 which showed FDG avid right lower lobe lung nodule with SUV of 4.6. Patient CT PET was also positive for subcarinal lymph node with SUV of 4.6. Patient underwent bronchoscopy with endobronchial ultrasound biopsy of the subcarinal lymph node confirmed adenocarcinoma. Patient has significant history of tobacco dependence around 30-pack-year however he quit about 25 years ago. Patient does have coronary artery disease status post stent placement. Patient also gives history of occupational asthma and exposure to bacteria.   Patient does give history of weight loss but describes it as intentional.  Denies headaches dizziness chest pain abdominal pain nausea bone pain     Interim History:   Patient presents to the clinic for a follow-up visit and for toxicity check. He has been started on Imfinzi. So far he has been tolerating treatment without any adverse events. Shortness of breath has improved. He does complain of difficulty breathing outside and attributes it to the cold weather. Denies hospitalization ER visit. Appetite is fair. Weight is stable. During this visit patient's allergy, social, medical, surgical history and medications were reviewed and updated. Past Medical History:   Past Medical History:   Diagnosis Date    Abdominal hernia     small, no significant symptomatology.  Abnormal PSA     with history of slight increase.  Allergic rhinitis     Benign prostatic hypertrophy     Chest pain     occasional.     Coronary artery disease     status post drug eluting stent placement, LAD, ostial obtuse marginal.     Dysphagia     Erectile dysfunction     Familial hyperlipidemia     with hypertriglyceridemia.  Gastroesophageal reflux disease     Hearing loss, bilateral     hearing aid in the left ear only. 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     Hypertension     Impaired fasting glucose     prediabetes.  Lumbar disc herniation     L3-L4, with chronic back pain.  Mild anemia     Nasal polyps     minimal symptoms.  Nasal septal deviation     right side.  Obesity     Palpitations     occasional.     Peptic ulcer disease     Reactive airway disease     asthma, industrial related, also a history of hypersensitivity pneumonitis.      Urinary frequency        Past Surgical History:     Past Surgical History:   Procedure Laterality Date    APPENDECTOMY  age 11    BRONCHOSCOPY  09/17/2020    BRONCHOSCOPY N/A 9/17/2020    EBUS- BRONCHOSCOPY ENDOBRONCHIAL ULTRASOUND, PATHOLOGY REQUESTED- CINDY NOTIFIED, GI UNIT SCHEDULED performed by Lizbeth Hall Kentrell Santillan MD at [facility]  10/20/2011    occluded LAD and ostial obtuse marginal stenosis, underwent drug eluting stents to both, RCA small, nondominant, occluded, ejection fraction 45%.  CARDIAC CATHETERIZATION  08/2018    with stent placement    CATARACT REMOVAL Bilateral 03/2018    COLONOSCOPY  01/21/2011    mild sigmoid diverticulosis.  COLONOSCOPY  4/6/16    hemorrhoid; sigmoid diverticulosis    KNEE ARTHROSCOPY Left 03/19/2010    partial medial and lateral synovectomies, partial medial meniscectomy, chondroplasty.  NASAL FRACTURE SURGERY  1960    OTHER SURGICAL HISTORY Left 02/12/2010    knee injection.  PORT SURGERY N/A 10/14/2020    RIGHT PORT INSERTION performed by Zuly Miller DO at [facility] PRE-[facility]AL[facility] / [address] Left 01/16/2012    actinic keratoses, ear, liquid nitrogen.  PRE-MALIGNANT / BENIGN SKIN LESION EXCISION Left 07/19/2011    actinic keratosis, upper ear, liquid nitrogen.  PROSTATE BIOPSY Bilateral 09/08/2015    Benign    REPAIR UMBILICAL OKZY,1+C/Q,NMTBX N/A 1/69/2357    UMBILICAL Hernia Repair w/ Mesh performed by Ana Maria Bee MD at [facility] SEPTOPLASTY  10/30/2015    with bilateral submucosal resection of the inferior turbinates, left middle turbinate elza bullosa resection done by Dr Trujillo Angry ARTHROSCOPY Left 10/17/14    W/ SUBACROMIAL DECOMPRESSION, DEBRIDEMENT ET CHONDROPLASTY    UPPER GASTROINTESTINAL ENDOSCOPY  01/21/2011    superficial ulcer of the fundus, erostive gastritis.      VASECTOMY Bilateral 04/04/1980       Patient Family Social History:     Social History     Socioeconomic History    Marital status:      Spouse name: Not on file    Number of children: Not on file    Years of education: Not on file    Highest education level: Not on file   Occupational History    Not on file   Social Needs    Financial resource strain: Not on file    Food insecurity     Worry: Not on file     Inability: Not on file    Transportation needs     Medical: Not on file     Non-medical: Not on file   Tobacco Use    Smoking status: Former Smoker     Packs/day: 2.00     Years: 15.00     Pack years: 30.00     Quit date: 1985     Years since quittin.1    Smokeless tobacco: Never Used    Tobacco comment: smoked 2 packs a day for 14 to 15 years, quit in    Substance and Sexual Activity    Alcohol use: No     Alcohol/week: 0.0 standard drinks     Comment: rare    Drug use: No    Sexual activity: Not on file   Lifestyle    Physical activity     Days per week: Not on file     Minutes per session: Not on file    Stress: Not on file   Relationships    Social connections     Talks on phone: Not on file     Gets together: Not on file     Attends Buddhist service: Not on file     Active member of club or organization: Not on file     Attends meetings of clubs or organizations: Not on file     Relationship status: Not on file    Intimate partner violence     Fear of current or ex partner: Not on file     Emotionally abused: Not on file     Physically abused: Not on file     Forced sexual activity: Not on file   Other Topics Concern    Not on file   Social History Narrative    Not on file     Family History   Problem Relation Age of Onset    Cancer Mother         lymphoma    Thyroid Disease Mother         hypothyroidism    Stroke Mother     Heart Disease Mother     High Blood Pressure Mother     Heart Attack Mother         in her 46s    Heart Failure Father         congestive    Coronary Art Dis Father     Emphysema Father         was a smoker    Heart Disease Maternal Grandmother     Heart Disease Maternal Grandfather     Crohn's Disease Sister     High Cholesterol Sister     High Cholesterol Child        Current Medications:     Current Outpatient Medications   Medication Sig Dispense Refill    losartan (COZAAR) 50 MG tablet TAKE 1 TABLET BY MOUTH ONE TIME A DAY  90 tablet 1    levothyroxine (SYNTHROID) 100 MCG tablet TAKE 1 TABLET BY MOUTH ONE TIME A DAY  90 tablet 1    doxazosin (CARDURA) 2 MG tablet TAKE 1 TABLET BY MOUTH TWO TIMES A DAY  180 tablet 1    tamsulosin (FLOMAX) 0.4 MG capsule TAKE 1 CAPSULE BY MOUTH ONE TIME A DAY 90 capsule 1    flecainide (TAMBOCOR) 50 MG tablet Take 50 mg by mouth 2 times daily      predniSONE (DELTASONE) 20 MG tablet Prednisone taper  Take 20 mg po bid for 5 days then   Take 20 mg po daily for 5 days then   Take 10 mg po daily for 5 days 40 tablet 0    Icosapent Ethyl (VASCEPA) 1 g CAPS capsule Take 2 capsules by mouth 2 times daily (Patient not taking: Reported on 12/8/2020) 360 capsule 1    Evolocumab (REPATHA SURECLICK) 613 MG/ML SOAJ Inject 1 pen into the skin every 14 days (Patient not taking: Reported on 12/8/2020) 6 pen 1    apixaban (ELIQUIS) 5 MG TABS tablet Take 1 tablet by mouth 2 times daily (Patient not taking: Reported on 12/8/2020) 180 tablet 1    ondansetron (ZOFRAN) 4 MG tablet Take 4 mg by mouth every 6 hours as needed for Nausea or Vomiting Disp 60 with 3 refills called in 10/20/2020      Magic Mouthwash (MIRACLE MOUTHWASH) Swish and spit 5 mLs 4 times daily as needed for Irritation Disp 240 ml with 2 refills called in 10/20/2020      dexamethasone (DECADRON) 4 MG tablet Take 1 tablet twice a day on day before and after chemo 40 tablet 1    oxybutynin (DITROPAN-XL) 10 MG extended release tablet Take 10 mg by mouth daily      ECHINACEA HERB PO Take by mouth daily      furosemide (LASIX) 20 MG tablet TAKE 1 TABLET BY MOUTH ONE TIME A DAY  90 tablet 1    clopidogrel (PLAVIX) 75 MG tablet TAKE 1 TABLET BY MOUTH DAILY 90 tablet 3    pravastatin (PRAVACHOL) 40 MG tablet TAKE ONE TABLET BY MOUTH ONCE EVERY EVENING 90 tablet 3    metoprolol tartrate (LOPRESSOR) 50 MG tablet Take 1 tablet by mouth 2 times daily 180 tablet 3    nitroGLYCERIN (NITROSTAT) 0.4 MG SL tablet Place 1 tablet under the tongue every 5 minutes as needed for Chest pain up to max of 3 total doses. If no relief after 1 dose, call 911. 25 tablet 3    albuterol sulfate HFA (VENTOLIN HFA) 108 (90 Base) MCG/ACT inhaler Inhale 2 puffs into the lungs every 6 hours as needed for Wheezing or Shortness of Breath 1 Inhaler 6    Multiple Vitamins-Minerals (MULTIVITAMIN PO) daily. No current facility-administered medications for this visit. Allergies:   Effient [prasugrel], Ace inhibitors, and Statins    Review of Systems:    Constitutional: No fever or chills. No night sweats, + weight loss   Eyes: No eye discharge, double vision, or eye pain   HEENT: negative for sore mouth, sore throat, hoarseness and voice change   Respiratory: negative for cough , sputum, dyspnea, wheezing, hemoptysis, chest pain   Cardiovascular: negative for chest pain, dyspnea, palpitations, orthopnea, PND   Gastrointestinal: negative for nausea, vomiting, diarrhea, constipation, abdominal pain, Dysphagia, hematemesis and hematochezia   Genitourinary: negative for frequency, dysuria, nocturia, urinary incontinence, and hematuria   Integument: negative for rash, skin lesions, bruises.    Hematologic/Lymphatic: negative for easy bruising, bleeding, lymphadenopathy, or petechiae   Endocrine: negative for heat or cold intolerance,weight changes, change in bowel habits and hair loss   Musculoskeletal: negative for myalgias, arthralgias, pain, joint swelling,and bone pain   Neurological: negative for headaches, dizziness, seizures, weakness, numbness        Physical Exam:  Vitals: /71 Comment: completed at infusion room  Pulse 97 Comment: completed at infusion room  Temp 97.8 °F (36.6 °C) Comment: completed at infusion room  Resp 20 Comment: completed at infusion room  Ht 5' 10\" (1.778 m) Comment: completed at infusion room  Wt 240 lb 12.8 oz (109.2 kg) Comment: completed at infusion room  SpO2 98% Comment: completed at infusion room  BMI 34.55 kg/m²   General appearance - well appearing, no in pain or distress  Mental status - AAO X3  Eyes - pupils equal and reactive, extraocular eye movements intact  Mouth - mucous membranes moist, pharynx normal without lesions  Neck - supple, no significant adenopathy  Lymphatics - no palpable lymphadenopathy, no hepatosplenomegaly  Chest -decreased breathing sounds bilaterally  Heart - normal rate, regular rhythm, normal S1, S2, no murmurs  Abdomen - soft, nontender, nondistended, no masses or organomegaly  Neurological - alert, oriented, normal speech, no focal findings or movement disorder noted  Extremities - peripheral pulses normal, no pedal edema, no clubbing or cyanosis  Skin - normal coloration and turgor, no rashes, no suspicious skin lesions noted             DATA:    Results for orders placed or performed during the hospital encounter of 02/09/21   Comprehensive Metabolic Panel   Result Value Ref Range    Glucose 181 (H) 70 - 99 mg/dL    BUN 15 8 - 23 mg/dL    CREATININE 0.94 0.70 - 1.20 mg/dL    Bun/Cre Ratio 16 9 - 20    Calcium 9.5 8.6 - 10.4 mg/dL    Sodium 141 135 - 144 mmol/L    Potassium 3.7 3.7 - 5.3 mmol/L    Chloride 104 98 - 107 mmol/L    CO2 25 20 - 31 mmol/L    Anion Gap 12 9 - 17 mmol/L    Alkaline Phosphatase 63 40 - 129 U/L    ALT 19 5 - 41 U/L    AST 20 <40 U/L    Total Bilirubin 0.33 0.3 - 1.2 mg/dL    Total Protein 7.1 6.4 - 8.3 g/dL    Albumin 3.9 3.5 - 5.2 g/dL    Albumin/Globulin Ratio 1.2 1.0 - 2.5    GFR Non-African American >60 >60 mL/min    GFR African American >60 >60 mL/min    GFR Comment          GFR Staging NOT REPORTED    CBC Auto Differential   Result Value Ref Range    WBC 4.8 3.5 - 11.3 k/uL    RBC 3.28 (L) 4.21 - 5.77 m/uL    Hemoglobin 10.7 (L) 13.0 - 17.0 g/dL    Hematocrit 34.0 (L) 40.7 - 50.3 %    .7 (H) 82.6 - 102.9 fL    MCH 32.6 25.2 - 33.5 pg    MCHC 31.5 25.2 - 33.5 g/dL    RDW 14.0 11.8 - 14.4 %    Platelets 649 (L) 020 - 453 k/uL    MPV 9.7 8.1 - 13.5 fL    NRBC Automated 0.0 0.0 per 100 WBC    Differential Type NOT REPORTED     WBC Morphology NOT REPORTED     RBC Morphology NOT REPORTED     Platelet Estimate NOT REPORTED     Monocytes 10 3 - 12 %    Lymphocytes 11 (L) 24 - 43 %    Seg Neutrophils 77 (H) 36 - 65 %    Eosinophils % 2 1 - 4 %    Basophils 0 0 - 2 %    Immature Granulocytes 0 0 %    Absolute Mono # 0.48 0.10 - 1.20 k/uL    Absolute Lymph # 0.53 (L) 1.10 - 3.70 k/uL    Segs Absolute 3.69 1.50 - 8.10 k/uL    Absolute Eos # 0.10 0.00 - 0.44 k/uL    Basophils Absolute 0.00 0.00 - 0.20 k/uL    Absolute Immature Granulocyte 0.00 0.00 - 0.30 k/uL    Morphology MACROCYTOSIS PRESENT     Morphology Platelet scan shows Decreased Platelets     Morphology SLIDE SCAN SUPPORTS AUTO DIFFERENTIAL    TSH with Reflex   Result Value Ref Range    TSH 3.10 0.30 - 5.00 mIU/L     -- Diagnosis --   BAL RIGHT LOWER LOBE:           NEGATIVE FOR MALIGNANCY.             FINE NEEDLE ASPIRATION STATION 7 EBUS:           ADENOCARCINOMA, COMPATIBLE WITH LUNG PRIMARY. CT PET    Impression    1. The previously described nodule involving the right lung base is FDG avid. Tissue sampling is recommended as malignancy is suspected. 2. The previously identified smaller lung nodules involving the lower lobes    are too small for PET CT characterization.  CT follow-up is recommended. 3. FDG avid subcarinal and right hilar lymph nodes suggestive of metastatic    disease. 4. No abnormal FDG activity identified within the neck, abdomen or pelvis.           Mri Brain W Wo Contrast    Chronic microvascular disease without acute intracranial abnormality. No abnormal postcontrast enhancement.      Echo Complete 2d W Doppler W Color    Result Date: 12/29/2020  Transthoracic Echocardiography Report (TTE)  Patient Name SHOCK       Date of Study             12/29/2020               Nolia Heaps   Date of      1945  Gender                    Male  Birth   Age          77 year(s)  Race    Room Number              Height:                   70 inch, 177.8 cm   Corporate ID [id number]    Weight:                   234 pounds, 106.1 kg  #   Patient Acct [de-identified]   BSA:         2.23 m^2     BMI:       33.58 kg/m^2  #   MR #         [id number]     Sonographer               Mercedes Dukes RDCS   Accession #  [id number]  Interpreting Physician    [address] [place] [address]   Fellow                   Referring Nurse                           Practitioner   Interpreting             Referring Physician       Jojo Da Silva  Fellow  Type of Study   TTE procedure:2D Echocardiogram, M-Mode, Doppler, Color Doppler. Procedure Date Date: 12/29/2020 Start: 09:16 AM Study Location: [facility] Technical Quality: Limited visualization due to body habitus. Indications:Atrial fibrillation. Patient Status: Outpatient Height: 70 inches Weight: 234.01 pounds BSA: 2.23 m^2 BMI: 33.58 kg/m^2 Rhythm: Within normal limits Allergies   - *No Known Allergies. - Ace Inhibitors. - *Unlisted:(Statins). CONCLUSIONS Summary Normal left ventricular size with normal hyperdynamic function. Left ventricular ejection fraction 65 %. Mild concentric left ventricular hypertrophy. Grade I (mild) left ventricular diastolic dysfunction. Technically difficult visualization of the right ventricle, but it does appear to be normal in size. Aortic valve not well visualized but appears normal. Trivial tricuspid regurgitation. Estimated right ventricular systolic pressure is 35 mmHg. Trivial pulmonic insufficiency.  Signature ----------------------------------------------------------------------------  Electronically signed by Mercedes Dukes RDCS(Sonographer) on 12/29/2020  10:49 AM ---------------------------------------------------------------------------- ----------------------------------------------------------------------------  Electronically signed by Ronaldo ParikhInterpreting physician) on 12/29/2020  03:06 PM ---------------------------------------------------------------------------- FINDINGS Left Atrium Left atrium is normal in size. Left Ventricle Normal left ventricular size with normal hyperdynamic function. Left ventricular ejection fraction 65 %. Mild concentric left ventricular hypertrophy. Grade I (mild) left ventricular diastolic dysfunction. Right Atrium Right atrium is normal in size. Right Ventricle Technically difficult visualization of the right ventricle, but it does appear to be normal in size. Mitral Valve Normal mitral valve structure and function. Aortic Valve Aortic valve not well visualized but appears normal. Tricuspid Valve Normal tricuspid valve structure and function. Trivial tricuspid regurgitation. Estimated right ventricular systolic pressure is 35 mmHg. Pulmonic Valve The pulmonic valve is normal in structure. Trivial pulmonic insufficiency.  Miscellaneous E/E' average = 9. M-mode / 2D Measurements & Calculations:   LVIDd:4.76 cm(3.7 - 5.6 cm)      Diastolic EYIFRX:539 ml  FGYPC:1.9 cm(2.2 - 4.0 cm)       Systolic NFZTVD:52.9 ml  ASTT:1.53 cm(0.6 - 1.1 cm)       Aortic Root:3 cm(2.0 - 3.7 cm)  LVPWd:1.11 cm(0.6 - 1.1 cm)      LA Dimension: 3.4 cm(1.9 - 4.0 cm)  Fractional Shortenin.67 %  Calculated LVEF (%): 66.76 %   Mitral:                                 Aortic   Peak E-Wave: 0.61 m/s                   Peak Velocity: 1.30 m/s  Peak A-Wave: 0.76 m/s                   Peak Gradient: 6.76 mmHg  E/A Ratio: 0.81  Peak Gradient: 1.49 mmHg  Deceleration Time: 239 msec   Tricuspid:                              Pulmonic:   Peak TR Velocity: 2.81 m/s              Peak Velocity: 0.98 m/s  Peak TR Gradient: 31.5844 mmHg          Peak Gradient: 3.82 mmHg  Estimated RA Pressure: 3 mmHg  Peak E-Wave: 0.80 m/s  Peak Gradient: 2.56 mmHg                                          Estimated PASP: 34.58 mmHg  Septal Wall E' velocity:0.06 m/s Lateral Wall E' velocity:0.08 m/s    Mri Abdomen W Wo Contrast    Result Date: 1/22/2021  EXAMINATION: MRI OF THE ABDOMEN WITHOUT AND WITH CONTRAST, 1/22/2021 9:50 am TECHNIQUE: Multiplanar multisequence MRI of the abdomen was performed without and with the administration of intravenous contrast. COMPARISON: CT 01/07/2021, 07/21/2020 HISTORY: ORDERING SYSTEM PROVIDED HISTORY: Adenocarcinoma of right lung [facility]) TECHNOLOGIST PROVIDED HISTORY: liver lesion on ct Specify organ?->Liver Reason for Exam:  History of lung cancer, follow up liver lesion seen on CT scan. Acuity: Acute Type of Exam: Subsequent/Follow-up Additional signs and symptoms: Adenocarcinoma of right lung; Regional lymph node metastasis present; Radiation pneumonitis. FINDINGS: There is hepatic steatosis. At the level of bifurcation of the main portal vein there is subtle area of posterior subcapsular segment 4A left lobe of liver area of nonenhancing T1 hypointensity best appreciated on the postcontrast images. This measures maximally about 0.8 x 1.1 x 1.8 cm. This correlates with finding on the recent CT scan. There is no T2 correlate and there is no restriction of diffusion. Likely benign but attention on follow-up. The spleen, pancreas, kidneys, adrenal glands and gallbladder show no significant abnormalities. Visualized GI tract show no acute process. No ascites. Trace right pleural effusion. 1.9 cm lung nodule at the right lung base in the lower lobe unchanged. Bone marrow signal age appropriate. 1. Previously noted area of concern in the posterior left lobe of liver is identified as the subtle T1 hypointense nonenhancing, non diffusion restricting focus isointense to liver on T2. This is in segment 4A at the level of bifurcation of the main portal vein. Benign etiology most favored. Attention on follow-up. No findings concerning for metastatic lesion. 2. Hepatic steatosis. 3. Trace right pleural effusion and a right lung base lower lobe 1.9 cm nodule unchanged from prior CT.      Ct with finding on the recent CT scan. There is no T2 correlate and there is no restriction of diffusion. Likely benign but attention on follow-up. The spleen, pancreas, kidneys, adrenal glands and gallbladder show no significant abnormalities. Visualized GI tract show no acute process. No ascites. Trace right pleural effusion. 1.9 cm lung nodule at the right lung base in the lower lobe unchanged. Bone marrow signal age appropriate. 1. Previously noted area of concern in the posterior left lobe of liver is identified as the subtle T1 hypointense nonenhancing, non diffusion restricting focus isointense to liver on T2. This is in segment 4A at the level of bifurcation of the main portal vein. Benign etiology most favored. Attention on follow-up. No findings concerning for metastatic lesion. 2. Hepatic steatosis. 3. Trace right pleural effusion and a right lung base lower lobe 1.9 cm nodule unchanged from prior CT. Impression:  Adenocarcinoma of right lung, clinical stage at least T1c, N2, M0 (stage IIIA)  Subcarinal lymph node metastasis  Weight loss  Radiation pneumonitis and shortness of breath      Plan:  I had a detailed discussion with the patient and we went over results of lab work-up imaging studies and other relevant clinical data  Toxicity check performed. Patient is tolerating treatment without unexpected side effects  Labs are adequate for treatment. We will proceed with treatment   Reiterated treatment plan. Reviewed risk benefit profile and reiterated potential side-effects of ongoing treatment  Plan to complete 1 year of immunotherapy consolidation. Patient has stopped taking anticoagulation on his own for atrial fibrillation. I encouraged the patient to discuss the matter with his cardiologist.  Patient is now completely off prednisone. NCCN guidelines were reviewed and discussed with the patient. The diagnosis and care plan were discussed with the patient in detail.  I discussed the natural history of the disease, prognosis, risks and goals of therapy and answered all the patients questions to the best of my ability. Patient expressed understanding and was in agreement. Chance Garcia          This note is created with the assistance of a speech recognition program.  While intending to generate a document that actually reflects the content of the visit, the document can still have some errors including those of syntax and sound a like substitutions which may escape proof reading. It such instances, actual meaning can be extrapolated by contextual diversion.

## 2021-02-17 ENCOUNTER — HOSPITAL ENCOUNTER (OUTPATIENT)
Dept: PHARMACY | Age: 76
Setting detail: THERAPIES SERIES
Discharge: HOME | End: 2021-02-17
Payer: MEDICARE

## 2021-02-17 DIAGNOSIS — I48.0 PAROXYSMAL A-FIB (HCC): ICD-10-CM

## 2021-02-17 LAB
INR BLD: 1.2
PROTIME: 14.3 SECONDS

## 2021-02-17 PROCEDURE — 36416 COLLJ CAPILLARY BLOOD SPEC: CPT

## 2021-02-17 PROCEDURE — 99211 OFF/OP EST MAY X REQ PHY/QHP: CPT

## 2021-02-17 PROCEDURE — 85610 PROTHROMBIN TIME: CPT

## 2021-02-17 NOTE — PROGRESS NOTES
ANTICOAGULATION SERVICE    Date of Clinic Visit:  2/17/2021    Dimitri Wells is a 77 y.o. male who presents to clinic today for anticoagulation monitoring and adjustment. Recent INR Results:  Internal QC passed  Lab Results   Component Value Date    INR 1.2 02/17/2021    INR 1.0 01/02/2018       Current Warfarin Dosage:  Dosing Plan  As of 2/17/2021    TTR:  --               Assessment/Plan:    Modify warfarin dose as noted above: INR is low today but only started warfarin 4 days ago. I will increase dose and recheck in 3 days. Next Clinic Appointment:  Return date  As of 2/17/2021    TTR:  --   Next INR check:               Please call [facility] at [phone] with any questions. Thanks! Claritza Jones, [facility]  Anticoagulation Service Pharmacist  2/17/2021 10:52 AM     CLINICAL PHARMACY CONSULT: MED RECONCILIATION/REVIEW ADDENDUM    For Pharmacy Admin Tracking Only    PHSO: Yes  Total # of Interventions Recommended: 1  - Increased Dose #: 1  - Maintenance Safety Lab Monitoring #: 1  Recommended intervention potential cost savings:   Accepted intervention potential cost savings:    Total Interventions Accepted: 1  Time Spent (min): David

## 2021-02-20 ENCOUNTER — HOSPITAL ENCOUNTER (OUTPATIENT)
Dept: PHARMACY | Age: 76
Setting detail: THERAPIES SERIES
Discharge: HOME | End: 2021-02-20
Payer: MEDICARE

## 2021-02-20 DIAGNOSIS — I48.0 PAROXYSMAL A-FIB (HCC): ICD-10-CM

## 2021-02-20 LAB
INR BLD: 1.8
PROTIME: 21.3 SECONDS

## 2021-02-20 PROCEDURE — 99211 OFF/OP EST MAY X REQ PHY/QHP: CPT

## 2021-02-20 PROCEDURE — 36416 COLLJ CAPILLARY BLOOD SPEC: CPT

## 2021-02-20 PROCEDURE — 85610 PROTHROMBIN TIME: CPT

## 2021-02-20 NOTE — PROGRESS NOTES
ANTICOAGULATION SERVICE    Date of Clinic Visit:  2/20/2021    Ryan Clark is a 77 y.o. male who presents to clinic today for anticoagulation monitoring and adjustment. Recent INR Results:  Internal QC passed  Lab Results   Component Value Date    INR 1.8 02/20/2021    INR 1.2 02/17/2021       Current Warfarin Dosage:  Dosing Plan  As of 2/20/2021    TTR:  --   Full warfarin instructions:  2/20: 10 mg; Otherwise 7.5 mg every day               Assessment/Plan:    Modify warfarin dose as noted above: Patient nicely increased since Monday. Will give 1 increased dose today then reduce dose back down. RE-check Tuesday. Next Clinic Appointment:  Return date  As of 2/20/2021    TTR:  --   Next INR check:  2/23/2021             Please call [facility] at [phone] with any questions. Thanks!   Irineo Sofia, [address] [facility]  Anticoagulation Service Pharmacist  2/20/2021 10:39 AM  CLINICAL PHARMACY CONSULT: MED RECONCILIATION/REVIEW ADDENDUM    For Pharmacy Admin Tracking Only    PHSO: Yes  Total # of Interventions Recommended: 1  - Increased Dose #: 1  Total Interventions Accepted: 1  Time Spent (min): Salma 56, [place] 219

## 2021-02-23 ENCOUNTER — HOSPITAL ENCOUNTER (OUTPATIENT)
Dept: PHARMACY | Age: 76
Setting detail: THERAPIES SERIES
Discharge: HOME | End: 2021-02-23
Payer: MEDICARE

## 2021-02-23 ENCOUNTER — CLINICAL DOCUMENTATION (OUTPATIENT)
Dept: SPIRITUAL SERVICES | Age: 76
End: 2021-02-23

## 2021-02-23 ENCOUNTER — HOSPITAL ENCOUNTER (OUTPATIENT)
Dept: INFUSION THERAPY | Age: 76
Discharge: HOME | End: 2021-02-23
Payer: MEDICARE

## 2021-02-23 VITALS
SYSTOLIC BLOOD PRESSURE: 109 MMHG | TEMPERATURE: 98.2 F | HEART RATE: 64 BPM | OXYGEN SATURATION: 95 % | HEIGHT: 70 IN | RESPIRATION RATE: 20 BRPM | BODY MASS INDEX: 33.93 KG/M2 | DIASTOLIC BLOOD PRESSURE: 67 MMHG | WEIGHT: 237 LBS

## 2021-02-23 DIAGNOSIS — E78.49 FAMILIAL HYPERLIPIDEMIA: ICD-10-CM

## 2021-02-23 DIAGNOSIS — R73.01 IMPAIRED FASTING GLUCOSE: ICD-10-CM

## 2021-02-23 DIAGNOSIS — E03.9 ACQUIRED HYPOTHYROIDISM: Primary | ICD-10-CM

## 2021-02-23 DIAGNOSIS — C34.91 ADENOCARCINOMA OF RIGHT LUNG (HCC): ICD-10-CM

## 2021-02-23 DIAGNOSIS — C34.91 ADENOCARCINOMA OF RIGHT LUNG (HCC): Primary | ICD-10-CM

## 2021-02-23 DIAGNOSIS — R97.20 ELEVATED PSA: ICD-10-CM

## 2021-02-23 DIAGNOSIS — I48.0 PAROXYSMAL A-FIB (HCC): ICD-10-CM

## 2021-02-23 LAB
ABSOLUTE EOS #: 0.05 K/UL (ref 0–0.44)
ABSOLUTE IMMATURE GRANULOCYTE: 0 K/UL (ref 0–0.3)
ABSOLUTE LYMPH #: 0.55 K/UL (ref 1.1–3.7)
ABSOLUTE MONO #: 0.6 K/UL (ref 0.1–1.2)
ALBUMIN SERPL-MCNC: 4 G/DL (ref 3.5–5.2)
ALBUMIN/GLOBULIN RATIO: 1.2 (ref 1–2.5)
ALP BLD-CCNC: 72 U/L (ref 40–129)
ALT SERPL-CCNC: 16 U/L (ref 5–41)
ANION GAP SERPL CALCULATED.3IONS-SCNC: 11 MMOL/L (ref 9–17)
AST SERPL-CCNC: 16 U/L
BASOPHILS # BLD: 0 % (ref 0–2)
BASOPHILS ABSOLUTE: 0 K/UL (ref 0–0.2)
BILIRUB SERPL-MCNC: 0.28 MG/DL (ref 0.3–1.2)
BUN BLDV-MCNC: 14 MG/DL (ref 8–23)
BUN/CREAT BLD: 14 (ref 9–20)
CALCIUM SERPL-MCNC: 9.4 MG/DL (ref 8.6–10.4)
CHLORIDE BLD-SCNC: 105 MMOL/L (ref 98–107)
CHOLESTEROL/HDL RATIO: 4.2
CHOLESTEROL: 174 MG/DL
CO2: 26 MMOL/L (ref 20–31)
CREAT SERPL-MCNC: 0.98 MG/DL (ref 0.7–1.2)
DIFFERENTIAL TYPE: ABNORMAL
EOSINOPHILS RELATIVE PERCENT: 1 % (ref 1–4)
ESTIMATED AVERAGE GLUCOSE: 126 MG/DL
GFR AFRICAN AMERICAN: >60 ML/MIN
GFR NON-AFRICAN AMERICAN: >60 ML/MIN
GFR SERPL CREATININE-BSD FRML MDRD: ABNORMAL ML/MIN/{1.73_M2}
GFR SERPL CREATININE-BSD FRML MDRD: ABNORMAL ML/MIN/{1.73_M2}
GLUCOSE BLD-MCNC: 123 MG/DL (ref 70–99)
HBA1C MFR BLD: 6 % (ref 4–6)
HCT VFR BLD CALC: 34.4 % (ref 40.7–50.3)
HDLC SERPL-MCNC: 41 MG/DL
HEMOGLOBIN: 10.8 G/DL (ref 13–17)
IMMATURE GRANULOCYTES: 0 %
INR BLD: 3
LDL CHOLESTEROL: 71 MG/DL (ref 0–130)
LYMPHOCYTES # BLD: 11 % (ref 24–43)
MCH RBC QN AUTO: 32 PG (ref 25.2–33.5)
MCHC RBC AUTO-ENTMCNC: 31.4 G/DL (ref 25.2–33.5)
MCV RBC AUTO: 101.8 FL (ref 82.6–102.9)
MONOCYTES # BLD: 12 % (ref 3–12)
MORPHOLOGY: ABNORMAL
MORPHOLOGY: ABNORMAL
NRBC AUTOMATED: 0 PER 100 WBC
PDW BLD-RTO: 13.2 % (ref 11.8–14.4)
PLATELET # BLD: 136 K/UL (ref 138–453)
PLATELET ESTIMATE: ABNORMAL
PMV BLD AUTO: 9.3 FL (ref 8.1–13.5)
POTASSIUM SERPL-SCNC: 4.3 MMOL/L (ref 3.7–5.3)
PROSTATE SPECIFIC ANTIGEN: 6.68 UG/L
PROTIME: 35.9 SECONDS
RBC # BLD: 3.38 M/UL (ref 4.21–5.77)
RBC # BLD: ABNORMAL 10*6/UL
SEG NEUTROPHILS: 76 % (ref 36–65)
SEGMENTED NEUTROPHILS ABSOLUTE COUNT: 3.8 K/UL (ref 1.5–8.1)
SODIUM BLD-SCNC: 142 MMOL/L (ref 135–144)
THYROXINE, FREE: 1.31 NG/DL (ref 0.93–1.7)
TOTAL PROTEIN: 7.3 G/DL (ref 6.4–8.3)
TRIGL SERPL-MCNC: 310 MG/DL
TSH SERPL DL<=0.05 MIU/L-ACNC: 2.14 MIU/L (ref 0.3–5)
VLDLC SERPL CALC-MCNC: ABNORMAL MG/DL (ref 1–30)
WBC # BLD: 5 K/UL (ref 3.5–11.3)
WBC # BLD: ABNORMAL 10*3/UL

## 2021-02-23 PROCEDURE — 85610 PROTHROMBIN TIME: CPT

## 2021-02-23 PROCEDURE — 85025 COMPLETE CBC W/AUTO DIFF WBC: CPT

## 2021-02-23 PROCEDURE — 99211 OFF/OP EST MAY X REQ PHY/QHP: CPT

## 2021-02-23 PROCEDURE — 84439 ASSAY OF FREE THYROXINE: CPT

## 2021-02-23 PROCEDURE — 36416 COLLJ CAPILLARY BLOOD SPEC: CPT

## 2021-02-23 PROCEDURE — 36591 DRAW BLOOD OFF VENOUS DEVICE: CPT

## 2021-02-23 PROCEDURE — 2580000003 HC RX 258: Performed by: INTERNAL MEDICINE

## 2021-02-23 PROCEDURE — 96413 CHEMO IV INFUSION 1 HR: CPT

## 2021-02-23 PROCEDURE — 6360000002 HC RX W HCPCS: Performed by: INTERNAL MEDICINE

## 2021-02-23 PROCEDURE — 84153 ASSAY OF PSA TOTAL: CPT

## 2021-02-23 PROCEDURE — 80053 COMPREHEN METABOLIC PANEL: CPT

## 2021-02-23 PROCEDURE — 80061 LIPID PANEL: CPT

## 2021-02-23 PROCEDURE — 84443 ASSAY THYROID STIM HORMONE: CPT

## 2021-02-23 PROCEDURE — 83036 HEMOGLOBIN GLYCOSYLATED A1C: CPT

## 2021-02-23 RX ORDER — SODIUM CHLORIDE 9 MG/ML
INJECTION, SOLUTION INTRAVENOUS CONTINUOUS
Status: CANCELLED | OUTPATIENT
Start: 2021-03-09

## 2021-02-23 RX ORDER — SODIUM CHLORIDE 9 MG/ML
INJECTION, SOLUTION INTRAVENOUS CONTINUOUS
Status: CANCELLED | OUTPATIENT
Start: 2021-02-23

## 2021-02-23 RX ORDER — SODIUM CHLORIDE 0.9 % (FLUSH) 0.9 %
10 SYRINGE (ML) INJECTION PRN
Status: CANCELLED | OUTPATIENT
Start: 2021-03-09

## 2021-02-23 RX ORDER — SODIUM CHLORIDE 9 MG/ML
20 INJECTION, SOLUTION INTRAVENOUS ONCE
Status: CANCELLED | OUTPATIENT
Start: 2021-02-23 | End: 2021-02-23

## 2021-02-23 RX ORDER — METHYLPREDNISOLONE SODIUM SUCCINATE 125 MG/2ML
125 INJECTION, POWDER, LYOPHILIZED, FOR SOLUTION INTRAMUSCULAR; INTRAVENOUS ONCE
Status: CANCELLED | OUTPATIENT
Start: 2021-03-09 | End: 2021-03-09

## 2021-02-23 RX ORDER — SODIUM CHLORIDE 9 MG/ML
20 INJECTION, SOLUTION INTRAVENOUS ONCE
Status: CANCELLED | OUTPATIENT
Start: 2021-03-09 | End: 2021-03-09

## 2021-02-23 RX ORDER — SODIUM CHLORIDE 0.9 % (FLUSH) 0.9 %
5 SYRINGE (ML) INJECTION PRN
Status: CANCELLED | OUTPATIENT
Start: 2021-02-23

## 2021-02-23 RX ORDER — SODIUM CHLORIDE 9 MG/ML
20 INJECTION, SOLUTION INTRAVENOUS ONCE
Status: COMPLETED | OUTPATIENT
Start: 2021-02-23 | End: 2021-02-23

## 2021-02-23 RX ORDER — SODIUM CHLORIDE 0.9 % (FLUSH) 0.9 %
10 SYRINGE (ML) INJECTION PRN
Status: CANCELLED | OUTPATIENT
Start: 2021-02-23

## 2021-02-23 RX ORDER — EPINEPHRINE 1 MG/ML
0.3 INJECTION, SOLUTION, CONCENTRATE INTRAVENOUS PRN
Status: CANCELLED | OUTPATIENT
Start: 2021-03-09

## 2021-02-23 RX ORDER — METHYLPREDNISOLONE SODIUM SUCCINATE 125 MG/2ML
125 INJECTION, POWDER, LYOPHILIZED, FOR SOLUTION INTRAMUSCULAR; INTRAVENOUS ONCE
Status: CANCELLED | OUTPATIENT
Start: 2021-02-23 | End: 2021-02-23

## 2021-02-23 RX ORDER — EPINEPHRINE 1 MG/ML
0.3 INJECTION, SOLUTION, CONCENTRATE INTRAVENOUS PRN
Status: CANCELLED | OUTPATIENT
Start: 2021-02-23

## 2021-02-23 RX ORDER — HEPARIN SODIUM (PORCINE) LOCK FLUSH IV SOLN 100 UNIT/ML 100 UNIT/ML
500 SOLUTION INTRAVENOUS PRN
Status: DISCONTINUED | OUTPATIENT
Start: 2021-02-23 | End: 2021-02-24 | Stop reason: HOSPADM

## 2021-02-23 RX ORDER — HEPARIN SODIUM (PORCINE) LOCK FLUSH IV SOLN 100 UNIT/ML 100 UNIT/ML
500 SOLUTION INTRAVENOUS PRN
Status: CANCELLED | OUTPATIENT
Start: 2021-02-23

## 2021-02-23 RX ORDER — DIPHENHYDRAMINE HYDROCHLORIDE 50 MG/ML
50 INJECTION INTRAMUSCULAR; INTRAVENOUS ONCE
Status: CANCELLED | OUTPATIENT
Start: 2021-03-09 | End: 2021-03-09

## 2021-02-23 RX ORDER — DIPHENHYDRAMINE HYDROCHLORIDE 50 MG/ML
50 INJECTION INTRAMUSCULAR; INTRAVENOUS ONCE
Status: CANCELLED | OUTPATIENT
Start: 2021-02-23 | End: 2021-02-23

## 2021-02-23 RX ORDER — HEPARIN SODIUM (PORCINE) LOCK FLUSH IV SOLN 100 UNIT/ML 100 UNIT/ML
500 SOLUTION INTRAVENOUS PRN
Status: CANCELLED | OUTPATIENT
Start: 2021-03-09

## 2021-02-23 RX ORDER — SODIUM CHLORIDE 0.9 % (FLUSH) 0.9 %
10 SYRINGE (ML) INJECTION PRN
Status: DISCONTINUED | OUTPATIENT
Start: 2021-02-23 | End: 2021-02-24 | Stop reason: HOSPADM

## 2021-02-23 RX ORDER — SODIUM CHLORIDE 0.9 % (FLUSH) 0.9 %
5 SYRINGE (ML) INJECTION PRN
Status: CANCELLED | OUTPATIENT
Start: 2021-03-09

## 2021-02-23 RX ADMIN — Medication 10 ML: at 09:04

## 2021-02-23 RX ADMIN — HEPARIN SODIUM (PORCINE) LOCK FLUSH IV SOLN 100 UNIT/ML 500 UNITS: 100 SOLUTION at 11:48

## 2021-02-23 RX ADMIN — SODIUM CHLORIDE 1120 MG: 9 INJECTION, SOLUTION INTRAVENOUS at 10:34

## 2021-02-23 RX ADMIN — SODIUM CHLORIDE 20 ML/HR: 9 INJECTION, SOLUTION INTRAVENOUS at 09:05

## 2021-02-23 ASSESSMENT — PAIN SCALES - GENERAL: PAINLEVEL_OUTOF10: 0

## 2021-02-23 NOTE — PROGRESS NOTES
rounding in infusion room    Patient receiving immune therapy treatment and discussed his previous chemo treatment. Patient's wife with him and both are talkative and in good spirits.  provided caring presence via active listening and conversation. Patient and spouse engaged in conversation and thanked  for visit. Chaplains will remain available to offer spiritual and emotional support as needed.     Electronically signed by Elvia Ko on 2/23/2021 at 11:02 AM

## 2021-02-23 NOTE — PROGRESS NOTES
Infusion complete. Flushed VAD with 10 ml of saline and 5 ml of heparin flush. D/C camargo needle. Band aid to site. Patient stable and discharged to home with wife will return in 2 weeks for next treatment.

## 2021-02-23 NOTE — PROGRESS NOTES
ANTICOAGULATION SERVICE    Date of Clinic Visit:  2/23/2021    Neida Rai is a 77 y.o. male who presents to clinic today for anticoagulation monitoring and adjustment. Recent INR Results:  Internal QC passed  Lab Results   Component Value Date    INR 3 02/23/2021    INR 1.8 02/20/2021       Current Warfarin Dosage:  Dosing Plan  As of 2/23/2021    TTR:     Full warfarin instructions:  2/23: 2.5 mg; Otherwise 5 mg every Mon, Wed, Fri; 7.5 mg all other days               Assessment/Plan:    Modify warfarin dose as noted above: Patient into range and went to top end of range rather quickly. Reduce dose and re-check mid next week. 14.3% dose reduction overall. Next Clinic Appointment:  Return date  As of 2/23/2021    TTR:     Next INR check:  3/3/2021             Please call [facility] at [phone] with any questions. Thanks!   Nakita Merino [facility]  Anticoagulation Service Pharmacist  2/23/2021 9:23 AM  CLINICAL PHARMACY CONSULT: MED RECONCILIATION/REVIEW ADDENDUM    For Pharmacy Admin Tracking Only    PHSO: Yes  Total # of Interventions Recommended: 1  - Decreased Dose #: 1    Total Interventions Accepted: 1  Time Spent (min): Salma 56, Kay[place] 219

## 2021-03-03 ENCOUNTER — OFFICE VISIT (OUTPATIENT)
Dept: FAMILY MEDICINE CLINIC | Age: 76
End: 2021-03-03
Payer: MEDICARE

## 2021-03-03 ENCOUNTER — HOSPITAL ENCOUNTER (OUTPATIENT)
Dept: PHARMACY | Age: 76
Setting detail: THERAPIES SERIES
Discharge: HOME | End: 2021-03-03
Payer: MEDICARE

## 2021-03-03 VITALS
DIASTOLIC BLOOD PRESSURE: 60 MMHG | OXYGEN SATURATION: 95 % | HEART RATE: 80 BPM | SYSTOLIC BLOOD PRESSURE: 96 MMHG | HEIGHT: 70 IN | BODY MASS INDEX: 33.36 KG/M2 | WEIGHT: 233 LBS | RESPIRATION RATE: 20 BRPM

## 2021-03-03 DIAGNOSIS — R97.20 ELEVATED PSA: ICD-10-CM

## 2021-03-03 DIAGNOSIS — E78.49 FAMILIAL HYPERLIPIDEMIA: ICD-10-CM

## 2021-03-03 DIAGNOSIS — I48.0 PAROXYSMAL A-FIB (HCC): ICD-10-CM

## 2021-03-03 DIAGNOSIS — I27.20 PULMONARY HYPERTENSION (HCC): ICD-10-CM

## 2021-03-03 DIAGNOSIS — C34.91 ADENOCARCINOMA OF RIGHT LUNG (HCC): ICD-10-CM

## 2021-03-03 DIAGNOSIS — R73.01 IMPAIRED FASTING GLUCOSE: ICD-10-CM

## 2021-03-03 DIAGNOSIS — I10 ESSENTIAL HYPERTENSION: Primary | ICD-10-CM

## 2021-03-03 DIAGNOSIS — G47.33 OBSTRUCTIVE SLEEP APNEA: ICD-10-CM

## 2021-03-03 DIAGNOSIS — E03.9 ACQUIRED HYPOTHYROIDISM: ICD-10-CM

## 2021-03-03 LAB
INR BLD: 3
PROTIME: 35.5 SECONDS

## 2021-03-03 PROCEDURE — G8427 DOCREV CUR MEDS BY ELIG CLIN: HCPCS | Performed by: FAMILY MEDICINE

## 2021-03-03 PROCEDURE — 36416 COLLJ CAPILLARY BLOOD SPEC: CPT

## 2021-03-03 PROCEDURE — 4040F PNEUMOC VAC/ADMIN/RCVD: CPT | Performed by: FAMILY MEDICINE

## 2021-03-03 PROCEDURE — 1123F ACP DISCUSS/DSCN MKR DOCD: CPT | Performed by: FAMILY MEDICINE

## 2021-03-03 PROCEDURE — 85610 PROTHROMBIN TIME: CPT

## 2021-03-03 PROCEDURE — 3017F COLORECTAL CA SCREEN DOC REV: CPT | Performed by: FAMILY MEDICINE

## 2021-03-03 PROCEDURE — G8417 CALC BMI ABV UP PARAM F/U: HCPCS | Performed by: FAMILY MEDICINE

## 2021-03-03 PROCEDURE — 1036F TOBACCO NON-USER: CPT | Performed by: FAMILY MEDICINE

## 2021-03-03 PROCEDURE — 99214 OFFICE O/P EST MOD 30 MIN: CPT | Performed by: FAMILY MEDICINE

## 2021-03-03 PROCEDURE — G8484 FLU IMMUNIZE NO ADMIN: HCPCS | Performed by: FAMILY MEDICINE

## 2021-03-03 PROCEDURE — 99211 OFF/OP EST MAY X REQ PHY/QHP: CPT

## 2021-03-03 RX ORDER — METOPROLOL TARTRATE 75 MG/1
TABLET, FILM COATED ORAL
COMMUNITY
Start: 2020-12-16

## 2021-03-03 RX ORDER — PRAVASTATIN SODIUM 40 MG
TABLET ORAL
Qty: 90 TABLET | Refills: 3 | Status: SHIPPED | OUTPATIENT
Start: 2021-03-03 | End: 2022-04-28

## 2021-03-03 RX ORDER — FUROSEMIDE 20 MG/1
TABLET ORAL
Qty: 90 TABLET | Refills: 1 | Status: SHIPPED | OUTPATIENT
Start: 2021-03-03 | End: 2021-09-27

## 2021-03-03 RX ORDER — OXYBUTYNIN CHLORIDE 10 MG/1
10 TABLET, EXTENDED RELEASE ORAL DAILY
Qty: 90 TABLET | Refills: 1 | Status: SHIPPED | OUTPATIENT
Start: 2021-03-03 | End: 2021-09-27

## 2021-03-03 RX ORDER — CLOPIDOGREL BISULFATE 75 MG/1
TABLET ORAL
Qty: 90 TABLET | Refills: 1 | Status: SHIPPED | OUTPATIENT
Start: 2021-03-03 | End: 2021-11-01

## 2021-03-03 ASSESSMENT — ENCOUNTER SYMPTOMS
VOMITING: 0
COUGH: 0
DIARRHEA: 0
SORE THROAT: 0
EYE DISCHARGE: 0
SINUS PRESSURE: 0
EYE REDNESS: 0
RHINORRHEA: 0
WHEEZING: 0
SHORTNESS OF BREATH: 1
NAUSEA: 0
TROUBLE SWALLOWING: 0
CONSTIPATION: 0
ABDOMINAL PAIN: 0

## 2021-03-03 ASSESSMENT — PATIENT HEALTH QUESTIONNAIRE - PHQ9
SUM OF ALL RESPONSES TO PHQ QUESTIONS 1-9: 0
SUM OF ALL RESPONSES TO PHQ9 QUESTIONS 1 & 2: 0

## 2021-03-03 NOTE — PROGRESS NOTES
ANTICOAGULATION SERVICE    Date of Clinic Visit:  3/3/2021    Will Brooks is a 77 y.o. male who presents to clinic today for anticoagulation monitoring and adjustment. Recent INR Results:  Internal QC passed  Lab Results   Component Value Date    INR 3 03/03/2021    INR 3 02/23/2021       Current Warfarin Dosage:  Dosing Plan  As of 3/3/2021    TTR:  100.0 % (4 d)   Full warfarin instructions:  7.5 mg every Tue, Thu, Sat; 5 mg all other days               Assessment/Plan:    Modify warfarin dose as noted above: INR remained the same as last week. I will decrease Trevor's dose slightly more and recheck in 6 days. Next Clinic Appointment:  Return date  As of 3/3/2021    TTR:  100.0 % (4 d)   Next INR check:  3/9/2021             Please call [facility] at [phone] with any questions. Thanks! EVERARDO Veras [facility]  Anticoagulation Service Pharmacist  3/3/2021 9:19 AM    CLINICAL PHARMACY CONSULT: MED RECONCILIATION/REVIEW ADDENDUM    For Pharmacy Admin Tracking Only    PHSO: Yes  Total # of Interventions Recommended: 1  - Decreased Dose #: 1  - Maintenance Safety Lab Monitoring #: 1  Recommended intervention potential cost savings:   Accepted intervention potential cost savings:    Total Interventions Accepted: 1  Time Spent (min): 15    Pablo Sin, [address] [place] [address]

## 2021-03-03 NOTE — PROGRESS NOTES
3/3/2021     Leah Mak (:  1945) is a 77 y.o. male, here for evaluation of the following medical concerns:    HPI  Patient comes in today for follow up of chronic health issues Patient had been diagnosed with adenocarcinoma of the right lung since I had last seen him and has completed chemotherapy and radiation treatments. Now receiving Imfinzi treatments for the next year duration. Has done well through the course of his treatment. Is feeling relatively well. Does intermittently get short of breath but not severe. He does follow with the radiation oncologist and oncologist here for continued management of this issue. Does have a known history of hypertension his blood pressure is quite low. Wife had looked through his meds and wondered if he needed to remain on the Cardura. I believe he was on this primarily related to prostate issues and as his blood pressure is quite low and he is already on Flomax I think we can go ahead and stop this and see if this will help get his blood pressure little bit more elevated. Did discuss with him that if he starts having some urinary symptoms we may need to consider going back on this medical therapy and they seem agreeable with this plan. Has a known history of impaired fasting glucose and his blood sugar levels remain stable and controlled with dietary efforts. Did encourage continued low-carb/low sugar diet and routine exercise to keep this optimally controlled. Has a known history of hypothyroidism and her thyroid levels are stable and adequately supplemented on his current thyroid dose. Does have a known history of hyperlipidemia and his cholesterol levels are improved since last check. We will continue with his current statin dose. Does have known obstructive sleep apnea and is currently at a pressure setting of 14 cm of H2O and does feel that this is appropriate. Is getting ongoing medical benefit and is tolerating its use.   Does have known pulmonary hypertension and does see the cardiologist for continued monitoring and management of this issue. Has known paroxysmal atrial fib and did have recent adjustments to his med doses and now is rate controlled. Does seem to be in normal sinus rhythm more often than A. fib at this point. Has a history of elevated PSA which is stable compared to previous check. Patient otherwise has no other acute medical concerns. .  Patient's recent lab reports are as follows:    Results for orders placed or performed during the hospital encounter of 02/23/21   CBC Auto Differential   Result Value Ref Range    WBC 5.0 3.5 - 11.3 k/uL    RBC 3.38 (L) 4.21 - 5.77 m/uL    Hemoglobin 10.8 (L) 13.0 - 17.0 g/dL    Hematocrit 34.4 (L) 40.7 - 50.3 %    .8 82.6 - 102.9 fL    MCH 32.0 25.2 - 33.5 pg    MCHC 31.4 25.2 - 33.5 g/dL    RDW 13.2 11.8 - 14.4 %    Platelets 449 (L) 815 - 453 k/uL    MPV 9.3 8.1 - 13.5 fL    NRBC Automated 0.0 0.0 per 100 WBC    Differential Type NOT REPORTED     WBC Morphology NOT REPORTED     RBC Morphology NOT REPORTED     Platelet Estimate NOT REPORTED     Monocytes 12 3 - 12 %    Lymphocytes 11 (L) 24 - 43 %    Seg Neutrophils 76 (H) 36 - 65 %    Eosinophils % 1 1 - 4 %    Basophils 0 0 - 2 %    Immature Granulocytes 0 0 %    Absolute Mono # 0.60 0.10 - 1.20 k/uL    Absolute Lymph # 0.55 (L) 1.10 - 3.70 k/uL    Segs Absolute 3.80 1.50 - 8.10 k/uL    Absolute Eos # 0.05 0.00 - 0.44 k/uL    Basophils Absolute 0.00 0.00 - 0.20 k/uL    Absolute Immature Granulocyte 0.00 0.00 - 0.30 k/uL    Morphology Platelet count adequate     Morphology MACROCYTOSIS PRESENT    Comprehensive Metabolic Panel   Result Value Ref Range    Glucose 123 (H) 70 - 99 mg/dL    BUN 14 8 - 23 mg/dL    CREATININE 0.98 0.70 - 1.20 mg/dL    Bun/Cre Ratio 14 9 - 20    Calcium 9.4 8.6 - 10.4 mg/dL    Sodium 142 135 - 144 mmol/L    Potassium 4.3 3.7 - 5.3 mmol/L    Chloride 105 98 - 107 mmol/L    CO2 26 20 - 31 mmol/L    Anion Negative for dizziness, weakness, light-headedness and headaches. Hematological: Negative for adenopathy. Psychiatric/Behavioral: Negative. Prior to Visit Medications    Medication Sig Taking? Authorizing Provider   warfarin (COUMADIN) 2.5 MG tablet Take 2 tablets by mouth daily Take 2 tablets (5 mg) by mouth daily.  Or as directed by INR results  Laverne Hancock MD   flecainide (TAMBOCOR) 50 MG tablet Take 50 mg by mouth 2 times daily  Historical Provider, MD   losartan (COZAAR) 50 MG tablet TAKE 1 TABLET BY MOUTH ONE TIME A DAY   Naty Lazcano DO   levothyroxine (SYNTHROID) 100 MCG tablet TAKE 1 TABLET BY MOUTH ONE TIME A DAY   Naty Lazcano DO   doxazosin (CARDURA) 2 MG tablet TAKE 1 TABLET BY MOUTH TWO TIMES A DAY   Naty Lazcano DO   tamsulosin (FLOMAX) 0.4 MG capsule TAKE 1 CAPSULE BY MOUTH ONE TIME A DAY  Naty Lazcano DO   predniSONE (DELTASONE) 20 MG tablet Prednisone taper  Take 20 mg po bid for 5 days then   Take 20 mg po daily for 5 days then   Take 10 mg po daily for 5 days  Jean Paul Whitaker MD   Icosapent Ethyl (VASCEPA) 1 g CAPS capsule Take 2 capsules by mouth 2 times daily  Patient not taking: Reported on 12/8/2020  Rakan Parikh DO   Evolocumab (REPATHA SURECLICK) 944 MG/ML SOAJ Inject 1 pen into the skin every 14 days  Patient not taking: Reported on 12/8/2020  Rakan Parikh DO   ondansetron (ZOFRAN) 4 MG tablet Take 4 mg by mouth every 6 hours as needed for Nausea or Vomiting Disp 60 with 3 refills called in 10/20/2020  Historical Provider, MD   Magic Mouthwash (MIRACLE MOUTHWASH) Swish and spit 5 mLs 4 times daily as needed for Irritation Disp 240 ml with 2 refills called in 10/20/2020  Historical Provider, MD   dexamethasone (DECADRON) 4 MG tablet Take 1 tablet twice a day on day before and after chemo  Jean Paul Whitaker MD   oxybutynin (DITROPAN-XL) 10 MG extended release tablet Take 10 mg by mouth daily  Historical Provider, MD   ECHINACEA HERB PO Take by mouth daily Historical Provider, MD   furosemide (LASIX) 20 MG tablet TAKE 1 TABLET BY MOUTH ONE TIME A DAY   Naty Lazcano DO   clopidogrel (PLAVIX) 75 MG tablet TAKE 1 TABLET BY MOUTH DAILY  Dung Castro DO   pravastatin (PRAVACHOL) 40 MG tablet TAKE ONE TABLET BY MOUTH ONCE EVERY EVENING  Dung Castro DO   metoprolol tartrate (LOPRESSOR) 50 MG tablet Take 1 tablet by mouth 2 times daily  Naty Lazcano DO   nitroGLYCERIN (NITROSTAT) 0.4 MG SL tablet Place 1 tablet under the tongue every 5 minutes as needed for Chest pain up to max of 3 total doses. If no relief after 1 dose, call 911. Kwasi Dowling MD   albuterol sulfate HFA (VENTOLIN HFA) 108 (90 Base) MCG/ACT inhaler Inhale 2 puffs into the lungs every 6 hours as needed for Wheezing or Shortness of Breath  Naty Lazcano DO   Multiple Vitamins-Minerals (MULTIVITAMIN PO) daily. Historical Provider, MD        Social History     Tobacco Use    Smoking status: Former Smoker     Packs/day: 2.00     Years: 15.00     Pack years: 30.00     Quit date: 1985     Years since quittin.1    Smokeless tobacco: Never Used    Tobacco comment: smoked 2 packs a day for 14 to 15 years, quit in    Substance Use Topics    Alcohol use: No     Alcohol/week: 0.0 standard drinks     Comment: rare        There were no vitals filed for this visit. Estimated body mass index is 34.01 kg/m² as calculated from the following:    Height as of 21: 5' 10\" (1.778 m). Weight as of 21: 237 lb (107.5 kg). Physical Exam  Vitals signs and nursing note reviewed. Constitutional:       General: He is not in acute distress. Appearance: Normal appearance. He is well-developed. He is not diaphoretic. HENT:      Head: Normocephalic and atraumatic.       Right Ear: Tympanic membrane, ear canal and external ear normal.      Left Ear: Tympanic membrane, ear canal and external ear normal.      Nose: Nose normal.      Mouth/Throat:      Mouth: Mucous membranes are moist.      Pharynx: Oropharynx is clear. No oropharyngeal exudate. Eyes:      General:         Right eye: No discharge. Left eye: No discharge. Conjunctiva/sclera: Conjunctivae normal.      Pupils: Pupils are equal, round, and reactive to light. Neck:      Musculoskeletal: Normal range of motion and neck supple. Thyroid: No thyromegaly. Cardiovascular:      Rate and Rhythm: Normal rate and regular rhythm. Heart sounds: Normal heart sounds. Pulmonary:      Effort: Pulmonary effort is normal.      Breath sounds: Normal breath sounds. No wheezing or rales. Abdominal:      General: Bowel sounds are normal. There is no distension. Palpations: Abdomen is soft. Tenderness: There is no abdominal tenderness. Lymphadenopathy:      Cervical: No cervical adenopathy. Skin:     General: Skin is warm and dry. Findings: No rash. Neurological:      Mental Status: He is alert and oriented to person, place, and time. Psychiatric:         Behavior: Behavior normal.         Thought Content: Thought content normal.         Judgment: Judgment normal.           ASSESSMENT/PLAN:  Encounter Diagnoses   Name Primary?     Essential hypertension Yes    Impaired fasting glucose     Acquired hypothyroidism     Familial hyperlipidemia     Obstructive sleep apnea     Pulmonary hypertension (HCC)     Paroxysmal A-fib (HCC)     Elevated PSA     Adenocarcinoma of right lung (HCC)      Orders Placed This Encounter   Medications    pravastatin (PRAVACHOL) 40 MG tablet     Sig: TAKE ONE TABLET BY MOUTH ONCE EVERY EVENING     Dispense:  90 tablet     Refill:  3    clopidogrel (PLAVIX) 75 MG tablet     Sig: TAKE 1 TABLET BY MOUTH DAILY     Dispense:  90 tablet     Refill:  1    furosemide (LASIX) 20 MG tablet     Sig: TAKE 1 TABLET BY MOUTH ONE TIME A DAY     Dispense:  90 tablet     Refill:  1    oxybutynin (DITROPAN-XL) 10 MG extended release tablet     Sig: Take 1 tablet

## 2021-03-09 ENCOUNTER — OFFICE VISIT (OUTPATIENT)
Dept: ONCOLOGY | Age: 76
End: 2021-03-09
Payer: MEDICARE

## 2021-03-09 ENCOUNTER — HOSPITAL ENCOUNTER (OUTPATIENT)
Dept: PHARMACY | Age: 76
Setting detail: THERAPIES SERIES
Discharge: HOME | End: 2021-03-09
Payer: MEDICARE

## 2021-03-09 ENCOUNTER — HOSPITAL ENCOUNTER (OUTPATIENT)
Dept: INFUSION THERAPY | Age: 76
Discharge: HOME | End: 2021-03-09
Payer: MEDICARE

## 2021-03-09 ENCOUNTER — OFFICE VISIT (OUTPATIENT)
Dept: PULMONOLOGY | Age: 76
End: 2021-03-09
Payer: MEDICARE

## 2021-03-09 ENCOUNTER — VIRTUAL VISIT (OUTPATIENT)
Dept: FAMILY MEDICINE CLINIC | Age: 76
End: 2021-03-09
Payer: MEDICARE

## 2021-03-09 VITALS
SYSTOLIC BLOOD PRESSURE: 99 MMHG | RESPIRATION RATE: 20 BRPM | HEART RATE: 87 BPM | WEIGHT: 231.6 LBS | HEIGHT: 70 IN | OXYGEN SATURATION: 94 % | TEMPERATURE: 99.1 F | BODY MASS INDEX: 33.16 KG/M2 | DIASTOLIC BLOOD PRESSURE: 69 MMHG

## 2021-03-09 VITALS
DIASTOLIC BLOOD PRESSURE: 69 MMHG | WEIGHT: 231.6 LBS | OXYGEN SATURATION: 94 % | TEMPERATURE: 99.1 F | BODY MASS INDEX: 33.16 KG/M2 | SYSTOLIC BLOOD PRESSURE: 99 MMHG | RESPIRATION RATE: 20 BRPM | HEART RATE: 87 BPM | HEIGHT: 70 IN

## 2021-03-09 VITALS
OXYGEN SATURATION: 94 % | DIASTOLIC BLOOD PRESSURE: 82 MMHG | HEIGHT: 70 IN | BODY MASS INDEX: 33.07 KG/M2 | WEIGHT: 231 LBS | SYSTOLIC BLOOD PRESSURE: 124 MMHG | HEART RATE: 64 BPM

## 2021-03-09 DIAGNOSIS — Z77.090 SUSPECTED EXPOSURE TO ASBESTOS: ICD-10-CM

## 2021-03-09 DIAGNOSIS — G47.33 OBSTRUCTIVE SLEEP APNEA: ICD-10-CM

## 2021-03-09 DIAGNOSIS — C77.9 REGIONAL LYMPH NODE METASTASIS PRESENT (HCC): ICD-10-CM

## 2021-03-09 DIAGNOSIS — Z29.8 IMMUNOTHERAPY: ICD-10-CM

## 2021-03-09 DIAGNOSIS — C34.91 ADENOCARCINOMA OF RIGHT LUNG (HCC): Primary | ICD-10-CM

## 2021-03-09 DIAGNOSIS — J44.9 COPD WITH ASTHMA (HCC): ICD-10-CM

## 2021-03-09 DIAGNOSIS — Z00.00 ROUTINE GENERAL MEDICAL EXAMINATION AT A HEALTH CARE FACILITY: Primary | ICD-10-CM

## 2021-03-09 DIAGNOSIS — J70.0 RADIATION PNEUMONITIS (HCC): ICD-10-CM

## 2021-03-09 DIAGNOSIS — D69.6 THROMBOCYTOPENIA (HCC): ICD-10-CM

## 2021-03-09 DIAGNOSIS — J94.8 PLEURAL CALCIFICATION: ICD-10-CM

## 2021-03-09 DIAGNOSIS — C34.91 ADENOCARCINOMA, LUNG, RIGHT (HCC): Primary | ICD-10-CM

## 2021-03-09 DIAGNOSIS — I27.20 PULMONARY HYPERTENSION (HCC): ICD-10-CM

## 2021-03-09 DIAGNOSIS — I48.0 PAROXYSMAL A-FIB (HCC): ICD-10-CM

## 2021-03-09 LAB
ABSOLUTE EOS #: 0.05 K/UL (ref 0–0.44)
ABSOLUTE IMMATURE GRANULOCYTE: 0.03 K/UL (ref 0–0.3)
ABSOLUTE LYMPH #: 0.72 K/UL (ref 1.1–3.7)
ABSOLUTE MONO #: 0.56 K/UL (ref 0.1–1.2)
ALBUMIN SERPL-MCNC: 3.9 G/DL (ref 3.5–5.2)
ALBUMIN/GLOBULIN RATIO: 1.1 (ref 1–2.5)
ALP BLD-CCNC: 69 U/L (ref 40–129)
ALT SERPL-CCNC: 16 U/L (ref 5–41)
ANION GAP SERPL CALCULATED.3IONS-SCNC: 10 MMOL/L (ref 9–17)
AST SERPL-CCNC: 16 U/L
BASOPHILS # BLD: 0 % (ref 0–2)
BASOPHILS ABSOLUTE: 0.03 K/UL (ref 0–0.2)
BILIRUB SERPL-MCNC: 0.3 MG/DL (ref 0.3–1.2)
BUN BLDV-MCNC: 17 MG/DL (ref 8–23)
BUN/CREAT BLD: 17 (ref 9–20)
CALCIUM SERPL-MCNC: 9.7 MG/DL (ref 8.6–10.4)
CHLORIDE BLD-SCNC: 102 MMOL/L (ref 98–107)
CO2: 28 MMOL/L (ref 20–31)
CREAT SERPL-MCNC: 1 MG/DL (ref 0.7–1.2)
DIFFERENTIAL TYPE: ABNORMAL
EOSINOPHILS RELATIVE PERCENT: 1 % (ref 1–4)
GFR AFRICAN AMERICAN: >60 ML/MIN
GFR NON-AFRICAN AMERICAN: >60 ML/MIN
GFR SERPL CREATININE-BSD FRML MDRD: ABNORMAL ML/MIN/{1.73_M2}
GFR SERPL CREATININE-BSD FRML MDRD: ABNORMAL ML/MIN/{1.73_M2}
GLUCOSE BLD-MCNC: 157 MG/DL (ref 70–99)
HCT VFR BLD CALC: 34.6 % (ref 40.7–50.3)
HEMOGLOBIN: 11.1 G/DL (ref 13–17)
IMMATURE GRANULOCYTES: 0 %
INR BLD: 2.2
LYMPHOCYTES # BLD: 11 % (ref 24–43)
MCH RBC QN AUTO: 31.7 PG (ref 25.2–33.5)
MCHC RBC AUTO-ENTMCNC: 32.1 G/DL (ref 25.2–33.5)
MCV RBC AUTO: 98.9 FL (ref 82.6–102.9)
MONOCYTES # BLD: 8 % (ref 3–12)
NRBC AUTOMATED: 0 PER 100 WBC
PDW BLD-RTO: 13.5 % (ref 11.8–14.4)
PLATELET # BLD: 163 K/UL (ref 138–453)
PLATELET ESTIMATE: ABNORMAL
PMV BLD AUTO: 8.8 FL (ref 8.1–13.5)
POTASSIUM SERPL-SCNC: 3.8 MMOL/L (ref 3.7–5.3)
PROTIME: 26.6 SECONDS
RBC # BLD: 3.5 M/UL (ref 4.21–5.77)
RBC # BLD: ABNORMAL 10*6/UL
SEG NEUTROPHILS: 80 % (ref 36–65)
SEGMENTED NEUTROPHILS ABSOLUTE COUNT: 5.44 K/UL (ref 1.5–8.1)
SODIUM BLD-SCNC: 140 MMOL/L (ref 135–144)
TOTAL PROTEIN: 7.3 G/DL (ref 6.4–8.3)
TSH SERPL DL<=0.05 MIU/L-ACNC: 2.57 MIU/L (ref 0.3–5)
WBC # BLD: 6.8 K/UL (ref 3.5–11.3)
WBC # BLD: ABNORMAL 10*3/UL

## 2021-03-09 PROCEDURE — 36416 COLLJ CAPILLARY BLOOD SPEC: CPT

## 2021-03-09 PROCEDURE — 6360000002 HC RX W HCPCS: Performed by: INTERNAL MEDICINE

## 2021-03-09 PROCEDURE — 96413 CHEMO IV INFUSION 1 HR: CPT

## 2021-03-09 PROCEDURE — G8484 FLU IMMUNIZE NO ADMIN: HCPCS | Performed by: FAMILY MEDICINE

## 2021-03-09 PROCEDURE — 85025 COMPLETE CBC W/AUTO DIFF WBC: CPT

## 2021-03-09 PROCEDURE — 3017F COLORECTAL CA SCREEN DOC REV: CPT | Performed by: INTERNAL MEDICINE

## 2021-03-09 PROCEDURE — 84443 ASSAY THYROID STIM HORMONE: CPT

## 2021-03-09 PROCEDURE — 36591 DRAW BLOOD OFF VENOUS DEVICE: CPT

## 2021-03-09 PROCEDURE — G8417 CALC BMI ABV UP PARAM F/U: HCPCS | Performed by: INTERNAL MEDICINE

## 2021-03-09 PROCEDURE — 1123F ACP DISCUSS/DSCN MKR DOCD: CPT | Performed by: INTERNAL MEDICINE

## 2021-03-09 PROCEDURE — G0439 PPPS, SUBSEQ VISIT: HCPCS | Performed by: FAMILY MEDICINE

## 2021-03-09 PROCEDURE — G8926 SPIRO NO PERF OR DOC: HCPCS | Performed by: INTERNAL MEDICINE

## 2021-03-09 PROCEDURE — G8484 FLU IMMUNIZE NO ADMIN: HCPCS | Performed by: INTERNAL MEDICINE

## 2021-03-09 PROCEDURE — 85610 PROTHROMBIN TIME: CPT

## 2021-03-09 PROCEDURE — G8428 CUR MEDS NOT DOCUMENT: HCPCS | Performed by: INTERNAL MEDICINE

## 2021-03-09 PROCEDURE — 4040F PNEUMOC VAC/ADMIN/RCVD: CPT | Performed by: INTERNAL MEDICINE

## 2021-03-09 PROCEDURE — 99214 OFFICE O/P EST MOD 30 MIN: CPT | Performed by: INTERNAL MEDICINE

## 2021-03-09 PROCEDURE — 1036F TOBACCO NON-USER: CPT | Performed by: INTERNAL MEDICINE

## 2021-03-09 PROCEDURE — 2580000003 HC RX 258: Performed by: INTERNAL MEDICINE

## 2021-03-09 PROCEDURE — 80053 COMPREHEN METABOLIC PANEL: CPT

## 2021-03-09 PROCEDURE — 3017F COLORECTAL CA SCREEN DOC REV: CPT | Performed by: FAMILY MEDICINE

## 2021-03-09 PROCEDURE — 99213 OFFICE O/P EST LOW 20 MIN: CPT

## 2021-03-09 PROCEDURE — G8427 DOCREV CUR MEDS BY ELIG CLIN: HCPCS | Performed by: INTERNAL MEDICINE

## 2021-03-09 PROCEDURE — 4040F PNEUMOC VAC/ADMIN/RCVD: CPT | Performed by: FAMILY MEDICINE

## 2021-03-09 PROCEDURE — 3023F SPIROM DOC REV: CPT | Performed by: INTERNAL MEDICINE

## 2021-03-09 PROCEDURE — 99211 OFF/OP EST MAY X REQ PHY/QHP: CPT

## 2021-03-09 PROCEDURE — 1123F ACP DISCUSS/DSCN MKR DOCD: CPT | Performed by: FAMILY MEDICINE

## 2021-03-09 RX ORDER — DIPHENHYDRAMINE HYDROCHLORIDE 50 MG/ML
50 INJECTION INTRAMUSCULAR; INTRAVENOUS ONCE
Status: CANCELLED | OUTPATIENT
Start: 2021-04-06 | End: 2021-04-06

## 2021-03-09 RX ORDER — SODIUM CHLORIDE 0.9 % (FLUSH) 0.9 %
5 SYRINGE (ML) INJECTION PRN
Status: CANCELLED | OUTPATIENT
Start: 2021-04-06

## 2021-03-09 RX ORDER — SODIUM CHLORIDE 9 MG/ML
INJECTION, SOLUTION INTRAVENOUS CONTINUOUS
Status: CANCELLED | OUTPATIENT
Start: 2021-04-06

## 2021-03-09 RX ORDER — SODIUM CHLORIDE 9 MG/ML
INJECTION, SOLUTION INTRAVENOUS CONTINUOUS
Status: CANCELLED | OUTPATIENT
Start: 2021-03-23

## 2021-03-09 RX ORDER — EPINEPHRINE 1 MG/ML
0.3 INJECTION, SOLUTION, CONCENTRATE INTRAVENOUS PRN
Status: CANCELLED | OUTPATIENT
Start: 2021-03-23

## 2021-03-09 RX ORDER — SODIUM CHLORIDE 0.9 % (FLUSH) 0.9 %
5 SYRINGE (ML) INJECTION PRN
Status: CANCELLED | OUTPATIENT
Start: 2021-03-23

## 2021-03-09 RX ORDER — SODIUM CHLORIDE 0.9 % (FLUSH) 0.9 %
10 SYRINGE (ML) INJECTION PRN
Status: CANCELLED | OUTPATIENT
Start: 2021-04-06

## 2021-03-09 RX ORDER — EPINEPHRINE 1 MG/ML
0.3 INJECTION, SOLUTION, CONCENTRATE INTRAVENOUS PRN
Status: CANCELLED | OUTPATIENT
Start: 2021-04-06

## 2021-03-09 RX ORDER — HEPARIN SODIUM (PORCINE) LOCK FLUSH IV SOLN 100 UNIT/ML 100 UNIT/ML
500 SOLUTION INTRAVENOUS PRN
Status: CANCELLED | OUTPATIENT
Start: 2021-04-06

## 2021-03-09 RX ORDER — SODIUM CHLORIDE 0.9 % (FLUSH) 0.9 %
10 SYRINGE (ML) INJECTION PRN
Status: DISCONTINUED | OUTPATIENT
Start: 2021-03-09 | End: 2021-03-10 | Stop reason: HOSPADM

## 2021-03-09 RX ORDER — SODIUM CHLORIDE 9 MG/ML
20 INJECTION, SOLUTION INTRAVENOUS ONCE
Status: COMPLETED | OUTPATIENT
Start: 2021-03-09 | End: 2021-03-09

## 2021-03-09 RX ORDER — SODIUM CHLORIDE 9 MG/ML
20 INJECTION, SOLUTION INTRAVENOUS ONCE
Status: CANCELLED | OUTPATIENT
Start: 2021-03-23 | End: 2021-03-23

## 2021-03-09 RX ORDER — HEPARIN SODIUM (PORCINE) LOCK FLUSH IV SOLN 100 UNIT/ML 100 UNIT/ML
500 SOLUTION INTRAVENOUS PRN
Status: CANCELLED | OUTPATIENT
Start: 2021-03-23

## 2021-03-09 RX ORDER — METHYLPREDNISOLONE SODIUM SUCCINATE 125 MG/2ML
125 INJECTION, POWDER, LYOPHILIZED, FOR SOLUTION INTRAMUSCULAR; INTRAVENOUS ONCE
Status: CANCELLED | OUTPATIENT
Start: 2021-03-23 | End: 2021-03-23

## 2021-03-09 RX ORDER — DIPHENHYDRAMINE HYDROCHLORIDE 50 MG/ML
50 INJECTION INTRAMUSCULAR; INTRAVENOUS ONCE
Status: CANCELLED | OUTPATIENT
Start: 2021-03-23 | End: 2021-03-23

## 2021-03-09 RX ORDER — METHYLPREDNISOLONE SODIUM SUCCINATE 125 MG/2ML
125 INJECTION, POWDER, LYOPHILIZED, FOR SOLUTION INTRAMUSCULAR; INTRAVENOUS ONCE
Status: CANCELLED | OUTPATIENT
Start: 2021-04-06 | End: 2021-04-06

## 2021-03-09 RX ORDER — SODIUM CHLORIDE 0.9 % (FLUSH) 0.9 %
10 SYRINGE (ML) INJECTION PRN
Status: CANCELLED | OUTPATIENT
Start: 2021-03-23

## 2021-03-09 RX ORDER — SODIUM CHLORIDE 9 MG/ML
20 INJECTION, SOLUTION INTRAVENOUS ONCE
Status: CANCELLED | OUTPATIENT
Start: 2021-04-06 | End: 2021-04-06

## 2021-03-09 RX ORDER — HEPARIN SODIUM (PORCINE) LOCK FLUSH IV SOLN 100 UNIT/ML 100 UNIT/ML
500 SOLUTION INTRAVENOUS PRN
Status: DISCONTINUED | OUTPATIENT
Start: 2021-03-09 | End: 2021-03-10 | Stop reason: HOSPADM

## 2021-03-09 RX ADMIN — SODIUM CHLORIDE 1120 MG: 9 INJECTION, SOLUTION INTRAVENOUS at 13:45

## 2021-03-09 RX ADMIN — Medication 10 ML: at 11:25

## 2021-03-09 RX ADMIN — SODIUM CHLORIDE 20 ML/HR: 9 INJECTION, SOLUTION INTRAVENOUS at 11:25

## 2021-03-09 RX ADMIN — HEPARIN SODIUM (PORCINE) LOCK FLUSH IV SOLN 100 UNIT/ML 500 UNITS: 100 SOLUTION at 14:54

## 2021-03-09 ASSESSMENT — LIFESTYLE VARIABLES
HOW OFTEN DO YOU HAVE SIX OR MORE DRINKS ON ONE OCCASION: 0
HAS A RELATIVE, FRIEND, DOCTOR, OR ANOTHER HEALTH PROFESSIONAL EXPRESSED CONCERN ABOUT YOUR DRINKING OR SUGGESTED YOU CUT DOWN: 0
HOW OFTEN DO YOU HAVE A DRINK CONTAINING ALCOHOL: 1
HOW OFTEN DURING THE LAST YEAR HAVE YOU NEEDED AN ALCOHOLIC DRINK FIRST THING IN THE MORNING TO GET YOURSELF GOING AFTER A NIGHT OF HEAVY DRINKING: 0
HOW OFTEN DURING THE LAST YEAR HAVE YOU FOUND THAT YOU WERE NOT ABLE TO STOP DRINKING ONCE YOU HAD STARTED: 0
HOW MANY STANDARD DRINKS CONTAINING ALCOHOL DO YOU HAVE ON A TYPICAL DAY: 0

## 2021-03-09 ASSESSMENT — PATIENT HEALTH QUESTIONNAIRE - PHQ9
SUM OF ALL RESPONSES TO PHQ9 QUESTIONS 1 & 2: 0
SUM OF ALL RESPONSES TO PHQ QUESTIONS 1-9: 0
1. LITTLE INTEREST OR PLEASURE IN DOING THINGS: 0

## 2021-03-09 ASSESSMENT — PAIN SCALES - GENERAL: PAINLEVEL_OUTOF10: 0

## 2021-03-09 NOTE — PROGRESS NOTES
Pulmonary function test: PFT showed nonspecific ventilatory impairment  Current symptoms: Patient has cough with occasional sputum, grade 2 dyspnea. He reports improvement in his shortness of breath since his last about 20 pounds  Current Rx: Patient has been recommended albuterol HFA, however he does not use it anymore as it did not seem to make any difference     GRISEL:  Patient also has history of obesity, coronary artery disease, status post PCI and has obstructive sleep apnea,   He is on CPAP and reports good compliance. He denies any symptoms of excessive daytime sleepiness. PAST MEDICAL HISTORY:      Diagnosis Date    Abdominal hernia     small, no significant symptomatology.  Abnormal PSA     with history of slight increase.  Allergic rhinitis     Benign prostatic hypertrophy     Chest pain     occasional.     Coronary artery disease     status post drug eluting stent placement, LAD, ostial obtuse marginal.     Dysphagia     Erectile dysfunction     Familial hyperlipidemia     with hypertriglyceridemia.  Gastroesophageal reflux disease     Hearing loss, bilateral     hearing aid in the left ear only. 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     Hypertension     Impaired fasting glucose     prediabetes.  Lumbar disc herniation     L3-L4, with chronic back pain.  Mild anemia     Nasal polyps     minimal symptoms.  Nasal septal deviation     right side.  Obesity     Palpitations     occasional.     Peptic ulcer disease     Reactive airway disease     asthma, industrial related, also a history of hypersensitivity pneumonitis.      Urinary frequency      PAST SURGICAL HISTORY:      Procedure Laterality Date    APPENDECTOMY  age 11    BRONCHOSCOPY  09/17/2020    BRONCHOSCOPY N/A 9/17/2020    EBUS- BRONCHOSCOPY ENDOBRONCHIAL ULTRASOUND, PATHOLOGY REQUESTED- CINDY NOTIFIED, GI UNIT SCHEDULED performed by Consuelo Link MD at [place] [address] 10/20/2011    occluded LAD and ostial obtuse marginal stenosis, underwent drug eluting stents to both, RCA small, nondominant, occluded, ejection fraction 45%.  CARDIAC CATHETERIZATION  08/2018    with stent placement    CATARACT REMOVAL Bilateral 03/2018    COLONOSCOPY  01/21/2011    mild sigmoid diverticulosis.  COLONOSCOPY  4/6/16    hemorrhoid; sigmoid diverticulosis    KNEE ARTHROSCOPY Left 03/19/2010    partial medial and lateral synovectomies, partial medial meniscectomy, chondroplasty.  NASAL FRACTURE SURGERY  1960    OTHER SURGICAL HISTORY Left 02/12/2010    knee injection.  PORT SURGERY N/A 10/14/2020    RIGHT PORT INSERTION performed by Kevan Candelario DO at [address] [place] [address] PRE-MALIGNANT / [address] Left 01/16/2012    actinic keratoses, ear, liquid nitrogen.  PRE-MALIGNANT / BENIGN SKIN LESION EXCISION Left 07/19/2011    actinic keratosis, upper ear, liquid nitrogen.  PROSTATE BIOPSY Bilateral 09/08/2015    Benign    REPAIR UMBILICAL BEWD,8+C/K,JSQUK N/A 1/58/5621    UMBILICAL Hernia Repair w/ Mesh performed by Regino Redding MD at [address] [place] [address] SEPTOPLASTY  10/30/2015    with bilateral submucosal resection of the inferior turbinates, left middle turbinate elza bullosa resection done by Dr Jana Rendon ARTHROSCOPY Left 10/17/14    W/ SUBACROMIAL DECOMPRESSION, DEBRIDEMENT ET CHONDROPLASTY    UPPER GASTROINTESTINAL ENDOSCOPY  01/21/2011    superficial ulcer of the fundus, erostive gastritis.  VASECTOMY Bilateral 04/04/1980     SOCIAL HISTORY:  TOBACCO:   reports that he quit smoking about 36 years ago. He has a 30.00 pack-year smoking history. He has never used smokeless tobacco.  ETOH:   reports no history of alcohol use. DRUGS: reports no history of drug use.   Regular for the fall patient repair     PHYSICAL EXAMINATION      VITAL SIGNS:   /82 (Site: Left Upper Arm, Position: Sitting, Cuff Size: Large Adult)   Pulse 64   Ht 5' 10\" (1.778 m)   Wt 231 lb (104.8 kg)   SpO2 94%   BMI 33.15 kg/m²   Wt Readings from Last 3 Encounters:   03/23/21 230 lb (104.3 kg)   03/09/21 231 lb (104.8 kg)   03/09/21 231 lb 9.6 oz (105.1 kg)     SYSTEMIC EXAMINATION:  General appearance -  [x] well appearing  [x] overweight  [] obese [] cachectic [x] comfortable  [x] in no acute distress  [] chronically ill-appearing  [] in mild to moderate respiratory distress  Mental status -  [x] alert  [x] oriented to person, place, and time  [] anxious  [] depressed mood   Head-  [x] atraumatic  [x] normocephalic  Eyes -  [] pupils equal and reactive, extraocular eye movements intact  [] sclera anicteric  Ears -  [x] hearing grossly normal bilaterally [] bilateral TM's and external ear canals normal  Nose -  [x] normal and patent [] no erythema  [] discharge  Mouth -  [x] mucous membranes moist  [] pharynx normal without lesions [] erythematous  [] exudate noted  Mallampati Airway Score -  [] I (soft palate, uvula, fauces, tonsillar pillars visible) [] II (soft palate, uvula, fauces visible) []  III (soft palate, base of uvula visible) [] IV (only hard palate visible)  Neck -  [] supple  [] no significant adenopathy  [] carotids upstroke normal bilaterally [] no JVD  [] no bruit  Lymphatics -  [x] no palpable lymphadenopathy  [] no hepatosplenomegaly  Chest -   [x] normal chest excursion  [x] no chest wall tenderness  [] increased AP diameter[] pectus noted [] scoliosis noted [x] no tachypnea retraction or cyanosis [x] clear to auscultation, no wheezes, rales or rhonchi, symmetric air entry  [] wheezing noted  [] rales noted  []rhonchi noted [] decreased air entry noted bilaterally  Heart -  [x] normal rate,  [x] regular rhythm  [] irregularly irregular rhythm [x] normal S1  [x] normal S2  [x] no murmurs, rubs, clicks or gallops  [] S3 present  [] S4 present  [] systolic murmur  [] diastolic murmur  [] midsystolic click []pericardial rub present   Abdomen -  [] soft [] nontender  [] nondistended  [] no masses or organomegaly  Neurological -  [x] normal speech  [x] no focal findings or movement disorder noted  [] cranial nerves II through XII intact  [] DTR's normal and symmetric  [] Babinski sign negative,  [x] motor and sensory grossly normal bilaterally  [] normal muscle tone [x] no tremors  [] strength 5/5  [] Romberg sign negative [] normal gait   Musculoskeletal -  [x] no joint tenderness [x] no deformity or swelling  Extremities - [x] no clubbing or cyanosis,  [x] no pedal edema [] pedal edema  [] intact peripheral pulses  Skin -  [x] normal coloration and turgor  [x] no rashes  [] no suspicious skin lesions noted          MEDICAL DECISION MAKING     PROBLEMS ADDRESSED (NUMBER AND COMPLEXITY)       ICD-10-CM    1. Adenocarcinoma, lung, right (HCC)  C34.91    2. COPD with asthma ([facility] [address]  J44.9    3. Pulmonary hypertension ([facility])  I27.20    4. Pleural calcification  J94.8    5. Suspected exposure to asbestos  Z77.090    6. Obstructive sleep apnea  G47.33         2.  DATA REVIEWED AND ANALYZED (AMOUNT AND/OR COMPLEXITY)   LABS:  ABG:   No results found for: PH, PHART, PCO2, TYS8GTH, PO2, PO2ART, HCO3, HCM7LWN, BE, BEART, THGB, X3XGAILR  VBG:  No results found for: PHVEN, UCW9WRQ, BEVEN, J6HAZOLP  CBC:   Lab Results   Component Value Date    WBC 6.4 03/23/2021    RBC 3.60 (L) 03/23/2021    HGB 11.3 (L) 03/23/2021    HCT 35.0 (L) 03/23/2021     03/23/2021    MCV 97.2 03/23/2021    MCH 31.4 03/23/2021    MCHC 32.3 03/23/2021    RDW 13.7 03/23/2021    LYMPHOPCT 14 (L) 03/23/2021    MONOPCT 8 03/23/2021    BASOPCT 1 03/23/2021    MONOSABS 0.48 03/23/2021    LYMPHSABS 0.88 (L) 03/23/2021    EOSABS 0.09 03/23/2021    BASOSABS 0.03 03/23/2021    DIFFTYPE NOT REPORTED 03/23/2021     Eosinophils/IgE:   Lab Results   Component Value Date    EOSABS 0.09 03/23/2021     Alpha 1 antitrypsin: No results found for: A1A  CMP:   Lab Results   Component Value Date     03/23/2021    K 3.5 (L) 03/23/2021     03/23/2021    CO2 28 03/23/2021    BUN 14 03/23/2021    CREATININE 0.86 03/23/2021    GLUCOSE 157 (H) 03/23/2021    CALCIUM 10.2 03/23/2021    PROT 7.5 03/23/2021    LABALBU 4.1 03/23/2021    BILITOT 0.38 03/23/2021    ALT 14 03/23/2021    AST 16 03/23/2021    ALKPHOS 68 03/23/2021     Coagulation Profile:   Lab Results   Component Value Date    INR 2 03/23/2021    PROTIME 23.9 03/23/2021    APTT 25.4 (L) 01/02/2018     BNP:   No results found for: BNP, PROBNP  D-Dimer/Fibrinogen:  No results found for: DDIMER  Others labs:  Lab Results   Component Value Date    TSH 1.85 03/23/2021     No results found for: VICKI, ANATITER, RHEUMFACTOR, RF, C3, C4, MPO, PR3, SEDRATE, CRP  Lab Results   Component Value Date    FOLATE 19.0 01/26/2021     Lab Results   Component Value Date    PSA 6.68 (H) 02/23/2021     No results found for: RPR, HIV    RADIOLOGY (CHEST X-RAY/CT SCAN/PET-CT SCAN/ECHO) RESULTS:  [] Not obtained  [x] Previously taken on  CT chest/abdomen/pelvis 1/7/2021  Impression   1.  CT CHEST: Favorable interval response to therapy seen as decreased size   of right lower lobe nodule. 2. Mixed response from therapy when correlating with subcarinal and right   hilar lymph nodes, subcarinal lymph node not significantly changed, right   hilar lymph node measuring slightly larger, though this may be reactive. 3. New patchy pulmonary infiltrates in the right lung may be related to post   radiation pneumonitis or other acute infectious process including possible   atypical/viral pneumonia. 4. CT ABDOMEN/PELVIS: New indeterminate right hepatic liver lesion I feel is   likely focal fatty infiltration, but could reflect a metastatic lesion. Correlation with MRI abdomen attention liver without and with gadolinium   correlation recommended. 5. Nonspecific prostate gland enlargement typical of BPH.      [x] Independent interpretation of a test result demonstrates: Decrease in the right lower lobe nodule, mediastinal/right hilar adenopathy, patchy right lung parenchyma,? Radiation pneumonitis/atypical infection    ECHOCARDIOGRAM:  Results for orders placed during the hospital encounter of 12/29/20   ECHO Complete 2D W Doppler W Color    Narrative   Summary  Normal left ventricular size with normal hyperdynamic function. Left ventricular ejection fraction 65 %. Mild concentric left ventricular hypertrophy. Grade I (mild) left ventricular diastolic dysfunction. Technically difficult visualization of the right ventricle, but it does  appear to be normal in size. Aortic valve not well visualized but appears normal.  Trivial tricuspid regurgitation. Estimated right ventricular systolic  pressure is 35 mmHg. Trivial pulmonic insufficiency. PULMONARY FUNCTION TEST:  [x] Not obtained  [] Ordered, but not yet obtained  [x] Previously taken on 6/25/2019  SPIROMETRY       Forced Vital Capacity maneuver: There is no demonstrable obstructive defect and FVC is mildly   diminished, which in setting of normal TLC is suggestive of   \"non-specific\" ventilatory impairment, which is typically seen in   patient with asthma, obesity, CHF, chest wall limitations. Consider methacholine challenge test to evaluate for asthma. Flow-Volume Loop: is normal.       Following inhaled bronchodilator there was: no significant   response (<8% improvement in the FEV1)     LUNG VOLUMES     Measurement by body plethysmography and/or gas diffusion shows:   Lung volumes are noraml except for decreased SVC and  ERV. Neither hyperinflation nor air trapping are present. Airway resistance, a measure of airway function is: Airway   resistance is within normal limits.      DIFFUSION CAPACITY (DLCO)     The diffusing capacity is:   Mildly reduced (>60% and <75%)   However DLCO corrected for alveolar volume is normal; which   suggests that reduction in the DLCO may be due to a reduced   measured lung volume rather than parenchymal or vascular disease;   this finding may be due to poor patient effort, previous lung   resection, obstruction of a major bronchus or small stature of   the patient     [x] Independent interpretation of a test result demonstrates: Nonspecific ventilatory impairment    POLYSOMNOGRAM:  BASELINE/TITRATION SLEEP STUDY( ):   [x] Not obtained  [] Ordered, but not yet obtained  [x] Previously, results unavailable for review    CPAP/BIPAP COMPLIANCE DATA:   [] Not obtained  [] Ordered, but not yet obtained  [x] Obtained and shows  CPAP compliance (12/8/20203/7/2021)  Current PAP settings CPAP at 14.5 cm water  Total Usage of PAP therapy 97.8% of nights  >4h/night usage is 100%,   Avg. Usage is 7 hr & 43 min   There is no significant residual AHI or air leaks    [x] Independent interpretation demonstrates:  Patient reported good compliance to the PAP therapy is well corroborated by the recent compliance report    ASSESSMENT OF EXCESSIVE DAYTIME SLEEPINESS (BY INDEPENDENT HISTORIAN):  [place] Sleepiness Scale:  [x] Not obtained  [] Obtained  [] Previously obtained  No flowsheet data found. PRIOR EXTERNAL NOTES:  [] Not obtained  [x] Reviewed    ORDERS PLACED:  No orders of the defined types were placed in this encounter. 3. RISK OF COMPLICATIONS AND/OR MORBIDITY OR MORTALITY:     ALLERGIES:  Allergies   Allergen Reactions    Effient [Prasugrel] Other (See Comments)     Superficial skin bleeding.  Ace Inhibitors Other (See Comments)     Cough.  Statins Other (See Comments)     Myalgias.        MEDICATIONS:  Outpatient Medications Prior to Visit   Medication Sig Dispense Refill    Metoprolol Tartrate 75 MG TABS TAKE 1 TABLET BY MOUTH TWO TIMES A DAY      pravastatin (PRAVACHOL) 40 MG tablet TAKE ONE TABLET BY MOUTH ONCE EVERY EVENING 90 tablet 3    clopidogrel (PLAVIX) 75 MG tablet TAKE 1 TABLET BY MOUTH DAILY 90 tablet 1    furosemide (LASIX) 20 MG tablet TAKE 1 TABLET BY MOUTH ONE TIME A DAY 90 tablet 1  oxybutynin (DITROPAN-XL) 10 MG extended release tablet Take 1 tablet by mouth daily 90 tablet 1    warfarin (COUMADIN) 2.5 MG tablet Take 2 tablets by mouth daily Take 2 tablets (5 mg) by mouth daily. Or as directed by INR results 60 tablet 3    flecainide (TAMBOCOR) 50 MG tablet Take 50 mg by mouth 2 times daily      losartan (COZAAR) 50 MG tablet TAKE 1 TABLET BY MOUTH ONE TIME A DAY  90 tablet 1    levothyroxine (SYNTHROID) 100 MCG tablet TAKE 1 TABLET BY MOUTH ONE TIME A DAY  90 tablet 1    tamsulosin (FLOMAX) 0.4 MG capsule TAKE 1 CAPSULE BY MOUTH ONE TIME A DAY 90 capsule 1    ondansetron (ZOFRAN) 4 MG tablet Take 4 mg by mouth every 6 hours as needed for Nausea or Vomiting Disp 60 with 3 refills called in 10/20/2020      ECHINACEA HERB PO Take by mouth daily      Multiple Vitamins-Minerals (MULTIVITAMIN PO) daily.  nitroGLYCERIN (NITROSTAT) 0.4 MG SL tablet Place 1 tablet under the tongue every 5 minutes as needed for Chest pain up to max of 3 total doses. If no relief after 1 dose, call 911. (Patient not taking: Reported on 3/9/2021) 25 tablet 3    albuterol sulfate HFA (VENTOLIN HFA) 108 (90 Base) MCG/ACT inhaler Inhale 2 puffs into the lungs every 6 hours as needed for Wheezing or Shortness of Breath (Patient not taking: Reported on 3/9/2021) 1 Inhaler 6    sodium chloride flush 0.9 % injection 10 mL       heparin flush 100 UNIT/ML injection 500 Units        No facility-administered medications prior to visit. PRESCRIPTION DRUG MANAGEMENT/RECOMMENDATIONS:    Patient has been recommended as needed albuterol HFA  However, he is not using it due to lack of perceived benefit  Reviewed use of rescue vs controlling agents, oral and inhaled meds and potential side effects  Reviewed use, techniques, schedule and side effects of all inhaled medications  Refills were provided   Barriers to medication compliance addressed.   Patient was recommended to have prednisone and an antibiotic questions that the patient had were answered to his satisfaction  We'll see the patient back in six months  Thank you for having us involved in the care of your patient. Please call us if you have any questions or concerns. Mando Soares MD    Pulmonary and Critical Care Medicine            Please note that this chart was generated using voice recognition Dragon dictation software. Although every effort was made to ensure the accuracy of this automated transcription, some errors in transcription may have occurred.

## 2021-03-09 NOTE — PROGRESS NOTES
Infusion complete. Flushed VAD with 10 ml of saline and 5 ml of heparin flush. D/C camargo needle. Band aid to site. Discharged form unit in stable condition.

## 2021-03-09 NOTE — PROGRESS NOTES
Medicare Annual Wellness Visit  Name: Claudio Arguello Date: 3/9/2021   MRN: [id number] Sex: Male   Age: 77 y.o. Ethnicity: Non-/Non    : 1945 Race: Vicente Mak is here for Medicare AWV (subsequent; last 2020)    Screenings for behavioral, psychosocial and functional/safety risks, and cognitive dysfunction are all negative except as indicated below. These results, as well as other [facility] [address] [facility], are documented in Flowsheets linked to this Encounter. Allergies   Allergen Reactions    Effient [Prasugrel] Other (See Comments)     Superficial skin bleeding.  Ace Inhibitors Other (See Comments)     Cough.  Statins Other (See Comments)     Myalgias. Prior to Visit Medications    Medication Sig Taking? Authorizing Provider   Metoprolol Tartrate 75 MG TABS TAKE 1 TABLET BY MOUTH TWO TIMES A DAY Yes Historical Provider, MD   pravastatin (PRAVACHOL) 40 MG tablet TAKE ONE TABLET BY MOUTH ONCE EVERY EVENING Yes Christian Llamas DO   clopidogrel (PLAVIX) 75 MG tablet TAKE 1 TABLET BY MOUTH DAILY Yes Christian Llamas DO   furosemide (LASIX) 20 MG tablet TAKE 1 TABLET BY MOUTH ONE TIME A DAY Yes Christian Llamas DO   oxybutynin (DITROPAN-XL) 10 MG extended release tablet Take 1 tablet by mouth daily Yes Christian Llamas DO   warfarin (COUMADIN) 2.5 MG tablet Take 2 tablets by mouth daily Take 2 tablets (5 mg) by mouth daily.  Or as directed by INR results Yes Juan Sawant MD   flecainide (TAMBOCOR) 50 MG tablet Take 50 mg by mouth 2 times daily Yes Historical Provider, MD   losartan (COZAAR) 50 MG tablet TAKE 1 TABLET BY MOUTH ONE TIME A DAY  Yes Christian Llamas DO   levothyroxine (SYNTHROID) 100 MCG tablet TAKE 1 TABLET BY MOUTH ONE TIME A DAY  Yes Christian Llamas DO   tamsulosin (FLOMAX) 0.4 MG capsule TAKE 1 CAPSULE BY MOUTH ONE TIME A DAY Yes Naty Lazcano,    ondansetron (ZOFRAN) 4 MG tablet Take 4 mg by mouth every 6 hours as needed for Nausea or Vomiting Disp 60 with 3 refills called in 10/20/2020 Yes Historical Provider, MD   ECHINACEA HERB PO Take by mouth daily Yes Historical Provider, MD   Multiple Vitamins-Minerals (MULTIVITAMIN PO) daily. Yes Historical Provider, MD   nitroGLYCERIN (NITROSTAT) 0.4 MG SL tablet Place 1 tablet under the tongue every 5 minutes as needed for Chest pain up to max of 3 total doses. If no relief after 1 dose, call 911. Patient not taking: Reported on 3/9/2021  Jai Lujan MD   albuterol sulfate HFA (VENTOLIN HFA) 108 (90 Base) MCG/ACT inhaler Inhale 2 puffs into the lungs every 6 hours as needed for Wheezing or Shortness of Breath  Patient not taking: Reported on 3/9/2021  Braulio Ferrera DO       Past Medical History:   Diagnosis Date    Abdominal hernia     small, no significant symptomatology.  Abnormal PSA     with history of slight increase.  Allergic rhinitis     Benign prostatic hypertrophy     Chest pain     occasional.     Coronary artery disease     status post drug eluting stent placement, LAD, ostial obtuse marginal.     Dysphagia     Erectile dysfunction     Familial hyperlipidemia     with hypertriglyceridemia.  Gastroesophageal reflux disease     Hearing loss, bilateral     hearing aid in the left ear only. 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     Hypertension     Impaired fasting glucose     prediabetes.  Lumbar disc herniation     L3-L4, with chronic back pain.  Mild anemia     Nasal polyps     minimal symptoms.  Nasal septal deviation     right side.  Obesity     Palpitations     occasional.     Peptic ulcer disease     Reactive airway disease     asthma, industrial related, also a history of hypersensitivity pneumonitis.      Urinary frequency        Past Surgical History:   Procedure Laterality Date    APPENDECTOMY  age 11    BRONCHOSCOPY  09/17/2020    BRONCHOSCOPY N/A 9/17/2020    EBUS- BRONCHOSCOPY ENDOBRONCHIAL ULTRASOUND, PATHOLOGY REQUESTED- CINDY NOTIFIED, GI UNIT SCHEDULED performed by Lindsay Aldana MD at [place] [address]  10/20/2011    occluded LAD and ostial obtuse marginal stenosis, underwent drug eluting stents to both, RCA small, nondominant, occluded, ejection fraction 45%.  CARDIAC CATHETERIZATION  08/2018    with stent placement    CATARACT REMOVAL Bilateral 03/2018    COLONOSCOPY  01/21/2011    mild sigmoid diverticulosis.  COLONOSCOPY  4/6/16    hemorrhoid; sigmoid diverticulosis    KNEE ARTHROSCOPY Left 03/19/2010    partial medial and lateral synovectomies, partial medial meniscectomy, chondroplasty.  NASAL FRACTURE SURGERY  1960    OTHER SURGICAL HISTORY Left 02/12/2010    knee injection.  PORT SURGERY N/A 10/14/2020    RIGHT PORT INSERTION performed by Rajesh Santos DO at [address] [place] [address] PRE-MALIGNANT / [address] Left 01/16/2012    actinic keratoses, ear, liquid nitrogen.  PRE-MALIGNANT / BENIGN SKIN LESION EXCISION Left 07/19/2011    actinic keratosis, upper ear, liquid nitrogen.  PROSTATE BIOPSY Bilateral 09/08/2015    Benign    REPAIR UMBILICAL IXLS,0+P/F,DHWEN N/A 8/95/6853    UMBILICAL Hernia Repair w/ Mesh performed by Aziza Meyers MD at [address] [place] [address] SEPTOPLASTY  10/30/2015    with bilateral submucosal resection of the inferior turbinates, left middle turbinate elza bullosa resection done by Dr Zahira Cheung ARTHROSCOPY Left 10/17/14    W/ SUBACROMIAL DECOMPRESSION, DEBRIDEMENT ET CHONDROPLASTY    UPPER GASTROINTESTINAL ENDOSCOPY  01/21/2011    superficial ulcer of the fundus, erostive gastritis.      VASECTOMY Bilateral 04/04/1980       Family History   Problem Relation Age of Onset    Cancer Mother         lymphoma    Thyroid Disease Mother         hypothyroidism    Stroke Mother     Heart Disease Mother     High Blood Pressure Mother     Heart Attack Mother         in her 46s    Heart Failure Father         congestive    Coronary Art Dis Father     Emphysema Father         was a smoker    Heart Disease Maternal Grandmother     Heart Disease Maternal Grandfather     Crohn's Disease Sister     High Cholesterol Sister     High Cholesterol Child        CareTeam (Including outside providers/suppliers regularly involved in providing care):   Patient Care Team:  Vladimir Odonnell DO as PCP - General (Family Medicine)  Vladimir Odonnell DO as PCP - [facility] Empaneled Provider  Fahad Hart RN as Nurse Navigator (Oncology)  Esteban Orellana MD as Consulting Physician (Hematology and Oncology)    Wt Readings from Last 3 Encounters:   03/03/21 233 lb (105.7 kg)   02/23/21 237 lb (107.5 kg)   02/09/21 240 lb 12.8 oz (109.2 kg)     There were no vitals filed for this visit. There is no height or weight on file to calculate BMI. Based upon direct observation of the patient, evaluation of cognition reveals recent and remote memory intact. Patient's complete Health Risk Assessment and screening values have been reviewed and are found in Flowsheets. The following problems were reviewed today and where indicated follow up appointments were made and/or referrals ordered. Positive Risk Factor Screenings with Interventions:          General Health and ACP:  General  In general, how would you say your health is?: Good  In the past 7 days, have you experienced any of the following?  New or Increased Pain, New or Increased Fatigue, Loneliness, Social Isolation, Stress or Anger?: None of These  Do you get the social and emotional support that you need?: Yes  Do you have a Living Will?: Yes  Advance Directives     Power of [employer] Will ACP-Advance Directive ACP-Power of     Not on File Not on File Not on File Not on File      General Health Risk Interventions:  · No Living Will: Advance Care Planning addressed with patient today-patient states he has living will-discussed bringing copy to scan to EMR    Health Habits/Nutrition:  Health Habits/Nutrition  Do you exercise for at least 20 minutes 2-3 times per week?: Yes  Have you lost any weight without trying in the past 3 months?: No  Do you eat only one meal per day?: No  Have you seen the dentist within the past year?: (!) No(held off while receiving chemo)     Health Habits/Nutrition Interventions:  · Dental exam overdue:  patient encouraged to make appointment with his/her dentist    Hearing/Vision:  No exam data present  Hearing/Vision  Do you or your family notice any trouble with your hearing that hasn't been managed with hearing aids?: No(hearing aides)  Do you have difficulty driving, watching TV, or doing any of your daily activities because of your eyesight?: No  Have you had an eye exam within the past year?: (!) No  Hearing/Vision Interventions:  · Vision concerns:  patient encouraged to make appointment with his/her eye specialist    Safety:  Safety  Do you have working smoke detectors?: Yes  Have all throw rugs been removed or fastened?: Yes  Do you have non-slip mats or surfaces in all bathtubs/showers?: (!) No  Do all of your stairways have a railing or banister?: (!) No(one step to come into the house)  Are your doorways, halls and stairs free of clutter?: Yes  Do you always fasten your seatbelt when you are in a car?: Yes  Safety Interventions:  · Home safety tips provided     Personalized Preventive Plan   Current Health Maintenance Status  Immunization History   Administered Date(s) Administered    COVID-19, Moderna, PF, 100mcg/0.5mL 01/28/2021, 02/25/2021    Influenza A ([id number]) Vaccine PF IM 01/11/2010    Influenza Virus Vaccine 12/10/2009, 10/26/2011, 11/24/2012    Influenza, High Dose (Fluzone 65 yrs and older) 10/14/2013, 11/10/2014, 11/09/2015, 10/14/2016, 11/03/2017, 10/18/2018, 10/08/2019    Influenza, Quadv, adjuvanted, 65 yrs +, IM, PF (Fluad) 09/11/2020    Pneumococcal Conjugate 13-valent (Lemuel Blair) 02/06/2017    Pneumococcal Polysaccharide ([id number]) 01/17/2012    Tdap (Boostrix, Adacel) 08/04/2015    Zoster Live (Zostavax) 07/31/2014    Zoster Recombinant (Shingrix) 03/01/2019, 05/08/2019        Health Maintenance   Topic Date Due    Annual Wellness Visit (AWV)  Completed today    PSA counseling  08/23/2021    A1C test (Diabetic or Prediabetic)  02/23/2022    Lipid screen  02/23/2022    TSH testing  02/23/2022    Potassium monitoring  02/23/2022    Creatinine monitoring  02/23/2022    DTaP/Tdap/Td vaccine (2 - Td) 08/04/2025    Colon cancer screen colonoscopy  04/06/2026    Flu vaccine  Completed    Shingles Vaccine  Completed    Pneumococcal 65+ yrs at Risk Vaccine  Completed    COVID-19 Vaccine  Completed    AAA screen  Completed    Hepatitis C screen  Completed    Hepatitis A vaccine  Aged Out    Hepatitis B vaccine  Aged Out    Hib vaccine  Aged Out    Meningococcal (ACWY) vaccine  Aged Out     Recommendations for [employer] Due: see orders and patient instructions/AVS.  . Recommended screening schedule for the next 5-10 years is provided to the patient in written form: see Patient Instructions/AVS.    Brown Aschoff, RN, 3/9/2021, performed the documented evaluation under the direct supervision of the attending physician. Bang Mak, was evaluated through a synchronous (real-time) telephone encounter. The patient (or guardian if applicable) is aware that this is a billable service. Verbal consent to proceed has been obtained within the past 12 months. The visit was conducted pursuant to the emergency declaration under the [address] [place] [address], [address] [place] [address] authority and the [employer] and [employer] General Act. Patient identification was verified, and a caregiver was present when appropriate. The patient was located in a state where the provider was credentialed to provide care.     Total time spent for this

## 2021-03-09 NOTE — PROGRESS NOTES
Chicho Mcdaniel Shock                                                                                                                  3/9/2021  MRN:   [id number]  YOB: 1945  PCP:                           Amy Gonzalez DO  Referring Physician: No ref. provider found  Treating Physician Name: Randy Hernandez MD      Reason for visit:  Chief Complaint   Patient presents with    Lung Cancer     4 wk f/u      Current problems:  Adenocarcinoma of right lung, clinical stage at least T1c, N2, M0 (stage IIIA)  Subcarinal lymph node metastasis    Active and recent treatments:  concurrent chemoradiation using carboplatin and Taxol  Consolidation therapy with Imfinzi-1/2021    Summary of Case/History:    Chicho Mcdaniel Shock a 77 y.o.male is a patient with biopsy confirmed adenocarcinoma of right lung presents to the office to establish care and for further evaluation treatment recommendations. Patient was initially seen by pulmonary team for lung nodules. Patient CT scan from July 2019 showed a right-sided pleural calcification thickening concerning for asbestos exposure. Patient also noted to have multiple pulmonary nodules measuring between 1.5 x 1.3 cm. Follow-up CT chest from July 2020 showed increase in size of the right lower lobe lung nodule which now measured 2.1 cm. Subsequently patient underwent CT PET on 8/22 which showed FDG avid right lower lobe lung nodule with SUV of 4.6. Patient CT PET was also positive for subcarinal lymph node with SUV of 4.6. Patient underwent bronchoscopy with endobronchial ultrasound biopsy of the subcarinal lymph node confirmed adenocarcinoma. Patient has significant history of tobacco dependence around 30-pack-year however he quit about 25 years ago. Patient does have coronary artery disease status post stent placement. Patient also gives history of occupational asthma and exposure to bacteria.   Patient does give history of weight loss but describes it as intentional.  Denies headaches dizziness chest pain abdominal pain nausea bone pain     Interim History:     Patient presents to the clinic for a follow-up visit and to review results of his lab work-up and discuss further treatment plan. Patient continues to be on Imfinzi without unexpected or severe side effects. Shortness of breath is gradually improving. However patient still gets short of breath with exertion. Denies hospitalization or ER visit. Appetite is fair. Weight is stable. During this visit patient's allergy, social, medical, surgical history and medications were reviewed and updated. Past Medical History:   Past Medical History:   Diagnosis Date    Abdominal hernia     small, no significant symptomatology.  Abnormal PSA     with history of slight increase.  Allergic rhinitis     Benign prostatic hypertrophy     Chest pain     occasional.     Coronary artery disease     status post drug eluting stent placement, LAD, ostial obtuse marginal.     Dysphagia     Erectile dysfunction     Familial hyperlipidemia     with hypertriglyceridemia.  Gastroesophageal reflux disease     Hearing loss, bilateral     hearing aid in the left ear only. 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     Hypertension     Impaired fasting glucose     prediabetes.  Lumbar disc herniation     L3-L4, with chronic back pain.  Mild anemia     Nasal polyps     minimal symptoms.  Nasal septal deviation     right side.  Obesity     Palpitations     occasional.     Peptic ulcer disease     Reactive airway disease     asthma, industrial related, also a history of hypersensitivity pneumonitis.      Urinary frequency        Past Surgical History:     Past Surgical History:   Procedure Laterality Date    APPENDECTOMY  age 11    BRONCHOSCOPY  09/17/2020    BRONCHOSCOPY N/A 9/17/2020    EBUS- BRONCHOSCOPY ENDOBRONCHIAL ULTRASOUND, PATHOLOGY REQUESTED- CINDY NOTIFIED, GI UNIT SCHEDULED performed by Merna Farrar MD at [facility]  10/20/2011    occluded LAD and ostial obtuse marginal stenosis, underwent drug eluting stents to both, RCA small, nondominant, occluded, ejection fraction 45%.  CARDIAC CATHETERIZATION  08/2018    with stent placement    CATARACT REMOVAL Bilateral 03/2018    COLONOSCOPY  01/21/2011    mild sigmoid diverticulosis.  COLONOSCOPY  4/6/16    hemorrhoid; sigmoid diverticulosis    KNEE ARTHROSCOPY Left 03/19/2010    partial medial and lateral synovectomies, partial medial meniscectomy, chondroplasty.  NASAL FRACTURE SURGERY  1960    OTHER SURGICAL HISTORY Left 02/12/2010    knee injection.  PORT SURGERY N/A 10/14/2020    RIGHT PORT INSERTION performed by Kita Scott DO at [address] [place] [address] PRE-MALIGNANT / [address] Left 01/16/2012    actinic keratoses, ear, liquid nitrogen.  PRE-MALIGNANT / BENIGN SKIN LESION EXCISION Left 07/19/2011    actinic keratosis, upper ear, liquid nitrogen.  PROSTATE BIOPSY Bilateral 09/08/2015    Benign    REPAIR UMBILICAL LPKD,8+Y/I,WFYTZ N/A 3/47/1836    UMBILICAL Hernia Repair w/ Mesh performed by Rai Tinajero MD at [address] [place] [address] SEPTOPLASTY  10/30/2015    with bilateral submucosal resection of the inferior turbinates, left middle turbinate elza bullosa resection done by Dr Viktoria Galeazzi ARTHROSCOPY Left 10/17/14    W/ SUBACROMIAL DECOMPRESSION, DEBRIDEMENT ET CHONDROPLASTY    UPPER GASTROINTESTINAL ENDOSCOPY  01/21/2011    superficial ulcer of the fundus, erostive gastritis.      VASECTOMY Bilateral 04/04/1980       Patient Family Social History:     Social History     Socioeconomic History    Marital status:      Spouse name: Not on file    Number of children: Not on file    Years of education: Not on file    Highest education level: Not on file   Occupational History    Not on file   Social Needs    Financial resource strain: Not on file    Food insecurity Worry: Not on file     Inability: Not on file    Transportation needs     Medical: Not on file     Non-medical: Not on file   Tobacco Use    Smoking status: Former Smoker     Packs/day: 2.00     Years: 15.00     Pack years: 30.00     Quit date: 1985     Years since quittin.2    Smokeless tobacco: Never Used    Tobacco comment: smoked 2 packs a day for 14 to 15 years, quit in    Substance and Sexual Activity    Alcohol use: No     Alcohol/week: 0.0 standard drinks     Comment: rare    Drug use: No    Sexual activity: Not on file   Lifestyle    Physical activity     Days per week: Not on file     Minutes per session: Not on file    Stress: Not on file   Relationships    Social connections     Talks on phone: Not on file     Gets together: Not on file     Attends Islam service: Not on file     Active member of club or organization: Not on file     Attends meetings of clubs or organizations: Not on file     Relationship status: Not on file    Intimate partner violence     Fear of current or ex partner: Not on file     Emotionally abused: Not on file     Physically abused: Not on file     Forced sexual activity: Not on file   Other Topics Concern    Not on file   Social History Narrative    Not on file     Family History   Problem Relation Age of Onset    Cancer Mother         lymphoma    Thyroid Disease Mother         hypothyroidism    Stroke Mother     Heart Disease Mother     High Blood Pressure Mother     Heart Attack Mother         in her 46s    Heart Failure Father         congestive    Coronary Art Dis Father     Emphysema Father         was a smoker    Heart Disease Maternal Grandmother     Heart Disease Maternal Grandfather     Crohn's Disease Sister     High Cholesterol Sister     High Cholesterol Child        Current Medications:     Current Outpatient Medications   Medication Sig Dispense Refill    Metoprolol Tartrate 75 MG TABS TAKE 1 TABLET BY MOUTH TWO TIMES A DAY      pravastatin (PRAVACHOL) 40 MG tablet TAKE ONE TABLET BY MOUTH ONCE EVERY EVENING 90 tablet 3    clopidogrel (PLAVIX) 75 MG tablet TAKE 1 TABLET BY MOUTH DAILY 90 tablet 1    furosemide (LASIX) 20 MG tablet TAKE 1 TABLET BY MOUTH ONE TIME A DAY 90 tablet 1    oxybutynin (DITROPAN-XL) 10 MG extended release tablet Take 1 tablet by mouth daily 90 tablet 1    warfarin (COUMADIN) 2.5 MG tablet Take 2 tablets by mouth daily Take 2 tablets (5 mg) by mouth daily. Or as directed by INR results 60 tablet 3    flecainide (TAMBOCOR) 50 MG tablet Take 50 mg by mouth 2 times daily      losartan (COZAAR) 50 MG tablet TAKE 1 TABLET BY MOUTH ONE TIME A DAY  90 tablet 1    levothyroxine (SYNTHROID) 100 MCG tablet TAKE 1 TABLET BY MOUTH ONE TIME A DAY  90 tablet 1    tamsulosin (FLOMAX) 0.4 MG capsule TAKE 1 CAPSULE BY MOUTH ONE TIME A DAY 90 capsule 1    ondansetron (ZOFRAN) 4 MG tablet Take 4 mg by mouth every 6 hours as needed for Nausea or Vomiting Disp 60 with 3 refills called in 10/20/2020      ECHINACEA HERB PO Take by mouth daily      albuterol sulfate HFA (VENTOLIN HFA) 108 (90 Base) MCG/ACT inhaler Inhale 2 puffs into the lungs every 6 hours as needed for Wheezing or Shortness of Breath (Patient not taking: Reported on 3/9/2021) 1 Inhaler 6    Multiple Vitamins-Minerals (MULTIVITAMIN PO) daily. No current facility-administered medications for this visit. Facility-Administered Medications Ordered in Other Visits   Medication Dose Route Frequency Provider Last Rate Last Admin    sodium chloride flush 0.9 % injection 10 mL  10 mL Intravenous PRN Susan Krishnamurthy MD   10 mL at 03/09/21 1125    heparin flush 100 UNIT/ML injection 500 Units  500 Units Intracatheter PRN Susan Krishnamurthy MD   500 Units at 03/09/21 8914       Allergies:   Effient [prasugrel], Ace inhibitors, and Statins    Review of Systems:    Constitutional: No fever or chills.  No night sweats, + weight loss   Eyes: No eye discharge, double vision, or eye pain   HEENT: negative for sore mouth, sore throat, hoarseness and voice change   Respiratory: negative for cough , sputum, dyspnea, wheezing, hemoptysis, chest pain   Cardiovascular: negative for chest pain, dyspnea, palpitations, orthopnea, PND   Gastrointestinal: negative for nausea, vomiting, diarrhea, constipation, abdominal pain, Dysphagia, hematemesis and hematochezia   Genitourinary: negative for frequency, dysuria, nocturia, urinary incontinence, and hematuria   Integument: negative for rash, skin lesions, bruises.    Hematologic/Lymphatic: negative for easy bruising, bleeding, lymphadenopathy, or petechiae   Endocrine: negative for heat or cold intolerance,weight changes, change in bowel habits and hair loss   Musculoskeletal: negative for myalgias, arthralgias, pain, joint swelling,and bone pain   Neurological: negative for headaches, dizziness, seizures, weakness, numbness        Physical Exam:  Vitals: BP 99/69 Comment: completed at infusion room  Pulse 87 Comment: completed at infusion room  Temp 99.1 °F (37.3 °C) Comment: completed at infusion room  Resp 20 Comment: completed at infusion room  Ht 5' 10\" (1.778 m) Comment: completed at infusion room  Wt 231 lb 9.6 oz (105.1 kg) Comment: completed at infusion room  SpO2 94% Comment: completed at infusion center  BMI 33.23 kg/m²   General appearance - well appearing, no in pain or distress  Mental status - AAO X3  Eyes - pupils equal and reactive, extraocular eye movements intact  Mouth - mucous membranes moist, pharynx normal without lesions  Neck - supple, no significant adenopathy  Lymphatics - no palpable lymphadenopathy, no hepatosplenomegaly  Chest -decreased breathing sounds bilaterally  Heart - normal rate, regular rhythm, normal S1, S2, no murmurs  Abdomen - soft, nontender, nondistended, no masses or organomegaly  Neurological - alert, oriented, normal speech, no focal findings or movement disorder noted  Extremities - peripheral pulses normal, no pedal edema, no clubbing or cyanosis  Skin - normal coloration and turgor, no rashes, no suspicious skin lesions noted             DATA:    Results for orders placed or performed during the hospital encounter of 03/09/21   Protime-INR   Result Value Ref Range    INR 2.2     Protime 26.6 seconds     -- Diagnosis --   BAL RIGHT LOWER LOBE:           NEGATIVE FOR MALIGNANCY.             FINE NEEDLE ASPIRATION STATION 7 EBUS:           ADENOCARCINOMA, COMPATIBLE WITH LUNG PRIMARY. CT PET    Impression    1. The previously described nodule involving the right lung base is FDG avid. Tissue sampling is recommended as malignancy is suspected. 2. The previously identified smaller lung nodules involving the lower lobes    are too small for PET CT characterization.  CT follow-up is recommended. 3. FDG avid subcarinal and right hilar lymph nodes suggestive of metastatic    disease. 4. No abnormal FDG activity identified within the neck, abdomen or pelvis.           Mri Brain W Wo Contrast    Chronic microvascular disease without acute intracranial abnormality. No abnormal postcontrast enhancement.      Echo Complete 2d W Doppler W Color    Result Date: 12/29/2020  Transthoracic Echocardiography Report (TTE)  Patient Name SHOCK       Date of Study             12/29/2020               Brian Sky   Date of      1945  Gender                    Male  Birth   Age          77 year(s)  Race                         Room Number              Height:                   70 inch, 177.8 cm   Corporate ID [id number]    Weight:                   234 pounds, 106.1 kg  #   Patient Acct [de-identified]   BSA:         2.23 m^2     BMI:       33.58 kg/m^2  #   MR #         [id number]     Sonographer               Rodríguez Aldana, MAIKEL   Accession #  [id number]  Interpreting Physician    [address] [place] [address]   Fellow                   Referring Nurse                           Practitioner Interpreting             Referring Physician       Venice Campbell  Type of Study   TTE procedure:2D Echocardiogram, M-Mode, Doppler, Color Doppler. Procedure Date Date: 12/29/2020 Start: 09:16 AM Study Location: [facility] Technical Quality: Limited visualization due to body habitus. Indications:Atrial fibrillation. Patient Status: Outpatient Height: 70 inches Weight: 234.01 pounds BSA: 2.23 m^2 BMI: 33.58 kg/m^2 Rhythm: Within normal limits Allergies   - *No Known Allergies. - Ace Inhibitors. - *Unlisted:(Statins). CONCLUSIONS Summary Normal left ventricular size with normal hyperdynamic function. Left ventricular ejection fraction 65 %. Mild concentric left ventricular hypertrophy. Grade I (mild) left ventricular diastolic dysfunction. Technically difficult visualization of the right ventricle, but it does appear to be normal in size. Aortic valve not well visualized but appears normal. Trivial tricuspid regurgitation. Estimated right ventricular systolic pressure is 35 mmHg. Trivial pulmonic insufficiency. Signature ----------------------------------------------------------------------------  Electronically signed by Thomas Mota RDCS(Sonographer) on 12/29/2020  10:49 AM ---------------------------------------------------------------------------- ----------------------------------------------------------------------------  Electronically signed by Ronaldo ParikhInterpreting physician) on 12/29/2020  03:06 PM ---------------------------------------------------------------------------- FINDINGS Left Atrium Left atrium is normal in size. Left Ventricle Normal left ventricular size with normal hyperdynamic function. Left ventricular ejection fraction 65 %. Mild concentric left ventricular hypertrophy. Grade I (mild) left ventricular diastolic dysfunction. Right Atrium Right atrium is normal in size.  Right Ventricle Technically difficult visualization of the right ventricle, but it does appear to be normal in size. Mitral Valve Normal mitral valve structure and function. Aortic Valve Aortic valve not well visualized but appears normal. Tricuspid Valve Normal tricuspid valve structure and function. Trivial tricuspid regurgitation. Estimated right ventricular systolic pressure is 35 mmHg. Pulmonic Valve The pulmonic valve is normal in structure. Trivial pulmonic insufficiency.  Miscellaneous E/E' average = 9. M-mode / 2D Measurements & Calculations:   LVIDd:4.76 cm(3.7 - 5.6 cm)      Diastolic UIVGFL:483 ml  XOVD.5 cm(2.2 - 4.0 cm)       Systolic DBOICX:81.4 ml  RYQD:6.01 cm(0.6 - 1.1 cm)       Aortic Root:3 cm(2.0 - 3.7 cm)  LVPWd:1.11 cm(0.6 - 1.1 cm)      LA Dimension: 3.4 cm(1.9 - 4.0 cm)  Fractional Shortenin.67 %  Calculated LVEF (%): 66.76 %   Mitral:                                 Aortic   Peak E-Wave: 0.61 m/s                   Peak Velocity: 1.30 m/s  Peak A-Wave: 0.76 m/s                   Peak Gradient: 6.76 mmHg  E/A Ratio: 0.81  Peak Gradient: 1.49 mmHg  Deceleration Time: 239 msec   Tricuspid:                              Pulmonic:   Peak TR Velocity: 2.81 m/s              Peak Velocity: 0.98 m/s  Peak TR Gradient: 31.5844 mmHg          Peak Gradient: 3.82 mmHg  Estimated RA Pressure: 3 mmHg  Peak E-Wave: 0.80 m/s  Peak Gradient: 2.56 mmHg                                          Estimated PASP: 34.58 mmHg  Septal Wall E' velocity:0.06 m/s Lateral Wall E' velocity:0.08 m/s    Mri Abdomen W Wo Contrast    Result Date: 2021  EXAMINATION: MRI OF THE ABDOMEN WITHOUT AND WITH CONTRAST, 2021 9:50 am TECHNIQUE: Multiplanar multisequence MRI of the abdomen was performed without and with the administration of intravenous contrast. COMPARISON: CT 2021, 2020 HISTORY: ORDERING SYSTEM PROVIDED HISTORY: Adenocarcinoma of right lung [facility] TECHNOLOGIST PROVIDED HISTORY: liver lesion on ct Specify organ?->Liver Reason for Exam:  History of lung cancer, follow up liver lesion seen on CT scan. Acuity: Acute Type of Exam: Subsequent/Follow-up Additional signs and symptoms: Adenocarcinoma of right lung; Regional lymph node metastasis present; Radiation pneumonitis. FINDINGS: There is hepatic steatosis. At the level of bifurcation of the main portal vein there is subtle area of posterior subcapsular segment 4A left lobe of liver area of nonenhancing T1 hypointensity best appreciated on the postcontrast images. This measures maximally about 0.8 x 1.1 x 1.8 cm. This correlates with finding on the recent CT scan. There is no T2 correlate and there is no restriction of diffusion. Likely benign but attention on follow-up. The spleen, pancreas, kidneys, adrenal glands and gallbladder show no significant abnormalities. Visualized GI tract show no acute process. No ascites. Trace right pleural effusion. 1.9 cm lung nodule at the right lung base in the lower lobe unchanged. Bone marrow signal age appropriate. 1. Previously noted area of concern in the posterior left lobe of liver is identified as the subtle T1 hypointense nonenhancing, non diffusion restricting focus isointense to liver on T2. This is in segment 4A at the level of bifurcation of the main portal vein. Benign etiology most favored. Attention on follow-up. No findings concerning for metastatic lesion. 2. Hepatic steatosis. 3. Trace right pleural effusion and a right lung base lower lobe 1.9 cm nodule unchanged from prior CT. Ct Chest Abdomen Pelvis W Contrast: 1/7/2021    CT CHEST: Favorable interval response to therapy seen as decreased size of right lower lobe nodule. 2. Mixed response from therapy when correlating with subcarinal and right hilar lymph nodes, subcarinal lymph node not significantly changed, right hilar lymph node measuring slightly larger, though this may be reactive.  3. New patchy pulmonary infiltrates in the right lung may be related to post radiation pneumonitis or other acute infectious process including possible atypical/viral pneumonia. 4. CT ABDOMEN/PELVIS: New indeterminate right hepatic liver lesion I feel is likely focal fatty infiltration, but could reflect a metastatic lesion. Correlation with MRI abdomen attention liver without and with gadolinium correlation recommended. 5. Nonspecific prostate gland enlargement typical of BPH. RECOMMENDATIONS: MRI abdomen attention liver without and with gadolinium     Mri Abdomen W Wo Contrast    Result Date: 1/22/2021  EXAMINATION: MRI OF THE ABDOMEN WITHOUT AND WITH CONTRAST, 1/22/2021 9:50 am TECHNIQUE: Multiplanar multisequence MRI of the abdomen was performed without and with the administration of intravenous contrast. COMPARISON: CT 01/07/2021, 07/21/2020 HISTORY: ORDERING SYSTEM PROVIDED HISTORY: Adenocarcinoma of right lung [facility] TECHNOLOGIST PROVIDED HISTORY: liver lesion on ct Specify organ?->Liver Reason for Exam:  History of lung cancer, follow up liver lesion seen on CT scan. Acuity: Acute Type of Exam: Subsequent/Follow-up Additional signs and symptoms: Adenocarcinoma of right lung; Regional lymph node metastasis present; Radiation pneumonitis. FINDINGS: There is hepatic steatosis. At the level of bifurcation of the main portal vein there is subtle area of posterior subcapsular segment 4A left lobe of liver area of nonenhancing T1 hypointensity best appreciated on the postcontrast images. This measures maximally about 0.8 x 1.1 x 1.8 cm. This correlates with finding on the recent CT scan. There is no T2 correlate and there is no restriction of diffusion. Likely benign but attention on follow-up. The spleen, pancreas, kidneys, adrenal glands and gallbladder show no significant abnormalities. Visualized GI tract show no acute process. No ascites. Trace right pleural effusion. 1.9 cm lung nodule at the right lung base in the lower lobe unchanged. Bone marrow signal age appropriate.      1. Previously noted area of concern in the posterior left lobe of liver is identified as the subtle T1 hypointense nonenhancing, non diffusion restricting focus isointense to liver on T2. This is in segment 4A at the level of bifurcation of the main portal vein. Benign etiology most favored. Attention on follow-up. No findings concerning for metastatic lesion. 2. Hepatic steatosis. 3. Trace right pleural effusion and a right lung base lower lobe 1.9 cm nodule unchanged from prior CT. Impression:  Adenocarcinoma of right lung, clinical stage at least T1c, N2, M0 (stage IIIA)  Subcarinal lymph node metastasis  Weight loss  Radiation pneumonitis and shortness of breath      Plan:  I had a detailed discussion with the patient and we went over results of lab work-up imaging studies and other relevant clinical data  Toxicity check performed. Patient is tolerating treatment without unexpected or severe side effects  Continue to monitor platelet count  Discussed natural history of lung cancer. We will obtain scans in late April to assess disease status  Patient received his Covid 19 vaccination for short earlier  Patient is now completely off steroids  Reiterated treatment plan to complete 1 year of immunotherapy consolidation. NCCN guidelines were reviewed and discussed with the patient. The diagnosis and care plan were discussed with the patient in detail. I discussed the natural history of the disease, prognosis, risks and goals of therapy and answered all the patients questions to the best of my ability. Patient expressed understanding and was in agreement. Kylie Salinas          This note is created with the assistance of a speech recognition program.  While intending to generate a document that actually reflects the content of the visit, the document can still have some errors including those of syntax and sound a like substitutions which may escape proof reading. It such instances, actual meaning can be extrapolated by contextual diversion.

## 2021-03-09 NOTE — PROGRESS NOTES
ANTICOAGULATION SERVICE    Date of Clinic Visit:  3/9/2021    Annalise Franco is a 77 y.o. male who presents to clinic today for anticoagulation monitoring and adjustment. Recent INR Results:  Internal QC passed  Lab Results   Component Value Date    INR 2.2 03/09/2021    INR 3 03/03/2021       Current Warfarin Dosage:  Dosing Plan  As of 3/9/2021    TTR:  100.0 % (1.4 wk)   Full warfarin instructions:  7.5 mg every Tue, Thu, Sat; 5 mg all other days               Assessment/Plan:    Continue current regimen as INR remains stable. Next Clinic Appointment:  Return date  As of 3/9/2021    TTR:  100.0 % (1.4 wk)   Next INR check:  3/23/2021             Please call [facility] at [phone] with any questions. Thanks!   Mateus Ryder, [address]  Anticoagulation Service Pharmacist  3/9/2021 11:57 AM  CLINICAL PHARMACY CONSULT: MED RECONCILIATION/REVIEW ADDENDUM    For Pharmacy Admin Tracking Only    PHSO: Yes  Total # of Interventions Recommended: 0    Total Interventions Accepted: 0  Time Spent (min): Latesha Robb 7169, Kaylah 219

## 2021-03-09 NOTE — PATIENT INSTRUCTIONS
Personalized Preventive Plan for Sammi Mak - 3/9/2021  Medicare offers a range of preventive health benefits. Some of the tests and screenings are paid in full while other may be subject to a deductible, co-insurance, and/or copay. Some of these benefits include a comprehensive review of your medical history including lifestyle, illnesses that may run in your family, and various assessments and screenings as appropriate. After reviewing your medical record and screening and assessments performed today your provider may have ordered immunizations, labs, imaging, and/or referrals for you. A list of these orders (if applicable) as well as your Preventive Care list are included within your After Visit Summary for your review. Other Preventive Recommendations:    · A preventive eye exam performed by an eye specialist is recommended every 1-2 years to screen for glaucoma; cataracts, macular degeneration, and other eye disorders. · A preventive dental visit is recommended every 6 months. · Try to get at least 150 minutes of exercise per week or 10,000 steps per day on a pedometer . · Order or download the FREE \"Exercise & Physical Activity: Your Everyday Guide\" from [employer]. Call 2-799.608.1281 or search [employer] online. · You need 3282-7532 mg of calcium and 3441-7593 IU of vitamin D per day. It is possible to meet your calcium requirement with diet alone, but a vitamin D supplement is usually necessary to meet this goal.  · When exposed to the sun, use a sunscreen that protects against both UVA and UVB radiation with an SPF of 30 or greater. Reapply every 2 to 3 hours or after sweating, drying off with a towel, or swimming. · Always wear a seat belt when traveling in a car. Always wear a helmet when riding a bicycle or motorcycle. Heart-Healthy Diet   Sodium, Fat, and Cholesterol Controlled Diet       What Is a Heart Healthy Diet?    A heart-healthy diet is one that limits sodium , certain types of fat , and cholesterol . This type of diet is recommended for:   People with any form of cardiovascular disease (eg, coronary heart disease , peripheral vascular disease , previous heart attack , previous stroke )   People with risk factors for cardiovascular disease, such as high blood pressure , high cholesterol , or diabetes   Anyone who wants to lower their risk of developing cardiovascular disease   Sodium    Sodium is a mineral found in many foods. In general, most people consume much more sodium than they need. Diets high in sodium can increase blood pressure and lead to edema (water retention). On a heart-healthy diet, you should consume no more than 2,300 mg (milligrams) of sodium per dayabout the amount in one teaspoon of table salt. The foods highest in sodium include table salt (about 50% sodium), processed foods, convenience foods, and preserved foods. Cholesterol    Cholesterol is a fat-like, waxy substance in your blood. Our bodies make some cholesterol. It is also found in animal products, with the highest amounts in fatty meat, egg yolks, whole milk, cheese, shellfish, and organ meats. On a heart-healthy diet, you should limit your cholesterol intake to less than 200 mg per day. It is normal and important to have some cholesterol in your bloodstream. But too much cholesterol can cause plaque to build up within your arteries, which can eventually lead to a heart attack or stroke. The two types of cholesterol that are most commonly referred to are:   Low-density lipoprotein (LDL) cholesterol  Also known as bad cholesterol, this is the cholesterol that tends to build up along your arteries. Bad cholesterol levels are increased by eating fats that are saturated or hydrogenated. Optimal level of this cholesterol is less than 100. Over 130 starts to get risky for heart disease.    High-density lipoprotein (HDL) cholesterol  Also known as good cholesterol, this type of cholesterol actually carries cholesterol away from your arteries and may, therefore, help lower your risk of having a heart attack. You want this level to be high (ideally greater than 60). It is a risk to have a level less than 40. You can raise this good cholesterol by eating olive oil, canola oil, avocados, or nuts. Exercise raises this level, too. Fat    Fat is calorie dense and packs a lot of calories into a small amount of food. Even though fats should be limited due to their high calorie content, not all fats are bad. In fact, some fats are quite healthful. Fat can be broken down into four main types. The good-for-you fats are:   Monounsaturated fat  found in oils such as olive and canola, avocados, and nuts and natural nut butters; can decrease cholesterol levels, while keeping levels of HDL cholesterol high   Polyunsaturated fat  found in oils such as safflower, sunflower, soybean, corn, and sesame; can decrease total cholesterol and LDL cholesterol   Omega-3 fatty acids  particularly those found in fatty fish (such as salmon, trout, tuna, mackerel, herring, and sardines); can decrease risk of arrhythmias, decrease triglyceride levels, and slightly lower blood pressure   The fats that you want to limit are:   Saturated fat  found in animal products, many fast foods, and a few vegetables; increases total blood cholesterol, including LDL levels   Animal fats that are saturated include: butter, lard, whole-milk dairy products, meat fat, and poultry skin   Vegetable fats that are saturated include: hydrogenated shortening, palm oil, coconut oil, cocoa butter   Hydrogenated or trans fat  found in margarine and vegetable shortening, most shelf stable snack foods, and fried foods; increases LDL and decreases HDL     It is generally recommended that you limit your total fat for the day to less than 30% of your total calories.  If you follow an 1800-calorie heart healthy diet, for example, this would mean 60 grams of fat or less per day. Saturated fat and trans fat in your diet raises your blood cholesterol the most, much more than dietary cholesterol does. For this reason, on a heart-healthy diet, less than 7% of your calories should come from saturated fat and ideally 0% from trans fat. On an 1800-calorie diet, this translates into less than 14 grams of saturated fat per day, leaving 46 grams of fat to come from mono- and polyunsaturated fats.    Food Choices on a Heart Healthy Diet   Food Category   Foods Recommended   Foods to Avoid   Grains   Breads and rolls without salted tops Most dry and cooked cereals Unsalted crackers and breadsticks Low-sodium or homemade breadcrumbs or stuffing All rice and pastas   Breads, rolls, and crackers with salted tops High-fat baked goods (eg, muffins, donuts, pastries) Quick breads, self-rising flour, and biscuit mixes Regular bread crumbs Instant hot cereals Commercially prepared rice, pasta, or stuffing mixes   Vegetables   Most fresh, frozen, and low-sodium canned vegetables Low-sodium and salt-free vegetable juices Canned vegetables if unsalted or rinsed   Regular canned vegetables and juices, including sauerkraut and pickled vegetables Frozen vegetables with sauces Commercially prepared potato and vegetable mixes   Fruits   Most fresh, frozen, and canned fruits All fruit juices   Fruits processed with salt or sodium   Milk   Nonfat or low-fat (1%) milk Nonfat or low-fat yogurt Cottage cheese, low-fat ricotta, cheeses labeled as low-fat and low-sodium   Whole milk Reduced-fat (2%) milk Malted and chocolate milk Full fat yogurt Most cheeses (unless low-fat and low salt) Buttermilk (no more than 1 cup per week)   Meats and Beans   Lean cuts of fresh or frozen beef, veal, lamb, or pork (look for the word loin) Fresh or frozen poultry without the skin Fresh or frozen fish and some shellfish Egg whites and egg substitutes (Limit whole eggs to three per and cholesterol amounts. For products low in sodium, look for sodium free, very low sodium, low sodium, no added salt, and unsalted   Skip the salt when cooking or at the table; if food needs more flavor, get creative and try out different herbs and spices. Garlic and onion also add substantial flavor to foods. Trim any visible fat off meat and poultry before cooking, and drain the fat off after law. Use cooking methods that require little or no added fat, such as grilling, boiling, baking, poaching, broiling, roasting, steaming, stir-frying, and sauting. Avoid fast food and convenience food. They tend to be high in saturated and trans fat and have a lot of added salt. Talk to a registered dietitian for individualized diet advice. Last Reviewed: March 2011 Cierra Guardado MS, MPH, RD   Updated: 3/29/2011   ·     Keeping Home a [facility]       As we get older, changes in balance, gait, strength, vision, hearing, and cognition make even the most youthful senior more prone to accidents. Falls are one of the leading health risks for older people. This increased risk of falling is related to:   Aging process (eg, decreased muscle strength, slowed reflexes)   Higher incidence of chronic health problems (eg, arthritis, diabetes) that may limit mobility, agility or sensory awareness   Side effects of medicine (eg, dizziness, blurred vision)especially medicines like prescription pain medicines and drugs used to treat mental health conditions   Depending on the brittleness of your bones, the consequences of a fall can be serious and long lasting. Home Life   Research by the Association of Aging [facility]) shows that some home accidents among older adults can be prevented by making simple lifestyle changes and basic modifications and repairs to the home environment. Here are some lifestyle changes that experts recommend:   Have your hearing and vision checked regularly.  Be sure to wear prescription glasses that are right for you. Speak to your doctor or pharmacist about the possible side effects of your medicines. A number of medicines can cause dizziness. If you have problems with sleep, talk to your doctor. Limit your intake of alcohol. If necessary, use a cane or walker to help maintain your balance. Wear supportive, rubber-soled shoes, even at home. If you live in a region that gets wintry weather, you may want to put special cleats on your shoes to prevent you from slipping on the snow and ice. Exercise regularly to help maintain muscle tone, agility, and balance. Always hold the banister when going up or down stairs. Also, use  bars when getting in or out of the bath or shower, or using the toilet. To avoid dizziness, get up slowly from a lying down position. Sit up first, dangling your legs for a minute or two before rising to a standing position. Overall Home Safety Check   According to the Consumer [employer] Safety [employer]'s \"Older Consumer Home Safety Checklist,\" it is important to check for potential hazards in each room. And remember, proper lighting is an essential factor in home safety. If you cannot see clearly, you are more likely to fall. Important questions to ask yourself include:   Are lamp, electric, extension, and telephone cords placed out of the flow of traffic and maintained in good condition? Have frayed cords been replaced? Are all small rugs and runners slip resistant? If not, you can secure them to the floor with a special double-sided carpet tape. Are smoke detectors properly locatedone on every floor of your home and one outside of every sleeping area? Are they in good working order? Are batteries replaced at least once a year? Do you have a well-maintained carbon monoxide detector outside every sleeping are in your home? Does your furniture layout leave plenty of space to maneuver between and around chairs, tables, beds, and sofas?    Are hallways, stairs and passages between rooms well lit? Can you reach a lamp without getting out of bed? Are floor surfaces well maintained? Shag rugs, high-pile carpeting, tile floors, and polished wood floors can be particularly slippery. Stairs should always have handrails and be carpeted or fitted with a non-skid tread. Is your telephone easily reachable. Is the cord safely tucked away? Room by Room   According to the Association of Aging, bathrooms and bertha are the two most potentially hazardous rooms in your home. In the Kitchen    Be sure your stove is in proper working order and always make sure burners and the oven are off before you go out or go to sleep. Keep pots on the back burners, turn handles away from the front of the stove, and keep stove clean and free of grease build-up. Kitchen ventilation systems and range exhausts should be working properly. Keep flammable objects such as towels and pot holders away from the cooking area except when in use. Make sure kitchen curtains are tied back. Move cords and appliances away from the sink and hot surfaces. If extension cords are needed, install wiring guides so they do not hang over the sink, range, or working areas. Look for coffee pots, kettles and toaster ovens with automatic shut-offs. Keep a mop handy in the kitchen so you can wipe up spills instantly. You should also have a small fire extinguisher. Arrange your kitchen with frequently used items on lower shelves to avoid the need to stand on a stepstool to reach them. Make sure countertops are well-lit to avoid injuries while cutting and preparing food. In the Bathroom    Use a non-slip mat or decals in the tub and shower, since wet, soapy tile or porcelain surfaces are extremely slippery. Make sure bathroom rugs are non-skid or tape them firmly to the floor. Bathtubs should have at least one, preferably two, grab bars, firmly attached to structural supports in the wall.  (Do not use built-in soap holders or glass shower doors as grab bars.)    Tub seats fitted with non-slip material on the legs allow you to wash sitting down. For people with limited mobility, bathtub transfer benches allow you to slide safely into the tub. Raised toilet seats and toilet safety rails are helpful for those with knee or hip problems. In the [facility]    Make sure you use a nightlight and that the area around your bed is clear of potential obstacles. Be careful with electric blankets and never go to sleep with a heating pad, which can cause serious burns even if on a low setting. Use fire-resistant mattress covers and pillows, and NEVER smoke in bed. Keep a phone next to the bed that is programmed to dial 911 at the push of a button. If you have a chronic condition, you may want to sign on with an automatic call-in service. Typically the system includes a small pendant that connects directly to an emergency medical voice-response system. You should also make arrangements to stay in contact with someonefriend, neighbor, family memberon a regular schedule. Fire Prevention   According to the [employer]. (Smoke Alarms for Every) [address] [place] [address], senior citizens are one of the two highest risk groups for death and serious injuries due to residential fires. When cooking, wear short-sleeved items, never a bulky long-sleeved robe. The [facility]'s Safety Checklist for Older Consumers emphasizes the importance of checking basements, garages, workshops and storage areas for fire hazards, such as volatile liquids, piles of old rags or clothing and overloaded circuits. Never smoke in bed or when lying down on a couch or recliner chair. Small portable electric or kerosene heaters are responsible for many home fires and should be used cautiously if at all. If you do use one, be sure to keep them away from flammable materials.     In case of fire, make sure you have a pre-established

## 2021-03-12 ENCOUNTER — TELEPHONE (OUTPATIENT)
Dept: CASE MANAGEMENT | Age: 76
End: 2021-03-12

## 2021-03-12 NOTE — TELEPHONE ENCOUNTER
Name: Malka Moraes  : 1945  MRN: [id number]    Oncology Navigation Follow-Up Note  Navigator spoke with pt. Offering assistance , pt. Denies needs at this time, plan to follow.    Electronically signed by Joshua Sarabia RN on 3/12/2021 at 2:56 PM

## 2021-03-23 ENCOUNTER — HOSPITAL ENCOUNTER (OUTPATIENT)
Dept: PHARMACY | Age: 76
Setting detail: THERAPIES SERIES
Discharge: HOME | End: 2021-03-23
Payer: MEDICARE

## 2021-03-23 ENCOUNTER — HOSPITAL ENCOUNTER (OUTPATIENT)
Dept: INFUSION THERAPY | Age: 76
Discharge: HOME | End: 2021-03-23
Payer: MEDICARE

## 2021-03-23 VITALS
BODY MASS INDEX: 32.93 KG/M2 | TEMPERATURE: 98 F | SYSTOLIC BLOOD PRESSURE: 124 MMHG | WEIGHT: 230 LBS | HEART RATE: 88 BPM | HEIGHT: 70 IN | RESPIRATION RATE: 16 BRPM | OXYGEN SATURATION: 100 % | DIASTOLIC BLOOD PRESSURE: 78 MMHG

## 2021-03-23 DIAGNOSIS — I48.0 PAROXYSMAL A-FIB (HCC): ICD-10-CM

## 2021-03-23 DIAGNOSIS — C34.91 ADENOCARCINOMA OF RIGHT LUNG (HCC): Primary | ICD-10-CM

## 2021-03-23 LAB
ABSOLUTE EOS #: 0.09 K/UL (ref 0–0.44)
ABSOLUTE IMMATURE GRANULOCYTE: 0.03 K/UL (ref 0–0.3)
ABSOLUTE LYMPH #: 0.88 K/UL (ref 1.1–3.7)
ABSOLUTE MONO #: 0.48 K/UL (ref 0.1–1.2)
ALBUMIN SERPL-MCNC: 4.1 G/DL (ref 3.5–5.2)
ALBUMIN/GLOBULIN RATIO: 1.2 (ref 1–2.5)
ALP BLD-CCNC: 68 U/L (ref 40–129)
ALT SERPL-CCNC: 14 U/L (ref 5–41)
ANION GAP SERPL CALCULATED.3IONS-SCNC: 10 MMOL/L (ref 9–17)
AST SERPL-CCNC: 16 U/L
BASOPHILS # BLD: 1 % (ref 0–2)
BASOPHILS ABSOLUTE: 0.03 K/UL (ref 0–0.2)
BILIRUB SERPL-MCNC: 0.38 MG/DL (ref 0.3–1.2)
BUN BLDV-MCNC: 14 MG/DL (ref 8–23)
BUN/CREAT BLD: 16 (ref 9–20)
CALCIUM SERPL-MCNC: 10.2 MG/DL (ref 8.6–10.4)
CHLORIDE BLD-SCNC: 101 MMOL/L (ref 98–107)
CO2: 28 MMOL/L (ref 20–31)
CREAT SERPL-MCNC: 0.86 MG/DL (ref 0.7–1.2)
DIFFERENTIAL TYPE: ABNORMAL
EOSINOPHILS RELATIVE PERCENT: 1 % (ref 1–4)
GFR AFRICAN AMERICAN: >60 ML/MIN
GFR NON-AFRICAN AMERICAN: >60 ML/MIN
GFR SERPL CREATININE-BSD FRML MDRD: ABNORMAL ML/MIN/{1.73_M2}
GFR SERPL CREATININE-BSD FRML MDRD: ABNORMAL ML/MIN/{1.73_M2}
GLUCOSE BLD-MCNC: 157 MG/DL (ref 70–99)
HCT VFR BLD CALC: 35 % (ref 40.7–50.3)
HEMOGLOBIN: 11.3 G/DL (ref 13–17)
IMMATURE GRANULOCYTES: 1 %
INR BLD: 2
LYMPHOCYTES # BLD: 14 % (ref 24–43)
MCH RBC QN AUTO: 31.4 PG (ref 25.2–33.5)
MCHC RBC AUTO-ENTMCNC: 32.3 G/DL (ref 25.2–33.5)
MCV RBC AUTO: 97.2 FL (ref 82.6–102.9)
MONOCYTES # BLD: 8 % (ref 3–12)
NRBC AUTOMATED: 0 PER 100 WBC
PDW BLD-RTO: 13.7 % (ref 11.8–14.4)
PLATELET # BLD: 173 K/UL (ref 138–453)
PLATELET ESTIMATE: ABNORMAL
PMV BLD AUTO: 9.1 FL (ref 8.1–13.5)
POTASSIUM SERPL-SCNC: 3.5 MMOL/L (ref 3.7–5.3)
PROTIME: 23.9 SECONDS
RBC # BLD: 3.6 M/UL (ref 4.21–5.77)
RBC # BLD: ABNORMAL 10*6/UL
SEG NEUTROPHILS: 75 % (ref 36–65)
SEGMENTED NEUTROPHILS ABSOLUTE COUNT: 4.87 K/UL (ref 1.5–8.1)
SODIUM BLD-SCNC: 139 MMOL/L (ref 135–144)
TOTAL PROTEIN: 7.5 G/DL (ref 6.4–8.3)
TSH SERPL DL<=0.05 MIU/L-ACNC: 1.85 MIU/L (ref 0.3–5)
WBC # BLD: 6.4 K/UL (ref 3.5–11.3)
WBC # BLD: ABNORMAL 10*3/UL

## 2021-03-23 PROCEDURE — 2580000003 HC RX 258: Performed by: INTERNAL MEDICINE

## 2021-03-23 PROCEDURE — 96413 CHEMO IV INFUSION 1 HR: CPT

## 2021-03-23 PROCEDURE — 99211 OFF/OP EST MAY X REQ PHY/QHP: CPT

## 2021-03-23 PROCEDURE — 85025 COMPLETE CBC W/AUTO DIFF WBC: CPT

## 2021-03-23 PROCEDURE — 36591 DRAW BLOOD OFF VENOUS DEVICE: CPT

## 2021-03-23 PROCEDURE — 6360000002 HC RX W HCPCS: Performed by: INTERNAL MEDICINE

## 2021-03-23 PROCEDURE — 80053 COMPREHEN METABOLIC PANEL: CPT

## 2021-03-23 PROCEDURE — 84443 ASSAY THYROID STIM HORMONE: CPT

## 2021-03-23 PROCEDURE — 85610 PROTHROMBIN TIME: CPT

## 2021-03-23 PROCEDURE — 36416 COLLJ CAPILLARY BLOOD SPEC: CPT

## 2021-03-23 RX ORDER — HEPARIN SODIUM (PORCINE) LOCK FLUSH IV SOLN 100 UNIT/ML 100 UNIT/ML
500 SOLUTION INTRAVENOUS PRN
Status: DISCONTINUED | OUTPATIENT
Start: 2021-03-23 | End: 2021-03-24 | Stop reason: HOSPADM

## 2021-03-23 RX ORDER — SODIUM CHLORIDE 9 MG/ML
20 INJECTION, SOLUTION INTRAVENOUS ONCE
Status: COMPLETED | OUTPATIENT
Start: 2021-03-23 | End: 2021-03-23

## 2021-03-23 RX ORDER — SODIUM CHLORIDE 0.9 % (FLUSH) 0.9 %
10 SYRINGE (ML) INJECTION PRN
Status: DISCONTINUED | OUTPATIENT
Start: 2021-03-23 | End: 2021-03-24 | Stop reason: HOSPADM

## 2021-03-23 RX ADMIN — SODIUM CHLORIDE 20 ML/HR: 9 INJECTION, SOLUTION INTRAVENOUS at 10:55

## 2021-03-23 RX ADMIN — HEPARIN SODIUM (PORCINE) LOCK FLUSH IV SOLN 100 UNIT/ML 500 UNITS: 100 SOLUTION at 13:00

## 2021-03-23 RX ADMIN — SODIUM CHLORIDE 1120 MG: 9 INJECTION, SOLUTION INTRAVENOUS at 11:49

## 2021-03-23 RX ADMIN — SODIUM CHLORIDE, PRESERVATIVE FREE 10 ML: 5 INJECTION INTRAVENOUS at 10:55

## 2021-03-23 NOTE — PROGRESS NOTES
Informed patient on potassium 3.5. Patient not currently on any potassium supplement, instructed patient and wife to increase foods high in potassium, additional education also provided.

## 2021-03-23 NOTE — PROGRESS NOTES
Patient on unit for imfinzi infusion. Mediport accessed, labs drawn, NSS  Infusing. Patient states he felt really good last week and was able to do some walking without getting too SOB.

## 2021-03-23 NOTE — PROGRESS NOTES
ANTICOAGULATION SERVICE    Date of Clinic Visit:  3/23/2021    eLanna Nolan is a 77 y.o. male who presents to clinic today for anticoagulation monitoring and adjustment. Recent INR Results:  Internal QC passed  Lab Results   Component Value Date    INR 2 03/23/2021    INR 2.2 03/09/2021       Current Warfarin Dosage:  Dosing Plan  As of 3/23/2021    TTR:  100.0 % (3.4 wk)   Full warfarin instructions:  5 mg every Mon, Wed, Fri; 7.5 mg all other days               Assessment/Plan:    Modify warfarin dose as noted above: INR continues to decline and is now at bottom of range. I will increase weekly dose by 5% and recheck in 2 weeks. Next Clinic Appointment:  Return date  As of 3/23/2021    TTR:  100.0 % (3.4 wk)   Next INR check:  4/6/2021             Please call [facility]agulation [facility] at [phone] with any questions. Thanks! Jaya Caballero, [address] [facility]  Anticoagulation Service Pharmacist  3/23/2021 11:34 AM    CLINICAL PHARMACY CONSULT: MED RECONCILIATION/REVIEW ADDENDUM    For Pharmacy Admin Tracking Only    PHSO: Yes  Total # of Interventions Recommended: 1  - Increased Dose #: 1  - Maintenance Safety Lab Monitoring #: 1  Recommended intervention potential cost savings:   Accepted intervention potential cost savings:    Total Interventions Accepted: 1  Time Spent (min): 15    Jaya Caballero, [address] [place] [address]

## 2021-03-23 NOTE — PROGRESS NOTES
Imfinzi infused and d/c'd. Patient tolerated infusion without any difficulty. Mediport flushed with 30 ml NSS and 5 ml heparin. Patient denies any complaints. Kenyon needle d/c'd, band aid to site. Patient discharged to home in stable condition.

## 2021-03-27 PROBLEM — J44.89 COPD WITH ASTHMA: Status: ACTIVE | Noted: 2021-03-27

## 2021-03-27 PROBLEM — J44.9 COPD WITH ASTHMA (HCC): Status: ACTIVE | Noted: 2021-03-27

## 2021-04-06 ENCOUNTER — HOSPITAL ENCOUNTER (OUTPATIENT)
Dept: INFUSION THERAPY | Age: 76
Discharge: HOME | End: 2021-04-06
Payer: MEDICARE

## 2021-04-06 ENCOUNTER — OFFICE VISIT (OUTPATIENT)
Dept: ONCOLOGY | Age: 76
End: 2021-04-06
Payer: MEDICARE

## 2021-04-06 ENCOUNTER — HOSPITAL ENCOUNTER (OUTPATIENT)
Dept: PHARMACY | Age: 76
Setting detail: THERAPIES SERIES
Discharge: HOME | End: 2021-04-06
Payer: MEDICARE

## 2021-04-06 VITALS
HEIGHT: 70 IN | TEMPERATURE: 98.3 F | HEART RATE: 87 BPM | DIASTOLIC BLOOD PRESSURE: 60 MMHG | RESPIRATION RATE: 16 BRPM | OXYGEN SATURATION: 94 % | SYSTOLIC BLOOD PRESSURE: 96 MMHG | WEIGHT: 225.6 LBS | BODY MASS INDEX: 32.3 KG/M2

## 2021-04-06 VITALS
SYSTOLIC BLOOD PRESSURE: 96 MMHG | HEIGHT: 70 IN | DIASTOLIC BLOOD PRESSURE: 60 MMHG | OXYGEN SATURATION: 94 % | TEMPERATURE: 98.3 F | BODY MASS INDEX: 32.27 KG/M2 | WEIGHT: 225.4 LBS | HEART RATE: 87 BPM | RESPIRATION RATE: 16 BRPM

## 2021-04-06 DIAGNOSIS — I48.0 PAROXYSMAL A-FIB (HCC): ICD-10-CM

## 2021-04-06 DIAGNOSIS — C77.9 REGIONAL LYMPH NODE METASTASIS PRESENT (HCC): ICD-10-CM

## 2021-04-06 DIAGNOSIS — Z29.8 IMMUNOTHERAPY: ICD-10-CM

## 2021-04-06 DIAGNOSIS — R06.00 DYSPNEA, UNSPECIFIED TYPE: ICD-10-CM

## 2021-04-06 DIAGNOSIS — J70.0 RADIATION PNEUMONITIS (HCC): ICD-10-CM

## 2021-04-06 DIAGNOSIS — C34.91 ADENOCARCINOMA OF RIGHT LUNG (HCC): Primary | ICD-10-CM

## 2021-04-06 LAB
ABSOLUTE EOS #: 0.05 K/UL (ref 0–0.44)
ABSOLUTE IMMATURE GRANULOCYTE: <0.03 K/UL (ref 0–0.3)
ABSOLUTE LYMPH #: 1.04 K/UL (ref 1.1–3.7)
ABSOLUTE MONO #: 0.6 K/UL (ref 0.1–1.2)
ALBUMIN SERPL-MCNC: 4.1 G/DL (ref 3.5–5.2)
ALBUMIN/GLOBULIN RATIO: 1.1 (ref 1–2.5)
ALP BLD-CCNC: 74 U/L (ref 40–129)
ALT SERPL-CCNC: 15 U/L (ref 5–41)
ANION GAP SERPL CALCULATED.3IONS-SCNC: 11 MMOL/L (ref 9–17)
AST SERPL-CCNC: 16 U/L
BASOPHILS # BLD: 0 % (ref 0–2)
BASOPHILS ABSOLUTE: 0.03 K/UL (ref 0–0.2)
BILIRUB SERPL-MCNC: 0.42 MG/DL (ref 0.3–1.2)
BUN BLDV-MCNC: 20 MG/DL (ref 8–23)
BUN/CREAT BLD: 19 (ref 9–20)
CALCIUM SERPL-MCNC: 10.1 MG/DL (ref 8.6–10.4)
CHLORIDE BLD-SCNC: 103 MMOL/L (ref 98–107)
CO2: 27 MMOL/L (ref 20–31)
CREAT SERPL-MCNC: 1.08 MG/DL (ref 0.7–1.2)
DIFFERENTIAL TYPE: ABNORMAL
EOSINOPHILS RELATIVE PERCENT: 1 % (ref 1–4)
GFR AFRICAN AMERICAN: >60 ML/MIN
GFR NON-AFRICAN AMERICAN: >60 ML/MIN
GFR SERPL CREATININE-BSD FRML MDRD: ABNORMAL ML/MIN/{1.73_M2}
GFR SERPL CREATININE-BSD FRML MDRD: ABNORMAL ML/MIN/{1.73_M2}
GLUCOSE BLD-MCNC: 161 MG/DL (ref 70–99)
HCT VFR BLD CALC: 35.6 % (ref 40.7–50.3)
HEMOGLOBIN: 11.3 G/DL (ref 13–17)
IMMATURE GRANULOCYTES: 0 %
INR BLD: 2.3
LYMPHOCYTES # BLD: 14 % (ref 24–43)
MCH RBC QN AUTO: 30.8 PG (ref 25.2–33.5)
MCHC RBC AUTO-ENTMCNC: 31.7 G/DL (ref 25.2–33.5)
MCV RBC AUTO: 97 FL (ref 82.6–102.9)
MONOCYTES # BLD: 8 % (ref 3–12)
NRBC AUTOMATED: 0 PER 100 WBC
PDW BLD-RTO: 14 % (ref 11.8–14.4)
PLATELET # BLD: 193 K/UL (ref 138–453)
PLATELET ESTIMATE: ABNORMAL
PMV BLD AUTO: 9.4 FL (ref 8.1–13.5)
POTASSIUM SERPL-SCNC: 4.2 MMOL/L (ref 3.7–5.3)
PROTIME: 27.2 SECONDS
RBC # BLD: 3.67 M/UL (ref 4.21–5.77)
RBC # BLD: ABNORMAL 10*6/UL
SEG NEUTROPHILS: 77 % (ref 36–65)
SEGMENTED NEUTROPHILS ABSOLUTE COUNT: 5.74 K/UL (ref 1.5–8.1)
SODIUM BLD-SCNC: 141 MMOL/L (ref 135–144)
TOTAL PROTEIN: 7.7 G/DL (ref 6.4–8.3)
TSH SERPL DL<=0.05 MIU/L-ACNC: 2.35 MIU/L (ref 0.3–5)
WBC # BLD: 7.5 K/UL (ref 3.5–11.3)
WBC # BLD: ABNORMAL 10*3/UL

## 2021-04-06 PROCEDURE — 96413 CHEMO IV INFUSION 1 HR: CPT

## 2021-04-06 PROCEDURE — 84443 ASSAY THYROID STIM HORMONE: CPT

## 2021-04-06 PROCEDURE — 6360000002 HC RX W HCPCS: Performed by: INTERNAL MEDICINE

## 2021-04-06 PROCEDURE — 4040F PNEUMOC VAC/ADMIN/RCVD: CPT | Performed by: INTERNAL MEDICINE

## 2021-04-06 PROCEDURE — G8428 CUR MEDS NOT DOCUMENT: HCPCS | Performed by: INTERNAL MEDICINE

## 2021-04-06 PROCEDURE — 36591 DRAW BLOOD OFF VENOUS DEVICE: CPT

## 2021-04-06 PROCEDURE — 99214 OFFICE O/P EST MOD 30 MIN: CPT | Performed by: INTERNAL MEDICINE

## 2021-04-06 PROCEDURE — 2580000003 HC RX 258: Performed by: INTERNAL MEDICINE

## 2021-04-06 PROCEDURE — 80053 COMPREHEN METABOLIC PANEL: CPT

## 2021-04-06 PROCEDURE — 99211 OFF/OP EST MAY X REQ PHY/QHP: CPT

## 2021-04-06 PROCEDURE — 36416 COLLJ CAPILLARY BLOOD SPEC: CPT

## 2021-04-06 PROCEDURE — G8417 CALC BMI ABV UP PARAM F/U: HCPCS | Performed by: INTERNAL MEDICINE

## 2021-04-06 PROCEDURE — 3017F COLORECTAL CA SCREEN DOC REV: CPT | Performed by: INTERNAL MEDICINE

## 2021-04-06 PROCEDURE — 1123F ACP DISCUSS/DSCN MKR DOCD: CPT | Performed by: INTERNAL MEDICINE

## 2021-04-06 PROCEDURE — 85610 PROTHROMBIN TIME: CPT

## 2021-04-06 PROCEDURE — 1036F TOBACCO NON-USER: CPT | Performed by: INTERNAL MEDICINE

## 2021-04-06 PROCEDURE — 85025 COMPLETE CBC W/AUTO DIFF WBC: CPT

## 2021-04-06 RX ORDER — DIPHENHYDRAMINE HYDROCHLORIDE 50 MG/ML
50 INJECTION INTRAMUSCULAR; INTRAVENOUS ONCE
Status: CANCELLED | OUTPATIENT
Start: 2021-04-20 | End: 2021-04-20

## 2021-04-06 RX ORDER — HEPARIN SODIUM (PORCINE) LOCK FLUSH IV SOLN 100 UNIT/ML 100 UNIT/ML
500 SOLUTION INTRAVENOUS PRN
Status: CANCELLED | OUTPATIENT
Start: 2021-04-20

## 2021-04-06 RX ORDER — SODIUM CHLORIDE 9 MG/ML
INJECTION, SOLUTION INTRAVENOUS CONTINUOUS
Status: CANCELLED | OUTPATIENT
Start: 2021-04-20

## 2021-04-06 RX ORDER — SODIUM CHLORIDE 0.9 % (FLUSH) 0.9 %
10 SYRINGE (ML) INJECTION PRN
Status: CANCELLED | OUTPATIENT
Start: 2021-04-20

## 2021-04-06 RX ORDER — SODIUM CHLORIDE 9 MG/ML
INJECTION, SOLUTION INTRAVENOUS CONTINUOUS
Status: CANCELLED | OUTPATIENT
Start: 2021-05-04

## 2021-04-06 RX ORDER — SODIUM CHLORIDE 0.9 % (FLUSH) 0.9 %
10 SYRINGE (ML) INJECTION PRN
Status: DISCONTINUED | OUTPATIENT
Start: 2021-04-06 | End: 2021-04-07 | Stop reason: HOSPADM

## 2021-04-06 RX ORDER — METHYLPREDNISOLONE SODIUM SUCCINATE 125 MG/2ML
125 INJECTION, POWDER, LYOPHILIZED, FOR SOLUTION INTRAMUSCULAR; INTRAVENOUS ONCE
Status: CANCELLED | OUTPATIENT
Start: 2021-05-04 | End: 2021-05-04

## 2021-04-06 RX ORDER — METHYLPREDNISOLONE SODIUM SUCCINATE 125 MG/2ML
125 INJECTION, POWDER, LYOPHILIZED, FOR SOLUTION INTRAMUSCULAR; INTRAVENOUS ONCE
Status: CANCELLED | OUTPATIENT
Start: 2021-04-20 | End: 2021-04-20

## 2021-04-06 RX ORDER — HEPARIN SODIUM (PORCINE) LOCK FLUSH IV SOLN 100 UNIT/ML 100 UNIT/ML
500 SOLUTION INTRAVENOUS PRN
Status: CANCELLED | OUTPATIENT
Start: 2021-05-04

## 2021-04-06 RX ORDER — SODIUM CHLORIDE 0.9 % (FLUSH) 0.9 %
5 SYRINGE (ML) INJECTION PRN
Status: CANCELLED | OUTPATIENT
Start: 2021-05-04

## 2021-04-06 RX ORDER — SODIUM CHLORIDE 9 MG/ML
20 INJECTION, SOLUTION INTRAVENOUS ONCE
Status: CANCELLED | OUTPATIENT
Start: 2021-05-04 | End: 2021-05-04

## 2021-04-06 RX ORDER — EPINEPHRINE 1 MG/ML
0.3 INJECTION, SOLUTION, CONCENTRATE INTRAVENOUS PRN
Status: CANCELLED | OUTPATIENT
Start: 2021-04-20

## 2021-04-06 RX ORDER — EPINEPHRINE 1 MG/ML
0.3 INJECTION, SOLUTION, CONCENTRATE INTRAVENOUS PRN
Status: CANCELLED | OUTPATIENT
Start: 2021-05-04

## 2021-04-06 RX ORDER — SODIUM CHLORIDE 0.9 % (FLUSH) 0.9 %
10 SYRINGE (ML) INJECTION PRN
Status: CANCELLED | OUTPATIENT
Start: 2021-05-04

## 2021-04-06 RX ORDER — DIPHENHYDRAMINE HYDROCHLORIDE 50 MG/ML
50 INJECTION INTRAMUSCULAR; INTRAVENOUS ONCE
Status: CANCELLED | OUTPATIENT
Start: 2021-05-04 | End: 2021-05-04

## 2021-04-06 RX ORDER — HEPARIN SODIUM (PORCINE) LOCK FLUSH IV SOLN 100 UNIT/ML 100 UNIT/ML
500 SOLUTION INTRAVENOUS PRN
Status: DISCONTINUED | OUTPATIENT
Start: 2021-04-06 | End: 2021-04-07 | Stop reason: HOSPADM

## 2021-04-06 RX ORDER — SODIUM CHLORIDE 9 MG/ML
20 INJECTION, SOLUTION INTRAVENOUS ONCE
Status: COMPLETED | OUTPATIENT
Start: 2021-04-06 | End: 2021-04-06

## 2021-04-06 RX ORDER — SODIUM CHLORIDE 9 MG/ML
20 INJECTION, SOLUTION INTRAVENOUS ONCE
Status: CANCELLED | OUTPATIENT
Start: 2021-04-20 | End: 2021-04-20

## 2021-04-06 RX ORDER — SODIUM CHLORIDE 0.9 % (FLUSH) 0.9 %
5 SYRINGE (ML) INJECTION PRN
Status: CANCELLED | OUTPATIENT
Start: 2021-04-20

## 2021-04-06 RX ADMIN — SODIUM CHLORIDE 1100 MG: 9 INJECTION, SOLUTION INTRAVENOUS at 13:48

## 2021-04-06 RX ADMIN — SODIUM CHLORIDE, PRESERVATIVE FREE 10 ML: 5 INJECTION INTRAVENOUS at 12:56

## 2021-04-06 RX ADMIN — HEPARIN SODIUM (PORCINE) LOCK FLUSH IV SOLN 100 UNIT/ML 500 UNITS: 100 SOLUTION at 14:58

## 2021-04-06 RX ADMIN — SODIUM CHLORIDE 20 ML/HR: 9 INJECTION, SOLUTION INTRAVENOUS at 12:56

## 2021-04-06 NOTE — PROGRESS NOTES
Imfinzi infused and d/c'd. Patient tolerated infusion without any difficulty. Mediport flushed with 30 ml NSS and 5 ml heparin. Patient denies any complaints. Kenyon needle d/c'd, band aid to site. Patient discharged to home.

## 2021-04-06 NOTE — PROGRESS NOTES
ANTICOAGULATION SERVICE    Date of Clinic Visit:  4/6/2021    Ej Schmitz is a 77 y.o. male who presents to clinic today for anticoagulation monitoring and adjustment. Recent INR Results:  Internal QC passed  Lab Results   Component Value Date    INR 2.3 04/06/2021    INR 2 03/23/2021       Current Warfarin Dosage:          Assessment/Plan:    Continue current regimen as INR remains stable. Next Clinic Appointment:      Please call [facility]agulation Clinic at [phone] with any questions. Thanks!   Dominic Rose [facility]  Anticoagulation Service Pharmacist  4/6/2021 1:26 PM  CLINICAL PHARMACY CONSULT: MED RECONCILIATION/REVIEW ADDENDUM    For Pharmacy Admin Tracking Only    PHSO: Yes  Total # of Interventions Recommended: 0  Total Interventions Accepted: 0  Time Spent (min): Salma 56, Kaylah 219

## 2021-04-06 NOTE — PROGRESS NOTES
Patient on unit for Imfinzi infusion. [facility] accessed, labs drawn, NSS infusing. Patient to see  with treatment today. Patient denies any complaints.

## 2021-04-06 NOTE — PROGRESS NOTES
Rich Mak                                                                                                                  4/6/2021  MRN:   [id number]  YOB: 1945  PCP:                           Claudette Amezcua DO  Referring Physician: No ref. provider found  Treating Physician Name: Mayda Hirsch MD      Reason for visit:  Chief Complaint   Patient presents with    Follow-up     Lung cancer     Current problems:  Adenocarcinoma of right lung, clinical stage at least T1c, N2, M0 (stage IIIA)  Subcarinal lymph node metastasis    Active and recent treatments:  concurrent chemoradiation using carboplatin and Taxol  Consolidation therapy with Imfinzi1/2021    Summary of Case/History:    Rich Mak a 77 y.o.male is a patient with biopsy confirmed adenocarcinoma of right lung presents to the office to establish care and for further evaluation treatment recommendations. Patient was initially seen by pulmonary team for lung nodules. Patient CT scan from July 2019 showed a right-sided pleural calcification thickening concerning for asbestos exposure. Patient also noted to have multiple pulmonary nodules measuring between 1.5 x 1.3 cm. Follow-up CT chest from July 2020 showed increase in size of the right lower lobe lung nodule which now measured 2.1 cm. Subsequently patient underwent CT PET on 8/22 which showed FDG avid right lower lobe lung nodule with SUV of 4.6. Patient CT PET was also positive for subcarinal lymph node with SUV of 4.6. Patient underwent bronchoscopy with endobronchial ultrasound biopsy of the subcarinal lymph node confirmed adenocarcinoma. Patient has significant history of tobacco dependence around 30-pack-year however he quit about 25 years ago. Patient does have coronary artery disease status post stent placement. Patient also gives history of occupational asthma and exposure to bacteria.   Patient does give history of weight loss but describes it as intentional.  Denies headaches dizziness chest pain abdominal pain nausea bone pain     Interim History:     Patient presents to the clinic for a follow-up visit and for toxicity check and to review results of her blood work-up. Patient has been tolerating treatment without any unexpected or severe side effects. Denies hospitalization or ER visit. Appetite is good. Weight is stable. Nausea is controlled. Denies headaches dizziness chest pain abdominal pain nausea bone pain weight loss or fatigue. Patient does get short of breath with exertion but it is improving    During this visit patient's allergy, social, medical, surgical history and medications were reviewed and updated. Past Medical History:   Past Medical History:   Diagnosis Date    Abdominal hernia     small, no significant symptomatology.  Abnormal PSA     with history of slight increase.  Allergic rhinitis     Benign prostatic hypertrophy     Chest pain     occasional.     Coronary artery disease     status post drug eluting stent placement, LAD, ostial obtuse marginal.     Dysphagia     Erectile dysfunction     Familial hyperlipidemia     with hypertriglyceridemia.  Gastroesophageal reflux disease     Hearing loss, bilateral     hearing aid in the left ear only. 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     Hypertension     Impaired fasting glucose     prediabetes.  Lumbar disc herniation     L3-L4, with chronic back pain.  Mild anemia     Nasal polyps     minimal symptoms.  Nasal septal deviation     right side.  Obesity     Palpitations     occasional.     Peptic ulcer disease     Reactive airway disease     asthma, industrial related, also a history of hypersensitivity pneumonitis.      Urinary frequency        Past Surgical History:     Past Surgical History:   Procedure Laterality Date    APPENDECTOMY  age 11    BRONCHOSCOPY  09/17/2020    BRONCHOSCOPY N/A 9/17/2020    EBUS- BRONCHOSCOPY ENDOBRONCHIAL ULTRASOUND, PATHOLOGY REQUESTED- CINDY NOTIFIED, GI UNIT SCHEDULED performed by Natasha Rowley MD at [facility] [place] [address]  10/20/2011    occluded LAD and ostial obtuse marginal stenosis, underwent drug eluting stents to both, RCA small, nondominant, occluded, ejection fraction 45%.  CARDIAC CATHETERIZATION  08/2018    with stent placement    CATARACT REMOVAL Bilateral 03/2018    COLONOSCOPY  01/21/2011    mild sigmoid diverticulosis.  COLONOSCOPY  4/6/16    hemorrhoid; sigmoid diverticulosis    KNEE ARTHROSCOPY Left 03/19/2010    partial medial and lateral synovectomies, partial medial meniscectomy, chondroplasty.  NASAL FRACTURE SURGERY  1960    OTHER SURGICAL HISTORY Left 02/12/2010    knee injection.  PORT SURGERY N/A 10/14/2020    RIGHT PORT INSERTION performed by Fely Hung DO at [address] [place] [address] PRE-MALIGNANT / [address] Left 01/16/2012    actinic keratoses, ear, liquid nitrogen.  PRE-MALIGNANT / BENIGN SKIN LESION EXCISION Left 07/19/2011    actinic keratosis, upper ear, liquid nitrogen.  PROSTATE BIOPSY Bilateral 09/08/2015    Benign    REPAIR UMBILICAL EBCG,3+W/G,SHLYK N/A 0/47/0721    UMBILICAL Hernia Repair w/ Mesh performed by Edwadr Galloway MD at [facility] SEPTOPLASTY  10/30/2015    with bilateral submucosal resection of the inferior turbinates, left middle turbinate elza bullosa resection done by Dr Ochoa Ridjaylyn ARTHROSCOPY Left 10/17/14    W/ SUBACROMIAL DECOMPRESSION, DEBRIDEMENT ET CHONDROPLASTY    UPPER GASTROINTESTINAL ENDOSCOPY  01/21/2011    superficial ulcer of the fundus, erostive gastritis.      VASECTOMY Bilateral 04/04/1980       Patient Family Social History:     Social History     Socioeconomic History    Marital status:      Spouse name: None    Number of children: None    Years of education: None    Highest education level: None   Occupational History    None   Social Needs    EVENING 90 tablet 3    clopidogrel (PLAVIX) 75 MG tablet TAKE 1 TABLET BY MOUTH DAILY 90 tablet 1    furosemide (LASIX) 20 MG tablet TAKE 1 TABLET BY MOUTH ONE TIME A DAY 90 tablet 1    oxybutynin (DITROPAN-XL) 10 MG extended release tablet Take 1 tablet by mouth daily 90 tablet 1    warfarin (COUMADIN) 2.5 MG tablet Take 2 tablets by mouth daily Take 2 tablets (5 mg) by mouth daily. Or as directed by INR results 60 tablet 3    flecainide (TAMBOCOR) 50 MG tablet Take 50 mg by mouth 2 times daily      losartan (COZAAR) 50 MG tablet TAKE 1 TABLET BY MOUTH ONE TIME A DAY  90 tablet 1    levothyroxine (SYNTHROID) 100 MCG tablet TAKE 1 TABLET BY MOUTH ONE TIME A DAY  90 tablet 1    tamsulosin (FLOMAX) 0.4 MG capsule TAKE 1 CAPSULE BY MOUTH ONE TIME A DAY 90 capsule 1    ondansetron (ZOFRAN) 4 MG tablet Take 4 mg by mouth every 6 hours as needed for Nausea or Vomiting Disp 60 with 3 refills called in 10/20/2020      ECHINACEA HERB PO Take by mouth daily      albuterol sulfate HFA (VENTOLIN HFA) 108 (90 Base) MCG/ACT inhaler Inhale 2 puffs into the lungs every 6 hours as needed for Wheezing or Shortness of Breath 1 Inhaler 6    Multiple Vitamins-Minerals (MULTIVITAMIN PO) daily. No current facility-administered medications for this visit.       Facility-Administered Medications Ordered in Other Visits   Medication Dose Route Frequency Provider Last Rate Last Admin    durvalumab (IMFINZI) 1,100 mg in sodium chloride 0.9 % 250 mL chemo IVPB  10 mg/kg (Treatment Plan Recorded) Intravenous Once Andrae Garcia  mL/hr at 04/06/21 1348 1,100 mg at 04/06/21 1348    0.9 % sodium chloride infusion  20 mL/hr Intravenous Once Andrae Garcia MD 20 mL/hr at 04/06/21 1256 20 mL/hr at 04/06/21 1256    sodium chloride flush 0.9 % injection 10 mL  10 mL Intravenous PRN Andrae Garcia MD        heparin flush 100 UNIT/ML injection 500 Units  500 Units Intracatheter PRN Andrae Garcia MD           Allergies: Effient [prasugrel], Ace inhibitors, and Statins    Review of Systems:    Constitutional: No fever or chills. No night sweats, + weight loss   Eyes: No eye discharge, double vision, or eye pain   HEENT: negative for sore mouth, sore throat, hoarseness and voice change   Respiratory: negative for cough , sputum, dyspnea, wheezing, hemoptysis, chest pain   Cardiovascular: negative for chest pain, dyspnea, palpitations, orthopnea, PND   Gastrointestinal: negative for nausea, vomiting, diarrhea, constipation, abdominal pain, Dysphagia, hematemesis and hematochezia   Genitourinary: negative for frequency, dysuria, nocturia, urinary incontinence, and hematuria   Integument: negative for rash, skin lesions, bruises.    Hematologic/Lymphatic: negative for easy bruising, bleeding, lymphadenopathy, or petechiae   Endocrine: negative for heat or cold intolerance,weight changes, change in bowel habits and hair loss   Musculoskeletal: negative for myalgias, arthralgias, pain, joint swelling,and bone pain   Neurological: negative for headaches, dizziness, seizures, weakness, numbness        Physical Exam:  Vitals: BP 96/60 Comment: Infusion center  Pulse 87 Comment: Infusion center  Temp 98.3 °F (36.8 °C) Comment: Infusion center  Resp 16 Comment: Infusion center  Ht 5' 10\" (1.778 m)   Wt 225 lb 9.6 oz (102.3 kg) Comment: Infusion center  SpO2 94% Comment: Infusion center  BMI 32.37 kg/m²   General appearance - well appearing, no in pain or distress  Mental status - AAO X3  Eyes - pupils equal and reactive, extraocular eye movements intact  Mouth - mucous membranes moist, pharynx normal without lesions  Neck - supple, no significant adenopathy  Lymphatics - no palpable lymphadenopathy, no hepatosplenomegaly  Chest -decreased breathing sounds bilaterally  Heart - normal rate, regular rhythm, normal S1, S2, no murmurs  Abdomen - soft, nontender, nondistended, no masses or organomegaly  Neurological - alert, oriented, normal speech, no focal findings or movement disorder noted  Extremities - peripheral pulses normal, no pedal edema, no clubbing or cyanosis  Skin - normal coloration and turgor, no rashes, no suspicious skin lesions noted             DATA:    Results for orders placed or performed during the hospital encounter of 04/06/21   Protime-INR   Result Value Ref Range    INR 2.3     Protime 27.2 seconds     -- Diagnosis --   BAL RIGHT LOWER LOBE:           NEGATIVE FOR MALIGNANCY.             FINE NEEDLE ASPIRATION STATION 7 EBUS:           ADENOCARCINOMA, COMPATIBLE WITH LUNG PRIMARY. CT PET    Impression    1. The previously described nodule involving the right lung base is FDG avid. Tissue sampling is recommended as malignancy is suspected. 2. The previously identified smaller lung nodules involving the lower lobes    are too small for PET CT characterization.  CT follow-up is recommended. 3. FDG avid subcarinal and right hilar lymph nodes suggestive of metastatic    disease. 4. No abnormal FDG activity identified within the neck, abdomen or pelvis.           Mri Brain W Wo Contrast    Chronic microvascular disease without acute intracranial abnormality. No abnormal postcontrast enhancement.      Echo Complete 2d W Doppler W Color    Result Date: 12/29/2020  Transthoracic Echocardiography Report (TTE)  Patient Name SHOCK       Date of Study             12/29/2020               Janalyn Armstrong   Date of      1945  Gender                    Male  Birth   Age          77 year(s)  Race                         Room Number              Height:                   70 inch, 177.8 cm   Corporate ID [id number]    Weight:                   234 pounds, 106.1 kg  #   Patient Acct [de-identified]   BSA:         2.23 m^2     BMI:       33.58 kg/m^2  #   MR #         [id number]     Sonographer               Patty Darby RDCS   Accession #  [id number]  Interpreting Physician    [address] [place] [address]   Fellow                   Referring Nurse                           Practitioner   Interpreting             Referring Physician       [address] [place] [address]  Fellow  Type of Study   TTE procedure:2D Echocardiogram, M-Mode, Doppler, Color Doppler. Procedure Date Date: 12/29/2020 Start: 09:16 AM Study Location: [facility] Technical Quality: Limited visualization due to body habitus. Indications:Atrial fibrillation. Patient Status: Outpatient Height: 70 inches Weight: 234.01 pounds BSA: 2.23 m^2 BMI: 33.58 kg/m^2 Rhythm: Within normal limits Allergies   - *No Known Allergies. - Ace Inhibitors. - *Unlisted:(Statins). CONCLUSIONS Summary Normal left ventricular size with normal hyperdynamic function. Left ventricular ejection fraction 65 %. Mild concentric left ventricular hypertrophy. Grade I (mild) left ventricular diastolic dysfunction. Technically difficult visualization of the right ventricle, but it does appear to be normal in size. Aortic valve not well visualized but appears normal. Trivial tricuspid regurgitation. Estimated right ventricular systolic pressure is 35 mmHg. Trivial pulmonic insufficiency. Signature ----------------------------------------------------------------------------  Electronically signed by Chance Rodríguez RDCS(Sonographer) on 12/29/2020  10:49 AM ---------------------------------------------------------------------------- ----------------------------------------------------------------------------  Electronically signed by Ronaldo ParikhInterpreting physician) on 12/29/2020  03:06 PM ---------------------------------------------------------------------------- FINDINGS Left Atrium Left atrium is normal in size. Left Ventricle Normal left ventricular size with normal hyperdynamic function. Left ventricular ejection fraction 65 %. Mild concentric left ventricular hypertrophy. Grade I (mild) left ventricular diastolic dysfunction. Right Atrium Right atrium is normal in size.  Right Ventricle Technically difficult visualization of the right ventricle, but it does appear to be normal in size. Mitral Valve Normal mitral valve structure and function. Aortic Valve Aortic valve not well visualized but appears normal. Tricuspid Valve Normal tricuspid valve structure and function. Trivial tricuspid regurgitation. Estimated right ventricular systolic pressure is 35 mmHg. Pulmonic Valve The pulmonic valve is normal in structure. Trivial pulmonic insufficiency.  Miscellaneous E/E' average = 9. M-mode / 2D Measurements & Calculations:   LVIDd:4.76 cm(3.7 - 5.6 cm)      Diastolic SIUPNE:011 ml  IGZRI:9.0 cm(2.2 - 4.0 cm)       Systolic LDGZYW:70.2 ml  NWCG:3.56 cm(0.6 - 1.1 cm)       Aortic Root:3 cm(2.0 - 3.7 cm)  LVPWd:1.11 cm(0.6 - 1.1 cm)      LA Dimension: 3.4 cm(1.9 - 4.0 cm)  Fractional Shortenin.67 %  Calculated LVEF (%): 66.76 %   Mitral:                                 Aortic   Peak E-Wave: 0.61 m/s                   Peak Velocity: 1.30 m/s  Peak A-Wave: 0.76 m/s                   Peak Gradient: 6.76 mmHg  E/A Ratio: 0.81  Peak Gradient: 1.49 mmHg  Deceleration Time: 239 msec   Tricuspid:                              Pulmonic:   Peak TR Velocity: 2.81 m/s              Peak Velocity: 0.98 m/s  Peak TR Gradient: 31.5844 mmHg          Peak Gradient: 3.82 mmHg  Estimated RA Pressure: 3 mmHg  Peak E-Wave: 0.80 m/s  Peak Gradient: 2.56 mmHg                                          Estimated PASP: 34.58 mmHg  Septal Wall E' velocity:0.06 m/s Lateral Wall E' velocity:0.08 m/s    Mri Abdomen W Wo Contrast    Result Date: 2021  EXAMINATION: MRI OF THE ABDOMEN WITHOUT AND WITH CONTRAST, 2021 9:50 am TECHNIQUE: Multiplanar multisequence MRI of the abdomen was performed without and with the administration of intravenous contrast. COMPARISON: CT 2021, 2020 HISTORY: ORDERING SYSTEM PROVIDED HISTORY: Adenocarcinoma of right lung [facility] TECHNOLOGIST PROVIDED HISTORY: liver lesion on ct Specify organ?->Liver Reason for Exam:  History of lung cancer, follow up liver lesion seen on CT scan. Acuity: Acute Type of Exam: Subsequent/Follow-up Additional signs and symptoms: Adenocarcinoma of right lung; Regional lymph node metastasis present; Radiation pneumonitis. FINDINGS: There is hepatic steatosis. At the level of bifurcation of the main portal vein there is subtle area of posterior subcapsular segment 4A left lobe of liver area of nonenhancing T1 hypointensity best appreciated on the postcontrast images. This measures maximally about 0.8 x 1.1 x 1.8 cm. This correlates with finding on the recent CT scan. There is no T2 correlate and there is no restriction of diffusion. Likely benign but attention on follow-up. The spleen, pancreas, kidneys, adrenal glands and gallbladder show no significant abnormalities. Visualized GI tract show no acute process. No ascites. Trace right pleural effusion. 1.9 cm lung nodule at the right lung base in the lower lobe unchanged. Bone marrow signal age appropriate. 1. Previously noted area of concern in the posterior left lobe of liver is identified as the subtle T1 hypointense nonenhancing, non diffusion restricting focus isointense to liver on T2. This is in segment 4A at the level of bifurcation of the main portal vein. Benign etiology most favored. Attention on follow-up. No findings concerning for metastatic lesion. 2. Hepatic steatosis. 3. Trace right pleural effusion and a right lung base lower lobe 1.9 cm nodule unchanged from prior CT. Ct Chest Abdomen Pelvis W Contrast: 1/7/2021    CT CHEST: Favorable interval response to therapy seen as decreased size of right lower lobe nodule. 2. Mixed response from therapy when correlating with subcarinal and right hilar lymph nodes, subcarinal lymph node not significantly changed, right hilar lymph node measuring slightly larger, though this may be reactive.  3. New patchy pulmonary infiltrates in the right lung may be related to post radiation pneumonitis or other acute infectious process including possible atypical/viral pneumonia. 4. CT ABDOMEN/PELVIS: New indeterminate right hepatic liver lesion I feel is likely focal fatty infiltration, but could reflect a metastatic lesion. Correlation with MRI abdomen attention liver without and with gadolinium correlation recommended. 5. Nonspecific prostate gland enlargement typical of BPH. RECOMMENDATIONS: MRI abdomen attention liver without and with gadolinium     Mri Abdomen W Wo Contrast    Result Date: 1/22/2021  EXAMINATION: MRI OF THE ABDOMEN WITHOUT AND WITH CONTRAST, 1/22/2021 9:50 am TECHNIQUE: Multiplanar multisequence MRI of the abdomen was performed without and with the administration of intravenous contrast. COMPARISON: CT 01/07/2021, 07/21/2020 HISTORY: ORDERING SYSTEM PROVIDED HISTORY: Adenocarcinoma of right lung [facility] TECHNOLOGIST PROVIDED HISTORY: liver lesion on ct Specify organ?->Liver Reason for Exam:  History of lung cancer, follow up liver lesion seen on CT scan. Acuity: Acute Type of Exam: Subsequent/Follow-up Additional signs and symptoms: Adenocarcinoma of right lung; Regional lymph node metastasis present; Radiation pneumonitis. FINDINGS: There is hepatic steatosis. At the level of bifurcation of the main portal vein there is subtle area of posterior subcapsular segment 4A left lobe of liver area of nonenhancing T1 hypointensity best appreciated on the postcontrast images. This measures maximally about 0.8 x 1.1 x 1.8 cm. This correlates with finding on the recent CT scan. There is no T2 correlate and there is no restriction of diffusion. Likely benign but attention on follow-up. The spleen, pancreas, kidneys, adrenal glands and gallbladder show no significant abnormalities. Visualized GI tract show no acute process. No ascites. Trace right pleural effusion. 1.9 cm lung nodule at the right lung base in the lower lobe unchanged. Bone marrow signal age appropriate. 1. Previously noted area of concern in the posterior left lobe of liver is identified as the subtle T1 hypointense nonenhancing, non diffusion restricting focus isointense to liver on T2. This is in segment 4A at the level of bifurcation of the main portal vein. Benign etiology most favored. Attention on follow-up. No findings concerning for metastatic lesion. 2. Hepatic steatosis. 3. Trace right pleural effusion and a right lung base lower lobe 1.9 cm nodule unchanged from prior CT. Impression:  Adenocarcinoma of right lung, clinical stage at least T1c, N2, M0 (stage IIIA)  Subcarinal lymph node metastasis  Weight loss  Radiation pneumonitis and shortness of breath      Plan:  I had a detailed discussion with the patient and we went over results of lab work-up imaging studies and other relevant clinical data  Toxicity check performed. Patient is tolerating treatment without unexpected side effects  Labs are adequate for treatment. We will proceed with treatment   Reiterated treatment plan. Reviewed risk benefit profile and reiterated potential side-effects of ongoing treatment  We will obtain scans to assess disease status before next office visit  We plan to complete 1 year of adjuvant Imfinzi  NCCN guidelines were reviewed and discussed with the patient. The diagnosis and care plan were discussed with the patient in detail. I discussed the natural history of the disease, prognosis, risks and goals of therapy and answered all the patients questions to the best of my ability. Patient expressed understanding and was in agreement. Iraida Gabriel          This note is created with the assistance of a speech recognition program.  While intending to generate a document that actually reflects the content of the visit, the document can still have some errors including those of syntax and sound a like substitutions which may escape proof reading.   It such instances, actual meaning can be extrapolated

## 2021-04-06 NOTE — PATIENT INSTRUCTIONS
\"On day of next appointment, please screen for temperature and COVID-19 symptoms prior to you clinic appointment. If any symptoms present, please call [phone] to reschedule. \"

## 2021-04-13 ENCOUNTER — TELEPHONE (OUTPATIENT)
Dept: CASE MANAGEMENT | Age: 76
End: 2021-04-13

## 2021-04-13 NOTE — TELEPHONE ENCOUNTER
Name: Sergey Freeman  : 1945  MRN: [id number]    Oncology Navigation Follow-Up Note  Navigator reaching out to pt. , but could only leave a message, plan to follow.   Electronically signed by Osiris Gallardo RN on 2021 at 12:31 PM

## 2021-04-20 ENCOUNTER — HOSPITAL ENCOUNTER (OUTPATIENT)
Dept: INFUSION THERAPY | Age: 76
Discharge: HOME | End: 2021-04-20
Payer: MEDICARE

## 2021-04-20 VITALS
WEIGHT: 226.4 LBS | RESPIRATION RATE: 16 BRPM | HEART RATE: 85 BPM | HEIGHT: 70 IN | SYSTOLIC BLOOD PRESSURE: 111 MMHG | BODY MASS INDEX: 32.41 KG/M2 | OXYGEN SATURATION: 95 % | DIASTOLIC BLOOD PRESSURE: 69 MMHG | TEMPERATURE: 97.1 F

## 2021-04-20 DIAGNOSIS — C34.91 ADENOCARCINOMA OF RIGHT LUNG (HCC): Primary | ICD-10-CM

## 2021-04-20 LAB
ABSOLUTE EOS #: 0.08 K/UL (ref 0–0.44)
ABSOLUTE IMMATURE GRANULOCYTE: <0.03 K/UL (ref 0–0.3)
ABSOLUTE LYMPH #: 0.88 K/UL (ref 1.1–3.7)
ABSOLUTE MONO #: 0.54 K/UL (ref 0.1–1.2)
ALBUMIN SERPL-MCNC: 4.1 G/DL (ref 3.5–5.2)
ALBUMIN/GLOBULIN RATIO: 1 (ref 1–2.5)
ALP BLD-CCNC: 81 U/L (ref 40–129)
ALT SERPL-CCNC: 15 U/L (ref 5–41)
ANION GAP SERPL CALCULATED.3IONS-SCNC: 11 MMOL/L (ref 9–17)
AST SERPL-CCNC: 17 U/L
BASOPHILS # BLD: 0 % (ref 0–2)
BASOPHILS ABSOLUTE: <0.03 K/UL (ref 0–0.2)
BILIRUB SERPL-MCNC: 0.38 MG/DL (ref 0.3–1.2)
BUN BLDV-MCNC: 16 MG/DL (ref 8–23)
BUN/CREAT BLD: 17 (ref 9–20)
CALCIUM SERPL-MCNC: 10.1 MG/DL (ref 8.6–10.4)
CHLORIDE BLD-SCNC: 103 MMOL/L (ref 98–107)
CO2: 28 MMOL/L (ref 20–31)
CREAT SERPL-MCNC: 0.94 MG/DL (ref 0.7–1.2)
DIFFERENTIAL TYPE: ABNORMAL
EOSINOPHILS RELATIVE PERCENT: 1 % (ref 1–4)
GFR AFRICAN AMERICAN: >60 ML/MIN
GFR NON-AFRICAN AMERICAN: >60 ML/MIN
GFR SERPL CREATININE-BSD FRML MDRD: ABNORMAL ML/MIN/{1.73_M2}
GFR SERPL CREATININE-BSD FRML MDRD: ABNORMAL ML/MIN/{1.73_M2}
GLUCOSE BLD-MCNC: 139 MG/DL (ref 70–99)
HCT VFR BLD CALC: 36.4 % (ref 40.7–50.3)
HEMOGLOBIN: 11.6 G/DL (ref 13–17)
IMMATURE GRANULOCYTES: 0 %
LYMPHOCYTES # BLD: 14 % (ref 24–43)
MCH RBC QN AUTO: 30.6 PG (ref 25.2–33.5)
MCHC RBC AUTO-ENTMCNC: 31.9 G/DL (ref 25.2–33.5)
MCV RBC AUTO: 96 FL (ref 82.6–102.9)
MONOCYTES # BLD: 8 % (ref 3–12)
NRBC AUTOMATED: 0 PER 100 WBC
PDW BLD-RTO: 14.5 % (ref 11.8–14.4)
PLATELET # BLD: 188 K/UL (ref 138–453)
PLATELET ESTIMATE: ABNORMAL
PMV BLD AUTO: 9.1 FL (ref 8.1–13.5)
POTASSIUM SERPL-SCNC: 3.9 MMOL/L (ref 3.7–5.3)
RBC # BLD: 3.79 M/UL (ref 4.21–5.77)
RBC # BLD: ABNORMAL 10*6/UL
SEG NEUTROPHILS: 77 % (ref 36–65)
SEGMENTED NEUTROPHILS ABSOLUTE COUNT: 5 K/UL (ref 1.5–8.1)
SODIUM BLD-SCNC: 142 MMOL/L (ref 135–144)
TOTAL PROTEIN: 8.1 G/DL (ref 6.4–8.3)
TSH SERPL DL<=0.05 MIU/L-ACNC: 3.1 MIU/L (ref 0.3–5)
WBC # BLD: 6.5 K/UL (ref 3.5–11.3)
WBC # BLD: ABNORMAL 10*3/UL

## 2021-04-20 PROCEDURE — 80053 COMPREHEN METABOLIC PANEL: CPT

## 2021-04-20 PROCEDURE — 85025 COMPLETE CBC W/AUTO DIFF WBC: CPT

## 2021-04-20 PROCEDURE — 84443 ASSAY THYROID STIM HORMONE: CPT

## 2021-04-20 PROCEDURE — 6360000002 HC RX W HCPCS: Performed by: INTERNAL MEDICINE

## 2021-04-20 PROCEDURE — 2580000003 HC RX 258: Performed by: INTERNAL MEDICINE

## 2021-04-20 PROCEDURE — 96413 CHEMO IV INFUSION 1 HR: CPT

## 2021-04-20 PROCEDURE — 36591 DRAW BLOOD OFF VENOUS DEVICE: CPT

## 2021-04-20 RX ORDER — SODIUM CHLORIDE 9 MG/ML
20 INJECTION, SOLUTION INTRAVENOUS ONCE
Status: DISCONTINUED | OUTPATIENT
Start: 2021-04-20 | End: 2021-04-21 | Stop reason: HOSPADM

## 2021-04-20 RX ORDER — SODIUM CHLORIDE 0.9 % (FLUSH) 0.9 %
10 SYRINGE (ML) INJECTION PRN
Status: DISCONTINUED | OUTPATIENT
Start: 2021-04-20 | End: 2021-04-21 | Stop reason: HOSPADM

## 2021-04-20 RX ORDER — HEPARIN SODIUM (PORCINE) LOCK FLUSH IV SOLN 100 UNIT/ML 100 UNIT/ML
500 SOLUTION INTRAVENOUS PRN
Status: DISCONTINUED | OUTPATIENT
Start: 2021-04-20 | End: 2021-04-21 | Stop reason: HOSPADM

## 2021-04-20 RX ADMIN — SODIUM CHLORIDE 1120 MG: 9 INJECTION, SOLUTION INTRAVENOUS at 13:43

## 2021-04-20 RX ADMIN — Medication 10 ML: at 12:54

## 2021-04-20 RX ADMIN — SODIUM CHLORIDE 20 ML/HR: 9 INJECTION, SOLUTION INTRAVENOUS at 12:55

## 2021-04-20 RX ADMIN — HEPARIN 500 UNITS: 100 SYRINGE at 14:52

## 2021-04-20 NOTE — PROGRESS NOTES
VAD accessed per protocol using 20 gauge 3/4\" camargo needle. Flushes easily. Labs drawn. NSS infusing. Patient states he is going to talk to his cardiologist about reducing his metoprolol due to low BP and dizziness/lightheadedness.

## 2021-04-20 NOTE — PROGRESS NOTES
Infusion complete. Flushed VAD with 10 ml of saline and 5 ml of heparin flush. D/C camargo needle. Band aid to site. Patient stable and discharged to home.

## 2021-04-27 ENCOUNTER — HOSPITAL ENCOUNTER (OUTPATIENT)
Dept: CT IMAGING | Age: 76
Discharge: HOME | End: 2021-04-29
Payer: MEDICARE

## 2021-04-27 DIAGNOSIS — C34.91 ADENOCARCINOMA OF RIGHT LUNG (HCC): ICD-10-CM

## 2021-04-27 PROCEDURE — 6360000004 HC RX CONTRAST MEDICATION: Performed by: INTERNAL MEDICINE

## 2021-04-27 PROCEDURE — 71260 CT THORAX DX C+: CPT

## 2021-04-27 RX ADMIN — IOPAMIDOL 80 ML: 755 INJECTION, SOLUTION INTRAVENOUS at 13:09

## 2021-05-04 ENCOUNTER — OFFICE VISIT (OUTPATIENT)
Dept: ONCOLOGY | Age: 76
End: 2021-05-04
Payer: MEDICARE

## 2021-05-04 ENCOUNTER — HOSPITAL ENCOUNTER (OUTPATIENT)
Dept: INFUSION THERAPY | Age: 76
Discharge: HOME | End: 2021-05-04
Payer: MEDICARE

## 2021-05-04 ENCOUNTER — HOSPITAL ENCOUNTER (OUTPATIENT)
Dept: PHARMACY | Age: 76
Setting detail: THERAPIES SERIES
Discharge: HOME | End: 2021-05-04
Payer: MEDICARE

## 2021-05-04 VITALS
RESPIRATION RATE: 16 BRPM | TEMPERATURE: 97.3 F | SYSTOLIC BLOOD PRESSURE: 126 MMHG | HEART RATE: 72 BPM | DIASTOLIC BLOOD PRESSURE: 78 MMHG

## 2021-05-04 VITALS
OXYGEN SATURATION: 95 % | SYSTOLIC BLOOD PRESSURE: 126 MMHG | HEIGHT: 70 IN | HEART RATE: 72 BPM | DIASTOLIC BLOOD PRESSURE: 78 MMHG | BODY MASS INDEX: 32.35 KG/M2 | RESPIRATION RATE: 16 BRPM | WEIGHT: 226 LBS | TEMPERATURE: 97.3 F

## 2021-05-04 DIAGNOSIS — C77.9 REGIONAL LYMPH NODE METASTASIS PRESENT (HCC): ICD-10-CM

## 2021-05-04 DIAGNOSIS — Z29.8 IMMUNOTHERAPY: ICD-10-CM

## 2021-05-04 DIAGNOSIS — I48.0 PAROXYSMAL A-FIB (HCC): ICD-10-CM

## 2021-05-04 DIAGNOSIS — C34.91 ADENOCARCINOMA OF RIGHT LUNG (HCC): Primary | ICD-10-CM

## 2021-05-04 DIAGNOSIS — J70.0 RADIATION PNEUMONITIS (HCC): ICD-10-CM

## 2021-05-04 DIAGNOSIS — J90 PLEURAL EFFUSION, RIGHT: ICD-10-CM

## 2021-05-04 LAB
ABSOLUTE EOS #: 0.07 K/UL (ref 0–0.44)
ABSOLUTE IMMATURE GRANULOCYTE: <0.03 K/UL (ref 0–0.3)
ABSOLUTE LYMPH #: 0.93 K/UL (ref 1.1–3.7)
ABSOLUTE MONO #: 0.63 K/UL (ref 0.1–1.2)
ALBUMIN SERPL-MCNC: 4 G/DL (ref 3.5–5.2)
ALBUMIN/GLOBULIN RATIO: 1.1 (ref 1–2.5)
ALP BLD-CCNC: 76 U/L (ref 40–129)
ALT SERPL-CCNC: 14 U/L (ref 5–41)
ANION GAP SERPL CALCULATED.3IONS-SCNC: 9 MMOL/L (ref 9–17)
AST SERPL-CCNC: 15 U/L
BASOPHILS # BLD: 0 % (ref 0–2)
BASOPHILS ABSOLUTE: 0.03 K/UL (ref 0–0.2)
BILIRUB SERPL-MCNC: 0.35 MG/DL (ref 0.3–1.2)
BUN BLDV-MCNC: 15 MG/DL (ref 8–23)
BUN/CREAT BLD: 14 (ref 9–20)
CALCIUM SERPL-MCNC: 9.8 MG/DL (ref 8.6–10.4)
CHLORIDE BLD-SCNC: 100 MMOL/L (ref 98–107)
CO2: 29 MMOL/L (ref 20–31)
CREAT SERPL-MCNC: 1.04 MG/DL (ref 0.7–1.2)
DIFFERENTIAL TYPE: ABNORMAL
EOSINOPHILS RELATIVE PERCENT: 1 % (ref 1–4)
GFR AFRICAN AMERICAN: >60 ML/MIN
GFR NON-AFRICAN AMERICAN: >60 ML/MIN
GFR SERPL CREATININE-BSD FRML MDRD: ABNORMAL ML/MIN/{1.73_M2}
GFR SERPL CREATININE-BSD FRML MDRD: ABNORMAL ML/MIN/{1.73_M2}
GLUCOSE BLD-MCNC: 130 MG/DL (ref 70–99)
HCT VFR BLD CALC: 35.4 % (ref 40.7–50.3)
HEMOGLOBIN: 11.2 G/DL (ref 13–17)
IMMATURE GRANULOCYTES: 0 %
INR BLD: 2.5
LYMPHOCYTES # BLD: 13 % (ref 24–43)
MCH RBC QN AUTO: 30.3 PG (ref 25.2–33.5)
MCHC RBC AUTO-ENTMCNC: 31.6 G/DL (ref 25.2–33.5)
MCV RBC AUTO: 95.7 FL (ref 82.6–102.9)
MONOCYTES # BLD: 9 % (ref 3–12)
NRBC AUTOMATED: 0 PER 100 WBC
PDW BLD-RTO: 14.5 % (ref 11.8–14.4)
PLATELET # BLD: 197 K/UL (ref 138–453)
PLATELET ESTIMATE: ABNORMAL
PMV BLD AUTO: 9.1 FL (ref 8.1–13.5)
POTASSIUM SERPL-SCNC: 4 MMOL/L (ref 3.7–5.3)
PROTIME: 29.5 SECONDS
RBC # BLD: 3.7 M/UL (ref 4.21–5.77)
RBC # BLD: ABNORMAL 10*6/UL
SEG NEUTROPHILS: 77 % (ref 36–65)
SEGMENTED NEUTROPHILS ABSOLUTE COUNT: 5.42 K/UL (ref 1.5–8.1)
SODIUM BLD-SCNC: 138 MMOL/L (ref 135–144)
TOTAL PROTEIN: 7.8 G/DL (ref 6.4–8.3)
TSH SERPL DL<=0.05 MIU/L-ACNC: 3.44 MIU/L (ref 0.3–5)
WBC # BLD: 7.1 K/UL (ref 3.5–11.3)
WBC # BLD: ABNORMAL 10*3/UL

## 2021-05-04 PROCEDURE — 1036F TOBACCO NON-USER: CPT | Performed by: INTERNAL MEDICINE

## 2021-05-04 PROCEDURE — 36416 COLLJ CAPILLARY BLOOD SPEC: CPT

## 2021-05-04 PROCEDURE — 6360000002 HC RX W HCPCS: Performed by: INTERNAL MEDICINE

## 2021-05-04 PROCEDURE — G8417 CALC BMI ABV UP PARAM F/U: HCPCS | Performed by: INTERNAL MEDICINE

## 2021-05-04 PROCEDURE — 1123F ACP DISCUSS/DSCN MKR DOCD: CPT | Performed by: INTERNAL MEDICINE

## 2021-05-04 PROCEDURE — 99211 OFF/OP EST MAY X REQ PHY/QHP: CPT

## 2021-05-04 PROCEDURE — 99214 OFFICE O/P EST MOD 30 MIN: CPT | Performed by: INTERNAL MEDICINE

## 2021-05-04 PROCEDURE — 85025 COMPLETE CBC W/AUTO DIFF WBC: CPT

## 2021-05-04 PROCEDURE — 84443 ASSAY THYROID STIM HORMONE: CPT

## 2021-05-04 PROCEDURE — 85610 PROTHROMBIN TIME: CPT

## 2021-05-04 PROCEDURE — 96413 CHEMO IV INFUSION 1 HR: CPT

## 2021-05-04 PROCEDURE — 2580000003 HC RX 258: Performed by: INTERNAL MEDICINE

## 2021-05-04 PROCEDURE — 4040F PNEUMOC VAC/ADMIN/RCVD: CPT | Performed by: INTERNAL MEDICINE

## 2021-05-04 PROCEDURE — G8427 DOCREV CUR MEDS BY ELIG CLIN: HCPCS | Performed by: INTERNAL MEDICINE

## 2021-05-04 PROCEDURE — 36591 DRAW BLOOD OFF VENOUS DEVICE: CPT

## 2021-05-04 PROCEDURE — 80053 COMPREHEN METABOLIC PANEL: CPT

## 2021-05-04 PROCEDURE — 3017F COLORECTAL CA SCREEN DOC REV: CPT | Performed by: INTERNAL MEDICINE

## 2021-05-04 RX ORDER — HEPARIN SODIUM (PORCINE) LOCK FLUSH IV SOLN 100 UNIT/ML 100 UNIT/ML
500 SOLUTION INTRAVENOUS PRN
Status: DISCONTINUED | OUTPATIENT
Start: 2021-05-04 | End: 2021-05-05 | Stop reason: HOSPADM

## 2021-05-04 RX ORDER — SODIUM CHLORIDE 0.9 % (FLUSH) 0.9 %
10 SYRINGE (ML) INJECTION PRN
Status: DISCONTINUED | OUTPATIENT
Start: 2021-05-04 | End: 2021-05-05 | Stop reason: HOSPADM

## 2021-05-04 RX ORDER — SODIUM CHLORIDE 9 MG/ML
20 INJECTION, SOLUTION INTRAVENOUS ONCE
Status: COMPLETED | OUTPATIENT
Start: 2021-05-04 | End: 2021-05-04

## 2021-05-04 RX ADMIN — SODIUM CHLORIDE 1100 MG: 9 INJECTION, SOLUTION INTRAVENOUS at 14:09

## 2021-05-04 RX ADMIN — Medication 10 ML: at 12:55

## 2021-05-04 RX ADMIN — SODIUM CHLORIDE 20 ML/HR: 9 INJECTION, SOLUTION INTRAVENOUS at 12:55

## 2021-05-04 RX ADMIN — HEPARIN 500 UNITS: 100 SYRINGE at 15:17

## 2021-05-04 NOTE — PATIENT INSTRUCTIONS
\"On day of next appointment, please screen for temperature and COVID-19 symptoms prior to you clinic appointment. If any symptoms present, please call [phone] to reschedule. \"

## 2021-05-04 NOTE — PROGRESS NOTES
Margi Mak                                                                                                                  5/4/2021  MRN:   [id number]  YOB: 1945  PCP:                           Kristina Sanders DO  Referring Physician: No ref. provider found  Treating Physician Name: Federico Ruffin MD      Reason for visit:  Chief Complaint   Patient presents with    Lung Cancer     follow up CT     Current problems:  Adenocarcinoma of right lung, clinical stage at least T1c, N2, M0 (stage IIIA)  Subcarinal lymph node metastasis    Active and recent treatments:  concurrent chemoradiation using carboplatin and Taxol  Consolidation therapy with Imfinzi1/2021    Summary of Case/History:    Margi Mak a 77 y.o.male is a patient with biopsy confirmed adenocarcinoma of right lung presents to the office to establish care and for further evaluation treatment recommendations. Patient was initially seen by pulmonary team for lung nodules. Patient CT scan from July 2019 showed a right-sided pleural calcification thickening concerning for asbestos exposure. Patient also noted to have multiple pulmonary nodules measuring between 1.5 x 1.3 cm. Follow-up CT chest from July 2020 showed increase in size of the right lower lobe lung nodule which now measured 2.1 cm. Subsequently patient underwent CT PET on 8/22 which showed FDG avid right lower lobe lung nodule with SUV of 4.6. Patient CT PET was also positive for subcarinal lymph node with SUV of 4.6. Patient underwent bronchoscopy with endobronchial ultrasound biopsy of the subcarinal lymph node confirmed adenocarcinoma. Patient has significant history of tobacco dependence around 30-pack-year however he quit about 25 years ago. Patient does have coronary artery disease status post stent placement. Patient also gives history of occupational asthma and exposure to bacteria.   Patient does give history of weight loss but describes it as intentional.  Denies headaches dizziness chest pain abdominal pain nausea bone pain     Interim History:     Patient presents to clinic for follow-up visit and to discuss results of lab work-up and other relevant clinical data. Patient denies any complaint or interval event. CT scan shows interval development of consolidative changes in the right lower lobe. There is also small right-sided pleural effusion. Patient feels that his exercise tolerance is improving. Denies fever chills. During this visit patient's allergy, social, medical, surgical history and medications were reviewed and updated. Past Medical History:   Past Medical History:   Diagnosis Date    Abdominal hernia     small, no significant symptomatology.  Abnormal PSA     with history of slight increase.  Allergic rhinitis     Benign prostatic hypertrophy     Chest pain     occasional.     Coronary artery disease     status post drug eluting stent placement, LAD, ostial obtuse marginal.     Dysphagia     Erectile dysfunction     Familial hyperlipidemia     with hypertriglyceridemia.  Gastroesophageal reflux disease     Hearing loss, bilateral     hearing aid in the left ear only. 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     Hypertension     Impaired fasting glucose     prediabetes.  Lumbar disc herniation     L3-L4, with chronic back pain.  Mild anemia     Nasal polyps     minimal symptoms.  Nasal septal deviation     right side.  Obesity     Palpitations     occasional.     Peptic ulcer disease     Reactive airway disease     asthma, industrial related, also a history of hypersensitivity pneumonitis.      Urinary frequency        Past Surgical History:     Past Surgical History:   Procedure Laterality Date    APPENDECTOMY  age 11    BRONCHOSCOPY  09/17/2020    BRONCHOSCOPY N/A 9/17/2020    EBUS- BRONCHOSCOPY ENDOBRONCHIAL ULTRASOUND, PATHOLOGY REQUESTED- CINDY NOTIFIED, GI UNIT SCHEDULED performed by Dottie Yee MD at [address]  10/20/2011    occluded LAD and ostial obtuse marginal stenosis, underwent drug eluting stents to both, RCA small, nondominant, occluded, ejection fraction 45%.  CARDIAC CATHETERIZATION  08/2018    with stent placement    CATARACT REMOVAL Bilateral 03/2018    COLONOSCOPY  01/21/2011    mild sigmoid diverticulosis.  COLONOSCOPY  4/6/16    hemorrhoid; sigmoid diverticulosis    KNEE ARTHROSCOPY Left 03/19/2010    partial medial and lateral synovectomies, partial medial meniscectomy, chondroplasty.  NASAL FRACTURE SURGERY  1960    OTHER SURGICAL HISTORY Left 02/12/2010    knee injection.  PORT SURGERY N/A 10/14/2020    RIGHT PORT INSERTION performed by Kacey Elias DO at [facility] PRE-MAL[facility]NT / [address] Left 01/16/2012    actinic keratoses, ear, liquid nitrogen.  PRE-MALIGNANT / BENIGN SKIN LESION EXCISION Left 07/19/2011    actinic keratosis, upper ear, liquid nitrogen.  PROSTATE BIOPSY Bilateral 09/08/2015    Benign    REPAIR UMBILICAL BRES,7+H/Y,EPAXH N/A 7/04/6529    UMBILICAL Hernia Repair w/ Mesh performed by Magdalene Arauz MD at [facility] SEPTOPLASTY  10/30/2015    with bilateral submucosal resection of the inferior turbinates, left middle turbinate elza bullosa resection done by Dr Flannery Lean ARTHROSCOPY Left 10/17/14    W/ SUBACROMIAL DECOMPRESSION, DEBRIDEMENT ET CHONDROPLASTY    UPPER GASTROINTESTINAL ENDOSCOPY  01/21/2011    superficial ulcer of the fundus, erostive gastritis.      VASECTOMY Bilateral 04/04/1980       Patient Family Social History:     Social History     Socioeconomic History    Marital status:      Spouse name: None    Number of children: None    Years of education: None    Highest education level: None   Occupational History    None   Social Needs    Financial resource strain: None    Food insecurity     Worry: None     Inability: None    Transportation needs     Medical: None     Non-medical: None   Tobacco Use    Smoking status: Former Smoker     Packs/day: 2.00     Years: 15.00     Pack years: 30.00     Quit date: 1985     Years since quittin.3    Smokeless tobacco: Never Used    Tobacco comment: smoked 2 packs a day for 14 to 15 years, quit in    Substance and Sexual Activity    Alcohol use: No     Alcohol/week: 0.0 standard drinks     Comment: rare    Drug use: No    Sexual activity: None   Lifestyle    Physical activity     Days per week: None     Minutes per session: None    Stress: None   Relationships    Social connections     Talks on phone: None     Gets together: None     Attends Denominational service: None     Active member of club or organization: None     Attends meetings of clubs or organizations: None     Relationship status: None    Intimate partner violence     Fear of current or ex partner: None     Emotionally abused: None     Physically abused: None     Forced sexual activity: None   Other Topics Concern    None   Social History Narrative    None     Family History   Problem Relation Age of Onset    Cancer Mother         lymphoma    Thyroid Disease Mother         hypothyroidism    Stroke Mother     Heart Disease Mother     High Blood Pressure Mother     Heart Attack Mother         in her 46s    Heart Failure Father         congestive    Coronary Art Dis Father     Emphysema Father         was a smoker    Heart Disease Maternal Grandmother     Heart Disease Maternal Grandfather     Crohn's Disease Sister     High Cholesterol Sister     High Cholesterol Child        Current Medications:     Current Outpatient Medications   Medication Sig Dispense Refill    Metoprolol Tartrate 75 MG TABS TAKE 1 TABLET BY MOUTH TWO TIMES A DAY      pravastatin (PRAVACHOL) 40 MG tablet TAKE ONE TABLET BY MOUTH ONCE EVERY EVENING 90 tablet 3    clopidogrel (PLAVIX) 75 MG tablet TAKE 1 TABLET BY MOUTH DAILY 90 tablet 1    furosemide (LASIX) 20 MG tablet TAKE 1 TABLET BY MOUTH ONE TIME A DAY 90 tablet 1    oxybutynin (DITROPAN-XL) 10 MG extended release tablet Take 1 tablet by mouth daily 90 tablet 1    warfarin (COUMADIN) 2.5 MG tablet Take 2 tablets by mouth daily Take 2 tablets (5 mg) by mouth daily. Or as directed by INR results 60 tablet 3    flecainide (TAMBOCOR) 50 MG tablet Take 50 mg by mouth 2 times daily      losartan (COZAAR) 50 MG tablet TAKE 1 TABLET BY MOUTH ONE TIME A DAY  90 tablet 1    levothyroxine (SYNTHROID) 100 MCG tablet TAKE 1 TABLET BY MOUTH ONE TIME A DAY  90 tablet 1    tamsulosin (FLOMAX) 0.4 MG capsule TAKE 1 CAPSULE BY MOUTH ONE TIME A DAY 90 capsule 1    ondansetron (ZOFRAN) 4 MG tablet Take 4 mg by mouth every 6 hours as needed for Nausea or Vomiting Disp 60 with 3 refills called in 10/20/2020      ECHINACEA HERB PO Take by mouth daily      albuterol sulfate HFA (VENTOLIN HFA) 108 (90 Base) MCG/ACT inhaler Inhale 2 puffs into the lungs every 6 hours as needed for Wheezing or Shortness of Breath 1 Inhaler 6    Multiple Vitamins-Minerals (MULTIVITAMIN PO) daily. No current facility-administered medications for this visit. Facility-Administered Medications Ordered in Other Visits   Medication Dose Route Frequency Provider Last Rate Last Admin    sodium chloride flush 0.9 % injection 10 mL  10 mL Intravenous PRN Inder Bassett MD   10 mL at 05/04/21 1255    heparin flush 100 UNIT/ML injection 500 Units  500 Units Intracatheter PRN Inder Bassett MD   500 Units at 05/04/21 1517       Allergies:   Effient [prasugrel], Ace inhibitors, and Statins    Review of Systems:    Constitutional: No fever or chills.  No night sweats, + weight loss   Eyes: No eye discharge, double vision, or eye pain   HEENT: negative for sore mouth, sore throat, hoarseness and voice change   Respiratory: negative for cough , sputum, dyspnea, wheezing, hemoptysis, chest pain   Cardiovascular: negative for chest pain, dyspnea, palpitations, orthopnea, PND   Gastrointestinal: negative for nausea, vomiting, diarrhea, constipation, abdominal pain, Dysphagia, hematemesis and hematochezia   Genitourinary: negative for frequency, dysuria, nocturia, urinary incontinence, and hematuria   Integument: negative for rash, skin lesions, bruises.    Hematologic/Lymphatic: negative for easy bruising, bleeding, lymphadenopathy, or petechiae   Endocrine: negative for heat or cold intolerance,weight changes, change in bowel habits and hair loss   Musculoskeletal: negative for myalgias, arthralgias, pain, joint swelling,and bone pain   Neurological: negative for headaches, dizziness, seizures, weakness, numbness        Physical Exam:  Vitals: /78 Comment: infusion room  Pulse 72 Comment: infusion room  Temp 97.3 °F (36.3 °C) Comment: infusion room  Resp 16 Comment: infusion room  Ht 5' 10\" (1.778 m)   Wt 226 lb (102.5 kg)   SpO2 95% Comment: infusion room  BMI 32.43 kg/m²   General appearance - well appearing, no in pain or distress  Mental status - AAO X3  Eyes - pupils equal and reactive, extraocular eye movements intact  Mouth - mucous membranes moist, pharynx normal without lesions  Neck - supple, no significant adenopathy  Lymphatics - no palpable lymphadenopathy, no hepatosplenomegaly  Chest -decreased breathing sounds bilaterally  Heart - normal rate, regular rhythm, normal S1, S2, no murmurs  Abdomen - soft, nontender, nondistended, no masses or organomegaly  Neurological - alert, oriented, normal speech, no focal findings or movement disorder noted  Extremities - peripheral pulses normal, no pedal edema, no clubbing or cyanosis  Skin - normal coloration and turgor, no rashes, no suspicious skin lesions noted             DATA:    Results for orders placed or performed during the hospital encounter of 05/04/21   Protime-INR   Result Value Ref Range    INR 2.5     Protime 29.5 seconds     -- Diagnosis --   BAL

## 2021-05-04 NOTE — PROGRESS NOTES
Patient on unit for immunotherapy. Mediport accessed, labs drawn, NSS infusing. Erinn checking patients INR.

## 2021-05-04 NOTE — PROGRESS NOTES
ANTICOAGULATION SERVICE    Date of Clinic Visit:  5/4/2021    Guilherme Reed is a 77 y.o. male who presents to clinic today for anticoagulation monitoring and adjustment. Recent INR Results:  Internal QC passed  Lab Results   Component Value Date    INR 2.5 05/04/2021    INR 2.3 04/06/2021       Current Warfarin Dosage:  Dosing Plan  As of 5/4/2021    TTR:  100.0 % (2.2 mo)   Full warfarin instructions:  5 mg every Mon, Wed, Fri; 7.5 mg all other days               Assessment/Plan:    Continue current regimen as INR remains stable. Next Clinic Appointment:  Return date  As of 5/4/2021    TTR:  100.0 % (2.2 mo)   Next INR check:  6/1/2021             Please call [facility] at [phone] with any questions. Thanks!   Charlie Huerta [facility]  Anticoagulation Service Pharmacist  5/4/2021 1:13 PM  For Pharmacy Admin Tracking Only     Time Spent (min): 10

## 2021-05-04 NOTE — PROGRESS NOTES
Imfinzi infused. Mediport flushed with NSS and heparin. Kenyon needle d/c'd, 2x2 and paper tape covering site. Patient denies any complaints and left unit for  appointment in stable condition.

## 2021-05-04 NOTE — PROGRESS NOTES
Progress Note  Date:2021       Room:Room/bed info not found  Patient Name:Bang Mak     YOB: 1945     Age:75 y.o. Subjective    Subjective   Review of Systems  Objective         Vitals Last 24 Hours:  TEMPERATURE:  Temp  Av.3 °F (36.3 °C)  Min: 97.3 °F (36.3 °C)  Max: 97.3 °F (36.3 °C)  RESPIRATIONS RANGE: Resp  Av  Min: 16  Max: 16  PULSE OXIMETRY RANGE: No data recorded  PULSE RANGE: Pulse  Av  Min: 72  Max: 72  BLOOD PRESSURE RANGE: Systolic (22LTD), FYA:739 , Min:126 , BKD:648   ; Diastolic (56LGY), OAR:31, Min:78, Max:78    I/O (24Hr): No intake or output data in the 24 hours ending 21 1313  Objective  Labs/Imaging/Diagnostics    Labs:  CBC:  Recent Labs     21  1300   WBC 7.1   RBC 3.70*   HGB 11.2*   HCT 35.4*   MCV 95.7   RDW 14.5*        CHEMISTRIES:No results for input(s): NA, K, CL, CO2, BUN, CREATININE, GLUCOSE, PHOS, MG in the last 72 hours. Invalid input(s): CA  PT/INR:  Recent Labs     21   PROTIME 29.5   INR 2.5     APTT:No results for input(s): APTT in the last 72 hours. LIVER PROFILE:No results for input(s): AST, ALT, BILIDIR, BILITOT, ALKPHOS in the last 72 hours. Imaging Last 24 Hours:  No results found.   Assessment//Plan           Assessment & Plan    Electronically signed by Tea Hitchcock, [address] [place] [address] on 21 at 1:13 PM EDT

## 2021-05-04 NOTE — PROGRESS NOTES
Writer l/m for [facility] to schedule Therapeutic and DX thoracentesis

## 2021-05-05 ENCOUNTER — TELEPHONE (OUTPATIENT)
Dept: GENERAL RADIOLOGY | Age: 76
End: 2021-05-05

## 2021-05-05 DIAGNOSIS — J70.0 RADIATION PNEUMONITIS (HCC): Primary | ICD-10-CM

## 2021-05-05 DIAGNOSIS — C34.91 ADENOCARCINOMA OF RIGHT LUNG (HCC): ICD-10-CM

## 2021-05-05 NOTE — TELEPHONE ENCOUNTER
I have a referral for Maxwell Rodríguez to have a Thoracentesis done. Per oncology, he is on Plavix, Aspirin, and Coumadin. Per Radiologist recommendation, we typically advise patients to discontinue each of these 5 days prior to scheduled procedure. Can you please advise whether or not this is acceptable or what your recommendations are?   Thanks,  [facility]  Radiology

## 2021-05-06 NOTE — TELEPHONE ENCOUNTER
Lucero called after talking to the pt and pt states he IS taking aspirin as well as the Plavix and coumadin. Pt is also worried about holding the coumadin since they just got his INR right.  Please advise pt.    [phone]

## 2021-05-07 ENCOUNTER — TELEPHONE (OUTPATIENT)
Dept: CASE MANAGEMENT | Age: 76
End: 2021-05-07

## 2021-05-07 NOTE — TELEPHONE ENCOUNTER
Name: Guilherme Reed  : 1945  MRN: [id number]    Oncology Navigation Follow-Up Note    Navigator spoke with pt. Offering assistance, pt. . Pt. Having thoracentesis -diagnostic in [place] , plan to follow.    Electronically signed by Meriel Mortimer, RN on 2021 at 4:09 PM

## 2021-05-11 ENCOUNTER — TELEPHONE (OUTPATIENT)
Dept: GENERAL RADIOLOGY | Age: 76
End: 2021-05-11

## 2021-05-12 ENCOUNTER — HOSPITAL ENCOUNTER (OUTPATIENT)
Dept: INTERVENTIONAL RADIOLOGY/VASCULAR | Age: 76
Discharge: HOME | End: 2021-05-14
Payer: MEDICARE

## 2021-05-12 ENCOUNTER — TELEPHONE (OUTPATIENT)
Dept: ONCOLOGY | Age: 76
End: 2021-05-12

## 2021-05-12 DIAGNOSIS — C34.91 ADENOCARCINOMA OF RIGHT LUNG (HCC): ICD-10-CM

## 2021-05-12 DIAGNOSIS — J70.0 RADIATION PNEUMONITIS (HCC): ICD-10-CM

## 2021-05-12 PROCEDURE — 32555 ASPIRATE PLEURA W/ IMAGING: CPT

## 2021-05-12 NOTE — PROGRESS NOTES
Patient here for scheduled thoracentesis. Dr. Jamie Prince in to assess US scan and informed patient that there is not enough pleural fluid to remove. Procedure cancelled for today. Patient & spouse left surgery dept. @ 1200. Dr. Brock Manual office notified.

## 2021-05-12 NOTE — TELEPHONE ENCOUNTER
Daishanathaniel Bustillo called and notified unable to do thoracentesis due to not enough fluid.  Dr. Kilo Haines notified and he will discuss with pt on 05/18/2021 virtual visit

## 2021-05-18 ENCOUNTER — VIRTUAL VISIT (OUTPATIENT)
Dept: ONCOLOGY | Age: 76
End: 2021-05-18
Payer: MEDICARE

## 2021-05-18 ENCOUNTER — HOSPITAL ENCOUNTER (OUTPATIENT)
Dept: INFUSION THERAPY | Age: 76
Discharge: HOME | End: 2021-05-18
Payer: MEDICARE

## 2021-05-18 VITALS
DIASTOLIC BLOOD PRESSURE: 66 MMHG | OXYGEN SATURATION: 97 % | SYSTOLIC BLOOD PRESSURE: 97 MMHG | HEIGHT: 70 IN | BODY MASS INDEX: 32.44 KG/M2 | HEART RATE: 73 BPM | WEIGHT: 226.6 LBS | RESPIRATION RATE: 16 BRPM | TEMPERATURE: 98.4 F

## 2021-05-18 DIAGNOSIS — J90 PLEURAL EFFUSION, RIGHT: ICD-10-CM

## 2021-05-18 DIAGNOSIS — C34.91 ADENOCARCINOMA OF RIGHT LUNG (HCC): Primary | ICD-10-CM

## 2021-05-18 DIAGNOSIS — Z29.8 IMMUNOTHERAPY: ICD-10-CM

## 2021-05-18 DIAGNOSIS — C77.9 REGIONAL LYMPH NODE METASTASIS PRESENT (HCC): ICD-10-CM

## 2021-05-18 DIAGNOSIS — J70.0 RADIATION PNEUMONITIS (HCC): ICD-10-CM

## 2021-05-18 LAB
ABSOLUTE EOS #: 0.08 K/UL (ref 0–0.44)
ABSOLUTE IMMATURE GRANULOCYTE: <0.03 K/UL (ref 0–0.3)
ABSOLUTE LYMPH #: 0.86 K/UL (ref 1.1–3.7)
ABSOLUTE MONO #: 0.53 K/UL (ref 0.1–1.2)
ALBUMIN SERPL-MCNC: 3.9 G/DL (ref 3.5–5.2)
ALBUMIN/GLOBULIN RATIO: 1.1 (ref 1–2.5)
ALP BLD-CCNC: 70 U/L (ref 40–129)
ALT SERPL-CCNC: 15 U/L (ref 5–41)
ANION GAP SERPL CALCULATED.3IONS-SCNC: 10 MMOL/L (ref 9–17)
AST SERPL-CCNC: 17 U/L
BASOPHILS # BLD: 0 % (ref 0–2)
BASOPHILS ABSOLUTE: 0.03 K/UL (ref 0–0.2)
BILIRUB SERPL-MCNC: 0.34 MG/DL (ref 0.3–1.2)
BUN BLDV-MCNC: 18 MG/DL (ref 8–23)
BUN/CREAT BLD: 18 (ref 9–20)
CALCIUM SERPL-MCNC: 9.8 MG/DL (ref 8.6–10.4)
CHLORIDE BLD-SCNC: 101 MMOL/L (ref 98–107)
CO2: 27 MMOL/L (ref 20–31)
CREAT SERPL-MCNC: 0.98 MG/DL (ref 0.7–1.2)
DIFFERENTIAL TYPE: ABNORMAL
EOSINOPHILS RELATIVE PERCENT: 1 % (ref 1–4)
GFR AFRICAN AMERICAN: >60 ML/MIN
GFR NON-AFRICAN AMERICAN: >60 ML/MIN
GFR SERPL CREATININE-BSD FRML MDRD: ABNORMAL ML/MIN/{1.73_M2}
GFR SERPL CREATININE-BSD FRML MDRD: ABNORMAL ML/MIN/{1.73_M2}
GLUCOSE BLD-MCNC: 161 MG/DL (ref 70–99)
HCT VFR BLD CALC: 34.7 % (ref 40.7–50.3)
HEMOGLOBIN: 11 G/DL (ref 13–17)
IMMATURE GRANULOCYTES: 0 %
LYMPHOCYTES # BLD: 13 % (ref 24–43)
MCH RBC QN AUTO: 30.2 PG (ref 25.2–33.5)
MCHC RBC AUTO-ENTMCNC: 31.7 G/DL (ref 25.2–33.5)
MCV RBC AUTO: 95.3 FL (ref 82.6–102.9)
MONOCYTES # BLD: 8 % (ref 3–12)
NRBC AUTOMATED: 0 PER 100 WBC
PDW BLD-RTO: 15.1 % (ref 11.8–14.4)
PLATELET # BLD: 200 K/UL (ref 138–453)
PLATELET ESTIMATE: ABNORMAL
PMV BLD AUTO: 9.1 FL (ref 8.1–13.5)
POTASSIUM SERPL-SCNC: 3.9 MMOL/L (ref 3.7–5.3)
RBC # BLD: 3.64 M/UL (ref 4.21–5.77)
RBC # BLD: ABNORMAL 10*6/UL
SEG NEUTROPHILS: 78 % (ref 36–65)
SEGMENTED NEUTROPHILS ABSOLUTE COUNT: 5.28 K/UL (ref 1.5–8.1)
SODIUM BLD-SCNC: 138 MMOL/L (ref 135–144)
TOTAL PROTEIN: 7.4 G/DL (ref 6.4–8.3)
TSH SERPL DL<=0.05 MIU/L-ACNC: 2.98 MIU/L (ref 0.3–5)
WBC # BLD: 6.8 K/UL (ref 3.5–11.3)
WBC # BLD: ABNORMAL 10*3/UL

## 2021-05-18 PROCEDURE — 4040F PNEUMOC VAC/ADMIN/RCVD: CPT | Performed by: INTERNAL MEDICINE

## 2021-05-18 PROCEDURE — G8428 CUR MEDS NOT DOCUMENT: HCPCS | Performed by: INTERNAL MEDICINE

## 2021-05-18 PROCEDURE — 36591 DRAW BLOOD OFF VENOUS DEVICE: CPT

## 2021-05-18 PROCEDURE — 84443 ASSAY THYROID STIM HORMONE: CPT

## 2021-05-18 PROCEDURE — 2580000003 HC RX 258: Performed by: INTERNAL MEDICINE

## 2021-05-18 PROCEDURE — 96413 CHEMO IV INFUSION 1 HR: CPT

## 2021-05-18 PROCEDURE — 1123F ACP DISCUSS/DSCN MKR DOCD: CPT | Performed by: INTERNAL MEDICINE

## 2021-05-18 PROCEDURE — 80053 COMPREHEN METABOLIC PANEL: CPT

## 2021-05-18 PROCEDURE — 6360000002 HC RX W HCPCS: Performed by: INTERNAL MEDICINE

## 2021-05-18 PROCEDURE — 99211 OFF/OP EST MAY X REQ PHY/QHP: CPT

## 2021-05-18 PROCEDURE — 85025 COMPLETE CBC W/AUTO DIFF WBC: CPT

## 2021-05-18 PROCEDURE — 99214 OFFICE O/P EST MOD 30 MIN: CPT | Performed by: INTERNAL MEDICINE

## 2021-05-18 PROCEDURE — 3017F COLORECTAL CA SCREEN DOC REV: CPT | Performed by: INTERNAL MEDICINE

## 2021-05-18 RX ORDER — DIPHENHYDRAMINE HYDROCHLORIDE 50 MG/ML
50 INJECTION INTRAMUSCULAR; INTRAVENOUS ONCE
Status: CANCELLED | OUTPATIENT
Start: 2021-05-18 | End: 2021-05-18

## 2021-05-18 RX ORDER — HEPARIN SODIUM (PORCINE) LOCK FLUSH IV SOLN 100 UNIT/ML 100 UNIT/ML
500 SOLUTION INTRAVENOUS PRN
Status: DISCONTINUED | OUTPATIENT
Start: 2021-05-18 | End: 2021-05-19 | Stop reason: HOSPADM

## 2021-05-18 RX ORDER — EPINEPHRINE 1 MG/ML
0.3 INJECTION, SOLUTION, CONCENTRATE INTRAVENOUS PRN
Status: CANCELLED | OUTPATIENT
Start: 2021-05-18

## 2021-05-18 RX ORDER — HEPARIN SODIUM (PORCINE) LOCK FLUSH IV SOLN 100 UNIT/ML 100 UNIT/ML
500 SOLUTION INTRAVENOUS PRN
Status: CANCELLED | OUTPATIENT
Start: 2021-05-18

## 2021-05-18 RX ORDER — SODIUM CHLORIDE 9 MG/ML
INJECTION, SOLUTION INTRAVENOUS CONTINUOUS
Status: CANCELLED | OUTPATIENT
Start: 2021-05-18

## 2021-05-18 RX ORDER — SODIUM CHLORIDE 0.9 % (FLUSH) 0.9 %
10 SYRINGE (ML) INJECTION PRN
Status: CANCELLED | OUTPATIENT
Start: 2021-05-18

## 2021-05-18 RX ORDER — METHYLPREDNISOLONE SODIUM SUCCINATE 125 MG/2ML
125 INJECTION, POWDER, LYOPHILIZED, FOR SOLUTION INTRAMUSCULAR; INTRAVENOUS ONCE
Status: CANCELLED | OUTPATIENT
Start: 2021-05-18 | End: 2021-05-18

## 2021-05-18 RX ORDER — SODIUM CHLORIDE 0.9 % (FLUSH) 0.9 %
5 SYRINGE (ML) INJECTION PRN
Status: CANCELLED | OUTPATIENT
Start: 2021-05-18

## 2021-05-18 RX ORDER — SODIUM CHLORIDE 9 MG/ML
20 INJECTION, SOLUTION INTRAVENOUS ONCE
Status: CANCELLED | OUTPATIENT
Start: 2021-05-18 | End: 2021-05-18

## 2021-05-18 RX ORDER — SODIUM CHLORIDE 9 MG/ML
20 INJECTION, SOLUTION INTRAVENOUS ONCE
Status: COMPLETED | OUTPATIENT
Start: 2021-05-18 | End: 2021-05-18

## 2021-05-18 RX ORDER — SODIUM CHLORIDE 0.9 % (FLUSH) 0.9 %
10 SYRINGE (ML) INJECTION PRN
Status: DISCONTINUED | OUTPATIENT
Start: 2021-05-18 | End: 2021-05-19 | Stop reason: HOSPADM

## 2021-05-18 RX ADMIN — HEPARIN 500 UNITS: 100 SYRINGE at 15:48

## 2021-05-18 RX ADMIN — SODIUM CHLORIDE 1100 MG: 9 INJECTION, SOLUTION INTRAVENOUS at 14:38

## 2021-05-18 RX ADMIN — SODIUM CHLORIDE, PRESERVATIVE FREE 10 ML: 5 INJECTION INTRAVENOUS at 13:30

## 2021-05-18 RX ADMIN — SODIUM CHLORIDE 20 ML/HR: 9 INJECTION, SOLUTION INTRAVENOUS at 13:30

## 2021-05-18 NOTE — PROGRESS NOTES
Infusion complete. Flushed VAD with 10 ml of saline and 5 ml of heparin flush. D/C camargo needle. Band aid to site. Patient stable and discharged to home. Will return in 2 weeks for next treatment.

## 2021-05-18 NOTE — PROGRESS NOTES
Patient on unit for imfinzi infusion. Mediport accessed, labs drawn, NSS infusing. Patient states he feels really good. Patient on teladoc with  for virtual visit.

## 2021-05-21 ENCOUNTER — OFFICE VISIT (OUTPATIENT)
Dept: PRIMARY CARE CLINIC | Age: 76
End: 2021-05-21
Payer: MEDICARE

## 2021-05-21 ENCOUNTER — HOSPITAL ENCOUNTER (OUTPATIENT)
Dept: GENERAL RADIOLOGY | Age: 76
Discharge: HOME | End: 2021-05-23
Payer: MEDICARE

## 2021-05-21 VITALS
SYSTOLIC BLOOD PRESSURE: 120 MMHG | BODY MASS INDEX: 32.43 KG/M2 | OXYGEN SATURATION: 96 % | WEIGHT: 226 LBS | DIASTOLIC BLOOD PRESSURE: 80 MMHG | HEART RATE: 72 BPM

## 2021-05-21 DIAGNOSIS — R07.81 RIB PAIN ON RIGHT SIDE: ICD-10-CM

## 2021-05-21 DIAGNOSIS — R07.81 RIB PAIN ON RIGHT SIDE: Primary | ICD-10-CM

## 2021-05-21 PROCEDURE — G8427 DOCREV CUR MEDS BY ELIG CLIN: HCPCS | Performed by: FAMILY MEDICINE

## 2021-05-21 PROCEDURE — 99213 OFFICE O/P EST LOW 20 MIN: CPT | Performed by: FAMILY MEDICINE

## 2021-05-21 PROCEDURE — 71101 X-RAY EXAM UNILAT RIBS/CHEST: CPT

## 2021-05-21 PROCEDURE — 4040F PNEUMOC VAC/ADMIN/RCVD: CPT | Performed by: FAMILY MEDICINE

## 2021-05-21 PROCEDURE — 3017F COLORECTAL CA SCREEN DOC REV: CPT | Performed by: FAMILY MEDICINE

## 2021-05-21 PROCEDURE — 1036F TOBACCO NON-USER: CPT | Performed by: FAMILY MEDICINE

## 2021-05-21 PROCEDURE — 1123F ACP DISCUSS/DSCN MKR DOCD: CPT | Performed by: FAMILY MEDICINE

## 2021-05-21 PROCEDURE — G8417 CALC BMI ABV UP PARAM F/U: HCPCS | Performed by: FAMILY MEDICINE

## 2021-05-21 RX ORDER — PREDNISONE 20 MG/1
20 TABLET ORAL DAILY
Qty: 4 TABLET | Refills: 0 | Status: SHIPPED | OUTPATIENT
Start: 2021-05-21 | End: 2021-05-25

## 2021-05-21 ASSESSMENT — ENCOUNTER SYMPTOMS
SHORTNESS OF BREATH: 1
COUGH: 1
HEMOPTYSIS: 0
WHEEZING: 0
SPUTUM PRODUCTION: 0
CHEST TIGHTNESS: 0

## 2021-05-21 NOTE — PROGRESS NOTES
[address] [place] [address]  Dept: [phone]  Dept Fax: [phone]  Loc: [phone]    Therese Burris is a 77 y.o. male who presents today for his medical conditions/complaints as noted below. Salvatore Mak is c/o of   Chief Complaint   Patient presents with    Chest Pain     sneezed and felt rib pop on R side 3 days ago. hx of lung cancer on same side. HPI:     Here today for right rib pain. Chest Pain   This is a new problem. The current episode started in the past 7 days. The onset quality is sudden. The problem occurs daily. The problem has been unchanged. The pain is present in the lateral region. The pain is at a severity of 8/10. The pain is severe. The quality of the pain is described as stabbing and sharp. The pain does not radiate. Associated symptoms include a cough (chronic) and shortness of breath (chronic). Pertinent negatives include no dizziness, fever, hemoptysis, lower extremity edema, malaise/fatigue, palpitations, sputum production or weakness. The cough has no precipitants. The cough is non-productive. Nothing relieves the cough. Nothing worsens the cough. The pain is aggravated by nothing. He has tried NSAIDs for the symptoms. The treatment provided no relief. His past medical history is significant for cancer. Past medical history comments: lung cancer           Past Medical History:   Diagnosis Date    Abdominal hernia     small, no significant symptomatology.  Abnormal PSA     with history of slight increase.  Allergic rhinitis     Benign prostatic hypertrophy     Chest pain     occasional.     Coronary artery disease     status post drug eluting stent placement, LAD, ostial obtuse marginal.     Dysphagia     Erectile dysfunction     Familial hyperlipidemia     with hypertriglyceridemia.      Gastroesophageal reflux disease     Hearing loss, bilateral     hearing aid in the left ear only. 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     Hypertension     Impaired fasting glucose     prediabetes.  Lumbar disc herniation     L3-L4, with chronic back pain.  Mild anemia     Nasal polyps     minimal symptoms.  Nasal septal deviation     right side.  Obesity     Palpitations     occasional.     Peptic ulcer disease     Reactive airway disease     asthma, industrial related, also a history of hypersensitivity pneumonitis.  Urinary frequency           Social History     Tobacco Use    Smoking status: Former Smoker     Packs/day: 2.00     Years: 15.00     Pack years: 30.00     Quit date: 1985     Years since quittin.4    Smokeless tobacco: Never Used    Tobacco comment: smoked 2 packs a day for 14 to 15 years, quit in    Substance Use Topics    Alcohol use: No     Alcohol/week: 0.0 standard drinks     Comment: rare     Current Outpatient Medications   Medication Sig Dispense Refill    predniSONE (DELTASONE) 20 MG tablet Take 1 tablet by mouth daily for 4 days 4 tablet 0    Metoprolol Tartrate 75 MG TABS TAKE 1 TABLET BY MOUTH TWO TIMES A DAY      pravastatin (PRAVACHOL) 40 MG tablet TAKE ONE TABLET BY MOUTH ONCE EVERY EVENING 90 tablet 3    clopidogrel (PLAVIX) 75 MG tablet TAKE 1 TABLET BY MOUTH DAILY 90 tablet 1    furosemide (LASIX) 20 MG tablet TAKE 1 TABLET BY MOUTH ONE TIME A DAY 90 tablet 1    oxybutynin (DITROPAN-XL) 10 MG extended release tablet Take 1 tablet by mouth daily 90 tablet 1    warfarin (COUMADIN) 2.5 MG tablet Take 2 tablets by mouth daily Take 2 tablets (5 mg) by mouth daily.  Or as directed by INR results 60 tablet 3    flecainide (TAMBOCOR) 50 MG tablet Take 50 mg by mouth 2 times daily      losartan (COZAAR) 50 MG tablet TAKE 1 TABLET BY MOUTH ONE TIME A DAY  90 tablet 1    levothyroxine (SYNTHROID) 100 MCG tablet TAKE 1 TABLET BY MOUTH ONE TIME A DAY  90 tablet 1    tamsulosin (FLOMAX) 0.4 MG capsule TAKE 1 CAPSULE BY MOUTH ONE TIME A DAY 90 capsule 1    ondansetron (ZOFRAN) 4 MG tablet Take 4 mg by mouth every 6 hours as needed for Nausea or Vomiting Disp 60 with 3 refills called in 10/20/2020      ECHINACEA HERB PO Take by mouth daily      albuterol sulfate HFA (VENTOLIN HFA) 108 (90 Base) MCG/ACT inhaler Inhale 2 puffs into the lungs every 6 hours as needed for Wheezing or Shortness of Breath 1 Inhaler 6    Multiple Vitamins-Minerals (MULTIVITAMIN PO) daily. No current facility-administered medications for this visit. Allergies   Allergen Reactions    Effient [Prasugrel] Other (See Comments)     Superficial skin bleeding.  Ace Inhibitors Other (See Comments)     Cough.  Statins Other (See Comments)     Myalgias. Subjective:     Review of Systems   Constitutional: Negative for activity change, appetite change, chills, fatigue, fever and malaise/fatigue. Eyes: Negative for visual disturbance. Respiratory: Positive for cough (chronic) and shortness of breath (chronic). Negative for hemoptysis, sputum production, chest tightness and wheezing. Cardiovascular: Positive for chest pain (right rib). Negative for palpitations and leg swelling. Genitourinary: Negative for difficulty urinating. Skin: Negative for rash. Neurological: Negative for dizziness, syncope, weakness and light-headedness. Objective:      Physical Exam  Vitals and nursing note reviewed. Constitutional:       General: He is not in acute distress. Appearance: He is well-developed. Eyes:      Conjunctiva/sclera: Conjunctivae normal.   Cardiovascular:      Rate and Rhythm: Normal rate and regular rhythm. Heart sounds: Normal heart sounds. No murmur heard. Pulmonary:      Effort: Pulmonary effort is normal. No respiratory distress. Breath sounds: Normal breath sounds. No wheezing or rales. Musculoskeletal:         General: Normal range of motion.       Cervical back: Normal range of motion and neck supple. Lymphadenopathy:      Cervical: No cervical adenopathy. Skin:     General: Skin is warm and dry. Findings: No rash. Neurological:      Mental Status: He is alert and oriented to person, place, and time. /80 (Site: Left Upper Arm, Position: Sitting, Cuff Size: Large Adult)   Pulse 72   Wt 226 lb (102.5 kg)   SpO2 96%   BMI 32.43 kg/m²     Assessment:       Diagnosis Orders   1. Rib pain on right side  XR RIBS RIGHT INCLUDE CHEST (MIN 3 VIEWS)      XR RIBS RIGHT INCLUDE CHEST (MIN 3 VIEWS)    Result Date: 5/21/2021  EXAMINATION: 6 XRAY VIEWS OF THE RIGHT RIBS WITH FRONTAL XRAY VIEW OF THE CHEST 5/21/2021 11:43 am COMPARISON: 04/27/2021 HISTORY: ORDERING SYSTEM PROVIDED HISTORY: Rib pain on right side TECHNOLOGIST PROVIDED HISTORY: Reason for Exam: Sneezed hard the 2-3 days ago, pain in lower right ribs since Acuity: Acute Type of Exam: Initial Right rib pain. Initial examination. FINDINGS: A right IJ port distal tip is in the high right atrium. There are post radiation/treatment changes in the right lung, which appear stable. No pleural effusion or pneumothorax is demonstrated. The heart is normal in size. No right rib fracture is seen. 1. No right rib fracture. 2. Stable right lung opacities, favored to be secondary to post radiation/treatment change. Continued CT follow-up is suggested. Plan:        Rib pain: new; I was concerned about a met vs cancer progression vs a rib fracture. His xray was normal so I reassured him that this is likely costochondritis. I will treat with prednisone. Return if symptoms worsen or fail to improve.     Orders Placed This Encounter   Procedures    XR RIBS RIGHT INCLUDE CHEST (MIN 3 VIEWS)     Standing Status:   Future     Number of Occurrences:   1     Standing Expiration Date:   5/21/2022     Orders Placed This Encounter   Medications    predniSONE (DELTASONE) 20 MG tablet     Sig: Take 1

## 2021-05-24 ENCOUNTER — TELEPHONE (OUTPATIENT)
Dept: CASE MANAGEMENT | Age: 76
End: 2021-05-24

## 2021-05-24 NOTE — TELEPHONE ENCOUNTER
Name: Jose Armando Bates  : 1945  MRN: [id number]    Oncology Navigation Follow-Up Note  Navigator spoke with pt. Offering assistance . Pt. Denies needs at this time. Pt. Has been taking items to the flea market on the weekend fulfilling activites of daily living.  Plan to follow  Electronically signed by Teena Rojas RN on 2021 at 10:34 AM

## 2021-05-30 NOTE — PROGRESS NOTES
Cristhian Mak                                                                                                                  5/18/2021  MRN:   [id number]  YOB: 1945  PCP:                           Kelsey Lechuga DO  Referring Physician: No ref. provider found  Treating Physician Name: Alma Pate MD      Reason for visit:  Chief Complaint   Patient presents with    Lung Cancer     Current problems:  Adenocarcinoma of right lung, clinical stage at least T1c, N2, M0 (stage IIIA)  Subcarinal lymph node metastasis    Active and recent treatments:  concurrent chemoradiation using carboplatin and Taxol  Consolidation therapy with Imfinzi-1/2021    Summary of Case/History:    Cristhian Mak a 77 y.o.male is a patient with biopsy confirmed adenocarcinoma of right lung presents to the office to establish care and for further evaluation treatment recommendations. Patient was initially seen by pulmonary team for lung nodules. Patient CT scan from July 2019 showed a right-sided pleural calcification thickening concerning for asbestos exposure. Patient also noted to have multiple pulmonary nodules measuring between 1.5 x 1.3 cm. Follow-up CT chest from July 2020 showed increase in size of the right lower lobe lung nodule which now measured 2.1 cm. Subsequently patient underwent CT PET on 8/22 which showed FDG avid right lower lobe lung nodule with SUV of 4.6. Patient CT PET was also positive for subcarinal lymph node with SUV of 4.6. Patient underwent bronchoscopy with endobronchial ultrasound biopsy of the subcarinal lymph node confirmed adenocarcinoma. Patient has significant history of tobacco dependence around 30-pack-year however he quit about 25 years ago. Patient does have coronary artery disease status post stent placement. Patient also gives history of occupational asthma and exposure to bacteria.   Patient does give history of weight loss but describes it as intentional. Metoprolol Tartrate 75 MG TABS TAKE 1 TABLET BY MOUTH TWO TIMES A DAY      pravastatin (PRAVACHOL) 40 MG tablet TAKE ONE TABLET BY MOUTH ONCE EVERY EVENING 90 tablet 3    clopidogrel (PLAVIX) 75 MG tablet TAKE 1 TABLET BY MOUTH DAILY 90 tablet 1    furosemide (LASIX) 20 MG tablet TAKE 1 TABLET BY MOUTH ONE TIME A DAY 90 tablet 1    oxybutynin (DITROPAN-XL) 10 MG extended release tablet Take 1 tablet by mouth daily 90 tablet 1    warfarin (COUMADIN) 2.5 MG tablet Take 2 tablets by mouth daily Take 2 tablets (5 mg) by mouth daily. Or as directed by INR results 60 tablet 3    flecainide (TAMBOCOR) 50 MG tablet Take 50 mg by mouth 2 times daily      losartan (COZAAR) 50 MG tablet TAKE 1 TABLET BY MOUTH ONE TIME A DAY  90 tablet 1    levothyroxine (SYNTHROID) 100 MCG tablet TAKE 1 TABLET BY MOUTH ONE TIME A DAY  90 tablet 1    tamsulosin (FLOMAX) 0.4 MG capsule TAKE 1 CAPSULE BY MOUTH ONE TIME A DAY 90 capsule 1    ondansetron (ZOFRAN) 4 MG tablet Take 4 mg by mouth every 6 hours as needed for Nausea or Vomiting Disp 60 with 3 refills called in 10/20/2020      ECHINACEA HERB PO Take by mouth daily      albuterol sulfate HFA (VENTOLIN HFA) 108 (90 Base) MCG/ACT inhaler Inhale 2 puffs into the lungs every 6 hours as needed for Wheezing or Shortness of Breath 1 Inhaler 6    Multiple Vitamins-Minerals (MULTIVITAMIN PO) daily. No current facility-administered medications for this visit. Allergies:   Effient [prasugrel], Ace inhibitors, and Statins    Review of Systems:    Constitutional: No fever or chills.  No night sweats, + weight loss   Eyes: No eye discharge, double vision, or eye pain   HEENT: negative for sore mouth, sore throat, hoarseness and voice change   Respiratory: negative for cough , sputum, dyspnea, wheezing, hemoptysis, chest pain   Cardiovascular: negative for chest pain, dyspnea, palpitations, orthopnea, PND   Gastrointestinal: negative for nausea, vomiting, diarrhea, constipation, abdominal pain, Dysphagia, hematemesis and hematochezia   Genitourinary: negative for frequency, dysuria, nocturia, urinary incontinence, and hematuria   Integument: negative for rash, skin lesions, bruises. Hematologic/Lymphatic: negative for easy bruising, bleeding, lymphadenopathy, or petechiae   Endocrine: negative for heat or cold intolerance,weight changes, change in bowel habits and hair loss   Musculoskeletal: negative for myalgias, arthralgias, pain, joint swelling,and bone pain   Neurological: negative for headaches, dizziness, seizures, weakness, numbness            PHYSICAL EXAMINATION:    Vital Signs: (As obtained by patient/caregiver or practitioner observation)    Blood pressure-  Heart rate-    Respiratory rate-    Temperature-  Pulse oximetry-     Constitutional: [x] Appears well-developed and well-nourished [x] No apparent distress      [] Abnormal-   Mental status  [x] Alert and awake  [x] Oriented to person/place/time [x]Able to follow commands      Eyes:  EOM    [x]  Normal  [] Abnormal-  Sclera  [x]  Normal  [] Abnormal -         Discharge []  None visible  [] Abnormal -    HENT:   [x] Normocephalic, atraumatic.   [] Abnormal   [x] Mouth/Throat: Mucous membranes are moist.     External Ears [x] Normal  [] Abnormal-     Neck: [x] No visualized mass     Pulmonary/Chest: [x] Respiratory effort normal.  [x] No visualized signs of difficulty breathing or respiratory distress        [] Abnormal-      Musculoskeletal:   [] Normal gait with no signs of ataxia         [x] Normal range of motion of neck        [] Abnormal-       Neurological:        [x] No Facial Asymmetry (Cranial nerve 7 motor function) (limited exam to video visit)          [x] No gaze palsy        [] Abnormal-         Skin:        [x] No significant exanthematous lesions or discoloration noted on facial skin         [] Abnormal-            Psychiatric:       [x] Normal Affect [x] No Hallucinations        [] Abnormal-     Other pertinent observable physical exam findings-     Due to this being a TeleHealth encounter, evaluation of the following organ systems is limited: Vitals/Constitutional/EENT/Resp/CV/GI//MS/Neuro/Skin/Heme-Lymph-Imm.             DATA:    Results for orders placed or performed during the hospital encounter of 05/18/21   Comprehensive Metabolic Panel   Result Value Ref Range    Glucose 161 (H) 70 - 99 mg/dL    BUN 18 8 - 23 mg/dL    CREATININE 0.98 0.70 - 1.20 mg/dL    Bun/Cre Ratio 18 9 - 20    Calcium 9.8 8.6 - 10.4 mg/dL    Sodium 138 135 - 144 mmol/L    Potassium 3.9 3.7 - 5.3 mmol/L    Chloride 101 98 - 107 mmol/L    CO2 27 20 - 31 mmol/L    Anion Gap 10 9 - 17 mmol/L    Alkaline Phosphatase 70 40 - 129 U/L    ALT 15 5 - 41 U/L    AST 17 <40 U/L    Total Bilirubin 0.34 0.3 - 1.2 mg/dL    Total Protein 7.4 6.4 - 8.3 g/dL    Albumin 3.9 3.5 - 5.2 g/dL    Albumin/Globulin Ratio 1.1 1.0 - 2.5    GFR Non-African American >60 >60 mL/min    GFR African American >60 >60 mL/min    GFR Comment          GFR Staging NOT REPORTED    CBC Auto Differential   Result Value Ref Range    WBC 6.8 3.5 - 11.3 k/uL    RBC 3.64 (L) 4.21 - 5.77 m/uL    Hemoglobin 11.0 (L) 13.0 - 17.0 g/dL    Hematocrit 34.7 (L) 40.7 - 50.3 %    MCV 95.3 82.6 - 102.9 fL    MCH 30.2 25.2 - 33.5 pg    MCHC 31.7 25.2 - 33.5 g/dL    RDW 15.1 (H) 11.8 - 14.4 %    Platelets 392 607 - 774 k/uL    MPV 9.1 8.1 - 13.5 fL    NRBC Automated 0.0 0.0 per 100 WBC    Differential Type NOT REPORTED     Seg Neutrophils 78 (H) 36 - 65 %    Lymphocytes 13 (L) 24 - 43 %    Monocytes 8 3 - 12 %    Eosinophils % 1 1 - 4 %    Basophils 0 0 - 2 %    Immature Granulocytes 0 0 %    Segs Absolute 5.28 1.50 - 8.10 k/uL    Absolute Lymph # 0.86 (L) 1.10 - 3.70 k/uL    Absolute Mono # 0.53 0.10 - 1.20 k/uL    Absolute Eos # 0.08 0.00 - 0.44 k/uL    Basophils Absolute 0.03 0.00 - 0.20 k/uL    Absolute Immature Granulocyte <0.03 0.00 - 0.30 k/uL    WBC Morphology NOT REPORTED     RBC Morphology ANISOCYTOSIS PRESENT     Platelet Estimate NOT REPORTED    TSH with Reflex   Result Value Ref Range    TSH 2.98 0.30 - 5.00 mIU/L     -- Diagnosis --   BAL RIGHT LOWER LOBE:           NEGATIVE FOR MALIGNANCY.             FINE NEEDLE ASPIRATION STATION 7 EBUS:           ADENOCARCINOMA, COMPATIBLE WITH LUNG PRIMARY. CT PET    Impression    1. The previously described nodule involving the right lung base is FDG avid. Tissue sampling is recommended as malignancy is suspected. 2. The previously identified smaller lung nodules involving the lower lobes    are too small for PET CT characterization.  CT follow-up is recommended. 3. FDG avid subcarinal and right hilar lymph nodes suggestive of metastatic    disease. 4. No abnormal FDG activity identified within the neck, abdomen or pelvis.                 Impression:  Adenocarcinoma of right lung, clinical stage at least T1c, N2, M0 (stage IIIA)  Subcarinal lymph node metastasis  Weight loss  Radiation pneumonitis and shortness of breath      Plan:  I had a detailed discussion with the patient and we went over results of lab work-up imaging studies and other relevant clinical data  Toxicity check performed. Shortness breath continues to improve. Per intervention radiology pleural effusion tube is too small for diagnostic thoracentesis  Reviewed goals and expectation  Continue Imfinzi. Monitor for therapy related pneumonitis  NCCN guidelines were reviewed and discussed with the patient. The diagnosis and care plan were discussed with the patient in detail. I discussed the natural history of the disease, prognosis, risks and goals of therapy and answered all the patients questions to the best of my ability. Patient expressed understanding and was in agreement.     Tisha Braxton MD          This note is created with the assistance of a speech recognition program.  While intending to generate a document that actually reflects the content of the visit, the document can still have some errors including those of syntax and sound a like substitutions which may escape proof reading. It such instances, actual meaning can be extrapolated by contextual diversion.

## 2021-06-01 ENCOUNTER — HOSPITAL ENCOUNTER (OUTPATIENT)
Dept: PHARMACY | Age: 76
Setting detail: THERAPIES SERIES
Discharge: HOME | End: 2021-06-01
Payer: MEDICARE

## 2021-06-01 ENCOUNTER — HOSPITAL ENCOUNTER (OUTPATIENT)
Dept: INFUSION THERAPY | Age: 76
Discharge: HOME | End: 2021-06-01
Payer: MEDICARE

## 2021-06-01 ENCOUNTER — OFFICE VISIT (OUTPATIENT)
Dept: ONCOLOGY | Age: 76
End: 2021-06-01
Payer: MEDICARE

## 2021-06-01 VITALS
DIASTOLIC BLOOD PRESSURE: 60 MMHG | HEIGHT: 70 IN | SYSTOLIC BLOOD PRESSURE: 110 MMHG | HEART RATE: 68 BPM | BODY MASS INDEX: 32.35 KG/M2 | RESPIRATION RATE: 16 BRPM | WEIGHT: 226 LBS | OXYGEN SATURATION: 97 % | TEMPERATURE: 98.3 F

## 2021-06-01 VITALS
TEMPERATURE: 98.3 F | RESPIRATION RATE: 16 BRPM | DIASTOLIC BLOOD PRESSURE: 60 MMHG | SYSTOLIC BLOOD PRESSURE: 110 MMHG | OXYGEN SATURATION: 97 % | HEART RATE: 68 BPM

## 2021-06-01 DIAGNOSIS — J90 PLEURAL EFFUSION, RIGHT: ICD-10-CM

## 2021-06-01 DIAGNOSIS — C34.91 ADENOCARCINOMA OF RIGHT LUNG (HCC): Primary | ICD-10-CM

## 2021-06-01 DIAGNOSIS — Z29.8 IMMUNOTHERAPY: ICD-10-CM

## 2021-06-01 DIAGNOSIS — I48.0 PAROXYSMAL A-FIB (HCC): Primary | ICD-10-CM

## 2021-06-01 DIAGNOSIS — C77.9 REGIONAL LYMPH NODE METASTASIS PRESENT (HCC): ICD-10-CM

## 2021-06-01 DIAGNOSIS — J70.0 RADIATION PNEUMONITIS (HCC): ICD-10-CM

## 2021-06-01 LAB
ABSOLUTE EOS #: 0.06 K/UL (ref 0–0.44)
ABSOLUTE IMMATURE GRANULOCYTE: <0.03 K/UL (ref 0–0.3)
ABSOLUTE LYMPH #: 0.87 K/UL (ref 1.1–3.7)
ABSOLUTE MONO #: 0.52 K/UL (ref 0.1–1.2)
ALBUMIN SERPL-MCNC: 4.1 G/DL (ref 3.5–5.2)
ALBUMIN/GLOBULIN RATIO: 1.2 (ref 1–2.5)
ALP BLD-CCNC: 87 U/L (ref 40–129)
ALT SERPL-CCNC: 20 U/L (ref 5–41)
ANION GAP SERPL CALCULATED.3IONS-SCNC: 11 MMOL/L (ref 9–17)
AST SERPL-CCNC: 20 U/L
BASOPHILS # BLD: 0 % (ref 0–2)
BASOPHILS ABSOLUTE: 0.03 K/UL (ref 0–0.2)
BILIRUB SERPL-MCNC: 0.24 MG/DL (ref 0.3–1.2)
BUN BLDV-MCNC: 16 MG/DL (ref 8–23)
BUN/CREAT BLD: 17 (ref 9–20)
CALCIUM SERPL-MCNC: 9.6 MG/DL (ref 8.6–10.4)
CHLORIDE BLD-SCNC: 103 MMOL/L (ref 98–107)
CO2: 27 MMOL/L (ref 20–31)
CREAT SERPL-MCNC: 0.92 MG/DL (ref 0.7–1.2)
DIFFERENTIAL TYPE: ABNORMAL
EOSINOPHILS RELATIVE PERCENT: 1 % (ref 1–4)
GFR AFRICAN AMERICAN: >60 ML/MIN
GFR NON-AFRICAN AMERICAN: >60 ML/MIN
GFR SERPL CREATININE-BSD FRML MDRD: ABNORMAL ML/MIN/{1.73_M2}
GFR SERPL CREATININE-BSD FRML MDRD: ABNORMAL ML/MIN/{1.73_M2}
GLUCOSE BLD-MCNC: 129 MG/DL (ref 70–99)
HCT VFR BLD CALC: 36.1 % (ref 40.7–50.3)
HEMOGLOBIN: 11.3 G/DL (ref 13–17)
IMMATURE GRANULOCYTES: 0 %
INR BLD: 2.9
LYMPHOCYTES # BLD: 13 % (ref 24–43)
MCH RBC QN AUTO: 30.2 PG (ref 25.2–33.5)
MCHC RBC AUTO-ENTMCNC: 31.3 G/DL (ref 25.2–33.5)
MCV RBC AUTO: 96.5 FL (ref 82.6–102.9)
MONOCYTES # BLD: 8 % (ref 3–12)
NRBC AUTOMATED: 0 PER 100 WBC
PDW BLD-RTO: 15.1 % (ref 11.8–14.4)
PLATELET # BLD: 164 K/UL (ref 138–453)
PLATELET ESTIMATE: ABNORMAL
PMV BLD AUTO: 9.4 FL (ref 8.1–13.5)
POTASSIUM SERPL-SCNC: 3.9 MMOL/L (ref 3.7–5.3)
PROTIME: 34.1 SECONDS
RBC # BLD: 3.74 M/UL (ref 4.21–5.77)
RBC # BLD: ABNORMAL 10*6/UL
SEG NEUTROPHILS: 78 % (ref 36–65)
SEGMENTED NEUTROPHILS ABSOLUTE COUNT: 5.19 K/UL (ref 1.5–8.1)
SODIUM BLD-SCNC: 141 MMOL/L (ref 135–144)
TOTAL PROTEIN: 7.4 G/DL (ref 6.4–8.3)
TSH SERPL DL<=0.05 MIU/L-ACNC: 3.03 MIU/L (ref 0.3–5)
WBC # BLD: 6.7 K/UL (ref 3.5–11.3)
WBC # BLD: ABNORMAL 10*3/UL

## 2021-06-01 PROCEDURE — G8428 CUR MEDS NOT DOCUMENT: HCPCS | Performed by: INTERNAL MEDICINE

## 2021-06-01 PROCEDURE — 85610 PROTHROMBIN TIME: CPT

## 2021-06-01 PROCEDURE — 1123F ACP DISCUSS/DSCN MKR DOCD: CPT | Performed by: INTERNAL MEDICINE

## 2021-06-01 PROCEDURE — 99211 OFF/OP EST MAY X REQ PHY/QHP: CPT

## 2021-06-01 PROCEDURE — 3017F COLORECTAL CA SCREEN DOC REV: CPT | Performed by: INTERNAL MEDICINE

## 2021-06-01 PROCEDURE — 80053 COMPREHEN METABOLIC PANEL: CPT

## 2021-06-01 PROCEDURE — 99213 OFFICE O/P EST LOW 20 MIN: CPT | Performed by: INTERNAL MEDICINE

## 2021-06-01 PROCEDURE — 84443 ASSAY THYROID STIM HORMONE: CPT

## 2021-06-01 PROCEDURE — 6360000002 HC RX W HCPCS: Performed by: INTERNAL MEDICINE

## 2021-06-01 PROCEDURE — 1036F TOBACCO NON-USER: CPT | Performed by: INTERNAL MEDICINE

## 2021-06-01 PROCEDURE — 96413 CHEMO IV INFUSION 1 HR: CPT

## 2021-06-01 PROCEDURE — 4040F PNEUMOC VAC/ADMIN/RCVD: CPT | Performed by: INTERNAL MEDICINE

## 2021-06-01 PROCEDURE — 36416 COLLJ CAPILLARY BLOOD SPEC: CPT

## 2021-06-01 PROCEDURE — 36591 DRAW BLOOD OFF VENOUS DEVICE: CPT

## 2021-06-01 PROCEDURE — 99214 OFFICE O/P EST MOD 30 MIN: CPT | Performed by: INTERNAL MEDICINE

## 2021-06-01 PROCEDURE — 2580000003 HC RX 258: Performed by: INTERNAL MEDICINE

## 2021-06-01 PROCEDURE — G8417 CALC BMI ABV UP PARAM F/U: HCPCS | Performed by: INTERNAL MEDICINE

## 2021-06-01 PROCEDURE — 85025 COMPLETE CBC W/AUTO DIFF WBC: CPT

## 2021-06-01 RX ORDER — SODIUM CHLORIDE 9 MG/ML
20 INJECTION, SOLUTION INTRAVENOUS ONCE
Status: COMPLETED | OUTPATIENT
Start: 2021-06-01 | End: 2021-06-01

## 2021-06-01 RX ORDER — EPINEPHRINE 1 MG/ML
0.3 INJECTION, SOLUTION, CONCENTRATE INTRAVENOUS PRN
Status: CANCELLED | OUTPATIENT
Start: 2021-06-15

## 2021-06-01 RX ORDER — SODIUM CHLORIDE 9 MG/ML
20 INJECTION, SOLUTION INTRAVENOUS ONCE
Status: CANCELLED | OUTPATIENT
Start: 2021-06-01 | End: 2021-06-01

## 2021-06-01 RX ORDER — DIPHENHYDRAMINE HYDROCHLORIDE 50 MG/ML
50 INJECTION INTRAMUSCULAR; INTRAVENOUS ONCE
Status: CANCELLED | OUTPATIENT
Start: 2021-06-15 | End: 2021-06-15

## 2021-06-01 RX ORDER — DIPHENHYDRAMINE HYDROCHLORIDE 50 MG/ML
50 INJECTION INTRAMUSCULAR; INTRAVENOUS ONCE
Status: CANCELLED | OUTPATIENT
Start: 2021-06-01 | End: 2021-06-01

## 2021-06-01 RX ORDER — HEPARIN SODIUM (PORCINE) LOCK FLUSH IV SOLN 100 UNIT/ML 100 UNIT/ML
500 SOLUTION INTRAVENOUS PRN
Status: CANCELLED | OUTPATIENT
Start: 2021-06-15

## 2021-06-01 RX ORDER — EPINEPHRINE 1 MG/ML
0.3 INJECTION, SOLUTION, CONCENTRATE INTRAVENOUS PRN
Status: CANCELLED | OUTPATIENT
Start: 2021-06-01

## 2021-06-01 RX ORDER — SODIUM CHLORIDE 0.9 % (FLUSH) 0.9 %
10 SYRINGE (ML) INJECTION PRN
Status: CANCELLED | OUTPATIENT
Start: 2021-06-01

## 2021-06-01 RX ORDER — SODIUM CHLORIDE 0.9 % (FLUSH) 0.9 %
5 SYRINGE (ML) INJECTION PRN
Status: CANCELLED | OUTPATIENT
Start: 2021-06-01

## 2021-06-01 RX ORDER — WATER 10 ML/10ML
2.2 INJECTION INTRAMUSCULAR; INTRAVENOUS; SUBCUTANEOUS ONCE
Status: CANCELLED | OUTPATIENT
Start: 2021-06-15 | End: 2021-06-15

## 2021-06-01 RX ORDER — SODIUM CHLORIDE 9 MG/ML
INJECTION, SOLUTION INTRAVENOUS CONTINUOUS
Status: CANCELLED | OUTPATIENT
Start: 2021-06-15

## 2021-06-01 RX ORDER — SODIUM CHLORIDE 9 MG/ML
INJECTION, SOLUTION INTRAVENOUS CONTINUOUS
Status: CANCELLED | OUTPATIENT
Start: 2021-06-01

## 2021-06-01 RX ORDER — SODIUM CHLORIDE 0.9 % (FLUSH) 0.9 %
10 SYRINGE (ML) INJECTION PRN
Status: DISCONTINUED | OUTPATIENT
Start: 2021-06-01 | End: 2021-06-02 | Stop reason: HOSPADM

## 2021-06-01 RX ORDER — METHYLPREDNISOLONE SODIUM SUCCINATE 125 MG/2ML
125 INJECTION, POWDER, LYOPHILIZED, FOR SOLUTION INTRAMUSCULAR; INTRAVENOUS ONCE
Status: CANCELLED | OUTPATIENT
Start: 2021-06-15 | End: 2021-06-15

## 2021-06-01 RX ORDER — HEPARIN SODIUM (PORCINE) LOCK FLUSH IV SOLN 100 UNIT/ML 100 UNIT/ML
500 SOLUTION INTRAVENOUS PRN
Status: CANCELLED | OUTPATIENT
Start: 2021-06-01

## 2021-06-01 RX ORDER — WATER 10 ML/10ML
2.2 INJECTION INTRAMUSCULAR; INTRAVENOUS; SUBCUTANEOUS ONCE
Status: CANCELLED | OUTPATIENT
Start: 2021-06-01 | End: 2021-06-01

## 2021-06-01 RX ORDER — SODIUM CHLORIDE 9 MG/ML
20 INJECTION, SOLUTION INTRAVENOUS ONCE
Status: CANCELLED | OUTPATIENT
Start: 2021-06-15 | End: 2021-06-15

## 2021-06-01 RX ORDER — SODIUM CHLORIDE 0.9 % (FLUSH) 0.9 %
5 SYRINGE (ML) INJECTION PRN
Status: CANCELLED | OUTPATIENT
Start: 2021-06-15

## 2021-06-01 RX ORDER — SODIUM CHLORIDE 0.9 % (FLUSH) 0.9 %
10 SYRINGE (ML) INJECTION PRN
Status: CANCELLED | OUTPATIENT
Start: 2021-06-15

## 2021-06-01 RX ORDER — METHYLPREDNISOLONE SODIUM SUCCINATE 125 MG/2ML
125 INJECTION, POWDER, LYOPHILIZED, FOR SOLUTION INTRAMUSCULAR; INTRAVENOUS ONCE
Status: CANCELLED | OUTPATIENT
Start: 2021-06-01 | End: 2021-06-01

## 2021-06-01 RX ORDER — HEPARIN SODIUM (PORCINE) LOCK FLUSH IV SOLN 100 UNIT/ML 100 UNIT/ML
500 SOLUTION INTRAVENOUS PRN
Status: DISCONTINUED | OUTPATIENT
Start: 2021-06-01 | End: 2021-06-02 | Stop reason: HOSPADM

## 2021-06-01 RX ADMIN — SODIUM CHLORIDE 1120 MG: 9 INJECTION, SOLUTION INTRAVENOUS at 14:24

## 2021-06-01 RX ADMIN — SODIUM CHLORIDE 20 ML/HR: 9 INJECTION, SOLUTION INTRAVENOUS at 12:45

## 2021-06-01 RX ADMIN — SODIUM CHLORIDE, PRESERVATIVE FREE 10 ML: 5 INJECTION INTRAVENOUS at 12:45

## 2021-06-01 RX ADMIN — HEPARIN 500 UNITS: 100 SYRINGE at 15:30

## 2021-06-01 RX ADMIN — SODIUM CHLORIDE, PRESERVATIVE FREE 10 ML: 5 INJECTION INTRAVENOUS at 15:30

## 2021-06-01 ASSESSMENT — PAIN SCALES - GENERAL
PAINLEVEL_OUTOF10: 0
PAINLEVEL_OUTOF10: 0

## 2021-06-01 NOTE — PROGRESS NOTES
Never Used    Tobacco comment: smoked 2 packs a day for 14 to 15 years, quit in 1987   Vaping Use    Vaping Use: Never used   Substance and Sexual Activity    Alcohol use: No     Alcohol/week: 0.0 standard drinks     Comment: rare    Drug use: No    Sexual activity: None   Other Topics Concern    None   Social History Narrative    None     Social Determinants of Health     Financial Resource Strain:     Difficulty of Paying Living Expenses:    Food Insecurity:     Worried About Running Out of Food in the Last Year:     Ran Out of Food in the Last Year:    Transportation Needs:     Lack of Transportation (Medical):      Lack of Transportation (Non-Medical):    Physical Activity:     Days of Exercise per Week:     Minutes of Exercise per Session:    Stress:     Feeling of Stress :    Social Connections:     Frequency of Communication with Friends and Family:     Frequency of Social Gatherings with Friends and Family:     Attends Religion Services:     Active Member of Clubs or Organizations:     Attends Club or Organization Meetings:     Marital Status:    Intimate Partner Violence:     Fear of Current or Ex-Partner:     Emotionally Abused:     Physically Abused:     Sexually Abused:      Family History   Problem Relation Age of Onset    Cancer Mother         lymphoma    Thyroid Disease Mother         hypothyroidism    Stroke Mother     Heart Disease Mother     High Blood Pressure Mother     Heart Attack Mother         in her 46s    Heart Failure Father         congestive    Coronary Art Dis Father     Emphysema Father         was a smoker    Heart Disease Maternal Grandmother     Heart Disease Maternal Grandfather     Crohn's Disease Sister     High Cholesterol Sister     High Cholesterol Child        Current Medications:     Current Outpatient Medications   Medication Sig Dispense Refill    Metoprolol Tartrate 75 MG TABS TAKE 1 TABLET BY MOUTH TWO TIMES A DAY      pravastatin (PRAVACHOL) 40 MG tablet TAKE ONE TABLET BY MOUTH ONCE EVERY EVENING 90 tablet 3    clopidogrel (PLAVIX) 75 MG tablet TAKE 1 TABLET BY MOUTH DAILY 90 tablet 1    furosemide (LASIX) 20 MG tablet TAKE 1 TABLET BY MOUTH ONE TIME A DAY 90 tablet 1    oxybutynin (DITROPAN-XL) 10 MG extended release tablet Take 1 tablet by mouth daily 90 tablet 1    warfarin (COUMADIN) 2.5 MG tablet Take 2 tablets by mouth daily Take 2 tablets (5 mg) by mouth daily. Or as directed by INR results 60 tablet 3    flecainide (TAMBOCOR) 50 MG tablet Take 50 mg by mouth 2 times daily      losartan (COZAAR) 50 MG tablet TAKE 1 TABLET BY MOUTH ONE TIME A DAY  90 tablet 1    levothyroxine (SYNTHROID) 100 MCG tablet TAKE 1 TABLET BY MOUTH ONE TIME A DAY  90 tablet 1    tamsulosin (FLOMAX) 0.4 MG capsule TAKE 1 CAPSULE BY MOUTH ONE TIME A DAY 90 capsule 1    ondansetron (ZOFRAN) 4 MG tablet Take 4 mg by mouth every 6 hours as needed for Nausea or Vomiting Disp 60 with 3 refills called in 10/20/2020      ECHINACEA HERB PO Take by mouth daily      albuterol sulfate HFA (VENTOLIN HFA) 108 (90 Base) MCG/ACT inhaler Inhale 2 puffs into the lungs every 6 hours as needed for Wheezing or Shortness of Breath 1 Inhaler 6    Multiple Vitamins-Minerals (MULTIVITAMIN PO) daily. No current facility-administered medications for this visit.      Facility-Administered Medications Ordered in Other Visits   Medication Dose Route Frequency Provider Last Rate Last Admin    0.9 % sodium chloride infusion  20 mL/hr Intravenous Once Umberto Ohara MD 20 mL/hr at 06/01/21 1245 20 mL/hr at 06/01/21 1245    durvalumab (IMFINZI) 1,120 mg in sodium chloride 0.9 % 250 mL chemo IVPB  1,120 mg Intravenous Once Umberto Ohara  mL/hr at 06/01/21 1424 1,120 mg at 06/01/21 1424    sodium chloride flush 0.9 % injection 10 mL  10 mL Intravenous PRN Umberto Ohara MD   10 mL at 06/01/21 1245    heparin flush 100 UNIT/ML injection 500 Units  500 Units no masses or organomegaly  Neurological - alert, oriented, normal speech, no focal findings or movement disorder noted  Extremities - peripheral pulses normal, no pedal edema, no clubbing or cyanosis  Skin - normal coloration and turgor, no rashes, no suspicious skin lesions noted           DATA:    Results for orders placed or performed during the hospital encounter of 06/01/21   Protime-INR   Result Value Ref Range    INR 2.9     Protime 34.1 seconds     -- Diagnosis --   BAL RIGHT LOWER LOBE:           NEGATIVE FOR MALIGNANCY.             FINE NEEDLE ASPIRATION STATION 7 EBUS:           ADENOCARCINOMA, COMPATIBLE WITH LUNG PRIMARY. CT PET    Impression    1. The previously described nodule involving the right lung base is FDG avid. Tissue sampling is recommended as malignancy is suspected. 2. The previously identified smaller lung nodules involving the lower lobes    are too small for PET CT characterization.  CT follow-up is recommended. 3. FDG avid subcarinal and right hilar lymph nodes suggestive of metastatic    disease. 4. No abnormal FDG activity identified within the neck, abdomen or pelvis.                 Impression:  Adenocarcinoma of right lung, clinical stage at least T1c, N2, M0 (stage IIIA)  Subcarinal lymph node metastasis  Weight loss  Radiation pneumonitis and shortness of breath      Plan:  I had a detailed discussion with the patient and we went over results of lab work-up imaging studies and other relevant clinical data  Toxicity check performed. Patient is tolerating treatment without unexpected side effects  Labs are adequate for treatment. We will proceed with treatment   Reiterated treatment plan. Reviewed risk benefit profile and reiterated potential side-effects of ongoing treatment  Patient shortness of breath is improving. He was able to walk a mile on the treadmill earlier  We plan to complete 1 year of adjuvant Imfinzi  Reviewed goals and expectations.   NCCN guidelines were reviewed and discussed with the patient. The diagnosis and care plan were discussed with the patient in detail. I discussed the natural history of the disease, prognosis, risks and goals of therapy and answered all the patients questions to the best of my ability. Patient expressed understanding and was in agreement. Seven Hsu MD          This note is created with the assistance of a speech recognition program.  While intending to generate a document that actually reflects the content of the visit, the document can still have some errors including those of syntax and sound a like substitutions which may escape proof reading. It such instances, actual meaning can be extrapolated by contextual diversion.

## 2021-06-01 NOTE — PROGRESS NOTES
ANTICOAGULATION SERVICE    Date of Clinic Visit:  6/1/2021    Jay Schumacher is a 77 y.o. male who presents to clinic today for anticoagulation monitoring and adjustment. Recent INR Results:  Internal QC passed  Lab Results   Component Value Date    INR 2.9 06/01/2021    INR 2.5 05/04/2021       Current Warfarin Dosage:  Dosing Plan  As of 6/1/2021    TTR:  100.0 % (3.1 mo)   Full warfarin instructions:  6/1: 5 mg; Otherwise 5 mg every Mon, Wed, Fri; 7.5 mg all other days               Assessment/Plan:    Continue current regimen as INR remains stable. Recheck 3 weeks. Next Clinic Appointment:  Return date  As of 6/1/2021    TTR:  100.0 % (3.1 mo)   Next INR check:               Please call [facility] at [phone] with any questions. Thanks!   Lavern Sifuentes [facility]  Anticoagulation Service Pharmacist  6/1/2021 1:15 PM     For Pharmacy Admin Tracking Only     Intervention Detail:    Total # of Interventions Recommended: 0   Total # of Interventions Accepted: 0   Time Spent (min): 15

## 2021-06-01 NOTE — PROGRESS NOTES
Infusion complete. Flushed VAD with saline and 5 ml of heparin flush. D/C camargo needle. Band aid to site. Stable and discharged to home.

## 2021-06-15 ENCOUNTER — HOSPITAL ENCOUNTER (OUTPATIENT)
Dept: PHARMACY | Age: 76
Setting detail: THERAPIES SERIES
Discharge: HOME | End: 2021-06-15
Payer: MEDICARE

## 2021-06-15 ENCOUNTER — HOSPITAL ENCOUNTER (OUTPATIENT)
Dept: INFUSION THERAPY | Age: 76
Discharge: HOME | End: 2021-06-15
Payer: MEDICARE

## 2021-06-15 ENCOUNTER — VIRTUAL VISIT (OUTPATIENT)
Dept: ONCOLOGY | Age: 76
End: 2021-06-15
Payer: MEDICARE

## 2021-06-15 VITALS
SYSTOLIC BLOOD PRESSURE: 108 MMHG | WEIGHT: 228 LBS | RESPIRATION RATE: 16 BRPM | HEIGHT: 70 IN | HEART RATE: 69 BPM | TEMPERATURE: 98.7 F | BODY MASS INDEX: 32.64 KG/M2 | DIASTOLIC BLOOD PRESSURE: 65 MMHG | OXYGEN SATURATION: 96 %

## 2021-06-15 DIAGNOSIS — C34.91 ADENOCARCINOMA OF RIGHT LUNG (HCC): Primary | ICD-10-CM

## 2021-06-15 DIAGNOSIS — I48.0 PAROXYSMAL A-FIB (HCC): Primary | ICD-10-CM

## 2021-06-15 DIAGNOSIS — Z29.8 IMMUNOTHERAPY: ICD-10-CM

## 2021-06-15 DIAGNOSIS — C77.9 REGIONAL LYMPH NODE METASTASIS PRESENT (HCC): ICD-10-CM

## 2021-06-15 DIAGNOSIS — J70.0 RADIATION PNEUMONITIS (HCC): ICD-10-CM

## 2021-06-15 LAB
ABSOLUTE EOS #: 0.08 K/UL (ref 0–0.44)
ABSOLUTE IMMATURE GRANULOCYTE: <0.03 K/UL (ref 0–0.3)
ABSOLUTE LYMPH #: 0.91 K/UL (ref 1.1–3.7)
ABSOLUTE MONO #: 0.59 K/UL (ref 0.1–1.2)
ALBUMIN SERPL-MCNC: 4 G/DL (ref 3.5–5.2)
ALBUMIN/GLOBULIN RATIO: 1.2 (ref 1–2.5)
ALP BLD-CCNC: 86 U/L (ref 40–129)
ALT SERPL-CCNC: 17 U/L (ref 5–41)
ANION GAP SERPL CALCULATED.3IONS-SCNC: 8 MMOL/L (ref 9–17)
AST SERPL-CCNC: 18 U/L
BASOPHILS # BLD: 1 % (ref 0–2)
BASOPHILS ABSOLUTE: 0.03 K/UL (ref 0–0.2)
BILIRUB SERPL-MCNC: <0.1 MG/DL (ref 0.3–1.2)
BUN BLDV-MCNC: 20 MG/DL (ref 8–23)
BUN/CREAT BLD: 17 (ref 9–20)
CALCIUM SERPL-MCNC: 9.7 MG/DL (ref 8.6–10.4)
CHLORIDE BLD-SCNC: 104 MMOL/L (ref 98–107)
CO2: 29 MMOL/L (ref 20–31)
CREAT SERPL-MCNC: 1.19 MG/DL (ref 0.7–1.2)
DIFFERENTIAL TYPE: ABNORMAL
EOSINOPHILS RELATIVE PERCENT: 1 % (ref 1–4)
GFR AFRICAN AMERICAN: >60 ML/MIN
GFR NON-AFRICAN AMERICAN: 60 ML/MIN
GFR SERPL CREATININE-BSD FRML MDRD: ABNORMAL ML/MIN/{1.73_M2}
GFR SERPL CREATININE-BSD FRML MDRD: ABNORMAL ML/MIN/{1.73_M2}
GLUCOSE BLD-MCNC: 142 MG/DL (ref 70–99)
HCT VFR BLD CALC: 37.5 % (ref 40.7–50.3)
HEMOGLOBIN: 12 G/DL (ref 13–17)
IMMATURE GRANULOCYTES: 0 %
INR BLD: 2.3
LYMPHOCYTES # BLD: 16 % (ref 24–43)
MCH RBC QN AUTO: 30.5 PG (ref 25.2–33.5)
MCHC RBC AUTO-ENTMCNC: 32 G/DL (ref 25.2–33.5)
MCV RBC AUTO: 95.2 FL (ref 82.6–102.9)
MONOCYTES # BLD: 10 % (ref 3–12)
NRBC AUTOMATED: 0 PER 100 WBC
PDW BLD-RTO: 15 % (ref 11.8–14.4)
PLATELET # BLD: 181 K/UL (ref 138–453)
PLATELET ESTIMATE: ABNORMAL
PMV BLD AUTO: 9.4 FL (ref 8.1–13.5)
POTASSIUM SERPL-SCNC: 4 MMOL/L (ref 3.7–5.3)
PROTIME: 28 SECONDS
RBC # BLD: 3.94 M/UL (ref 4.21–5.77)
RBC # BLD: ABNORMAL 10*6/UL
SEG NEUTROPHILS: 72 % (ref 36–65)
SEGMENTED NEUTROPHILS ABSOLUTE COUNT: 4.05 K/UL (ref 1.5–8.1)
SODIUM BLD-SCNC: 141 MMOL/L (ref 135–144)
TOTAL PROTEIN: 7.3 G/DL (ref 6.4–8.3)
TSH SERPL DL<=0.05 MIU/L-ACNC: 3.78 MIU/L (ref 0.3–5)
WBC # BLD: 5.7 K/UL (ref 3.5–11.3)
WBC # BLD: ABNORMAL 10*3/UL

## 2021-06-15 PROCEDURE — 84443 ASSAY THYROID STIM HORMONE: CPT

## 2021-06-15 PROCEDURE — 36591 DRAW BLOOD OFF VENOUS DEVICE: CPT

## 2021-06-15 PROCEDURE — 3017F COLORECTAL CA SCREEN DOC REV: CPT | Performed by: INTERNAL MEDICINE

## 2021-06-15 PROCEDURE — 4040F PNEUMOC VAC/ADMIN/RCVD: CPT | Performed by: INTERNAL MEDICINE

## 2021-06-15 PROCEDURE — 2580000003 HC RX 258: Performed by: INTERNAL MEDICINE

## 2021-06-15 PROCEDURE — 99211 OFF/OP EST MAY X REQ PHY/QHP: CPT | Performed by: INTERNAL MEDICINE

## 2021-06-15 PROCEDURE — 36416 COLLJ CAPILLARY BLOOD SPEC: CPT

## 2021-06-15 PROCEDURE — 96413 CHEMO IV INFUSION 1 HR: CPT

## 2021-06-15 PROCEDURE — 85025 COMPLETE CBC W/AUTO DIFF WBC: CPT

## 2021-06-15 PROCEDURE — 85610 PROTHROMBIN TIME: CPT

## 2021-06-15 PROCEDURE — 1123F ACP DISCUSS/DSCN MKR DOCD: CPT | Performed by: INTERNAL MEDICINE

## 2021-06-15 PROCEDURE — 80053 COMPREHEN METABOLIC PANEL: CPT

## 2021-06-15 PROCEDURE — 6360000002 HC RX W HCPCS: Performed by: INTERNAL MEDICINE

## 2021-06-15 PROCEDURE — G8428 CUR MEDS NOT DOCUMENT: HCPCS | Performed by: INTERNAL MEDICINE

## 2021-06-15 PROCEDURE — 99211 OFF/OP EST MAY X REQ PHY/QHP: CPT

## 2021-06-15 PROCEDURE — 99214 OFFICE O/P EST MOD 30 MIN: CPT | Performed by: INTERNAL MEDICINE

## 2021-06-15 RX ORDER — SODIUM CHLORIDE 9 MG/ML
20 INJECTION, SOLUTION INTRAVENOUS ONCE
Status: COMPLETED | OUTPATIENT
Start: 2021-06-15 | End: 2021-06-15

## 2021-06-15 RX ORDER — HEPARIN SODIUM (PORCINE) LOCK FLUSH IV SOLN 100 UNIT/ML 100 UNIT/ML
500 SOLUTION INTRAVENOUS PRN
Status: DISCONTINUED | OUTPATIENT
Start: 2021-06-15 | End: 2021-06-16 | Stop reason: HOSPADM

## 2021-06-15 RX ORDER — SODIUM CHLORIDE 0.9 % (FLUSH) 0.9 %
10 SYRINGE (ML) INJECTION PRN
Status: DISCONTINUED | OUTPATIENT
Start: 2021-06-15 | End: 2021-06-16 | Stop reason: HOSPADM

## 2021-06-15 RX ADMIN — SODIUM CHLORIDE 1120 MG: 9 INJECTION, SOLUTION INTRAVENOUS at 13:57

## 2021-06-15 RX ADMIN — SODIUM CHLORIDE 20 ML/HR: 9 INJECTION, SOLUTION INTRAVENOUS at 12:58

## 2021-06-15 RX ADMIN — Medication 10 ML: at 12:57

## 2021-06-15 RX ADMIN — HEPARIN 500 UNITS: 100 SYRINGE at 15:11

## 2021-06-15 NOTE — PROGRESS NOTES
Patient on unit for imfinzi infusion. Mediport accessed, labs drawn, NSS  Infusing. Patient states that he has been feeling really good this last month.

## 2021-06-15 NOTE — PATIENT INSTRUCTIONS
\"On day of next appointment, please screen for temperature and COVID-19 symptoms prior to you clinic appointment. If any symptoms present, please call [phone] to reschedule. \"

## 2021-06-15 NOTE — PROGRESS NOTES
Nikki Mak                                                                                                                  6/15/2021  MRN:   [id number]  YOB: 1945  PCP:                           Hilary Gibson DO  Referring Physician: Ascencion Orosco  Treating Physician Name: Renetta Lora MD      Reason for visit:  Toxicity check. Reviewed treatment plan. Current problems:  Adenocarcinoma of right lung, clinical stage at least T1c, N2, M0 (stage IIIA)  Subcarinal lymph node metastasis    Active and recent treatments:  concurrent chemoradiation using carboplatin and Taxol  Consolidation therapy with Imfinzi-1/2021    Summary of Case/History:    Nikki Mak a 77 y.o.male is a patient with biopsy confirmed adenocarcinoma of right lung presents to the office to establish care and for further evaluation treatment recommendations. Patient was initially seen by pulmonary team for lung nodules. Patient CT scan from July 2019 showed a right-sided pleural calcification thickening concerning for asbestos exposure. Patient also noted to have multiple pulmonary nodules measuring between 1.5 x 1.3 cm. Follow-up CT chest from July 2020 showed increase in size of the right lower lobe lung nodule which now measured 2.1 cm. Subsequently patient underwent CT PET on 8/22 which showed FDG avid right lower lobe lung nodule with SUV of 4.6. Patient CT PET was also positive for subcarinal lymph node with SUV of 4.6. Patient underwent bronchoscopy with endobronchial ultrasound biopsy of the subcarinal lymph node confirmed adenocarcinoma. Patient has significant history of tobacco dependence around 30-pack-year however he quit about 25 years ago. Patient does have coronary artery disease status post stent placement. Patient also gives history of occupational asthma and exposure to bacteria.   Patient does give history of weight loss but describes it as intentional.  Denies headaches dizziness chest pain abdominal pain nausea bone pain     Interim History:   Patient seen in clinic via virtual visit due to ongoing coronavirus pandemic. Patient presents to the clinic for a follow-up visit and for toxicity check and to review results of her blood work-up. Patient has been tolerating treatment without any unexpected or severe side effects. Denies hospitalization or ER visit. Appetite is good. Weight is stable. Nausea is controlled. During this visit patient's allergy, social, medical, surgical history and medications were reviewed and updated. Past Medical History:   Past Medical History:   Diagnosis Date    Abdominal hernia     small, no significant symptomatology.  Abnormal PSA     with history of slight increase.  Allergic rhinitis     Benign prostatic hypertrophy     Chest pain     occasional.     Coronary artery disease     status post drug eluting stent placement, LAD, ostial obtuse marginal.     Dysphagia     Erectile dysfunction     Familial hyperlipidemia     with hypertriglyceridemia.  Gastroesophageal reflux disease     Hearing loss, bilateral     hearing aid in the left ear only. 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     Hypertension     Impaired fasting glucose     prediabetes.  Lumbar disc herniation     L3-L4, with chronic back pain.  Mild anemia     Nasal polyps     minimal symptoms.  Nasal septal deviation     right side.  Obesity     Palpitations     occasional.     Peptic ulcer disease     Reactive airway disease     asthma, industrial related, also a history of hypersensitivity pneumonitis.      Urinary frequency        Past Surgical History:     Past Surgical History:   Procedure Laterality Date    APPENDECTOMY  age 11    BRONCHOSCOPY  09/17/2020    BRONCHOSCOPY N/A 9/17/2020    EBUS- BRONCHOSCOPY ENDOBRONCHIAL ULTRASOUND, PATHOLOGY REQUESTED- CINDY NOTIFIED, GI UNIT SCHEDULED performed by Yuki Cui MD at [address] [facility] CATHETERIZATION  10/20/2011    occluded LAD and ostial obtuse marginal stenosis, underwent drug eluting stents to both, RCA small, nondominant, occluded, ejection fraction 45%.  CARDIAC CATHETERIZATION  08/2018    with stent placement    CATARACT REMOVAL Bilateral 03/2018    COLONOSCOPY  01/21/2011    mild sigmoid diverticulosis.  COLONOSCOPY  4/6/16    hemorrhoid; sigmoid diverticulosis    KNEE ARTHROSCOPY Left 03/19/2010    partial medial and lateral synovectomies, partial medial meniscectomy, chondroplasty.  NASAL FRACTURE SURGERY  1960    OTHER SURGICAL HISTORY Left 02/12/2010    knee injection.  PORT SURGERY N/A 10/14/2020    RIGHT PORT INSERTION performed by Javad Brunson DO at [address] [place] [address] PRE-MALIGNANT / [address] Left 01/16/2012    actinic keratoses, ear, liquid nitrogen.  PRE-MALIGNANT / BENIGN SKIN LESION EXCISION Left 07/19/2011    actinic keratosis, upper ear, liquid nitrogen.  PROSTATE BIOPSY Bilateral 09/08/2015    Benign    REPAIR UMBILICAL BXHA,4+D/K,LPCIT N/A 2/06/7804    UMBILICAL Hernia Repair w/ Mesh performed by Ivis Young MD at [address] [place] [address] SEPTOPLASTY  10/30/2015    with bilateral submucosal resection of the inferior turbinates, left middle turbinate elza bullosa resection done by Dr Estrellita Culver ARTHROSCOPY Left 10/17/14    W/ SUBACROMIAL DECOMPRESSION, DEBRIDEMENT ET CHONDROPLASTY    UPPER GASTROINTESTINAL ENDOSCOPY  01/21/2011    superficial ulcer of the fundus, erostive gastritis.      VASECTOMY Bilateral 04/04/1980       Patient Family Social History:     Social History     Socioeconomic History    Marital status:      Spouse name: Not on file    Number of children: Not on file    Years of education: Not on file    Highest education level: Not on file   Occupational History    Not on file   Tobacco Use    Smoking status: Former Smoker     Packs/day: 2.00     Years: 15.00     Pack years: 30.00     Quit date: Metoprolol Tartrate 75 MG TABS TAKE 1 TABLET BY MOUTH TWO TIMES A DAY      pravastatin (PRAVACHOL) 40 MG tablet TAKE ONE TABLET BY MOUTH ONCE EVERY EVENING 90 tablet 3    clopidogrel (PLAVIX) 75 MG tablet TAKE 1 TABLET BY MOUTH DAILY 90 tablet 1    furosemide (LASIX) 20 MG tablet TAKE 1 TABLET BY MOUTH ONE TIME A DAY 90 tablet 1    oxybutynin (DITROPAN-XL) 10 MG extended release tablet Take 1 tablet by mouth daily 90 tablet 1    warfarin (COUMADIN) 2.5 MG tablet Take 2 tablets by mouth daily Take 2 tablets (5 mg) by mouth daily. Or as directed by INR results 60 tablet 3    flecainide (TAMBOCOR) 50 MG tablet Take 50 mg by mouth 2 times daily      losartan (COZAAR) 50 MG tablet TAKE 1 TABLET BY MOUTH ONE TIME A DAY  90 tablet 1    levothyroxine (SYNTHROID) 100 MCG tablet TAKE 1 TABLET BY MOUTH ONE TIME A DAY  90 tablet 1    tamsulosin (FLOMAX) 0.4 MG capsule TAKE 1 CAPSULE BY MOUTH ONE TIME A DAY 90 capsule 1    ondansetron (ZOFRAN) 4 MG tablet Take 4 mg by mouth every 6 hours as needed for Nausea or Vomiting Disp 60 with 3 refills called in 10/20/2020      ECHINACEA HERB PO Take by mouth daily      albuterol sulfate HFA (VENTOLIN HFA) 108 (90 Base) MCG/ACT inhaler Inhale 2 puffs into the lungs every 6 hours as needed for Wheezing or Shortness of Breath 1 Inhaler 6    Multiple Vitamins-Minerals (MULTIVITAMIN PO) daily. No current facility-administered medications for this visit.      Facility-Administered Medications Ordered in Other Visits   Medication Dose Route Frequency Provider Last Rate Last Admin    0.9 % sodium chloride infusion  20 mL/hr Intravenous Once [facility], MD        durvalumab [facility]) 1,120 mg in sodium chloride 0.9 % 250 mL chemo IVPB  1,120 mg Intravenous Once [facility], MD        sodium chloride flush 0.9 % injection 10 mL  10 mL Intravenous PRN [facility], MD        heparin flush 100 UNIT/ML injection 500 Units  500 Units Intracatheter PRN [facility], MD Allergies:   Effient [prasugrel], Ace inhibitors, and Statins    Review of Systems:    Constitutional: No fever or chills. No night sweats, + weight loss   Eyes: No eye discharge, double vision, or eye pain   HEENT: negative for sore mouth, sore throat, hoarseness and voice change   Respiratory: negative for cough , sputum, dyspnea, wheezing, hemoptysis, chest pain   Cardiovascular: negative for chest pain, dyspnea, palpitations, orthopnea, PND   Gastrointestinal: negative for nausea, vomiting, diarrhea, constipation, abdominal pain, Dysphagia, hematemesis and hematochezia   Genitourinary: negative for frequency, dysuria, nocturia, urinary incontinence, and hematuria   Integument: negative for rash, skin lesions, bruises. Hematologic/Lymphatic: negative for easy bruising, bleeding, lymphadenopathy, or petechiae   Endocrine: negative for heat or cold intolerance,weight changes, change in bowel habits and hair loss   Musculoskeletal: negative for myalgias, arthralgias, pain, joint swelling,and bone pain   Neurological: negative for headaches, dizziness, seizures, weakness, numbness          PHYSICAL EXAMINATION:     Vital Signs: (As obtained by patient/caregiver or practitioner observation)    Blood pressure-  Heart rate-    Respiratory rate-    Temperature-  Pulse oximetry-     Constitutional: [x] Appears well-developed and well-nourished [x] No apparent distress      [] Abnormal-   Mental status  [x] Alert and awake  [x] Oriented to person/place/time [x]Able to follow commands      Eyes:  EOM    [x]  Normal  [] Abnormal-  Sclera  [x]  Normal  [] Abnormal -         Discharge [x]  None visible  [] Abnormal -    HENT:   [x] Normocephalic, atraumatic.   [] Abnormal   [x] Mouth/Throat: Mucous membranes are moist.     External Ears [x] Normal  [] Abnormal-     Neck: [x] No visualized mass     Pulmonary/Chest: [x] Respiratory effort normal.  [x] No visualized signs of difficulty breathing or respiratory distress NRBC Automated 0.0 0.0 per 100 WBC    Differential Type NOT REPORTED     Seg Neutrophils 72 (H) 36 - 65 %    Lymphocytes 16 (L) 24 - 43 %    Monocytes 10 3 - 12 %    Eosinophils % 1 1 - 4 %    Basophils 1 0 - 2 %    Immature Granulocytes 0 0 %    Segs Absolute 4.05 1.50 - 8.10 k/uL    Absolute Lymph # 0.91 (L) 1.10 - 3.70 k/uL    Absolute Mono # 0.59 0.10 - 1.20 k/uL    Absolute Eos # 0.08 0.00 - 0.44 k/uL    Basophils Absolute 0.03 0.00 - 0.20 k/uL    Absolute Immature Granulocyte <0.03 0.00 - 0.30 k/uL    WBC Morphology NOT REPORTED     RBC Morphology ANISOCYTOSIS PRESENT     Platelet Estimate NOT REPORTED    TSH with Reflex   Result Value Ref Range    TSH 3.78 0.30 - 5.00 mIU/L     -- Diagnosis --   BAL RIGHT LOWER LOBE:           NEGATIVE FOR MALIGNANCY.             FINE NEEDLE ASPIRATION STATION 7 EBUS:           ADENOCARCINOMA, COMPATIBLE WITH LUNG PRIMARY. CT PET    Impression    1. The previously described nodule involving the right lung base is FDG avid. Tissue sampling is recommended as malignancy is suspected. 2. The previously identified smaller lung nodules involving the lower lobes    are too small for PET CT characterization.  CT follow-up is recommended. 3. FDG avid subcarinal and right hilar lymph nodes suggestive of metastatic    disease. 4. No abnormal FDG activity identified within the neck, abdomen or pelvis.                 Impression:  Adenocarcinoma of right lung, clinical stage at least T1c, N2, M0 (stage IIIA)  Subcarinal lymph node metastasis  Weight loss  Radiation pneumonitis and shortness of breath      Plan:  I had a detailed discussion with the patient and we went over results of lab work-up imaging studies and other relevant clinical data  Toxicity check performed. Patient is tolerating treatment without unexpected side effects  Labs are adequate for treatment. We will proceed with treatment   Reiterated treatment plan.   Reviewed risk benefit profile and reiterated potential side-effects of ongoing treatment  Reviewed goals and expectations. We plan to complete 1 year of consolidation immunotherapy. NCCN guidelines were reviewed and discussed with the patient. The diagnosis and care plan were discussed with the patient in detail. I discussed the natural history of the disease, prognosis, risks and goals of therapy and answered all the patients questions to the best of my ability. Patient expressed understanding and was in agreement. Ronnie Hale MD          This note is created with the assistance of a speech recognition program.  While intending to generate a document that actually reflects the content of the visit, the document can still have some errors including those of syntax and sound a like substitutions which may escape proof reading. It such instances, actual meaning can be extrapolated by contextual diversion.

## 2021-06-29 ENCOUNTER — HOSPITAL ENCOUNTER (OUTPATIENT)
Dept: INFUSION THERAPY | Age: 76
Discharge: HOME | End: 2021-06-29
Payer: MEDICARE

## 2021-06-29 VITALS
RESPIRATION RATE: 16 BRPM | WEIGHT: 229 LBS | TEMPERATURE: 98.1 F | OXYGEN SATURATION: 95 % | HEIGHT: 70 IN | HEART RATE: 70 BPM | SYSTOLIC BLOOD PRESSURE: 127 MMHG | BODY MASS INDEX: 32.78 KG/M2 | DIASTOLIC BLOOD PRESSURE: 75 MMHG

## 2021-06-29 DIAGNOSIS — C34.91 ADENOCARCINOMA OF RIGHT LUNG (HCC): Primary | ICD-10-CM

## 2021-06-29 LAB
ABSOLUTE EOS #: 0.06 K/UL (ref 0–0.44)
ABSOLUTE IMMATURE GRANULOCYTE: <0.03 K/UL (ref 0–0.3)
ABSOLUTE LYMPH #: 0.78 K/UL (ref 1.1–3.7)
ABSOLUTE MONO #: 0.44 K/UL (ref 0.1–1.2)
ALBUMIN SERPL-MCNC: 4.2 G/DL (ref 3.5–5.2)
ALBUMIN/GLOBULIN RATIO: 1.4 (ref 1–2.5)
ALP BLD-CCNC: 88 U/L (ref 40–129)
ALT SERPL-CCNC: 18 U/L (ref 5–41)
ANION GAP SERPL CALCULATED.3IONS-SCNC: 10 MMOL/L (ref 9–17)
AST SERPL-CCNC: 19 U/L
BASOPHILS # BLD: 0 % (ref 0–2)
BASOPHILS ABSOLUTE: <0.03 K/UL (ref 0–0.2)
BILIRUB SERPL-MCNC: 0.31 MG/DL (ref 0.3–1.2)
BUN BLDV-MCNC: 14 MG/DL (ref 8–23)
BUN/CREAT BLD: 14 (ref 9–20)
CALCIUM SERPL-MCNC: 9.6 MG/DL (ref 8.6–10.4)
CHLORIDE BLD-SCNC: 103 MMOL/L (ref 98–107)
CO2: 26 MMOL/L (ref 20–31)
CREAT SERPL-MCNC: 0.99 MG/DL (ref 0.7–1.2)
DIFFERENTIAL TYPE: ABNORMAL
EOSINOPHILS RELATIVE PERCENT: 1 % (ref 1–4)
GFR AFRICAN AMERICAN: >60 ML/MIN
GFR NON-AFRICAN AMERICAN: >60 ML/MIN
GFR SERPL CREATININE-BSD FRML MDRD: ABNORMAL ML/MIN/{1.73_M2}
GFR SERPL CREATININE-BSD FRML MDRD: ABNORMAL ML/MIN/{1.73_M2}
GLUCOSE BLD-MCNC: 167 MG/DL (ref 70–99)
HCT VFR BLD CALC: 36.5 % (ref 40.7–50.3)
HEMOGLOBIN: 11.8 G/DL (ref 13–17)
IMMATURE GRANULOCYTES: 0 %
LYMPHOCYTES # BLD: 14 % (ref 24–43)
MCH RBC QN AUTO: 30.6 PG (ref 25.2–33.5)
MCHC RBC AUTO-ENTMCNC: 32.3 G/DL (ref 25.2–33.5)
MCV RBC AUTO: 94.8 FL (ref 82.6–102.9)
MONOCYTES # BLD: 8 % (ref 3–12)
NRBC AUTOMATED: 0 PER 100 WBC
PDW BLD-RTO: 14.8 % (ref 11.8–14.4)
PLATELET # BLD: 145 K/UL (ref 138–453)
PLATELET ESTIMATE: ABNORMAL
PMV BLD AUTO: 9 FL (ref 8.1–13.5)
POTASSIUM SERPL-SCNC: 3.8 MMOL/L (ref 3.7–5.3)
RBC # BLD: 3.85 M/UL (ref 4.21–5.77)
RBC # BLD: ABNORMAL 10*6/UL
SEG NEUTROPHILS: 77 % (ref 36–65)
SEGMENTED NEUTROPHILS ABSOLUTE COUNT: 4.24 K/UL (ref 1.5–8.1)
SODIUM BLD-SCNC: 139 MMOL/L (ref 135–144)
TOTAL PROTEIN: 7.2 G/DL (ref 6.4–8.3)
WBC # BLD: 5.6 K/UL (ref 3.5–11.3)
WBC # BLD: ABNORMAL 10*3/UL

## 2021-06-29 PROCEDURE — 96413 CHEMO IV INFUSION 1 HR: CPT

## 2021-06-29 PROCEDURE — 36591 DRAW BLOOD OFF VENOUS DEVICE: CPT

## 2021-06-29 PROCEDURE — 2580000003 HC RX 258: Performed by: INTERNAL MEDICINE

## 2021-06-29 PROCEDURE — 85025 COMPLETE CBC W/AUTO DIFF WBC: CPT

## 2021-06-29 PROCEDURE — 80053 COMPREHEN METABOLIC PANEL: CPT

## 2021-06-29 PROCEDURE — 96417 CHEMO IV INFUS EACH ADDL SEQ: CPT

## 2021-06-29 PROCEDURE — 6360000002 HC RX W HCPCS: Performed by: INTERNAL MEDICINE

## 2021-06-29 RX ORDER — DIPHENHYDRAMINE HYDROCHLORIDE 50 MG/ML
50 INJECTION INTRAMUSCULAR; INTRAVENOUS ONCE
Status: CANCELLED | OUTPATIENT
Start: 2021-07-13 | End: 2021-07-13

## 2021-06-29 RX ORDER — SODIUM CHLORIDE 0.9 % (FLUSH) 0.9 %
10 SYRINGE (ML) INJECTION PRN
Status: DISCONTINUED | OUTPATIENT
Start: 2021-06-29 | End: 2021-06-30 | Stop reason: HOSPADM

## 2021-06-29 RX ORDER — HEPARIN SODIUM (PORCINE) LOCK FLUSH IV SOLN 100 UNIT/ML 100 UNIT/ML
500 SOLUTION INTRAVENOUS PRN
Status: CANCELLED | OUTPATIENT
Start: 2021-07-13

## 2021-06-29 RX ORDER — SODIUM CHLORIDE 0.9 % (FLUSH) 0.9 %
10 SYRINGE (ML) INJECTION PRN
Status: CANCELLED | OUTPATIENT
Start: 2021-06-29

## 2021-06-29 RX ORDER — SODIUM CHLORIDE 9 MG/ML
INJECTION, SOLUTION INTRAVENOUS CONTINUOUS
Status: CANCELLED | OUTPATIENT
Start: 2021-07-13

## 2021-06-29 RX ORDER — SODIUM CHLORIDE 9 MG/ML
20 INJECTION, SOLUTION INTRAVENOUS ONCE
Status: CANCELLED | OUTPATIENT
Start: 2021-06-29 | End: 2021-06-29

## 2021-06-29 RX ORDER — SODIUM CHLORIDE 9 MG/ML
20 INJECTION, SOLUTION INTRAVENOUS ONCE
Status: CANCELLED | OUTPATIENT
Start: 2021-07-13 | End: 2021-07-13

## 2021-06-29 RX ORDER — SODIUM CHLORIDE 9 MG/ML
20 INJECTION, SOLUTION INTRAVENOUS ONCE
Status: COMPLETED | OUTPATIENT
Start: 2021-06-29 | End: 2021-06-29

## 2021-06-29 RX ORDER — SODIUM CHLORIDE 0.9 % (FLUSH) 0.9 %
5 SYRINGE (ML) INJECTION PRN
Status: CANCELLED | OUTPATIENT
Start: 2021-06-29

## 2021-06-29 RX ORDER — DIPHENHYDRAMINE HYDROCHLORIDE 50 MG/ML
50 INJECTION INTRAMUSCULAR; INTRAVENOUS ONCE
Status: CANCELLED | OUTPATIENT
Start: 2021-06-29 | End: 2021-06-29

## 2021-06-29 RX ORDER — HEPARIN SODIUM (PORCINE) LOCK FLUSH IV SOLN 100 UNIT/ML 100 UNIT/ML
500 SOLUTION INTRAVENOUS PRN
Status: CANCELLED | OUTPATIENT
Start: 2021-06-29

## 2021-06-29 RX ORDER — METHYLPREDNISOLONE SODIUM SUCCINATE 125 MG/2ML
125 INJECTION, POWDER, LYOPHILIZED, FOR SOLUTION INTRAMUSCULAR; INTRAVENOUS ONCE
Status: CANCELLED | OUTPATIENT
Start: 2021-07-13 | End: 2021-07-13

## 2021-06-29 RX ORDER — SODIUM CHLORIDE 9 MG/ML
INJECTION, SOLUTION INTRAVENOUS CONTINUOUS
Status: CANCELLED | OUTPATIENT
Start: 2021-06-29

## 2021-06-29 RX ORDER — EPINEPHRINE 1 MG/ML
0.3 INJECTION, SOLUTION, CONCENTRATE INTRAVENOUS PRN
Status: CANCELLED | OUTPATIENT
Start: 2021-07-13

## 2021-06-29 RX ORDER — METHYLPREDNISOLONE SODIUM SUCCINATE 125 MG/2ML
125 INJECTION, POWDER, LYOPHILIZED, FOR SOLUTION INTRAMUSCULAR; INTRAVENOUS ONCE
Status: CANCELLED | OUTPATIENT
Start: 2021-06-29 | End: 2021-06-29

## 2021-06-29 RX ORDER — SODIUM CHLORIDE 0.9 % (FLUSH) 0.9 %
10 SYRINGE (ML) INJECTION PRN
Status: CANCELLED | OUTPATIENT
Start: 2021-07-13

## 2021-06-29 RX ORDER — EPINEPHRINE 1 MG/ML
0.3 INJECTION, SOLUTION, CONCENTRATE INTRAVENOUS PRN
Status: CANCELLED | OUTPATIENT
Start: 2021-06-29

## 2021-06-29 RX ORDER — SODIUM CHLORIDE 0.9 % (FLUSH) 0.9 %
5 SYRINGE (ML) INJECTION PRN
Status: CANCELLED | OUTPATIENT
Start: 2021-07-13

## 2021-06-29 RX ORDER — HEPARIN SODIUM (PORCINE) LOCK FLUSH IV SOLN 100 UNIT/ML 100 UNIT/ML
500 SOLUTION INTRAVENOUS PRN
Status: DISCONTINUED | OUTPATIENT
Start: 2021-06-29 | End: 2021-06-30 | Stop reason: HOSPADM

## 2021-06-29 RX ADMIN — SODIUM CHLORIDE, PRESERVATIVE FREE 10 ML: 5 INJECTION INTRAVENOUS at 08:31

## 2021-06-29 RX ADMIN — HEPARIN 500 UNITS: 100 SYRINGE at 10:34

## 2021-06-29 RX ADMIN — SODIUM CHLORIDE 20 ML/HR: 9 INJECTION, SOLUTION INTRAVENOUS at 08:31

## 2021-06-29 RX ADMIN — SODIUM CHLORIDE 1100 MG: 9 INJECTION, SOLUTION INTRAVENOUS at 09:28

## 2021-06-29 ASSESSMENT — PAIN SCALES - GENERAL: PAINLEVEL_OUTOF10: 0

## 2021-06-30 ENCOUNTER — TELEPHONE (OUTPATIENT)
Dept: CASE MANAGEMENT | Age: 76
End: 2021-06-30

## 2021-07-13 ENCOUNTER — HOSPITAL ENCOUNTER (OUTPATIENT)
Dept: INFUSION THERAPY | Age: 76
Discharge: HOME | End: 2021-07-13
Payer: MEDICARE

## 2021-07-13 ENCOUNTER — HOSPITAL ENCOUNTER (OUTPATIENT)
Dept: PHARMACY | Age: 76
Setting detail: THERAPIES SERIES
Discharge: HOME | End: 2021-07-13
Payer: MEDICARE

## 2021-07-13 ENCOUNTER — VIRTUAL VISIT (OUTPATIENT)
Dept: ONCOLOGY | Age: 76
End: 2021-07-13
Payer: MEDICARE

## 2021-07-13 VITALS
DIASTOLIC BLOOD PRESSURE: 64 MMHG | OXYGEN SATURATION: 96 % | WEIGHT: 233.4 LBS | HEIGHT: 70 IN | BODY MASS INDEX: 33.41 KG/M2 | HEART RATE: 72 BPM | RESPIRATION RATE: 16 BRPM | TEMPERATURE: 98.7 F | SYSTOLIC BLOOD PRESSURE: 114 MMHG

## 2021-07-13 DIAGNOSIS — I48.0 PAROXYSMAL A-FIB (HCC): Primary | ICD-10-CM

## 2021-07-13 DIAGNOSIS — Z29.8 IMMUNOTHERAPY: ICD-10-CM

## 2021-07-13 DIAGNOSIS — J70.0 RADIATION PNEUMONITIS (HCC): ICD-10-CM

## 2021-07-13 DIAGNOSIS — C34.91 ADENOCARCINOMA OF RIGHT LUNG (HCC): Primary | ICD-10-CM

## 2021-07-13 DIAGNOSIS — C77.9 REGIONAL LYMPH NODE METASTASIS PRESENT (HCC): ICD-10-CM

## 2021-07-13 LAB
ABSOLUTE EOS #: 0.06 K/UL (ref 0–0.44)
ABSOLUTE IMMATURE GRANULOCYTE: <0.03 K/UL (ref 0–0.3)
ABSOLUTE LYMPH #: 0.94 K/UL (ref 1.1–3.7)
ABSOLUTE MONO #: 0.7 K/UL (ref 0.1–1.2)
ALBUMIN SERPL-MCNC: 4.3 G/DL (ref 3.5–5.2)
ALBUMIN/GLOBULIN RATIO: 1.3 (ref 1–2.5)
ALP BLD-CCNC: 98 U/L (ref 40–129)
ALT SERPL-CCNC: 24 U/L (ref 5–41)
ANION GAP SERPL CALCULATED.3IONS-SCNC: 9 MMOL/L (ref 9–17)
AST SERPL-CCNC: 23 U/L
BASOPHILS # BLD: 1 % (ref 0–2)
BASOPHILS ABSOLUTE: 0.04 K/UL (ref 0–0.2)
BILIRUB SERPL-MCNC: 0.25 MG/DL (ref 0.3–1.2)
BUN BLDV-MCNC: 15 MG/DL (ref 8–23)
BUN/CREAT BLD: 17 (ref 9–20)
CALCIUM SERPL-MCNC: 9.6 MG/DL (ref 8.6–10.4)
CHLORIDE BLD-SCNC: 103 MMOL/L (ref 98–107)
CO2: 29 MMOL/L (ref 20–31)
CREAT SERPL-MCNC: 0.87 MG/DL (ref 0.7–1.2)
DIFFERENTIAL TYPE: ABNORMAL
EOSINOPHILS RELATIVE PERCENT: 1 % (ref 1–4)
GFR AFRICAN AMERICAN: >60 ML/MIN
GFR NON-AFRICAN AMERICAN: >60 ML/MIN
GFR SERPL CREATININE-BSD FRML MDRD: ABNORMAL ML/MIN/{1.73_M2}
GFR SERPL CREATININE-BSD FRML MDRD: ABNORMAL ML/MIN/{1.73_M2}
GLUCOSE BLD-MCNC: 120 MG/DL (ref 70–99)
HCT VFR BLD CALC: 37.4 % (ref 40.7–50.3)
HEMOGLOBIN: 12.1 G/DL (ref 13–17)
IMMATURE GRANULOCYTES: 0 %
INR BLD: 3.1
LYMPHOCYTES # BLD: 14 % (ref 24–43)
MCH RBC QN AUTO: 30.6 PG (ref 25.2–33.5)
MCHC RBC AUTO-ENTMCNC: 32.4 G/DL (ref 25.2–33.5)
MCV RBC AUTO: 94.7 FL (ref 82.6–102.9)
MONOCYTES # BLD: 11 % (ref 3–12)
NRBC AUTOMATED: 0 PER 100 WBC
PDW BLD-RTO: 14.6 % (ref 11.8–14.4)
PLATELET # BLD: 170 K/UL (ref 138–453)
PLATELET ESTIMATE: ABNORMAL
PMV BLD AUTO: 9.5 FL (ref 8.1–13.5)
POTASSIUM SERPL-SCNC: 3.8 MMOL/L (ref 3.7–5.3)
PROTIME: 36.7 SECONDS
RBC # BLD: 3.95 M/UL (ref 4.21–5.77)
RBC # BLD: ABNORMAL 10*6/UL
SEG NEUTROPHILS: 73 % (ref 36–65)
SEGMENTED NEUTROPHILS ABSOLUTE COUNT: 4.76 K/UL (ref 1.5–8.1)
SODIUM BLD-SCNC: 141 MMOL/L (ref 135–144)
TOTAL PROTEIN: 7.5 G/DL (ref 6.4–8.3)
TSH SERPL DL<=0.05 MIU/L-ACNC: 3.73 MIU/L (ref 0.3–5)
WBC # BLD: 6.5 K/UL (ref 3.5–11.3)
WBC # BLD: ABNORMAL 10*3/UL

## 2021-07-13 PROCEDURE — 4040F PNEUMOC VAC/ADMIN/RCVD: CPT | Performed by: INTERNAL MEDICINE

## 2021-07-13 PROCEDURE — 36591 DRAW BLOOD OFF VENOUS DEVICE: CPT

## 2021-07-13 PROCEDURE — 96413 CHEMO IV INFUSION 1 HR: CPT

## 2021-07-13 PROCEDURE — 1123F ACP DISCUSS/DSCN MKR DOCD: CPT | Performed by: INTERNAL MEDICINE

## 2021-07-13 PROCEDURE — 80053 COMPREHEN METABOLIC PANEL: CPT

## 2021-07-13 PROCEDURE — 99214 OFFICE O/P EST MOD 30 MIN: CPT | Performed by: INTERNAL MEDICINE

## 2021-07-13 PROCEDURE — 99211 OFF/OP EST MAY X REQ PHY/QHP: CPT | Performed by: INTERNAL MEDICINE

## 2021-07-13 PROCEDURE — 6360000002 HC RX W HCPCS: Performed by: INTERNAL MEDICINE

## 2021-07-13 PROCEDURE — 2580000003 HC RX 258: Performed by: INTERNAL MEDICINE

## 2021-07-13 PROCEDURE — 84443 ASSAY THYROID STIM HORMONE: CPT

## 2021-07-13 PROCEDURE — G8428 CUR MEDS NOT DOCUMENT: HCPCS | Performed by: INTERNAL MEDICINE

## 2021-07-13 PROCEDURE — 99211 OFF/OP EST MAY X REQ PHY/QHP: CPT

## 2021-07-13 PROCEDURE — 3017F COLORECTAL CA SCREEN DOC REV: CPT | Performed by: INTERNAL MEDICINE

## 2021-07-13 PROCEDURE — 36416 COLLJ CAPILLARY BLOOD SPEC: CPT

## 2021-07-13 PROCEDURE — 85025 COMPLETE CBC W/AUTO DIFF WBC: CPT

## 2021-07-13 PROCEDURE — 85610 PROTHROMBIN TIME: CPT

## 2021-07-13 RX ORDER — SODIUM CHLORIDE 9 MG/ML
20 INJECTION, SOLUTION INTRAVENOUS ONCE
Status: CANCELLED | OUTPATIENT
Start: 2021-07-27 | End: 2021-07-27

## 2021-07-13 RX ORDER — EPINEPHRINE 1 MG/ML
0.3 INJECTION, SOLUTION, CONCENTRATE INTRAVENOUS PRN
Status: CANCELLED | OUTPATIENT
Start: 2021-07-27

## 2021-07-13 RX ORDER — SODIUM CHLORIDE 9 MG/ML
INJECTION, SOLUTION INTRAVENOUS CONTINUOUS
Status: CANCELLED | OUTPATIENT
Start: 2021-07-27

## 2021-07-13 RX ORDER — METHYLPREDNISOLONE SODIUM SUCCINATE 125 MG/2ML
125 INJECTION, POWDER, LYOPHILIZED, FOR SOLUTION INTRAMUSCULAR; INTRAVENOUS ONCE
Status: CANCELLED | OUTPATIENT
Start: 2021-07-27 | End: 2021-07-27

## 2021-07-13 RX ORDER — SODIUM CHLORIDE 0.9 % (FLUSH) 0.9 %
5 SYRINGE (ML) INJECTION PRN
Status: CANCELLED | OUTPATIENT
Start: 2021-07-27

## 2021-07-13 RX ORDER — SODIUM CHLORIDE 0.9 % (FLUSH) 0.9 %
10 SYRINGE (ML) INJECTION PRN
Status: DISCONTINUED | OUTPATIENT
Start: 2021-07-13 | End: 2021-07-14 | Stop reason: HOSPADM

## 2021-07-13 RX ORDER — DIPHENHYDRAMINE HYDROCHLORIDE 50 MG/ML
50 INJECTION INTRAMUSCULAR; INTRAVENOUS ONCE
Status: CANCELLED | OUTPATIENT
Start: 2021-07-27 | End: 2021-07-27

## 2021-07-13 RX ORDER — HEPARIN SODIUM (PORCINE) LOCK FLUSH IV SOLN 100 UNIT/ML 100 UNIT/ML
500 SOLUTION INTRAVENOUS PRN
Status: CANCELLED | OUTPATIENT
Start: 2021-07-27

## 2021-07-13 RX ORDER — SODIUM CHLORIDE 9 MG/ML
20 INJECTION, SOLUTION INTRAVENOUS ONCE
Status: COMPLETED | OUTPATIENT
Start: 2021-07-13 | End: 2021-07-13

## 2021-07-13 RX ORDER — HEPARIN SODIUM (PORCINE) LOCK FLUSH IV SOLN 100 UNIT/ML 100 UNIT/ML
500 SOLUTION INTRAVENOUS PRN
Status: DISCONTINUED | OUTPATIENT
Start: 2021-07-13 | End: 2021-07-14 | Stop reason: HOSPADM

## 2021-07-13 RX ORDER — SODIUM CHLORIDE 0.9 % (FLUSH) 0.9 %
10 SYRINGE (ML) INJECTION PRN
Status: CANCELLED | OUTPATIENT
Start: 2021-07-27

## 2021-07-13 RX ADMIN — SODIUM CHLORIDE 20 ML/HR: 9 INJECTION, SOLUTION INTRAVENOUS at 12:29

## 2021-07-13 RX ADMIN — HEPARIN 500 UNITS: 100 SYRINGE at 15:06

## 2021-07-13 RX ADMIN — SODIUM CHLORIDE 1120 MG: 9 INJECTION, SOLUTION INTRAVENOUS at 14:00

## 2021-07-13 RX ADMIN — SODIUM CHLORIDE, PRESERVATIVE FREE 10 ML: 5 INJECTION INTRAVENOUS at 12:28

## 2021-07-13 ASSESSMENT — PAIN SCALES - GENERAL: PAINLEVEL_OUTOF10: 0

## 2021-07-13 NOTE — PROGRESS NOTES
ANTICOAGULATION SERVICE    Date of Clinic Visit:  2021    Cristina Ward is a 77 y.o. male who presents to clinic today for anticoagulation monitoring and adjustment. Recent INR Results:  Internal QC passed  Lab Results   Component Value Date    INR 3.1 2021    INR 2.3 06/15/2021       Current Warfarin Dosage:  Dosing Plan  As of 2021    TTR:  97.4 % (4.5 mo)   Full warfarin instructions:  : 5 mg; Otherwise 5 mg every Mon, Wed, Fri; 7.5 mg all other days               Assessment/Plan:    Continue current regimen as INR remains stable. INR is just slightly above range today. I will decrease dose for today then back on regular dose. Recheck 4 weeks. Next Clinic Appointment:  Return date  As of 2021    TTR:  97.4 % (4.5 mo)   Next INR check:  8/10/2021             Please call [facility] at [phone] with any questions. Thanks!   Feroz Moses  Anticoagulation Service Pharmacist  2021 1:24 PM     For Pharmacy Admin Tracking Only     Intervention Detail: Adherence Monitorin   Total # of Interventions Recommended: 0   Total # of Interventions Accepted: 0   Time Spent (min): 15

## 2021-07-13 NOTE — PROGRESS NOTES
[facility]. Patient denies any complaints and has virtual visit with  today during treatment.

## 2021-07-13 NOTE — PROGRESS NOTES
Miguelangel Montero Shock                                                                                                                  7/13/2021  MRN:   [id number]  YOB: 1945  PCP:                           Norberto Lance DO  Referring Physician: No ref. provider found  Treating Physician Name: Andrew Mills MD      Reason for visit:  Toxicity check. Reviewed treatment plan. Patient seen in virtual clinic due to ongoing coronavirus pandemic    Current problems:  Adenocarcinoma of right lung, clinical stage at least T1c, N2, M0 (stage IIIA)  Subcarinal lymph node metastasis    Active and recent treatments:  concurrent chemoradiation using carboplatin and Taxol  Consolidation therapy with Imfinzi-1/2021    Summary of Case/History:    Sunday Sherrill a 77 y. o.male is a patient with biopsy confirmed adenocarcinoma of right lung presents to the office to establish care and for further evaluation treatment recommendations. Patient was initially seen by pulmonary team for lung nodules. Patient CT scan from July 2019 showed a right-sided pleural calcification thickening concerning for asbestos exposure. Patient also noted to have multiple pulmonary nodules measuring between 1.5 x 1.3 cm. Follow-up CT chest from July 2020 showed increase in size of the right lower lobe lung nodule which now measured 2.1 cm. Subsequently patient underwent CT PET on 8/22 which showed FDG avid right lower lobe lung nodule with SUV of 4.6. Patient CT PET was also positive for subcarinal lymph node with SUV of 4.6. Patient underwent bronchoscopy with endobronchial ultrasound biopsy of the subcarinal lymph node confirmed adenocarcinoma. Patient has significant history of tobacco dependence around 30-pack-year however he quit about 25 years ago. Patient does have coronary artery disease status post stent placement. Patient also gives history of occupational asthma and exposure to bacteria.   Patient does give history of weight loss but describes it as intentional.  Denies headaches dizziness chest pain abdominal pain nausea bone pain     Interim History:   Patient seen in clinic via virtual visit due to ongoing coronavirus pandemic. Patient presents to the clinic for a follow-up visit and for toxicity check and to review results of her blood work-up. Patient has been tolerating treatment without any unexpected or severe side effects. Denies hospitalization or ER visit. Appetite is good. Weight is stable. Nausea is controlled. Denies headaches dizziness chest pain shortness of breath abdominal pain nausea bone pain weight loss or fatigue. Shortness of breath has improved. Patient continues to be active. During this visit patient's allergy, social, medical, surgical history and medications were reviewed and updated. Past Medical History:   Past Medical History:   Diagnosis Date    Abdominal hernia     small, no significant symptomatology.  Abnormal PSA     with history of slight increase.  Allergic rhinitis     Benign prostatic hypertrophy     Chest pain     occasional.     Coronary artery disease     status post drug eluting stent placement, LAD, ostial obtuse marginal.     Dysphagia     Erectile dysfunction     Familial hyperlipidemia     with hypertriglyceridemia.  Gastroesophageal reflux disease     Hearing loss, bilateral     hearing aid in the left ear only. 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     Hypertension     Impaired fasting glucose     prediabetes.  Lumbar disc herniation     L3-L4, with chronic back pain.  Mild anemia     Nasal polyps     minimal symptoms.  Nasal septal deviation     right side.  Obesity     Palpitations     occasional.     Peptic ulcer disease     Reactive airway disease     asthma, industrial related, also a history of hypersensitivity pneumonitis.      Urinary frequency        Past Surgical History:     Past Surgical History: Number of children: Not on file    Years of education: Not on file    Highest education level: Not on file   Occupational History    Not on file   Tobacco Use    Smoking status: Former Smoker     Packs/day: 2.00     Years: 15.00     Pack years: 30.00     Quit date: 1985     Years since quittin.5    Smokeless tobacco: Never Used    Tobacco comment: smoked 2 packs a day for 14 to 15 years, quit in    Vaping Use    Vaping Use: Never used   Substance and Sexual Activity    Alcohol use: No     Alcohol/week: 0.0 standard drinks     Comment: rare    Drug use: No    Sexual activity: Not on file   Other Topics Concern    Not on file   Social History Narrative    Not on file     Social Determinants of Health     Financial Resource Strain:     Difficulty of Paying Living Expenses:    Food Insecurity:     Worried About Running Out of Food in the Last Year:     [address] in the Last Year:    Transportation Needs:     Lack of Transportation (Medical):      Lack of Transportation (Non-Medical):    Physical Activity:     Days of Exercise per Week:     Minutes of Exercise per Session:    Stress:     Feeling of Stress :    Social Connections:     Frequency of Communication with Friends and Family:     Frequency of Social Gatherings with Friends and Family:     Attends Taoism Services:     Active Member of Clubs or Organizations:     Attends Club or Organization Meetings:     Marital Status:    Intimate Partner Violence:     Fear of Current or Ex-Partner:     Emotionally Abused:     Physically Abused:     Sexually Abused:      Family History   Problem Relation Age of Onset    Cancer Mother         lymphoma    Thyroid Disease Mother         hypothyroidism    Stroke Mother     Heart Disease Mother     High Blood Pressure Mother     Heart Attack Mother         in her 46s    Heart Failure Father         congestive    Coronary Art Dis Father     Emphysema Father         was a smoker    Heart Disease Maternal Grandmother     Heart Disease Maternal Grandfather     Crohn's Disease Sister     High Cholesterol Sister     High Cholesterol Child        Current Medications:     Current Outpatient Medications   Medication Sig Dispense Refill    Metoprolol Tartrate 75 MG TABS TAKE 1 TABLET BY MOUTH TWO TIMES A DAY      pravastatin (PRAVACHOL) 40 MG tablet TAKE ONE TABLET BY MOUTH ONCE EVERY EVENING 90 tablet 3    clopidogrel (PLAVIX) 75 MG tablet TAKE 1 TABLET BY MOUTH DAILY 90 tablet 1    furosemide (LASIX) 20 MG tablet TAKE 1 TABLET BY MOUTH ONE TIME A DAY 90 tablet 1    oxybutynin (DITROPAN-XL) 10 MG extended release tablet Take 1 tablet by mouth daily 90 tablet 1    warfarin (COUMADIN) 2.5 MG tablet Take 2 tablets by mouth daily Take 2 tablets (5 mg) by mouth daily. Or as directed by INR results 60 tablet 3    flecainide (TAMBOCOR) 50 MG tablet Take 50 mg by mouth 2 times daily      losartan (COZAAR) 50 MG tablet TAKE 1 TABLET BY MOUTH ONE TIME A DAY  90 tablet 1    levothyroxine (SYNTHROID) 100 MCG tablet TAKE 1 TABLET BY MOUTH ONE TIME A DAY  90 tablet 1    tamsulosin (FLOMAX) 0.4 MG capsule TAKE 1 CAPSULE BY MOUTH ONE TIME A DAY 90 capsule 1    ondansetron (ZOFRAN) 4 MG tablet Take 4 mg by mouth every 6 hours as needed for Nausea or Vomiting Disp 60 with 3 refills called in 10/20/2020      ECHINACEA HERB PO Take by mouth daily      albuterol sulfate HFA (VENTOLIN HFA) 108 (90 Base) MCG/ACT inhaler Inhale 2 puffs into the lungs every 6 hours as needed for Wheezing or Shortness of Breath 1 Inhaler 6    Multiple Vitamins-Minerals (MULTIVITAMIN PO) daily. No current facility-administered medications for this visit.      Facility-Administered Medications Ordered in Other Visits   Medication Dose Route Frequency Provider Last Rate Last Admin    0.9 % sodium chloride infusion  20 mL/hr Intravenous Once [facility], MD        durvalumab (IMFINZI) 1,120 mg in sodium chloride 0.9 % 250 mL chemo IVPB  1,120 mg Intravenous Once [facility], MD        sodium chloride flush 0.9 % injection 10 mL  10 mL Intravenous PRN [facility], MD        heparin flush 100 UNIT/ML injection 500 Units  500 Units Intracatheter PRN [facility], MD           Allergies:   Effient [prasugrel], Ace inhibitors, and Statins    Review of Systems:    Constitutional: No fever or chills. No night sweats, + weight loss   Eyes: No eye discharge, double vision, or eye pain   HEENT: negative for sore mouth, sore throat, hoarseness and voice change   Respiratory: negative for cough , sputum, dyspnea, wheezing, hemoptysis, chest pain   Cardiovascular: negative for chest pain, dyspnea, palpitations, orthopnea, PND   Gastrointestinal: negative for nausea, vomiting, diarrhea, constipation, abdominal pain, Dysphagia, hematemesis and hematochezia   Genitourinary: negative for frequency, dysuria, nocturia, urinary incontinence, and hematuria   Integument: negative for rash, skin lesions, bruises.    Hematologic/Lymphatic: negative for easy bruising, bleeding, lymphadenopathy, or petechiae   Endocrine: negative for heat or cold intolerance,weight changes, change in bowel habits and hair loss   Musculoskeletal: negative for myalgias, arthralgias, pain, joint swelling,and bone pain   Neurological: negative for headaches, dizziness, seizures, weakness, numbness          PHYSICAL EXAMINATION:     Vital Signs: (As obtained by patient/caregiver or practitioner observation)    Blood pressure-  Heart rate-    Respiratory rate-    Temperature-  Pulse oximetry-     Constitutional: [x] Appears well-developed and well-nourished [x] No apparent distress      [] Abnormal-   Mental status  [x] Alert and awake  [x] Oriented to person/place/time [x]Able to follow commands      Eyes:  EOM    [x]  Normal  [] Abnormal-  Sclera  [x]  Normal  [] Abnormal -         Discharge [x]  None visible  [] Abnormal -    HENT:   [x] Normocephalic, atraumatic. [] Abnormal   [x] Mouth/Throat: Mucous membranes are moist.     External Ears [x] Normal  [] Abnormal-     Neck: [x] No visualized mass     Pulmonary/Chest: [x] Respiratory effort normal.  [x] No visualized signs of difficulty breathing or respiratory distress        [] Abnormal-      Musculoskeletal:   [x] Normal gait with no signs of ataxia         [x] Normal range of motion of neck        [] Abnormal-       Neurological:        [x] No Facial Asymmetry (Cranial nerve 7 motor function) (limited exam to video visit)          [x] No gaze palsy        [] Abnormal-         Skin:        [x] No significant exanthematous lesions or discoloration noted on facial skin         [] Abnormal-            Psychiatric:       [x] Normal Affect [x] No Hallucinations        [] Abnormal-     Other pertinent observable physical exam findings-     Due to this being a TeleHealth encounter, evaluation of the following organ systems is limited: Vitals/Constitutional/EENT/Resp/CV/GI//MS/Neuro/Skin/Heme-Lymph-Imm.     Monitor there was a K[place]  DATA:    Results for orders placed or performed during the hospital encounter of 07/13/21   CBC Auto Differential   Result Value Ref Range    WBC 6.5 3.5 - 11.3 k/uL    RBC 3.95 (L) 4.21 - 5.77 m/uL    Hemoglobin 12.1 (L) 13.0 - 17.0 g/dL    Hematocrit 37.4 (L) 40.7 - 50.3 %    MCV 94.7 82.6 - 102.9 fL    MCH 30.6 25.2 - 33.5 pg    MCHC 32.4 25.2 - 33.5 g/dL    RDW 14.6 (H) 11.8 - 14.4 %    Platelets 547 484 - 144 k/uL    MPV 9.5 8.1 - 13.5 fL    NRBC Automated 0.0 0.0 per 100 WBC    Differential Type NOT REPORTED     Seg Neutrophils 73 (H) 36 - 65 %    Lymphocytes 14 (L) 24 - 43 %    Monocytes 11 3 - 12 %    Eosinophils % 1 1 - 4 %    Basophils 1 0 - 2 %    Immature Granulocytes 0 0 %    Segs Absolute 4.76 1.50 - 8.10 k/uL    Absolute Lymph # 0.94 (L) 1.10 - 3.70 k/uL    Absolute Mono # 0.70 0.10 - 1.20 k/uL    Absolute Eos # 0.06 0.00 - 0.44 k/uL    Basophils Absolute 0.04 0.00 - 0.20 k/uL    Absolute Immature Granulocyte <0.03 0.00 - 0.30 k/uL    WBC Morphology NOT REPORTED     RBC Morphology ANISOCYTOSIS PRESENT     Platelet Estimate NOT REPORTED    Comprehensive Metabolic Panel   Result Value Ref Range    Glucose 120 (H) 70 - 99 mg/dL    BUN 15 8 - 23 mg/dL    CREATININE 0.87 0.70 - 1.20 mg/dL    Bun/Cre Ratio 17 9 - 20    Calcium 9.6 8.6 - 10.4 mg/dL    Sodium 141 135 - 144 mmol/L    Potassium 3.8 3.7 - 5.3 mmol/L    Chloride 103 98 - 107 mmol/L    CO2 29 20 - 31 mmol/L    Anion Gap 9 9 - 17 mmol/L    Alkaline Phosphatase 98 40 - 129 U/L    ALT 24 5 - 41 U/L    AST 23 <40 U/L    Total Bilirubin 0.25 (L) 0.3 - 1.2 mg/dL    Total Protein 7.5 6.4 - 8.3 g/dL    Albumin 4.3 3.5 - 5.2 g/dL    Albumin/Globulin Ratio 1.3 1.0 - 2.5    GFR Non-African American >60 >60 mL/min    GFR African American >60 >60 mL/min    GFR Comment          GFR Staging NOT REPORTED    TSH with Reflex   Result Value Ref Range    TSH 3.73 0.30 - 5.00 mIU/L     -- Diagnosis --   BAL RIGHT LOWER LOBE:           NEGATIVE FOR MALIGNANCY.             FINE NEEDLE ASPIRATION STATION 7 EBUS:           ADENOCARCINOMA, COMPATIBLE WITH LUNG PRIMARY. CT PET    Impression    1. The previously described nodule involving the right lung base is FDG avid. Tissue sampling is recommended as malignancy is suspected. 2. The previously identified smaller lung nodules involving the lower lobes    are too small for PET CT characterization.  CT follow-up is recommended. 3. FDG avid subcarinal and right hilar lymph nodes suggestive of metastatic    disease.     4. No abnormal FDG activity identified within the neck, abdomen or pelvis.                 Impression:  Adenocarcinoma of right lung, clinical stage at least T1c, N2, M0 (stage IIIA)  Subcarinal lymph node metastasis  Weight loss  Radiation pneumonitis and shortness of breath      Plan:  I had a detailed discussion with the patient and we went over results of lab work-up imaging studies and other relevant clinical data  Toxicity check performed  Patient is overall doing well. Tolerating treatment  We plan to complete 1 year of consolidation immunotherapy. Reiterated treatment plan logistics as well as potential side effects  Reviewed goals and expectations  We plan to complete 1 year of consolidation immunotherapy. We will see patient back in office in a month with repeat CT scans. We offered in person appointment on 8/6 however patient could not make it. We will schedule appointment in 4 weeks. NCCN guidelines were reviewed and discussed with the patient. The diagnosis and care plan were discussed with the patient in detail. I discussed the natural history of the disease, prognosis, risks and goals of therapy and answered all the patients questions to the best of my ability. Patient expressed understanding and was in agreement. John Krishnan MD          This note is created with the assistance of a speech recognition program.  While intending to generate a document that actually reflects the content of the visit, the document can still have some errors including those of syntax and sound a like substitutions which may escape proof reading. It such instances, actual meaning can be extrapolated by contextual diversion.

## 2021-07-13 NOTE — PROGRESS NOTES
Immunotherapy infused and d/c'd. Patient tolerated infusion without any difficulty. Mediport flushed with 30 ml NSS and 5 ml heparin. Patient denies any complaints. [place]yon needle d/c'd, band aid to site. Patient discharged to home.

## 2021-07-27 ENCOUNTER — HOSPITAL ENCOUNTER (OUTPATIENT)
Dept: INFUSION THERAPY | Age: 76
Discharge: HOME | End: 2021-07-27
Payer: MEDICARE

## 2021-07-27 VITALS
SYSTOLIC BLOOD PRESSURE: 135 MMHG | HEIGHT: 70 IN | TEMPERATURE: 98 F | HEART RATE: 71 BPM | RESPIRATION RATE: 16 BRPM | DIASTOLIC BLOOD PRESSURE: 81 MMHG | OXYGEN SATURATION: 95 % | WEIGHT: 231.2 LBS | BODY MASS INDEX: 33.1 KG/M2

## 2021-07-27 DIAGNOSIS — C34.91 ADENOCARCINOMA OF RIGHT LUNG (HCC): Primary | ICD-10-CM

## 2021-07-27 LAB
ABSOLUTE EOS #: 0.07 K/UL (ref 0–0.44)
ABSOLUTE IMMATURE GRANULOCYTE: 0.03 K/UL (ref 0–0.3)
ABSOLUTE LYMPH #: 0.99 K/UL (ref 1.1–3.7)
ABSOLUTE MONO #: 0.67 K/UL (ref 0.1–1.2)
ALBUMIN SERPL-MCNC: 4.3 G/DL (ref 3.5–5.2)
ALBUMIN/GLOBULIN RATIO: 1.2 (ref 1–2.5)
ALP BLD-CCNC: 99 U/L (ref 40–129)
ALT SERPL-CCNC: 17 U/L (ref 5–41)
ANION GAP SERPL CALCULATED.3IONS-SCNC: 12 MMOL/L (ref 9–17)
AST SERPL-CCNC: 18 U/L
BASOPHILS # BLD: 1 % (ref 0–2)
BASOPHILS ABSOLUTE: 0.03 K/UL (ref 0–0.2)
BILIRUB SERPL-MCNC: 0.41 MG/DL (ref 0.3–1.2)
BUN BLDV-MCNC: 16 MG/DL (ref 8–23)
BUN/CREAT BLD: 17 (ref 9–20)
CALCIUM SERPL-MCNC: 9.7 MG/DL (ref 8.6–10.4)
CHLORIDE BLD-SCNC: 100 MMOL/L (ref 98–107)
CO2: 26 MMOL/L (ref 20–31)
CREAT SERPL-MCNC: 0.96 MG/DL (ref 0.7–1.2)
DIFFERENTIAL TYPE: ABNORMAL
EOSINOPHILS RELATIVE PERCENT: 1 % (ref 1–4)
GFR AFRICAN AMERICAN: >60 ML/MIN
GFR NON-AFRICAN AMERICAN: >60 ML/MIN
GFR SERPL CREATININE-BSD FRML MDRD: ABNORMAL ML/MIN/{1.73_M2}
GFR SERPL CREATININE-BSD FRML MDRD: ABNORMAL ML/MIN/{1.73_M2}
GLUCOSE BLD-MCNC: 116 MG/DL (ref 70–99)
HCT VFR BLD CALC: 35.9 % (ref 40.7–50.3)
HEMOGLOBIN: 11.9 G/DL (ref 13–17)
IMMATURE GRANULOCYTES: 1 %
LYMPHOCYTES # BLD: 15 % (ref 24–43)
MCH RBC QN AUTO: 31.3 PG (ref 25.2–33.5)
MCHC RBC AUTO-ENTMCNC: 33.1 G/DL (ref 25.2–33.5)
MCV RBC AUTO: 94.5 FL (ref 82.6–102.9)
MONOCYTES # BLD: 10 % (ref 3–12)
NRBC AUTOMATED: 0 PER 100 WBC
PDW BLD-RTO: 14.5 % (ref 11.8–14.4)
PLATELET # BLD: 162 K/UL (ref 138–453)
PLATELET ESTIMATE: ABNORMAL
PMV BLD AUTO: 9.3 FL (ref 8.1–13.5)
POTASSIUM SERPL-SCNC: 4.1 MMOL/L (ref 3.7–5.3)
RBC # BLD: 3.8 M/UL (ref 4.21–5.77)
RBC # BLD: ABNORMAL 10*6/UL
SEG NEUTROPHILS: 72 % (ref 36–65)
SEGMENTED NEUTROPHILS ABSOLUTE COUNT: 4.65 K/UL (ref 1.5–8.1)
SODIUM BLD-SCNC: 138 MMOL/L (ref 135–144)
TOTAL PROTEIN: 7.8 G/DL (ref 6.4–8.3)
TSH SERPL DL<=0.05 MIU/L-ACNC: 3.75 MIU/L (ref 0.3–5)
WBC # BLD: 6.4 K/UL (ref 3.5–11.3)
WBC # BLD: ABNORMAL 10*3/UL

## 2021-07-27 PROCEDURE — 2580000003 HC RX 258: Performed by: INTERNAL MEDICINE

## 2021-07-27 PROCEDURE — 6360000002 HC RX W HCPCS: Performed by: INTERNAL MEDICINE

## 2021-07-27 PROCEDURE — 96413 CHEMO IV INFUSION 1 HR: CPT

## 2021-07-27 PROCEDURE — 80053 COMPREHEN METABOLIC PANEL: CPT

## 2021-07-27 PROCEDURE — 85025 COMPLETE CBC W/AUTO DIFF WBC: CPT

## 2021-07-27 PROCEDURE — 36591 DRAW BLOOD OFF VENOUS DEVICE: CPT

## 2021-07-27 PROCEDURE — 84443 ASSAY THYROID STIM HORMONE: CPT

## 2021-07-27 RX ORDER — HEPARIN SODIUM (PORCINE) LOCK FLUSH IV SOLN 100 UNIT/ML 100 UNIT/ML
500 SOLUTION INTRAVENOUS PRN
Status: DISCONTINUED | OUTPATIENT
Start: 2021-07-27 | End: 2021-07-28 | Stop reason: HOSPADM

## 2021-07-27 RX ORDER — SODIUM CHLORIDE 9 MG/ML
20 INJECTION, SOLUTION INTRAVENOUS ONCE
Status: COMPLETED | OUTPATIENT
Start: 2021-07-27 | End: 2021-07-27

## 2021-07-27 RX ORDER — SODIUM CHLORIDE 0.9 % (FLUSH) 0.9 %
10 SYRINGE (ML) INJECTION PRN
Status: DISCONTINUED | OUTPATIENT
Start: 2021-07-27 | End: 2021-07-28 | Stop reason: HOSPADM

## 2021-07-27 RX ADMIN — HEPARIN 500 UNITS: 100 SYRINGE at 15:20

## 2021-07-27 RX ADMIN — SODIUM CHLORIDE 1120 MG: 9 INJECTION, SOLUTION INTRAVENOUS at 14:13

## 2021-07-27 RX ADMIN — SODIUM CHLORIDE, PRESERVATIVE FREE 10 ML: 5 INJECTION INTRAVENOUS at 13:25

## 2021-07-27 RX ADMIN — SODIUM CHLORIDE 20 ML/HR: 9 INJECTION, SOLUTION INTRAVENOUS at 13:25

## 2021-07-27 NOTE — PROGRESS NOTES
Patient on unit for imfinzi infusion. [facility] accessed, labs drawn, NSS infusing. Patient denies any complaints.

## 2021-08-01 DIAGNOSIS — I10 ESSENTIAL HYPERTENSION: ICD-10-CM

## 2021-08-02 RX ORDER — TAMSULOSIN HYDROCHLORIDE 0.4 MG/1
CAPSULE ORAL
Qty: 90 CAPSULE | Refills: 1 | Status: SHIPPED | OUTPATIENT
Start: 2021-08-02 | End: 2022-02-02 | Stop reason: SDUPTHER

## 2021-08-02 RX ORDER — LEVOTHYROXINE SODIUM 0.1 MG/1
TABLET ORAL
Qty: 90 TABLET | Refills: 1 | Status: SHIPPED | OUTPATIENT
Start: 2021-08-02 | End: 2022-02-02 | Stop reason: SDUPTHER

## 2021-08-02 RX ORDER — LOSARTAN POTASSIUM 50 MG/1
TABLET ORAL
Qty: 90 TABLET | Refills: 1 | Status: SHIPPED | OUTPATIENT
Start: 2021-08-02 | End: 2022-02-02 | Stop reason: SDUPTHER

## 2021-08-02 NOTE — TELEPHONE ENCOUNTER
Mackenzie Shepard called requesting a refill of the below medication which has been pended for you:     Requested Prescriptions     Pending Prescriptions Disp Refills    losartan (COZAAR) 50 MG tablet [Pharmacy Med Name: Losartan Potassium Oral Tablet 50 MG] 90 tablet 1     Sig: TAKE 1 TABLET BY MOUTH EVERY DAY    levothyroxine (SYNTHROID) 100 MCG tablet [Pharmacy Med Name: Levothyroxine Sodium Oral Tablet 100 MCG] 90 tablet 1     Sig: TAKE 1 TABLET BY MOUTH EVERY DAY    tamsulosin (FLOMAX) 0.4 MG capsule [Pharmacy Med Name: Tamsulosin HCl Oral Capsule 0.4 MG] 90 capsule 1     Sig: TAKE 1 CAPSULE BY MOUTH EVERY DAY       Last Appointment Date: 3/9/2021  Next Appointment Date: 9/3/2021    Allergies   Allergen Reactions    Effient [Prasugrel] Other (See Comments)     Superficial skin bleeding.  Ace Inhibitors Other (See Comments)     Cough.  Statins Other (See Comments)     Myalgias.

## 2021-08-03 ENCOUNTER — HOSPITAL ENCOUNTER (OUTPATIENT)
Dept: CT IMAGING | Age: 76
Discharge: HOME | End: 2021-08-05
Payer: MEDICARE

## 2021-08-03 DIAGNOSIS — C77.9 REGIONAL LYMPH NODE METASTASIS PRESENT (HCC): ICD-10-CM

## 2021-08-03 DIAGNOSIS — J70.0 RADIATION PNEUMONITIS (HCC): ICD-10-CM

## 2021-08-03 DIAGNOSIS — Z29.8 IMMUNOTHERAPY: ICD-10-CM

## 2021-08-03 DIAGNOSIS — C34.91 ADENOCARCINOMA OF RIGHT LUNG (HCC): ICD-10-CM

## 2021-08-03 PROCEDURE — 71260 CT THORAX DX C+: CPT

## 2021-08-03 PROCEDURE — 6360000004 HC RX CONTRAST MEDICATION: Performed by: INTERNAL MEDICINE

## 2021-08-03 RX ADMIN — IOPAMIDOL 100 ML: 755 INJECTION, SOLUTION INTRAVENOUS at 10:16

## 2021-08-10 ENCOUNTER — HOSPITAL ENCOUNTER (OUTPATIENT)
Dept: INFUSION THERAPY | Age: 76
Discharge: HOME | End: 2021-08-10
Payer: MEDICARE

## 2021-08-10 ENCOUNTER — HOSPITAL ENCOUNTER (OUTPATIENT)
Dept: PHARMACY | Age: 76
Setting detail: THERAPIES SERIES
Discharge: HOME | End: 2021-08-10
Payer: MEDICARE

## 2021-08-10 ENCOUNTER — VIRTUAL VISIT (OUTPATIENT)
Dept: ONCOLOGY | Age: 76
End: 2021-08-10
Payer: MEDICARE

## 2021-08-10 VITALS
SYSTOLIC BLOOD PRESSURE: 129 MMHG | TEMPERATURE: 97.5 F | RESPIRATION RATE: 16 BRPM | DIASTOLIC BLOOD PRESSURE: 76 MMHG | HEART RATE: 67 BPM

## 2021-08-10 DIAGNOSIS — J70.0 RADIATION PNEUMONITIS (HCC): ICD-10-CM

## 2021-08-10 DIAGNOSIS — C34.91 ADENOCARCINOMA OF RIGHT LUNG (HCC): Primary | ICD-10-CM

## 2021-08-10 DIAGNOSIS — M89.9 BONE LESION: ICD-10-CM

## 2021-08-10 DIAGNOSIS — C77.9 REGIONAL LYMPH NODE METASTASIS PRESENT (HCC): ICD-10-CM

## 2021-08-10 DIAGNOSIS — Z29.8 IMMUNOTHERAPY: ICD-10-CM

## 2021-08-10 DIAGNOSIS — I48.0 PAROXYSMAL A-FIB (HCC): Primary | ICD-10-CM

## 2021-08-10 LAB
ABSOLUTE EOS #: 0.07 K/UL (ref 0–0.44)
ABSOLUTE IMMATURE GRANULOCYTE: <0.03 K/UL (ref 0–0.3)
ABSOLUTE LYMPH #: 1.01 K/UL (ref 1.1–3.7)
ABSOLUTE MONO #: 0.51 K/UL (ref 0.1–1.2)
ALBUMIN SERPL-MCNC: 4.2 G/DL (ref 3.5–5.2)
ALBUMIN/GLOBULIN RATIO: 1.2 (ref 1–2.5)
ALP BLD-CCNC: 98 U/L (ref 40–129)
ALT SERPL-CCNC: 17 U/L (ref 5–41)
ANION GAP SERPL CALCULATED.3IONS-SCNC: 9 MMOL/L (ref 9–17)
AST SERPL-CCNC: 18 U/L
BASOPHILS # BLD: 0 % (ref 0–2)
BASOPHILS ABSOLUTE: <0.03 K/UL (ref 0–0.2)
BILIRUB SERPL-MCNC: 0.38 MG/DL (ref 0.3–1.2)
BUN BLDV-MCNC: 16 MG/DL (ref 8–23)
BUN/CREAT BLD: 16 (ref 9–20)
CALCIUM SERPL-MCNC: 9.9 MG/DL (ref 8.6–10.4)
CHLORIDE BLD-SCNC: 103 MMOL/L (ref 98–107)
CO2: 27 MMOL/L (ref 20–31)
CREAT SERPL-MCNC: 0.97 MG/DL (ref 0.7–1.2)
DIFFERENTIAL TYPE: ABNORMAL
EOSINOPHILS RELATIVE PERCENT: 1 % (ref 1–4)
GFR AFRICAN AMERICAN: >60 ML/MIN
GFR NON-AFRICAN AMERICAN: >60 ML/MIN
GFR SERPL CREATININE-BSD FRML MDRD: ABNORMAL ML/MIN/{1.73_M2}
GFR SERPL CREATININE-BSD FRML MDRD: ABNORMAL ML/MIN/{1.73_M2}
GLUCOSE BLD-MCNC: 141 MG/DL (ref 70–99)
HCT VFR BLD CALC: 36.7 % (ref 40.7–50.3)
HEMOGLOBIN: 12.1 G/DL (ref 13–17)
IMMATURE GRANULOCYTES: 0 %
INR BLD: 2.2
LYMPHOCYTES # BLD: 16 % (ref 24–43)
MCH RBC QN AUTO: 31.5 PG (ref 25.2–33.5)
MCHC RBC AUTO-ENTMCNC: 33 G/DL (ref 25.2–33.5)
MCV RBC AUTO: 95.6 FL (ref 82.6–102.9)
MONOCYTES # BLD: 8 % (ref 3–12)
NRBC AUTOMATED: 0 PER 100 WBC
PDW BLD-RTO: 14.3 % (ref 11.8–14.4)
PLATELET # BLD: 174 K/UL (ref 138–453)
PLATELET ESTIMATE: ABNORMAL
PMV BLD AUTO: 9.6 FL (ref 8.1–13.5)
POTASSIUM SERPL-SCNC: 3.7 MMOL/L (ref 3.7–5.3)
PROTIME: 26.7 SECONDS
RBC # BLD: 3.84 M/UL (ref 4.21–5.77)
RBC # BLD: ABNORMAL 10*6/UL
SEG NEUTROPHILS: 75 % (ref 36–65)
SEGMENTED NEUTROPHILS ABSOLUTE COUNT: 4.63 K/UL (ref 1.5–8.1)
SODIUM BLD-SCNC: 139 MMOL/L (ref 135–144)
THYROXINE, FREE: 1.05 NG/DL (ref 0.93–1.7)
TOTAL PROTEIN: 7.6 G/DL (ref 6.4–8.3)
TSH SERPL DL<=0.05 MIU/L-ACNC: 5.03 MIU/L (ref 0.3–5)
WBC # BLD: 6.3 K/UL (ref 3.5–11.3)
WBC # BLD: ABNORMAL 10*3/UL

## 2021-08-10 PROCEDURE — G8428 CUR MEDS NOT DOCUMENT: HCPCS | Performed by: INTERNAL MEDICINE

## 2021-08-10 PROCEDURE — 36591 DRAW BLOOD OFF VENOUS DEVICE: CPT

## 2021-08-10 PROCEDURE — 36416 COLLJ CAPILLARY BLOOD SPEC: CPT

## 2021-08-10 PROCEDURE — 84439 ASSAY OF FREE THYROXINE: CPT

## 2021-08-10 PROCEDURE — 3017F COLORECTAL CA SCREEN DOC REV: CPT | Performed by: INTERNAL MEDICINE

## 2021-08-10 PROCEDURE — 6360000002 HC RX W HCPCS: Performed by: INTERNAL MEDICINE

## 2021-08-10 PROCEDURE — 84443 ASSAY THYROID STIM HORMONE: CPT

## 2021-08-10 PROCEDURE — 4040F PNEUMOC VAC/ADMIN/RCVD: CPT | Performed by: INTERNAL MEDICINE

## 2021-08-10 PROCEDURE — 2580000003 HC RX 258: Performed by: INTERNAL MEDICINE

## 2021-08-10 PROCEDURE — 85610 PROTHROMBIN TIME: CPT

## 2021-08-10 PROCEDURE — 80053 COMPREHEN METABOLIC PANEL: CPT

## 2021-08-10 PROCEDURE — 99211 OFF/OP EST MAY X REQ PHY/QHP: CPT

## 2021-08-10 PROCEDURE — 96413 CHEMO IV INFUSION 1 HR: CPT

## 2021-08-10 PROCEDURE — 85025 COMPLETE CBC W/AUTO DIFF WBC: CPT

## 2021-08-10 PROCEDURE — 99214 OFFICE O/P EST MOD 30 MIN: CPT | Performed by: INTERNAL MEDICINE

## 2021-08-10 PROCEDURE — 1123F ACP DISCUSS/DSCN MKR DOCD: CPT | Performed by: INTERNAL MEDICINE

## 2021-08-10 RX ORDER — SODIUM CHLORIDE 0.9 % (FLUSH) 0.9 %
10 SYRINGE (ML) INJECTION PRN
Status: DISCONTINUED | OUTPATIENT
Start: 2021-08-10 | End: 2021-08-11 | Stop reason: HOSPADM

## 2021-08-10 RX ORDER — HEPARIN SODIUM (PORCINE) LOCK FLUSH IV SOLN 100 UNIT/ML 100 UNIT/ML
500 SOLUTION INTRAVENOUS PRN
Status: DISCONTINUED | OUTPATIENT
Start: 2021-08-10 | End: 2021-08-11 | Stop reason: HOSPADM

## 2021-08-10 RX ORDER — SODIUM CHLORIDE 9 MG/ML
20 INJECTION, SOLUTION INTRAVENOUS ONCE
Status: COMPLETED | OUTPATIENT
Start: 2021-08-10 | End: 2021-08-10

## 2021-08-10 RX ADMIN — SODIUM CHLORIDE, PRESERVATIVE FREE 10 ML: 5 INJECTION INTRAVENOUS at 12:29

## 2021-08-10 RX ADMIN — SODIUM CHLORIDE 20 ML/HR: 9 INJECTION, SOLUTION INTRAVENOUS at 12:30

## 2021-08-10 RX ADMIN — HEPARIN 500 UNITS: 100 SYRINGE at 15:33

## 2021-08-10 RX ADMIN — SODIUM CHLORIDE 1120 MG: 9 INJECTION, SOLUTION INTRAVENOUS at 14:21

## 2021-08-10 ASSESSMENT — PAIN SCALES - GENERAL: PAINLEVEL_OUTOF10: 0

## 2021-08-10 NOTE — PROGRESS NOTES
Quan Kelley here for md virtual visit and treatment   Port accessed per Desiree with good blood return, labs drawn and sent to lab   Virtual visit with Dr Celestina Crane completed   Labs reviewed

## 2021-08-10 NOTE — PATIENT INSTRUCTIONS
Referred to IR for bone biopsy.   Return visit to discuss since of biopsy  Hold further immunotherapy results available

## 2021-08-10 NOTE — PLAN OF CARE
Problem: SAFETY  Goal: Free from accidental physical injury  Outcome: Met This Shift     Problem: KNOWLEDGE DEFICIT  Goal: Patient/S.O. demonstrates understanding of disease process, treatment plan, medications, and discharge instructions.   Outcome: Met This Shift

## 2021-08-10 NOTE — PROGRESS NOTES
Shannen Dowling Shock                                                                                                                  8/10/2021  MRN:   [id number]  YOB: 1945  PCP:                           Noelle Casillas DO  Referring Physician: No ref. provider found  Treating Physician Name: Ky Pantoja MD      Reason for visit:  Patient seen in [facility] clinic due to ongoing coronavirus pandemic  Reviewed results of CT scans. Discussed treatment plan    Current problems:  Adenocarcinoma of right lung, clinical stage at least T1c, N2, M0 (stage IIIA)  Subcarinal lymph node metastasis    Active and recent treatments:  concurrent chemoradiation using carboplatin and Taxol  Consolidation therapy with Imfinzi-1/2021    Summary of Case/History:    Valeria Hernandez a 77 y. o.male is a patient with biopsy confirmed adenocarcinoma of right lung presents to the office to establish care and for further evaluation treatment recommendations. Patient was initially seen by pulmonary team for lung nodules. Patient CT scan from July 2019 showed a right-sided pleural calcification thickening concerning for asbestos exposure. Patient also noted to have multiple pulmonary nodules measuring between 1.5 x 1.3 cm. Follow-up CT chest from July 2020 showed increase in size of the right lower lobe lung nodule which now measured 2.1 cm. Subsequently patient underwent CT PET on 8/22 which showed FDG avid right lower lobe lung nodule with SUV of 4.6. Patient CT PET was also positive for subcarinal lymph node with SUV of 4.6. Patient underwent bronchoscopy with endobronchial ultrasound biopsy of the subcarinal lymph node confirmed adenocarcinoma. Patient has significant history of tobacco dependence around 30-pack-year however he quit about 25 years ago. Patient does have coronary artery disease status post stent placement. Patient also gives history of occupational asthma and exposure to bacteria.   Patient does give history of weight loss but describes it as intentional.  Denies headaches dizziness chest pain abdominal pain nausea bone pain     Interim History:     Patient presents to the clinic via virtual visit due to ongoing coronavirus pandemic. Patient CT scan shows concerning lesion in the eighth rib L3. Patient does complain of some pain over the chest wall however states that it is getting better. Previously x-ray done back in May was unremarkable. Denies hospitalization or ER visit. Weight is stable but appetite is fair. Exercise tolerance is improving    During this visit patient's allergy, social, medical, surgical history and medications were reviewed and updated. Past Medical History:   Past Medical History:   Diagnosis Date    Abdominal hernia     small, no significant symptomatology.  Abnormal PSA     with history of slight increase.  Allergic rhinitis     Benign prostatic hypertrophy     Chest pain     occasional.     Coronary artery disease     status post drug eluting stent placement, LAD, ostial obtuse marginal.     Dysphagia     Erectile dysfunction     Familial hyperlipidemia     with hypertriglyceridemia.  Gastroesophageal reflux disease     Hearing loss, bilateral     hearing aid in the left ear only. 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     Hypertension     Impaired fasting glucose     prediabetes.  Lumbar disc herniation     L3-L4, with chronic back pain.  Mild anemia     Nasal polyps     minimal symptoms.  Nasal septal deviation     right side.  Obesity     Palpitations     occasional.     Peptic ulcer disease     Reactive airway disease     asthma, industrial related, also a history of hypersensitivity pneumonitis.      Urinary frequency        Past Surgical History:     Past Surgical History:   Procedure Laterality Date    APPENDECTOMY  age 11    BRONCHOSCOPY  09/17/2020    BRONCHOSCOPY N/A 9/17/2020    EBUS- BRONCHOSCOPY ENDOBRONCHIAL ULTRASOUND, PATHOLOGY REQUESTED- CINDY NOTIFIED, GI UNIT SCHEDULED performed by Chico Figueroa MD at [address] [place] [address]  10/20/2011    occluded LAD and ostial obtuse marginal stenosis, underwent drug eluting stents to both, RCA small, nondominant, occluded, ejection fraction 45%.  CARDIAC CATHETERIZATION  08/2018    with stent placement    CATARACT REMOVAL Bilateral 03/2018    COLONOSCOPY  01/21/2011    mild sigmoid diverticulosis.  COLONOSCOPY  4/6/16    hemorrhoid; sigmoid diverticulosis    KNEE ARTHROSCOPY Left 03/19/2010    partial medial and lateral synovectomies, partial medial meniscectomy, chondroplasty.  NASAL FRACTURE SURGERY  1960    OTHER SURGICAL HISTORY Left 02/12/2010    knee injection.  PORT SURGERY N/A 10/14/2020    RIGHT PORT INSERTION performed by Liset Dorman DO at [address] [place] [address] PRE-MALIGNANT / [address] Left 01/16/2012    actinic keratoses, ear, liquid nitrogen.  PRE-MALIGNANT / BENIGN SKIN LESION EXCISION Left 07/19/2011    actinic keratosis, upper ear, liquid nitrogen.  PROSTATE BIOPSY Bilateral 09/08/2015    Benign    REPAIR UMBILICAL UYDG,8+M/F,HANBY N/A 4/30/7763    UMBILICAL Hernia Repair w/ Mesh performed by Shade Martinez MD at [address] [place] [address] SEPTOPLASTY  10/30/2015    with bilateral submucosal resection of the inferior turbinates, left middle turbinate elza bullosa resection done by Dr Ruth Bland ARTHROSCOPY Left 10/17/14    W/ SUBACROMIAL DECOMPRESSION, DEBRIDEMENT ET CHONDROPLASTY    UPPER GASTROINTESTINAL ENDOSCOPY  01/21/2011    superficial ulcer of the fundus, erostive gastritis.      VASECTOMY Bilateral 04/04/1980       Patient Family Social History:     Social History     Socioeconomic History    Marital status:      Spouse name: Not on file    Number of children: Not on file    Years of education: Not on file    Highest education level: Not on file   Occupational History    Not on file Cholesterol Child        Current Medications:     Current Outpatient Medications   Medication Sig Dispense Refill    losartan (COZAAR) 50 MG tablet TAKE 1 TABLET BY MOUTH EVERY DAY 90 tablet 1    levothyroxine (SYNTHROID) 100 MCG tablet TAKE 1 TABLET BY MOUTH EVERY DAY  90 tablet 1    tamsulosin (FLOMAX) 0.4 MG capsule TAKE 1 CAPSULE BY MOUTH EVERY DAY 90 capsule 1    Metoprolol Tartrate 75 MG TABS TAKE 1 TABLET BY MOUTH TWO TIMES A DAY      pravastatin (PRAVACHOL) 40 MG tablet TAKE ONE TABLET BY MOUTH ONCE EVERY EVENING 90 tablet 3    clopidogrel (PLAVIX) 75 MG tablet TAKE 1 TABLET BY MOUTH DAILY 90 tablet 1    furosemide (LASIX) 20 MG tablet TAKE 1 TABLET BY MOUTH ONE TIME A DAY 90 tablet 1    oxybutynin (DITROPAN-XL) 10 MG extended release tablet Take 1 tablet by mouth daily 90 tablet 1    warfarin (COUMADIN) 2.5 MG tablet Take 2 tablets by mouth daily Take 2 tablets (5 mg) by mouth daily. Or as directed by INR results 60 tablet 3    flecainide (TAMBOCOR) 50 MG tablet Take 50 mg by mouth 2 times daily      ondansetron (ZOFRAN) 4 MG tablet Take 4 mg by mouth every 6 hours as needed for Nausea or Vomiting Disp 60 with 3 refills called in 10/20/2020      ECHINACEA HERB PO Take by mouth daily      albuterol sulfate HFA (VENTOLIN HFA) 108 (90 Base) MCG/ACT inhaler Inhale 2 puffs into the lungs every 6 hours as needed for Wheezing or Shortness of Breath 1 Inhaler 6    Multiple Vitamins-Minerals (MULTIVITAMIN PO) daily. No current facility-administered medications for this visit. Allergies:   Effient [prasugrel], Ace inhibitors, and Statins    Review of Systems:    Constitutional: No fever or chills.  No night sweats, + weight loss   Eyes: No eye discharge, double vision, or eye pain   HEENT: negative for sore mouth, sore throat, hoarseness and voice change   Respiratory: negative for cough , sputum, dyspnea, wheezing, hemoptysis, chest pain   Cardiovascular: negative for chest pain, dyspnea, Psychiatric:       [x] Normal Affect [x] No Hallucinations        [] Abnormal-     Other pertinent observable physical exam findings-     Due to this being a TeleHealth encounter, evaluation of the following organ systems is limited: Vitals/Constitutional/EENT/Resp/CV/GI//MS/Neuro/Skin/Heme-Lymph-Imm.     Monitor there was a [place]  DATA:    Results for orders placed or performed during the hospital encounter of 08/10/21   CBC Auto Differential   Result Value Ref Range    WBC 6.3 3.5 - 11.3 k/uL    RBC 3.84 (L) 4.21 - 5.77 m/uL    Hemoglobin 12.1 (L) 13.0 - 17.0 g/dL    Hematocrit 36.7 (L) 40.7 - 50.3 %    MCV 95.6 82.6 - 102.9 fL    MCH 31.5 25.2 - 33.5 pg    MCHC 33.0 25.2 - 33.5 g/dL    RDW 14.3 11.8 - 14.4 %    Platelets 567 829 - 008 k/uL    MPV 9.6 8.1 - 13.5 fL    NRBC Automated 0.0 0.0 per 100 WBC    Differential Type NOT REPORTED     Seg Neutrophils 75 (H) 36 - 65 %    Lymphocytes 16 (L) 24 - 43 %    Monocytes 8 3 - 12 %    Eosinophils % 1 1 - 4 %    Basophils 0 0 - 2 %    Immature Granulocytes 0 0 %    Segs Absolute 4.63 1.50 - 8.10 k/uL    Absolute Lymph # 1.01 (L) 1.10 - 3.70 k/uL    Absolute Mono # 0.51 0.10 - 1.20 k/uL    Absolute Eos # 0.07 0.00 - 0.44 k/uL    Basophils Absolute <0.03 0.00 - 0.20 k/uL    Absolute Immature Granulocyte <0.03 0.00 - 0.30 k/uL    WBC Morphology NOT REPORTED     RBC Morphology NOT REPORTED     Platelet Estimate NOT REPORTED    Comprehensive Metabolic Panel   Result Value Ref Range    Glucose 141 (H) 70 - 99 mg/dL    BUN 16 8 - 23 mg/dL    CREATININE 0.97 0.70 - 1.20 mg/dL    Bun/Cre Ratio 16 9 - 20    Calcium 9.9 8.6 - 10.4 mg/dL    Sodium 139 135 - 144 mmol/L    Potassium 3.7 3.7 - 5.3 mmol/L    Chloride 103 98 - 107 mmol/L    CO2 27 20 - 31 mmol/L    Anion Gap 9 9 - 17 mmol/L    Alkaline Phosphatase 98 40 - 129 U/L    ALT 17 5 - 41 U/L    AST 18 <40 U/L    Total Bilirubin 0.38 0.3 - 1.2 mg/dL    Total Protein 7.6 6.4 - 8.3 g/dL    Albumin 4.2 3.5 - 5.2 g/dL Albumin/Globulin Ratio 1.2 1.0 - 2.5    GFR Non-African American >60 >60 mL/min    GFR African American >60 >60 mL/min    GFR Comment          GFR Staging NOT REPORTED      -- Diagnosis --   BAL RIGHT LOWER LOBE:           NEGATIVE FOR MALIGNANCY.             FINE NEEDLE ASPIRATION STATION 7 EBUS:           ADENOCARCINOMA, COMPATIBLE WITH LUNG PRIMARY. CT PET    Impression    1. The previously described nodule involving the right lung base is FDG avid. Tissue sampling is recommended as malignancy is suspected. 2. The previously identified smaller lung nodules involving the lower lobes    are too small for PET CT characterization.  CT follow-up is recommended. 3. FDG avid subcarinal and right hilar lymph nodes suggestive of metastatic    disease. 4. No abnormal FDG activity identified within the neck, abdomen or pelvis.             CT CHEST ABDOMEN PELVIS W CONTRAST    Result Date: 8/3/2021  EXAMINATION: CT OF THE CHEST, ABDOMEN, AND PELVIS WITH CONTRAST 8/3/2021 10:04 am TECHNIQUE: CT of the chest, abdomen and pelvis was performed with the administration of intravenous contrast. Multiplanar reformatted images are provided for review. Dose modulation, iterative reconstruction, and/or weight based adjustment of the mA/kV was utilized to reduce the radiation dose to as low as reasonably achievable. COMPARISON: Chest CT 04/27/2021. CT chest abdomen and pelvis 01/07/2021. MRI abdomen 01/22/2021. PET-CT 08/22/2020. HISTORY: ORDERING SYSTEM PROVIDED HISTORY: Adenocarcinoma of right lung [facility]) TECHNOLOGIST PROVIDED HISTORY: assess responce Reason for Exam: h/o right lung adenocarcinoma with lymph node mets , h/o appendectomy, left shoulder surgery, cardiac stents Acuity: Chronic Type of Exam: Subsequent/Follow-up FINDINGS: Chest: Mediastinum: Mildly prominent lymph node in the right tracheoesophageal groove at the thoracic inlet on image 16 measures 1 cm in short axis. No pericardial effusion. Calcified atheromatous plaque and coronary calcification noted. Post treatment changes in the right hilum. Lungs/pleura: Calcified pleura bilaterally. Small right pleural effusion. Post treatment changes in the right perihilar lung and medial right lower lobe. Overall more diffuse opacities in the right lower lobe and more numerous ground-glass opacities in the right upper lobe have improved. Focal ground-glass opacity is present in the inferior right upper lobe with adjacent nodular opacity in the superior segment of the right lower lobe anteriorly. The left lung is without new airspace disease. The central airway is patent. Soft Tissues/Bones: Lucent, mildly expansile lesion in the anterolateral right 8th rib is noted. Abdomen/Pelvis: Organs: Previously described abnormality near the right portal vein is less prominent in the interval and was previously described as a probably benign finding with MRI. The gallbladder, pancreas, spleen, adrenals and kidneys reveal no discrete abnormality. GI/Bowel: There is no bowel dilatation or wall thickening identified. Diverticulosis. Pelvis: No acute findings. Prostatomegaly. Peritoneum/Retroperitoneum: No free air or free fluid. The aorta is normal in caliber. The visceral branches are patent. No lymphadenopathy. Bones/Soft Tissues: Mixed lucent and sclerotic bone lesion in the right lateral aspect of the L3 vertebral body is a new finding. 1.  New anterior right 8th rib and L3 bone lesions, concerning for metastatic deposits. 2.  Previously noted more diffuse airspace disease in the right lung has improved. Localized ground-glass opacity in the inferior right upper lobe and residual opacities in the superior segment of the right lower lobe remain, which may represent resolving inflammatory process. Short-term imaging follow-up is suggested.  3.  Less prominent geographic liver lesion near the right portal vein, previously described as probably benign with MRI.  This may represent focal fat. 4.  Small right pleural effusion. Impression:  Adenocarcinoma of right lung, clinical stage at least T1c, N2, M0 (stage IIIA)  Bony lesions involving L3 and right eighth rib  Subcarinal lymph node metastasis  Weight loss  Radiation pneumonitis and shortness of breath      Plan:  I had a detailed discussion with the patient and we went over results of lab work-up imaging studies and other relevant clinical data  Toxicity check performed. Overall patient is tolerating treatment without unexpected or severe side effects  Reviewed results of CT scan. Personally reviewed images of CT scan. Patient has now developed new lesions involving the eighth rib and L3. Will refer patient for biopsy. I will continue patient on [place] in the meanwhile  Reviewed goals and expectations  NCCN guidelines were reviewed and discussed with the patient. The diagnosis and care plan were discussed with the patient in detail. I discussed the natural history of the disease, prognosis, risks and goals of therapy and answered all the patients questions to the best of my ability. Patient expressed understanding and was in agreement. German Jones MD          This note is created with the assistance of a speech recognition program.  While intending to generate a document that actually reflects the content of the visit, the document can still have some errors including those of syntax and sound a like substitutions which may escape proof reading. It such instances, actual meaning can be extrapolated by contextual diversion.

## 2021-08-10 NOTE — PATIENT INSTRUCTIONS
\"On day of next appointment, please screen for temperature and COVID-19 symptoms prior to you clinic appointment. If any symptoms present, please call [phone] to reschedule. \"

## 2021-08-10 NOTE — PROGRESS NOTES
ANTICOAGULATION SERVICE    Date of Clinic Visit:  8/10/2021    Chuy Torres is a 77 y.o. male who presents to clinic today for anticoagulation monitoring and adjustment. Recent INR Results:  Internal QC passed  Lab Results   Component Value Date    INR 2.2 08/10/2021    INR 3.1 07/13/2021       Current Warfarin Dosage:  Dosing Plan  As of 8/10/2021    TTR:  96.0 % (5.5 mo)   Full warfarin instructions:  5 mg every Mon, Wed, Fri; 7.5 mg all other days               Assessment/Plan:    Continue current regimen as INR remains stable. Next Clinic Appointment:  Return date  As of 8/10/2021    TTR:  96.0 % (5.5 mo)   Next INR check:  9/7/2021             Please call [facility] at [phone] with any questions. Thanks!   Kelly Beck, [facility]  Anticoagulation Service Pharmacist  8/10/2021 1:06 PM  For Pharmacy Admin Tracking Only     Total # of Interventions Recommended: 0   Total # of Interventions Accepted: 0   Time Spent (min): 10 normal

## 2021-08-18 ENCOUNTER — HOSPITAL ENCOUNTER (OUTPATIENT)
Dept: CT IMAGING | Age: 76
Discharge: HOME | End: 2021-08-20
Payer: MEDICARE

## 2021-08-18 VITALS
DIASTOLIC BLOOD PRESSURE: 87 MMHG | HEART RATE: 70 BPM | HEIGHT: 70 IN | TEMPERATURE: 98.2 F | OXYGEN SATURATION: 95 % | WEIGHT: 226 LBS | BODY MASS INDEX: 32.35 KG/M2 | SYSTOLIC BLOOD PRESSURE: 138 MMHG | RESPIRATION RATE: 18 BRPM

## 2021-08-18 DIAGNOSIS — R91.8 PULMONARY NODULES/LESIONS, MULTIPLE: ICD-10-CM

## 2021-08-18 LAB
INR BLD: 1
PARTIAL THROMBOPLASTIN TIME: 38.2 SEC (ref 20.5–30.5)
PLATELET # BLD: 166 K/UL (ref 138–453)
PROTHROMBIN TIME: 10.9 SEC (ref 9.1–12.3)

## 2021-08-18 PROCEDURE — 85610 PROTHROMBIN TIME: CPT

## 2021-08-18 PROCEDURE — 88305 TISSUE EXAM BY PATHOLOGIST: CPT

## 2021-08-18 PROCEDURE — 7100000011 HC PHASE II RECOVERY - ADDTL 15 MIN

## 2021-08-18 PROCEDURE — 85049 AUTOMATED PLATELET COUNT: CPT

## 2021-08-18 PROCEDURE — 88342 IMHCHEM/IMCYTCHM 1ST ANTB: CPT

## 2021-08-18 PROCEDURE — 88341 IMHCHEM/IMCYTCHM EA ADD ANTB: CPT

## 2021-08-18 PROCEDURE — 6360000002 HC RX W HCPCS: Performed by: RADIOLOGY

## 2021-08-18 PROCEDURE — 88311 DECALCIFY TISSUE: CPT

## 2021-08-18 PROCEDURE — 2580000003 HC RX 258: Performed by: PHYSICIAN ASSISTANT

## 2021-08-18 PROCEDURE — 7100000010 HC PHASE II RECOVERY - FIRST 15 MIN

## 2021-08-18 PROCEDURE — 2709999900 CT BIOPSY DEEP BONE PERCUTANEOUS

## 2021-08-18 PROCEDURE — G1010 CDSM STANSON: HCPCS

## 2021-08-18 PROCEDURE — 85730 THROMBOPLASTIN TIME PARTIAL: CPT

## 2021-08-18 PROCEDURE — 2580000003 HC RX 258: Performed by: RADIOLOGY

## 2021-08-18 RX ORDER — FENTANYL CITRATE 50 UG/ML
INJECTION, SOLUTION INTRAMUSCULAR; INTRAVENOUS
Status: COMPLETED | OUTPATIENT
Start: 2021-08-18 | End: 2021-08-18

## 2021-08-18 RX ORDER — SODIUM CHLORIDE 0.9 % (FLUSH) 0.9 %
10 SYRINGE (ML) INJECTION ONCE
Status: COMPLETED | OUTPATIENT
Start: 2021-08-18 | End: 2021-08-18

## 2021-08-18 RX ORDER — SODIUM CHLORIDE 9 MG/ML
INJECTION, SOLUTION INTRAVENOUS CONTINUOUS
Status: DISCONTINUED | OUTPATIENT
Start: 2021-08-18 | End: 2021-08-21 | Stop reason: HOSPADM

## 2021-08-18 RX ORDER — SODIUM CHLORIDE 0.9 % (FLUSH) 0.9 %
5-40 SYRINGE (ML) INJECTION 2 TIMES DAILY
Status: CANCELLED | OUTPATIENT
Start: 2021-08-18

## 2021-08-18 RX ORDER — ACETAMINOPHEN 325 MG/1
650 TABLET ORAL EVERY 4 HOURS PRN
Status: DISCONTINUED | OUTPATIENT
Start: 2021-08-18 | End: 2021-08-21 | Stop reason: HOSPADM

## 2021-08-18 RX ORDER — MIDAZOLAM HYDROCHLORIDE 2 MG/2ML
INJECTION, SOLUTION INTRAMUSCULAR; INTRAVENOUS
Status: COMPLETED | OUTPATIENT
Start: 2021-08-18 | End: 2021-08-18

## 2021-08-18 RX ORDER — HEPARIN SODIUM (PORCINE) LOCK FLUSH IV SOLN 100 UNIT/ML 100 UNIT/ML
500 SOLUTION INTRAVENOUS PRN
Status: DISCONTINUED | OUTPATIENT
Start: 2021-08-18 | End: 2021-08-21 | Stop reason: HOSPADM

## 2021-08-18 RX ORDER — LANOLIN ALCOHOL/MO/W.PET/CERES
500 CREAM (GRAM) TOPICAL 2 TIMES DAILY
COMMUNITY

## 2021-08-18 RX ADMIN — FENTANYL CITRATE 50 MCG: 50 INJECTION INTRAMUSCULAR; INTRAVENOUS at 12:55

## 2021-08-18 RX ADMIN — SODIUM CHLORIDE: 9 INJECTION, SOLUTION INTRAVENOUS at 10:27

## 2021-08-18 RX ADMIN — FENTANYL CITRATE 25 MCG: 50 INJECTION INTRAMUSCULAR; INTRAVENOUS at 13:16

## 2021-08-18 RX ADMIN — MIDAZOLAM HYDROCHLORIDE 1 MG: 1 INJECTION, SOLUTION INTRAMUSCULAR; INTRAVENOUS at 12:55

## 2021-08-18 RX ADMIN — SODIUM CHLORIDE, PRESERVATIVE FREE 10 ML: 5 INJECTION INTRAVENOUS at 14:25

## 2021-08-18 RX ADMIN — HEPARIN 500 UNITS: 100 SYRINGE at 14:21

## 2021-08-18 ASSESSMENT — PAIN SCALES - GENERAL
PAINLEVEL_OUTOF10: 0
PAINLEVEL_OUTOF10: 0

## 2021-08-18 ASSESSMENT — PAIN - FUNCTIONAL ASSESSMENT: PAIN_FUNCTIONAL_ASSESSMENT: 0-10

## 2021-08-18 NOTE — PROGRESS NOTES
Discharge instructions given, questions answered. Understanding of discharge instructions stated per patient.

## 2021-08-18 NOTE — BRIEF OP NOTE
Brief Postoperative Note    Yady Morin Shock  YOB: 1945  [id number]    Pre-operative Diagnosis: bone bx    Post-operative Diagnosis: Same    Procedure: Bone bx    Anesthesia: Local and Moderate Sedation    Surgeons/Assistants: Precious Maguire MD and SAUL Fatima    Estimated Blood Loss: less than 50     Complications: None    Specimens: Was Obtained:     Findings: Successful bx of L3 bone lesion. 1 core sample taken.     Electronically signed by SAUL Cassidy on 8/18/2021 at 1:29 PM

## 2021-08-18 NOTE — H&P
History and Physical    Pt Name: Chuy Torres  MRN: [id number]  YOB: 1945  Date of evaluation: 8/18/2021  Primary Care Physician: Aaron Garcia DO    SUBJECTIVE:   History of Chief Complaint:    Chuy Torres is a 77 y.o. male who is scheduled today for CT NEEDLE BIOPSY. Patient has current adenocarcinoma of the right lung and on recent imaging study was found to have bone lesions to the right rib and L3. Patient receives chemotherapy through his right chest port. He reports shortness of breath with exertion reportedly from pneumonitis secondary to radiation. Allergies  is allergic to effient [prasugrel], ace inhibitors, and statins. Medications  Prior to Admission medications    Medication Sig Start Date End Date Taking?  Authorizing Provider   Omega-3 Fatty Acids (FISH OIL) 1200 MG CPDR Take 2,400 mg by mouth 2 times daily   Yes Historical Provider, MD   niacin 500 MG extended release capsule Take 500 mg by mouth 2 times daily   Yes Historical Provider, MD   losartan (COZAAR) 50 MG tablet TAKE 1 TABLET BY MOUTH EVERY DAY 8/2/21  Yes Aaron Garcia DO   levothyroxine (SYNTHROID) 100 MCG tablet TAKE 1 TABLET BY MOUTH EVERY DAY  8/2/21  Yes Aaron Garcia DO   tamsulosin (FLOMAX) 0.4 MG capsule TAKE 1 CAPSULE BY MOUTH EVERY DAY 8/2/21  Yes Aaron Garcia DO   Metoprolol Tartrate 75 MG TABS TAKE 1 TABLET BY MOUTH TWO TIMES A DAY 12/16/20  Yes Historical Provider, MD   pravastatin (PRAVACHOL) 40 MG tablet TAKE ONE TABLET BY MOUTH ONCE EVERY EVENING 3/3/21  Yes Aaron Garcia DO   furosemide (LASIX) 20 MG tablet TAKE 1 TABLET BY MOUTH ONE TIME A DAY 3/3/21  Yes Aaron Garcia DO   oxybutynin (DITROPAN-XL) 10 MG extended release tablet Take 1 tablet by mouth daily 3/3/21  Yes Aaron Garcia DO   flecainide (TAMBOCOR) 50 MG tablet Take 50 mg by mouth 2 times daily   Yes Historical Provider, MD   ECHINACEA HERB PO Take by mouth daily   Yes Historical Provider, MD   albuterol sulfate HFA (VENTOLIN HFA) 108 (90 Base) MCG/ACT inhaler Inhale 2 puffs into the lungs every 6 hours as needed for Wheezing or Shortness of Breath 2/13/19  Yes Kristie Hyde, DO   Multiple Vitamins-Minerals (MULTIVITAMIN PO) daily. Yes Historical Provider, MD   clopidogrel (PLAVIX) 75 MG tablet TAKE 1 TABLET BY MOUTH DAILY 3/3/21   Kristie Hyde, DO   warfarin (COUMADIN) 2.5 MG tablet Take 2 tablets by mouth daily Take 2 tablets (5 mg) by mouth daily. Or as directed by INR results 2/11/21   Sola Aguilera MD   ondansetron (ZOFRAN) 4 MG tablet Take 4 mg by mouth every 6 hours as needed for Nausea or Vomiting Disp 60 with 3 refills called in 10/20/2020    Historical Provider, MD     Past Medical History    has a past medical history of Abdominal hernia, Abnormal PSA, Adenocarcinoma, lung, right (HCC), Allergic rhinitis, Benign prostatic hypertrophy, Chest pain, Coronary artery disease, Dysphagia, Erectile dysfunction, Familial hyperlipidemia, Gastroesophageal reflux disease, Hearing loss, bilateral, HTLV-1 carrier, Hypertension, Impaired fasting glucose, Lumbar disc herniation, Mild anemia, Nasal polyps, Nasal septal deviation, Obesity, Palpitations, Peptic ulcer disease, Pulmonary nodules, Reactive airway disease, Sleep apnea, and Urinary frequency. Past Surgical History   has a past surgical history that includes Nasal fracture surgery (1960); Vasectomy (Bilateral, 04/04/1980); Upper gastrointestinal endoscopy (01/21/2011); Colonoscopy (01/21/2011); Cardiac catheterization (10/20/2011); pre-malignant / benign skin lesion excision (Left, 01/16/2012); pre-malignant / benign skin lesion excision (Left, 07/19/2011); Appendectomy (age 11); Knee arthroscopy (Left, 03/19/2010); other surgical history (Left, 02/12/2010); Shoulder arthroscopy (Left, 10/17/14); Septoplasty (10/30/2015); Colonoscopy (4/6/16); Prostate Biopsy (Bilateral, 51/05/8995); repair umbilical NLXK,1+B/R,NDYKA (N/A, 1/11/2018);  Cataract removal (Bilateral, 2018); Cardiac catheterization (2018); bronchoscopy (2020); bronchoscopy (N/A, 2020); and Port Surgery (N/A, 10/14/2020). Social History   reports that he quit smoking about 36 years ago. He has a 30.00 pack-year smoking history. He has never used smokeless tobacco.   reports no history of alcohol use. reports no history of drug use. Marital Status   Children 3  Occupation retired  Family History  Family Status   Relation Name Status    Mother   at age 61        complications of lymphoma    Father      MGM  (Not Specified)    MGF  (Not Specified)   [facility] Sister  (Not Specified)    Child  (Not Specified)     family history includes Cancer in his mother; Coronary Art Dis in his father; Crohn's Disease in his sister; Emphysema in his father; Heart Attack in his mother; Heart Disease in his maternal grandfather, maternal grandmother, and mother; Heart Failure in his father; High Blood Pressure in his mother; High Cholesterol in his child and sister; Stroke in his mother; Thyroid Disease in his mother. OBJECTIVE:   VITALS:  height is 5' 10\" (1.778 m) and weight is 226 lb (102.5 kg). His infrared temperature is 98.4 °F (36.9 °C). His blood pressure is 163/94 (abnormal) and his pulse is 67. His respiration is 20 and oxygen saturation is 100%. CONSTITUTIONAL:alert & oriented x 3, no acute distress. Calm and pleasant. SKIN:  Warm and dry, no rashes to exposed areas of skin   HEAD:  Normocephalic, atraumatic   EYES: PERRL. EOMs intact. Wearing glasses. EARS:  Intact and equal bilaterally. No edema or thickening, without lumps, lesions, or discharge. Bilateral hearing loss; wears bilateral aides. NOSE:  Nares patent. No rhinorrhea   MOUTH/THROAT:  Mucous membranes pink and moist, uvula midline, teeth appear to be intact, two missing. NECK: Supple, no lymphadenopathy  LUNGS: Respirations even and non-labored.  Clear to auscultation bilaterally, no wheezes, rales, or rhonchi. CARDIOVASCULAR: Regular rate and rhythm, no murmurs/rubs/gallops. ABDOMEN: soft, non-tender and non-distended, bowel sounds active x 4. EXTREMITIES: No edema to bilateral lower extremities. No varicosities to bilateral lower extremities. NEUROLOGIC: CN II-XII are grossly intact. Gait not assessed. IMPRESSIONS:   Pulmonary nodules/lesion. PLANS:   CT NEEDLE BIOPSY.     ILYA Zhou CNP   Electronically signed 8/18/2021 at 10:30 AM

## 2021-08-18 NOTE — PROGRESS NOTES
Patient returns from [address] [place] [address] on stretcher. Occlusive dressing noted to the lower back right side. It is noted that the site is more swollen than on the lower left back area and Dr. Jordy Yen is aware. No redness or drainage is noted. Patient states no pain at this time.

## 2021-08-18 NOTE — POST SEDATION
Sedation Post Procedure Note    Patient Name: López Mak   YOB: 1945  Room/Bed: Room/bed info not found  Medical Record Number: [id number]  Date: 8/18/2021   Time: 1:28 PM         Physicians/Assistants: SALU Cortés    Procedure Performed:  Bone bx    Post-Sedation Vital Signs:  Vitals:    08/18/21 1320   BP: (!) 155/98   Pulse: 62   Resp: 25   Temp:    SpO2: 96%      Vital signs were reviewed and were stable after the procedure (see flow sheet for vitals)            Post-Sedation Exam: Pt remains stable           Complications: none    Electronically signed by SAUL Segura on 8/18/2021 at 1:28 PM

## 2021-08-18 NOTE — SEDATION DOCUMENTATION
BIOPSY AREA SLIGHTLY SWOLLEN. GAUZE DRY, NO BLEEDING NOTED. PT IS LAYING ON FOLDED SHEET ON BIOPSY SITE FOR NOW.

## 2021-08-18 NOTE — FLOWSHEET NOTE
Skin clean, dry & intact. Sterile procedure, clean mask to patient & provider, sterile gloves. CHG prep, accessed with Gripper Plus 22 G, 3/4 inch. Brisk blood return, 5 mL waste, 7 mL drawn for ordered labs. Normal saline flush, 10 mL pulsated, IV bag & tubing started. Sterile dressing. Tolerated well.

## 2021-08-18 NOTE — PRE SEDATION
Sedation Pre-Procedure Note    Patient Name: Mercedes Mak   YOB: 1945  Room/Bed: Room/bed info not found  Medical Record Number: [id number]  Date: 8/18/2021   Time:   12:45    Indication:  Bone bx    Consent: I have discussed with the patient and/or the patient representative the indication, alternatives, and the possible risks and/or complications of the planned procedure and the anesthesia methods. The patient and/or patient representative appear to understand and agree to proceed. 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 Past Surgical History:   has a past surgical history that includes Nasal fracture surgery (1960); Vasectomy (Bilateral, 04/04/1980); Upper gastrointestinal endoscopy (01/21/2011); Colonoscopy (01/21/2011); Cardiac catheterization (10/20/2011); pre-malignant / benign skin lesion excision (Left, 01/16/2012); pre-malignant / benign skin lesion excision (Left, 07/19/2011); Appendectomy (age 11); Knee arthroscopy (Left, 03/19/2010); other surgical history (Left, 02/12/2010); Shoulder arthroscopy (Left, 10/17/14); Septoplasty (10/30/2015); Colonoscopy (4/6/16); Prostate Biopsy (Bilateral, 62/08/4457); repair umbilical MYXX,8+G/S,ZHCQF (N/A, 1/11/2018); Cataract removal (Bilateral, 03/2018);  Cardiac catheterization (08/2018); bronchoscopy (09/17/2020); bronchoscopy (N/A, 9/17/2020); and Port Surgery (N/A, 10/14/2020). Medications:   Scheduled Meds:   Continuous Infusions:    sodium chloride 20 mL/hr at 08/18/21 1027     PRN Meds: heparin flush  Home Meds:   Prior to Admission medications    Medication Sig Start Date End Date Taking? Authorizing Provider   Omega-3 Fatty Acids (FISH OIL) 1200 MG CPDR Take 2,400 mg by mouth 2 times daily   Yes Historical Provider, MD   niacin 500 MG extended release capsule Take 500 mg by mouth 2 times daily   Yes Historical Provider, MD   losartan (COZAAR) 50 MG tablet TAKE 1 TABLET BY MOUTH EVERY DAY 8/2/21  Yes Albert Loge, DO   levothyroxine (SYNTHROID) 100 MCG tablet TAKE 1 TABLET BY MOUTH EVERY DAY  8/2/21  Yes Albert Loge, DO   tamsulosin (FLOMAX) 0.4 MG capsule TAKE 1 CAPSULE BY MOUTH EVERY DAY 8/2/21  Yes Albert Loge, DO   Metoprolol Tartrate 75 MG TABS TAKE 1 TABLET BY MOUTH TWO TIMES A DAY 12/16/20  Yes Historical Provider, MD   pravastatin (PRAVACHOL) 40 MG tablet TAKE ONE TABLET BY MOUTH ONCE EVERY EVENING 3/3/21  Yes Albert Loge, DO   furosemide (LASIX) 20 MG tablet TAKE 1 TABLET BY MOUTH ONE TIME A DAY 3/3/21  Yes Albert Loge, DO   oxybutynin (DITROPAN-XL) 10 MG extended release tablet Take 1 tablet by mouth daily 3/3/21  Yes Albert Loge, DO   flecainide (TAMBOCOR) 50 MG tablet Take 50 mg by mouth 2 times daily   Yes Historical Provider, MD   ECHINACEA HERB PO Take by mouth daily   Yes Historical Provider, MD   albuterol sulfate HFA (VENTOLIN HFA) 108 (90 Base) MCG/ACT inhaler Inhale 2 puffs into the lungs every 6 hours as needed for Wheezing or Shortness of Breath 2/13/19  Yes Albert Loge, DO   Multiple Vitamins-Minerals (MULTIVITAMIN PO) daily.    Yes Historical Provider, MD   clopidogrel (PLAVIX) 75 MG tablet TAKE 1 TABLET BY MOUTH DAILY 3/3/21   Albert Loge, DO   warfarin (COUMADIN) 2.5 MG tablet Take 2 tablets by mouth daily Take 2 tablets (5 mg) by mouth daily. Or as directed by INR results 2/11/21   Sola Aguilera MD   ondansetron (ZOFRAN) 4 MG tablet Take 4 mg by mouth every 6 hours as needed for Nausea or Vomiting Disp 60 with 3 refills called in 10/20/2020    Historical Provider, MD     Coumadin Use Last 7 Days:  no  Antiplatelet drug therapy use last 7 days: no  Other anticoagulant use last 7 days: no  Additional Medication Information:  See med [facility]      Pre-Sedation Documentation and Exam:   I have reviewed the patient's history and review of systems.     Mallampati Airway Assessment:  Mallampati Class II - (soft palate, fauces & uvula are visible)    Prior History of Anesthesia Complications:   none    ASA Classification:  Class 2 - A normal healthy patient with mild systemic disease    Sedation/ Anesthesia Plan:   intravenous sedation    Medications Planned:   midazolam (Versed) intravenously and fentanyl intravenously    Patient is an appropriate candidate for plan of sedation: yes    Electronically signed by SAUL Hickman on 8/18/2021 at 1:26 PM

## 2021-08-18 NOTE — PROGRESS NOTES
Discharge instructions signed per patient, questions answered. Occlusive dressing to the right lower back intact with noted swelling not a new finding and is less than charted before. Wife, Tamir rausch also views back area to know if swelling increases at home. Understanding stated per wife, Tamir rausch. No redness, warmth or drainage noted.

## 2021-08-18 NOTE — PROGRESS NOTES
Called IR to update Dr. Erinn Willams on the swelling to the right lower back area. Swelling is reduced and Dr. Erinn Willams is ok for patient to go home per MISAEL HERNANDEZ RN.

## 2021-08-20 LAB — SURGICAL PATHOLOGY REPORT: NORMAL

## 2021-08-24 ENCOUNTER — OFFICE VISIT (OUTPATIENT)
Dept: ONCOLOGY | Age: 76
End: 2021-08-24
Payer: MEDICARE

## 2021-08-24 VITALS
DIASTOLIC BLOOD PRESSURE: 66 MMHG | HEIGHT: 70 IN | HEART RATE: 70 BPM | BODY MASS INDEX: 32.78 KG/M2 | TEMPERATURE: 98.2 F | OXYGEN SATURATION: 94 % | SYSTOLIC BLOOD PRESSURE: 122 MMHG | WEIGHT: 229 LBS

## 2021-08-24 DIAGNOSIS — J70.0 RADIATION PNEUMONITIS (HCC): ICD-10-CM

## 2021-08-24 DIAGNOSIS — C34.91 ADENOCARCINOMA OF RIGHT LUNG (HCC): Primary | ICD-10-CM

## 2021-08-24 PROCEDURE — 3017F COLORECTAL CA SCREEN DOC REV: CPT | Performed by: INTERNAL MEDICINE

## 2021-08-24 PROCEDURE — 1123F ACP DISCUSS/DSCN MKR DOCD: CPT | Performed by: INTERNAL MEDICINE

## 2021-08-24 PROCEDURE — 99215 OFFICE O/P EST HI 40 MIN: CPT | Performed by: INTERNAL MEDICINE

## 2021-08-24 PROCEDURE — 4040F PNEUMOC VAC/ADMIN/RCVD: CPT | Performed by: INTERNAL MEDICINE

## 2021-08-24 PROCEDURE — 99213 OFFICE O/P EST LOW 20 MIN: CPT

## 2021-08-24 PROCEDURE — G8427 DOCREV CUR MEDS BY ELIG CLIN: HCPCS | Performed by: INTERNAL MEDICINE

## 2021-08-24 PROCEDURE — G8417 CALC BMI ABV UP PARAM F/U: HCPCS | Performed by: INTERNAL MEDICINE

## 2021-08-24 PROCEDURE — 1036F TOBACCO NON-USER: CPT | Performed by: INTERNAL MEDICINE

## 2021-08-24 NOTE — PROGRESS NOTES
Zhao Samson Shock                                                                                                                  8/24/2021  MRN:   [id number]  YOB: 1945  PCP:                           Delano Mcclain DO  Referring Physician: No ref. provider found  Treating Physician Name: Paul Euceda MD      Reason for visit:  Patient seen in [facility] clinic due to ongoing coronavirus pandemic  Reviewed results of CT scans. Discussed treatment plan    Current problems:  Adenocarcinoma of right lung, clinical stage at least T1c, N2, M0 (stage IIIA)  Recurrent disease, biopsy-proven8/2021    Active and recent treatments:  concurrent chemoradiation using carboplatin and Taxol  Consolidation therapy with Imfinzi1/2021    Summary of Case/History:    Ewa Andrews a 77 y. o.male is a patient with biopsy confirmed adenocarcinoma of right lung presents to the office to establish care and for further evaluation treatment recommendations. Patient was initially seen by pulmonary team for lung nodules. Patient CT scan from July 2019 showed a right-sided pleural calcification thickening concerning for asbestos exposure. Patient also noted to have multiple pulmonary nodules measuring between 1.5 x 1.3 cm. Follow-up CT chest from July 2020 showed increase in size of the right lower lobe lung nodule which now measured 2.1 cm. Subsequently patient underwent CT PET on 8/22 which showed FDG avid right lower lobe lung nodule with SUV of 4.6. Patient CT PET was also positive for subcarinal lymph node with SUV of 4.6. Patient underwent bronchoscopy with endobronchial ultrasound biopsy of the subcarinal lymph node confirmed adenocarcinoma. Patient has significant history of tobacco dependence around 30-pack-year however he quit about 25 years ago. Patient does have coronary artery disease status post stent placement. Patient also gives history of occupational asthma and exposure to bacteria. Patient does give history of weight loss but describes it as intentional.  Denies headaches dizziness chest pain abdominal pain nausea bone pain     Interim History:     Patient presents to the clinic for a follow-up visit and to discuss results of his biopsy report which is consistent with recurrent cancer. Patient denies hospitalization ER visit. Weight is stable. Appetite is fair. Patient wishes to proceed with further treatment. During this visit patient's allergy, social, medical, surgical history and medications were reviewed and updated. Past Medical History:   Past Medical History:   Diagnosis Date    Abdominal hernia     small, no significant symptomatology.  Abnormal PSA     with history of slight increase.  Adenocarcinoma, lung, right (HCC)     Allergic rhinitis     Benign prostatic hypertrophy     Chest pain     occasional.     Coronary artery disease     status post drug eluting stent placement, LAD, ostial obtuse marginal.     Dysphagia     Erectile dysfunction     Familial hyperlipidemia     with hypertriglyceridemia.  Gastroesophageal reflux disease     Hearing loss, bilateral     hearing aid in the left ear only. 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     Hypertension     Impaired fasting glucose     prediabetes.  Lumbar disc herniation     L3-L4, with chronic back pain.  Mild anemia     Nasal polyps     minimal symptoms.  Nasal septal deviation     right side.  Obesity     Palpitations     occasional.     Peptic ulcer disease     Pulmonary nodules     Reactive airway disease     asthma, industrial related, also a history of hypersensitivity pneumonitis.      Sleep apnea     wears CPAP nightly    Urinary frequency        Past Surgical History:     Past Surgical History:   Procedure Laterality Date    APPENDECTOMY  age 11    BRONCHOSCOPY  09/17/2020    BRONCHOSCOPY N/A 9/17/2020    EBUS- BRONCHOSCOPY ENDOBRONCHIAL ULTRASOUND, PATHOLOGY REQUESTED- CINDY NOTIFIED, GI UNIT SCHEDULED performed by Donna White MD at [address]  10/20/2011    occluded LAD and ostial obtuse marginal stenosis, underwent drug eluting stents to both, RCA small, nondominant, occluded, ejection fraction 45%.  CARDIAC CATHETERIZATION  08/2018    with stent placement    CATARACT REMOVAL Bilateral 03/2018    COLONOSCOPY  01/21/2011    mild sigmoid diverticulosis.  COLONOSCOPY  4/6/16    hemorrhoid; sigmoid diverticulosis    CT BIOPSY PERCUTANEOUS DEEP BONE  8/18/2021    CT BIOPSY PERCUTANEOUS DEEP BONE 8/18/2021 [facility] CT SCAN    KNEE ARTHROSCOPY Left 03/19/2010    partial medial and lateral synovectomies, partial medial meniscectomy, chondroplasty.  NASAL FRACTURE SURGERY  1960    OTHER SURGICAL HISTORY Left 02/12/2010    knee injection.  PORT SURGERY N/A 10/14/2020    RIGHT PORT INSERTION performed by Rosas Constantino DO at [address] [place] [address] PRE-MALIGNANT / [address] Left 01/16/2012    actinic keratoses, ear, liquid nitrogen.  PRE-MALIGNANT / BENIGN SKIN LESION EXCISION Left 07/19/2011    actinic keratosis, upper ear, liquid nitrogen.  PROSTATE BIOPSY Bilateral 09/08/2015    Benign    REPAIR UMBILICAL RAQT,1+S/B,EPBBN N/A 5/77/9606    UMBILICAL Hernia Repair w/ Mesh performed by Feliciano Chand MD at [address] [place] [address] SEPTOPLASTY  10/30/2015    with bilateral submucosal resection of the inferior turbinates, left middle turbinate elza bullosa resection done by Dr Radha Chaparro ARTHROSCOPY Left 10/17/14    W/ SUBACROMIAL DECOMPRESSION, DEBRIDEMENT ET CHONDROPLASTY    UPPER GASTROINTESTINAL ENDOSCOPY  01/21/2011    superficial ulcer of the fundus, erostive gastritis.      VASECTOMY Bilateral 04/04/1980       Patient Family Social History:     Social History     Socioeconomic History    Marital status:      Spouse name: None    Number of children: None    Years of education: None    Highest education level: None   Occupational History    None   Tobacco Use    Smoking status: Former Smoker     Packs/day: 2.00     Years: 15.00     Pack years: 30.00     Quit date: 1985     Years since quittin.6    Smokeless tobacco: Never Used    Tobacco comment: smoked 2 packs a day for 14 to 15 years, quit in    Vaping Use    Vaping Use: Never used   Substance and Sexual Activity    Alcohol use: No     Alcohol/week: 0.0 standard drinks     Comment: rare    Drug use: No    Sexual activity: None   Other Topics Concern    None   Social History Narrative    None     Social Determinants of Health     Financial Resource Strain:     Difficulty of Paying Living Expenses:    Food Insecurity:     Worried About Running Out of Food in the Last Year:     Ran Out of Food in the Last Year:    Transportation Needs:     Lack of Transportation (Medical):      Lack of Transportation (Non-Medical):    Physical Activity:     Days of Exercise per Week:     Minutes of Exercise per Session:    Stress:     Feeling of Stress :    Social Connections:     Frequency of Communication with Friends and Family:     Frequency of Social Gatherings with Friends and Family:     Attends Islam Services:     Active Member of Clubs or Organizations:     Attends Club or Organization Meetings:     Marital Status:    Intimate Partner Violence:     Fear of Current or Ex-Partner:     Emotionally Abused:     Physically Abused:     Sexually Abused:      Family History   Problem Relation Age of Onset    Cancer Mother         lymphoma    Thyroid Disease Mother         hypothyroidism    Stroke Mother     Heart Disease Mother     High Blood Pressure Mother     Heart Attack Mother         in her 46s    Heart Failure Father         congestive    Coronary Art Dis Father     Emphysema Father         was a smoker    Heart Disease Maternal Grandmother     Heart Disease Maternal Grandfather     Crohn's Disease Sister     High Cholesterol Sister     High Cholesterol Child        Current Medications:     Current Outpatient Medications   Medication Sig Dispense Refill    Omega-3 Fatty Acids (FISH OIL) 1200 MG CPDR Take 2,400 mg by mouth 2 times daily      niacin 500 MG extended release capsule Take 500 mg by mouth 2 times daily      losartan (COZAAR) 50 MG tablet TAKE 1 TABLET BY MOUTH EVERY DAY 90 tablet 1    levothyroxine (SYNTHROID) 100 MCG tablet TAKE 1 TABLET BY MOUTH EVERY DAY  90 tablet 1    tamsulosin (FLOMAX) 0.4 MG capsule TAKE 1 CAPSULE BY MOUTH EVERY DAY 90 capsule 1    Metoprolol Tartrate 75 MG TABS TAKE 1 TABLET BY MOUTH TWO TIMES A DAY      pravastatin (PRAVACHOL) 40 MG tablet TAKE ONE TABLET BY MOUTH ONCE EVERY EVENING 90 tablet 3    clopidogrel (PLAVIX) 75 MG tablet TAKE 1 TABLET BY MOUTH DAILY 90 tablet 1    furosemide (LASIX) 20 MG tablet TAKE 1 TABLET BY MOUTH ONE TIME A DAY 90 tablet 1    oxybutynin (DITROPAN-XL) 10 MG extended release tablet Take 1 tablet by mouth daily 90 tablet 1    warfarin (COUMADIN) 2.5 MG tablet Take 2 tablets by mouth daily Take 2 tablets (5 mg) by mouth daily. Or as directed by INR results 60 tablet 3    flecainide (TAMBOCOR) 50 MG tablet Take 50 mg by mouth 2 times daily      ondansetron (ZOFRAN) 4 MG tablet Take 4 mg by mouth every 6 hours as needed for Nausea or Vomiting Disp 60 with 3 refills called in 10/20/2020      ECHINACEA HERB PO Take by mouth daily      albuterol sulfate HFA (VENTOLIN HFA) 108 (90 Base) MCG/ACT inhaler Inhale 2 puffs into the lungs every 6 hours as needed for Wheezing or Shortness of Breath 1 Inhaler 6    Multiple Vitamins-Minerals (MULTIVITAMIN PO) daily. No current facility-administered medications for this visit. Allergies:   Effient [prasugrel], Ace inhibitors, and Statins    Review of Systems:    Constitutional: No fever or chills.  No night sweats, no weight loss   Eyes: No eye discharge, double vision, or eye pain   HEENT: negative for sore hospital encounter of 08/18/21   APTT   Result Value Ref Range    PTT 38.2 (H) 20.5 - 30.5 sec   Platelet count   Result Value Ref Range    Platelets 774 618 - 062 k/uL   Protime-INR   Result Value Ref Range    Protime 10.9 9.1 - 12.3 sec    INR 1.0    Surgical Pathology   Result Value Ref Range    Surgical Pathology Report       -- Diagnosis --  L3 VERTEBRAL BODY, BIOPSY:  -METASTATIC LUNG ADENOCARCINOMA. Fabián Mobley M.D  **Electronically Signed Out**         des/8/19/2021       Clinical Information  Pre-Op Diagnosis:  L3 VERTEBRAL LESION, ADENOCARCINOMA OF RIGHT LUNG  Operation Performed:  BONE LESION BIOPSY    Source of Specimen  1: BONE BIOPSY    Gross Description  \"RIO ORONA, BONE\" One pink-tan bone core, 1.0 cm in length x 0.3  cm in diameter and also are brown clot, 1.0 x 1.0 x 0.3 cm in  aggregate. Submitted in toto after short decalcification, 1cs. lm tm      Microscopic Description  Microscopic examination performed. Sections show a portions of bone  with reactive features and new bone formation and separate blood clot  with clusters of malignant cells present in the bone and blood clot. These malignant cells are positive for cytokeratin 7 and TTF-1  immunostains. Cytokeratin 20 immunostain is negative. These results  support a diagnosis of metastatic lung adenocarcinoma. Contro ls react  as expected. Diagnosis malignancy confirmed by Dr. Eloisa Adhikari. SURGICAL PATHOLOGY CONSULTATION       Patient Name: Julio C ORONA 17: [id number]  Path Number: [id number]    [facility] [address] [place], [address]  [phone]  Fax: [phone]       -- Diagnosis --   BAL RIGHT LOWER LOBE:           NEGATIVE FOR MALIGNANCY.             FINE NEEDLE ASPIRATION STATION 7 EBUS:           ADENOCARCINOMA, COMPATIBLE WITH LUNG PRIMARY. CT PET    Impression    1.  The previously described nodule involving the unfortunately is consistent with disease recurrence. Discussed natural history of adenocarcinoma of lung. Reviewed NCCN guidelines for treatment. Reviewed treatment options including systemic palliative therapy with the goals of control of disease improvement in quality of life and prolong life as well as the supportive care and hospice. Patient understand that his disease not curable. After detailed discussion we decided to proceed with treatment  Patient disease progressed while on immunotherapy. I will challenge patient with carboplatin Alimta and bevacizumab. Reviewed logistics prognostic data as well as potential side effects of combination therapy  We will obtain NGS testing to identify any actionable mutation  NCCN guidelines were reviewed and discussed with the patient. The diagnosis and care plan were discussed with the patient in detail. I discussed the natural history of the disease, prognosis, risks and goals of therapy and answered all the patients questions to the best of my ability. Patient expressed understanding and was in agreement. Aiden Velazquez MD          I spent more than forty minutes examining, evaluating, reviewing data, counseling the patient and coordinating care. Greater than 50% of time was spent face-to-face with the patient this note is created with the assistance of a speech recognition program.  While intending to generate a document that actually reflects the content of the visit, the document can still have some errors including those of syntax and sound a like substitutions which may escape proof reading. It such instances, actual meaning can be extrapolated by contextual diversion.

## 2021-08-25 ENCOUNTER — HOSPITAL ENCOUNTER (OUTPATIENT)
Dept: INFUSION THERAPY | Age: 76
Discharge: HOME | End: 2021-08-25
Payer: MEDICARE

## 2021-08-25 VITALS
RESPIRATION RATE: 16 BRPM | DIASTOLIC BLOOD PRESSURE: 68 MMHG | SYSTOLIC BLOOD PRESSURE: 118 MMHG | TEMPERATURE: 99.2 F | HEART RATE: 71 BPM | OXYGEN SATURATION: 96 %

## 2021-08-25 DIAGNOSIS — C34.91 ADENOCARCINOMA OF RIGHT LUNG (HCC): Primary | ICD-10-CM

## 2021-08-25 PROCEDURE — 99212 OFFICE O/P EST SF 10 MIN: CPT

## 2021-08-25 PROCEDURE — 6360000002 HC RX W HCPCS: Performed by: INTERNAL MEDICINE

## 2021-08-25 PROCEDURE — 96372 THER/PROPH/DIAG INJ SC/IM: CPT

## 2021-08-25 RX ORDER — DEXAMETHASONE 4 MG/1
4 TABLET ORAL SEE ADMIN INSTRUCTIONS
COMMUNITY
End: 2022-03-03

## 2021-08-25 RX ORDER — FOLIC ACID 1 MG/1
1 TABLET ORAL DAILY
COMMUNITY
End: 2021-12-07 | Stop reason: ALTCHOICE

## 2021-08-25 RX ORDER — CYANOCOBALAMIN 1000 UG/ML
1000 INJECTION INTRAMUSCULAR; SUBCUTANEOUS ONCE
Status: COMPLETED | OUTPATIENT
Start: 2021-08-25 | End: 2021-08-25

## 2021-08-25 RX ADMIN — CYANOCOBALAMIN 1000 MCG: 1000 INJECTION, SOLUTION INTRAMUSCULAR; SUBCUTANEOUS at 14:43

## 2021-08-25 NOTE — PROGRESS NOTES
Patient on unit for vitamin B12 injection. Patient to start new chemotherapy treatment next Wednesday. Writer reviewed new regimen with patient. Patient will be taking carboplatin, alimta, and zirabev. Teaching sheets from [facility] and verbal information given on chemotherapy agents, action, administration and side effects. Prescriptions for folic acid 1mg daily and decadron 4mg one tablet twice a day for 3 days starting the day before chemotherapy called to [facility] at [facility] in [place]. Patient aware and verbalized understanding on how to take new prescriptions. Patient signed new consent. Vitamin B12, 1,000 mcg given IM right deltoid as ordered. Patient tolerated it well and left unit in stable condition.

## 2021-08-27 ENCOUNTER — HOSPITAL ENCOUNTER (OUTPATIENT)
Dept: LAB | Age: 76
Discharge: HOME | End: 2021-08-27
Payer: MEDICARE

## 2021-08-27 ENCOUNTER — TELEPHONE (OUTPATIENT)
Dept: CASE MANAGEMENT | Age: 76
End: 2021-08-27

## 2021-08-27 DIAGNOSIS — R97.20 ELEVATED PSA: ICD-10-CM

## 2021-08-27 DIAGNOSIS — E03.9 ACQUIRED HYPOTHYROIDISM: ICD-10-CM

## 2021-08-27 DIAGNOSIS — I10 ESSENTIAL HYPERTENSION: ICD-10-CM

## 2021-08-27 DIAGNOSIS — E78.49 FAMILIAL HYPERLIPIDEMIA: ICD-10-CM

## 2021-08-27 DIAGNOSIS — R73.01 IMPAIRED FASTING GLUCOSE: ICD-10-CM

## 2021-08-27 LAB
ABSOLUTE EOS #: 0.07 K/UL (ref 0–0.44)
ABSOLUTE IMMATURE GRANULOCYTE: <0.03 K/UL (ref 0–0.3)
ABSOLUTE LYMPH #: 0.87 K/UL (ref 1.1–3.7)
ABSOLUTE MONO #: 0.53 K/UL (ref 0.1–1.2)
ALBUMIN SERPL-MCNC: 4.2 G/DL (ref 3.5–5.2)
ALBUMIN/GLOBULIN RATIO: 1.3 (ref 1–2.5)
ALP BLD-CCNC: 115 U/L (ref 40–129)
ALT SERPL-CCNC: 18 U/L (ref 5–41)
ANION GAP SERPL CALCULATED.3IONS-SCNC: 12 MMOL/L (ref 9–17)
AST SERPL-CCNC: 18 U/L
BASOPHILS # BLD: 0 % (ref 0–2)
BASOPHILS ABSOLUTE: <0.03 K/UL (ref 0–0.2)
BILIRUB SERPL-MCNC: 0.48 MG/DL (ref 0.3–1.2)
BUN BLDV-MCNC: 16 MG/DL (ref 8–23)
BUN/CREAT BLD: 16 (ref 9–20)
CALCIUM SERPL-MCNC: 9.8 MG/DL (ref 8.6–10.4)
CHLORIDE BLD-SCNC: 102 MMOL/L (ref 98–107)
CHOLESTEROL/HDL RATIO: 6.3
CHOLESTEROL: 222 MG/DL
CO2: 25 MMOL/L (ref 20–31)
CREAT SERPL-MCNC: 1.01 MG/DL (ref 0.7–1.2)
DIFFERENTIAL TYPE: ABNORMAL
EOSINOPHILS RELATIVE PERCENT: 1 % (ref 1–4)
ESTIMATED AVERAGE GLUCOSE: 131 MG/DL
GFR AFRICAN AMERICAN: >60 ML/MIN
GFR NON-AFRICAN AMERICAN: >60 ML/MIN
GFR SERPL CREATININE-BSD FRML MDRD: ABNORMAL ML/MIN/{1.73_M2}
GFR SERPL CREATININE-BSD FRML MDRD: ABNORMAL ML/MIN/{1.73_M2}
GLUCOSE BLD-MCNC: 124 MG/DL (ref 70–99)
HBA1C MFR BLD: 6.2 % (ref 4–6)
HCT VFR BLD CALC: 37.6 % (ref 40.7–50.3)
HDLC SERPL-MCNC: 35 MG/DL
HEMOGLOBIN: 12 G/DL (ref 13–17)
IMMATURE GRANULOCYTES: 0 %
LDL CHOLESTEROL DIRECT: 96 MG/DL
LDL CHOLESTEROL: ABNORMAL MG/DL (ref 0–130)
LYMPHOCYTES # BLD: 13 % (ref 24–43)
MCH RBC QN AUTO: 31.5 PG (ref 25.2–33.5)
MCHC RBC AUTO-ENTMCNC: 31.9 G/DL (ref 25.2–33.5)
MCV RBC AUTO: 98.7 FL (ref 82.6–102.9)
MONOCYTES # BLD: 8 % (ref 3–12)
NRBC AUTOMATED: 0 PER 100 WBC
PDW BLD-RTO: 14.2 % (ref 11.8–14.4)
PLATELET # BLD: 156 K/UL (ref 138–453)
PLATELET ESTIMATE: ABNORMAL
PMV BLD AUTO: 9.4 FL (ref 8.1–13.5)
POTASSIUM SERPL-SCNC: 4.4 MMOL/L (ref 3.7–5.3)
PROSTATE SPECIFIC ANTIGEN: 5.32 UG/L
RBC # BLD: 3.81 M/UL (ref 4.21–5.77)
RBC # BLD: ABNORMAL 10*6/UL
SEG NEUTROPHILS: 78 % (ref 36–65)
SEGMENTED NEUTROPHILS ABSOLUTE COUNT: 5.06 K/UL (ref 1.5–8.1)
SODIUM BLD-SCNC: 139 MMOL/L (ref 135–144)
THYROXINE, FREE: 1.14 NG/DL (ref 0.93–1.7)
TOTAL PROTEIN: 7.5 G/DL (ref 6.4–8.3)
TRIGL SERPL-MCNC: 603 MG/DL
TSH SERPL DL<=0.05 MIU/L-ACNC: 3.56 MIU/L (ref 0.3–5)
VLDLC SERPL CALC-MCNC: ABNORMAL MG/DL (ref 1–30)
WBC # BLD: 6.6 K/UL (ref 3.5–11.3)
WBC # BLD: ABNORMAL 10*3/UL

## 2021-08-27 PROCEDURE — 36415 COLL VENOUS BLD VENIPUNCTURE: CPT

## 2021-08-27 PROCEDURE — 84153 ASSAY OF PSA TOTAL: CPT

## 2021-08-27 PROCEDURE — 83036 HEMOGLOBIN GLYCOSYLATED A1C: CPT

## 2021-08-27 PROCEDURE — 83721 ASSAY OF BLOOD LIPOPROTEIN: CPT

## 2021-08-27 PROCEDURE — 85025 COMPLETE CBC W/AUTO DIFF WBC: CPT

## 2021-08-27 PROCEDURE — 80053 COMPREHEN METABOLIC PANEL: CPT

## 2021-08-27 PROCEDURE — 84439 ASSAY OF FREE THYROXINE: CPT

## 2021-08-27 PROCEDURE — 84443 ASSAY THYROID STIM HORMONE: CPT

## 2021-08-27 PROCEDURE — 80061 LIPID PANEL: CPT

## 2021-08-27 NOTE — TELEPHONE ENCOUNTER
Name: Tatianna Howard  : 1945  MRN: [id number]    Oncology Navigation Follow-Up Note    Navigator spoke with pt. And offering assistance , pt. Planning to start new regimen next week and denies needs at this time, plan to follow.    Electronically signed by Alexandra Veliz RN on 2021 at 2:35 PM

## 2021-09-01 ENCOUNTER — HOSPITAL ENCOUNTER (OUTPATIENT)
Dept: INFUSION THERAPY | Age: 76
Discharge: HOME | End: 2021-09-01
Payer: MEDICARE

## 2021-09-01 ENCOUNTER — HOSPITAL ENCOUNTER (OUTPATIENT)
Dept: PHARMACY | Age: 76
Setting detail: THERAPIES SERIES
Discharge: HOME | End: 2021-09-01
Payer: MEDICARE

## 2021-09-01 ENCOUNTER — CLINICAL DOCUMENTATION (OUTPATIENT)
Dept: SPIRITUAL SERVICES | Age: 76
End: 2021-09-01

## 2021-09-01 VITALS
HEIGHT: 70 IN | SYSTOLIC BLOOD PRESSURE: 141 MMHG | TEMPERATURE: 97.5 F | RESPIRATION RATE: 16 BRPM | BODY MASS INDEX: 33.33 KG/M2 | OXYGEN SATURATION: 98 % | HEART RATE: 62 BPM | WEIGHT: 232.8 LBS | DIASTOLIC BLOOD PRESSURE: 87 MMHG

## 2021-09-01 DIAGNOSIS — I48.0 PAROXYSMAL A-FIB (HCC): Primary | ICD-10-CM

## 2021-09-01 DIAGNOSIS — C34.91 ADENOCARCINOMA OF RIGHT LUNG (HCC): Primary | ICD-10-CM

## 2021-09-01 LAB
ABSOLUTE EOS #: <0.03 K/UL (ref 0–0.44)
ABSOLUTE IMMATURE GRANULOCYTE: 0.06 K/UL (ref 0–0.3)
ABSOLUTE LYMPH #: 0.69 K/UL (ref 1.1–3.7)
ABSOLUTE MONO #: 0.58 K/UL (ref 0.1–1.2)
ALBUMIN SERPL-MCNC: 4.3 G/DL (ref 3.5–5.2)
ALBUMIN/GLOBULIN RATIO: 1.4 (ref 1–2.5)
ALP BLD-CCNC: 114 U/L (ref 40–129)
ALT SERPL-CCNC: 24 U/L (ref 5–41)
ANION GAP SERPL CALCULATED.3IONS-SCNC: 12 MMOL/L (ref 9–17)
AST SERPL-CCNC: 18 U/L
BASOPHILS # BLD: 0 % (ref 0–2)
BASOPHILS ABSOLUTE: <0.03 K/UL (ref 0–0.2)
BILIRUB SERPL-MCNC: 0.2 MG/DL (ref 0.3–1.2)
BILIRUBIN URINE: NEGATIVE
BUN BLDV-MCNC: 19 MG/DL (ref 8–23)
BUN/CREAT BLD: 21 (ref 9–20)
CALCIUM SERPL-MCNC: 9.8 MG/DL (ref 8.6–10.4)
CHLORIDE BLD-SCNC: 102 MMOL/L (ref 98–107)
CO2: 24 MMOL/L (ref 20–31)
COLOR: ABNORMAL
COMMENT UA: ABNORMAL
CREAT SERPL-MCNC: 0.9 MG/DL (ref 0.7–1.2)
DIFFERENTIAL TYPE: ABNORMAL
EOSINOPHILS RELATIVE PERCENT: 0 % (ref 1–4)
GFR AFRICAN AMERICAN: >60 ML/MIN
GFR NON-AFRICAN AMERICAN: >60 ML/MIN
GFR SERPL CREATININE-BSD FRML MDRD: ABNORMAL ML/MIN/{1.73_M2}
GFR SERPL CREATININE-BSD FRML MDRD: ABNORMAL ML/MIN/{1.73_M2}
GLUCOSE BLD-MCNC: 154 MG/DL (ref 70–99)
GLUCOSE URINE: ABNORMAL
HCT VFR BLD CALC: 34.5 % (ref 40.7–50.3)
HEMOGLOBIN: 11.5 G/DL (ref 13–17)
IMMATURE GRANULOCYTES: 1 %
INR BLD: 2
KETONES, URINE: NEGATIVE
LEUKOCYTE ESTERASE, URINE: NEGATIVE
LYMPHOCYTES # BLD: 7 % (ref 24–43)
MCH RBC QN AUTO: 31.9 PG (ref 25.2–33.5)
MCHC RBC AUTO-ENTMCNC: 33.3 G/DL (ref 25.2–33.5)
MCV RBC AUTO: 95.8 FL (ref 82.6–102.9)
MONOCYTES # BLD: 6 % (ref 3–12)
NITRITE, URINE: NEGATIVE
NRBC AUTOMATED: 0 PER 100 WBC
PDW BLD-RTO: 14.2 % (ref 11.8–14.4)
PH UA: 5.5 (ref 5–6)
PLATELET # BLD: 173 K/UL (ref 138–453)
PLATELET ESTIMATE: ABNORMAL
PMV BLD AUTO: 9.9 FL (ref 8.1–13.5)
POTASSIUM SERPL-SCNC: 4.3 MMOL/L (ref 3.7–5.3)
PROTEIN UA: NEGATIVE
PROTIME: 23.9 SECONDS
RBC # BLD: 3.6 M/UL (ref 4.21–5.77)
RBC # BLD: ABNORMAL 10*6/UL
SEG NEUTROPHILS: 87 % (ref 36–65)
SEGMENTED NEUTROPHILS ABSOLUTE COUNT: 8.67 K/UL (ref 1.5–8.1)
SODIUM BLD-SCNC: 138 MMOL/L (ref 135–144)
SPECIFIC GRAVITY UA: 1.02 (ref 1.01–1.02)
TOTAL PROTEIN: 7.3 G/DL (ref 6.4–8.3)
TURBIDITY: ABNORMAL
URINE HGB: NEGATIVE
UROBILINOGEN, URINE: NORMAL
WBC # BLD: 10 K/UL (ref 3.5–11.3)
WBC # BLD: ABNORMAL 10*3/UL

## 2021-09-01 PROCEDURE — 81003 URINALYSIS AUTO W/O SCOPE: CPT

## 2021-09-01 PROCEDURE — 85025 COMPLETE CBC W/AUTO DIFF WBC: CPT

## 2021-09-01 PROCEDURE — 36591 DRAW BLOOD OFF VENOUS DEVICE: CPT

## 2021-09-01 PROCEDURE — 80053 COMPREHEN METABOLIC PANEL: CPT

## 2021-09-01 PROCEDURE — 99211 OFF/OP EST MAY X REQ PHY/QHP: CPT

## 2021-09-01 PROCEDURE — 96367 TX/PROPH/DG ADDL SEQ IV INF: CPT

## 2021-09-01 PROCEDURE — 36416 COLLJ CAPILLARY BLOOD SPEC: CPT

## 2021-09-01 PROCEDURE — 6360000002 HC RX W HCPCS: Performed by: INTERNAL MEDICINE

## 2021-09-01 PROCEDURE — 96411 CHEMO IV PUSH ADDL DRUG: CPT

## 2021-09-01 PROCEDURE — 2580000003 HC RX 258: Performed by: INTERNAL MEDICINE

## 2021-09-01 PROCEDURE — 85610 PROTHROMBIN TIME: CPT

## 2021-09-01 PROCEDURE — 96375 TX/PRO/DX INJ NEW DRUG ADDON: CPT

## 2021-09-01 PROCEDURE — 96413 CHEMO IV INFUSION 1 HR: CPT

## 2021-09-01 PROCEDURE — 96417 CHEMO IV INFUS EACH ADDL SEQ: CPT

## 2021-09-01 RX ORDER — SODIUM CHLORIDE 0.9 % (FLUSH) 0.9 %
5-40 SYRINGE (ML) INJECTION PRN
Status: DISCONTINUED | OUTPATIENT
Start: 2021-09-01 | End: 2021-09-02 | Stop reason: HOSPADM

## 2021-09-01 RX ORDER — METHYLPREDNISOLONE SODIUM SUCCINATE 125 MG/2ML
125 INJECTION, POWDER, LYOPHILIZED, FOR SOLUTION INTRAMUSCULAR; INTRAVENOUS ONCE
Status: CANCELLED | OUTPATIENT
Start: 2021-09-01 | End: 2021-09-01

## 2021-09-01 RX ORDER — SODIUM CHLORIDE 9 MG/ML
20 INJECTION, SOLUTION INTRAVENOUS ONCE
Status: COMPLETED | OUTPATIENT
Start: 2021-09-01 | End: 2021-09-01

## 2021-09-01 RX ORDER — PALONOSETRON 0.05 MG/ML
0.25 INJECTION, SOLUTION INTRAVENOUS ONCE
Status: COMPLETED | OUTPATIENT
Start: 2021-09-01 | End: 2021-09-01

## 2021-09-01 RX ORDER — HEPARIN SODIUM (PORCINE) LOCK FLUSH IV SOLN 100 UNIT/ML 100 UNIT/ML
500 SOLUTION INTRAVENOUS PRN
Status: CANCELLED | OUTPATIENT
Start: 2021-09-01

## 2021-09-01 RX ORDER — SODIUM CHLORIDE 9 MG/ML
INJECTION, SOLUTION INTRAVENOUS CONTINUOUS
Status: CANCELLED | OUTPATIENT
Start: 2021-09-01

## 2021-09-01 RX ORDER — DEXAMETHASONE SODIUM PHOSPHATE 10 MG/ML
10 INJECTION, SOLUTION INTRAMUSCULAR; INTRAVENOUS ONCE
Status: COMPLETED | OUTPATIENT
Start: 2021-09-01 | End: 2021-09-01

## 2021-09-01 RX ORDER — DIPHENHYDRAMINE HYDROCHLORIDE 50 MG/ML
50 INJECTION INTRAMUSCULAR; INTRAVENOUS ONCE
Status: CANCELLED | OUTPATIENT
Start: 2021-09-01 | End: 2021-09-01

## 2021-09-01 RX ORDER — MEPERIDINE HYDROCHLORIDE 50 MG/ML
12.5 INJECTION INTRAMUSCULAR; INTRAVENOUS; SUBCUTANEOUS ONCE
Status: CANCELLED | OUTPATIENT
Start: 2021-09-01 | End: 2021-09-01

## 2021-09-01 RX ORDER — HEPARIN SODIUM (PORCINE) LOCK FLUSH IV SOLN 100 UNIT/ML 100 UNIT/ML
500 SOLUTION INTRAVENOUS PRN
Status: DISCONTINUED | OUTPATIENT
Start: 2021-09-01 | End: 2021-09-02 | Stop reason: HOSPADM

## 2021-09-01 RX ORDER — EPINEPHRINE 1 MG/ML
0.3 INJECTION, SOLUTION, CONCENTRATE INTRAVENOUS PRN
Status: CANCELLED | OUTPATIENT
Start: 2021-09-01

## 2021-09-01 RX ORDER — PALONOSETRON 0.05 MG/ML
0.25 INJECTION, SOLUTION INTRAVENOUS ONCE
Status: CANCELLED | OUTPATIENT
Start: 2021-09-01

## 2021-09-01 RX ORDER — SODIUM CHLORIDE 9 MG/ML
25 INJECTION, SOLUTION INTRAVENOUS PRN
Status: CANCELLED | OUTPATIENT
Start: 2021-09-01

## 2021-09-01 RX ORDER — SODIUM CHLORIDE 9 MG/ML
20 INJECTION, SOLUTION INTRAVENOUS ONCE
Status: CANCELLED | OUTPATIENT
Start: 2021-09-01 | End: 2021-09-01

## 2021-09-01 RX ORDER — SODIUM CHLORIDE 0.9 % (FLUSH) 0.9 %
5-40 SYRINGE (ML) INJECTION PRN
Status: CANCELLED | OUTPATIENT
Start: 2021-09-01

## 2021-09-01 RX ADMIN — SODIUM CHLORIDE 1600 MG: 9 INJECTION, SOLUTION INTRAVENOUS at 13:09

## 2021-09-01 RX ADMIN — SODIUM CHLORIDE 20 ML/HR: 9 INJECTION, SOLUTION INTRAVENOUS at 09:50

## 2021-09-01 RX ADMIN — CARBOPLATIN 655 MG: 10 INJECTION, SOLUTION INTRAVENOUS at 14:55

## 2021-09-01 RX ADMIN — SODIUM CHLORIDE, PRESERVATIVE FREE 10 ML: 5 INJECTION INTRAVENOUS at 09:50

## 2021-09-01 RX ADMIN — PALONOSETRON HYDROCHLORIDE 0.25 MG: 0.25 INJECTION, SOLUTION INTRAVENOUS at 12:25

## 2021-09-01 RX ADMIN — DEXAMETHASONE SODIUM PHOSPHATE 10 MG: 10 INJECTION, SOLUTION INTRAMUSCULAR; INTRAVENOUS at 12:27

## 2021-09-01 RX ADMIN — HEPARIN 500 UNITS: 100 SYRINGE at 15:31

## 2021-09-01 RX ADMIN — SODIUM CHLORIDE 1100 MG: 9 INJECTION, SOLUTION INTRAVENOUS at 14:40

## 2021-09-01 ASSESSMENT — PAIN SCALES - GENERAL
PAINLEVEL_OUTOF10: 0
PAINLEVEL_OUTOF10: 0

## 2021-09-01 NOTE — PROGRESS NOTES
rounding on Infusion    Assessment: Patient and wife are readjusting to news that his cancer has advanced. They are learning about necessary adjustments in treatment and appear overwhelmed at the moment. Intervention:  provided a listening presence while they voiced concern. Patient expressed appreciation for visit and offer of continued prayer. Plan: Chaplains are available on site or on call 24/7 for spiritual and emotional support.

## 2021-09-01 NOTE — FLOWSHEET NOTE
09/01/21 1331   Encounter Summary   Services provided to: Patient and family together   Referral/Consult From: [facility]   (9/1/21)   Complexity of Encounter Moderate   Length of Encounter 15 minutes   Spiritual/Gnosticist   Type Spiritual support   Assessment Approachable   Intervention Active listening;Sustaining presence/ Ministry of presence   Outcome Expressed gratitude;Engaged in conversation   Grief and Life Adjustment   Type New Diagnosis

## 2021-09-01 NOTE — PROGRESS NOTES
ANTICOAGULATION SERVICE    Date of Clinic Visit:  2021    Tristin Beach is a 77 y.o. male who presents to clinic today for anticoagulation monitoring and adjustment. Recent INR Results:  Internal QC passed  Lab Results   Component Value Date    INR 2 2021    INR 1.0 2021       Current Warfarin Dosage:  Dosing Plan  As of 2021    TTR:  85.2 % (6.2 mo)   Full warfarin instructions:  : 7.5 mg; Otherwise 5 mg every Mon, Wed, Fri; 7.5 mg all other days               Assessment/Plan:    Continue current regimen as INR remains stable. Recheck 3 weeks. Roxana Boucher is starting on Avastin so will need to watch for increased risk of bleeding,. Next Clinic Appointment:  Return date  As of 2021    TTR:  85.2 % (6.2 mo)   Next INR check:  2021             Please call [facility] at [phone] with any questions. Thanks!   Marco Antonio Valdez, [facility]  Anticoagulation Service Pharmacist  2021 10:38 AM     For Pharmacy Admin Tracking Only     Intervention Detail: Adherence Monitorin   Total # of Interventions Recommended: 1   Total # of Interventions Accepted: 1   Time Spent (min): 15

## 2021-09-03 ENCOUNTER — OFFICE VISIT (OUTPATIENT)
Dept: FAMILY MEDICINE CLINIC | Age: 76
End: 2021-09-03
Payer: MEDICARE

## 2021-09-03 VITALS
HEART RATE: 66 BPM | WEIGHT: 233.5 LBS | OXYGEN SATURATION: 98 % | RESPIRATION RATE: 14 BRPM | HEIGHT: 70 IN | BODY MASS INDEX: 33.43 KG/M2 | SYSTOLIC BLOOD PRESSURE: 132 MMHG | DIASTOLIC BLOOD PRESSURE: 72 MMHG | TEMPERATURE: 97 F

## 2021-09-03 DIAGNOSIS — C34.91 ADENOCARCINOMA OF RIGHT LUNG (HCC): ICD-10-CM

## 2021-09-03 DIAGNOSIS — G47.33 OBSTRUCTIVE SLEEP APNEA: ICD-10-CM

## 2021-09-03 DIAGNOSIS — R97.20 ELEVATED PSA: ICD-10-CM

## 2021-09-03 DIAGNOSIS — M17.0 PRIMARY OSTEOARTHRITIS OF BOTH KNEES: ICD-10-CM

## 2021-09-03 DIAGNOSIS — E78.49 FAMILIAL HYPERLIPIDEMIA: ICD-10-CM

## 2021-09-03 DIAGNOSIS — G89.29 CHRONIC PAIN OF BOTH KNEES: ICD-10-CM

## 2021-09-03 DIAGNOSIS — M25.562 CHRONIC PAIN OF BOTH KNEES: ICD-10-CM

## 2021-09-03 DIAGNOSIS — M25.561 CHRONIC PAIN OF BOTH KNEES: ICD-10-CM

## 2021-09-03 DIAGNOSIS — R73.01 IMPAIRED FASTING GLUCOSE: Primary | ICD-10-CM

## 2021-09-03 DIAGNOSIS — I10 ESSENTIAL HYPERTENSION: ICD-10-CM

## 2021-09-03 DIAGNOSIS — E03.9 ACQUIRED HYPOTHYROIDISM: ICD-10-CM

## 2021-09-03 PROCEDURE — 3017F COLORECTAL CA SCREEN DOC REV: CPT | Performed by: FAMILY MEDICINE

## 2021-09-03 PROCEDURE — 1036F TOBACCO NON-USER: CPT | Performed by: FAMILY MEDICINE

## 2021-09-03 PROCEDURE — 4040F PNEUMOC VAC/ADMIN/RCVD: CPT | Performed by: FAMILY MEDICINE

## 2021-09-03 PROCEDURE — 1123F ACP DISCUSS/DSCN MKR DOCD: CPT | Performed by: FAMILY MEDICINE

## 2021-09-03 PROCEDURE — 99212 OFFICE O/P EST SF 10 MIN: CPT | Performed by: FAMILY MEDICINE

## 2021-09-03 PROCEDURE — 99214 OFFICE O/P EST MOD 30 MIN: CPT | Performed by: FAMILY MEDICINE

## 2021-09-03 PROCEDURE — G8417 CALC BMI ABV UP PARAM F/U: HCPCS | Performed by: FAMILY MEDICINE

## 2021-09-03 PROCEDURE — 20610 DRAIN/INJ JOINT/BURSA W/O US: CPT | Performed by: FAMILY MEDICINE

## 2021-09-03 PROCEDURE — G8427 DOCREV CUR MEDS BY ELIG CLIN: HCPCS | Performed by: FAMILY MEDICINE

## 2021-09-03 RX ORDER — TRIAMCINOLONE ACETONIDE 40 MG/ML
40 INJECTION, SUSPENSION INTRA-ARTICULAR; INTRAMUSCULAR ONCE
Status: COMPLETED | OUTPATIENT
Start: 2021-09-03 | End: 2021-09-03

## 2021-09-03 RX ADMIN — TRIAMCINOLONE ACETONIDE 40 MG: 40 INJECTION, SUSPENSION INTRA-ARTICULAR; INTRAMUSCULAR at 10:32

## 2021-09-03 RX ADMIN — TRIAMCINOLONE ACETONIDE 40 MG: 40 INJECTION, SUSPENSION INTRA-ARTICULAR; INTRAMUSCULAR at 10:31

## 2021-09-03 SDOH — ECONOMIC STABILITY: FOOD INSECURITY: WITHIN THE PAST 12 MONTHS, THE FOOD YOU BOUGHT JUST DIDN'T LAST AND YOU DIDN'T HAVE MONEY TO GET MORE.: NEVER TRUE

## 2021-09-03 SDOH — ECONOMIC STABILITY: FOOD INSECURITY: WITHIN THE PAST 12 MONTHS, YOU WORRIED THAT YOUR FOOD WOULD RUN OUT BEFORE YOU GOT MONEY TO BUY MORE.: NEVER TRUE

## 2021-09-03 ASSESSMENT — ENCOUNTER SYMPTOMS
ABDOMINAL PAIN: 0
SINUS PRESSURE: 0
EYE REDNESS: 0
SHORTNESS OF BREATH: 0
VOMITING: 0
COUGH: 0
TROUBLE SWALLOWING: 0
DIARRHEA: 0
EYE DISCHARGE: 0
RHINORRHEA: 0
CONSTIPATION: 0
SORE THROAT: 0
WHEEZING: 0
NAUSEA: 0

## 2021-09-03 ASSESSMENT — SOCIAL DETERMINANTS OF HEALTH (SDOH): HOW HARD IS IT FOR YOU TO PAY FOR THE VERY BASICS LIKE FOOD, HOUSING, MEDICAL CARE, AND HEATING?: NOT HARD AT ALL

## 2021-09-03 NOTE — PROGRESS NOTES
9/3/2021     Mercedes Mak (:  1945) is a 77 y.o. 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  Just started his first cycle and seems to be tolerating it well thus far. Has to go through a total of 6 cycles. He has been having some increased issues with pain in both of his knees and would like to have injections and did discuss this with the oncologist who felt it was appropriate for him to get knee injections at this time. Does have a known history of impaired fasting glucose and his blood sugar level is a little more elevated than last check. We did talk about low-carb/low sugar diet and increasing exercise as able for optimal blood sugar control. Has a known history of hypertension his blood pressure is stable and controlled on his current medical therapy. Does have known hypothyroidism and her thyroid levels are stable and adequately supplemented on his current thyroid dose. Has known obstructive sleep apnea and is consistently using his CPAP machine. Is getting ongoing medical benefit from its use. Does have a known history of elevated PSA and his PSA level actually declined compared to last check so we will just continue to monitor at this time. Not having any acute symptoms to discuss at this time. Patient otherwise has no other acute medical concerns. .  Patient's recent lab reports are as follows:    Lab Results   Component Value Date    WBC 10.0 2021    RBC 3.60 2021    RBC 3.89 10/21/2011    HGB 11.5 2021    HCT 34.5 2021    MCV 95.8 2021    MCH 31.9 2021    MCHC 33.3 2021    RDW 14.2 2021     2021     10/21/2011    MPV 9.9 2021       Lab Results   Component Value Date    CHOL 222 08/27/2021    CHOL 222 05/01/2019    HDL 35 08/27/2021    CHOLHDLRATIO 6.3 08/27/2021    TRIG 603 08/27/2021    VLDL NOT REPORTED 08/27/2021       Lab Results   Component Value Date    TSH 3.56 08/27/2021       Lab Results   Component Value Date     09/01/2021    K 4.3 09/01/2021     09/01/2021    CO2 24 09/01/2021    BUN 19 09/01/2021    CREATININE 0.90 09/01/2021    GLUCOSE 154 09/01/2021    GLUCOSE 104 10/21/2011    CALCIUM 9.8 09/01/2021       Lab Results   Component Value Date    LABA1C 6.2 08/27/2021          Other review of systems are as noted below. Preventative measures are reviewed today. See health maintenance section for complete preventative plan of care. Did review patient's med list, allergies, social history, fam history, pmhx and pshx today as noted in the record. Review of Systems   Constitutional: Negative for chills, fatigue and fever. HENT: Negative for congestion, ear pain, postnasal drip, rhinorrhea, sinus pressure, sore throat and trouble swallowing. Eyes: Negative for discharge and redness. Respiratory: Negative for cough, shortness of breath and wheezing. Cardiovascular: Negative for chest pain. Gastrointestinal: Negative for abdominal pain, constipation, diarrhea, nausea and vomiting. Genitourinary: Negative for dysuria, flank pain, frequency and urgency. Musculoskeletal: Positive for arthralgias. Negative for myalgias and neck pain. Skin: Negative for rash and wound. Allergic/Immunologic: Negative for environmental allergies. Neurological: Negative for dizziness, weakness, light-headedness and headaches. Hematological: Negative for adenopathy. Psychiatric/Behavioral: Negative. Prior to Visit Medications    Medication Sig Taking?  Authorizing Provider   folic acid (FOLVITE) 1 MG tablet Take 1 mg by mouth daily  Historical Provider, MD   dexamethasone (DECADRON) 4 MG tablet Take 4 mg by mouth See Admin Instructions Take one tablet twice a day for 3 days starting the day before scheduled chemotherapy  Historical Provider, MD   Omega-3 Fatty Acids (FISH OIL) 1200 MG CPDR Take 2,400 mg by mouth 2 times daily  Historical Provider, MD   niacin 500 MG extended release capsule Take 500 mg by mouth 2 times daily  Historical Provider, MD   losartan (COZAAR) 50 MG tablet TAKE 1 TABLET BY MOUTH EVERY DAY  Hemanth Stockton DO   levothyroxine (SYNTHROID) 100 MCG tablet TAKE 1 TABLET BY MOUTH EVERY DAY   Hemanth Stockton DO   tamsulosin (FLOMAX) 0.4 MG capsule TAKE 1 CAPSULE BY MOUTH EVERY DAY  Naty Lazcano DO   Metoprolol Tartrate 75 MG TABS TAKE 1 TABLET BY MOUTH TWO TIMES A DAY  Historical Provider, MD   pravastatin (PRAVACHOL) 40 MG tablet TAKE ONE TABLET BY MOUTH ONCE EVERY EVENING  Hemanth Stockton DO   clopidogrel (PLAVIX) 75 MG tablet TAKE 1 TABLET BY MOUTH DAILY  Hemanth Stockton DO   furosemide (LASIX) 20 MG tablet TAKE 1 TABLET BY MOUTH ONE TIME A DAY  Naty Lazcano DO   oxybutynin (DITROPAN-XL) 10 MG extended release tablet Take 1 tablet by mouth daily  Nayt Lazcano DO   warfarin (COUMADIN) 2.5 MG tablet Take 2 tablets by mouth daily Take 2 tablets (5 mg) by mouth daily. Or as directed by INR results  Dana Almanza MD   flecainide (TAMBOCOR) 50 MG tablet Take 50 mg by mouth 2 times daily  Historical Provider, MD   ondansetron (ZOFRAN) 4 MG tablet Take 4 mg by mouth every 6 hours as needed for Nausea or Vomiting Disp 60 with 3 refills called in 10/20/2020  Historical Provider, MD   ECHINACEA HERB PO Take by mouth daily  Historical Provider, MD   albuterol sulfate HFA (VENTOLIN HFA) 108 (90 Base) MCG/ACT inhaler Inhale 2 puffs into the lungs every 6 hours as needed for Wheezing or Shortness of Breath  Naty Lazcano DO   Multiple Vitamins-Minerals (MULTIVITAMIN PO) daily.   Historical Provider, MD        Social History     Tobacco Use    Smoking status: Former Smoker     Packs/day: 2.00     Years: 15.00 Pack years: 30.00     Quit date: 1985     Years since quittin.6    Smokeless tobacco: Never Used    Tobacco comment: smoked 2 packs a day for 14 to 15 years, quit in    Substance Use Topics    Alcohol use: No     Alcohol/week: 0.0 standard drinks     Comment: rare        There were no vitals filed for this visit. Estimated body mass index is 33.4 kg/m² as calculated from the following:    Height as of 21: 5' 10\" (1.778 m). Weight as of 21: 232 lb 12.8 oz (105.6 kg). Physical Exam  Vitals and nursing note reviewed. Constitutional:       General: He is not in acute distress. Appearance: Normal appearance. He is well-developed. He is not diaphoretic. HENT:      Head: Normocephalic and atraumatic. Right Ear: Tympanic membrane, ear canal and external ear normal.      Left Ear: Tympanic membrane, ear canal and external ear normal.      Nose: Nose normal.      Mouth/Throat:      Mouth: Mucous membranes are moist.      Pharynx: Oropharynx is clear. No oropharyngeal exudate. Eyes:      General:         Right eye: No discharge. Left eye: No discharge. Conjunctiva/sclera: Conjunctivae normal.      Pupils: Pupils are equal, round, and reactive to light. Neck:      Thyroid: No thyromegaly. Cardiovascular:      Rate and Rhythm: Normal rate and regular rhythm. Heart sounds: Normal heart sounds. Pulmonary:      Effort: Pulmonary effort is normal.      Breath sounds: Normal breath sounds. No wheezing or rales. Abdominal:      General: Bowel sounds are normal. There is no distension. Palpations: Abdomen is soft. Tenderness: There is no abdominal tenderness. Musculoskeletal:         General: No swelling or tenderness. Normal range of motion. Cervical back: Normal range of motion and neck supple. Comments: Movesbilateral knees in a normal range of motion. Ligamentous structures are intact.   There is a negative anterior drawer sign bilaterally. No significant effusion is noted. Lymphadenopathy:      Cervical: No cervical adenopathy. Skin:     General: Skin is warm and dry. Findings: No rash. Neurological:      Mental Status: He is alert and oriented to person, place, and time. Psychiatric:         Behavior: Behavior normal.         Thought Content: Thought content normal.         Judgment: Judgment normal.           ASSESSMENT/PLAN:  Encounter Diagnoses   Name Primary?     Impaired fasting glucose Yes    Essential hypertension     Acquired hypothyroidism     Familial hyperlipidemia     Obstructive sleep apnea     Adenocarcinoma of right lung (HCC)     Elevated PSA     Chronic pain of both knees     Primary osteoarthritis of both knees      Orders Placed This Encounter   Medications    triamcinolone acetonide (KENALOG-40) injection 40 mg    triamcinolone acetonide (KENALOG-40) injection 40 mg     Orders Placed This Encounter   Procedures    CBC Auto Differential     Standing Status:   Future     Standing Expiration Date:   9/3/2022    Comprehensive Metabolic Panel     Standing Status:   Future     Standing Expiration Date:   9/3/2022    Hemoglobin A1C     Standing Status:   Future     Standing Expiration Date:   9/3/2022    Lipid Panel     Standing Status:   Future     Standing Expiration Date:   9/3/2022     Order Specific Question:   Is Patient Fasting?/# of Hours     Answer:   12 hours    TSH without Reflex     Standing Status:   Future     Standing Expiration Date:   9/3/2022    T4, Free     Standing Status:   Future     Standing Expiration Date:   9/3/2022    PSA, Diagnostic     Standing Status:   Future     Standing Expiration Date:   9/3/2022    CO ARTHROCENTESIS ASPIR&/INJ MAJOR JT/BURSA W/O US    CO ARTHROCENTESIS ASPIR&/INJ MAJOR JT/BURSA W/O US     Procedure Note    PREOP DIAGNOSIS:  [] Right []  Left    [x] Bilateral Knee Pain    POSTOP DIAGNOSIS:  [] Right []  Left    [x] Bilateral Knee Pain    OPERATION:  [] Right []  Left    [x] Bilateral INTRA-ARTICULAR KNEE INJECTION      PROCEDURE:  After sterile prep, an Anterior and Lateral approach was used. [x] 40 mg Kenalog  [x]  2 cc 1% Xylocaine without Epi       []  12 mg of Celestone     Injected intra-articularly without incident. Pre- and post neurovascular status verified. No complications noted. Informed consent and time out was completed prior to procedure    Patient is to follow a low-carb/low sugar/low-fat diet and increase exercise for optimal blood sugar and cholesterol control. Patient is to continue on the rest of his current medical therapy. No additional changes are made at this time. Patient is to return to my office in 6 months duration or sooner if any further problems or symptoms arise. (Please note that portions of this note were completed with a voice recognition program. Efforts were made to edit the dictations but occasionally words are mis-transcribed.)      No follow-ups on file. An electronic signature was used to authenticate this note.     --Norberto Lance,  on 9/3/2021 at 7:28 AM

## 2021-09-08 ENCOUNTER — TELEPHONE (OUTPATIENT)
Dept: CASE MANAGEMENT | Age: 76
End: 2021-09-08

## 2021-09-08 NOTE — TELEPHONE ENCOUNTER
Name: Paul Soriano  : 1945  MRN: [id number]    Oncology Navigation Follow-Up Note  Navigator spoke with pts. Spouse and offering assistance if needed. Writer checking [facility] results, but remain pending. Pt. Resting at this time. New regimen causing more fatigue and lasting somewhat longer than previous regimen. Plan to follow.    Electronically signed by Therese Villeda RN on 2021 at 2:23 PM

## 2021-09-14 ENCOUNTER — OFFICE VISIT (OUTPATIENT)
Dept: PULMONOLOGY | Age: 76
End: 2021-09-14
Payer: MEDICARE

## 2021-09-14 VITALS
HEART RATE: 77 BPM | BODY MASS INDEX: 32.44 KG/M2 | HEIGHT: 70 IN | SYSTOLIC BLOOD PRESSURE: 122 MMHG | DIASTOLIC BLOOD PRESSURE: 80 MMHG | WEIGHT: 226.6 LBS | OXYGEN SATURATION: 97 %

## 2021-09-14 DIAGNOSIS — J44.9 COPD WITH ASTHMA (HCC): ICD-10-CM

## 2021-09-14 DIAGNOSIS — C34.91 ADENOCARCINOMA OF RIGHT LUNG (HCC): Primary | ICD-10-CM

## 2021-09-14 DIAGNOSIS — C34.90 ALK-POSITIVE NON-SMALL CELL LUNG CANCER (HCC): Primary | ICD-10-CM

## 2021-09-14 DIAGNOSIS — G47.33 OBSTRUCTIVE SLEEP APNEA: ICD-10-CM

## 2021-09-14 PROCEDURE — 99213 OFFICE O/P EST LOW 20 MIN: CPT

## 2021-09-14 PROCEDURE — 4040F PNEUMOC VAC/ADMIN/RCVD: CPT | Performed by: INTERNAL MEDICINE

## 2021-09-14 PROCEDURE — 1123F ACP DISCUSS/DSCN MKR DOCD: CPT | Performed by: INTERNAL MEDICINE

## 2021-09-14 PROCEDURE — 99214 OFFICE O/P EST MOD 30 MIN: CPT | Performed by: INTERNAL MEDICINE

## 2021-09-14 PROCEDURE — G8417 CALC BMI ABV UP PARAM F/U: HCPCS | Performed by: INTERNAL MEDICINE

## 2021-09-14 PROCEDURE — G8926 SPIRO NO PERF OR DOC: HCPCS | Performed by: INTERNAL MEDICINE

## 2021-09-14 PROCEDURE — 1036F TOBACCO NON-USER: CPT | Performed by: INTERNAL MEDICINE

## 2021-09-14 PROCEDURE — 3023F SPIROM DOC REV: CPT | Performed by: INTERNAL MEDICINE

## 2021-09-14 PROCEDURE — 3017F COLORECTAL CA SCREEN DOC REV: CPT | Performed by: INTERNAL MEDICINE

## 2021-09-14 PROCEDURE — G8427 DOCREV CUR MEDS BY ELIG CLIN: HCPCS | Performed by: INTERNAL MEDICINE

## 2021-09-15 NOTE — PROGRESS NOTES
PULMONARY MEDICINE OUTPATIENT  FOLLOW UP NOTE                                                                       PATIENT:  Chelsy Mak  YOB: 1945  MRN: [id number]     REFERRED BY: [place], DO   CHIEF COMPLIANT: 6 Month Follow-Up       HISTORY     HISTORY OF PRESENT ILLNESS:   Brenda Rosario is a 77y.o. year old male here for follow-up. Metastatic right lung adenocarcinoma/history of asbestos exposure:  Patient was initially seen after abnormal CT scan (7/16/2019) which showed right-sided pleural calcification/thickening raising the suspicion for asbestos exposure. There is no honeycombing or bronchiectasis. Patient also has multiple bilateral pulmonary nodules, largest one seen in the right lung base measuring about 1.5 X 1.3 cm at subpleural location  Follow-up CT chest (7/21/2020) is reported to show an interval enlargement in the size of right lower lobe nodule to 2.1 x 1.7 cm. No other significant changes in other nodules or pleural thickening. The PET scan (8/22/2020) which showed PET avidity within a maximum SUV of 4.6 in the right lung base, additionally patient had FDG avid subcarinal and right hilar which are reported showing maximum SUV of 4.6  He is s/p EBUS TBNA (9/17/2020) for PET avid right hilar and subcarinal mediastinal lymph node. His cytology turned out to be positive for adenocarcinoma in the subcarinal lymph node.   He was treated with chemo/immunotherapy  CT chest abdomen/pelvis (1/7/2021), reported favorable response to therapy with decrease in size of right lower lobe nodule, however had mixed response to mediastinal/hilar adenopathy  CT chest (4/27/2021) showed interval development of consolidative changes in the right lower lobe with associated bronchiectasis extending to the right hilum with interval progression of groundglass opacities in the right upper lobe and a small pleural effusion  CT chest abdomen/pelvis (8/3/2021) showed a new right eighth rib and T3 bone lesion, which was subsequently biopsied and the pathology is suggestive of metastatic spread  Patient has been restarted on chemotherapy with Dr. Kathy Loya     Chronic obstructive pulmonary disease:  Tobacco history: Patient is a former smoker and  reports that he quit smoking about 36 years ago. He has a 30.00 pack-year smoking history. He has never used smokeless tobacco.  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 Pulmonary function test: PFT showed nonspecific ventilatory impairment  Current symptoms: Patient has cough with occasional sputum, grade 2 dyspnea. He reports improvement in his shortness of breath since his last about 20 pounds  Current Rx: Patient has been recommended albuterol HFA     GRISEL:  Patient is morbidly obese and has history of sleep apnea  He is on CPAP  Reports good compliance to the machine and is benefiting from the therapy  He denies any symptoms of excessive daytime sleepiness. PAST MEDICAL HISTORY:      Diagnosis Date    Abdominal hernia     small, no significant symptomatology.  Abnormal PSA     with history of slight increase.  Adenocarcinoma, lung, right (HCC)     Allergic rhinitis     Benign prostatic hypertrophy     Chest pain     occasional.     Coronary artery disease     status post drug eluting stent placement, LAD, ostial obtuse marginal.     Dysphagia     Erectile dysfunction     Familial hyperlipidemia     with hypertriglyceridemia.  Gastroesophageal reflux disease     Hearing loss, bilateral     hearing aid in the left ear only. 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     Hypertension     Impaired fasting glucose     prediabetes.  Lumbar disc herniation     L3-L4, with chronic back pain.      Mild anemia     Nasal polyps     minimal symptoms.  Nasal septal deviation     right side.  Obesity     Palpitations     occasional.     Peptic ulcer disease     Pulmonary nodules     Reactive airway disease     asthma, industrial related, also a history of hypersensitivity pneumonitis.  Sleep apnea     wears CPAP nightly    Urinary frequency      PAST SURGICAL HISTORY:      Procedure Laterality Date    APPENDECTOMY  age 11    BRONCHOSCOPY  09/17/2020    BRONCHOSCOPY N/A 9/17/2020    EBUS- BRONCHOSCOPY ENDOBRONCHIAL ULTRASOUND, PATHOLOGY REQUESTED- CINDY NOTIFIED, GI UNIT SCHEDULED performed by Naeem Hickey MD at [address]  10/20/2011    occluded LAD and ostial obtuse marginal stenosis, underwent drug eluting stents to both, RCA small, nondominant, occluded, ejection fraction 45%.  CARDIAC CATHETERIZATION  08/2018    with stent placement    CATARACT REMOVAL Bilateral 03/2018    COLONOSCOPY  01/21/2011    mild sigmoid diverticulosis.  COLONOSCOPY  4/6/16    hemorrhoid; sigmoid diverticulosis    CT BIOPSY PERCUTANEOUS DEEP BONE  8/18/2021    CT BIOPSY PERCUTANEOUS DEEP BONE 8/18/2021 [facility] CT SCAN    KNEE ARTHROSCOPY Left 03/19/2010    partial medial and lateral synovectomies, partial medial meniscectomy, chondroplasty.  NASAL FRACTURE SURGERY  1960    OTHER SURGICAL HISTORY Left 02/12/2010    knee injection.  PORT SURGERY N/A 10/14/2020    RIGHT PORT INSERTION performed by Solange Gross DO at [address] [place] [address] PRE-MALIGNANT / [address] Left 01/16/2012    actinic keratoses, ear, liquid nitrogen.  PRE-MALIGNANT / BENIGN SKIN LESION EXCISION Left 07/19/2011    actinic keratosis, upper ear, liquid nitrogen.      PROSTATE BIOPSY Bilateral 09/08/2015    Benign    REPAIR UMBILICAL HVQW,0+D/F,ROHJE N/A 9/01/4211    UMBILICAL Hernia Repair w/ Mesh performed by Girish Engle MD at [address] [place] [address] SEPTOPLASTY  10/30/2015    with bilateral submucosal resection of the inferior bruit  Lymphatics -  [x] no palpable lymphadenopathy  [] no hepatosplenomegaly  Chest -   [x] normal chest excursion  [x] no chest wall tenderness  [] increased AP diameter[] pectus noted [] scoliosis noted [x] no tachypnea retraction or cyanosis [x] clear to auscultation, no wheezes, rales or rhonchi, symmetric air entry  [] wheezing noted  [] rales noted  []rhonchi noted [] decreased air entry noted bilaterally  Heart -  [x] normal rate,  [x] regular rhythm  [] irregularly irregular rhythm [x] normal S1  [x] normal S2  [x] no murmurs, rubs, clicks or gallops  [] S3 present  [] S4 present  [] systolic murmur  [] diastolic murmur  [] midsystolic click []pericardial rub present   Abdomen -  [] soft [] nontender  [] nondistended  [] no masses or organomegaly  Neurological -  [x] normal speech  [x] no focal findings or movement disorder noted  [] cranial nerves II through XII intact  [] DTR's normal and symmetric  [] Babinski sign negative,  [x] motor and sensory grossly normal bilaterally  [] normal muscle tone [x] no tremors  [] strength 5/5  [] Romberg sign negative [] normal gait   Musculoskeletal -  [x] no joint tenderness [x] no deformity or swelling  Extremities - [x] no clubbing or cyanosis,  [x] no pedal edema [] pedal edema  [] intact peripheral pulses  Skin -  [x] normal coloration and turgor  [x] no rashes  [] no suspicious skin lesions noted          MEDICAL DECISION MAKING     PROBLEMS ADDRESSED (NUMBER AND COMPLEXITY)       ICD-10-CM    1. Adenocarcinoma of right lung (HCC)  C34.91    2. COPD with asthma ([facility] [address]  J44.9    3. Obstructive sleep apnea  G47.33         2.  DATA REVIEWED AND ANALYZED (AMOUNT AND/OR COMPLEXITY)   LABS:  ABG:   No results found for: PH, PHART, PCO2, HNQ2CYN, PO2, PO2ART, HCO3, CNV9QTN, BE, BEART, THGB, F5FPLJCL  VBG:  No results found for: PHVEN, ZVO5CZG, BEVEN, E3MCCMCK  CBC:   Lab Results   Component Value Date    WBC 2.8 (L) 09/21/2021    RBC 3.70 (L) 09/21/2021    HGB 11.9 (L) 09/21/2021    HCT 35.5 (L) 09/21/2021     (L) 09/21/2021    MCV 95.9 09/21/2021    MCH 32.2 09/21/2021    MCHC 33.5 09/21/2021    RDW 14.6 (H) 09/21/2021    LYMPHOPCT 25 09/21/2021    MONOPCT 15 (H) 09/21/2021    BASOPCT 0 09/21/2021    MONOSABS 0.42 09/21/2021    LYMPHSABS 0.70 (L) 09/21/2021    EOSABS 0.03 09/21/2021    BASOSABS <0.03 09/21/2021    DIFFTYPE NOT REPORTED 09/21/2021     Eosinophils/IgE:   Lab Results   Component Value Date    EOSABS 0.03 09/21/2021     Alpha 1 antitrypsin: No results found for: A1A  CMP:   Lab Results   Component Value Date     09/21/2021    K 4.1 09/21/2021     09/21/2021    CO2 25 09/21/2021    BUN 19 09/21/2021    CREATININE 0.91 09/21/2021    GLUCOSE 181 (H) 09/21/2021    CALCIUM 9.3 09/21/2021    PROT 7.3 09/21/2021    LABALBU 4.3 09/21/2021    BILITOT 0.26 (L) 09/21/2021    ALT 30 09/21/2021    AST 23 09/21/2021    ALKPHOS 110 09/21/2021     Coagulation Profile:   Lab Results   Component Value Date    INR 2.7 09/21/2021    PROTIME 32 09/21/2021    APTT 38.2 (H) 08/18/2021     BNP:   No results found for: BNP, PROBNP  D-Dimer/Fibrinogen:  No results found for: DDIMER  Others labs:  Lab Results   Component Value Date    TSH 3.56 08/27/2021     No results found for: VICKI, ANATITER, RHEUMFACTOR, RF, C3, C4, MPO, PR3, SEDRATE, CRP  Lab Results   Component Value Date    FOLATE 19.0 01/26/2021     Lab Results   Component Value Date    PSA 5.32 (H) 08/27/2021     No results found for: RPR, HIV    RADIOLOGY (CHEST X-RAY/CT SCAN/PET-CT SCAN/ECHO) RESULTS:  [] Not obtained  [x]Reviewed  CT chest/abdomen/pelvis 8/3/2021  Impression   1.  New anterior right 8th rib and L3 bone lesions, concerning for metastatic   deposits.       2.  Previously noted more diffuse airspace disease in the right lung has   improved.  Localized ground-glass opacity in the inferior right upper lobe   and residual opacities in the superior segment of the right lower lobe   remain, which may Forced Vital Capacity maneuver: There is no demonstrable obstructive defect and FVC is mildly   diminished, which in setting of normal TLC is suggestive of   \"non-specific\" ventilatory impairment, which is typically seen in   patient with asthma, obesity, CHF, chest wall limitations. Consider methacholine challenge test to evaluate for asthma. Flow-Volume Loop: is normal.       Following inhaled bronchodilator there was: no significant   response (<8% improvement in the FEV1)     LUNG VOLUMES     Measurement by body plethysmography and/or gas diffusion shows:   Lung volumes are noraml except for decreased SVC and  ERV. Neither hyperinflation nor air trapping are present. Airway resistance, a measure of airway function is: Airway   resistance is within normal limits. DIFFUSION CAPACITY (DLCO)     The diffusing capacity is:   Mildly reduced (>60% and <75%)   However DLCO corrected for alveolar volume is normal; which   suggests that reduction in the DLCO may be due to a reduced   measured lung volume rather than parenchymal or vascular disease;   this finding may be due to poor patient effort, previous lung   resection, obstruction of a major bronchus or small stature of   the patient     Impression: Nonspecific ventilatory impairment    POLYSOMNOGRAM:  BASELINE/TITRATION SLEEP STUDY:  [] Not obtained  [] Ordered, but not yet obtained  [x] Previously, results unavailable for review    CPAP/BIPAP COMPLIANCE DATA:   [] Not obtained  [] Ordered, but not yet obtained  [x] Obtained and shows              ASSESSMENT OF EXCESSIVE DAYTIME SLEEPINESS (BY INDEPENDENT HISTORIAN):  [place] Sleepiness Scale:  [x] Not obtained  [] Obtained  [] Previously obtained  No flowsheet data found. PRIOR EXTERNAL NOTES:  [] Not obtained  [x] Reviewed    ORDERS PLACED:  No orders of the defined types were placed in this encounter.        3. RISK OF COMPLICATIONS AND/OR MORBIDITY OR MORTALITY: ALLERGIES:  Allergies   Allergen Reactions    Effient [Prasugrel] Other (See Comments)     Superficial skin bleeding.  Ace Inhibitors Other (See Comments)     Cough.  Statins Other (See Comments)     Myalgias. MEDICATIONS:  Outpatient Medications Prior to Visit   Medication Sig Dispense Refill    folic acid (FOLVITE) 1 MG tablet Take 1 mg by mouth daily      dexamethasone (DECADRON) 4 MG tablet Take 4 mg by mouth See Admin Instructions Take one tablet twice a day for 3 days starting the day before scheduled chemotherapy      Omega-3 Fatty Acids (FISH OIL) 1200 MG CPDR Take 2,400 mg by mouth 2 times daily      niacin 500 MG extended release capsule Take 500 mg by mouth 2 times daily      losartan (COZAAR) 50 MG tablet TAKE 1 TABLET BY MOUTH EVERY DAY 90 tablet 1    levothyroxine (SYNTHROID) 100 MCG tablet TAKE 1 TABLET BY MOUTH EVERY DAY  90 tablet 1    tamsulosin (FLOMAX) 0.4 MG capsule TAKE 1 CAPSULE BY MOUTH EVERY DAY 90 capsule 1    Metoprolol Tartrate 75 MG TABS TAKE 1 TABLET BY MOUTH TWO TIMES A DAY      pravastatin (PRAVACHOL) 40 MG tablet TAKE ONE TABLET BY MOUTH ONCE EVERY EVENING 90 tablet 3    clopidogrel (PLAVIX) 75 MG tablet TAKE 1 TABLET BY MOUTH DAILY 90 tablet 1    furosemide (LASIX) 20 MG tablet TAKE 1 TABLET BY MOUTH ONE TIME A DAY 90 tablet 1    oxybutynin (DITROPAN-XL) 10 MG extended release tablet Take 1 tablet by mouth daily 90 tablet 1    warfarin (COUMADIN) 2.5 MG tablet Take 2 tablets by mouth daily Take 2 tablets (5 mg) by mouth daily.  Or as directed by INR results 60 tablet 3    flecainide (TAMBOCOR) 50 MG tablet Take 50 mg by mouth 2 times daily      ondansetron (ZOFRAN) 4 MG tablet Take 4 mg by mouth every 6 hours as needed for Nausea or Vomiting Disp 60 with 3 refills called in 10/20/2020      ECHINACEA HERB PO Take by mouth daily      albuterol sulfate HFA (VENTOLIN HFA) 108 (90 Base) MCG/ACT inhaler Inhale 2 puffs into the lungs every 6 hours as needed for Wheezing or Shortness of Breath 1 Inhaler 6    Multiple Vitamins-Minerals (MULTIVITAMIN PO) daily. No facility-administered medications prior to visit. PRESCRIPTION DRUG MANAGEMENT/RECOMMENDATIONS:    Patient recently noted to have recurrence of lung cancer  He has been started back on chemotherapy with Dr. Grace Ivana  Has been Elnoria Booty denies any significant pulmonary symptoms  Recommended to continue as needed albuterol  Reviewed use of rescue vs controlling agents, oral and inhaled meds and potential side effects  Reviewed use, techniques, schedule and side effects of all inhaled medications  Refills were provided   Barriers to medication compliance addressed. Patient was recommended to have prednisone and an antibiotic available for use during an exacerbation    SUPPLEMENTAL OXYGEN NEEDS:  Patient is not on home oxygen therapy. SLEEP APNEA MANAGEMENT RECOMMENDATIONS:  Patient denies any excessive daytime sleepiness and reports refreshing and restorative sleep  Patient is using PAP as recommended and is benefiting from the therapy  Compliance data was reviewed  Continue to use CPAP/BiPAP for at least 4 hours every night  Use heated humidification, if needed  Weight loss was recommended and discussed  Recommended good sleep hygiene and instructions provided  Patient instructed not to drive or operate heavy machinery, if sleepy    SMOKING CESSATION/COUNSELING:  Recommended to continue smoking cessation    LIFESTYLE MODIFICATION RECOMMENDATIONS:  Follow healthy behaviors: nutrition, exercise and medication adherence  Maintain an active lifestyle    LUNG CANCER SCREENING RECOMMENDATIONS:  Lung Cancer Screening: After reviewing the patient's smoking history, age and other clinical criteria, the LOW DOSE CT is : NOT INDICATED;  Signs or symptoms of Lung cancer  Continue surveillance imaging as per oncology recommendations    IMMUNIZATION HISTORY/RECOMMENDATIONS:    Immunization History   Administered Date(s) Administered    COVID-19, Moderna, PF, 100mcg/0.5mL 01/28/2021, 02/25/2021    Influenza A ([id number]) Vaccine PF IM 01/11/2010    Influenza Virus Vaccine 12/10/2009, 10/26/2011, 11/24/2012    Influenza, High Dose (Fluzone 65 yrs and older) 10/14/2013, 11/10/2014, 11/09/2015, 10/14/2016, 11/03/2017, 10/18/2018, 10/08/2019    Influenza, Quadv, adjuvanted, 65 yrs +, IM, PF (Fluad) 09/11/2020    Pneumococcal Conjugate 13-valent ([id number]) 02/06/2017    Pneumococcal Polysaccharide ([id number]) 01/17/2012    Tdap (Boostrix, Adacel) 08/04/2015    Zoster Live (Zostavax) 07/31/2014    Zoster Recombinant (Shingrix) 03/01/2019, 05/08/2019     Recommend influenza vaccination in the fall annually; Up-to-date   Recommendations were given regarding pneumococcal vaccination; Up-to-date  Recommendations were given regarding COVID-19 vaccination; Up-to-date    All the questions that the patient had were answered to his satisfaction  We'll see the patient back in six months  Thank you for having us involved in the care of your patient. Please call us if you have any questions or concerns. Harinder Beach MD, MD    Pulmonary and Critical Care Medicine            Please note that this chart was generated using voice recognition Dragon dictation software. Although every effort was made to ensure the accuracy of this automated transcription, some errors in transcription may have occurred.

## 2021-09-21 ENCOUNTER — HOSPITAL ENCOUNTER (OUTPATIENT)
Dept: INFUSION THERAPY | Age: 76
Discharge: HOME | End: 2021-09-21
Payer: MEDICARE

## 2021-09-21 ENCOUNTER — OFFICE VISIT (OUTPATIENT)
Dept: ONCOLOGY | Age: 76
End: 2021-09-21
Payer: MEDICARE

## 2021-09-21 ENCOUNTER — HOSPITAL ENCOUNTER (OUTPATIENT)
Dept: PHARMACY | Age: 76
Setting detail: THERAPIES SERIES
Discharge: HOME | End: 2021-09-21
Payer: MEDICARE

## 2021-09-21 VITALS
DIASTOLIC BLOOD PRESSURE: 76 MMHG | WEIGHT: 224.8 LBS | HEIGHT: 70 IN | TEMPERATURE: 97.9 F | BODY MASS INDEX: 32.18 KG/M2 | OXYGEN SATURATION: 97 % | SYSTOLIC BLOOD PRESSURE: 122 MMHG | HEART RATE: 68 BPM

## 2021-09-21 DIAGNOSIS — C34.91 ADENOCARCINOMA, LUNG, RIGHT (HCC): ICD-10-CM

## 2021-09-21 DIAGNOSIS — D69.6 THROMBOCYTOPENIA (HCC): ICD-10-CM

## 2021-09-21 DIAGNOSIS — C34.90 ALK-POSITIVE NON-SMALL CELL LUNG CANCER (HCC): Primary | ICD-10-CM

## 2021-09-21 DIAGNOSIS — C79.51 BONE METASTASIS (HCC): ICD-10-CM

## 2021-09-21 DIAGNOSIS — T66.XXXA RADIATION ESOPHAGITIS: ICD-10-CM

## 2021-09-21 DIAGNOSIS — C34.91 ADENOCARCINOMA OF RIGHT LUNG (HCC): ICD-10-CM

## 2021-09-21 DIAGNOSIS — C34.91 ADENOCARCINOMA OF RIGHT LUNG (HCC): Primary | ICD-10-CM

## 2021-09-21 DIAGNOSIS — I48.0 PAROXYSMAL A-FIB (HCC): Primary | ICD-10-CM

## 2021-09-21 DIAGNOSIS — J70.0 RADIATION PNEUMONITIS (HCC): ICD-10-CM

## 2021-09-21 DIAGNOSIS — T45.1X5D ADVERSE EFFECT OF CHEMOTHERAPY, SUBSEQUENT ENCOUNTER: ICD-10-CM

## 2021-09-21 DIAGNOSIS — K20.80 RADIATION ESOPHAGITIS: ICD-10-CM

## 2021-09-21 DIAGNOSIS — C77.9 REGIONAL LYMPH NODE METASTASIS PRESENT (HCC): ICD-10-CM

## 2021-09-21 DIAGNOSIS — M89.9 BONE LESION: ICD-10-CM

## 2021-09-21 LAB
ABSOLUTE EOS #: 0.03 K/UL (ref 0–0.44)
ABSOLUTE IMMATURE GRANULOCYTE: 0.04 K/UL (ref 0–0.3)
ABSOLUTE LYMPH #: 0.7 K/UL (ref 1.1–3.7)
ABSOLUTE MONO #: 0.42 K/UL (ref 0.1–1.2)
ALBUMIN SERPL-MCNC: 4.3 G/DL (ref 3.5–5.2)
ALBUMIN/GLOBULIN RATIO: 1.4 (ref 1–2.5)
ALP BLD-CCNC: 110 U/L (ref 40–129)
ALT SERPL-CCNC: 30 U/L (ref 5–41)
ANION GAP SERPL CALCULATED.3IONS-SCNC: 12 MMOL/L (ref 9–17)
AST SERPL-CCNC: 23 U/L
BASOPHILS # BLD: 0 % (ref 0–2)
BASOPHILS ABSOLUTE: <0.03 K/UL (ref 0–0.2)
BILIRUB SERPL-MCNC: 0.26 MG/DL (ref 0.3–1.2)
BUN BLDV-MCNC: 19 MG/DL (ref 8–23)
BUN/CREAT BLD: 21 (ref 9–20)
CALCIUM SERPL-MCNC: 9.3 MG/DL (ref 8.6–10.4)
CHLORIDE BLD-SCNC: 103 MMOL/L (ref 98–107)
CO2: 25 MMOL/L (ref 20–31)
CREAT SERPL-MCNC: 0.91 MG/DL (ref 0.7–1.2)
DIFFERENTIAL TYPE: ABNORMAL
EOSINOPHILS RELATIVE PERCENT: 1 % (ref 1–4)
GFR AFRICAN AMERICAN: >60 ML/MIN
GFR NON-AFRICAN AMERICAN: >60 ML/MIN
GFR SERPL CREATININE-BSD FRML MDRD: ABNORMAL ML/MIN/{1.73_M2}
GFR SERPL CREATININE-BSD FRML MDRD: ABNORMAL ML/MIN/{1.73_M2}
GLUCOSE BLD-MCNC: 181 MG/DL (ref 70–99)
HCT VFR BLD CALC: 35.5 % (ref 40.7–50.3)
HEMOGLOBIN: 11.9 G/DL (ref 13–17)
IMMATURE GRANULOCYTES: 1 %
INR BLD: 2.7
LYMPHOCYTES # BLD: 25 % (ref 24–43)
MCH RBC QN AUTO: 32.2 PG (ref 25.2–33.5)
MCHC RBC AUTO-ENTMCNC: 33.5 G/DL (ref 25.2–33.5)
MCV RBC AUTO: 95.9 FL (ref 82.6–102.9)
MONOCYTES # BLD: 15 % (ref 3–12)
NRBC AUTOMATED: 0 PER 100 WBC
PDW BLD-RTO: 14.6 % (ref 11.8–14.4)
PLATELET # BLD: 134 K/UL (ref 138–453)
PLATELET ESTIMATE: ABNORMAL
PMV BLD AUTO: 9.7 FL (ref 8.1–13.5)
POTASSIUM SERPL-SCNC: 4.1 MMOL/L (ref 3.7–5.3)
PROTIME: 32 SECONDS
RBC # BLD: 3.7 M/UL (ref 4.21–5.77)
RBC # BLD: ABNORMAL 10*6/UL
SEG NEUTROPHILS: 58 % (ref 36–65)
SEGMENTED NEUTROPHILS ABSOLUTE COUNT: 1.56 K/UL (ref 1.5–8.1)
SODIUM BLD-SCNC: 140 MMOL/L (ref 135–144)
TOTAL PROTEIN: 7.3 G/DL (ref 6.4–8.3)
WBC # BLD: 2.8 K/UL (ref 3.5–11.3)
WBC # BLD: ABNORMAL 10*3/UL

## 2021-09-21 PROCEDURE — 36416 COLLJ CAPILLARY BLOOD SPEC: CPT

## 2021-09-21 PROCEDURE — 99215 OFFICE O/P EST HI 40 MIN: CPT | Performed by: INTERNAL MEDICINE

## 2021-09-21 PROCEDURE — 85610 PROTHROMBIN TIME: CPT

## 2021-09-21 PROCEDURE — 80053 COMPREHEN METABOLIC PANEL: CPT

## 2021-09-21 PROCEDURE — 85025 COMPLETE CBC W/AUTO DIFF WBC: CPT

## 2021-09-21 PROCEDURE — 99211 OFF/OP EST MAY X REQ PHY/QHP: CPT

## 2021-09-21 PROCEDURE — 2580000003 HC RX 258: Performed by: INTERNAL MEDICINE

## 2021-09-21 PROCEDURE — 99214 OFFICE O/P EST MOD 30 MIN: CPT | Performed by: INTERNAL MEDICINE

## 2021-09-21 PROCEDURE — 6360000002 HC RX W HCPCS: Performed by: INTERNAL MEDICINE

## 2021-09-21 PROCEDURE — G8427 DOCREV CUR MEDS BY ELIG CLIN: HCPCS | Performed by: INTERNAL MEDICINE

## 2021-09-21 PROCEDURE — 3017F COLORECTAL CA SCREEN DOC REV: CPT | Performed by: INTERNAL MEDICINE

## 2021-09-21 PROCEDURE — G8417 CALC BMI ABV UP PARAM F/U: HCPCS | Performed by: INTERNAL MEDICINE

## 2021-09-21 PROCEDURE — 4040F PNEUMOC VAC/ADMIN/RCVD: CPT | Performed by: INTERNAL MEDICINE

## 2021-09-21 PROCEDURE — 1123F ACP DISCUSS/DSCN MKR DOCD: CPT | Performed by: INTERNAL MEDICINE

## 2021-09-21 PROCEDURE — 1036F TOBACCO NON-USER: CPT | Performed by: INTERNAL MEDICINE

## 2021-09-21 RX ORDER — SODIUM CHLORIDE 0.9 % (FLUSH) 0.9 %
5-40 SYRINGE (ML) INJECTION PRN
Status: DISCONTINUED | OUTPATIENT
Start: 2021-09-21 | End: 2021-09-22 | Stop reason: HOSPADM

## 2021-09-21 RX ORDER — HEPARIN SODIUM (PORCINE) LOCK FLUSH IV SOLN 100 UNIT/ML 100 UNIT/ML
500 SOLUTION INTRAVENOUS PRN
Status: CANCELLED | OUTPATIENT
Start: 2021-09-21

## 2021-09-21 RX ORDER — HEPARIN SODIUM (PORCINE) LOCK FLUSH IV SOLN 100 UNIT/ML 100 UNIT/ML
500 SOLUTION INTRAVENOUS PRN
Status: DISCONTINUED | OUTPATIENT
Start: 2021-09-21 | End: 2021-09-22 | Stop reason: HOSPADM

## 2021-09-21 RX ORDER — SODIUM CHLORIDE 0.9 % (FLUSH) 0.9 %
5-40 SYRINGE (ML) INJECTION PRN
Status: CANCELLED | OUTPATIENT
Start: 2021-09-21

## 2021-09-21 RX ADMIN — HEPARIN 500 UNITS: 100 SYRINGE at 10:36

## 2021-09-21 RX ADMIN — SODIUM CHLORIDE, PRESERVATIVE FREE 10 ML: 5 INJECTION INTRAVENOUS at 11:35

## 2021-09-21 NOTE — PROGRESS NOTES
VAD accessed per protocol with 3/4 inch 20 gauge camargo needle. Flushes easy with good blood return. Labs Drawn. Flushed with 10 ml of normal saline and 5 ml of heparin flush. D/C'd 3/4 inch camargo needle and band aide applied. Patient stable and discharged to go to coumadin clinic.

## 2021-09-21 NOTE — PATIENT INSTRUCTIONS
\"On day of next appointment, please screen for temperature and COVID-19 symptoms prior to you clinic appointment. If any symptoms present, please call [phone] to reschedule. \"

## 2021-09-21 NOTE — PROGRESS NOTES
ANTICOAGULATION SERVICE    Date of Clinic Visit:  9/21/2021    Ciara Vigil is a 77 y.o. male who presents to clinic today for anticoagulation monitoring and adjustment. Recent INR Results:  Internal QC passed  Lab Results   Component Value Date    INR 2.7 09/21/2021    INR 2 09/01/2021       Current Warfarin Dosage:  Dosing Plan  As of 9/21/2021    TTR:  86.6 % (6.9 mo)   Full warfarin instructions:  5 mg every Mon, Wed, Fri; 7.5 mg all other days               Assessment/Plan:    Continue current regimen as INR remains stable. Next Clinic Appointment:  Return date  As of 9/21/2021    TTR:  86.6 % (6.9 mo)   Next INR check:  11/2/2021             Please call [facility] at [phone] with any questions. Thanks!   Rupa Gomez, [facility]  Anticoagulation Service Pharmacist  9/21/2021 11:22 AM   For Pharmacy Admin Tracking Only        Total # of Interventions Recommended: 0   Total # of Interventions Accepted: 0   Time Spent (min): 15

## 2021-09-21 NOTE — PROGRESS NOTES
[facility]                                                                                                                  9/21/2021  MRN:   [id number]  YOB: 1945  PCP:                           Saul Jorgensen DO  Referring Physician: No ref. provider found  Treating Physician Name: Ping Rincon MD      Reason for visit:  Discuss treatment plan. 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 Active and recent treatments:  concurrent chemoradiation using carboplatin and Taxol  Consolidation therapy with Imfinzi1/2021  Carboplatin, Alimta and bevacizumab x18/2021 while awaiting results of NGS  Anticipating to start alectinib    Summary of Case/History:    Vito Cuevas a 77 y. o.male is a patient with biopsy confirmed adenocarcinoma of right lung presents to the office to establish care and for further evaluation treatment recommendations. Patient was initially seen by pulmonary team for lung nodules. Patient CT scan from July 2019 showed a right-sided pleural calcification thickening concerning for asbestos exposure. Patient also noted to have multiple pulmonary nodules measuring between 1.5 x 1.3 cm. Follow-up CT chest from July 2020 showed increase in size of the right lower lobe lung nodule which now measured 2.1 cm. Subsequently patient underwent CT PET on 8/22 which showed FDG avid right lower lobe lung nodule with SUV of 4.6. Patient CT PET was also positive for subcarinal lymph node with SUV of 4.6. Patient underwent bronchoscopy with endobronchial ultrasound biopsy of the subcarinal lymph node confirmed adenocarcinoma. Patient has significant history of tobacco dependence around 30-pack-year however he quit about 25 years ago. Patient does have coronary artery disease status post stent placement.   Patient also gives history of occupational asthma and exposure to bacteria. Patient does give history of weight loss but describes it as intentional.  Denies headaches dizziness chest pain abdominal pain nausea bone pain     Interim History:     Patient presents to the clinic to discuss further treatment plan. He received 1 cycle of chemotherapy and tolerated it well. Patient did complain of being tired. Denies weight loss. He has recovered from the chemotherapy. Patient NGS came back positive for ALK chromosomal rearrangement. Patient denies nausea vomiting fever chills. Denies hospitalization or ER visit. During this visit patient's allergy, social, medical, surgical history and medications were reviewed and updated. Past Medical History:   Past Medical History:   Diagnosis Date    Abdominal hernia     small, no significant symptomatology.  Abnormal PSA     with history of slight increase.  Adenocarcinoma, lung, right (HCC)     Allergic rhinitis     Benign prostatic hypertrophy     Chest pain     occasional.     Coronary artery disease     status post drug eluting stent placement, LAD, ostial obtuse marginal.     Dysphagia     Erectile dysfunction     Familial hyperlipidemia     with hypertriglyceridemia.  Gastroesophageal reflux disease     Hearing loss, bilateral     hearing aid in the left ear only. 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     Hypertension     Impaired fasting glucose     prediabetes.  Lumbar disc herniation     L3-L4, with chronic back pain.  Mild anemia     Nasal polyps     minimal symptoms.  Nasal septal deviation     right side.  Obesity     Palpitations     occasional.     Peptic ulcer disease     Pulmonary nodules     Reactive airway disease     asthma, industrial related, also a history of hypersensitivity pneumonitis.      Sleep apnea     wears CPAP nightly    Urinary frequency        Past Surgical History:     Past Surgical History: Procedure Laterality Date    APPENDECTOMY  age 11    BRONCHOSCOPY  09/17/2020    BRONCHOSCOPY N/A 9/17/2020    EBUS- BRONCHOSCOPY ENDOBRONCHIAL ULTRASOUND, PATHOLOGY REQUESTED- CINDY NOTIFIED, GI UNIT SCHEDULED performed by Madisyn Hurtado MD at [address] [place] [address]  10/20/2011    occluded LAD and ostial obtuse marginal stenosis, underwent drug eluting stents to both, RCA small, nondominant, occluded, ejection fraction 45%.  CARDIAC CATHETERIZATION  08/2018    with stent placement    CATARACT REMOVAL Bilateral 03/2018    COLONOSCOPY  01/21/2011    mild sigmoid diverticulosis.  COLONOSCOPY  4/6/16    hemorrhoid; sigmoid diverticulosis    CT BIOPSY PERCUTANEOUS DEEP BONE  8/18/2021    CT BIOPSY PERCUTANEOUS DEEP BONE 8/18/2021 [facility] CT SCAN    KNEE ARTHROSCOPY Left 03/19/2010    partial medial and lateral synovectomies, partial medial meniscectomy, chondroplasty.  NASAL FRACTURE SURGERY  1960    OTHER SURGICAL HISTORY Left 02/12/2010    knee injection.  PORT SURGERY N/A 10/14/2020    RIGHT PORT INSERTION performed by Verito Hays DO at [address] [place] [address] PRE-MALIGNANT / [address] Left 01/16/2012    actinic keratoses, ear, liquid nitrogen.  PRE-MALIGNANT / BENIGN SKIN LESION EXCISION Left 07/19/2011    actinic keratosis, upper ear, liquid nitrogen.  PROSTATE BIOPSY Bilateral 09/08/2015    Benign    REPAIR UMBILICAL KLGF,7+J/F,VDPET N/A 2/38/2817    UMBILICAL Hernia Repair w/ Mesh performed by Ortega Lopez MD at [address] [place] [address] SEPTOPLASTY  10/30/2015    with bilateral submucosal resection of the inferior turbinates, left middle turbinate elza bullosa resection done by Dr Cisse Cos ARTHROSCOPY Left 10/17/14    W/ SUBACROMIAL DECOMPRESSION, DEBRIDEMENT ET CHONDROPLASTY    UPPER GASTROINTESTINAL ENDOSCOPY  01/21/2011    superficial ulcer of the fundus, erostive gastritis.      VASECTOMY Bilateral 04/04/1980       Patient Family Social History:     Social History     Socioeconomic History    Marital status:      Spouse name: None    Number of children: None    Years of education: None    Highest education level: None   Occupational History    None   Tobacco Use    Smoking status: Former Smoker     Packs/day: 2.00     Years: 15.00     Pack years: 30.00     Quit date: 1985     Years since quittin.7    Smokeless tobacco: Never Used    Tobacco comment: smoked 2 packs a day for 14 to 15 years, quit in    Vaping Use    Vaping Use: Never used   Substance and Sexual Activity    Alcohol use: No     Alcohol/week: 0.0 standard drinks     Comment: rare    Drug use: No    Sexual activity: None   Other Topics Concern    None   Social History Narrative    None     Social Determinants of Health     Financial Resource Strain: Low Risk     Difficulty of Paying Living Expenses: Not hard at all   Food Insecurity: No Food Insecurity    Worried About Running Out of Food in the Last Year: Never true    [address] in the Last Year: Never true   Transportation Needs:     Lack of Transportation (Medical):      Lack of Transportation (Non-Medical):    Physical Activity:     Days of Exercise per Week:     Minutes of Exercise per Session:    Stress:     Feeling of Stress :    Social Connections:     Frequency of Communication with Friends and Family:     Frequency of Social Gatherings with Friends and Family:     Attends [employer] Services:     Active Member of Clubs or Organizations:     Attends Club or Organization Meetings:     Marital Status:    Intimate Partner Violence:     Fear of Current or Ex-Partner:     Emotionally Abused:     Physically Abused:     Sexually Abused:      Family History   Problem Relation Age of Onset    Cancer Mother         lymphoma    Thyroid Disease Mother         hypothyroidism    Stroke Mother     Heart Disease Mother     High Blood Pressure Mother     Heart Attack Mother         in her 46s    Heart Failure Father         congestive    Coronary Art Dis Father     Emphysema Father         was a smoker    Heart Disease Maternal Grandmother     Heart Disease Maternal Grandfather     Crohn's Disease Sister     High Cholesterol Sister     High Cholesterol Child        Current Medications:     Current Outpatient Medications   Medication Sig Dispense Refill    Alectinib HCl 150 MG CAPS Take 600 mg by mouth 2 times daily 240 capsule 3    folic acid (FOLVITE) 1 MG tablet Take 1 mg by mouth daily      dexamethasone (DECADRON) 4 MG tablet Take 4 mg by mouth See Admin Instructions Take one tablet twice a day for 3 days starting the day before scheduled chemotherapy      Omega-3 Fatty Acids (FISH OIL) 1200 MG CPDR Take 2,400 mg by mouth 2 times daily      niacin 500 MG extended release capsule Take 500 mg by mouth 2 times daily      losartan (COZAAR) 50 MG tablet TAKE 1 TABLET BY MOUTH EVERY DAY 90 tablet 1    levothyroxine (SYNTHROID) 100 MCG tablet TAKE 1 TABLET BY MOUTH EVERY DAY  90 tablet 1    tamsulosin (FLOMAX) 0.4 MG capsule TAKE 1 CAPSULE BY MOUTH EVERY DAY 90 capsule 1    Metoprolol Tartrate 75 MG TABS TAKE 1 TABLET BY MOUTH TWO TIMES A DAY      pravastatin (PRAVACHOL) 40 MG tablet TAKE ONE TABLET BY MOUTH ONCE EVERY EVENING 90 tablet 3    clopidogrel (PLAVIX) 75 MG tablet TAKE 1 TABLET BY MOUTH DAILY 90 tablet 1    furosemide (LASIX) 20 MG tablet TAKE 1 TABLET BY MOUTH ONE TIME A DAY 90 tablet 1    oxybutynin (DITROPAN-XL) 10 MG extended release tablet Take 1 tablet by mouth daily 90 tablet 1    warfarin (COUMADIN) 2.5 MG tablet Take 2 tablets by mouth daily Take 2 tablets (5 mg) by mouth daily.  Or as directed by INR results 60 tablet 3    flecainide (TAMBOCOR) 50 MG tablet Take 50 mg by mouth 2 times daily      ondansetron (ZOFRAN) 4 MG tablet Take 4 mg by mouth every 6 hours as needed for Nausea or Vomiting Disp 60 with 3 refills called in 10/20/2020     [facility] Vince PO Take by mouth daily      albuterol sulfate HFA (VENTOLIN HFA) 108 (90 Base) MCG/ACT inhaler Inhale 2 puffs into the lungs every 6 hours as needed for Wheezing or Shortness of Breath 1 Inhaler 6    Multiple Vitamins-Minerals (MULTIVITAMIN PO) daily. No current facility-administered medications for this visit. Facility-Administered Medications Ordered in Other Visits   Medication Dose Route Frequency Provider Last Rate Last Admin    sodium chloride flush 0.9 % injection 5-40 mL  5-40 mL IntraVENous PRN Chirag Ulloa MD   10 mL at 09/21/21 1135    heparin flush 100 UNIT/ML injection 500 Units  500 Units IntraCATHeter PRN Chirag Ulloa MD   500 Units at 09/21/21 1036       Allergies:   Effient [prasugrel], Ace inhibitors, and Statins    Review of Systems:    Constitutional: No fever or chills. No night sweats, no weight loss   Eyes: No eye discharge, double vision, or eye pain   HEENT: negative for sore mouth, sore throat, hoarseness and voice change   Respiratory: negative for cough , sputum, dyspnea, wheezing, hemoptysis, chest pain   Cardiovascular: negative for chest pain, dyspnea, palpitations, orthopnea, PND   Gastrointestinal: negative for nausea, vomiting, diarrhea, constipation, abdominal pain, Dysphagia, hematemesis and hematochezia   Genitourinary: negative for frequency, dysuria, nocturia, urinary incontinence, and hematuria   Integument: negative for rash, skin lesions, bruises.    Hematologic/Lymphatic: negative for easy bruising, bleeding, lymphadenopathy, or petechiae   Endocrine: negative for heat or cold intolerance,weight changes, change in bowel habits and hair loss   Musculoskeletal: negative for myalgias, arthralgias, pain, joint swelling,and bone pain   Neurological: negative for headaches, dizziness, seizures, weakness, numbness    Physical Exam:  Vitals: /76 (Site: Left Upper Arm, Position: Sitting, Cuff Size: Large Adult)   Pulse 68   Temp 97.9 °F (36.6 °C)   Ht 5' 10\" (1.778 m)   Wt 224 lb 12.8 oz (102 kg)   SpO2 97%   BMI 32.26 kg/m²     General appearance - well appearing, no in pain or distress  Mental status - AAO X3  Eyes - pupils equal and reactive, extraocular eye movements intact  Mouth - mucous membranes moist, pharynx normal without lesions  Neck - supple, no significant adenopathy  Lymphatics - no palpable lymphadenopathy, no hepatosplenomegaly  Chest - clear to auscultation, no wheezes, rales or rhonchi, symmetric air entry  Heart - normal rate, regular rhythm, normal S1, S2, no murmurs  Abdomen - soft, nontender, nondistended, no masses or organomegaly  Neurological - alert, oriented, normal speech, no focal findings or movement disorder noted  Extremities - peripheral pulses normal, no pedal edema, no clubbing or cyanosis  Skin - normal coloration and turgor, no rashes, no suspicious skin lesions noted     DATA:    Results for orders placed or performed during the hospital encounter of 09/21/21   Comprehensive Metabolic Panel   Result Value Ref Range    Glucose 181 (H) 70 - 99 mg/dL    BUN 19 8 - 23 mg/dL    CREATININE 0.91 0.70 - 1.20 mg/dL    Bun/Cre Ratio 21 (H) 9 - 20    Calcium 9.3 8.6 - 10.4 mg/dL    Sodium 140 135 - 144 mmol/L    Potassium 4.1 3.7 - 5.3 mmol/L    Chloride 103 98 - 107 mmol/L    CO2 25 20 - 31 mmol/L    Anion Gap 12 9 - 17 mmol/L    Alkaline Phosphatase 110 40 - 129 U/L    ALT 30 5 - 41 U/L    AST 23 <40 U/L    Total Bilirubin 0.26 (L) 0.3 - 1.2 mg/dL    Total Protein 7.3 6.4 - 8.3 g/dL    Albumin 4.3 3.5 - 5.2 g/dL    Albumin/Globulin Ratio 1.4 1.0 - 2.5    GFR Non-African American >60 >60 mL/min    GFR African American >60 >60 mL/min    GFR Comment          GFR Staging NOT REPORTED    CBC Auto Differential   Result Value Ref Range    WBC 2.8 (L) 3.5 - 11.3 k/uL    RBC 3.70 (L) 4.21 - 5.77 m/uL    Hemoglobin 11.9 (L) 13.0 - 17.0 g/dL    Hematocrit 35.5 (L) 40.7 - 50.3 %    MCV 95.9 82.6 - 102.9 fL    MCH 32.2 25.2 - 33.5 pg    MCHC and pelvis 01/07/2021. MRI abdomen 01/22/2021. PET-CT 08/22/2020. HISTORY: ORDERING SYSTEM PROVIDED HISTORY: Adenocarcinoma of right lung [facility]) TECHNOLOGIST PROVIDED HISTORY: assess responce Reason for Exam: h/o right lung adenocarcinoma with lymph node mets , h/o appendectomy, left shoulder surgery, cardiac stents Acuity: Chronic Type of Exam: Subsequent/Follow-up FINDINGS: Chest: Mediastinum: Mildly prominent lymph node in the right tracheoesophageal groove at the thoracic inlet on image 16 measures 1 cm in short axis. No pericardial effusion. Calcified atheromatous plaque and coronary calcification noted. Post treatment changes in the right hilum. Lungs/pleura: Calcified pleura bilaterally. Small right pleural effusion. Post treatment changes in the right perihilar lung and medial right lower lobe. Overall more diffuse opacities in the right lower lobe and more numerous ground-glass opacities in the right upper lobe have improved. Focal ground-glass opacity is present in the inferior right upper lobe with adjacent nodular opacity in the superior segment of the right lower lobe anteriorly. The left lung is without new airspace disease. The central airway is patent. Soft Tissues/Bones: Lucent, mildly expansile lesion in the anterolateral right 8th rib is noted. Abdomen/Pelvis: Organs: Previously described abnormality near the right portal vein is less prominent in the interval and was previously described as a probably benign finding with MRI. The gallbladder, pancreas, spleen, adrenals and kidneys reveal no discrete abnormality. GI/Bowel: There is no bowel dilatation or wall thickening identified. Diverticulosis. Pelvis: No acute findings. Prostatomegaly. Peritoneum/Retroperitoneum: No free air or free fluid. The aorta is normal in caliber. The visceral branches are patent. No lymphadenopathy.  Bones/Soft Tissues: Mixed lucent and sclerotic bone lesion in the right lateral aspect of the L3 vertebral body is a new finding. 1.  New anterior right 8th rib and L3 bone lesions, concerning for metastatic deposits. 2.  Previously noted more diffuse airspace disease in the right lung has improved. Localized ground-glass opacity in the inferior right upper lobe and residual opacities in the superior segment of the right lower lobe remain, which may represent resolving inflammatory process. Short-term imaging follow-up is suggested. 3.  Less prominent geographic liver lesion near the right portal vein, previously described as probably benign with MRI. This may represent focal fat. 4.  Small right pleural effusion. Impression:  Adenocarcinoma of right lung, clinical stage at least T1c, N2, M0 (stage IIIA)  Disease recurrence, biopsy-proven8/2021  Radiation pneumonitis and shortness of breath      Plan:  I had a detailed discussion with the patient and we went over results of lab work-up imaging studies and other relevant clinical data  Reviewed results of NGS testing. Liquid biopsy shows EML 4ALK chromosomal rearrangement. Patient would be a candidate for targeted therapy using alectinib. Reviewed goals and expectations. Patient wishes to proceed with further treatment  We will write for alectinib  Reviewed logistics prognostic data as well as potential side effects  We will hold off on further chemotherapy at this point while awaiting alectinib  We will also start patient on Xgeva down the road given bone metastasis. NCCN guidelines were reviewed and discussed with the patient. The diagnosis and care plan were discussed with the patient in detail. I discussed the natural history of the disease, prognosis, risks and goals of therapy and answered all the patients questions to the best of my ability. Patient expressed understanding and was in agreement. Andrew Mills MD          I spent more than forty minutes examining, evaluating, reviewing data, counseling the patient and coordinating care. Greater than 50% of time was spent face-to-face with the patient this note is created with the assistance of a speech recognition program.  While intending to generate a document that actually reflects the content of the visit, the document can still have some errors including those of syntax and sound a like substitutions which may escape proof reading. It such instances, actual meaning can be extrapolated by contextual diversion.

## 2021-09-24 ENCOUNTER — TELEPHONE (OUTPATIENT)
Dept: CASE MANAGEMENT | Age: 76
End: 2021-09-24

## 2021-09-24 NOTE — TELEPHONE ENCOUNTER
Name: Giovanny Leyva  : 1945  MRN: [id number]    Oncology Navigation Follow-Up Note  Navigator left message for nurses to followup on [employer] tissue portion of testing .  Writer unsure if Dr. Pallavi Chavez aware   Electronically signed by Belinda Berry RN on 2021 at 8:43 AM

## 2021-09-27 RX ORDER — OXYBUTYNIN CHLORIDE 10 MG/1
TABLET, EXTENDED RELEASE ORAL
Qty: 90 TABLET | Refills: 1 | Status: SHIPPED | OUTPATIENT
Start: 2021-09-27 | End: 2022-03-03 | Stop reason: SDUPTHER

## 2021-09-27 RX ORDER — FUROSEMIDE 20 MG/1
TABLET ORAL
Qty: 90 TABLET | Refills: 1 | Status: SHIPPED | OUTPATIENT
Start: 2021-09-27 | End: 2022-03-03 | Stop reason: SDUPTHER

## 2021-09-27 NOTE — TELEPHONE ENCOUNTER
Margaret Davila called requesting a refill of the below medication which has been pended for you:     Requested Prescriptions     Pending Prescriptions Disp Refills    furosemide (LASIX) 20 MG tablet [Pharmacy Med Name: Furosemide Oral Tablet 20 MG] 90 tablet 1     Sig: TAKE 1 TABLET BY MOUTH EVERY DAY    oxybutynin (DITROPAN-XL) 10 MG extended release tablet [Pharmacy Med Name: Oxybutynin Chloride ER Oral Tablet Extended Release 24 Hour 10 MG] 90 tablet 1     Sig: TAKE 1 TABLET BY MOUTH EVERY DAY       Last Appointment Date: 9/3/2021  Next Appointment Date: 3/3/2022    Allergies   Allergen Reactions    Effient [Prasugrel] Other (See Comments)     Superficial skin bleeding.  Ace Inhibitors Other (See Comments)     Cough.  Statins Other (See Comments)     Myalgias.

## 2021-10-07 ENCOUNTER — HOSPITAL ENCOUNTER (OUTPATIENT)
Dept: INFUSION THERAPY | Age: 76
Discharge: HOME | End: 2021-10-07
Payer: MEDICARE

## 2021-10-07 PROCEDURE — 99211 OFF/OP EST MAY X REQ PHY/QHP: CPT

## 2021-10-07 NOTE — PROGRESS NOTES
Patient here for oral chemotherapy  teaching. Spent 20 minutes with him and wife. Handouts given: oral chemo therapy and handout on drug alecitinib. Answered questions and provided support.

## 2021-10-20 ENCOUNTER — HOSPITAL ENCOUNTER (OUTPATIENT)
Dept: INFUSION THERAPY | Age: 76
Discharge: HOME | End: 2021-10-20
Payer: MEDICARE

## 2021-10-20 ENCOUNTER — HOSPITAL ENCOUNTER (OUTPATIENT)
Dept: PHARMACY | Age: 76
Setting detail: THERAPIES SERIES
Discharge: HOME | End: 2021-10-20
Payer: MEDICARE

## 2021-10-20 DIAGNOSIS — C34.91 ADENOCARCINOMA, LUNG, RIGHT (HCC): Primary | ICD-10-CM

## 2021-10-20 DIAGNOSIS — C34.90 ALK-POSITIVE NON-SMALL CELL LUNG CANCER (HCC): Primary | ICD-10-CM

## 2021-10-20 DIAGNOSIS — I48.0 PAROXYSMAL A-FIB (HCC): Primary | ICD-10-CM

## 2021-10-20 DIAGNOSIS — C34.91 ADENOCARCINOMA OF RIGHT LUNG (HCC): ICD-10-CM

## 2021-10-20 LAB
INR BLD: 3.7
PROTIME: 44.5 SECONDS

## 2021-10-20 PROCEDURE — 99211 OFF/OP EST MAY X REQ PHY/QHP: CPT

## 2021-10-20 PROCEDURE — 2580000003 HC RX 258: Performed by: INTERNAL MEDICINE

## 2021-10-20 PROCEDURE — 6360000002 HC RX W HCPCS: Performed by: INTERNAL MEDICINE

## 2021-10-20 PROCEDURE — 36416 COLLJ CAPILLARY BLOOD SPEC: CPT

## 2021-10-20 PROCEDURE — 85610 PROTHROMBIN TIME: CPT

## 2021-10-20 PROCEDURE — 96523 IRRIG DRUG DELIVERY DEVICE: CPT

## 2021-10-20 RX ORDER — SODIUM CHLORIDE 0.9 % (FLUSH) 0.9 %
5-40 SYRINGE (ML) INJECTION PRN
Status: DISCONTINUED | OUTPATIENT
Start: 2021-10-20 | End: 2021-10-21 | Stop reason: HOSPADM

## 2021-10-20 RX ORDER — HEPARIN SODIUM (PORCINE) LOCK FLUSH IV SOLN 100 UNIT/ML 100 UNIT/ML
500 SOLUTION INTRAVENOUS PRN
Status: DISCONTINUED | OUTPATIENT
Start: 2021-10-20 | End: 2021-10-21 | Stop reason: HOSPADM

## 2021-10-20 RX ORDER — HEPARIN SODIUM (PORCINE) LOCK FLUSH IV SOLN 100 UNIT/ML 100 UNIT/ML
500 SOLUTION INTRAVENOUS PRN
Status: CANCELLED | OUTPATIENT
Start: 2021-10-20

## 2021-10-20 RX ORDER — SODIUM CHLORIDE 0.9 % (FLUSH) 0.9 %
5-40 SYRINGE (ML) INJECTION PRN
Status: CANCELLED | OUTPATIENT
Start: 2021-10-20

## 2021-10-20 RX ADMIN — HEPARIN 500 UNITS: 100 SYRINGE at 10:40

## 2021-10-20 RX ADMIN — SODIUM CHLORIDE, PRESERVATIVE FREE 10 ML: 5 INJECTION INTRAVENOUS at 10:40

## 2021-10-20 RX ADMIN — SODIUM CHLORIDE, PRESERVATIVE FREE 10 ML: 5 INJECTION INTRAVENOUS at 10:37

## 2021-10-20 NOTE — PROGRESS NOTES
ANTICOAGULATION SERVICE    Date of Clinic Visit:  10/20/2021    Jade Gibbs is a 77 y.o. male who presents to clinic today for anticoagulation monitoring and adjustment. Recent INR Results:  Internal QC passed  Lab Results   Component Value Date    INR 3.7 10/20/2021    INR 2.7 09/21/2021       Current Warfarin Dosage:  Dosing Plan  As of 10/20/2021    TTR:  79.5 % (7.8 mo)   Full warfarin instructions:  10/20: 2.5 mg; Otherwise 7.5 mg every Sun, Tue, Thu; 5 mg all other days               Assessment/Plan:    Modify warfarin dose as noted above: INR is elevated today and has trended up the last few visits. Cierra Carter has started a new medicatioin Alectinib but this should not be affecting his INR. I will decrease this weeks dose by 11% and then decrease weekly dose 5.6% starting next week. Next Clinic Appointment:  Return date  As of 10/20/2021    TTR:  79.5 % (7.8 mo)   Next INR check:  11/17/2021             Please call [facility] at [phone] with any questions. Thanks!   Ifrah Ferro, [address] [facility]  Anticoagulation Service Pharmacist  10/20/2021 11:21 AM     For Pharmacy Admin Tracking Only     Intervention Detail: Dose Adjustment: 1, reason: Therapy Optimization   Total # of Interventions Recommended: 1   Total # of Interventions Accepted: 1   Time Spent (min): 20

## 2021-10-20 NOTE — PROGRESS NOTES
VAD accessed per protocol using 20 gauge 3/4\" camargo needle. Flushes easily. Good blood return. Flushed with 10 ml normal saline and 5 ml Heplock solution. Camargo needle dc'd. Bandaid to site. Patient tolerated procedure with out difficulty.

## 2021-10-26 ENCOUNTER — HOSPITAL ENCOUNTER (OUTPATIENT)
Dept: LAB | Age: 76
Discharge: HOME | End: 2021-10-26
Payer: MEDICARE

## 2021-10-26 ENCOUNTER — OFFICE VISIT (OUTPATIENT)
Dept: ONCOLOGY | Age: 76
End: 2021-10-26
Payer: MEDICARE

## 2021-10-26 VITALS
WEIGHT: 239.6 LBS | HEART RATE: 55 BPM | SYSTOLIC BLOOD PRESSURE: 130 MMHG | HEIGHT: 70 IN | DIASTOLIC BLOOD PRESSURE: 68 MMHG | BODY MASS INDEX: 34.3 KG/M2 | OXYGEN SATURATION: 97 %

## 2021-10-26 DIAGNOSIS — C34.90 ALK-POSITIVE NON-SMALL CELL LUNG CANCER (HCC): ICD-10-CM

## 2021-10-26 DIAGNOSIS — D69.6 THROMBOCYTOPENIA, UNSPECIFIED (HCC): ICD-10-CM

## 2021-10-26 DIAGNOSIS — J70.0 RADIATION PNEUMONITIS (HCC): ICD-10-CM

## 2021-10-26 DIAGNOSIS — C34.90 ALK-POSITIVE NON-SMALL CELL LUNG CANCER (HCC): Primary | ICD-10-CM

## 2021-10-26 DIAGNOSIS — M89.9 BONE LESION: ICD-10-CM

## 2021-10-26 DIAGNOSIS — C34.91 ADENOCARCINOMA OF RIGHT LUNG (HCC): ICD-10-CM

## 2021-10-26 LAB
ABSOLUTE EOS #: 0.15 K/UL (ref 0–0.44)
ABSOLUTE IMMATURE GRANULOCYTE: 0.08 K/UL (ref 0–0.3)
ABSOLUTE LYMPH #: 1.21 K/UL (ref 1.1–3.7)
ABSOLUTE MONO #: 0.8 K/UL (ref 0.1–1.2)
ALBUMIN SERPL-MCNC: 4.3 G/DL (ref 3.5–5.2)
ALBUMIN/GLOBULIN RATIO: 1.4 (ref 1–2.5)
ALP BLD-CCNC: 100 U/L (ref 40–129)
ALT SERPL-CCNC: 26 U/L (ref 5–41)
ANION GAP SERPL CALCULATED.3IONS-SCNC: 10 MMOL/L (ref 9–17)
AST SERPL-CCNC: 27 U/L
BASOPHILS # BLD: 1 % (ref 0–2)
BASOPHILS ABSOLUTE: 0.05 K/UL (ref 0–0.2)
BILIRUB SERPL-MCNC: 0.46 MG/DL (ref 0.3–1.2)
BUN BLDV-MCNC: 22 MG/DL (ref 8–23)
BUN/CREAT BLD: 22 (ref 9–20)
CALCIUM SERPL-MCNC: 9.5 MG/DL (ref 8.6–10.4)
CHLORIDE BLD-SCNC: 103 MMOL/L (ref 98–107)
CO2: 27 MMOL/L (ref 20–31)
CREAT SERPL-MCNC: 1.01 MG/DL (ref 0.7–1.2)
DIFFERENTIAL TYPE: ABNORMAL
EOSINOPHILS RELATIVE PERCENT: 2 % (ref 1–4)
GFR AFRICAN AMERICAN: >60 ML/MIN
GFR NON-AFRICAN AMERICAN: >60 ML/MIN
GFR SERPL CREATININE-BSD FRML MDRD: ABNORMAL ML/MIN/{1.73_M2}
GFR SERPL CREATININE-BSD FRML MDRD: ABNORMAL ML/MIN/{1.73_M2}
GLUCOSE BLD-MCNC: 184 MG/DL (ref 70–99)
HCT VFR BLD CALC: 34.6 % (ref 40.7–50.3)
HEMOGLOBIN: 11 G/DL (ref 13–17)
IMMATURE GRANULOCYTES: 1 %
LYMPHOCYTES # BLD: 12 % (ref 24–43)
MCH RBC QN AUTO: 32.3 PG (ref 25.2–33.5)
MCHC RBC AUTO-ENTMCNC: 31.8 G/DL (ref 25.2–33.5)
MCV RBC AUTO: 101.5 FL (ref 82.6–102.9)
MONOCYTES # BLD: 8 % (ref 3–12)
NRBC AUTOMATED: 0 PER 100 WBC
PDW BLD-RTO: 17.4 % (ref 11.8–14.4)
PLATELET # BLD: 134 K/UL (ref 138–453)
PLATELET ESTIMATE: ABNORMAL
PMV BLD AUTO: 9.5 FL (ref 8.1–13.5)
POTASSIUM SERPL-SCNC: 4 MMOL/L (ref 3.7–5.3)
RBC # BLD: 3.41 M/UL (ref 4.21–5.77)
RBC # BLD: ABNORMAL 10*6/UL
SEG NEUTROPHILS: 76 % (ref 36–65)
SEGMENTED NEUTROPHILS ABSOLUTE COUNT: 7.49 K/UL (ref 1.5–8.1)
SODIUM BLD-SCNC: 140 MMOL/L (ref 135–144)
TOTAL PROTEIN: 7.3 G/DL (ref 6.4–8.3)
WBC # BLD: 9.8 K/UL (ref 3.5–11.3)
WBC # BLD: ABNORMAL 10*3/UL

## 2021-10-26 PROCEDURE — G8427 DOCREV CUR MEDS BY ELIG CLIN: HCPCS | Performed by: INTERNAL MEDICINE

## 2021-10-26 PROCEDURE — G8484 FLU IMMUNIZE NO ADMIN: HCPCS | Performed by: INTERNAL MEDICINE

## 2021-10-26 PROCEDURE — 1123F ACP DISCUSS/DSCN MKR DOCD: CPT | Performed by: INTERNAL MEDICINE

## 2021-10-26 PROCEDURE — 1036F TOBACCO NON-USER: CPT | Performed by: INTERNAL MEDICINE

## 2021-10-26 PROCEDURE — 4040F PNEUMOC VAC/ADMIN/RCVD: CPT | Performed by: INTERNAL MEDICINE

## 2021-10-26 PROCEDURE — 85025 COMPLETE CBC W/AUTO DIFF WBC: CPT

## 2021-10-26 PROCEDURE — 36415 COLL VENOUS BLD VENIPUNCTURE: CPT

## 2021-10-26 PROCEDURE — 3017F COLORECTAL CA SCREEN DOC REV: CPT | Performed by: INTERNAL MEDICINE

## 2021-10-26 PROCEDURE — 80053 COMPREHEN METABOLIC PANEL: CPT

## 2021-10-26 PROCEDURE — 99214 OFFICE O/P EST MOD 30 MIN: CPT | Performed by: INTERNAL MEDICINE

## 2021-10-26 PROCEDURE — G8417 CALC BMI ABV UP PARAM F/U: HCPCS | Performed by: INTERNAL MEDICINE

## 2021-10-26 RX ORDER — ALECTINIB HYDROCHLORIDE 150 MG/1
CAPSULE ORAL
COMMUNITY
End: 2021-11-15 | Stop reason: SDUPTHER

## 2021-10-26 NOTE — PROGRESS NOTES
Anne Marie Mak                                                                                                                  10/26/2021  MRN:   [id number]  YOB: 1945  PCP:                           Stephanie Dodd DO  Referring Physician: No ref. provider found  Treating Physician Name: Radha Bagley MD      Reason for visit:  Toxicity check. Reviewed results of lab work-up. Discussed treatment plan. 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 Active and recent treatments:  concurrent chemoradiation using carboplatin and Taxol  Consolidation therapy with Imfinzi1/2021  Carboplatin, Alimta and bevacizumab x18/2021 while awaiting results of NGS  Alectinib-10/8/2021    Summary of Case/History:    Laurita Garrett a 77 y. o.male is a patient with biopsy confirmed adenocarcinoma of right lung presents to the office to establish care and for further evaluation treatment recommendations. Patient was initially seen by pulmonary team for lung nodules. Patient CT scan from July 2019 showed a right-sided pleural calcification thickening concerning for asbestos exposure. Patient also noted to have multiple pulmonary nodules measuring between 1.5 x 1.3 cm. Follow-up CT chest from July 2020 showed increase in size of the right lower lobe lung nodule which now measured 2.1 cm. Subsequently patient underwent CT PET on 8/22 which showed FDG avid right lower lobe lung nodule with SUV of 4.6. Patient CT PET was also positive for subcarinal lymph node with SUV of 4.6. Patient underwent bronchoscopy with endobronchial ultrasound biopsy of the subcarinal lymph node confirmed adenocarcinoma. Patient has significant history of tobacco dependence around 30-pack-year however he quit about 25 years ago.   Patient does have coronary artery disease status post stent placement. Patient also gives history of occupational asthma and exposure to bacteria. Patient does give history of weight loss but describes it as intentional.  Denies headaches dizziness chest pain abdominal pain nausea bone pain     Interim History:     Patient presents to the clinic for a follow-up visit and to discuss results with lab work-up and other relevant clinical data. Patient has been taking alectinib since the beginning of this month. Has been tolerating the pill without any difficulty. Patient does complain of being more congested in the chest complains of short of breath attributes it to the weather. Denies nausea vomiting fever chills. According to the patient's wife patient seems to have more energy since he has been on the pill and of the chemo. During this visit patient's allergy, social, medical, surgical history and medications were reviewed and updated. Past Medical History:   Past Medical History:   Diagnosis Date    Abdominal hernia     small, no significant symptomatology.  Abnormal PSA     with history of slight increase.  Adenocarcinoma, lung, right (HCC)     Allergic rhinitis     Benign prostatic hypertrophy     Chest pain     occasional.     Coronary artery disease     status post drug eluting stent placement, LAD, ostial obtuse marginal.     Dysphagia     Erectile dysfunction     Familial hyperlipidemia     with hypertriglyceridemia.  Gastroesophageal reflux disease     Hearing loss, bilateral     hearing aid in the left ear only. 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     Hypertension     Impaired fasting glucose     prediabetes.  Lumbar disc herniation     L3-L4, with chronic back pain.  Mild anemia     Nasal polyps     minimal symptoms.  Nasal septal deviation     right side.      Obesity     Palpitations     occasional.     Peptic ulcer disease     Pulmonary nodules     Reactive airway disease     asthma, industrial related, also a history of hypersensitivity pneumonitis.  Sleep apnea     wears CPAP nightly    Urinary frequency        Past Surgical History:     Past Surgical History:   Procedure Laterality Date    APPENDECTOMY  age 11    BRONCHOSCOPY  09/17/2020    BRONCHOSCOPY N/A 9/17/2020    EBUS- BRONCHOSCOPY ENDOBRONCHIAL ULTRASOUND, PATHOLOGY REQUESTED- CINDY NOTIFIED, GI UNIT SCHEDULED performed by Naeem Hickey MD at [address]  10/20/2011    occluded LAD and ostial obtuse marginal stenosis, underwent drug eluting stents to both, RCA small, nondominant, occluded, ejection fraction 45%.  CARDIAC CATHETERIZATION  08/2018    with stent placement    CATARACT REMOVAL Bilateral 03/2018    COLONOSCOPY  01/21/2011    mild sigmoid diverticulosis.  COLONOSCOPY  4/6/16    hemorrhoid; sigmoid diverticulosis    CT BIOPSY PERCUTANEOUS DEEP BONE  8/18/2021    CT BIOPSY PERCUTANEOUS DEEP BONE 8/18/2021 [facility] CT SCAN    KNEE ARTHROSCOPY Left 03/19/2010    partial medial and lateral synovectomies, partial medial meniscectomy, chondroplasty.  NASAL FRACTURE SURGERY  1960    OTHER SURGICAL HISTORY Left 02/12/2010    knee injection.  PORT SURGERY N/A 10/14/2020    RIGHT PORT INSERTION performed by Solange Gross DO at [address] [place] [address] PRE-MALIGNANT / [address] Left 01/16/2012    actinic keratoses, ear, liquid nitrogen.  PRE-MALIGNANT / BENIGN SKIN LESION EXCISION Left 07/19/2011    actinic keratosis, upper ear, liquid nitrogen.      PROSTATE BIOPSY Bilateral 09/08/2015    Benign    REPAIR UMBILICAL LOPM,5+X/B,ZSNME N/A 7/78/5108    UMBILICAL Hernia Repair w/ Mesh performed by Girish Engle MD at [address] [place] [address] SEPTOPLASTY  10/30/2015    with bilateral submucosal resection of the inferior turbinates, left middle turbinate elza bullosa resection done by Dr Yoselyn Adan ARTHROSCOPY Left 10/17/14    W/ SUBACROMIAL DECOMPRESSION, DEBRIDEMENT ET CHONDROPLASTY    UPPER GASTROINTESTINAL ENDOSCOPY  2011    superficial ulcer of the fundus, erostive gastritis.  VASECTOMY Bilateral 1980       Patient Family Social History:     Social History     Socioeconomic History    Marital status:      Spouse name: None    Number of children: None    Years of education: None    Highest education level: None   Occupational History    None   Tobacco Use    Smoking status: Former Smoker     Packs/day: 2.00     Years: 15.00     Pack years: 30.00     Quit date: 1985     Years since quittin.8    Smokeless tobacco: Never Used    Tobacco comment: smoked 2 packs a day for 14 to 15 years, quit in    Vaping Use    Vaping Use: Never used   Substance and Sexual Activity    Alcohol use: No     Alcohol/week: 0.0 standard drinks     Comment: rare    Drug use: No    Sexual activity: None   Other Topics Concern    None   Social History Narrative    None     Social Determinants of Health     Financial Resource Strain: Low Risk     Difficulty of Paying Living Expenses: Not hard at all   Food Insecurity: No Food Insecurity    Worried About Running Out of Food in the Last Year: Never true    Ochoa of Food in the Last Year: Never true   Transportation Needs:     Lack of Transportation (Medical):      Lack of Transportation (Non-Medical):    Physical Activity:     Days of Exercise per Week:     Minutes of Exercise per Session:    Stress:     Feeling of Stress :    Social Connections:     Frequency of Communication with Friends and Family:     Frequency of Social Gatherings with Friends and Family:     Attends Sikh Services:     Active Member of Clubs or Organizations:     Attends Club or Organization Meetings:     Marital Status:    Intimate Partner Violence:     Fear of Current or Ex-Partner:     Emotionally Abused:     Physically Abused:     Sexually Abused:      Family History   Problem Relation Age of Onset    Cancer Mother         lymphoma    Thyroid Disease Mother         hypothyroidism    Stroke Mother     Heart Disease Mother     High Blood Pressure Mother     Heart Attack Mother         in her 46s    Heart Failure Father         congestive    Coronary Art Dis Father     Emphysema Father         was a smoker    Heart Disease Maternal Grandmother     Heart Disease Maternal Grandfather     Crohn's Disease Sister     High Cholesterol Sister     High Cholesterol Child        Current Medications:     Current Outpatient Medications   Medication Sig Dispense Refill    Alectinib HCl (ALECENSA) 150 MG CAPS Take by mouth 4 caps BID      furosemide (LASIX) 20 MG tablet TAKE 1 TABLET BY MOUTH EVERY DAY 90 tablet 1    oxybutynin (DITROPAN-XL) 10 MG extended release tablet TAKE 1 TABLET BY MOUTH EVERY DAY  90 tablet 1    folic acid (FOLVITE) 1 MG tablet Take 1 mg by mouth daily      dexamethasone (DECADRON) 4 MG tablet Take 4 mg by mouth See Admin Instructions Take one tablet twice a day for 3 days starting the day before scheduled chemotherapy      Omega-3 Fatty Acids (FISH OIL) 1200 MG CPDR Take 2,400 mg by mouth 2 times daily      niacin 500 MG extended release capsule Take 500 mg by mouth 2 times daily      losartan (COZAAR) 50 MG tablet TAKE 1 TABLET BY MOUTH EVERY DAY 90 tablet 1    levothyroxine (SYNTHROID) 100 MCG tablet TAKE 1 TABLET BY MOUTH EVERY DAY  90 tablet 1    tamsulosin (FLOMAX) 0.4 MG capsule TAKE 1 CAPSULE BY MOUTH EVERY DAY 90 capsule 1    Metoprolol Tartrate 75 MG TABS TAKE 1 TABLET BY MOUTH TWO TIMES A DAY      pravastatin (PRAVACHOL) 40 MG tablet TAKE ONE TABLET BY MOUTH ONCE EVERY EVENING 90 tablet 3    clopidogrel (PLAVIX) 75 MG tablet TAKE 1 TABLET BY MOUTH DAILY 90 tablet 1    warfarin (COUMADIN) 2.5 MG tablet Take 2 tablets by mouth daily Take 2 tablets (5 mg) by mouth daily.  Or as directed by INR results 60 tablet 3    flecainide (TAMBOCOR) 50 MG tablet Take 50 mg by mouth 2 times pharynx normal without lesions  Neck - supple, no significant adenopathy  Lymphatics - no palpable lymphadenopathy, no hepatosplenomegaly  Chest - clear to auscultation, no wheezes, rales or rhonchi, symmetric air entry  Heart - normal rate, regular rhythm, normal S1, S2, no murmurs  Abdomen - soft, nontender, nondistended, no masses or organomegaly  Neurological - alert, oriented, normal speech, no focal findings or movement disorder noted  Extremities - peripheral pulses normal, no pedal edema, no clubbing or cyanosis  Skin - normal coloration and turgor, no rashes, no suspicious skin lesions noted     DATA:    Results for orders placed or performed during the hospital encounter of 10/26/21   Comprehensive Metabolic Panel   Result Value Ref Range    Glucose 184 (H) 70 - 99 mg/dL    BUN 22 8 - 23 mg/dL    CREATININE 1.01 0.70 - 1.20 mg/dL    Bun/Cre Ratio 22 (H) 9 - 20    Calcium 9.5 8.6 - 10.4 mg/dL    Sodium 140 135 - 144 mmol/L    Potassium 4.0 3.7 - 5.3 mmol/L    Chloride 103 98 - 107 mmol/L    CO2 27 20 - 31 mmol/L    Anion Gap 10 9 - 17 mmol/L    Alkaline Phosphatase 100 40 - 129 U/L    ALT 26 5 - 41 U/L    AST 27 <40 U/L    Total Bilirubin 0.46 0.3 - 1.2 mg/dL    Total Protein 7.3 6.4 - 8.3 g/dL    Albumin 4.3 3.5 - 5.2 g/dL    Albumin/Globulin Ratio 1.4 1.0 - 2.5    GFR Non-African American >60 >60 mL/min    GFR African American >60 >60 mL/min    GFR Comment          GFR Staging NOT REPORTED    CBC Auto Differential   Result Value Ref Range    WBC 9.8 3.5 - 11.3 k/uL    RBC 3.41 (L) 4.21 - 5.77 m/uL    Hemoglobin 11.0 (L) 13.0 - 17.0 g/dL    Hematocrit 34.6 (L) 40.7 - 50.3 %    .5 82.6 - 102.9 fL    MCH 32.3 25.2 - 33.5 pg    MCHC 31.8 25.2 - 33.5 g/dL    RDW 17.4 (H) 11.8 - 14.4 %    Platelets 512 (L) 332 - 453 k/uL    MPV 9.5 8.1 - 13.5 fL    NRBC Automated 0.0 0.0 per 100 WBC    Differential Type NOT REPORTED     Seg Neutrophils 76 (H) 36 - 65 %    Lymphocytes 12 (L) 24 - 43 %    Monocytes 8 3 - 12 %    Eosinophils % 2 1 - 4 %    Basophils 1 0 - 2 %    Immature Granulocytes 1 (H) 0 %    Segs Absolute 7.49 1.50 - 8.10 k/uL    Absolute Lymph # 1.21 1.10 - 3.70 k/uL    Absolute Mono # 0.80 0.10 - 1.20 k/uL    Absolute Eos # 0.15 0.00 - 0.44 k/uL    Basophils Absolute 0.05 0.00 - 0.20 k/uL    Absolute Immature Granulocyte 0.08 0.00 - 0.30 k/uL    WBC Morphology NOT REPORTED     RBC Morphology ANISOCYTOSIS PRESENT     Platelet Estimate NOT REPORTED      -- Diagnosis --   BAL RIGHT LOWER LOBE:           NEGATIVE FOR MALIGNANCY.             FINE NEEDLE ASPIRATION STATION 7 EBUS:           ADENOCARCINOMA, COMPATIBLE WITH LUNG PRIMARY. CT PET    Impression    1. The previously described nodule involving the right lung base is FDG avid. Tissue sampling is recommended as malignancy is suspected. 2. The previously identified smaller lung nodules involving the lower lobes    are too small for PET CT characterization.  CT follow-up is recommended. 3. FDG avid subcarinal and right hilar lymph nodes suggestive of metastatic    disease. 4. No abnormal FDG activity identified within the neck, abdomen or pelvis.             CT CHEST ABDOMEN PELVIS W CONTRAST    Result Date: 8/3/2021  EXAMINATION: CT OF THE CHEST, ABDOMEN, AND PELVIS WITH CONTRAST 8/3/2021 10:04 am TECHNIQUE: CT of the chest, abdomen and pelvis was performed with the administration of intravenous contrast. Multiplanar reformatted images are provided for review. Dose modulation, iterative reconstruction, and/or weight based adjustment of the mA/kV was utilized to reduce the radiation dose to as low as reasonably achievable. COMPARISON: Chest CT 04/27/2021. CT chest abdomen and pelvis 01/07/2021. MRI abdomen 01/22/2021. PET-CT 08/22/2020.  HISTORY: ORDERING SYSTEM PROVIDED HISTORY: Adenocarcinoma of right lung [facility] TECHNOLOGIST PROVIDED HISTORY: assess responce Reason for Exam: h/o right lung adenocarcinoma with lymph node mets , h/o appendectomy, left shoulder surgery, cardiac stents Acuity: Chronic Type of Exam: Subsequent/Follow-up FINDINGS: Chest: Mediastinum: Mildly prominent lymph node in the right tracheoesophageal groove at the thoracic inlet on image 16 measures 1 cm in short axis. No pericardial effusion. Calcified atheromatous plaque and coronary calcification noted. Post treatment changes in the right hilum. Lungs/pleura: Calcified pleura bilaterally. Small right pleural effusion. Post treatment changes in the right perihilar lung and medial right lower lobe. Overall more diffuse opacities in the right lower lobe and more numerous ground-glass opacities in the right upper lobe have improved. Focal ground-glass opacity is present in the inferior right upper lobe with adjacent nodular opacity in the superior segment of the right lower lobe anteriorly. The left lung is without new airspace disease. The central airway is patent. Soft Tissues/Bones: Lucent, mildly expansile lesion in the anterolateral right 8th rib is noted. Abdomen/Pelvis: Organs: Previously described abnormality near the right portal vein is less prominent in the interval and was previously described as a probably benign finding with MRI. The gallbladder, pancreas, spleen, adrenals and kidneys reveal no discrete abnormality. GI/Bowel: There is no bowel dilatation or wall thickening identified. Diverticulosis. Pelvis: No acute findings. Prostatomegaly. Peritoneum/Retroperitoneum: No free air or free fluid. The aorta is normal in caliber. The visceral branches are patent. No lymphadenopathy. Bones/Soft Tissues: Mixed lucent and sclerotic bone lesion in the right lateral aspect of the L3 vertebral body is a new finding. 1.  New anterior right 8th rib and L3 bone lesions, concerning for metastatic deposits. 2.  Previously noted more diffuse airspace disease in the right lung has improved.   Localized ground-glass opacity in the inferior right upper lobe and be extrapolated by contextual diversion. Zachary Ashley

## 2021-11-01 RX ORDER — CLOPIDOGREL BISULFATE 75 MG/1
TABLET ORAL
Qty: 90 TABLET | Refills: 1 | Status: SHIPPED | OUTPATIENT
Start: 2021-11-01 | End: 2022-02-18 | Stop reason: SDUPTHER

## 2021-11-01 NOTE — TELEPHONE ENCOUNTER
Fadi Godinez called requesting a refill of the below medication which has been pended for you:     Requested Prescriptions     Pending Prescriptions Disp Refills    clopidogrel (PLAVIX) 75 MG tablet [Pharmacy Med Name: Clopidogrel Bisulfate Oral Tablet 75 MG] 90 tablet 1     Sig: TAKE 1 TABLET BY MOUTH EVERY DAY       Last Appointment Date: 9/3/2021  Next Appointment Date: 3/3/2022    Allergies   Allergen Reactions    Effient [Prasugrel] Other (See Comments)     Superficial skin bleeding.  Ace Inhibitors Other (See Comments)     Cough.  Statins Other (See Comments)     Myalgias.

## 2021-11-15 DIAGNOSIS — C34.90 ALK-POSITIVE NON-SMALL CELL LUNG CANCER (HCC): Primary | ICD-10-CM

## 2021-11-16 RX ORDER — ALECTINIB HYDROCHLORIDE 150 MG/1
600 CAPSULE ORAL 2 TIMES DAILY
Qty: 240 CAPSULE | Refills: 5 | Status: SHIPPED | OUTPATIENT
Start: 2021-11-16 | End: 2022-01-11 | Stop reason: SDUPTHER

## 2021-11-18 ENCOUNTER — HOSPITAL ENCOUNTER (OUTPATIENT)
Dept: INFUSION THERAPY | Age: 76
Discharge: HOME | End: 2021-11-18
Payer: MEDICARE

## 2021-11-18 ENCOUNTER — HOSPITAL ENCOUNTER (OUTPATIENT)
Dept: PHARMACY | Age: 76
Setting detail: THERAPIES SERIES
Discharge: HOME | End: 2021-11-18
Payer: MEDICARE

## 2021-11-18 DIAGNOSIS — I48.0 PAROXYSMAL A-FIB (HCC): Primary | ICD-10-CM

## 2021-11-18 DIAGNOSIS — C34.91 ADENOCARCINOMA, LUNG, RIGHT (HCC): Primary | ICD-10-CM

## 2021-11-18 LAB
INR BLD: 3.4
PROTIME: 40.3 SECONDS

## 2021-11-18 PROCEDURE — 85610 PROTHROMBIN TIME: CPT

## 2021-11-18 PROCEDURE — 36416 COLLJ CAPILLARY BLOOD SPEC: CPT

## 2021-11-18 PROCEDURE — 99211 OFF/OP EST MAY X REQ PHY/QHP: CPT

## 2021-11-18 PROCEDURE — 96523 IRRIG DRUG DELIVERY DEVICE: CPT

## 2021-11-18 RX ORDER — HEPARIN SODIUM (PORCINE) LOCK FLUSH IV SOLN 100 UNIT/ML 100 UNIT/ML
500 SOLUTION INTRAVENOUS PRN
Status: DISCONTINUED | OUTPATIENT
Start: 2021-11-18 | End: 2021-11-19 | Stop reason: HOSPADM

## 2021-11-18 RX ORDER — SODIUM CHLORIDE 0.9 % (FLUSH) 0.9 %
5-40 SYRINGE (ML) INJECTION PRN
Status: DISCONTINUED | OUTPATIENT
Start: 2021-11-18 | End: 2021-11-19 | Stop reason: HOSPADM

## 2021-11-18 RX ORDER — HEPARIN SODIUM (PORCINE) LOCK FLUSH IV SOLN 100 UNIT/ML 100 UNIT/ML
500 SOLUTION INTRAVENOUS PRN
Status: CANCELLED | OUTPATIENT
Start: 2021-11-18

## 2021-11-18 RX ORDER — SODIUM CHLORIDE 0.9 % (FLUSH) 0.9 %
5-40 SYRINGE (ML) INJECTION PRN
Status: CANCELLED | OUTPATIENT
Start: 2021-11-18

## 2021-11-18 NOTE — PATIENT INSTRUCTIONS
\"On day of next appointment, please screen for temperature and COVID-19 symptoms prior to you clinic appointment. If any symptoms present, please call [phone] to reschedule. \"

## 2021-11-18 NOTE — PROGRESS NOTES
ANTICOAGULATION SERVICE    Date of Clinic Visit:  11/18/2021    Edison Messina is a 77 y.o. male who presents to clinic today for anticoagulation monitoring and adjustment. Recent INR Results:  Internal QC passed  Lab Results   Component Value Date    INR 3.4 11/18/2021    INR 3.7 10/20/2021       Current Warfarin Dosage:  Dosing Plan  As of 11/18/2021    TTR:  70.9 % (8.8 mo)   Full warfarin instructions:  7.5 mg every Tue, Thu; 5 mg all other days               Assessment/Plan:    Modify warfarin dose as noted above: Patient is just above target will 5.9% and re-check as normal.    Next Clinic Appointment:  Return date  As of 11/18/2021    TTR:  70.9 % (8.8 mo)   Next INR check:  12/16/2021             Please call [facility] at [phone] with any questions. Thanks!   [facility]  Anticoagulation Service Pharmacist  11/18/2021 11:05 AM  For Pharmacy Admin Tracking Only     Intervention Detail: Dose Adjustment: 1, reason: Therapy De-escalation   Total # of Interventions Recommended: 1   Total # of Interventions Accepted: 1   Time Spent (min): 15

## 2021-11-18 NOTE — PROGRESS NOTES
VAD accessed per protocol with 3/4 \" camargo needle. Flushed easily with saline. Blood return noted. Flushed with 10 ml of NSS and 5 ml of Heparin flush. VAD D/C'd and Band-Aid to site. Patient stable and discharged to home.

## 2021-11-22 ENCOUNTER — PATIENT MESSAGE (OUTPATIENT)
Dept: FAMILY MEDICINE CLINIC | Age: 76
End: 2021-11-22

## 2021-11-23 NOTE — TELEPHONE ENCOUNTER
From: Sohan Kwon Shock  To: Dr. Suzanne Coleman: 11/22/2021 8:24 PM EST  Subject: C-Pap    Today I regestered my C-Pap to see if it is on recall. Well, it is so do you advise me to discontinue using it or use it untill they resolve the problem. I sleep a lot better when I use it but I also don't want problems from it. Thanks for your time

## 2021-12-07 ENCOUNTER — OFFICE VISIT (OUTPATIENT)
Dept: ONCOLOGY | Age: 76
End: 2021-12-07
Payer: MEDICARE

## 2021-12-07 ENCOUNTER — HOSPITAL ENCOUNTER (OUTPATIENT)
Dept: LAB | Age: 76
Discharge: HOME | End: 2021-12-07
Payer: MEDICARE

## 2021-12-07 VITALS
OXYGEN SATURATION: 98 % | HEART RATE: 62 BPM | BODY MASS INDEX: 33.56 KG/M2 | WEIGHT: 234.4 LBS | DIASTOLIC BLOOD PRESSURE: 64 MMHG | HEIGHT: 70 IN | SYSTOLIC BLOOD PRESSURE: 108 MMHG

## 2021-12-07 DIAGNOSIS — C34.91 ADENOCARCINOMA OF RIGHT LUNG (HCC): ICD-10-CM

## 2021-12-07 DIAGNOSIS — J70.0 RADIATION PNEUMONITIS (HCC): ICD-10-CM

## 2021-12-07 DIAGNOSIS — M89.9 BONE LESION: ICD-10-CM

## 2021-12-07 DIAGNOSIS — C34.90 ALK-POSITIVE NON-SMALL CELL LUNG CANCER (HCC): Primary | ICD-10-CM

## 2021-12-07 DIAGNOSIS — C34.90 ALK-POSITIVE NON-SMALL CELL LUNG CANCER (HCC): ICD-10-CM

## 2021-12-07 DIAGNOSIS — D69.6 THROMBOCYTOPENIA, UNSPECIFIED (HCC): ICD-10-CM

## 2021-12-07 LAB
ABSOLUTE EOS #: 0.15 K/UL (ref 0–0.44)
ABSOLUTE IMMATURE GRANULOCYTE: 0.05 K/UL (ref 0–0.3)
ABSOLUTE LYMPH #: 2.15 K/UL (ref 1.1–3.7)
ABSOLUTE MONO #: 0.77 K/UL (ref 0.1–1.2)
ALBUMIN SERPL-MCNC: 4.3 G/DL (ref 3.5–5.2)
ALBUMIN/GLOBULIN RATIO: 1.3 (ref 1–2.5)
ALP BLD-CCNC: 102 U/L (ref 40–129)
ALT SERPL-CCNC: 16 U/L (ref 5–41)
ANION GAP SERPL CALCULATED.3IONS-SCNC: 10 MMOL/L (ref 9–17)
AST SERPL-CCNC: 26 U/L
BASOPHILS # BLD: 1 % (ref 0–2)
BASOPHILS ABSOLUTE: 0.07 K/UL (ref 0–0.2)
BILIRUB SERPL-MCNC: 0.45 MG/DL (ref 0.3–1.2)
BUN BLDV-MCNC: 22 MG/DL (ref 8–23)
BUN/CREAT BLD: 17 (ref 9–20)
CALCIUM SERPL-MCNC: 10 MG/DL (ref 8.6–10.4)
CHLORIDE BLD-SCNC: 104 MMOL/L (ref 98–107)
CO2: 27 MMOL/L (ref 20–31)
CREAT SERPL-MCNC: 1.26 MG/DL (ref 0.7–1.2)
DIFFERENTIAL TYPE: ABNORMAL
EOSINOPHILS RELATIVE PERCENT: 2 % (ref 1–4)
GFR AFRICAN AMERICAN: >60 ML/MIN
GFR NON-AFRICAN AMERICAN: 56 ML/MIN
GFR SERPL CREATININE-BSD FRML MDRD: ABNORMAL ML/MIN/{1.73_M2}
GFR SERPL CREATININE-BSD FRML MDRD: ABNORMAL ML/MIN/{1.73_M2}
GLUCOSE BLD-MCNC: 133 MG/DL (ref 70–99)
HCT VFR BLD CALC: 33 % (ref 40.7–50.3)
HEMOGLOBIN: 10.6 G/DL (ref 13–17)
IMMATURE GRANULOCYTES: 1 %
LYMPHOCYTES # BLD: 25 % (ref 24–43)
MCH RBC QN AUTO: 32.9 PG (ref 25.2–33.5)
MCHC RBC AUTO-ENTMCNC: 32.1 G/DL (ref 25.2–33.5)
MCV RBC AUTO: 102.5 FL (ref 82.6–102.9)
MONOCYTES # BLD: 9 % (ref 3–12)
NRBC AUTOMATED: 0 PER 100 WBC
PDW BLD-RTO: 18.4 % (ref 11.8–14.4)
PLATELET # BLD: 209 K/UL (ref 138–453)
PLATELET ESTIMATE: ABNORMAL
PMV BLD AUTO: 9 FL (ref 8.1–13.5)
POTASSIUM SERPL-SCNC: 4.2 MMOL/L (ref 3.7–5.3)
RBC # BLD: 3.22 M/UL (ref 4.21–5.77)
RBC # BLD: ABNORMAL 10*6/UL
SEG NEUTROPHILS: 62 % (ref 36–65)
SEGMENTED NEUTROPHILS ABSOLUTE COUNT: 5.32 K/UL (ref 1.5–8.1)
SODIUM BLD-SCNC: 141 MMOL/L (ref 135–144)
TOTAL PROTEIN: 7.7 G/DL (ref 6.4–8.3)
WBC # BLD: 8.5 K/UL (ref 3.5–11.3)
WBC # BLD: ABNORMAL 10*3/UL

## 2021-12-07 PROCEDURE — G8484 FLU IMMUNIZE NO ADMIN: HCPCS | Performed by: INTERNAL MEDICINE

## 2021-12-07 PROCEDURE — 36415 COLL VENOUS BLD VENIPUNCTURE: CPT

## 2021-12-07 PROCEDURE — G8427 DOCREV CUR MEDS BY ELIG CLIN: HCPCS | Performed by: INTERNAL MEDICINE

## 2021-12-07 PROCEDURE — G8417 CALC BMI ABV UP PARAM F/U: HCPCS | Performed by: INTERNAL MEDICINE

## 2021-12-07 PROCEDURE — 4040F PNEUMOC VAC/ADMIN/RCVD: CPT | Performed by: INTERNAL MEDICINE

## 2021-12-07 PROCEDURE — 1036F TOBACCO NON-USER: CPT | Performed by: INTERNAL MEDICINE

## 2021-12-07 PROCEDURE — 99214 OFFICE O/P EST MOD 30 MIN: CPT | Performed by: INTERNAL MEDICINE

## 2021-12-07 PROCEDURE — 3017F COLORECTAL CA SCREEN DOC REV: CPT | Performed by: INTERNAL MEDICINE

## 2021-12-07 PROCEDURE — 80053 COMPREHEN METABOLIC PANEL: CPT

## 2021-12-07 PROCEDURE — 85025 COMPLETE CBC W/AUTO DIFF WBC: CPT

## 2021-12-07 PROCEDURE — 1123F ACP DISCUSS/DSCN MKR DOCD: CPT | Performed by: INTERNAL MEDICINE

## 2021-12-07 RX ORDER — BENZONATATE 100 MG/1
100 CAPSULE ORAL 2 TIMES DAILY PRN
Qty: 60 CAPSULE | Refills: 0 | Status: SHIPPED | OUTPATIENT
Start: 2021-12-07 | End: 2022-01-06

## 2021-12-07 NOTE — PROGRESS NOTES
Franny Night Shock                                                                                                                  12/7/2021  MRN:   [id number]  YOB: 1945  PCP:                           Kasey Reardon DO  Referring Physician: No ref. provider found  Treating Physician Name: Angie Sandhu MD      Reason for visit:  Toxicity check. Reviewed results of lab work-up. Discussed treatment plan. 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 Active and recent treatments:  concurrent chemoradiation using carboplatin and Taxol  Consolidation therapy with Imfinzi-1/2021  Carboplatin, Alimta and bevacizumab x1-8/2021 while awaiting results of NGS  Alectinib-10/8/2021    Summary of Case/History:    Mesha Diaz a 77 y. o.male is a patient with biopsy confirmed adenocarcinoma of right lung presents to the office to establish care and for further evaluation treatment recommendations. Patient was initially seen by pulmonary team for lung nodules. Patient CT scan from July 2019 showed a right-sided pleural calcification thickening concerning for asbestos exposure. Patient also noted to have multiple pulmonary nodules measuring between 1.5 x 1.3 cm. Follow-up CT chest from July 2020 showed increase in size of the right lower lobe lung nodule which now measured 2.1 cm. Subsequently patient underwent CT PET on 8/22 which showed FDG avid right lower lobe lung nodule with SUV of 4.6. Patient CT PET was also positive for subcarinal lymph node with SUV of 4.6. Patient underwent bronchoscopy with endobronchial ultrasound biopsy of the subcarinal lymph node confirmed adenocarcinoma. Patient has significant history of tobacco dependence around 30-pack-year however he quit about 25 years ago.   Patient does have coronary artery disease status post stent placement. Patient also gives history of occupational asthma and exposure to bacteria. Patient does give history of weight loss but describes it as intentional.  Denies headaches dizziness chest pain abdominal pain nausea bone pain     Interim History:     Patient presents to the clinic for a follow-up visit and for toxicity check and to review results of her blood work-up. Patient has been tolerating treatment without any unexpected or severe side effects. Denies hospitalization or ER visit. Appetite is good. Weight is stable. Nausea is controlled. Complains of cough    During this visit patient's allergy, social, medical, surgical history and medications were reviewed and updated. Past Medical History:   Past Medical History:   Diagnosis Date    Abdominal hernia     small, no significant symptomatology.  Abnormal PSA     with history of slight increase.  Adenocarcinoma, lung, right (HCC)     Allergic rhinitis     Benign prostatic hypertrophy     Chest pain     occasional.     Coronary artery disease     status post drug eluting stent placement, LAD, ostial obtuse marginal.     Dysphagia     Erectile dysfunction     Familial hyperlipidemia     with hypertriglyceridemia.  Gastroesophageal reflux disease     Hearing loss, bilateral     hearing aid in the left ear only. 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     Hypertension     Impaired fasting glucose     prediabetes.  Lumbar disc herniation     L3-L4, with chronic back pain.  Mild anemia     Nasal polyps     minimal symptoms.  Nasal septal deviation     right side.  Obesity     Palpitations     occasional.     Peptic ulcer disease     Pulmonary nodules     Reactive airway disease     asthma, industrial related, also a history of hypersensitivity pneumonitis.      Sleep apnea     wears CPAP nightly    Urinary frequency        Past Surgical History:     Past Surgical History:   Procedure Laterality Date    APPENDECTOMY  age 10    BRONCHOSCOPY  09/17/2020    BRONCHOSCOPY N/A 9/17/2020    EBUS- BRONCHOSCOPY ENDOBRONCHIAL ULTRASOUND, PATHOLOGY REQUESTED- CINDY NOTIFIED, GI UNIT SCHEDULED performed by Kalyani English MD at [address] [facility]  10/20/2011    occluded LAD and ostial obtuse marginal stenosis, underwent drug eluting stents to both, RCA small, nondominant, occluded, ejection fraction 45%.  CARDIAC CATHETERIZATION  08/2018    with stent placement    CATARACT REMOVAL Bilateral 03/2018    COLONOSCOPY  01/21/2011    mild sigmoid diverticulosis.  COLONOSCOPY  4/6/16    hemorrhoid; sigmoid diverticulosis    CT BIOPSY PERCUTANEOUS DEEP BONE  8/18/2021    CT BIOPSY PERCUTANEOUS DEEP BONE 8/18/2021 [facility] CT SCAN    KNEE ARTHROSCOPY Left 03/19/2010    partial medial and lateral synovectomies, partial medial meniscectomy, chondroplasty.  NASAL FRACTURE SURGERY  1960    OTHER SURGICAL HISTORY Left 02/12/2010    knee injection.  PORT SURGERY N/A 10/14/2020    RIGHT PORT INSERTION performed by Dary Nieto DO at [address] [place] [address] PRE-MALIGNANT / [address] Left 01/16/2012    actinic keratoses, ear, liquid nitrogen.  PRE-MALIGNANT / BENIGN SKIN LESION EXCISION Left 07/19/2011    actinic keratosis, upper ear, liquid nitrogen.  PROSTATE BIOPSY Bilateral 09/08/2015    Benign    REPAIR UMBILICAL ICOW,2+I/I,OJWGS N/A 9/12/9816    UMBILICAL Hernia Repair w/ Mesh performed by Babatunde Chou MD at [address] [place] [address] SEPTOPLASTY  10/30/2015    with bilateral submucosal resection of the inferior turbinates, left middle turbinate elza bullosa resection done by Dr Abbott Slice ARTHROSCOPY Left 10/17/14    W/ SUBACROMIAL DECOMPRESSION, DEBRIDEMENT ET CHONDROPLASTY    UPPER GASTROINTESTINAL ENDOSCOPY  01/21/2011    superficial ulcer of the fundus, erostive gastritis.      VASECTOMY Bilateral 04/04/1980       Patient Family Social History:     Social History     Socioeconomic History    Marital status:      Spouse name: None    Number of children: None    Years of education: None    Highest education level: None   Occupational History    None   Tobacco Use    Smoking status: Former Smoker     Packs/day: 2.00     Years: 15.00     Pack years: 30.00     Quit date: 1985     Years since quittin.9    Smokeless tobacco: Never Used    Tobacco comment: smoked 2 packs a day for 14 to 15 years, quit in    Vaping Use    Vaping Use: Never used   Substance and Sexual Activity    Alcohol use: No     Alcohol/week: 0.0 standard drinks     Comment: rare    Drug use: No    Sexual activity: None   Other Topics Concern    None   Social History Narrative    None     Social Determinants of Health     Financial Resource Strain: Low Risk     Difficulty of Paying Living Expenses: Not hard at all   Food Insecurity: No Food Insecurity    Worried About [address] in the Last Year: Never true    [facility] in the Last Year: Never true   Transportation Needs:     Lack of Transportation (Medical): Not on file    Lack of Transportation (Non-Medical):  Not on file   Physical Activity:     Days of Exercise per Week: Not on file    Minutes of Exercise per Session: Not on file   Stress:     Feeling of Stress : Not on file   Social Connections:     Frequency of Communication with Friends and Family: Not on file    Frequency of Social Gatherings with Friends and Family: Not on file    Attends Jainism Services: Not on file    Active Member of Clubs or Organizations: Not on file    Attends Club or Organization Meetings: Not on file    Marital Status: Not on file   Intimate Partner Violence:     Fear of Current or Ex-Partner: Not on file    Emotionally Abused: Not on file    Physically Abused: Not on file    Sexually Abused: Not on file   Housing Stability:     Unable to Pay for Housing in the Last Year: Not on file    Number of Places Lived in the Last Year: Not on file    Unstable Housing in the Last Year: Not on file     Family History   Problem Relation Age of Onset    Cancer Mother         lymphoma    Thyroid Disease Mother         hypothyroidism    Stroke Mother     Heart Disease Mother     High Blood Pressure Mother     Heart Attack Mother         in her 46s    Heart Failure Father         congestive    Coronary Art Dis Father     Emphysema Father         was a smoker    Heart Disease Maternal Grandmother     Heart Disease Maternal Grandfather     Crohn's Disease Sister     High Cholesterol Sister     High Cholesterol Child        Current Medications:     Current Outpatient Medications   Medication Sig Dispense Refill    Alectinib HCl (ALECENSA) 150 MG CAPS Take 600 mg by mouth 2 times daily 4 caps  capsule 5    clopidogrel (PLAVIX) 75 MG tablet TAKE 1 TABLET BY MOUTH EVERY DAY 90 tablet 1    Handicap Placard MISC by Does not apply route 1 each 0    furosemide (LASIX) 20 MG tablet TAKE 1 TABLET BY MOUTH EVERY DAY 90 tablet 1    oxybutynin (DITROPAN-XL) 10 MG extended release tablet TAKE 1 TABLET BY MOUTH EVERY DAY  90 tablet 1    Omega-3 Fatty Acids (FISH OIL) 1200 MG CPDR Take 2,400 mg by mouth 2 times daily      niacin 500 MG extended release capsule Take 500 mg by mouth 2 times daily      losartan (COZAAR) 50 MG tablet TAKE 1 TABLET BY MOUTH EVERY DAY 90 tablet 1    levothyroxine (SYNTHROID) 100 MCG tablet TAKE 1 TABLET BY MOUTH EVERY DAY  90 tablet 1    tamsulosin (FLOMAX) 0.4 MG capsule TAKE 1 CAPSULE BY MOUTH EVERY DAY 90 capsule 1    Metoprolol Tartrate 75 MG TABS TAKE 1 TABLET BY MOUTH TWO TIMES A DAY      pravastatin (PRAVACHOL) 40 MG tablet TAKE ONE TABLET BY MOUTH ONCE EVERY EVENING 90 tablet 3    warfarin (COUMADIN) 2.5 MG tablet Take 2 tablets by mouth daily Take 2 tablets (5 mg) by mouth daily.  Or as directed by INR results 60 tablet 3    flecainide (TAMBOCOR) 50 MG tablet Take 50 mg by mouth 2 times daily      ECHINACEA HERB PO Take by mouth daily      Multiple Vitamins-Minerals (MULTIVITAMIN PO) daily.  dexamethasone (DECADRON) 4 MG tablet Take 4 mg by mouth See Admin Instructions Take one tablet twice a day for 3 days starting the day before scheduled chemotherapy (Patient not taking: Reported on 12/7/2021)      albuterol sulfate HFA (VENTOLIN HFA) 108 (90 Base) MCG/ACT inhaler Inhale 2 puffs into the lungs every 6 hours as needed for Wheezing or Shortness of Breath (Patient not taking: Reported on 12/7/2021) 1 Inhaler 6     No current facility-administered medications for this visit. Allergies:   Effient [prasugrel], Ace inhibitors, and Statins    Review of Systems:    Constitutional: No fever or chills. No night sweats, no weight loss. Positive fatigue  Eyes: No eye discharge, double vision, or eye pain   HEENT: negative for sore mouth, sore throat, hoarseness and voice change   Respiratory: negative for sputum, dyspnea, wheezing, hemoptysis, chest pain. Positive for cough  Cardiovascular: negative for chest pain, dyspnea, palpitations, orthopnea, PND   Gastrointestinal: negative for nausea, vomiting, diarrhea, constipation, abdominal pain, Dysphagia, hematemesis and hematochezia   Genitourinary: negative for frequency, dysuria, nocturia, urinary incontinence, and hematuria   Integument: negative for rash, skin lesions, bruises.    Hematologic/Lymphatic: negative for easy bruising, bleeding, lymphadenopathy, or petechiae   Endocrine: negative for heat or cold intolerance,weight changes, change in bowel habits and hair loss   Musculoskeletal: negative for myalgias, arthralgias, pain, joint swelling,and bone pain   Neurological: negative for headaches, dizziness, seizures, weakness, numbness    Physical Exam:  Vitals: /64   Pulse 62   Ht 5' 10\" (1.778 m)   Wt 234 lb 6.4 oz (106.3 kg)   SpO2 98%   BMI 33.63 kg/m²     General appearance - well appearing, no in pain or distress  Mental status - AAO X3  Eyes - pupils equal and reactive, extraocular eye movements intact  Mouth - mucous membranes moist, pharynx normal without lesions  Neck - supple, no significant adenopathy  Lymphatics - no palpable lymphadenopathy, no hepatosplenomegaly  Chest - clear to auscultation, no wheezes, rales or rhonchi, symmetric air entry  Heart - normal rate, regular rhythm, normal S1, S2, no murmurs  Abdomen - soft, nontender, nondistended, no masses or organomegaly  Neurological - alert, oriented, normal speech, no focal findings or movement disorder noted  Extremities - peripheral pulses normal, no pedal edema, no clubbing or cyanosis  Skin - normal coloration and turgor, no rashes, no suspicious skin lesions noted     DATA:    Results for orders placed or performed during the hospital encounter of 12/07/21   Comprehensive Metabolic Panel   Result Value Ref Range    Glucose 133 (H) 70 - 99 mg/dL    BUN 22 8 - 23 mg/dL    CREATININE 1.26 (H) 0.70 - 1.20 mg/dL    Bun/Cre Ratio 17 9 - 20    Calcium 10.0 8.6 - 10.4 mg/dL    Sodium 141 135 - 144 mmol/L    Potassium 4.2 3.7 - 5.3 mmol/L    Chloride 104 98 - 107 mmol/L    CO2 27 20 - 31 mmol/L    Anion Gap 10 9 - 17 mmol/L    Alkaline Phosphatase 102 40 - 129 U/L    ALT 16 5 - 41 U/L    AST 26 <40 U/L    Total Bilirubin 0.45 0.3 - 1.2 mg/dL    Total Protein 7.7 6.4 - 8.3 g/dL    Albumin 4.3 3.5 - 5.2 g/dL    Albumin/Globulin Ratio 1.3 1.0 - 2.5    GFR Non- 56 (L) >60 mL/min    GFR African American >60 >60 mL/min    GFR Comment          GFR Staging NOT REPORTED    CBC Auto Differential   Result Value Ref Range    WBC 8.5 3.5 - 11.3 k/uL    RBC 3.22 (L) 4.21 - 5.77 m/uL    Hemoglobin 10.6 (L) 13.0 - 17.0 g/dL    Hematocrit 33.0 (L) 40.7 - 50.3 %    .5 82.6 - 102.9 fL    MCH 32.9 25.2 - 33.5 pg    MCHC 32.1 25.2 - 33.5 g/dL    RDW 18.4 (H) 11.8 - 14.4 %    Platelets 009 491 - 737 k/uL    MPV 9.0 8.1 - 13.5 fL    NRBC Automated 0.0 0.0 per 100 WBC    Differential Type NOT REPORTED     Seg Neutrophils 62 36 - 65 %    Lymphocytes 25 24 - 43 %    Monocytes 9 3 - 12 %    Eosinophils % 2 1 - 4 %    Basophils 1 0 - 2 %    Immature Granulocytes 1 (H) 0 %    Segs Absolute 5.32 1.50 - 8.10 k/uL    Absolute Lymph # 2.15 1.10 - 3.70 k/uL    Absolute Mono # 0.77 0.10 - 1.20 k/uL    Absolute Eos # 0.15 0.00 - 0.44 k/uL    Basophils Absolute 0.07 0.00 - 0.20 k/uL    Absolute Immature Granulocyte 0.05 0.00 - 0.30 k/uL    WBC Morphology NOT REPORTED     RBC Morphology ANISOCYTOSIS PRESENT     Platelet Estimate NOT REPORTED      -- Diagnosis --   BAL RIGHT LOWER LOBE:           NEGATIVE FOR MALIGNANCY.             FINE NEEDLE ASPIRATION STATION 7 EBUS:           ADENOCARCINOMA, COMPATIBLE WITH LUNG PRIMARY. CT PET    Impression    1. The previously described nodule involving the right lung base is FDG avid. Tissue sampling is recommended as malignancy is suspected. 2. The previously identified smaller lung nodules involving the lower lobes    are too small for PET CT characterization.  CT follow-up is recommended. 3. FDG avid subcarinal and right hilar lymph nodes suggestive of metastatic    disease. 4. No abnormal FDG activity identified within the neck, abdomen or pelvis.             CT CHEST ABDOMEN PELVIS W CONTRAST    Result Date: 8/3/2021  EXAMINATION: CT OF THE CHEST, ABDOMEN, AND PELVIS WITH CONTRAST 8/3/2021 10:04 am TECHNIQUE: CT of the chest, abdomen and pelvis was performed with the administration of intravenous contrast. Multiplanar reformatted images are provided for review. Dose modulation, iterative reconstruction, and/or weight based adjustment of the mA/kV was utilized to reduce the radiation dose to as low as reasonably achievable. COMPARISON: Chest CT 04/27/2021. CT chest abdomen and pelvis 01/07/2021. MRI abdomen 01/22/2021. PET-CT 08/22/2020.  HISTORY: ORDERING SYSTEM PROVIDED HISTORY: Adenocarcinoma of right lung ([address] TECHNOLOGIST PROVIDED HISTORY: assess responce Reason for Exam: h/o right lung adenocarcinoma with lymph node mets , h/o appendectomy, left shoulder surgery, cardiac stents Acuity: Chronic Type of Exam: Subsequent/Follow-up FINDINGS: Chest: Mediastinum: Mildly prominent lymph node in the right tracheoesophageal groove at the thoracic inlet on image 16 measures 1 cm in short axis. No pericardial effusion. Calcified atheromatous plaque and coronary calcification noted. Post treatment changes in the right hilum. Lungs/pleura: Calcified pleura bilaterally. Small right pleural effusion. Post treatment changes in the right perihilar lung and medial right lower lobe. Overall more diffuse opacities in the right lower lobe and more numerous ground-glass opacities in the right upper lobe have improved. Focal ground-glass opacity is present in the inferior right upper lobe with adjacent nodular opacity in the superior segment of the right lower lobe anteriorly. The left lung is without new airspace disease. The central airway is patent. Soft Tissues/Bones: Lucent, mildly expansile lesion in the anterolateral right 8th rib is noted. Abdomen/Pelvis: Organs: Previously described abnormality near the right portal vein is less prominent in the interval and was previously described as a probably benign finding with MRI. The gallbladder, pancreas, spleen, adrenals and kidneys reveal no discrete abnormality. GI/Bowel: There is no bowel dilatation or wall thickening identified. Diverticulosis. Pelvis: No acute findings. Prostatomegaly. Peritoneum/Retroperitoneum: No free air or free fluid. The aorta is normal in caliber. The visceral branches are patent. No lymphadenopathy. Bones/Soft Tissues: Mixed lucent and sclerotic bone lesion in the right lateral aspect of the L3 vertebral body is a new finding. 1.  New anterior right 8th rib and L3 bone lesions, concerning for metastatic deposits.  2.  Previously noted more diffuse airspace disease in the right lung has improved. Localized ground-glass opacity in the inferior right upper lobe and residual opacities in the superior segment of the right lower lobe remain, which may represent resolving inflammatory process. Short-term imaging follow-up is suggested. 3.  Less prominent geographic liver lesion near the right portal vein, previously described as probably benign with MRI. This may represent focal fat. 4.  Small right pleural effusion. Impression:  Adenocarcinoma of right lung, clinical stage at least T1c, N2, M0 (stage IIIA)  Disease recurrence, biopsy-proven-8/2021  Radiation pneumonitis and shortness of breath  Thrombocytopenia  Anemia      Plan:  I had a detailed discussion with the patient and we went over results of lab work-up imaging studies and other relevant clinical data  Toxicity check performed. Patient is tolerating treatment without unexpected side effects  Labs are adequate for treatment. We will proceed with treatment   Reiterated treatment plan. Reviewed risk benefit profile and reiterated potential side-effects of ongoing treatment  Continue alectinib at current dose  We will check iron stores given anemia  Patient given prescription for cough suppressant  Xgeva along with calcium vitamin D for bone metastases  We will obtain scans before next office visit  NCCN guidelines were reviewed and discussed with the patient. The diagnosis and care plan were discussed with the patient in detail. I discussed the natural history of the disease, prognosis, risks and goals of therapy and answered all the patients questions to the best of my ability. Patient expressed understanding and was in agreement.     Zoie Ojeda MD          This note is created with the assistance of a speech recognition program.  While intending to generate a document that actually reflects the content of the visit, the document can still have some errors including those of syntax and sound a like substitutions which may escape proof reading. It such instances, actual meaning can be extrapolated by contextual diversion. Mary Fernando

## 2021-12-15 DIAGNOSIS — D69.6 THROMBOCYTOPENIA (HCC): ICD-10-CM

## 2021-12-15 DIAGNOSIS — C34.90 ALK-POSITIVE NON-SMALL CELL LUNG CANCER (HCC): Primary | ICD-10-CM

## 2021-12-16 ENCOUNTER — HOSPITAL ENCOUNTER (OUTPATIENT)
Dept: INFUSION THERAPY | Age: 76
Discharge: HOME | End: 2021-12-16
Payer: MEDICARE

## 2021-12-16 ENCOUNTER — HOSPITAL ENCOUNTER (OUTPATIENT)
Dept: PHARMACY | Age: 76
Setting detail: THERAPIES SERIES
Discharge: HOME | End: 2021-12-16
Payer: MEDICARE

## 2021-12-16 DIAGNOSIS — I48.0 PAROXYSMAL A-FIB (HCC): Primary | ICD-10-CM

## 2021-12-16 DIAGNOSIS — C34.91 ADENOCARCINOMA, LUNG, RIGHT (HCC): Primary | ICD-10-CM

## 2021-12-16 LAB
INR BLD: 2.6
PROTIME: 31.5 SECONDS

## 2021-12-16 PROCEDURE — 2580000003 HC RX 258: Performed by: INTERNAL MEDICINE

## 2021-12-16 PROCEDURE — 99211 OFF/OP EST MAY X REQ PHY/QHP: CPT

## 2021-12-16 PROCEDURE — 96523 IRRIG DRUG DELIVERY DEVICE: CPT

## 2021-12-16 PROCEDURE — 6360000002 HC RX W HCPCS: Performed by: INTERNAL MEDICINE

## 2021-12-16 RX ORDER — SODIUM CHLORIDE 0.9 % (FLUSH) 0.9 %
5-40 SYRINGE (ML) INJECTION PRN
Status: DISCONTINUED | OUTPATIENT
Start: 2021-12-16 | End: 2021-12-17 | Stop reason: HOSPADM

## 2021-12-16 RX ORDER — HEPARIN SODIUM (PORCINE) LOCK FLUSH IV SOLN 100 UNIT/ML 100 UNIT/ML
500 SOLUTION INTRAVENOUS PRN
Status: DISCONTINUED | OUTPATIENT
Start: 2021-12-16 | End: 2021-12-17 | Stop reason: HOSPADM

## 2021-12-16 RX ORDER — HEPARIN SODIUM (PORCINE) LOCK FLUSH IV SOLN 100 UNIT/ML 100 UNIT/ML
500 SOLUTION INTRAVENOUS PRN
Status: CANCELLED | OUTPATIENT
Start: 2021-12-16

## 2021-12-16 RX ORDER — SODIUM CHLORIDE 0.9 % (FLUSH) 0.9 %
5-40 SYRINGE (ML) INJECTION PRN
Status: CANCELLED | OUTPATIENT
Start: 2021-12-16

## 2021-12-16 RX ADMIN — SODIUM CHLORIDE, PRESERVATIVE FREE 10 ML: 5 INJECTION INTRAVENOUS at 10:22

## 2021-12-16 RX ADMIN — HEPARIN 500 UNITS: 100 SYRINGE at 10:23

## 2021-12-16 NOTE — PATIENT INSTRUCTIONS
\"On day of next appointment, please screen for temperature and COVID-19 symptoms prior to you clinic appointment. If any symptoms present, please call [phone] to reschedule. \"

## 2021-12-16 NOTE — PROGRESS NOTES
ANTICOAGULATION SERVICE    Date of Clinic Visit:  12/16/2021    Nelly Henry is a 77 y.o. male who presents to clinic today for anticoagulation monitoring and adjustment. Recent INR Results:  Internal QC passed  Lab Results   Component Value Date    INR 2.6 12/16/2021    INR 3.4 11/18/2021       Current Warfarin Dosage:  Dosing Plan  As of 12/16/2021    TTR:  68.9 % (9.7 mo)   Full warfarin instructions:  7.5 mg every Tue, Thu; 5 mg all other days               Assessment/Plan:    Continue current regimen as INR remains stable. Next Clinic Appointment:  Return date  As of 12/16/2021    TTR:  68.9 % (9.7 mo)   Next INR check:  1/13/2022             Please call [facility] at [phone] with any questions. Thanks!   Jason Garcia, [facility]  Anticoagulation Service Pharmacist  12/16/2021 2:28 PM  For Pharmacy Admin Tracking Only        Total # of Interventions Recommended: 0   Total # of Interventions Accepted: 0   Time Spent (min): 10

## 2021-12-30 ENCOUNTER — HOSPITAL ENCOUNTER (OUTPATIENT)
Dept: LAB | Age: 76
Discharge: HOME | End: 2021-12-30
Payer: MEDICARE

## 2021-12-30 DIAGNOSIS — M89.9 BONE LESION: ICD-10-CM

## 2021-12-30 DIAGNOSIS — C34.91 ADENOCARCINOMA OF RIGHT LUNG (HCC): ICD-10-CM

## 2021-12-30 DIAGNOSIS — J70.0 RADIATION PNEUMONITIS (HCC): ICD-10-CM

## 2021-12-30 DIAGNOSIS — D69.6 THROMBOCYTOPENIA (HCC): ICD-10-CM

## 2021-12-30 DIAGNOSIS — C34.90 ALK-POSITIVE NON-SMALL CELL LUNG CANCER (HCC): ICD-10-CM

## 2021-12-30 LAB
ABSOLUTE EOS #: 0.09 K/UL (ref 0–0.44)
ABSOLUTE IMMATURE GRANULOCYTE: 0.04 K/UL (ref 0–0.3)
ABSOLUTE LYMPH #: 1.42 K/UL (ref 1.1–3.7)
ABSOLUTE MONO #: 0.56 K/UL (ref 0.1–1.2)
ALBUMIN SERPL-MCNC: 4.3 G/DL (ref 3.5–5.2)
ALBUMIN/GLOBULIN RATIO: 1.3 (ref 1–2.5)
ALP BLD-CCNC: 103 U/L (ref 40–129)
ALT SERPL-CCNC: 15 U/L (ref 5–41)
ANION GAP SERPL CALCULATED.3IONS-SCNC: 13 MMOL/L (ref 9–17)
AST SERPL-CCNC: 21 U/L
BASOPHILS # BLD: 0 % (ref 0–2)
BASOPHILS ABSOLUTE: 0.03 K/UL (ref 0–0.2)
BILIRUB SERPL-MCNC: 0.39 MG/DL (ref 0.3–1.2)
BUN BLDV-MCNC: 31 MG/DL (ref 8–23)
BUN/CREAT BLD: 25 (ref 9–20)
CALCIUM SERPL-MCNC: 9.3 MG/DL (ref 8.6–10.4)
CHLORIDE BLD-SCNC: 100 MMOL/L (ref 98–107)
CO2: 26 MMOL/L (ref 20–31)
CREAT SERPL-MCNC: 1.23 MG/DL (ref 0.7–1.2)
DIFFERENTIAL TYPE: ABNORMAL
EOSINOPHILS RELATIVE PERCENT: 1 % (ref 1–4)
FERRITIN: 1010 UG/L (ref 30–400)
GFR AFRICAN AMERICAN: >60 ML/MIN
GFR NON-AFRICAN AMERICAN: 57 ML/MIN
GFR SERPL CREATININE-BSD FRML MDRD: ABNORMAL ML/MIN/{1.73_M2}
GFR SERPL CREATININE-BSD FRML MDRD: ABNORMAL ML/MIN/{1.73_M2}
GLUCOSE BLD-MCNC: 206 MG/DL (ref 70–99)
HCT VFR BLD CALC: 33.1 % (ref 40.7–50.3)
HEMOGLOBIN: 10.7 G/DL (ref 13–17)
IMMATURE GRANULOCYTES: 1 %
IRON SATURATION: 24 % (ref 20–55)
IRON: 58 UG/DL (ref 59–158)
LYMPHOCYTES # BLD: 17 % (ref 24–43)
MCH RBC QN AUTO: 33.6 PG (ref 25.2–33.5)
MCHC RBC AUTO-ENTMCNC: 32.3 G/DL (ref 25.2–33.5)
MCV RBC AUTO: 104.1 FL (ref 82.6–102.9)
MONOCYTES # BLD: 7 % (ref 3–12)
NRBC AUTOMATED: 0 PER 100 WBC
PDW BLD-RTO: 17 % (ref 11.8–14.4)
PLATELET # BLD: 157 K/UL (ref 138–453)
PLATELET ESTIMATE: ABNORMAL
PMV BLD AUTO: 9.7 FL (ref 8.1–13.5)
POTASSIUM SERPL-SCNC: 3.7 MMOL/L (ref 3.7–5.3)
RBC # BLD: 3.18 M/UL (ref 4.21–5.77)
RBC # BLD: ABNORMAL 10*6/UL
SEG NEUTROPHILS: 74 % (ref 36–65)
SEGMENTED NEUTROPHILS ABSOLUTE COUNT: 6.01 K/UL (ref 1.5–8.1)
SODIUM BLD-SCNC: 139 MMOL/L (ref 135–144)
TOTAL IRON BINDING CAPACITY: 245 UG/DL (ref 250–450)
TOTAL PROTEIN: 7.6 G/DL (ref 6.4–8.3)
UNSATURATED IRON BINDING CAPACITY: 187 UG/DL (ref 112–347)
WBC # BLD: 8.2 K/UL (ref 3.5–11.3)
WBC # BLD: ABNORMAL 10*3/UL

## 2021-12-30 PROCEDURE — 80053 COMPREHEN METABOLIC PANEL: CPT

## 2021-12-30 PROCEDURE — 82728 ASSAY OF FERRITIN: CPT

## 2021-12-30 PROCEDURE — 36415 COLL VENOUS BLD VENIPUNCTURE: CPT

## 2021-12-30 PROCEDURE — 83550 IRON BINDING TEST: CPT

## 2021-12-30 PROCEDURE — 85025 COMPLETE CBC W/AUTO DIFF WBC: CPT

## 2021-12-30 PROCEDURE — 83540 ASSAY OF IRON: CPT

## 2022-01-06 ENCOUNTER — HOSPITAL ENCOUNTER (OUTPATIENT)
Dept: CT IMAGING | Age: 77
Discharge: HOME | End: 2022-01-08
Payer: MEDICARE

## 2022-01-06 DIAGNOSIS — C34.91 ADENOCARCINOMA OF RIGHT LUNG (HCC): ICD-10-CM

## 2022-01-06 DIAGNOSIS — C34.90 ALK-POSITIVE NON-SMALL CELL LUNG CANCER (HCC): ICD-10-CM

## 2022-01-06 DIAGNOSIS — J70.0 RADIATION PNEUMONITIS (HCC): ICD-10-CM

## 2022-01-06 DIAGNOSIS — D69.6 THROMBOCYTOPENIA, UNSPECIFIED (HCC): ICD-10-CM

## 2022-01-06 PROCEDURE — 71260 CT THORAX DX C+: CPT

## 2022-01-06 PROCEDURE — 6360000004 HC RX CONTRAST MEDICATION: Performed by: INTERNAL MEDICINE

## 2022-01-06 RX ADMIN — IOPAMIDOL 100 ML: 755 INJECTION, SOLUTION INTRAVENOUS at 09:48

## 2022-01-11 ENCOUNTER — OFFICE VISIT (OUTPATIENT)
Dept: ONCOLOGY | Age: 77
End: 2022-01-11
Payer: MEDICARE

## 2022-01-11 VITALS
SYSTOLIC BLOOD PRESSURE: 114 MMHG | TEMPERATURE: 97.5 F | BODY MASS INDEX: 34.33 KG/M2 | HEART RATE: 64 BPM | OXYGEN SATURATION: 97 % | HEIGHT: 70 IN | DIASTOLIC BLOOD PRESSURE: 72 MMHG | WEIGHT: 239.8 LBS

## 2022-01-11 DIAGNOSIS — J70.0 RADIATION PNEUMONITIS (HCC): ICD-10-CM

## 2022-01-11 DIAGNOSIS — D69.6 THROMBOCYTOPENIA, UNSPECIFIED (HCC): ICD-10-CM

## 2022-01-11 DIAGNOSIS — C34.90 ALK-POSITIVE NON-SMALL CELL LUNG CANCER (HCC): Primary | ICD-10-CM

## 2022-01-11 DIAGNOSIS — C79.51 BONE METASTASIS (HCC): ICD-10-CM

## 2022-01-11 PROCEDURE — G8417 CALC BMI ABV UP PARAM F/U: HCPCS | Performed by: INTERNAL MEDICINE

## 2022-01-11 PROCEDURE — 99214 OFFICE O/P EST MOD 30 MIN: CPT | Performed by: INTERNAL MEDICINE

## 2022-01-11 PROCEDURE — 99215 OFFICE O/P EST HI 40 MIN: CPT | Performed by: INTERNAL MEDICINE

## 2022-01-11 PROCEDURE — 3017F COLORECTAL CA SCREEN DOC REV: CPT | Performed by: INTERNAL MEDICINE

## 2022-01-11 PROCEDURE — 1036F TOBACCO NON-USER: CPT | Performed by: INTERNAL MEDICINE

## 2022-01-11 PROCEDURE — 4040F PNEUMOC VAC/ADMIN/RCVD: CPT | Performed by: INTERNAL MEDICINE

## 2022-01-11 PROCEDURE — 1123F ACP DISCUSS/DSCN MKR DOCD: CPT | Performed by: INTERNAL MEDICINE

## 2022-01-11 PROCEDURE — G8484 FLU IMMUNIZE NO ADMIN: HCPCS | Performed by: INTERNAL MEDICINE

## 2022-01-11 PROCEDURE — G8427 DOCREV CUR MEDS BY ELIG CLIN: HCPCS | Performed by: INTERNAL MEDICINE

## 2022-01-11 RX ORDER — ALECTINIB HYDROCHLORIDE 150 MG/1
600 CAPSULE ORAL 2 TIMES DAILY
Qty: 240 CAPSULE | Refills: 5 | Status: SHIPPED | OUTPATIENT
Start: 2022-01-11 | End: 2022-07-15 | Stop reason: SDUPTHER

## 2022-01-11 NOTE — PROGRESS NOTES
[facility]                                                                                                                  1/11/2022  MRN:   [id number]  YOB: 1945  PCP:                           Tiffanie Lozoya DO  Referring Physician: No ref. provider found  Treating Physician Name: Mary Mosley MD      Reason for visit:  Patient presents to the clinic for toxicity check and to discuss results of lab work-up, imaging studies and further treatment plan. 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 Active and recent treatments:  concurrent chemoradiation using carboplatin and Taxol  Consolidation therapy with Imfinzi-1/2021  Carboplatin, Alimta and bevacizumab x1-8/2021 while awaiting results of NGS  Alectinib-10/8/2021    Summary of Case/History:    Gali Smith a 77 y. o.male is a patient with biopsy confirmed adenocarcinoma of right lung presents to the office to establish care and for further evaluation treatment recommendations. Patient was initially seen by pulmonary team for lung nodules. Patient CT scan from July 2019 showed a right-sided pleural calcification thickening concerning for asbestos exposure. Patient also noted to have multiple pulmonary nodules measuring between 1.5 x 1.3 cm. Follow-up CT chest from July 2020 showed increase in size of the right lower lobe lung nodule which now measured 2.1 cm. Subsequently patient underwent CT PET on 8/22 which showed FDG avid right lower lobe lung nodule with SUV of 4.6. Patient CT PET was also positive for subcarinal lymph node with SUV of 4.6. Patient underwent bronchoscopy with endobronchial ultrasound biopsy of the subcarinal lymph node confirmed adenocarcinoma.   Patient has significant history of tobacco dependence around 30-pack-year however he quit about 25 years ago. Patient does have coronary artery disease status post stent placement. Patient also gives history of occupational asthma and exposure to bacteria. Patient does give history of weight loss but describes it as intentional.  Denies headaches dizziness chest pain abdominal pain nausea bone pain     Interim History:     Patient presents to the clinic for a follow-up visit and for toxicity check and to review results of her blood work-up. Patient has been tolerating treatment without any unexpected or severe side effects. Denies hospitalization or ER visit. Appetite is good. Weight is stable. Nausea is controlled. CT scan showed response to treatment. Cough is improved. During this visit patient's allergy, social, medical, surgical history and medications were reviewed and updated. Past Medical History:   Past Medical History:   Diagnosis Date    Abdominal hernia     small, no significant symptomatology.  Abnormal PSA     with history of slight increase.  Adenocarcinoma, lung, right (HCC)     Allergic rhinitis     Benign prostatic hypertrophy     Chest pain     occasional.     Coronary artery disease     status post drug eluting stent placement, LAD, ostial obtuse marginal.     Dysphagia     Erectile dysfunction     Familial hyperlipidemia     with hypertriglyceridemia.  Gastroesophageal reflux disease     Hearing loss, bilateral     hearing aid in the left ear only. 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     Hypertension     Impaired fasting glucose     prediabetes.  Lumbar disc herniation     L3-L4, with chronic back pain.  Mild anemia     Nasal polyps     minimal symptoms.  Nasal septal deviation     right side.  Obesity     Palpitations     occasional.     Peptic ulcer disease     Pulmonary nodules     Reactive airway disease     asthma, industrial related, also a history of hypersensitivity pneumonitis.      Sleep apnea     wears CPAP nightly    Urinary frequency        Past Surgical History:     Past Surgical History:   Procedure Laterality Date    APPENDECTOMY  age 11    BRONCHOSCOPY  09/17/2020    BRONCHOSCOPY N/A 9/17/2020    EBUS- BRONCHOSCOPY ENDOBRONCHIAL ULTRASOUND, PATHOLOGY REQUESTED- CINDY NOTIFIED, GI UNIT SCHEDULED performed by Roel Beaulieu MD at [address]  10/20/2011    occluded LAD and ostial obtuse marginal stenosis, underwent drug eluting stents to both, RCA small, nondominant, occluded, ejection fraction 45%.  CARDIAC CATHETERIZATION  08/2018    with stent placement    CATARACT REMOVAL Bilateral 03/2018    COLONOSCOPY  01/21/2011    mild sigmoid diverticulosis.  COLONOSCOPY  4/6/16    hemorrhoid; sigmoid diverticulosis    CT BIOPSY PERCUTANEOUS DEEP BONE  8/18/2021    CT BIOPSY PERCUTANEOUS DEEP BONE 8/18/2021 [facility] CT SCAN    KNEE ARTHROSCOPY Left 03/19/2010    partial medial and lateral synovectomies, partial medial meniscectomy, chondroplasty.  NASAL FRACTURE SURGERY  1960    OTHER SURGICAL HISTORY Left 02/12/2010    knee injection.  PORT SURGERY N/A 10/14/2020    RIGHT PORT INSERTION performed by Evy Michaud DO at [address] [place] [address] PRE-MALIGNANT / [address] Left 01/16/2012    actinic keratoses, ear, liquid nitrogen.  PRE-MALIGNANT / BENIGN SKIN LESION EXCISION Left 07/19/2011    actinic keratosis, upper ear, liquid nitrogen.      PROSTATE BIOPSY Bilateral 09/08/2015    Benign    REPAIR UMBILICAL FYGR,1+G/A,QXSMN N/A 7/65/3981    UMBILICAL Hernia Repair w/ Mesh performed by Marvin Ortiz MD at [address] [place] [address] SEPTOPLASTY  10/30/2015    with bilateral submucosal resection of the inferior turbinates, left middle turbinate elza bullosa resection done by Dr Gifford Romberg ARTHROSCOPY Left 10/17/14    W/ SUBACROMIAL DECOMPRESSION, DEBRIDEMENT ET CHONDROPLASTY    UPPER GASTROINTESTINAL ENDOSCOPY  01/21/2011    superficial ulcer of the fundus, erostive gastritis.  VASECTOMY Bilateral 1980       Patient Family Social History:     Social History     Socioeconomic History    Marital status:      Spouse name: None    Number of children: None    Years of education: None    Highest education level: None   Occupational History    None   Tobacco Use    Smoking status: Former Smoker     Packs/day: 2.00     Years: 15.00     Pack years: 30.00     Quit date: 1985     Years since quittin.0    Smokeless tobacco: Never Used    Tobacco comment: smoked 2 packs a day for 14 to 15 years, quit in    Vaping Use    Vaping Use: Never used   Substance and Sexual Activity    Alcohol use: No     Alcohol/week: 0.0 standard drinks     Comment: rare    Drug use: No    Sexual activity: None   Other Topics Concern    None   Social History Narrative    None     Social Determinants of Health     Financial Resource Strain: Low Risk     Difficulty of Paying Living Expenses: Not hard at all   Food Insecurity: No Food Insecurity    Worried About Running Out of Food in the Last Year: Never true    [place] of Food in the Last Year: Never true   Transportation Needs:     Lack of Transportation (Medical): Not on file    Lack of Transportation (Non-Medical):  Not on file   Physical Activity:     Days of Exercise per Week: Not on file    Minutes of Exercise per Session: Not on file   Stress:     Feeling of Stress : Not on file   Social Connections:     Frequency of Communication with Friends and Family: Not on file    Frequency of Social Gatherings with Friends and Family: Not on file    Attends Bahai Services: Not on file    Active Member of Clubs or Organizations: Not on file    Attends Club or Organization Meetings: Not on file    Marital Status: Not on file   Intimate Partner Violence:     Fear of Current or Ex-Partner: Not on file    Emotionally Abused: Not on file    Physically Abused: Not on file    Sexually Abused: Not on file Housing Stability:     Unable to Pay for Housing in the Last Year: Not on file    Number of Places Lived in the Last Year: Not on file    Unstable Housing in the Last Year: Not on file     Family History   Problem Relation Age of Onset    Cancer Mother         lymphoma    Thyroid Disease Mother         hypothyroidism    Stroke Mother     Heart Disease Mother     High Blood Pressure Mother     Heart Attack Mother         in her 46s    Heart Failure Father         congestive    Coronary Art Dis Father     Emphysema Father         was a smoker    Heart Disease Maternal Grandmother     Heart Disease Maternal Grandfather     Crohn's Disease Sister     High Cholesterol Sister     High Cholesterol Child        Current Medications:     Current Outpatient Medications   Medication Sig Dispense Refill    Alectinib HCl (ALECENSA) 150 MG CAPS Take 600 mg by mouth 2 times daily 4 caps  capsule 5    clopidogrel (PLAVIX) 75 MG tablet TAKE 1 TABLET BY MOUTH EVERY DAY 90 tablet 1    Handicap Placard MISC by Does not apply route 1 each 0    furosemide (LASIX) 20 MG tablet TAKE 1 TABLET BY MOUTH EVERY DAY 90 tablet 1    oxybutynin (DITROPAN-XL) 10 MG extended release tablet TAKE 1 TABLET BY MOUTH EVERY DAY  90 tablet 1    dexamethasone (DECADRON) 4 MG tablet Take 4 mg by mouth See Admin Instructions Take one tablet twice a day for 3 days starting the day before scheduled chemotherapy       Omega-3 Fatty Acids (FISH OIL) 1200 MG CPDR Take 2,400 mg by mouth 2 times daily      niacin 500 MG extended release capsule Take 500 mg by mouth 2 times daily      losartan (COZAAR) 50 MG tablet TAKE 1 TABLET BY MOUTH EVERY DAY 90 tablet 1    levothyroxine (SYNTHROID) 100 MCG tablet TAKE 1 TABLET BY MOUTH EVERY DAY  90 tablet 1    tamsulosin (FLOMAX) 0.4 MG capsule TAKE 1 CAPSULE BY MOUTH EVERY DAY 90 capsule 1    Metoprolol Tartrate 75 MG TABS TAKE 1 TABLET BY MOUTH TWO TIMES A DAY      pravastatin (PRAVACHOL) 40 MG tablet TAKE ONE TABLET BY MOUTH ONCE EVERY EVENING 90 tablet 3    warfarin (COUMADIN) 2.5 MG tablet Take 2 tablets by mouth daily Take 2 tablets (5 mg) by mouth daily. Or as directed by INR results 60 tablet 3    flecainide (TAMBOCOR) 50 MG tablet Take 50 mg by mouth 2 times daily      ECHINACEA HERB PO Take by mouth daily      albuterol sulfate HFA (VENTOLIN HFA) 108 (90 Base) MCG/ACT inhaler Inhale 2 puffs into the lungs every 6 hours as needed for Wheezing or Shortness of Breath 1 Inhaler 6    Multiple Vitamins-Minerals (MULTIVITAMIN PO) daily. No current facility-administered medications for this visit. Allergies:   Effient [prasugrel], Ace inhibitors, and Statins    Review of Systems:    Constitutional: No fever or chills. No night sweats, no weight loss. Positive fatigue  Eyes: No eye discharge, double vision, or eye pain   HEENT: negative for sore mouth, sore throat, hoarseness and voice change   Respiratory: negative for sputum, dyspnea, wheezing, hemoptysis, chest pain. Positive for cough  Cardiovascular: negative for chest pain, dyspnea, palpitations, orthopnea, PND   Gastrointestinal: negative for nausea, vomiting, diarrhea, constipation, abdominal pain, Dysphagia, hematemesis and hematochezia   Genitourinary: negative for frequency, dysuria, nocturia, urinary incontinence, and hematuria   Integument: negative for rash, skin lesions, bruises.    Hematologic/Lymphatic: negative for easy bruising, bleeding, lymphadenopathy, or petechiae   Endocrine: negative for heat or cold intolerance,weight changes, change in bowel habits and hair loss   Musculoskeletal: negative for myalgias, arthralgias, pain, joint swelling,and bone pain   Neurological: negative for headaches, dizziness, seizures, weakness, numbness    Physical Exam:  Vitals: /72 (Site: Right Upper Arm, Position: Sitting, Cuff Size: Large Adult)   Pulse 64   Temp 97.5 °F (36.4 °C)   Ht 5' 10\" (1.778 m)   Wt 239 lb 12.8 oz (108.8 kg)   SpO2 97%   BMI 34.41 kg/m²     General appearance - well appearing, no in pain or distress  Mental status - AAO X3  Eyes - pupils equal and reactive, extraocular eye movements intact  Mouth - mucous membranes moist, pharynx normal without lesions  Neck - supple, no significant adenopathy  Lymphatics - no palpable lymphadenopathy, no hepatosplenomegaly  Chest - clear to auscultation, no wheezes, rales or rhonchi, symmetric air entry  Heart - normal rate, regular rhythm, normal S1, S2, no murmurs  Abdomen - soft, nontender, nondistended, no masses or organomegaly  Neurological - alert, oriented, normal speech, no focal findings or movement disorder noted  Extremities - peripheral pulses normal, no pedal edema, no clubbing or cyanosis  Skin - normal coloration and turgor, no rashes, no suspicious skin lesions noted     DATA:    Results for orders placed or performed during the hospital encounter of 12/30/21   Iron and TIBC   Result Value Ref Range    Iron 58 (L) 59 - 158 ug/dL    TIBC 245 (L) 250 - 450 ug/dL    Iron Saturation 24 20 - 55 %    UIBC 187 112 - 347 ug/dL   Ferritin   Result Value Ref Range    Ferritin 1,010 (H) 30 - 400 ug/L   Comprehensive Metabolic Panel   Result Value Ref Range    Glucose 206 (H) 70 - 99 mg/dL    BUN 31 (H) 8 - 23 mg/dL    CREATININE 1.23 (H) 0.70 - 1.20 mg/dL    Bun/Cre Ratio 25 (H) 9 - 20    Calcium 9.3 8.6 - 10.4 mg/dL    Sodium 139 135 - 144 mmol/L    Potassium 3.7 3.7 - 5.3 mmol/L    Chloride 100 98 - 107 mmol/L    CO2 26 20 - 31 mmol/L    Anion Gap 13 9 - 17 mmol/L    Alkaline Phosphatase 103 40 - 129 U/L    ALT 15 5 - 41 U/L    AST 21 <40 U/L    Total Bilirubin 0.39 0.3 - 1.2 mg/dL    Total Protein 7.6 6.4 - 8.3 g/dL    Albumin 4.3 3.5 - 5.2 g/dL    Albumin/Globulin Ratio 1.3 1.0 - 2.5    GFR Non- 57 (L) >60 mL/min    GFR African American >60 >60 mL/min    GFR Comment          GFR Staging NOT REPORTED    CBC Auto Differential   Result Value Ref Range    WBC 8.2 3.5 - 11.3 k/uL    RBC 3.18 (L) 4.21 - 5.77 m/uL    Hemoglobin 10.7 (L) 13.0 - 17.0 g/dL    Hematocrit 33.1 (L) 40.7 - 50.3 %    .1 (H) 82.6 - 102.9 fL    MCH 33.6 (H) 25.2 - 33.5 pg    MCHC 32.3 25.2 - 33.5 g/dL    RDW 17.0 (H) 11.8 - 14.4 %    Platelets 904 643 - 814 k/uL    MPV 9.7 8.1 - 13.5 fL    NRBC Automated 0.0 0.0 per 100 WBC    Differential Type NOT REPORTED     Seg Neutrophils 74 (H) 36 - 65 %    Lymphocytes 17 (L) 24 - 43 %    Monocytes 7 3 - 12 %    Eosinophils % 1 1 - 4 %    Basophils 0 0 - 2 %    Immature Granulocytes 1 (H) 0 %    Segs Absolute 6.01 1.50 - 8.10 k/uL    Absolute Lymph # 1.42 1.10 - 3.70 k/uL    Absolute Mono # 0.56 0.10 - 1.20 k/uL    Absolute Eos # 0.09 0.00 - 0.44 k/uL    Basophils Absolute 0.03 0.00 - 0.20 k/uL    Absolute Immature Granulocyte 0.04 0.00 - 0.30 k/uL    WBC Morphology NOT REPORTED     RBC Morphology ANISOCYTOSIS PRESENT     Platelet Estimate NOT REPORTED      -- Diagnosis --   BAL RIGHT LOWER LOBE:           NEGATIVE FOR MALIGNANCY.             FINE NEEDLE ASPIRATION STATION 7 EBUS:           ADENOCARCINOMA, COMPATIBLE WITH LUNG PRIMARY. CT PET    Impression    1. The previously described nodule involving the right lung base is FDG avid. Tissue sampling is recommended as malignancy is suspected. 2. The previously identified smaller lung nodules involving the lower lobes    are too small for PET CT characterization.  CT follow-up is recommended. 3. FDG avid subcarinal and right hilar lymph nodes suggestive of metastatic    disease. 4. No abnormal FDG activity identified within the neck, abdomen or pelvis.           CT CHEST ABDOMEN PELVIS W CONTRAST    Result Date: 1/6/2022  EXAMINATION: CT OF THE CHEST, ABDOMEN, AND PELVIS WITH CONTRAST 1/6/2022 9:44 am TECHNIQUE: CT of the chest, abdomen and pelvis was performed with the administration of intravenous contrast. Multiplanar reformatted images are provided for review.  Dose modulation, iterative reconstruction, and/or weight based adjustment of the mA/kV was utilized to reduce the radiation dose to as low as reasonably achievable. COMPARISON: 08/03/2021 HISTORY: ORDERING SYSTEM PROVIDED HISTORY: ALK-positive non-small cell lung cancer [facility]) TECHNOLOGIST PROVIDED HISTORY: assess disease status FINDINGS: Chest: Mediastinum: Tip of MediPort is seen in the cavoatrial junction. Severe coronary artery calcification is seen. No pericardial effusion is seen small hiatal hernia seen. There is nonspecific thickening at the GE GE junction. Lungs/pleura: Punctate pulmonary nodules in the left lower are unchanged. Tangential paramediastinal opacity is seen on the right, with associated consolidative change in bronchiectasis, similar to prior. There is decreased right-sided pleural effusion, with improved aeration of the right lung. Pleural calcifications are seen on the right and left. A punctate pulmonary nodule in the right middle lobe is unchanged. Soft Tissues/Bones: Spurring is seen in the spine. Remote appearing rib fractures are seen. .  Lytic lesion right anterior 8 rib now appears more sclerotic Abdomen/Pelvis: Organs: No splenomegaly. No perisplenic fluid Adrenal glands appear normal No hydronephrosis on the right. No hydronephrosis on left. Punctate hypodense nodule is seen in the right kidney, measuring 11 mm, likely cyst No peripancreatic fluid. No peripancreatic inflammatory change Gallbladder is contracted accentuating its wall thickness GI/Bowel: No significant small bowel distention noted. Moderate stool seen in the colon. Scattered colonic diverticula are seen. Appendix is identified and normal in caliber. Pelvis: Prominent fat is seen in the inguinal canals Prostate measures 6.0 x 5.4 cm. Peritoneum/Retroperitoneum: Atherosclerotic change seen in abdominal aorta. No aneurysm. No retroperitoneal adenopathy Bones/Soft Tissues: Spurring is seen in the spine.   Spurring is seen in the hips. Sclerotic focus seen in left iliac wing. Sclerotic focus is seen in the L3 vertebral body. .  L3 bony lesion previously appeared more lytic. Left iliac wing lesion previously appeared more lytic     Chest: Improved aeration the right lung with decreased right-sided pleural effusion. A few scattered punctate noncalcified pulmonary nodules on both the right and left appear unchanged Lytic lesion right anterior rib now appears more sclerotic Abdomen and pelvis: Healing bony metastatic lesions RECOMMENDATIONS: Unavailable     Impression:  Adenocarcinoma of right lung, clinical stage at least T1c, N2, M0 (stage IIIA)  Disease recurrence, biopsy-proven-8/2021  Radiation pneumonitis and shortness of breath  Thrombocytopenia  Anemia      Plan:  I had a detailed discussion with the patient and we went over results of lab work-up imaging studies and other relevant clinical data  Toxicity check performed. Patient is tolerating treatment without unexpected side effects  Labs are adequate for treatment. We will proceed with treatment   Reiterated treatment plan. Reviewed risk benefit profile and reiterated potential side-effects of ongoing treatment  Reviewed images of CT scans. Compared with the previous scan. Iron studies show adequate iron stores. Anemia likely secondary to alectinib  Continue alectinib at current dose  Xgeva along with calcium vitamin D for bone metastases  NCCN guidelines were reviewed and discussed with the patient. The diagnosis and care plan were discussed with the patient in detail. I discussed the natural history of the disease, prognosis, risks and goals of therapy and answered all the patients questions to the best of my ability. Patient expressed understanding and was in agreement. Morena Abreu MD          I spent more than 40 minutes examining, evaluating, reviewing data, counseling the patient and coordinating care.   Greater than 50% of time was spent face-to-face with the patient this note is created with the assistance of a speech recognition program.  While intending to generate a document that actually reflects the content of the visit, the document can still have some errors including those of syntax and sound a like substitutions which may escape proof reading. It such instances, actual meaning can be extrapolated by contextual diversion. Becky Bustillo

## 2022-01-13 ENCOUNTER — HOSPITAL ENCOUNTER (OUTPATIENT)
Dept: INFUSION THERAPY | Age: 77
Discharge: HOME | End: 2022-01-13
Payer: MEDICARE

## 2022-01-13 ENCOUNTER — HOSPITAL ENCOUNTER (OUTPATIENT)
Dept: PHARMACY | Age: 77
Setting detail: THERAPIES SERIES
Discharge: HOME | End: 2022-01-13
Payer: MEDICARE

## 2022-01-13 DIAGNOSIS — I48.0 PAROXYSMAL A-FIB (HCC): Primary | ICD-10-CM

## 2022-01-13 DIAGNOSIS — C34.91 ADENOCARCINOMA, LUNG, RIGHT (HCC): Primary | ICD-10-CM

## 2022-01-13 LAB
INR BLD: 3.7
PROTIME: 43.9 SECONDS

## 2022-01-13 PROCEDURE — 2580000003 HC RX 258: Performed by: INTERNAL MEDICINE

## 2022-01-13 PROCEDURE — 96523 IRRIG DRUG DELIVERY DEVICE: CPT

## 2022-01-13 PROCEDURE — 85610 PROTHROMBIN TIME: CPT

## 2022-01-13 PROCEDURE — 6360000002 HC RX W HCPCS: Performed by: INTERNAL MEDICINE

## 2022-01-13 PROCEDURE — 36416 COLLJ CAPILLARY BLOOD SPEC: CPT

## 2022-01-13 PROCEDURE — 99212 OFFICE O/P EST SF 10 MIN: CPT

## 2022-01-13 RX ORDER — HEPARIN SODIUM (PORCINE) LOCK FLUSH IV SOLN 100 UNIT/ML 100 UNIT/ML
500 SOLUTION INTRAVENOUS PRN
OUTPATIENT
Start: 2022-01-13

## 2022-01-13 RX ORDER — SODIUM CHLORIDE 0.9 % (FLUSH) 0.9 %
5-40 SYRINGE (ML) INJECTION PRN
Status: DISCONTINUED | OUTPATIENT
Start: 2022-01-13 | End: 2022-01-14 | Stop reason: HOSPADM

## 2022-01-13 RX ORDER — SODIUM CHLORIDE 0.9 % (FLUSH) 0.9 %
5-40 SYRINGE (ML) INJECTION PRN
OUTPATIENT
Start: 2022-01-13

## 2022-01-13 RX ORDER — HEPARIN SODIUM (PORCINE) LOCK FLUSH IV SOLN 100 UNIT/ML 100 UNIT/ML
500 SOLUTION INTRAVENOUS PRN
Status: DISCONTINUED | OUTPATIENT
Start: 2022-01-13 | End: 2022-01-14 | Stop reason: HOSPADM

## 2022-01-13 RX ADMIN — SODIUM CHLORIDE, PRESERVATIVE FREE 10 ML: 5 INJECTION INTRAVENOUS at 13:39

## 2022-01-13 RX ADMIN — HEPARIN 500 UNITS: 100 SYRINGE at 13:39

## 2022-01-13 RX ADMIN — SODIUM CHLORIDE, PRESERVATIVE FREE 10 ML: 5 INJECTION INTRAVENOUS at 13:37

## 2022-01-13 NOTE — PROGRESS NOTES
ANTICOAGULATION SERVICE    Date of Clinic Visit:  1/13/2022    Yossi Pryor is a 77 y.o. male who presents to clinic today for anticoagulation monitoring and adjustment. Recent INR Results:  Internal QC passed  Lab Results   Component Value Date    INR 3.7 01/13/2022    INR 2.6 12/16/2021       Current Warfarin Dosage:  Dosing Plan  As of 1/13/2022    TTR:  66.1 % (10.7 mo)   Full warfarin instructions:  1/14: 2.5 mg; Otherwise 7.5 mg every Tue; 5 mg all other days               Assessment/Plan:    Modify warfarin dose as noted above: Patient is above target. Will reduce dose down 6.2% and re-check as normal.    Next Clinic Appointment:  Return date  As of 1/13/2022    TTR:  66.1 % (10.7 mo)   Next INR check:  2/10/2022             Please call [facility] at [phone] with any questions. Thanks!   EVERARDO [facility]  Anticoagulation Service Pharmacist  1/13/2022 1:48 PM  For Pharmacy Admin Tracking Only     Intervention Detail: Dose Adjustment: 1, reason: Therapy De-escalation   Total # of Interventions Recommended: 1   Total # of Interventions Accepted: 1   Time Spent (min): 15

## 2022-01-13 NOTE — PATIENT INSTRUCTIONS
\"On day of next appointment, please screen for temperature and COVID-19 symptoms prior to you clinic appointment. If any symptoms present, please call [phone] to reschedule. \"

## 2022-01-18 ENCOUNTER — TELEPHONE (OUTPATIENT)
Dept: SURGERY | Age: 77
End: 2022-01-18

## 2022-01-18 ENCOUNTER — INITIAL CONSULT (OUTPATIENT)
Dept: SURGERY | Age: 77
End: 2022-01-18
Payer: MEDICARE

## 2022-01-18 VITALS
SYSTOLIC BLOOD PRESSURE: 132 MMHG | DIASTOLIC BLOOD PRESSURE: 70 MMHG | TEMPERATURE: 97.6 F | HEIGHT: 70 IN | HEART RATE: 66 BPM | WEIGHT: 243.2 LBS | BODY MASS INDEX: 34.82 KG/M2

## 2022-01-18 DIAGNOSIS — C34.91 ADENOCARCINOMA OF RIGHT LUNG (HCC): ICD-10-CM

## 2022-01-18 PROCEDURE — 4040F PNEUMOC VAC/ADMIN/RCVD: CPT | Performed by: SURGERY

## 2022-01-18 PROCEDURE — 3017F COLORECTAL CA SCREEN DOC REV: CPT | Performed by: SURGERY

## 2022-01-18 PROCEDURE — G8417 CALC BMI ABV UP PARAM F/U: HCPCS | Performed by: SURGERY

## 2022-01-18 PROCEDURE — G8484 FLU IMMUNIZE NO ADMIN: HCPCS | Performed by: SURGERY

## 2022-01-18 PROCEDURE — G8427 DOCREV CUR MEDS BY ELIG CLIN: HCPCS | Performed by: SURGERY

## 2022-01-18 PROCEDURE — 1123F ACP DISCUSS/DSCN MKR DOCD: CPT | Performed by: SURGERY

## 2022-01-18 PROCEDURE — 99215 OFFICE O/P EST HI 40 MIN: CPT | Performed by: SURGERY

## 2022-01-18 PROCEDURE — 1036F TOBACCO NON-USER: CPT | Performed by: SURGERY

## 2022-01-18 PROCEDURE — 99213 OFFICE O/P EST LOW 20 MIN: CPT | Performed by: SURGERY

## 2022-01-18 NOTE — PROGRESS NOTES
ejection fraction 45%.  CARDIAC CATHETERIZATION  08/2018    with stent placement    CATARACT REMOVAL Bilateral 03/2018    COLONOSCOPY  01/21/2011    mild sigmoid diverticulosis.  COLONOSCOPY  4/6/16    hemorrhoid; sigmoid diverticulosis    CT BIOPSY PERCUTANEOUS DEEP BONE  8/18/2021    CT BIOPSY PERCUTANEOUS DEEP BONE 8/18/2021 [facility] CT SCAN    KNEE ARTHROSCOPY Left 03/19/2010    partial medial and lateral synovectomies, partial medial meniscectomy, chondroplasty.  NASAL FRACTURE SURGERY  1960    OTHER SURGICAL HISTORY Left 02/12/2010    knee injection.  PORT SURGERY N/A 10/14/2020    RIGHT PORT INSERTION performed by Richy Burger DO at [facility] PRE-[facility] / [address] Left 01/16/2012    actinic keratoses, ear, liquid nitrogen.  PRE-MAL[place] / BENIGN SKIN LESION EXCISION Left 07/19/2011    actinic keratosis, upper ear, liquid nitrogen.  PROSTATE BIOPSY Bilateral 09/08/2015    Benign    REPAIR UMBILICAL VEJS,4+B/J,WXAII N/A 3/14/2542    UMBILICAL Hernia Repair w/ Mesh performed by Zeb Galdamez MD at [facility] SEPTOPLASTY  10/30/2015    with bilateral submucosal resection of the inferior turbinates, left middle turbinate elza bullosa resection done by Dr Gaston Brilliant ARTHROSCOPY Left 10/17/14    W/ SUBACROMIAL DECOMPRESSION, DEBRIDEMENT ET CHONDROPLASTY    UPPER GASTROINTESTINAL ENDOSCOPY  01/21/2011    superficial ulcer of the fundus, erostive gastritis.      VASECTOMY Bilateral 04/04/1980       Current Outpatient Medications   Medication Sig Dispense Refill    Alectinib HCl (ALECENSA) 150 MG CAPS Take 600 mg by mouth 2 times daily 4 caps  capsule 5    clopidogrel (PLAVIX) 75 MG tablet TAKE 1 TABLET BY MOUTH EVERY DAY 90 tablet 1    Handicap Placard MISC by Does not apply route 1 each 0    furosemide (LASIX) 20 MG tablet TAKE 1 TABLET BY MOUTH EVERY DAY 90 tablet 1    oxybutynin (DITROPAN-XL) 10 MG extended release tablet TAKE 1 TABLET BY MOUTH EVERY DAY  90 tablet 1    Omega-3 Fatty Acids (FISH OIL) 1200 MG CPDR Take 2,400 mg by mouth 2 times daily      niacin 500 MG extended release capsule Take 500 mg by mouth 2 times daily      losartan (COZAAR) 50 MG tablet TAKE 1 TABLET BY MOUTH EVERY DAY 90 tablet 1    levothyroxine (SYNTHROID) 100 MCG tablet TAKE 1 TABLET BY MOUTH EVERY DAY  90 tablet 1    tamsulosin (FLOMAX) 0.4 MG capsule TAKE 1 CAPSULE BY MOUTH EVERY DAY 90 capsule 1    Metoprolol Tartrate 75 MG TABS TAKE 1 TABLET BY MOUTH TWO TIMES A DAY      pravastatin (PRAVACHOL) 40 MG tablet TAKE ONE TABLET BY MOUTH ONCE EVERY EVENING 90 tablet 3    warfarin (COUMADIN) 2.5 MG tablet Take 2 tablets by mouth daily Take 2 tablets (5 mg) by mouth daily. Or as directed by INR results 60 tablet 3    flecainide (TAMBOCOR) 50 MG tablet Take 50 mg by mouth 2 times daily      ECHINACEA HERB PO Take by mouth daily      albuterol sulfate HFA (VENTOLIN HFA) 108 (90 Base) MCG/ACT inhaler Inhale 2 puffs into the lungs every 6 hours as needed for Wheezing or Shortness of Breath 1 Inhaler 6    Multiple Vitamins-Minerals (MULTIVITAMIN PO) daily.  dexamethasone (DECADRON) 4 MG tablet Take 4 mg by mouth See Admin Instructions Take one tablet twice a day for 3 days starting the day before scheduled chemotherapy  (Patient not taking: Reported on 1/18/2022)       No current facility-administered medications for this visit. Allergies   Allergen Reactions    Effient [Prasugrel] Other (See Comments)     Superficial skin bleeding.  Ace Inhibitors Other (See Comments)     Cough.  Statins Other (See Comments)     Myalgias.         Family History   Problem Relation Age of Onset    Cancer Mother         lymphoma    Thyroid Disease Mother         hypothyroidism    Stroke Mother     Heart Disease Mother     High Blood Pressure Mother     Heart Attack Mother         in her 46s    Heart Failure Father         congestive    Coronary Art Dis Father     Emphysema Father         was a smoker    Heart Disease Maternal Grandmother     Heart Disease Maternal Grandfather     Crohn's Disease Sister     High Cholesterol Sister     High Cholesterol Child        Social History     Socioeconomic History    Marital status:      Spouse name: Not on file    Number of children: Not on file    Years of education: Not on file    Highest education level: Not on file   Occupational History    Not on file   Tobacco Use    Smoking status: Former Smoker     Packs/day: 2.00     Years: 15.00     Pack years: 30.00     Quit date: 1985     Years since quittin.0    Smokeless tobacco: Never Used    Tobacco comment: smoked 2 packs a day for 14 to 15 years, quit in    Vaping Use    Vaping Use: Never used   Substance and Sexual Activity    Alcohol use: No     Alcohol/week: 0.0 standard drinks     Comment: rare    Drug use: No    Sexual activity: Not on file   Other Topics Concern    Not on file   Social History Narrative    Not on file     Social Determinants of Health     Financial Resource Strain: Low Risk     Difficulty of Paying Living Expenses: Not hard at all   Food Insecurity: No Food Insecurity    Worried About [address] in the Last Year: Never true    [facility] in the Last Year: Never true   Transportation Needs:     Lack of Transportation (Medical): Not on file    Lack of Transportation (Non-Medical):  Not on file   Physical Activity:     Days of Exercise per Week: Not on file    Minutes of Exercise per Session: Not on file   Stress:     Feeling of Stress : Not on file   Social Connections:     Frequency of Communication with Friends and Family: Not on file    Frequency of Social Gatherings with Friends and Family: Not on file    Attends Jewish Services: Not on file    Active Member of Clubs or Organizations: Not on file    Attends Club or Organization Meetings: Not on file    Marital Status: Not on (Please note that portions of this note were completed with a voice recognition program.  Efforts were made to edit the dictations but occasionally words are mis-transcribed.)

## 2022-01-18 NOTE — TELEPHONE ENCOUNTER
[facility] SPECIALTY [facility]    Pre-Operative Evaluation/Consultation    Name:  Romeo Gaytan Shock                                         Age:  77 y.o. MRN:  [id number]       :  1945   Date:  2022         Sex: male    There were no encounter diagnoses. Surgeon:  Dr. Alysha Pyle  Procedure (Planned):  Port removal  Date Scheduled surgery: 22    Attending : No att. providers found    Primary Physician: Stephen Tee  Cardiologist: Talita Werner    Type of Anesthesia Requested: Monitored Anesthesia Care    Patient Medical history: Allergies   Allergen Reactions    Effient [Prasugrel] Other (See Comments)     Superficial skin bleeding.  Ace Inhibitors Other (See Comments)     Cough.  Statins Other (See Comments)     Myalgias. Patient Active Problem List   Diagnosis    HTN (hypertension)    Peptic ulcer disease    Coronary artery disease    Impaired fasting glucose    Benign prostatic hyperplasia    Erectile dysfunction    Familial hyperlipidemia    Left rotator cuff tear    Benign prostatic hyperplasia    Obstructive sleep apnea    Acquired hypothyroidism    Ventral hernia without obstruction or gangrene    Umbilical hernia without obstruction and without gangrene    S/P PTCA (percutaneous transluminal coronary angioplasty)    Occupational asthma    Pulmonary hypertension ([address]    Adenocarcinoma of right lung ([address]    Adenocarcinoma, lung, right ([address]    Paroxysmal A-fib ([address]    COPD with asthma ([address]    ALK-positive non-small cell lung cancer ([address]    Bone metastasis ([address]    Thrombocytopenia, unspecified     Past Medical History:   Diagnosis Date    Abdominal hernia     small, no significant symptomatology.  Abnormal PSA     with history of slight increase.      Adenocarcinoma, lung, right (HCC)     Allergic rhinitis     Benign prostatic hypertrophy     Chest pain     occasional.     Coronary artery disease     status post drug eluting stent placement, LAD, ostial obtuse marginal.     Dysphagia     Erectile dysfunction     Familial hyperlipidemia     with hypertriglyceridemia.  Gastroesophageal reflux disease     Hearing loss, bilateral     hearing aid in the left ear only. 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     Hypertension     Impaired fasting glucose     prediabetes.  Lumbar disc herniation     L3-L4, with chronic back pain.  Mild anemia     Nasal polyps     minimal symptoms.  Nasal septal deviation     right side.  Obesity     Palpitations     occasional.     Peptic ulcer disease     Pulmonary nodules     Reactive airway disease     asthma, industrial related, also a history of hypersensitivity pneumonitis.  Sleep apnea     wears CPAP nightly    Urinary frequency      Past Surgical History:   Procedure Laterality Date    APPENDECTOMY  age 11    BRONCHOSCOPY  09/17/2020    BRONCHOSCOPY N/A 9/17/2020    EBUS- BRONCHOSCOPY ENDOBRONCHIAL ULTRASOUND, PATHOLOGY REQUESTED- CINDY NOTIFIED, GI UNIT SCHEDULED performed by Mami Solo MD at [address]  10/20/2011    occluded LAD and ostial obtuse marginal stenosis, underwent drug eluting stents to both, RCA small, nondominant, occluded, ejection fraction 45%.  CARDIAC CATHETERIZATION  08/2018    with stent placement    CATARACT REMOVAL Bilateral 03/2018    COLONOSCOPY  01/21/2011    mild sigmoid diverticulosis.  COLONOSCOPY  4/6/16    hemorrhoid; sigmoid diverticulosis    CT BIOPSY PERCUTANEOUS DEEP BONE  8/18/2021    CT BIOPSY PERCUTANEOUS DEEP BONE 8/18/2021 [facility] CT SCAN    KNEE ARTHROSCOPY Left 03/19/2010    partial medial and lateral synovectomies, partial medial meniscectomy, chondroplasty.  NASAL FRACTURE SURGERY  1960    OTHER SURGICAL HISTORY Left 02/12/2010    knee injection.      PORT SURGERY N/A 10/14/2020    RIGHT PORT INSERTION performed by Pat Arredondo DO at [facility] / [facility] Left 2012    actinic keratoses, ear, liquid nitrogen.  PRE-MALIGNANT / BENIGN SKIN LESION EXCISION Left 2011    actinic keratosis, upper ear, liquid nitrogen.  PROSTATE BIOPSY Bilateral 2015    Benign    REPAIR UMBILICAL NUWO,4+H/B,LIPNQ N/A 3/16/3488    UMBILICAL Hernia Repair w/ Mesh performed by Marvin Ortiz MD at [address] [place] [address] SEPTOPLASTY  10/30/2015    with bilateral submucosal resection of the inferior turbinates, left middle turbinate elza bullosa resection done by Dr Gifford Romberg ARTHROSCOPY Left 10/17/14    W/ SUBACROMIAL DECOMPRESSION, DEBRIDEMENT ET CHONDROPLASTY    UPPER GASTROINTESTINAL ENDOSCOPY  2011    superficial ulcer of the fundus, erostive gastritis.      VASECTOMY Bilateral 1980     Social History     Tobacco Use    Smoking status: Former Smoker     Packs/day: 2.00     Years: 15.00     Pack years: 30.00     Quit date: 1985     Years since quittin.0    Smokeless tobacco: Never Used    Tobacco comment: smoked 2 packs a day for 14 to 15 years, quit in    Vaping Use    Vaping Use: Never used   Substance Use Topics    Alcohol use: No     Alcohol/week: 0.0 standard drinks     Comment: rare    Drug use: No     Medications:  Current Outpatient Medications   Medication Sig Dispense Refill    Alectinib HCl (ALECENSA) 150 MG CAPS Take 600 mg by mouth 2 times daily 4 caps  capsule 5    clopidogrel (PLAVIX) 75 MG tablet TAKE 1 TABLET BY MOUTH EVERY DAY 90 tablet 1    Handicap Placard MISC by Does not apply route 1 each 0    furosemide (LASIX) 20 MG tablet TAKE 1 TABLET BY MOUTH EVERY DAY 90 tablet 1    oxybutynin (DITROPAN-XL) 10 MG extended release tablet TAKE 1 TABLET BY MOUTH EVERY DAY  90 tablet 1    dexamethasone (DECADRON) 4 MG tablet Take 4 mg by mouth See Admin Instructions Take one tablet twice a day for 3 days starting the day before scheduled chemotherapy  (Patient not taking: Reported on 2022)      Omega-3 Fatty Acids (FISH OIL) 1200 MG CPDR Take 2,400 mg by mouth 2 times daily      niacin 500 MG extended release capsule Take 500 mg by mouth 2 times daily      losartan (COZAAR) 50 MG tablet TAKE 1 TABLET BY MOUTH EVERY DAY 90 tablet 1    levothyroxine (SYNTHROID) 100 MCG tablet TAKE 1 TABLET BY MOUTH EVERY DAY  90 tablet 1    tamsulosin (FLOMAX) 0.4 MG capsule TAKE 1 CAPSULE BY MOUTH EVERY DAY 90 capsule 1    Metoprolol Tartrate 75 MG TABS TAKE 1 TABLET BY MOUTH TWO TIMES A DAY      pravastatin (PRAVACHOL) 40 MG tablet TAKE ONE TABLET BY MOUTH ONCE EVERY EVENING 90 tablet 3    warfarin (COUMADIN) 2.5 MG tablet Take 2 tablets by mouth daily Take 2 tablets (5 mg) by mouth daily. Or as directed by INR results 60 tablet 3    flecainide (TAMBOCOR) 50 MG tablet Take 50 mg by mouth 2 times daily      ECHINACEA HERB PO Take by mouth daily      albuterol sulfate HFA (VENTOLIN HFA) 108 (90 Base) MCG/ACT inhaler Inhale 2 puffs into the lungs every 6 hours as needed for Wheezing or Shortness of Breath 1 Inhaler 6    Multiple Vitamins-Minerals (MULTIVITAMIN PO) daily. No current facility-administered medications for this visit. Scheduled Meds:  Continuous Infusions:  PRN Meds:. Prior to Admission medications    Medication Sig Start Date End Date Taking?  Authorizing Provider   Alectinib HCl (ALECENSA) 150 MG CAPS Take 600 mg by mouth 2 times daily 4 caps BID 1/11/22   Steph Mcfadden MD   clopidogrel (PLAVIX) 75 MG tablet TAKE 1 TABLET BY MOUTH EVERY DAY 11/1/21   Gina Robbins DO   Handicap Placard MISC by Does not apply route 10/26/21   Steph Mcfadden MD   furosemide (LASIX) 20 MG tablet TAKE 1 TABLET BY MOUTH EVERY DAY 9/27/21   Gina Robbins DO   oxybutynin (DITROPAN-XL) 10 MG extended release tablet TAKE 1 TABLET BY MOUTH EVERY DAY  9/27/21   Gina Robbins DO   dexamethasone (DECADRON) 4 MG tablet Take 4 mg by mouth See Admin Instructions Take one tablet twice a day for 3 days starting the day before scheduled chemotherapy   Patient not taking: Reported on 1/18/2022    Historical Provider, MD   Omega-3 Fatty Acids (FISH OIL) 1200 MG CPDR Take 2,400 mg by mouth 2 times daily    Historical Provider, MD   niacin 500 MG extended release capsule Take 500 mg by mouth 2 times daily    Historical Provider, MD   losartan (COZAAR) 50 MG tablet TAKE 1 TABLET BY MOUTH EVERY DAY 8/2/21   Catheline Drafts, DO   levothyroxine (SYNTHROID) 100 MCG tablet TAKE 1 TABLET BY MOUTH EVERY DAY  8/2/21   Catheline Drafts, DO   tamsulosin (FLOMAX) 0.4 MG capsule TAKE 1 CAPSULE BY MOUTH EVERY DAY 8/2/21   Catheline Drafts, DO   Metoprolol Tartrate 75 MG TABS TAKE 1 TABLET BY MOUTH TWO TIMES A DAY 12/16/20   Historical Provider, MD   pravastatin (PRAVACHOL) 40 MG tablet TAKE ONE TABLET BY MOUTH ONCE EVERY EVENING 3/3/21   Catheline Drafts, DO   warfarin (COUMADIN) 2.5 MG tablet Take 2 tablets by mouth daily Take 2 tablets (5 mg) by mouth daily. Or as directed by INR results 2/11/21   Orlando Perry MD   flecainide (TAMBOCOR) 50 MG tablet Take 50 mg by mouth 2 times daily    Historical Provider, MD   ECHINACEA HERB PO Take by mouth daily    Historical Provider, MD   albuterol sulfate HFA (VENTOLIN HFA) 108 (90 Base) MCG/ACT inhaler Inhale 2 puffs into the lungs every 6 hours as needed for Wheezing or Shortness of Breath 2/13/19   Catheline Drafts, DO   Multiple Vitamins-Minerals (MULTIVITAMIN PO) daily. Historical Provider, MD     Vital Signs (Current) [unfilled]    Weight:   Wt Readings from Last 1 Encounters:   01/18/22 243 lb 3.2 oz (110.3 kg)     Height:   Ht Readings from Last 1 Encounters:   01/18/22 5' 10\" (1.778 m)      BMI:  There is no height or weight on file to calculate BMI. Estimated body mass index is 34.9 kg/m² as calculated from the following:    Height as of an earlier encounter on 1/18/22: 5' 10\" (1.778 m). Weight as of an earlier encounter on 1/18/22: 243 lb 3.2 oz (110.3 kg).   body mass index is unknown because there is no height or weight on file. Cardiac Clearance: Clearance sent to cardiology and PCP   Medical Clearance:None   Appointment for surgery Clearance scheduled for:None     Preoperative Testing: These are the current and completed labs:  CBC:   Lab Results   Component Value Date    WBC 8.2 12/30/2021    RBC 3.18 12/30/2021    RBC 3.89 10/21/2011    HGB 10.7 12/30/2021    HCT 33.1 12/30/2021    .1 12/30/2021    RDW 17.0 12/30/2021     12/30/2021     10/21/2011     CMP:   Lab Results   Component Value Date     12/30/2021    K 3.7 12/30/2021     12/30/2021    CO2 26 12/30/2021    BUN 31 12/30/2021    CREATININE 1.23 12/30/2021    GFRAA >60 12/30/2021    LABGLOM 57 12/30/2021    GLUCOSE 206 12/30/2021    GLUCOSE 104 10/21/2011    PROT 7.6 12/30/2021    CALCIUM 9.3 12/30/2021    BILITOT 0.39 12/30/2021    ALKPHOS 103 12/30/2021    AST 21 12/30/2021    ALT 15 12/30/2021     POC Tests: No results for input(s): POCGLU, POCNA, POCK, POCCL, POCBUN, POCHEMO, POCHCT in the last 72 hours. Coags    Lab Results   Component Value Date    PROTIME 43.9 01/13/2022    INR 3.7 01/13/2022    APTT 38.2 44/53/1501     HCG (If Applicable) No results found for: PREGTESTUR, PREGSERUM, HCG, HCGQUANT   ABGs No results found for: PHART, PO2ART, RCK1IDJ, FAW8PFD, BEART, Y1YBZELN   Type & Screen (If Applicable)  No results found for: Cristel Patrick    Additional ordered pre-operative testing:  []CBC    []ABG      [] BMP   []URINALYSIS   []CMP    []HCG   []COAGS PT/INR  []T&C  []LFTs   []TYPE AND SCREEN    [x] EKG  [] Chest X-Ray  [] Other Radiology    [] Sent to Hospitalist None  [] Sent to Anesthesia for your review: CRNAs   [] Additional Orders: None     Comments:None   Requests: No Special requests    Signed:  Ray Saldana LPN 5/24/9989 62:84 AM

## 2022-01-18 NOTE — TELEPHONE ENCOUNTER
Last cardio note in Epic Nov 2020. LM to call to either schedule an office visit or let us know if he is seeing Cardio at a different facility.

## 2022-01-18 NOTE — ASSESSMENT & PLAN NOTE
Has completed IV chemotherapy. Is still on oral chemotherapy but no plans at this point for any further IV treatments. We will plan for port removal under MAC anesthesia in OR. Risks of the procedure including bleeding, infection, scarring, pain, need for additional procedures and anesthesia risk are discussed and consent is obtained. Will obtain his most recent EKG for anesthesiology review and will also get clearance to hold blood thinners prior to procedure.

## 2022-01-18 NOTE — TELEPHONE ENCOUNTER
[facility]         Patient:Bang Mak           :1945           Surgical/Procedure Planned: port removal    Date & Location: 22       Outpatient   Planned Length of OR: 30 minutes    Sedation: general    Estimated Cardiac Risk for Non-Cardiac Surgery/Procedure     Low______ Moderate______ High______    Medication Instructions - Clarification needed by this date:     Coumadin  Hold ___ Days    Resume medications:     Lovenox indicated: _____Yes _____NO    Provider: Dr. Terra Johnson of Provider Giving Orders for Medication holds:    _____________________________________________

## 2022-01-18 NOTE — TELEPHONE ENCOUNTER
[facility]         Patient:Bang Mak           :1945           Surgical/Procedure Planned: port removal    Date & Location: 22       Outpatient   Planned Length of OR: 30 minutes    Sedation: general    Estimated Cardiac Risk for Non-Cardiac Surgery/Procedure     Low______ Moderate______ High______    Medication Instructions - Clarification needed by this date:     Plavix   Hold ___ Days    Resume medications:     Lovenox indicated: _____Yes _____NO    Provider: Dr. Tadeo Daniel of Provider Giving Orders for Medication holds:    _____________________________________________

## 2022-01-27 NOTE — H&P
Bin Mak is a 77 y.o. male who presents today for evaluation for port removal.  Patient is known to me and has previously had port placed for treatment of metastatic adenocarcinoma of the lung. He has recently seen oncology back and has completed his IV treatments and is now on oral medication. I was referred back to me for port removal.     Past Medical History        Past Medical History:   Diagnosis Date    Abdominal hernia       small, no significant symptomatology.  Abnormal PSA       with history of slight increase.  Adenocarcinoma, lung, right (HCC)      Allergic rhinitis      Benign prostatic hypertrophy      Chest pain       occasional.     Coronary artery disease       status post drug eluting stent placement, LAD, ostial obtuse marginal.     Dysphagia      Erectile dysfunction      Familial hyperlipidemia       with hypertriglyceridemia.  Gastroesophageal reflux disease      Hearing loss, bilateral       hearing aid in the left ear only. 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     Hypertension      Impaired fasting glucose       prediabetes.  Lumbar disc herniation       L3-L4, with chronic back pain.  Mild anemia      Nasal polyps       minimal symptoms.  Nasal septal deviation       right side.  Obesity      Palpitations       occasional.     Peptic ulcer disease      Pulmonary nodules      Reactive airway disease       asthma, industrial related, also a history of hypersensitivity pneumonitis.      Sleep apnea       wears CPAP nightly    Urinary frequency              Past Surgical History         Past Surgical History:   Procedure Laterality Date    APPENDECTOMY   age 11   Alonso Cookh BRONCHOSCOPY   09/17/2020    BRONCHOSCOPY N/A 9/17/2020     EBUS- BRONCHOSCOPY ENDOBRONCHIAL ULTRASOUND, PATHOLOGY REQUESTED- CINDY NOTIFIED, GI UNIT SCHEDULED performed by Cole Benavides MD at [address]   10/20/2011     occluded LAD and ostial obtuse marginal stenosis, underwent drug eluting stents to both, RCA small, nondominant, occluded, ejection fraction 45%.  CARDIAC CATHETERIZATION   08/2018     with stent placement    CATARACT REMOVAL Bilateral 03/2018    COLONOSCOPY   01/21/2011     mild sigmoid diverticulosis.  COLONOSCOPY   4/6/16     hemorrhoid; sigmoid diverticulosis    CT BIOPSY PERCUTANEOUS DEEP BONE   8/18/2021     CT BIOPSY PERCUTANEOUS DEEP BONE 8/18/2021 [facility] CT SCAN    KNEE ARTHROSCOPY Left 03/19/2010     partial medial and lateral synovectomies, partial medial meniscectomy, chondroplasty.  NASAL FRACTURE SURGERY   1960    OTHER SURGICAL HISTORY Left 02/12/2010     knee injection.  PORT SURGERY N/A 10/14/2020     RIGHT PORT INSERTION performed by Pat Arredondo DO at [facility] PRE-MALIGNANT / [address] Left 01/16/2012     actinic keratoses, ear, liquid nitrogen.  PRE-MALIGNANT / BENIGN SKIN LESION EXCISION Left 07/19/2011     actinic keratosis, upper ear, liquid nitrogen.  PROSTATE BIOPSY Bilateral 09/08/2015     Benign    REPAIR UMBILICAL UVYB,4+Q/Z,UKPEV N/A 3/06/6529     UMBILICAL Hernia Repair w/ Mesh performed by Sarah Newsome MD at [facility] SEPTOPLASTY   10/30/2015     with bilateral submucosal resection of the inferior turbinates, left middle turbinate elza bullosa resection done by Dr Michael Elaine ARTHROSCOPY Left 10/17/14     W/ SUBACROMIAL DECOMPRESSION, DEBRIDEMENT ET CHONDROPLASTY    UPPER GASTROINTESTINAL ENDOSCOPY   01/21/2011     superficial ulcer of the fundus, erostive gastritis.      VASECTOMY Bilateral 04/04/1980            Current Facility-Administered Medications          Current Outpatient Medications   Medication Sig Dispense Refill    Alectinib HCl (ALECENSA) 150 MG CAPS Take 600 mg by mouth 2 times daily 4 caps  capsule 5    clopidogrel (PLAVIX) 75 MG tablet TAKE 1 TABLET BY MOUTH EVERY DAY 90 tablet 1    Handicap Placard MISC by Does not apply route 1 each 0    furosemide (LASIX) 20 MG tablet TAKE 1 TABLET BY MOUTH EVERY DAY 90 tablet 1    oxybutynin (DITROPAN-XL) 10 MG extended release tablet TAKE 1 TABLET BY MOUTH EVERY DAY  90 tablet 1    Omega-3 Fatty Acids (FISH OIL) 1200 MG CPDR Take 2,400 mg by mouth 2 times daily        niacin 500 MG extended release capsule Take 500 mg by mouth 2 times daily        losartan (COZAAR) 50 MG tablet TAKE 1 TABLET BY MOUTH EVERY DAY 90 tablet 1    levothyroxine (SYNTHROID) 100 MCG tablet TAKE 1 TABLET BY MOUTH EVERY DAY  90 tablet 1    tamsulosin (FLOMAX) 0.4 MG capsule TAKE 1 CAPSULE BY MOUTH EVERY DAY 90 capsule 1    Metoprolol Tartrate 75 MG TABS TAKE 1 TABLET BY MOUTH TWO TIMES A DAY        pravastatin (PRAVACHOL) 40 MG tablet TAKE ONE TABLET BY MOUTH ONCE EVERY EVENING 90 tablet 3    warfarin (COUMADIN) 2.5 MG tablet Take 2 tablets by mouth daily Take 2 tablets (5 mg) by mouth daily. Or as directed by INR results 60 tablet 3    flecainide (TAMBOCOR) 50 MG tablet Take 50 mg by mouth 2 times daily        ECHINACEA HERB PO Take by mouth daily        albuterol sulfate HFA (VENTOLIN HFA) 108 (90 Base) MCG/ACT inhaler Inhale 2 puffs into the lungs every 6 hours as needed for Wheezing or Shortness of Breath 1 Inhaler 6    Multiple Vitamins-Minerals (MULTIVITAMIN PO) daily.        dexamethasone (DECADRON) 4 MG tablet Take 4 mg by mouth See Admin Instructions Take one tablet twice a day for 3 days starting the day before scheduled chemotherapy  (Patient not taking: Reported on 1/18/2022)          No current facility-administered medications for this visit.                  Allergies   Allergen Reactions    Effient [Prasugrel] Other (See Comments)       Superficial skin bleeding.  Ace Inhibitors Other (See Comments)       Cough.      Statins Other (See Comments)       Myalgias.          Family History         Family History   Problem Relation Age of Onset    Cancer Mother       lymphoma    Thyroid Disease Mother           hypothyroidism    Stroke Mother      Heart Disease Mother      High Blood Pressure Mother      Heart Attack Mother           in her 46s    Heart Failure Father           congestive    Coronary Art Dis Father      Emphysema Father           was a smoker    Heart Disease Maternal Grandmother      Heart Disease Maternal Grandfather      Crohn's Disease Sister      High Cholesterol Sister      High Cholesterol Child              Social History               Socioeconomic History    Marital status:        Spouse name: Not on file    Number of children: Not on file    Years of education: Not on file    Highest education level: Not on file   Occupational History    Not on file   Tobacco Use    Smoking status: Former Smoker       Packs/day: 2.00       Years: 15.00       Pack years: 30.00       Quit date: 1985       Years since quittin.0    Smokeless tobacco: Never Used    Tobacco comment: smoked 2 packs a day for 14 to 15 years, quit in    Vaping Use    Vaping Use: Never used   Substance and Sexual Activity    Alcohol use: No       Alcohol/week: 0.0 standard drinks       Comment: rare    Drug use: No    Sexual activity: Not on file   Other Topics Concern    Not on file   Social History Narrative    Not on file      Social Determinants of Health          Financial Resource Strain: Low Risk     Difficulty of Paying Living Expenses: Not hard at all   Food Insecurity: No Food Insecurity    Worried About [facility] in the Last Year: Never true    [facility] in the Last Year: Never true   Transportation Needs:     Lack of Transportation (Medical): Not on file    Lack of Transportation (Non-Medical):  Not on file   Physical Activity:     Days of Exercise per Week: Not on file    Minutes of Exercise per Session: Not on file   Stress:     Feeling of Stress : Not on file   Social Connections:     Frequency of Communication with Friends and Family: Not on file    Frequency of Social Gatherings with Friends and Family: Not on file    Attends Druze Services: Not on file    Active Member of Clubs or Organizations: Not on file    Attends Club or Organization Meetings: Not on file    Marital Status: Not on file   Intimate Partner Violence:     Fear of Current or Ex-Partner: Not on file    Emotionally Abused: Not on file    Physically Abused: Not on file    Sexually Abused: Not on file   Housing Stability:     Unable to Pay for Housing in the Last Year: Not on file    Number of [place] in the Last Year: Not on file    Unstable Housing in the Last Year: Not on file            ROS:   Review of Systems - General ROS: negative  Psychological ROS: negative  Ophthalmic ROS: negative  ENT ROS: negative  Respiratory ROS: no cough, shortness of breath, or wheezing  Cardiovascular ROS: no chest pain or dyspnea on exertion  Gastrointestinal ROS: no abdominal pain, change in bowel habits, or black or bloody stools  Genito-Urinary ROS: no dysuria, trouble voiding, or hematuria  Musculoskeletal ROS: negative  Dermatological ROS: negative        Objective       Vitals:     01/18/22 1115   BP: 132/70   Pulse: 66   Temp: 97.6 °F (36.4 °C)      General:in no apparent distress  Eyes: No gross abnormalities. Ears, Nose, Throat: hearing grossly normal bilaterally  Neck: neck supple and non tender without mass  Lungs: clear to auscultation without wheezes or rales   Heart: S1S2, no mumurs, RRR  Abdomen: soft, nontender, no HSM, no guarding, no rebound, no masses  Extremity: negative  Neuro: CN II-XII grossly intact     Port palpable at right chest.  Incision healed. No signs of infection or other acute issues.     1. Adenocarcinoma of right lung [facility])  Assessment & Plan:  Has completed IV chemotherapy.   Is still on oral chemotherapy but no plans at this point for any further IV treatments.     We will plan for port removal under MAC anesthesia in OR.   Risks of the procedure including bleeding, infection, scarring, pain, need for additional procedures and anesthesia risk are discussed and consent is obtained.     Will obtain his most recent EKG for anesthesiology review and will also get clearance to hold blood thinners prior to procedure.           (Please note that portions of this note were completed with a voice recognition program.  Efforts were made to edit the dictations but occasionally words are mis-transcribed.)    The patient was interviewed and examined and there have been no changes since the above History and Physical.    Electronically signed by Indra Gomes DO on 1/27/2022 at 5:48 PM

## 2022-01-28 ENCOUNTER — HOSPITAL ENCOUNTER (OUTPATIENT)
Age: 77
Setting detail: OUTPATIENT SURGERY
Discharge: HOME | End: 2022-01-28
Attending: SURGERY | Admitting: SURGERY
Payer: MEDICARE

## 2022-01-28 ENCOUNTER — ANESTHESIA EVENT (OUTPATIENT)
Dept: OPERATING ROOM | Age: 77
End: 2022-01-28
Payer: MEDICARE

## 2022-01-28 ENCOUNTER — ANESTHESIA (OUTPATIENT)
Dept: OPERATING ROOM | Age: 77
End: 2022-01-28
Payer: MEDICARE

## 2022-01-28 VITALS
OXYGEN SATURATION: 99 % | HEIGHT: 70 IN | SYSTOLIC BLOOD PRESSURE: 143 MMHG | TEMPERATURE: 98.2 F | RESPIRATION RATE: 18 BRPM | WEIGHT: 240.8 LBS | BODY MASS INDEX: 34.47 KG/M2 | HEART RATE: 56 BPM | DIASTOLIC BLOOD PRESSURE: 68 MMHG

## 2022-01-28 VITALS
OXYGEN SATURATION: 99 % | RESPIRATION RATE: 13 BRPM | SYSTOLIC BLOOD PRESSURE: 132 MMHG | DIASTOLIC BLOOD PRESSURE: 78 MMHG

## 2022-01-28 PROCEDURE — 3700000000 HC ANESTHESIA ATTENDED CARE: Performed by: SURGERY

## 2022-01-28 PROCEDURE — 3700000001 HC ADD 15 MINUTES (ANESTHESIA): Performed by: SURGERY

## 2022-01-28 PROCEDURE — 7100000010 HC PHASE II RECOVERY - FIRST 15 MIN: Performed by: SURGERY

## 2022-01-28 PROCEDURE — 6360000002 HC RX W HCPCS: Performed by: NURSE ANESTHETIST, CERTIFIED REGISTERED

## 2022-01-28 PROCEDURE — 3600000002 HC SURGERY LEVEL 2 BASE: Performed by: SURGERY

## 2022-01-28 PROCEDURE — 7100000011 HC PHASE II RECOVERY - ADDTL 15 MIN: Performed by: SURGERY

## 2022-01-28 PROCEDURE — 2500000003 HC RX 250 WO HCPCS: Performed by: SURGERY

## 2022-01-28 PROCEDURE — 2709999900 HC NON-CHARGEABLE SUPPLY: Performed by: SURGERY

## 2022-01-28 PROCEDURE — 6360000002 HC RX W HCPCS: Performed by: SURGERY

## 2022-01-28 PROCEDURE — 2500000003 HC RX 250 WO HCPCS: Performed by: NURSE ANESTHETIST, CERTIFIED REGISTERED

## 2022-01-28 PROCEDURE — 36590 REMOVAL TUNNELED CV CATH: CPT | Performed by: SURGERY

## 2022-01-28 PROCEDURE — 3600000012 HC SURGERY LEVEL 2 ADDTL 15MIN: Performed by: SURGERY

## 2022-01-28 PROCEDURE — 2580000003 HC RX 258: Performed by: SURGERY

## 2022-01-28 RX ORDER — PROPOFOL 10 MG/ML
INJECTION, EMULSION INTRAVENOUS PRN
Status: DISCONTINUED | OUTPATIENT
Start: 2022-01-28 | End: 2022-01-28 | Stop reason: SDUPTHER

## 2022-01-28 RX ORDER — SODIUM CHLORIDE 0.9 % (FLUSH) 0.9 %
10 SYRINGE (ML) INJECTION EVERY 12 HOURS SCHEDULED
Status: DISCONTINUED | OUTPATIENT
Start: 2022-01-28 | End: 2022-01-28 | Stop reason: HOSPADM

## 2022-01-28 RX ORDER — LIDOCAINE HYDROCHLORIDE 40 MG/ML
INJECTION, SOLUTION RETROBULBAR; TOPICAL PRN
Status: DISCONTINUED | OUTPATIENT
Start: 2022-01-28 | End: 2022-01-28 | Stop reason: SDUPTHER

## 2022-01-28 RX ORDER — SODIUM CHLORIDE, SODIUM LACTATE, POTASSIUM CHLORIDE, CALCIUM CHLORIDE 600; 310; 30; 20 MG/100ML; MG/100ML; MG/100ML; MG/100ML
INJECTION, SOLUTION INTRAVENOUS CONTINUOUS
Status: DISCONTINUED | OUTPATIENT
Start: 2022-01-28 | End: 2022-01-28 | Stop reason: HOSPADM

## 2022-01-28 RX ORDER — SODIUM CHLORIDE 0.9 % (FLUSH) 0.9 %
10 SYRINGE (ML) INJECTION PRN
Status: DISCONTINUED | OUTPATIENT
Start: 2022-01-28 | End: 2022-01-28 | Stop reason: HOSPADM

## 2022-01-28 RX ORDER — SODIUM CHLORIDE 9 MG/ML
25 INJECTION, SOLUTION INTRAVENOUS PRN
Status: DISCONTINUED | OUTPATIENT
Start: 2022-01-28 | End: 2022-01-28 | Stop reason: HOSPADM

## 2022-01-28 RX ADMIN — Medication 2 G: at 09:11

## 2022-01-28 RX ADMIN — PROPOFOL 80 MG: 10 INJECTION, EMULSION INTRAVENOUS at 09:11

## 2022-01-28 RX ADMIN — LIDOCAINE HYDROCHLORIDE 120 MG: 40 INJECTION, SOLUTION RETROBULBAR; TOPICAL at 09:12

## 2022-01-28 RX ADMIN — SODIUM CHLORIDE, SODIUM LACTATE, POTASSIUM CHLORIDE, AND CALCIUM CHLORIDE: .6; .31; .03; .02 INJECTION, SOLUTION INTRAVENOUS at 08:12

## 2022-01-28 ASSESSMENT — PULMONARY FUNCTION TESTS
PIF_VALUE: 0
PIF_VALUE: 3
PIF_VALUE: 0

## 2022-01-28 ASSESSMENT — PAIN - FUNCTIONAL ASSESSMENT: PAIN_FUNCTIONAL_ASSESSMENT: 0-10

## 2022-01-28 ASSESSMENT — PAIN SCALES - GENERAL
PAINLEVEL_OUTOF10: 0

## 2022-01-28 NOTE — OP NOTE
[address]                 [address] [place] [address]                                OPERATIVE REPORT    PATIENT NAME: Trish ORONA                   :        1945  MED REC NO:   [id number]                             ROOM:  ACCOUNT NO:   [de-identified]                           ADMIT DATE: 2022  PROVIDER:     John Cheatham    DATE OF PROCEDURE:  2022    SURGEON:  Dr. John Cheatham. ASSISTANT:  None. PREOPERATIVE DIAGNOSIS:  History of lung cancer. POSTOPERATIVE DIAGNOSIS:  History of lung cancer. PROCEDURE:  Removal of right IJ port. ANESTHESIA:  MAC with local.    ESTIMATED BLOOD LOSS:  5 mL. FLUIDS:  1000 mL of crystalloid. COMPLICATIONS:  None. SPECIMENS:  None. INDICATION FOR PROCEDURE:  The patient is a 60-year-old gentleman who is  known to me and has previously had port placed for treatment of  metastatic lung cancer. He has completed his chemotherapy and was told  that he could have his port removed. He saw me in the office and we  decided to do this in the OR under some MAC anesthesia for comfort. Prior to the day of the procedure, risks, benefits, and alternatives  were explained and consent was obtained. DESCRIPTION OF PROCEDURE:  The patient was brought to the operative  suite and placed on the operative table in the supine position. Monitoring devices and SCD cuffs were placed. MAC anesthesia was  induced. After induction of anesthesia, time-out was performed and  correct patient and procedure were verified. The patient's right chest  and neck were prepped and draped in the sterile fashion. Prior to the  time of incision, the patient received preoperative antibiotics. The  skin overlying the port at the right chest was anesthetized with local  anesthetic and I went through the prior incision sites and reopened that  with 15 blade.   Dissection was carried down to the level of the

## 2022-01-28 NOTE — ANESTHESIA PRE PROCEDURE
Department of Anesthesiology  Preprocedure Note       Name:  Juju Waters Shock   Age:  77 y.o.  :  1945                                          MRN:  [id number]         Date:  2022      Surgeon: Kun De La Torre):  Margot Alvarado DO    Procedure: Procedure(s):  Right PORT REMOVAL    Medications prior to admission:   Prior to Admission medications    Medication Sig Start Date End Date Taking?  Authorizing Provider   Alectinib HCl (ALECENSA) 150 MG CAPS Take 600 mg by mouth 2 times daily 4 caps BID 22   Cristina Arndt MD   clopidogrel (PLAVIX) 75 MG tablet TAKE 1 TABLET BY MOUTH EVERY DAY 21   Frederick Downs, DO   Handicap Placard MISC by Does not apply route 10/26/21   Cristina Arndt MD   furosemide (LASIX) 20 MG tablet TAKE 1 TABLET BY MOUTH EVERY DAY 21   Frederick Downs, DO   oxybutynin (DITROPAN-XL) 10 MG extended release tablet TAKE 1 TABLET BY MOUTH EVERY DAY  21   Frederick Downs, DO   dexamethasone (DECADRON) 4 MG tablet Take 4 mg by mouth See Admin Instructions Take one tablet twice a day for 3 days starting the day before scheduled chemotherapy   Patient not taking: Reported on 2022    Historical Provider, MD   Omega-3 Fatty Acids (FISH OIL) 1200 MG CPDR Take 2,400 mg by mouth 2 times daily    Historical Provider, MD   niacin 500 MG extended release capsule Take 500 mg by mouth 2 times daily    Historical Provider, MD   losartan (COZAAR) 50 MG tablet TAKE 1 TABLET BY MOUTH EVERY DAY 21   Frederick Downs, DO   levothyroxine (SYNTHROID) 100 MCG tablet TAKE 1 TABLET BY MOUTH EVERY DAY  21   Frederick Carvalhouse, DO   tamsulosin (FLOMAX) 0.4 MG capsule TAKE 1 CAPSULE BY MOUTH EVERY DAY 21   Frederick Downs, DO   Metoprolol Tartrate 75 MG TABS TAKE 1 TABLET BY MOUTH TWO TIMES A DAY 20   Historical Provider, MD   pravastatin (PRAVACHOL) 40 MG tablet TAKE ONE TABLET BY MOUTH ONCE EVERY EVENING 3/3/21   Frederick Downs, DO   warfarin (COUMADIN) 2.5 MG tablet Take 2 tablets by mouth daily Take 2 tablets (5 mg) by mouth daily. Or as directed by INR results 2/11/21   Isha Rasmussen MD   flecainide (TAMBOCOR) 50 MG tablet Take 50 mg by mouth 2 times daily    Historical Provider, MD   ECHINACEA HERB PO Take by mouth daily    Historical Provider, MD   albuterol sulfate HFA (VENTOLIN HFA) 108 (90 Base) MCG/ACT inhaler Inhale 2 puffs into the lungs every 6 hours as needed for Wheezing or Shortness of Breath 2/13/19   Yash Samuels, DO   Multiple Vitamins-Minerals (MULTIVITAMIN PO) daily. Historical Provider, MD       Current medications:    No current facility-administered medications for this visit. No current outpatient medications on file. Facility-Administered Medications Ordered in Other Visits   Medication Dose Route Frequency Provider Last Rate Last Admin    lactated ringers infusion   IntraVENous Continuous Ben Elizondo  mL/hr at 01/28/22 0812 New Bag at 01/28/22 0812    sodium chloride flush 0.9 % injection 10 mL  10 mL IntraVENous 2 times per day Ben Elizondo DO        sodium chloride flush 0.9 % injection 10 mL  10 mL IntraVENous PRN Ben Elizondo DO        0.9 % sodium chloride infusion  25 mL IntraVENous PRN Ben Elizondo DO        ceFAZolin (ANCEF) 2000 mg in sterile water 20 mL IV syringe  2,000 mg IntraVENous On Call to [address] DO Manuel           Allergies: Allergies   Allergen Reactions    Effient [Prasugrel] Other (See Comments)     Superficial skin bleeding.  Ace Inhibitors Other (See Comments)     Cough.  Statins Other (See Comments)     Myalgias.         Problem List:    Patient Active Problem List   Diagnosis Code    HTN (hypertension) I10    Peptic ulcer disease K27.9    Coronary artery disease I25.10    Impaired fasting glucose R73.01    Benign prostatic hyperplasia N40.0    Erectile dysfunction N52.9    Familial hyperlipidemia E78.49    Left rotator cuff tear M75.102    Benign prostatic hyperplasia N40.0    Obstructive sleep apnea G47.33    Acquired hypothyroidism E03.9    Ventral hernia without obstruction or gangrene K50.6    Umbilical hernia without obstruction and without gangrene K42.9    S/P PTCA (percutaneous transluminal coronary angioplasty) Z98.61    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  Abnormal PSA     with history of slight increase.  Adenocarcinoma, lung, right (HCC)     Allergic rhinitis     Benign prostatic hypertrophy     Chest pain     occasional.     Coronary artery disease     status post drug eluting stent placement, LAD, ostial obtuse marginal.     Dysphagia     Erectile dysfunction     Familial hyperlipidemia     with hypertriglyceridemia.  Gastroesophageal reflux disease     Hearing loss, bilateral     hearing aid in the left ear only. 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     Hypertension     Impaired fasting glucose     prediabetes.  Lumbar disc herniation     L3-L4, with chronic back pain.  Mild anemia     Nasal polyps     minimal symptoms.  Nasal septal deviation     right side.  Obesity     Palpitations     occasional.     Peptic ulcer disease     Pulmonary nodules     Reactive airway disease     asthma, industrial related, also a history of hypersensitivity pneumonitis.      Sleep apnea     wears CPAP nightly    Urinary frequency        Past Surgical History:        Procedure Laterality Date    APPENDECTOMY  age 10    BRONCHOSCOPY  09/17/2020    BRONCHOSCOPY N/A 9/17/2020 EBUS- BRONCHOSCOPY ENDOBRONCHIAL ULTRASOUND, PATHOLOGY REQUESTED- CINDY NOTIFIED, GI UNIT SCHEDULED performed by Ronn Angulo MD at [address]  10/20/2011    occluded LAD and ostial obtuse marginal stenosis, underwent drug eluting stents to both, RCA small, nondominant, occluded, ejection fraction 45%.  CARDIAC CATHETERIZATION  08/2018    with stent placement    CATARACT REMOVAL Bilateral 03/2018    COLONOSCOPY  01/21/2011    mild sigmoid diverticulosis.  COLONOSCOPY  4/6/16    hemorrhoid; sigmoid diverticulosis    CT BIOPSY PERCUTANEOUS DEEP BONE  8/18/2021    CT BIOPSY PERCUTANEOUS DEEP BONE 8/18/2021 [facility] CT SCAN    KNEE ARTHROSCOPY Left 03/19/2010    partial medial and lateral synovectomies, partial medial meniscectomy, chondroplasty.  NASAL FRACTURE SURGERY  1960    OTHER SURGICAL HISTORY Left 02/12/2010    knee injection.  PORT SURGERY N/A 10/14/2020    RIGHT PORT INSERTION performed by Jacquelyn Spivey DO at [address] [place] [address] PRE-MALIGNANT / [address] Left 01/16/2012    actinic keratoses, ear, liquid nitrogen.  PRE-MALIGNANT / BENIGN SKIN LESION EXCISION Left 07/19/2011    actinic keratosis, upper ear, liquid nitrogen.  PROSTATE BIOPSY Bilateral 09/08/2015    Benign    REPAIR UMBILICAL TFJZ,2+V/V,AGPGE N/A 9/85/2464    UMBILICAL Hernia Repair w/ Mesh performed by Ludmila Leon MD at [address] [place] [address] SEPTOPLASTY  10/30/2015    with bilateral submucosal resection of the inferior turbinates, left middle turbinate elza bullosa resection done by Dr Kiana Moss ARTHROSCOPY Left 10/17/14    W/ SUBACROMIAL DECOMPRESSION, DEBRIDEMENT ET CHONDROPLASTY    UPPER GASTROINTESTINAL ENDOSCOPY  01/21/2011    superficial ulcer of the fundus, erostive gastritis.      VASECTOMY Bilateral 04/04/1980       Social History:    Social History     Tobacco Use    Smoking status: Former Smoker     Packs/day: 2.00     Years: 15.00     Pack years: 30.00 Quit date: 1985     Years since quittin.0    Smokeless tobacco: Never Used    Tobacco comment: smoked 2 packs a day for 14 to 15 years, quit in    Substance Use Topics    Alcohol use: No     Alcohol/week: 0.0 standard drinks     Comment: rare                                Counseling given: Not Answered  Comment: smoked 2 packs a day for 14 to 15 years, quit in       Vital Signs (Current): There were no vitals filed for this visit. BP Readings from Last 3 Encounters:   22 (!) 140/77   22 132/70   22 114/72       NPO Status:                                                                                 BMI:   Wt Readings from Last 3 Encounters:   22 240 lb 12.8 oz (109.2 kg)   22 243 lb 3.2 oz (110.3 kg)   22 239 lb 12.8 oz (108.8 kg)     There is no height or weight on file to calculate BMI.    CBC:   Lab Results   Component Value Date    WBC 8.2 2021    RBC 3.18 2021    RBC 3.89 10/21/2011    HGB 10.7 2021    HCT 33.1 2021    .1 2021    RDW 17.0 2021     2021     10/21/2011       CMP:   Lab Results   Component Value Date     2021    K 3.7 2021     2021    CO2 26 2021    BUN 31 2021    CREATININE 1.23 2021    GFRAA >60 2021    LABGLOM 57 2021    GLUCOSE 206 2021    GLUCOSE 104 10/21/2011    PROT 7.6 2021    CALCIUM 9.3 2021    BILITOT 0.39 2021    ALKPHOS 103 2021    AST 21 2021    ALT 15 2021       POC Tests: No results for input(s): POCGLU, POCNA, POCK, POCCL, POCBUN, POCHEMO, POCHCT in the last 72 hours.     Coags:   Lab Results   Component Value Date    PROTIME 43.9 2022    INR 3.7 2022    APTT 38.2 2021       HCG (If Applicable): No results found for: PREGTESTUR, PREGSERUM, HCG, HCGQUANT     ABGs: No results found for: PHART, PO2ART, NAT8DTZ, YWN0NUA, BEART, J8KXWMXZ     Type & Screen (If Applicable):  No results found for: LABABO, LABRH    Drug/Infectious Status (If Applicable):  Lab Results   Component Value Date    HEPCAB NONREACTIVE 02/01/2017       COVID-19 Screening (If Applicable):   Lab Results   Component Value Date    COVID19 Not Detected 10/08/2020         Anesthesia Evaluation  Patient summary reviewed no history of anesthetic complications:   Airway: Mallampati: III  TM distance: >3 FB   Neck ROM: full  Mouth opening: > = 3 FB Dental:          Pulmonary:normal exam    (+) sleep apnea: on CPAP,  asthma:                           ROS comment: Lung CA   Cardiovascular:  Exercise tolerance: poor (<4 METS),   (+) hypertension:, valvular problems/murmurs:, CAD:, CABG/stent: no interval change, CHF: diastolic, pulmonary hypertension:,       ECG reviewed          Cleared by cardiology              Neuro/Psych:   Negative Neuro/Psych ROS              GI/Hepatic/Renal:   (+) GERD:, PUD, morbid obesity          Endo/Other:    (+) hypothyroidism::., malignancy/cancer. ROS comment: Lung CA Abdominal:             Vascular: negative vascular ROS. Other Findings:               Anesthesia Plan      TIVA and general     ASA 3       Induction: intravenous. Anesthetic plan and risks discussed with patient and spouse. Use of blood products discussed with patient whom.    Plan discussed with surgical team.                  Reva Hutton, ILYA - CRNA   1/28/2022

## 2022-01-28 NOTE — ANESTHESIA POSTPROCEDURE EVALUATION
Department of Anesthesiology  Postprocedure Note    Patient: Kristi Rosenberg  MRN: [id number]  YOB: 1945  Date of evaluation: 1/28/2022  Time:  9:46 AM     Procedure Summary     Date: 01/28/22 Room / Location: [address] [place] [address]    Anesthesia Start: 4729 Anesthesia Stop: 9626    Procedure: Right PORT REMOVAL (Right ) Diagnosis: (lung cancer)    Surgeons: Margot Alvarado DO Responsible Provider: ILYA Sánchez CRNA    Anesthesia Type: TIVA, general ASA Status: 3          Anesthesia Type: TIVA, general    Myra Phase I: Myra Score: 10    Myra Phase II: Myra Score: 10    Last vitals: Reviewed and per EMR flowsheets.        Anesthesia Post Evaluation    Patient location during evaluation: PACU  Patient participation: complete - patient participated  Level of consciousness: awake and alert  Pain score: 0  Airway patency: patent  Nausea & Vomiting: no nausea and no vomiting  Complications: no  Cardiovascular status: blood pressure returned to baseline and hemodynamically stable  Respiratory status: acceptable  Hydration status: euvolemic

## 2022-02-02 DIAGNOSIS — I10 ESSENTIAL HYPERTENSION: ICD-10-CM

## 2022-02-02 RX ORDER — TAMSULOSIN HYDROCHLORIDE 0.4 MG/1
CAPSULE ORAL
Qty: 90 CAPSULE | Refills: 1 | Status: SHIPPED | OUTPATIENT
Start: 2022-02-02 | End: 2022-08-01

## 2022-02-02 RX ORDER — LOSARTAN POTASSIUM 50 MG/1
TABLET ORAL
Qty: 90 TABLET | Refills: 1 | Status: SHIPPED | OUTPATIENT
Start: 2022-02-02 | End: 2022-08-09 | Stop reason: SDUPTHER

## 2022-02-02 RX ORDER — LEVOTHYROXINE SODIUM 0.1 MG/1
100 TABLET ORAL DAILY
Qty: 90 TABLET | Refills: 1 | Status: SHIPPED | OUTPATIENT
Start: 2022-02-02 | End: 2022-03-03 | Stop reason: SDUPTHER

## 2022-02-02 NOTE — TELEPHONE ENCOUNTER
Jorge Gaming called requesting a refill of the below medication which has been pended for you:     Requested Prescriptions     Pending Prescriptions Disp Refills    losartan (COZAAR) 50 MG tablet 90 tablet 1     Sig: TAKE 1 TABLET BY MOUTH EVERY DAY    levothyroxine (SYNTHROID) 100 MCG tablet 90 tablet 1    tamsulosin (FLOMAX) 0.4 MG capsule 90 capsule 1     Sig: TAKE 1 CAPSULE BY MOUTH EVERY DAY       Last Appointment Date: 9/3/2021  Next Appointment Date: 3/3/2022    Allergies   Allergen Reactions    Effient [Prasugrel] Other (See Comments)     Superficial skin bleeding.  Ace Inhibitors Other (See Comments)     Cough.  Statins Other (See Comments)     Myalgias.

## 2022-02-10 ENCOUNTER — HOSPITAL ENCOUNTER (OUTPATIENT)
Dept: PHARMACY | Age: 77
Setting detail: THERAPIES SERIES
Discharge: HOME | End: 2022-02-10
Payer: MEDICARE

## 2022-02-10 DIAGNOSIS — I48.0 PAROXYSMAL A-FIB (HCC): Primary | ICD-10-CM

## 2022-02-10 LAB
INR BLD: 1.9
PROTIME: 22.4 SECONDS

## 2022-02-10 PROCEDURE — 99212 OFFICE O/P EST SF 10 MIN: CPT

## 2022-02-10 PROCEDURE — 85610 PROTHROMBIN TIME: CPT

## 2022-02-10 PROCEDURE — 36416 COLLJ CAPILLARY BLOOD SPEC: CPT

## 2022-02-10 NOTE — PATIENT INSTRUCTIONS
\"On day of next appointment, please screen for temperature and COVID-19 symptoms prior to you clinic appointment. If any symptoms present, please call [phone] to reschedule. \"

## 2022-02-10 NOTE — PROGRESS NOTES
ANTICOAGULATION SERVICE    Date of Clinic Visit:  2/10/2022    Damian Monterroso is a 77 y.o. male who presents to clinic today for anticoagulation monitoring and adjustment. Recent INR Results:  Internal QC passed  Lab Results   Component Value Date    INR 1.9 02/10/2022    INR 3.7 01/13/2022       Current Warfarin Dosage:  Dosing Plan  As of 2/10/2022    TTR:  65.2 % (11.6 mo)   Full warfarin instructions:  2/10: 7.5 mg; Otherwise 7.5 mg every Tue; 5 mg all other days               Assessment/Plan:    Modify warfarin dose as noted above: Patient just below target but had restarted not quite 2 weeks ago from a procedure. Will boost today's dose and re-check as normal.    Next Clinic Appointment:  Return date  As of 2/10/2022    TTR:  65.2 % (11.6 mo)   Next INR check:  3/10/2022             Please call [facility] at [phone] with any questions. Thanks!   EVERARDO Fournier [facility]  Anticoagulation Service Pharmacist  2/10/2022 1:41 PM  For Pharmacy Admin Tracking Only     Intervention Detail: Dose Adjustment: 1, reason: Therapy Optimization   Total # of Interventions Recommended: 1   Total # of Interventions Accepted: 1   Time Spent (min): 15

## 2022-02-21 RX ORDER — CLOPIDOGREL BISULFATE 75 MG/1
TABLET ORAL
Qty: 90 TABLET | Refills: 1 | Status: SHIPPED | OUTPATIENT
Start: 2022-02-21 | End: 2022-08-01

## 2022-02-22 ENCOUNTER — TELEPHONE (OUTPATIENT)
Dept: PHARMACY | Age: 77
End: 2022-02-22

## 2022-02-22 RX ORDER — WARFARIN SODIUM 2.5 MG/1
5 TABLET ORAL DAILY
Qty: 90 TABLET | Refills: 3 | Status: SHIPPED | OUTPATIENT
Start: 2022-02-22 | End: 2022-08-01

## 2022-02-24 ENCOUNTER — HOSPITAL ENCOUNTER (OUTPATIENT)
Dept: LAB | Age: 77
Discharge: HOME | End: 2022-02-24
Payer: MEDICARE

## 2022-02-24 DIAGNOSIS — R97.20 ELEVATED PSA: ICD-10-CM

## 2022-02-24 DIAGNOSIS — I10 ESSENTIAL HYPERTENSION: ICD-10-CM

## 2022-02-24 DIAGNOSIS — E78.49 FAMILIAL HYPERLIPIDEMIA: ICD-10-CM

## 2022-02-24 DIAGNOSIS — C34.90 ALK-POSITIVE NON-SMALL CELL LUNG CANCER (HCC): ICD-10-CM

## 2022-02-24 DIAGNOSIS — J70.0 RADIATION PNEUMONITIS (HCC): ICD-10-CM

## 2022-02-24 DIAGNOSIS — E03.9 ACQUIRED HYPOTHYROIDISM: ICD-10-CM

## 2022-02-24 DIAGNOSIS — R73.01 IMPAIRED FASTING GLUCOSE: ICD-10-CM

## 2022-02-24 DIAGNOSIS — C34.91 ADENOCARCINOMA OF RIGHT LUNG (HCC): ICD-10-CM

## 2022-02-24 DIAGNOSIS — M89.9 BONE LESION: ICD-10-CM

## 2022-02-24 LAB
ABSOLUTE EOS #: 0.15 K/UL (ref 0–0.44)
ABSOLUTE EOS #: 0.15 K/UL (ref 0–0.44)
ABSOLUTE IMMATURE GRANULOCYTE: 0.03 K/UL (ref 0–0.3)
ABSOLUTE IMMATURE GRANULOCYTE: 0.03 K/UL (ref 0–0.3)
ABSOLUTE LYMPH #: 1.6 K/UL (ref 1.1–3.7)
ABSOLUTE LYMPH #: 1.6 K/UL (ref 1.1–3.7)
ABSOLUTE MONO #: 0.73 K/UL (ref 0.1–1.2)
ABSOLUTE MONO #: 0.73 K/UL (ref 0.1–1.2)
ALBUMIN SERPL-MCNC: 4.5 G/DL (ref 3.5–5.2)
ALBUMIN SERPL-MCNC: 4.5 G/DL (ref 3.5–5.2)
ALBUMIN/GLOBULIN RATIO: 1.3 (ref 1–2.5)
ALBUMIN/GLOBULIN RATIO: 1.3 (ref 1–2.5)
ALP BLD-CCNC: 93 U/L (ref 40–129)
ALP BLD-CCNC: 93 U/L (ref 40–129)
ALT SERPL-CCNC: 26 U/L (ref 5–41)
ALT SERPL-CCNC: 26 U/L (ref 5–41)
ANION GAP SERPL CALCULATED.3IONS-SCNC: 12 MMOL/L (ref 9–17)
ANION GAP SERPL CALCULATED.3IONS-SCNC: 12 MMOL/L (ref 9–17)
AST SERPL-CCNC: 26 U/L
AST SERPL-CCNC: 26 U/L
BASOPHILS # BLD: 1 % (ref 0–2)
BASOPHILS # BLD: 1 % (ref 0–2)
BASOPHILS ABSOLUTE: 0.05 K/UL (ref 0–0.2)
BASOPHILS ABSOLUTE: 0.05 K/UL (ref 0–0.2)
BILIRUB SERPL-MCNC: 0.49 MG/DL (ref 0.3–1.2)
BILIRUB SERPL-MCNC: 0.49 MG/DL (ref 0.3–1.2)
BUN BLDV-MCNC: 24 MG/DL (ref 8–23)
BUN BLDV-MCNC: 24 MG/DL (ref 8–23)
BUN/CREAT BLD: 20 (ref 9–20)
BUN/CREAT BLD: 20 (ref 9–20)
CALCIUM SERPL-MCNC: 10.2 MG/DL (ref 8.6–10.4)
CALCIUM SERPL-MCNC: 10.2 MG/DL (ref 8.6–10.4)
CHLORIDE BLD-SCNC: 100 MMOL/L (ref 98–107)
CHLORIDE BLD-SCNC: 100 MMOL/L (ref 98–107)
CHOLESTEROL/HDL RATIO: 5.5
CHOLESTEROL: 241 MG/DL
CO2: 27 MMOL/L (ref 20–31)
CO2: 27 MMOL/L (ref 20–31)
CREAT SERPL-MCNC: 1.23 MG/DL (ref 0.7–1.2)
CREAT SERPL-MCNC: 1.23 MG/DL (ref 0.7–1.2)
EOSINOPHILS RELATIVE PERCENT: 2 % (ref 1–4)
EOSINOPHILS RELATIVE PERCENT: 2 % (ref 1–4)
ESTIMATED AVERAGE GLUCOSE: 146 MG/DL
GFR AFRICAN AMERICAN: >60 ML/MIN
GFR AFRICAN AMERICAN: >60 ML/MIN
GFR NON-AFRICAN AMERICAN: 57 ML/MIN
GFR NON-AFRICAN AMERICAN: 57 ML/MIN
GFR SERPL CREATININE-BSD FRML MDRD: ABNORMAL ML/MIN/{1.73_M2}
GFR SERPL CREATININE-BSD FRML MDRD: ABNORMAL ML/MIN/{1.73_M2}
GLUCOSE BLD-MCNC: 133 MG/DL (ref 70–99)
GLUCOSE BLD-MCNC: 133 MG/DL (ref 70–99)
HBA1C MFR BLD: 6.7 % (ref 4–6)
HCT VFR BLD CALC: 34.2 % (ref 40.7–50.3)
HCT VFR BLD CALC: 34.2 % (ref 40.7–50.3)
HDLC SERPL-MCNC: 44 MG/DL
HEMOGLOBIN: 11.2 G/DL (ref 13–17)
HEMOGLOBIN: 11.2 G/DL (ref 13–17)
IMMATURE GRANULOCYTES: 0 %
IMMATURE GRANULOCYTES: 0 %
LDL CHOLESTEROL DIRECT: 76 MG/DL
LDL CHOLESTEROL: ABNORMAL MG/DL (ref 0–130)
LYMPHOCYTES # BLD: 21 % (ref 24–43)
LYMPHOCYTES # BLD: 21 % (ref 24–43)
MCH RBC QN AUTO: 33.7 PG (ref 25.2–33.5)
MCH RBC QN AUTO: 33.7 PG (ref 25.2–33.5)
MCHC RBC AUTO-ENTMCNC: 32.7 G/DL (ref 25.2–33.5)
MCHC RBC AUTO-ENTMCNC: 32.7 G/DL (ref 25.2–33.5)
MCV RBC AUTO: 103 FL (ref 82.6–102.9)
MCV RBC AUTO: 103 FL (ref 82.6–102.9)
MONOCYTES # BLD: 10 % (ref 3–12)
MONOCYTES # BLD: 10 % (ref 3–12)
NRBC AUTOMATED: 0 PER 100 WBC
NRBC AUTOMATED: 0 PER 100 WBC
PDW BLD-RTO: 16.4 % (ref 11.8–14.4)
PDW BLD-RTO: 16.4 % (ref 11.8–14.4)
PLATELET # BLD: 177 K/UL (ref 138–453)
PLATELET # BLD: 177 K/UL (ref 138–453)
PMV BLD AUTO: 10.3 FL (ref 8.1–13.5)
PMV BLD AUTO: 10.3 FL (ref 8.1–13.5)
POTASSIUM SERPL-SCNC: 4.1 MMOL/L (ref 3.7–5.3)
POTASSIUM SERPL-SCNC: 4.1 MMOL/L (ref 3.7–5.3)
PROSTATE SPECIFIC ANTIGEN: 6.87 UG/L
RBC # BLD: 3.32 M/UL (ref 4.21–5.77)
RBC # BLD: 3.32 M/UL (ref 4.21–5.77)
RBC # BLD: ABNORMAL 10*6/UL
RBC # BLD: ABNORMAL 10*6/UL
SEG NEUTROPHILS: 66 % (ref 36–65)
SEG NEUTROPHILS: 66 % (ref 36–65)
SEGMENTED NEUTROPHILS ABSOLUTE COUNT: 4.96 K/UL (ref 1.5–8.1)
SEGMENTED NEUTROPHILS ABSOLUTE COUNT: 4.96 K/UL (ref 1.5–8.1)
SODIUM BLD-SCNC: 139 MMOL/L (ref 135–144)
SODIUM BLD-SCNC: 139 MMOL/L (ref 135–144)
THYROXINE, FREE: 1.06 NG/DL (ref 0.93–1.7)
TOTAL PROTEIN: 7.9 G/DL (ref 6.4–8.3)
TOTAL PROTEIN: 7.9 G/DL (ref 6.4–8.3)
TRIGL SERPL-MCNC: 756 MG/DL
TSH SERPL DL<=0.05 MIU/L-ACNC: 8.07 MIU/L (ref 0.3–5)
WBC # BLD: 7.5 K/UL (ref 3.5–11.3)
WBC # BLD: 7.5 K/UL (ref 3.5–11.3)

## 2022-02-24 PROCEDURE — 83721 ASSAY OF BLOOD LIPOPROTEIN: CPT

## 2022-02-24 PROCEDURE — 80061 LIPID PANEL: CPT

## 2022-02-24 PROCEDURE — 85025 COMPLETE CBC W/AUTO DIFF WBC: CPT

## 2022-02-24 PROCEDURE — 83036 HEMOGLOBIN GLYCOSYLATED A1C: CPT

## 2022-02-24 PROCEDURE — 84153 ASSAY OF PSA TOTAL: CPT

## 2022-02-24 PROCEDURE — 80053 COMPREHEN METABOLIC PANEL: CPT

## 2022-02-24 PROCEDURE — 84439 ASSAY OF FREE THYROXINE: CPT

## 2022-02-24 PROCEDURE — 84443 ASSAY THYROID STIM HORMONE: CPT

## 2022-02-24 PROCEDURE — 36415 COLL VENOUS BLD VENIPUNCTURE: CPT

## 2022-03-01 ENCOUNTER — OFFICE VISIT (OUTPATIENT)
Dept: ONCOLOGY | Age: 77
End: 2022-03-01
Payer: MEDICARE

## 2022-03-01 VITALS
WEIGHT: 244.2 LBS | OXYGEN SATURATION: 94 % | SYSTOLIC BLOOD PRESSURE: 118 MMHG | HEART RATE: 66 BPM | TEMPERATURE: 98.6 F | BODY MASS INDEX: 34.96 KG/M2 | HEIGHT: 70 IN | DIASTOLIC BLOOD PRESSURE: 72 MMHG

## 2022-03-01 DIAGNOSIS — E66.01 SEVERE OBESITY (BMI 35.0-39.9) WITH COMORBIDITY (HCC): ICD-10-CM

## 2022-03-01 DIAGNOSIS — C79.51 BONE METASTASIS (HCC): ICD-10-CM

## 2022-03-01 DIAGNOSIS — R41.3 MEMORY LOSS: ICD-10-CM

## 2022-03-01 DIAGNOSIS — C34.90 ALK-POSITIVE NON-SMALL CELL LUNG CANCER (HCC): Primary | ICD-10-CM

## 2022-03-01 DIAGNOSIS — D64.9 ANEMIA, UNSPECIFIED TYPE: ICD-10-CM

## 2022-03-01 PROCEDURE — G8417 CALC BMI ABV UP PARAM F/U: HCPCS | Performed by: INTERNAL MEDICINE

## 2022-03-01 PROCEDURE — 1036F TOBACCO NON-USER: CPT | Performed by: INTERNAL MEDICINE

## 2022-03-01 PROCEDURE — 99214 OFFICE O/P EST MOD 30 MIN: CPT | Performed by: INTERNAL MEDICINE

## 2022-03-01 PROCEDURE — 4040F PNEUMOC VAC/ADMIN/RCVD: CPT | Performed by: INTERNAL MEDICINE

## 2022-03-01 PROCEDURE — 1123F ACP DISCUSS/DSCN MKR DOCD: CPT | Performed by: INTERNAL MEDICINE

## 2022-03-01 PROCEDURE — G8484 FLU IMMUNIZE NO ADMIN: HCPCS | Performed by: INTERNAL MEDICINE

## 2022-03-01 PROCEDURE — 3017F COLORECTAL CA SCREEN DOC REV: CPT | Performed by: INTERNAL MEDICINE

## 2022-03-01 PROCEDURE — G8427 DOCREV CUR MEDS BY ELIG CLIN: HCPCS | Performed by: INTERNAL MEDICINE

## 2022-03-01 NOTE — PROGRESS NOTES
Emani Knight Shock                                                                                                                  3/1/2022  MRN:   [id number]  YOB: 1945  PCP:                           Neftali Morrissey DO  Referring Physician: No ref. provider found  Treating Physician Name: Marietta Moon MD      Reason for visit:  Toxicity check. Discussed treatment plan. 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 Active and recent treatments:  concurrent chemoradiation using carboplatin and Taxol  Consolidation therapy with Imfinzi-1/2021  Carboplatin, Alimta and bevacizumab x1-8/2021 while awaiting results of NGS  Alectinib-10/8/2021    Summary of Case/History:    Aaron Del Toro a 77 y. o.male is a patient with biopsy confirmed adenocarcinoma of right lung presents to the office to establish care and for further evaluation treatment recommendations. Patient was initially seen by pulmonary team for lung nodules. Patient CT scan from July 2019 showed a right-sided pleural calcification thickening concerning for asbestos exposure. Patient also noted to have multiple pulmonary nodules measuring between 1.5 x 1.3 cm. Follow-up CT chest from July 2020 showed increase in size of the right lower lobe lung nodule which now measured 2.1 cm. Subsequently patient underwent CT PET on 8/22 which showed FDG avid right lower lobe lung nodule with SUV of 4.6. Patient CT PET was also positive for subcarinal lymph node with SUV of 4.6. Patient underwent bronchoscopy with endobronchial ultrasound biopsy of the subcarinal lymph node confirmed adenocarcinoma. Patient has significant history of tobacco dependence around 30-pack-year however he quit about 25 years ago. Patient does have coronary artery disease status post stent placement. Patient also gives history of occupational asthma and exposure to bacteria. Patient does give history of weight loss but describes it as intentional.  Denies headaches dizziness chest pain abdominal pain nausea bone pain     Interim History:     Patient presents to the clinic for a follow-up visit and for toxicity check and to review results of her blood work-up. Patient has been tolerating treatment without any unexpected or severe side effects. Denies hospitalization or ER visit. Appetite is good. Weight is stable. Nausea is controlled. Complains of memory loss  During this visit patient's allergy, social, medical, surgical history and medications were reviewed and updated. Past Medical History:   Past Medical History:   Diagnosis Date    Abdominal hernia     small, no significant symptomatology.  Abnormal PSA     with history of slight increase.  Adenocarcinoma, lung, right (HCC)     Allergic rhinitis     Benign prostatic hypertrophy     Chest pain     occasional.     Coronary artery disease     status post drug eluting stent placement, LAD, ostial obtuse marginal.     Dysphagia     Erectile dysfunction     Familial hyperlipidemia     with hypertriglyceridemia.  Gastroesophageal reflux disease     Hearing loss, bilateral     hearing aid in the left ear only. 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     Hypertension     Impaired fasting glucose     prediabetes.  Lumbar disc herniation     L3-L4, with chronic back pain.  Mild anemia     Nasal polyps     minimal symptoms.  Nasal septal deviation     right side.  Obesity     Palpitations     occasional.     Peptic ulcer disease     Pulmonary nodules     Reactive airway disease     asthma, industrial related, also a history of hypersensitivity pneumonitis.      Sleep apnea     wears CPAP nightly    Urinary frequency        Past Surgical History:     Past Surgical History:   Procedure Laterality Date    APPENDECTOMY age 11    BRONCHOSCOPY  09/17/2020    BRONCHOSCOPY N/A 9/17/2020    EBUS- BRONCHOSCOPY ENDOBRONCHIAL ULTRASOUND, PATHOLOGY REQUESTED- CINDY NOTIFIED, GI UNIT SCHEDULED performed by Ronn Angulo MD at [address] [facility]  10/20/2011    occluded LAD and ostial obtuse marginal stenosis, underwent drug eluting stents to both, RCA small, nondominant, occluded, ejection fraction 45%.  CARDIAC CATHETERIZATION  08/2018    with stent placement    CATARACT REMOVAL Bilateral 03/2018    COLONOSCOPY  01/21/2011    mild sigmoid diverticulosis.  COLONOSCOPY  4/6/16    hemorrhoid; sigmoid diverticulosis    CT BIOPSY PERCUTANEOUS DEEP BONE  8/18/2021    CT BIOPSY PERCUTANEOUS DEEP BONE 8/18/2021 [facility] CT SCAN    KNEE ARTHROSCOPY Left 03/19/2010    partial medial and lateral synovectomies, partial medial meniscectomy, chondroplasty.  NASAL FRACTURE SURGERY  1960    OTHER SURGICAL HISTORY Left 02/12/2010    knee injection.  PORT SURGERY N/A 10/14/2020    RIGHT PORT INSERTION performed by Jacquelyn Spivey DO at [address] [place] [address] Right 1/28/2022    Right PORT REMOVAL performed by Jacquelyn Spivey DO at [address] [place] [address] PRE-MALIGNANT / BENIGN SKIN LESION EXCISION Left 01/16/2012    actinic keratoses, ear, liquid nitrogen.  PRE-MALIGNANT / BENIGN SKIN LESION EXCISION Left 07/19/2011    actinic keratosis, upper ear, liquid nitrogen.  PROSTATE BIOPSY Bilateral 09/08/2015    Benign    REPAIR UMBILICAL XTTV,8+F/T,DEVEX N/A 5/84/2594    UMBILICAL Hernia Repair w/ Mesh performed by Ludmila Leon MD at [address] [place] [address] SEPTOPLASTY  10/30/2015    with bilateral submucosal resection of the inferior turbinates, left middle turbinate elza bullosa resection done by Dr Kiana Moss ARTHROSCOPY Left 10/17/14    W/ SUBACROMIAL DECOMPRESSION, DEBRIDEMENT ET CHONDROPLASTY    UPPER GASTROINTESTINAL ENDOSCOPY  01/21/2011    superficial ulcer of the fundus, erostive gastritis.      VASECTOMY extended release capsule Take 500 mg by mouth 2 times daily      Metoprolol Tartrate 75 MG TABS TAKE 1 TABLET BY MOUTH TWO TIMES A DAY      pravastatin (PRAVACHOL) 40 MG tablet TAKE ONE TABLET BY MOUTH ONCE EVERY EVENING 90 tablet 3    flecainide (TAMBOCOR) 50 MG tablet Take 50 mg by mouth 2 times daily      ECHINACEA HERB PO Take by mouth daily      albuterol sulfate HFA (VENTOLIN HFA) 108 (90 Base) MCG/ACT inhaler Inhale 2 puffs into the lungs every 6 hours as needed for Wheezing or Shortness of Breath 1 Inhaler 6    Multiple Vitamins-Minerals (MULTIVITAMIN PO) daily. No current facility-administered medications for this visit. Allergies:   Effient [prasugrel], Ace inhibitors, and Statins    Review of Systems:    Constitutional: No fever or chills. No night sweats, no weight loss. Positive fatigue  Eyes: No eye discharge, double vision, or eye pain   HEENT: negative for sore mouth, sore throat, hoarseness and voice change   Respiratory: negative for sputum, dyspnea, wheezing, hemoptysis, chest pain. Positive for cough  Cardiovascular: negative for chest pain, dyspnea, palpitations, orthopnea, PND   Gastrointestinal: negative for nausea, vomiting, diarrhea, constipation, abdominal pain, Dysphagia, hematemesis and hematochezia   Genitourinary: negative for frequency, dysuria, nocturia, urinary incontinence, and hematuria   Integument: negative for rash, skin lesions, bruises. Hematologic/Lymphatic: negative for easy bruising, bleeding, lymphadenopathy, or petechiae   Endocrine: negative for heat or cold intolerance,weight changes, change in bowel habits and hair loss   Musculoskeletal: negative for myalgias, arthralgias, pain, joint swelling,and bone pain   Neurological: negative for headaches, dizziness, seizures, weakness, numbness.   Positive for memory loss    Physical Exam:  Vitals: /72 (Site: Right Upper Arm, Position: Sitting, Cuff Size: Large Adult)   Pulse 66   Temp 98.6 °F (37 °C)   Ht 5' 10\" (1.778 m)   Wt 244 lb 3.2 oz (110.8 kg)   SpO2 94%   BMI 35.04 kg/m²     General appearance - well appearing, no in pain or distress  Mental status - AAO X3  Eyes - pupils equal and reactive, extraocular eye movements intact  Mouth - mucous membranes moist, pharynx normal without lesions  Neck - supple, no significant adenopathy  Lymphatics - no palpable lymphadenopathy, no hepatosplenomegaly  Chest - clear to auscultation, no wheezes, rales or rhonchi, symmetric air entry  Heart - normal rate, regular rhythm, normal S1, S2, no murmurs  Abdomen - soft, nontender, nondistended, no masses or organomegaly  Neurological - alert, oriented, normal speech, no focal findings or movement disorder noted  Extremities - peripheral pulses normal, no pedal edema, no clubbing or cyanosis  Skin - normal coloration and turgor, no rashes, no suspicious skin lesions noted     DATA:    Results for orders placed or performed during the hospital encounter of 02/24/22   PSA, Diagnostic   Result Value Ref Range    PSA 6.87 (H) <4.1 ug/L   T4, Free   Result Value Ref Range    Thyroxine, Free 1.06 0.93 - 1.70 ng/dL   TSH without Reflex   Result Value Ref Range    TSH 8.07 (H) 0.30 - 5.00 mIU/L   Lipid Panel   Result Value Ref Range    Cholesterol 241 (H) <200 mg/dL    HDL 44 >40 mg/dL    LDL Cholesterol      0 - 130 mg/dL    Chol/HDL Ratio 5.5 (H) <5    Triglycerides 756 (H) <150 mg/dL   Hemoglobin A1C   Result Value Ref Range    Hemoglobin A1C 6.7 (H) 4.0 - 6.0 %    Estimated Avg Glucose 146 mg/dL   Comprehensive Metabolic Panel   Result Value Ref Range    Glucose 133 (H) 70 - 99 mg/dL    BUN 24 (H) 8 - 23 mg/dL    CREATININE 1.23 (H) 0.70 - 1.20 mg/dL    Bun/Cre Ratio 20 9 - 20    Calcium 10.2 8.6 - 10.4 mg/dL    Sodium 139 135 - 144 mmol/L    Potassium 4.1 3.7 - 5.3 mmol/L    Chloride 100 98 - 107 mmol/L    CO2 27 20 - 31 mmol/L    Anion Gap 12 9 - 17 mmol/L    Alkaline Phosphatase 93 40 - 129 U/L    ALT 26 5 - 41 U/L AST 26 <40 U/L    Total Bilirubin 0.49 0.3 - 1.2 mg/dL    Total Protein 7.9 6.4 - 8.3 g/dL    Albumin 4.5 3.5 - 5.2 g/dL    Albumin/Globulin Ratio 1.3 1.0 - 2.5    GFR Non- 57 (L) >60 mL/min    GFR African American >60 >60 mL/min    GFR Comment         CBC Auto Differential   Result Value Ref Range    WBC 7.5 3.5 - 11.3 k/uL    RBC 3.32 (L) 4.21 - 5.77 m/uL    Hemoglobin 11.2 (L) 13.0 - 17.0 g/dL    Hematocrit 34.2 (L) 40.7 - 50.3 %    .0 (H) 82.6 - 102.9 fL    MCH 33.7 (H) 25.2 - 33.5 pg    MCHC 32.7 25.2 - 33.5 g/dL    RDW 16.4 (H) 11.8 - 14.4 %    Platelets 602 888 - 132 k/uL    MPV 10.3 8.1 - 13.5 fL    NRBC Automated 0.0 0.0 per 100 WBC    Seg Neutrophils 66 (H) 36 - 65 %    Lymphocytes 21 (L) 24 - 43 %    Monocytes 10 3 - 12 %    Eosinophils % 2 1 - 4 %    Basophils 1 0 - 2 %    Immature Granulocytes 0 0 %    Segs Absolute 4.96 1.50 - 8.10 k/uL    Absolute Lymph # 1.60 1.10 - 3.70 k/uL    Absolute Mono # 0.73 0.10 - 1.20 k/uL    Absolute Eos # 0.15 0.00 - 0.44 k/uL    Basophils Absolute 0.05 0.00 - 0.20 k/uL    Absolute Immature Granulocyte 0.03 0.00 - 0.30 k/uL    RBC Morphology ANISOCYTOSIS PRESENT    LDL Cholesterol, Direct   Result Value Ref Range    LDL Direct 76 <100 mg/dL     -- Diagnosis --   BAL RIGHT LOWER LOBE:           NEGATIVE FOR MALIGNANCY.             FINE NEEDLE ASPIRATION STATION 7 EBUS:           ADENOCARCINOMA, COMPATIBLE WITH LUNG PRIMARY. CT PET    Impression    1. The previously described nodule involving the right lung base is FDG avid. Tissue sampling is recommended as malignancy is suspected. 2. The previously identified smaller lung nodules involving the lower lobes    are too small for PET CT characterization.  CT follow-up is recommended. 3. FDG avid subcarinal and right hilar lymph nodes suggestive of metastatic    disease.     4. No abnormal FDG activity identified within the neck, abdomen or pelvis.           CT CHEST ABDOMEN PELVIS W CONTRAST    Result Date: 1/6/2022  EXAMINATION: CT OF THE CHEST, ABDOMEN, AND PELVIS WITH CONTRAST 1/6/2022 9:44 am TECHNIQUE: CT of the chest, abdomen and pelvis was performed with the administration of intravenous contrast. Multiplanar reformatted images are provided for review. Dose modulation, iterative reconstruction, and/or weight based adjustment of the mA/kV was utilized to reduce the radiation dose to as low as reasonably achievable. COMPARISON: 08/03/2021 HISTORY: ORDERING SYSTEM PROVIDED HISTORY: ALK-positive non-small cell lung cancer [facility]) TECHNOLOGIST PROVIDED HISTORY: assess disease status FINDINGS: Chest: Mediastinum: Tip of MediPort is seen in the cavoatrial junction. Severe coronary artery calcification is seen. No pericardial effusion is seen small hiatal hernia seen. There is nonspecific thickening at the GE GE junction. Lungs/pleura: Punctate pulmonary nodules in the left lower are unchanged. Tangential paramediastinal opacity is seen on the right, with associated consolidative change in bronchiectasis, similar to prior. There is decreased right-sided pleural effusion, with improved aeration of the right lung. Pleural calcifications are seen on the right and left. A punctate pulmonary nodule in the right middle lobe is unchanged. Soft Tissues/Bones: Spurring is seen in the spine. Remote appearing rib fractures are seen. .  Lytic lesion right anterior 8 rib now appears more sclerotic Abdomen/Pelvis: Organs: No splenomegaly. No perisplenic fluid Adrenal glands appear normal No hydronephrosis on the right. No hydronephrosis on left. Punctate hypodense nodule is seen in the right kidney, measuring 11 mm, likely cyst No peripancreatic fluid. No peripancreatic inflammatory change Gallbladder is contracted accentuating its wall thickness GI/Bowel: No significant small bowel distention noted. Moderate stool seen in the colon. Scattered colonic diverticula are seen.   Appendix is identified and normal in caliber. Pelvis: Prominent fat is seen in the inguinal canals Prostate measures 6.0 x 5.4 cm. Peritoneum/Retroperitoneum: Atherosclerotic change seen in abdominal aorta. No aneurysm. No retroperitoneal adenopathy Bones/Soft Tissues: Spurring is seen in the spine. Spurring is seen in the hips. Sclerotic focus seen in left iliac wing. Sclerotic focus is seen in the L3 vertebral body. .  L3 bony lesion previously appeared more lytic. Left iliac wing lesion previously appeared more lytic     Chest: Improved aeration the right lung with decreased right-sided pleural effusion. A few scattered punctate noncalcified pulmonary nodules on both the right and left appear unchanged Lytic lesion right anterior rib now appears more sclerotic Abdomen and pelvis: Healing bony metastatic lesions RECOMMENDATIONS: Unavailable     Impression:  Adenocarcinoma of right lung, clinical stage at least T1c, N2, M0 (stage IIIA)  Disease recurrence, biopsy-proven-8/2021  Radiation pneumonitis and shortness of breath  Thrombocytopenia  Anemia      Plan:  I had a detailed discussion with the patient and we went over results of lab work-up imaging studies and other relevant clinical data  Toxicity check performed. Patient is overall tolerating treatment without unexpected or severe side effects  Plan to continue alectinib  We will obtain scans with next office visit  We will also obtain MRI of brain since patient is complaining of forgetting simple things  Cytopenias likely secondary to alectinib we will continue to monitor  Continue counseling vitamin D  Xgeva along with calcium vitamin D for bone metastases  NCCN guidelines were reviewed and discussed with the patient. The diagnosis and care plan were discussed with the patient in detail. I discussed the natural history of the disease, prognosis, risks and goals of therapy and answered all the patients questions to the best of my ability.   Patient expressed understanding and was in agreement. Liat Claire MD          This note is created with the assistance of a speech recognition program.  While intending to generate a document that actually reflects the content of the visit, the document can still have some errors including those of syntax and sound a like substitutions which may escape proof reading. It such instances, actual meaning can be extrapolated by contextual diversion. [place] Fly

## 2022-03-03 ENCOUNTER — OFFICE VISIT (OUTPATIENT)
Dept: FAMILY MEDICINE CLINIC | Age: 77
End: 2022-03-03
Payer: MEDICARE

## 2022-03-03 VITALS
SYSTOLIC BLOOD PRESSURE: 112 MMHG | OXYGEN SATURATION: 94 % | HEIGHT: 70 IN | HEART RATE: 65 BPM | RESPIRATION RATE: 16 BRPM | DIASTOLIC BLOOD PRESSURE: 64 MMHG | WEIGHT: 245 LBS | BODY MASS INDEX: 35.07 KG/M2

## 2022-03-03 DIAGNOSIS — C34.91 ADENOCARCINOMA OF RIGHT LUNG (HCC): ICD-10-CM

## 2022-03-03 DIAGNOSIS — M25.562 CHRONIC PAIN OF BOTH KNEES: ICD-10-CM

## 2022-03-03 DIAGNOSIS — M25.561 CHRONIC PAIN OF BOTH KNEES: ICD-10-CM

## 2022-03-03 DIAGNOSIS — E03.9 ACQUIRED HYPOTHYROIDISM: ICD-10-CM

## 2022-03-03 DIAGNOSIS — I27.20 PULMONARY HYPERTENSION (HCC): ICD-10-CM

## 2022-03-03 DIAGNOSIS — G47.33 OBSTRUCTIVE SLEEP APNEA: ICD-10-CM

## 2022-03-03 DIAGNOSIS — I10 ESSENTIAL HYPERTENSION: Primary | ICD-10-CM

## 2022-03-03 DIAGNOSIS — I48.0 PAROXYSMAL A-FIB (HCC): ICD-10-CM

## 2022-03-03 DIAGNOSIS — R73.01 IMPAIRED FASTING GLUCOSE: ICD-10-CM

## 2022-03-03 DIAGNOSIS — M17.0 PRIMARY OSTEOARTHRITIS OF BOTH KNEES: ICD-10-CM

## 2022-03-03 DIAGNOSIS — E78.49 FAMILIAL HYPERLIPIDEMIA: ICD-10-CM

## 2022-03-03 DIAGNOSIS — J44.9 COPD WITH ASTHMA (HCC): ICD-10-CM

## 2022-03-03 DIAGNOSIS — G89.29 CHRONIC PAIN OF BOTH KNEES: ICD-10-CM

## 2022-03-03 DIAGNOSIS — R97.20 ELEVATED PSA: ICD-10-CM

## 2022-03-03 PROCEDURE — 3023F SPIROM DOC REV: CPT | Performed by: FAMILY MEDICINE

## 2022-03-03 PROCEDURE — PBSHW PBB SHADOW CHARGE: Performed by: FAMILY MEDICINE

## 2022-03-03 PROCEDURE — 1036F TOBACCO NON-USER: CPT | Performed by: FAMILY MEDICINE

## 2022-03-03 PROCEDURE — 99213 OFFICE O/P EST LOW 20 MIN: CPT | Performed by: FAMILY MEDICINE

## 2022-03-03 PROCEDURE — G8417 CALC BMI ABV UP PARAM F/U: HCPCS | Performed by: FAMILY MEDICINE

## 2022-03-03 PROCEDURE — 20610 DRAIN/INJ JOINT/BURSA W/O US: CPT | Performed by: FAMILY MEDICINE

## 2022-03-03 PROCEDURE — G8427 DOCREV CUR MEDS BY ELIG CLIN: HCPCS | Performed by: FAMILY MEDICINE

## 2022-03-03 PROCEDURE — G8482 FLU IMMUNIZE ORDER/ADMIN: HCPCS | Performed by: FAMILY MEDICINE

## 2022-03-03 PROCEDURE — 3017F COLORECTAL CA SCREEN DOC REV: CPT | Performed by: FAMILY MEDICINE

## 2022-03-03 PROCEDURE — 1123F ACP DISCUSS/DSCN MKR DOCD: CPT | Performed by: FAMILY MEDICINE

## 2022-03-03 PROCEDURE — 4040F PNEUMOC VAC/ADMIN/RCVD: CPT | Performed by: FAMILY MEDICINE

## 2022-03-03 PROCEDURE — 99214 OFFICE O/P EST MOD 30 MIN: CPT | Performed by: FAMILY MEDICINE

## 2022-03-03 RX ORDER — LEVOTHYROXINE SODIUM 0.12 MG/1
125 TABLET ORAL DAILY
Qty: 90 TABLET | Refills: 1 | Status: SHIPPED | OUTPATIENT
Start: 2022-03-03 | End: 2022-09-13 | Stop reason: SDUPTHER

## 2022-03-03 RX ORDER — FUROSEMIDE 20 MG/1
TABLET ORAL
Qty: 90 TABLET | Refills: 1 | Status: SHIPPED | OUTPATIENT
Start: 2022-03-03 | End: 2022-10-03

## 2022-03-03 RX ORDER — TRIAMCINOLONE ACETONIDE 40 MG/ML
40 INJECTION, SUSPENSION INTRA-ARTICULAR; INTRAMUSCULAR ONCE
Status: COMPLETED | OUTPATIENT
Start: 2022-03-03 | End: 2022-03-03

## 2022-03-03 RX ORDER — OXYBUTYNIN CHLORIDE 10 MG/1
10 TABLET, EXTENDED RELEASE ORAL DAILY
Qty: 90 TABLET | Refills: 1 | Status: SHIPPED | OUTPATIENT
Start: 2022-03-03 | End: 2022-10-03

## 2022-03-03 RX ADMIN — TRIAMCINOLONE ACETONIDE 40 MG: 40 INJECTION, SUSPENSION INTRA-ARTICULAR; INTRAMUSCULAR at 10:31

## 2022-03-03 RX ADMIN — TRIAMCINOLONE ACETONIDE 40 MG: 40 INJECTION, SUSPENSION INTRA-ARTICULAR; INTRAMUSCULAR at 10:30

## 2022-03-03 ASSESSMENT — ENCOUNTER SYMPTOMS
COUGH: 0
RHINORRHEA: 0
ABDOMINAL PAIN: 0
SINUS PRESSURE: 0
SHORTNESS OF BREATH: 0
EYE REDNESS: 0
DIARRHEA: 0
WHEEZING: 0
EYE DISCHARGE: 0
SORE THROAT: 0
CONSTIPATION: 0
TROUBLE SWALLOWING: 0
NAUSEA: 0
VOMITING: 0

## 2022-03-03 ASSESSMENT — PATIENT HEALTH QUESTIONNAIRE - PHQ9
SUM OF ALL RESPONSES TO PHQ QUESTIONS 1-9: 0
SUM OF ALL RESPONSES TO PHQ9 QUESTIONS 1 & 2: 0
2. FEELING DOWN, DEPRESSED OR HOPELESS: 0
1. LITTLE INTEREST OR PLEASURE IN DOING THINGS: 0
SUM OF ALL RESPONSES TO PHQ QUESTIONS 1-9: 0

## 2022-03-03 NOTE — PROGRESS NOTES
3/3/2022     Tanvi Mak (:  1945) is a 77 y.o. male, here for evaluation of the following medical concerns:    HPI  Patient comes in today for follow up of chronic health issues Patient has been doing relatively well. Did complete treatment for his adenocarcinoma of the right lung and is now on alectinib therapy. Doing well overall and his last scans appear to be improved. He is feeling relatively well other than feeling somewhat tired. I did make note that his thyroid levels are subtherapeutic so we will increase his thyroid dose which may help with some of his fatigue. Does have a known history of hypertension and his blood pressure is stable and controlled on his current medical therapy. Has a known history of impaired fasting glucose his blood sugar levels have crept up some as compared to last check. Did discuss lipid-lowering diet and increasing exercise in order to get this under better control and he seems agreeable with this plan. Has known familial hyperlipidemia and his overall cholesterol levels show high total cholesterol and high triglycerides which is somewhat chronic for him. Is to continue with his current statin dose. Does have a known history of struct of sleep apnea and does consistently use his CPAP machine. Is getting ongoing medical benefit from its use. Has a known history of paroxysmal atrial fibrillation is not having any acute cardiac symptoms. 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 Has a known history of pulmonary hypertension without acute symptoms at this time. Has been having some increased issues with pain again in both knees. Has known osteoarthritis of both knees and would like to have injections in his knees going into the spring and summer months. We can do this at this time. Has elevation in his PSA and it has elevated more so than last check.   Did discuss referral to urology but at this point he would just like to continue to monitor and we can reassess in 6 months duration. Patient otherwise has no other acute medical concerns to discuss.   Patient's recent lab reports are as follows:    Results for orders placed or performed during the hospital encounter of 02/24/22   PSA, Diagnostic   Result Value Ref Range    PSA 6.87 (H) <4.1 ug/L   T4, Free   Result Value Ref Range    Thyroxine, Free 1.06 0.93 - 1.70 ng/dL   TSH without Reflex   Result Value Ref Range    TSH 8.07 (H) 0.30 - 5.00 mIU/L   Lipid Panel   Result Value Ref Range    Cholesterol 241 (H) <200 mg/dL    HDL 44 >40 mg/dL    LDL Cholesterol      0 - 130 mg/dL    Chol/HDL Ratio 5.5 (H) <5    Triglycerides 756 (H) <150 mg/dL   Hemoglobin A1C   Result Value Ref Range    Hemoglobin A1C 6.7 (H) 4.0 - 6.0 %    Estimated Avg Glucose 146 mg/dL   Comprehensive Metabolic Panel   Result Value Ref Range    Glucose 133 (H) 70 - 99 mg/dL    BUN 24 (H) 8 - 23 mg/dL    CREATININE 1.23 (H) 0.70 - 1.20 mg/dL    Bun/Cre Ratio 20 9 - 20    Calcium 10.2 8.6 - 10.4 mg/dL    Sodium 139 135 - 144 mmol/L    Potassium 4.1 3.7 - 5.3 mmol/L    Chloride 100 98 - 107 mmol/L    CO2 27 20 - 31 mmol/L    Anion Gap 12 9 - 17 mmol/L    Alkaline Phosphatase 93 40 - 129 U/L    ALT 26 5 - 41 U/L    AST 26 <40 U/L    Total Bilirubin 0.49 0.3 - 1.2 mg/dL    Total Protein 7.9 6.4 - 8.3 g/dL    Albumin 4.5 3.5 - 5.2 g/dL    Albumin/Globulin Ratio 1.3 1.0 - 2.5    GFR Non- 57 (L) >60 mL/min    GFR African American >60 >60 mL/min    GFR Comment         CBC Auto Differential   Result Value Ref Range    WBC 7.5 3.5 - 11.3 k/uL    RBC 3.32 (L) 4.21 - 5.77 m/uL    Hemoglobin 11.2 (L) 13.0 - 17.0 g/dL    Hematocrit 34.2 (L) 40.7 - 50.3 %    .0 (H) 82.6 - 102.9 fL    MCH 33.7 (H) 25.2 - 33.5 pg    MCHC 32.7 25.2 - 33.5 g/dL    RDW 16.4 (H) 11.8 - 14.4 %    Platelets 382 468 - 302 k/uL    MPV 10.3 8.1 - 13.5 fL    NRBC Automated 0.0 0.0 per 100 WBC    Seg Neutrophils 66 (H) 36 - 65 %    Lymphocytes 21 (L) 24 - 43 %    Monocytes 10 3 - 12 %    Eosinophils % 2 1 - 4 %    Basophils 1 0 - 2 %    Immature Granulocytes 0 0 %    Segs Absolute 4.96 1.50 - 8.10 k/uL    Absolute Lymph # 1.60 1.10 - 3.70 k/uL    Absolute Mono # 0.73 0.10 - 1.20 k/uL    Absolute Eos # 0.15 0.00 - 0.44 k/uL    Basophils Absolute 0.05 0.00 - 0.20 k/uL    Absolute Immature Granulocyte 0.03 0.00 - 0.30 k/uL    RBC Morphology ANISOCYTOSIS PRESENT    LDL Cholesterol, Direct   Result Value Ref Range    LDL Direct 76 <100 mg/dL      Other review of systems are as noted below. Preventative measures are reviewed today. See health maintenance section for complete preventative plan of care. Did review patient's med list, allergies, social history, fam history, pmhx and pshx today as noted in the record. Review of Systems   Constitutional: Positive for fatigue. Negative for chills and fever. HENT: Negative for congestion, ear pain, postnasal drip, rhinorrhea, sinus pressure, sore throat and trouble swallowing. Eyes: Negative for discharge and redness. Respiratory: Negative for cough, shortness of breath and wheezing. Cardiovascular: Negative for chest pain. Gastrointestinal: Negative for abdominal pain, constipation, diarrhea, nausea and vomiting. Genitourinary: Negative for dysuria, flank pain, frequency and urgency. Musculoskeletal: Positive for arthralgias. Negative for myalgias and neck pain. Skin: Negative for rash and wound. Allergic/Immunologic: Negative for environmental allergies. Neurological: Negative for dizziness, weakness, light-headedness and headaches. Hematological: Negative for adenopathy. Psychiatric/Behavioral: Negative. Prior to Visit Medications    Medication Sig Taking? Authorizing Provider   warfarin (COUMADIN) 2.5 MG tablet Take 2 tablets by mouth daily Take 3 tablets (7.5 mg) by mouth on Tues. Take 2 tablets (5mg) by mouth all other days.  Or as directed by INR results  Sharla Douglass DO   clopidogrel (PLAVIX) 75 MG tablet TAKE 1 TABLET BY MOUTH EVERY DAY  Sharla Douglass DO   losartan (COZAAR) 50 MG tablet TAKE 1 TABLET BY MOUTH EVERY DAY  Sharla Douglass DO   levothyroxine (SYNTHROID) 100 MCG tablet Take 1 tablet by mouth Daily  Sharla Douglass DO   tamsulosin (FLOMAX) 0.4 MG capsule TAKE 1 CAPSULE BY MOUTH EVERY DAY  Naty Lazcano DO   Alectinib HCl (ALECENSA) 150 MG CAPS Take 600 mg by mouth 2 times daily 4 caps BID  Moiz Valdez MD   Handicap Placard MISC by Does not apply route  Moiz Valdez MD   furosemide (LASIX) 20 MG tablet TAKE 1 TABLET BY MOUTH EVERY DAY  Naty Lazcano DO   oxybutynin (DITROPAN-XL) 10 MG extended release tablet TAKE 1 TABLET BY MOUTH EVERY DAY   Naty Lazcano DO   dexamethasone (DECADRON) 4 MG tablet Take 4 mg by mouth See Admin Instructions Take one tablet twice a day for 3 days starting the day before scheduled chemotherapy  Historical Provider, MD   Omega-3 Fatty Acids (FISH OIL) 1200 MG CPDR Take 2,400 mg by mouth 2 times daily  Historical Provider, MD   niacin 500 MG extended release capsule Take 500 mg by mouth 2 times daily  Historical Provider, MD   Metoprolol Tartrate 75 MG TABS TAKE 1 TABLET BY MOUTH TWO TIMES A DAY  Historical Provider, MD   pravastatin (PRAVACHOL) 40 MG tablet TAKE ONE TABLET BY MOUTH ONCE EVERY EVENING  Sharla Douglass DO   flecainide (TAMBOCOR) 50 MG tablet Take 50 mg by mouth 2 times daily  Historical Provider, MD   ECHINACEA HERB PO Take by mouth daily  Historical Provider, MD   albuterol sulfate HFA (VENTOLIN HFA) 108 (90 Base) MCG/ACT inhaler Inhale 2 puffs into the lungs every 6 hours as needed for Wheezing or Shortness of Breath  Naty Lazcano DO   Multiple Vitamins-Minerals (MULTIVITAMIN PO) daily.   Historical Provider, MD        Social History     Tobacco Use    Smoking status: Former Smoker     Packs/day: 2.00     Years: 15.00     Pack years: 30.00     Quit cervical adenopathy. Skin:     General: Skin is warm and dry. Findings: No rash. Neurological:      Mental Status: He is alert and oriented to person, place, and time. Psychiatric:         Behavior: Behavior normal.         Thought Content: Thought content normal.         Judgment: Judgment normal.           ASSESSMENT/PLAN:  Encounter Diagnoses   Name Primary?     Essential hypertension Yes    Impaired fasting glucose     Acquired hypothyroidism     Familial hyperlipidemia     Obstructive sleep apnea     Paroxysmal A-fib (HCC)     COPD with asthma (HCC)     Pulmonary hypertension (HCC)     Adenocarcinoma of right lung (HCC)     Chronic pain of both knees     Primary osteoarthritis of both knees     Elevated PSA      Orders Placed This Encounter   Medications    oxybutynin (DITROPAN-XL) 10 MG extended release tablet     Sig: Take 1 tablet by mouth daily     Dispense:  90 tablet     Refill:  1    furosemide (LASIX) 20 MG tablet     Sig: TAKE 1 TABLET BY MOUTH EVERY DAY     Dispense:  90 tablet     Refill:  1    triamcinolone acetonide (KENALOG-40) injection 40 mg    triamcinolone acetonide (KENALOG-40) injection 40 mg    levothyroxine (SYNTHROID) 125 MCG tablet     Sig: Take 1 tablet by mouth Daily     Dispense:  90 tablet     Refill:  1     Orders Placed This Encounter   Procedures    CBC with Auto Differential     Standing Status:   Future     Standing Expiration Date:   3/3/2023    Comprehensive Metabolic Panel     Standing Status:   Future     Standing Expiration Date:   3/3/2023    Hemoglobin A1C     Standing Status:   Future     Standing Expiration Date:   3/3/2023    Lipid Panel     Standing Status:   Future     Standing Expiration Date:   3/3/2023     Order Specific Question:   Is Patient Fasting?/# of Hours     Answer:   12 hours    TSH     Standing Status:   Future     Standing Expiration Date:   3/3/2023    T4, Free     Standing Status:   Future     Standing Expiration Date:   3/3/2023    PSA, Diagnostic     Standing Status:   Future     Standing Expiration Date:   3/3/2023    CO ARTHROCENTESIS ASPIR&/INJ MAJOR JT/BURSA W/O US    CO ARTHROCENTESIS ASPIR&/INJ MAJOR JT/BURSA W/O US     Procedure Note    PREOP DIAGNOSIS:  [] Right []  Left    [x] Bilateral Knee Pain    POSTOP DIAGNOSIS:  [] Right []  Left    [x] Bilateral Knee Pain    OPERATION:  [] Right []  Left    [x] Bilateral INTRA-ARTICULAR KNEE INJECTION      PROCEDURE:  After sterile prep, an Anterior and Lateral approach was used. [x] 40 mg Kenalog  [x]  2 cc 1% Xylocaine without Epi       []  12 mg of Celestone     Injected intra-articularly without incident. Pre- and post neurovascular status verified. No complications noted. Informed consent and time out was completed prior to procedure    Patient is given a higher dose of Synthroid to help with his thyroid levels. Patient is to continue to follow a low-carb/low sugar/low-fat diet and increase exercise for optimal blood sugar and cholesterol control. Patient is to continue on the rest of his current medical therapy. No additional changes are made at this time. Patient is to return to my office in 6 months duration or sooner if any further problems or symptoms arise    (Please note that portions of this note were completed with a voice recognition program. Efforts were made to edit the dictations but occasionally words are mis-transcribed.)        No follow-ups on file. An electronic signature was used to authenticate this note.     --Cayla Mena, DO on 3/3/2022 at 7:29 AM

## 2022-03-03 NOTE — PATIENT INSTRUCTIONS
Hospital Outpatient Visit on 02/24/2022   Component Date Value Ref Range Status    PSA 02/24/2022 6.87* <4.1 ug/L Final    Comment: The Roche \"ECLIA\" assay is used. Results obtained with different assay methods cannot be   used interchangeably.  Thyroxine, Free 02/24/2022 1.06  0.93 - 1.70 ng/dL Final    TSH 02/24/2022 8.07* 0.30 - 5.00 mIU/L Final    Cholesterol 02/24/2022 241* <200 mg/dL Final    Comment:    Cholesterol Guidelines:      <200  Desirable   200-240  Borderline      >240  Undesirable         HDL 02/24/2022 44  >40 mg/dL Final    Comment:    HDL Guidelines:    <40     Undesirable   40-59    Borderline    >59     Desirable         LDL Cholesterol 02/24/2022       0 - 130 mg/dL Final    Comment: Calculation not valid for Triglyceride value greater than 400 mg/dL. Direct LDL reflexed           LDL Guidelines:     <100    Desirable   100-129   Near to/above Desirable   130-159   Borderline      >159   Undesirable     Direct (measured) LDL and calculated LDL are not interchangeable tests.  Chol/HDL Ratio 02/24/2022 5.5* <5 Final            Triglycerides 02/24/2022 756* <150 mg/dL Final    Comment:    Triglyceride Guidelines:     <150   Desirable   150-199  Borderline   200-499  High     >499   Very high   Based on AHA Guidelines for fasting triglyceride, October 2012.  Hemoglobin A1C 02/24/2022 6.7* 4.0 - 6.0 % Final    Estimated Avg Glucose 02/24/2022 146  mg/dL Final    Comment: The ADA and AACC recommend providing the estimated average glucose result to permit better   patient understanding of their HBA1c result.       Glucose 02/24/2022 133* 70 - 99 mg/dL Final    BUN 02/24/2022 24* 8 - 23 mg/dL Final    CREATININE 02/24/2022 1.23* 0.70 - 1.20 mg/dL Final    Bun/Cre Ratio 02/24/2022 20  9 - 20 Final    Calcium 02/24/2022 10.2  8.6 - 10.4 mg/dL Final    Sodium 02/24/2022 139  135 - 144 mmol/L Final    Potassium 02/24/2022 4.1  3.7 - 5.3 mmol/L Final    Chloride 02/24/2022 100  98 - 107 mmol/L Final    CO2 02/24/2022 27  20 - 31 mmol/L Final    Anion Gap 02/24/2022 12  9 - 17 mmol/L Final    Alkaline Phosphatase 02/24/2022 93  40 - 129 U/L Final    ALT 02/24/2022 26  5 - 41 U/L Final    AST 02/24/2022 26  <40 U/L Final    Total Bilirubin 02/24/2022 0.49  0.3 - 1.2 mg/dL Final    Total Protein 02/24/2022 7.9  6.4 - 8.3 g/dL Final    Albumin 02/24/2022 4.5  3.5 - 5.2 g/dL Final    Albumin/Globulin Ratio 02/24/2022 1.3  1.0 - 2.5 Final    GFR Non- 02/24/2022 57* >60 mL/min Final    GFR  02/24/2022 >60  >60 mL/min Final    GFR Comment 02/24/2022        Final    Comment: Average GFR for 79or more years old:   76 mL/min/1.73sq m  Chronic Kidney Disease:   <60 mL/min/1.73sq m  Kidney failure:   <15 mL/min/1.73sq m              eGFR calculated using average adult body mass.  Additional eGFR calculator available at:        [email or web address]            WBC 02/24/2022 7.5  3.5 - 11.3 k/uL Final    RBC 02/24/2022 3.32* 4.21 - 5.77 m/uL Final    Hemoglobin 02/24/2022 11.2* 13.0 - 17.0 g/dL Final    Hematocrit 02/24/2022 34.2* 40.7 - 50.3 % Final    MCV 02/24/2022 103.0* 82.6 - 102.9 fL Final    MCH 02/24/2022 33.7* 25.2 - 33.5 pg Final    MCHC 02/24/2022 32.7  25.2 - 33.5 g/dL Final    RDW 02/24/2022 16.4* 11.8 - 14.4 % Final    Platelets 59/41/1087 177  138 - 453 k/uL Final    MPV 02/24/2022 10.3  8.1 - 13.5 fL Final    NRBC Automated 02/24/2022 0.0  0.0 per 100 WBC Final    Seg Neutrophils 02/24/2022 66* 36 - 65 % Final    Lymphocytes 02/24/2022 21* 24 - 43 % Final    Monocytes 02/24/2022 10  3 - 12 % Final    Eosinophils % 02/24/2022 2  1 - 4 % Final    Basophils 02/24/2022 1  0 - 2 % Final    Immature Granulocytes 02/24/2022 0  0 % Final    Segs Absolute 02/24/2022 4.96  1.50 - 8.10 k/uL Final    Absolute Lymph # 02/24/2022 1.60  1.10 - 3.70 k/uL Final    Absolute Mono # 02/24/2022 0.73 0.10 - 1.20 k/uL Final    Absolute Eos # 02/24/2022 0.15  0.00 - 0.44 k/uL Final    Basophils Absolute 02/24/2022 0.05  0.00 - 0.20 k/uL Final    Absolute Immature Granulocyte 02/24/2022 0.03  0.00 - 0.30 k/uL Final    RBC Morphology 02/24/2022 ANISOCYTOSIS PRESENT   Final    MACROCYTOSIS PRESENT    LDL Direct 02/24/2022 76  <100 mg/dL Final

## 2022-03-08 ENCOUNTER — OFFICE VISIT (OUTPATIENT)
Dept: PULMONOLOGY | Age: 77
End: 2022-03-08
Payer: MEDICARE

## 2022-03-08 VITALS
HEART RATE: 80 BPM | DIASTOLIC BLOOD PRESSURE: 84 MMHG | SYSTOLIC BLOOD PRESSURE: 136 MMHG | HEIGHT: 70 IN | OXYGEN SATURATION: 96 % | WEIGHT: 245 LBS | BODY MASS INDEX: 35.07 KG/M2

## 2022-03-08 DIAGNOSIS — I27.20 PULMONARY HYPERTENSION (HCC): ICD-10-CM

## 2022-03-08 DIAGNOSIS — C34.91 ADENOCARCINOMA OF RIGHT LUNG (HCC): Primary | ICD-10-CM

## 2022-03-08 DIAGNOSIS — G47.33 OBSTRUCTIVE SLEEP APNEA: ICD-10-CM

## 2022-03-08 DIAGNOSIS — J94.8 PLEURAL CALCIFICATION: ICD-10-CM

## 2022-03-08 DIAGNOSIS — J44.9 COPD WITH ASTHMA (HCC): ICD-10-CM

## 2022-03-08 PROCEDURE — 1036F TOBACCO NON-USER: CPT | Performed by: INTERNAL MEDICINE

## 2022-03-08 PROCEDURE — 3017F COLORECTAL CA SCREEN DOC REV: CPT | Performed by: INTERNAL MEDICINE

## 2022-03-08 PROCEDURE — 4040F PNEUMOC VAC/ADMIN/RCVD: CPT | Performed by: INTERNAL MEDICINE

## 2022-03-08 PROCEDURE — 3023F SPIROM DOC REV: CPT | Performed by: INTERNAL MEDICINE

## 2022-03-08 PROCEDURE — G8427 DOCREV CUR MEDS BY ELIG CLIN: HCPCS | Performed by: INTERNAL MEDICINE

## 2022-03-08 PROCEDURE — 99213 OFFICE O/P EST LOW 20 MIN: CPT

## 2022-03-08 PROCEDURE — 1123F ACP DISCUSS/DSCN MKR DOCD: CPT | Performed by: INTERNAL MEDICINE

## 2022-03-08 PROCEDURE — G8482 FLU IMMUNIZE ORDER/ADMIN: HCPCS | Performed by: INTERNAL MEDICINE

## 2022-03-08 PROCEDURE — G8417 CALC BMI ABV UP PARAM F/U: HCPCS | Performed by: INTERNAL MEDICINE

## 2022-03-08 PROCEDURE — 99214 OFFICE O/P EST MOD 30 MIN: CPT | Performed by: INTERNAL MEDICINE

## 2022-03-08 NOTE — PROGRESS NOTES
PULMONARY MEDICINE OUTPATIENT  FOLLOW UP NOTE                                                                       PATIENT:  Deann Mak  YOB: 1945  MRN: [id number]     REFERRED BY: Albert Holden DO   CHIEF COMPLIANT: 6 Month Follow-Up (Adenocarcinoma of right lung)       HISTORY     HISTORY OF PRESENT ILLNESS:   Deann Mak is a 77y.o. year old male here for follow-up. Metastatic right lung adenocarcinoma/history of asbestos exposure:  Initially seen after abnormal CT scan (7/16/2019) which showed right-sided pleural calcification/thickening raising the suspicion for asbestos exposure. There is no honeycombing or bronchiectasis. Patient also has multiple bilateral pulmonary nodules, largest one seen in the right lung base measuring about 1.5 X 1.3 cm at subpleural location  CT chest (7/21/2020) is reported to show an interval enlargement in the size of right lower lobe nodule to 2.1 x 1.7 cm. No other significant changes in other nodules or pleural thickening. PET scan (8/22/2020) which showed PET avidity within a maximum SUV of 4.6 in the right lung base, additionally patient had FDG avid subcarinal and right hilar which are reported showing maximum SUV of 4.6  S/p EBUS TBNA (9/17/2020) for PET avid right hilar and subcarinal mediastinal lymph node. His cytology turned out to be positive for adenocarcinoma in the subcarinal lymph node.   He was treated with chemo/immunotherapy  CT chest abdomen/pelvis (1/7/2021), reported favorable response to therapy with decrease in size of right lower lobe nodule, however had mixed response to mediastinal/hilar adenopathy  CT chest (4/27/2021) showed interval development of consolidative changes in the right lower lobe with associated bronchiectasis extending to the right hilum with interval progression of groundglass opacities in the right upper lobe and a small pleural effusion  CT chest abdomen/pelvis (8/3/2021) showed a new right eighth rib and T3 bone lesion, which was subsequently biopsied and the pathology is suggestive of metastatic spread  Patient is currently on Alecensa for metastatic lung adenocarcinoma under care of Dr. Lissette Ascencio     Chronic obstructive pulmonary disease:  Tobacco history: Patient is a former smoker and  reports that he quit smoking about 37 years ago. He started smoking about 56 years ago. He has a 30.00 pack-year smoking history. He has never used smokeless tobacco.  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 Pulmonary function test: PFT showed nonspecific ventilatory impairment  Current symptoms: Patient denies any significant cough or shortness of breath  Current Rx: Patient is currently not using any bronchodilators     GRISEL:  Patient is morbidly obese and has history of sleep apnea, which is being treated with CPAP  Patient reports good compliance to the machine and is benefiting from the therapy  He denies any symptoms of excessive daytime sleepiness. PAST MEDICAL HISTORY:      Diagnosis Date    Abdominal hernia     small, no significant symptomatology.  Abnormal PSA     with history of slight increase.  Adenocarcinoma, lung, right (HCC)     Allergic rhinitis     Benign prostatic hypertrophy     Chest pain     occasional.     Coronary artery disease     status post drug eluting stent placement, LAD, ostial obtuse marginal.     Dysphagia     Erectile dysfunction     Familial hyperlipidemia     with hypertriglyceridemia.  Gastroesophageal reflux disease     Hearing loss, bilateral     hearing aid in the left ear only. 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     Hypertension     Impaired fasting glucose     prediabetes.  Lumbar disc herniation     L3-L4, with chronic back pain.      Mild anemia     Nasal polyps     minimal symptoms.  Nasal septal deviation     right side.  Obesity     Palpitations     occasional.     Peptic ulcer disease     Pulmonary nodules     Reactive airway disease     asthma, industrial related, also a history of hypersensitivity pneumonitis.  Sleep apnea     wears CPAP nightly    Urinary frequency      PAST SURGICAL HISTORY:      Procedure Laterality Date    APPENDECTOMY  age 11    BRONCHOSCOPY  09/17/2020    BRONCHOSCOPY N/A 9/17/2020    EBUS- BRONCHOSCOPY ENDOBRONCHIAL ULTRASOUND, PATHOLOGY REQUESTED- CINDY NOTIFIED, GI UNIT SCHEDULED performed by Jessika Arvizu MD at [address] [facility]  10/20/2011    occluded LAD and ostial obtuse marginal stenosis, underwent drug eluting stents to both, RCA small, nondominant, occluded, ejection fraction 45%.  CARDIAC CATHETERIZATION  08/2018    with stent placement    CATARACT REMOVAL Bilateral 03/2018    COLONOSCOPY  01/21/2011    mild sigmoid diverticulosis.  COLONOSCOPY  4/6/16    hemorrhoid; sigmoid diverticulosis    CT BIOPSY PERCUTANEOUS DEEP BONE  8/18/2021    CT BIOPSY PERCUTANEOUS DEEP BONE 8/18/2021 [facility] CT SCAN    KNEE ARTHROSCOPY Left 03/19/2010    partial medial and lateral synovectomies, partial medial meniscectomy, chondroplasty.  NASAL FRACTURE SURGERY  1960    OTHER SURGICAL HISTORY Left 02/12/2010    knee injection.  PORT SURGERY N/A 10/14/2020    RIGHT PORT INSERTION performed by Ja Ugarte DO at [place] [address] Right 1/28/2022    Right PORT REMOVAL performed by Ja Ugarte DO at [address] [place] [address] PRE-MALIGNANT / BENIGN SKIN LESION EXCISION Left 01/16/2012    actinic keratoses, ear, liquid nitrogen.  PRE-MALIGNANT / BENIGN SKIN LESION EXCISION Left 07/19/2011    actinic keratosis, upper ear, liquid nitrogen.      PROSTATE BIOPSY Bilateral 09/08/2015    Benign    REPAIR UMBILICAL LAJP,6+B/Y,[facility] N/A 5/94/9597    UMBILICAL Hernia Repair w/ Mesh performed by Fei Schmitz MD at [address] [place] [address] SEPTOPLASTY  10/30/2015    with bilateral submucosal resection of the inferior turbinates, left middle turbinate elza bullosa resection done by Dr Chang [place] ARTHROSCOPY Left 10/17/14    W/ SUBACROMIAL DECOMPRESSION, DEBRIDEMENT ET CHONDROPLASTY    UPPER GASTROINTESTINAL ENDOSCOPY  01/21/2011    superficial ulcer of the fundus, erostive gastritis.  VASECTOMY Bilateral 04/04/1980     SOCIAL HISTORY:  TOBACCO:   reports that he quit smoking about 37 years ago. He started smoking about 56 years ago. He has a 30.00 pack-year smoking history. He has never used smokeless tobacco.  ETOH:   reports no history of alcohol use. DRUGS: reports no history of drug use.   Regular for the fall patient repair     PHYSICAL EXAMINATION      VITAL SIGNS:   /84 (Site: Left Upper Arm, Position: Sitting, Cuff Size: Large Adult)   Pulse 80   Ht 5' 10\" (1.778 m)   Wt 245 lb (111.1 kg)   SpO2 96%   BMI 35.15 kg/m²   Wt Readings from Last 3 Encounters:   04/12/22 249 lb 3.2 oz (113 kg)   03/08/22 245 lb (111.1 kg)   03/03/22 245 lb (111.1 kg)     SYSTEMIC EXAMINATION:  General appearance -  [x] well appearing  [x] overweight  [] obese [] cachectic [x] comfortable  [x] in no acute distress  [] chronically ill-appearing  [] in mild to moderate respiratory distress  Mental status -  [x] alert  [x] oriented to person, place, and time  [] anxious  [] depressed mood   Head-  [x] atraumatic  [x] normocephalic  Eyes -  [] pupils equal and reactive, extraocular eye movements intact  [] sclera anicteric  Ears -  [x] hearing grossly normal bilaterally [] bilateral TM's and external ear canals normal  Nose -  [x] normal and patent [] no erythema  [] discharge  Mouth -  [x] mucous membranes moist  [] pharynx normal without lesions [] erythematous  [] exudate noted  Mallampati Airway Score -  [] I (soft palate, uvula, fauces, tonsillar pillars visible) [] II (soft palate, uvula, fauces visible) []  III (soft palate, base of uvula visible) [] IV (only hard palate visible)  Neck -  [] supple  [] no significant adenopathy  [] carotids upstroke normal bilaterally [] no JVD  [] no bruit  Lymphatics -  [x] no palpable lymphadenopathy  [] no hepatosplenomegaly  Chest -   [x] normal chest excursion  [x] no chest wall tenderness  [] increased AP diameter[] pectus noted [] scoliosis noted [x] no tachypnea retraction or cyanosis [x] clear to auscultation, no wheezes, rales or rhonchi, symmetric air entry  [] wheezing noted  [] rales noted  []rhonchi noted [] decreased air entry noted bilaterally  Heart -  [x] normal rate,  [x] regular rhythm  [] irregularly irregular rhythm [x] normal S1  [x] normal S2  [x] no murmurs, rubs, clicks or gallops  [] S3 present  [] S4 present  [] systolic murmur  [] diastolic murmur  [] midsystolic click []pericardial rub present   Abdomen -  [] soft [] nontender  [] nondistended  [] no masses or organomegaly  Neurological -  [x] normal speech  [x] no focal findings or movement disorder noted  [] cranial nerves II through XII intact  [] DTR's normal and symmetric  [] Babinski sign negative,  [x] motor and sensory grossly normal bilaterally  [] normal muscle tone [x] no tremors  [] strength 5/5  [] Romberg sign negative [] normal gait   Musculoskeletal -  [x] no joint tenderness [x] no deformity or swelling  Extremities - [x] no clubbing or cyanosis,  [x] no pedal edema [] pedal edema  [] intact peripheral pulses  Skin -  [x] normal coloration and turgor  [x] no rashes  [] no suspicious skin lesions noted          MEDICAL DECISION MAKING     PROBLEMS ADDRESSED (NUMBER AND COMPLEXITY)       ICD-10-CM    1. Adenocarcinoma of right lung (HCC)  C34.91    2. COPD with asthma ([facility] [address]  J44.9    3. Obstructive sleep apnea  G47.33    4. Pulmonary hypertension (HCC)  I27.20    5. Pleural calcification  J94.8         2.  DATA REVIEWED AND ANALYZED (AMOUNT AND/OR COMPLEXITY) LABS:  ABG:   No results found for: PH, PHART, PCO2, SRY6TRN, PO2, PO2ART, HCO3, OCP1TEF, BE, BEART, THGB, Y2IBVJEB  VBG:  No results found for: PHVEN, OYE1JPV, BEVEN, M6IXROPA  CBC:   Lab Results   Component Value Date    WBC 8.2 03/31/2022    RBC 3.35 (L) 03/31/2022    HGB 11.2 (L) 03/31/2022    HCT 34.3 (L) 03/31/2022     03/31/2022    .4 03/31/2022    MCH 33.4 03/31/2022    MCHC 32.7 03/31/2022    RDW 16.5 (H) 03/31/2022    LYMPHOPCT 26 03/31/2022    MONOPCT 8 03/31/2022    BASOPCT 1 03/31/2022    MONOSABS 0.67 03/31/2022    LYMPHSABS 2.12 03/31/2022    EOSABS 0.14 03/31/2022    BASOSABS 0.04 03/31/2022    DIFFTYPE NOT REPORTED 12/30/2021     Eosinophils/IgE:   Lab Results   Component Value Date    EOSABS 0.14 03/31/2022     Alpha 1 antitrypsin: No results found for: A1A  CMP:   Lab Results   Component Value Date     03/31/2022    K 4.1 03/31/2022     03/31/2022    CO2 28 03/31/2022    BUN 24 (H) 03/31/2022    CREATININE 1.29 (H) 03/31/2022    GLUCOSE 166 (H) 03/31/2022    CALCIUM 9.5 03/31/2022    PROT 7.4 03/31/2022    LABALBU 4.3 03/31/2022    BILITOT 0.37 03/31/2022    ALT 21 03/31/2022    AST 22 03/31/2022    ALKPHOS 81 03/31/2022     Coagulation Profile:   Lab Results   Component Value Date    INR 1.8 04/07/2022    PROTIME 22.2 04/07/2022    APTT 38.2 (H) 08/18/2021     BNP:   No results found for: BNP, PROBNP  D-Dimer/Fibrinogen:  No results found for: DDIMER  Others labs:  Lab Results   Component Value Date    TSH 8.07 (H) 02/24/2022     No results found for: VICKI, ANATITER, RHEUMFACTOR, RF, C3, C4, MPO, PR3, SEDRATE, CRP  Lab Results   Component Value Date    IRON 58 (L) 12/30/2021    TIBC 245 (L) 12/30/2021    FERRITIN 1,010 (H) 12/30/2021    FOLATE 19.0 01/26/2021     Lab Results   Component Value Date    PSA 6.87 (H) 02/24/2022     No results found for: RPR, HIV    RADIOLOGY (CHEST X-RAY/CT SCAN/PET-CT SCAN/ECHO) RESULTS:  [] Not obtained  [x]Reviewed  CT chest 1/6/2022  Chest:       Improved aeration the right lung with decreased right-sided pleural effusion. A few scattered punctate noncalcified pulmonary nodules on both the right and   left appear unchanged       Lytic lesion right anterior rib now appears more sclerotic       Abdomen and pelvis:       Healing bony metastatic lesions       CT chest/abdomen/pelvis 8/3/2021  Impression   1.  New anterior right 8th rib and L3 bone lesions, concerning for metastatic   deposits.       2.  Previously noted more diffuse airspace disease in the right lung has   improved.  Localized ground-glass opacity in the inferior right upper lobe   and residual opacities in the superior segment of the right lower lobe   remain, which may represent resolving inflammatory process.  Short-term   imaging follow-up is suggested.       3.  Less prominent geographic liver lesion near the right portal vein,   previously described as probably benign with MRI.  This may represent focal   fat.       4.  Small right pleural effusion. CT chest/abdomen/pelvis 1/7/2021  Impression   1.  CT CHEST: Favorable interval response to therapy seen as decreased size   of right lower lobe nodule. 2. Mixed response from therapy when correlating with subcarinal and right   hilar lymph nodes, subcarinal lymph node not significantly changed, right   hilar lymph node measuring slightly larger, though this may be reactive. 3. New patchy pulmonary infiltrates in the right lung may be related to post   radiation pneumonitis or other acute infectious process including possible   atypical/viral pneumonia. 4. CT ABDOMEN/PELVIS: New indeterminate right hepatic liver lesion I feel is   likely focal fatty infiltration, but could reflect a metastatic lesion. Correlation with MRI abdomen attention liver without and with gadolinium   correlation recommended.    5. Nonspecific prostate gland enlargement typical of BPH.     ECHOCARDIOGRAM:  Results for orders placed during the hospital encounter of 12/29/20   ECHO Complete 2D W Doppler W Color    Narrative   Summary  Normal left ventricular size with normal hyperdynamic function. Left ventricular ejection fraction 65 %. Mild concentric left ventricular hypertrophy. Grade I (mild) left ventricular diastolic dysfunction. Technically difficult visualization of the right ventricle, but it does  appear to be normal in size. Aortic valve not well visualized but appears normal.  Trivial tricuspid regurgitation. Estimated right ventricular systolic  pressure is 35 mmHg. Trivial pulmonic insufficiency. PULMONARY FUNCTION TEST:  [x] Not obtained  [] Ordered, but not yet obtained  [x] Previously taken on 6/25/2019  SPIROMETRY       Forced Vital Capacity maneuver: There is no demonstrable obstructive defect and FVC is mildly   diminished, which in setting of normal TLC is suggestive of   \"non-specific\" ventilatory impairment, which is typically seen in   patient with asthma, obesity, CHF, chest wall limitations. Consider methacholine challenge test to evaluate for asthma. Flow-Volume Loop: is normal.       Following inhaled bronchodilator there was: no significant   response (<8% improvement in the FEV1)     LUNG VOLUMES     Measurement by body plethysmography and/or gas diffusion shows:   Lung volumes are noraml except for decreased SVC and  ERV. Neither hyperinflation nor air trapping are present. Airway resistance, a measure of airway function is: Airway   resistance is within normal limits.      DIFFUSION CAPACITY (DLCO)     The diffusing capacity is:   Mildly reduced (>60% and <75%)   However DLCO corrected for alveolar volume is normal; which   suggests that reduction in the DLCO may be due to a reduced   measured lung volume rather than parenchymal or vascular disease;   this finding may be due to poor patient effort, previous lung   resection, obstruction of a major bronchus or small stature of   the patient     Impression: Nonspecific ventilatory impairment    POLYSOMNOGRAM:  BASELINE/TITRATION SLEEP STUDY:  [] Not obtained  [x]Unavailable    CPAP/BIPAP COMPLIANCE DATA:   [] Not obtained  [] Ordered, but not yet obtained  [x] Reviewed    Media Information                 ASSESSMENT OF EXCESSIVE DAYTIME SLEEPINESS (BY INDEPENDENT HISTORIAN):  Clare Sleepiness Scale:  [x] Not obtained  [] Obtained  [] Previously obtained  No flowsheet data found. PRIOR EXTERNAL NOTES:  [] Not obtained  [x] Reviewed    ORDERS PLACED:  No orders of the defined types were placed in this encounter. 3. RISK OF COMPLICATIONS AND/OR MORBIDITY OR MORTALITY:     ALLERGIES:  Allergies   Allergen Reactions    Effient [Prasugrel] Other (See Comments)     Superficial skin bleeding.  Ace Inhibitors Other (See Comments)     Cough.  Statins Other (See Comments)     Myalgias. MEDICATIONS:  Outpatient Medications Prior to Visit   Medication Sig Dispense Refill    oxybutynin (DITROPAN-XL) 10 MG extended release tablet Take 1 tablet by mouth daily 90 tablet 1    furosemide (LASIX) 20 MG tablet TAKE 1 TABLET BY MOUTH EVERY DAY 90 tablet 1    levothyroxine (SYNTHROID) 125 MCG tablet Take 1 tablet by mouth Daily 90 tablet 1    warfarin (COUMADIN) 2.5 MG tablet Take 2 tablets by mouth daily Take 3 tablets (7.5 mg) by mouth on Tues. Take 2 tablets (5mg) by mouth all other days.  Or as directed by INR results 90 tablet 3    clopidogrel (PLAVIX) 75 MG tablet TAKE 1 TABLET BY MOUTH EVERY DAY 90 tablet 1    losartan (COZAAR) 50 MG tablet TAKE 1 TABLET BY MOUTH EVERY DAY 90 tablet 1    tamsulosin (FLOMAX) 0.4 MG capsule TAKE 1 CAPSULE BY MOUTH EVERY DAY 90 capsule 1    Alectinib HCl (ALECENSA) 150 MG CAPS Take 600 mg by mouth 2 times daily 4 caps  capsule 5    Omega-3 Fatty Acids (FISH OIL) 1200 MG CPDR Take 2,400 mg by mouth 2 times daily      niacin 500 MG extended release capsule Take 500 mg by mouth 2 times daily  Metoprolol Tartrate 75 MG TABS TAKE 1 TABLET BY MOUTH TWO TIMES A DAY      pravastatin (PRAVACHOL) 40 MG tablet TAKE ONE TABLET BY MOUTH ONCE EVERY EVENING 90 tablet 3    flecainide (TAMBOCOR) 50 MG tablet Take 50 mg by mouth 2 times daily      ECHINACEA HERB PO Take by mouth daily      Multiple Vitamins-Minerals (MULTIVITAMIN PO) daily.  albuterol sulfate HFA (VENTOLIN HFA) 108 (90 Base) MCG/ACT inhaler Inhale 2 puffs into the lungs every 6 hours as needed for Wheezing or Shortness of Breath 1 Inhaler 6     No facility-administered medications prior to visit. PRESCRIPTION DRUG MANAGEMENT/RECOMMENDATIONS:    Reviewed PFT and recent imaging data  Patient is currently on immunotherapy with Alecensa for metastatic lung adenocarcinoma and follows up with Dr. Lissette Ascencio  Currently denies any pulmonary symptoms  Recommended as needed albuterol  Reviewed use of rescue vs controlling agents, oral and inhaled meds and potential side effects  Reviewed use, techniques, schedule and side effects of all inhaled medications  Refills were provided   Barriers to medication compliance addressed. Patient was recommended to have prednisone and an antibiotic available for use during an exacerbation    SUPPLEMENTAL OXYGEN NEEDS:  Patient is not on home oxygen therapy.     SLEEP APNEA MANAGEMENT RECOMMENDATIONS:  Patient denies any excessive daytime sleepiness and reports refreshing and restorative sleep  Patient is using PAP as recommended and is benefiting from the therapy  Compliance data was reviewed  Continue to use CPAP/BiPAP for at least 4 hours every night  Use heated humidification, if needed  Weight loss was recommended and discussed  Recommended good sleep hygiene and instructions provided  Patient instructed not to drive or operate heavy machinery, if sleepy    SMOKING CESSATION/COUNSELING:  Recommended to continue smoking cessation    LIFESTYLE MODIFICATION RECOMMENDATIONS:  Follow healthy behaviors: nutrition, exercise and medication adherence  Maintain an active lifestyle    LUNG CANCER SCREENING RECOMMENDATIONS:  Lung Cancer Screening: After reviewing the patient's smoking history, age and other clinical criteria, the LOW DOSE CT is : NOT INDICATED; Signs or symptoms of Lung cancer  Continue surveillance imaging as per oncology recommendations    IMMUNIZATION HISTORY/RECOMMENDATIONS:    Immunization History   Administered Date(s) Administered    COVID-19, Jhon Christine, Primary or Immunocompromised, PF, 100mcg/0.5mL 01/28/2021, 02/25/2021    COVID-19, Pfizer Purple top, DILUTE for use, 12+ yrs, 30mcg/0.3mL dose 11/18/2021    Influenza A ([id number]) Vaccine PF IM 01/11/2010    Influenza Virus Vaccine 12/10/2009, 10/26/2011, 11/24/2012    Influenza, High Dose (Fluzone 65 yrs and older) 10/14/2013, 11/10/2014, 11/09/2015, 10/14/2016, 11/03/2017, 10/18/2018, 10/08/2019, 10/15/2021    Influenza, Quadv, adjuvanted, 65 yrs +, IM, PF (Fluad) 09/11/2020    Pneumococcal Conjugate 13-valent ([id number]) 02/06/2017    Pneumococcal Polysaccharide ([id number]) 01/17/2012    Tdap (Boostrix, Adacel) 08/04/2015    Zoster Live (Zostavax) 07/31/2014    Zoster Recombinant (Shingrix) 03/01/2019, 05/08/2019     Recommend influenza vaccination in the fall annually; Up-to-date   Recommendations were given regarding pneumococcal vaccination; Up-to-date  Recommendations were given regarding COVID-19 vaccination; Up-to-date    All the questions that the patient had were answered to his satisfaction  We'll see the patient back in six months  Thank you for having us involved in the care of your patient. Please call us if you have any questions or concerns. Emmett Montgomery MD, MD    Pulmonary and Critical Care Medicine            Please note that this chart was generated using voice recognition Dragon dictation software.   Although every effort was made to ensure the accuracy of this automated transcription, some errors in

## 2022-03-10 ENCOUNTER — HOSPITAL ENCOUNTER (OUTPATIENT)
Dept: PHARMACY | Age: 77
Setting detail: THERAPIES SERIES
Discharge: HOME | End: 2022-03-10
Payer: MEDICARE

## 2022-03-10 DIAGNOSIS — I48.0 PAROXYSMAL A-FIB (HCC): Primary | ICD-10-CM

## 2022-03-10 LAB
INR BLD: 2
PROTIME: 24 SECONDS

## 2022-03-10 PROCEDURE — 85610 PROTHROMBIN TIME: CPT

## 2022-03-10 PROCEDURE — 36416 COLLJ CAPILLARY BLOOD SPEC: CPT

## 2022-03-10 PROCEDURE — 99211 OFF/OP EST MAY X REQ PHY/QHP: CPT

## 2022-03-10 NOTE — PROGRESS NOTES
ANTICOAGULATION SERVICE    Date of Clinic Visit:  3/10/2022    Evie Broderick is a 77 y.o. male who presents to clinic today for anticoagulation monitoring and adjustment. Recent INR Results:  Internal QC passed  Lab Results   Component Value Date    INR 2 03/10/2022    INR 1.9 02/10/2022       Current Warfarin Dosage:  Dosing Plan  As of 3/10/2022    TTR:  60.4 % (1 y)   Full warfarin instructions:  7.5 mg every Tue, Thu; 5 mg all other days               Assessment/Plan:    Modify warfarin dose as noted above:     Next Clinic Appointment:  Return date  As of 3/10/2022    TTR:  60.4 % (1 y)   Next INR check:  4/7/2022             Please call [facility] at [phone] with any questions. Thanks!   Keaton Durant [facility]  Anticoagulation Service Pharmacist  3/10/2022 1:58 PM  For Pharmacy Admin Tracking Only     Intervention Detail: Dose Adjustment: 1, reason: Therapy Optimization   Total # of Interventions Recommended: 1   Total # of Interventions Accepted: 1   Time Spent (min): 20

## 2022-03-18 ENCOUNTER — TELEMEDICINE (OUTPATIENT)
Dept: FAMILY MEDICINE CLINIC | Age: 77
End: 2022-03-18
Payer: MEDICARE

## 2022-03-18 DIAGNOSIS — Z00.00 MEDICARE ANNUAL WELLNESS VISIT, SUBSEQUENT: Primary | ICD-10-CM

## 2022-03-18 PROCEDURE — 3017F COLORECTAL CA SCREEN DOC REV: CPT | Performed by: FAMILY MEDICINE

## 2022-03-18 PROCEDURE — G0439 PPPS, SUBSEQ VISIT: HCPCS | Performed by: FAMILY MEDICINE

## 2022-03-18 PROCEDURE — 4040F PNEUMOC VAC/ADMIN/RCVD: CPT | Performed by: FAMILY MEDICINE

## 2022-03-18 PROCEDURE — 1123F ACP DISCUSS/DSCN MKR DOCD: CPT | Performed by: FAMILY MEDICINE

## 2022-03-18 PROCEDURE — G8482 FLU IMMUNIZE ORDER/ADMIN: HCPCS | Performed by: FAMILY MEDICINE

## 2022-03-18 ASSESSMENT — PATIENT HEALTH QUESTIONNAIRE - PHQ9
SUM OF ALL RESPONSES TO PHQ QUESTIONS 1-9: 0
1. LITTLE INTEREST OR PLEASURE IN DOING THINGS: 0
SUM OF ALL RESPONSES TO PHQ QUESTIONS 1-9: 0
2. FEELING DOWN, DEPRESSED OR HOPELESS: 0
SUM OF ALL RESPONSES TO PHQ QUESTIONS 1-9: 0
SUM OF ALL RESPONSES TO PHQ9 QUESTIONS 1 & 2: 0
SUM OF ALL RESPONSES TO PHQ QUESTIONS 1-9: 0

## 2022-03-18 ASSESSMENT — LIFESTYLE VARIABLES: HOW OFTEN DO YOU HAVE A DRINK CONTAINING ALCOHOL: NEVER

## 2022-03-18 NOTE — PROGRESS NOTES
Medicare Annual Wellness Visit    Jonathan Mcdaniel 12 is here for Medicare AWV (subsequent; last 3/9/2021)    Assessment & Plan    Recommendations for Preventive Services Due: see orders and patient instructions/AVS.  Recommended screening schedule for the next 5-10 years is provided to the patient in written form: see Patient Instructions/AVS.     No follow-ups on file. Subjective     Patient's complete Health Risk Assessment and screening values have been reviewed and are found in Flowsheets. The following problems were reviewed today and where indicated follow up appointments were made and/or referrals ordered. Positive Risk Factor Screenings with Interventions:              Health Habits/Nutrition:     Physical Activity: Insufficiently Active    Days of Exercise per Week: 2 days    Minutes of Exercise per Session: 60 min     Have you lost any weight without trying in the past 3 months?: No     Have you seen the dentist within the past year?: Yes    Health Habits/Nutrition Interventions:  · Nutritional issues:  educational materials for healthy, well-balanced diet provided    Hearing/Vision:  Do you or your family notice any trouble with your hearing that hasn't been managed with hearing aids?: No (bilateral hearing aides)  Do you have difficulty driving, watching TV, or doing any of your daily activities because of your eyesight?: No  Have you had an eye exam within the past year?: (!) No  No exam data present    Hearing/Vision Interventions:  · Vision concerns:  patient encouraged to make appointment with his/her eye specialist            Objective      Patient-Reported Vitals  Patient-Reported Systolic (Top): 060 mmHg  Patient-Reported Diastolic (Bottom): 67 mmHg  Patient-Reported Pulse: 72  Patient-Reported Weight: 245lb  Patient-Reported Height: 5\"10\"              Allergies   Allergen Reactions    Effient [Prasugrel] Other (See Comments)     Superficial skin bleeding.      Ace Inhibitors Other (See Comments)     Cough.  Statins Other (See Comments)     Myalgias. Prior to Visit Medications    Medication Sig Taking? Authorizing Provider   oxybutynin (DITROPAN-XL) 10 MG extended release tablet Take 1 tablet by mouth daily Yes Enrrique Wilde DO   furosemide (LASIX) 20 MG tablet TAKE 1 TABLET BY MOUTH EVERY DAY Yes Enrrique Wilde DO   levothyroxine (SYNTHROID) 125 MCG tablet Take 1 tablet by mouth Daily Yes Enrrique Wilde DO   warfarin (COUMADIN) 2.5 MG tablet Take 2 tablets by mouth daily Take 3 tablets (7.5 mg) by mouth on Tues. Take 2 tablets (5mg) by mouth all other days. Or as directed by INR results Yes Enrrique Wilde DO   clopidogrel (PLAVIX) 75 MG tablet TAKE 1 TABLET BY MOUTH EVERY DAY Yes Enrrique Wilde DO   losartan (COZAAR) 50 MG tablet TAKE 1 TABLET BY MOUTH EVERY DAY Yes Enrrique Wilde DO   tamsulosin (FLOMAX) 0.4 MG capsule TAKE 1 CAPSULE BY MOUTH EVERY DAY Yes Naty Lazcano,    Alectinib HCl (ALECENSA) 150 MG CAPS Take 600 mg by mouth 2 times daily 4 caps BID Yes Santos Martínez MD   Omega-3 Fatty Acids (FISH OIL) 1200 MG CPDR Take 2,400 mg by mouth 2 times daily Yes Historical Provider, MD   niacin 500 MG extended release capsule Take 500 mg by mouth 2 times daily Yes Historical Provider, MD   Metoprolol Tartrate 75 MG TABS TAKE 1 TABLET BY MOUTH TWO TIMES A DAY Yes Historical Provider, MD   pravastatin (PRAVACHOL) 40 MG tablet TAKE ONE TABLET BY MOUTH ONCE EVERY EVENING Yes Enrrique Wilde DO   flecainide (TAMBOCOR) 50 MG tablet Take 50 mg by mouth 2 times daily Yes Historical Provider, MD   ECHINACEA HERB PO Take by mouth daily Yes Historical Provider, MD   albuterol sulfate HFA (VENTOLIN HFA) 108 (90 Base) MCG/ACT inhaler Inhale 2 puffs into the lungs every 6 hours as needed for Wheezing or Shortness of Breath Yes Enrrique Wilde, DO   Multiple Vitamins-Minerals (MULTIVITAMIN PO) daily.  Yes Historical Provider, MD       CareTeam (Including outside providers/suppliers regularly involved in providing care):   Patient Care Team:  Gina Robbins DO as PCP - General (Family Medicine)  Gina Robbins DO as PCP - [facility] Empaneled Provider  Kayla Ordoñez RN as Nurse Navigator (Oncology)  Steph Mcfadden MD as Consulting Physician (Hematology and Oncology)  Montserrat Nelson MD as Consulting Physician (Cardiology)  Elva Bennett MD as Consulting Physician (Pulmonary Disease)    Reviewed and updated this visit:  Tobacco  Allergies  Meds  Med Hx  Surg Hx  Soc Hx  Fam Hx          AWV completed via telephone encounter. Bang NELSON Obdulio, was evaluated through a synchronous (real-time) audio-video encounter. The patient (or guardian if applicable) is aware that this is a billable service, which includes applicable co-pays. This Virtual Visit was conducted with patient's (and/or legal guardian's) consent. The visit was conducted pursuant to the emergency declaration under the [address] waiver authority and the [employer] and [employer] General Act. Patient identification was verified, and a caregiver was present when appropriate. The patient was located at home in a state where the provider was licensed to provide care. Jaqueline Davis RN, 3/18/2022, performed the documented evaluation under the direct supervision of the attending physician.

## 2022-03-31 ENCOUNTER — HOSPITAL ENCOUNTER (OUTPATIENT)
Dept: LAB | Age: 77
Discharge: HOME | End: 2022-03-31
Payer: MEDICARE

## 2022-03-31 DIAGNOSIS — M89.9 BONE LESION: ICD-10-CM

## 2022-03-31 DIAGNOSIS — C34.90 ALK-POSITIVE NON-SMALL CELL LUNG CANCER (HCC): ICD-10-CM

## 2022-03-31 DIAGNOSIS — C34.91 ADENOCARCINOMA OF RIGHT LUNG (HCC): ICD-10-CM

## 2022-03-31 DIAGNOSIS — J70.0 RADIATION PNEUMONITIS (HCC): ICD-10-CM

## 2022-03-31 LAB
ABSOLUTE EOS #: 0.14 K/UL (ref 0–0.44)
ABSOLUTE IMMATURE GRANULOCYTE: 0.08 K/UL (ref 0–0.3)
ABSOLUTE LYMPH #: 2.12 K/UL (ref 1.1–3.7)
ABSOLUTE MONO #: 0.67 K/UL (ref 0.1–1.2)
ALBUMIN SERPL-MCNC: 4.3 G/DL (ref 3.5–5.2)
ALBUMIN/GLOBULIN RATIO: 1.4 (ref 1–2.5)
ALP BLD-CCNC: 81 U/L (ref 40–129)
ALT SERPL-CCNC: 21 U/L (ref 5–41)
ANION GAP SERPL CALCULATED.3IONS-SCNC: 10 MMOL/L (ref 9–17)
AST SERPL-CCNC: 22 U/L
BASOPHILS # BLD: 1 % (ref 0–2)
BASOPHILS ABSOLUTE: 0.04 K/UL (ref 0–0.2)
BILIRUB SERPL-MCNC: 0.37 MG/DL (ref 0.3–1.2)
BUN BLDV-MCNC: 24 MG/DL (ref 8–23)
BUN/CREAT BLD: 19 (ref 9–20)
CALCIUM SERPL-MCNC: 9.5 MG/DL (ref 8.6–10.4)
CHLORIDE BLD-SCNC: 103 MMOL/L (ref 98–107)
CO2: 28 MMOL/L (ref 20–31)
CREAT SERPL-MCNC: 1.29 MG/DL (ref 0.7–1.2)
EOSINOPHILS RELATIVE PERCENT: 2 % (ref 1–4)
GFR AFRICAN AMERICAN: >60 ML/MIN
GFR NON-AFRICAN AMERICAN: 54 ML/MIN
GFR SERPL CREATININE-BSD FRML MDRD: ABNORMAL ML/MIN/{1.73_M2}
GLUCOSE BLD-MCNC: 166 MG/DL (ref 70–99)
HCT VFR BLD CALC: 34.3 % (ref 40.7–50.3)
HEMOGLOBIN: 11.2 G/DL (ref 13–17)
IMMATURE GRANULOCYTES: 1 %
LYMPHOCYTES # BLD: 26 % (ref 24–43)
MCH RBC QN AUTO: 33.4 PG (ref 25.2–33.5)
MCHC RBC AUTO-ENTMCNC: 32.7 G/DL (ref 25.2–33.5)
MCV RBC AUTO: 102.4 FL (ref 82.6–102.9)
MONOCYTES # BLD: 8 % (ref 3–12)
NRBC AUTOMATED: 0 PER 100 WBC
PDW BLD-RTO: 16.5 % (ref 11.8–14.4)
PLATELET # BLD: 161 K/UL (ref 138–453)
PMV BLD AUTO: 10.5 FL (ref 8.1–13.5)
POTASSIUM SERPL-SCNC: 4.1 MMOL/L (ref 3.7–5.3)
RBC # BLD: 3.35 M/UL (ref 4.21–5.77)
RBC # BLD: ABNORMAL 10*6/UL
SEG NEUTROPHILS: 62 % (ref 36–65)
SEGMENTED NEUTROPHILS ABSOLUTE COUNT: 5.15 K/UL (ref 1.5–8.1)
SODIUM BLD-SCNC: 141 MMOL/L (ref 135–144)
TOTAL PROTEIN: 7.4 G/DL (ref 6.4–8.3)
WBC # BLD: 8.2 K/UL (ref 3.5–11.3)

## 2022-03-31 PROCEDURE — 80053 COMPREHEN METABOLIC PANEL: CPT

## 2022-03-31 PROCEDURE — 36415 COLL VENOUS BLD VENIPUNCTURE: CPT

## 2022-03-31 PROCEDURE — 85025 COMPLETE CBC W/AUTO DIFF WBC: CPT

## 2022-04-07 ENCOUNTER — HOSPITAL ENCOUNTER (OUTPATIENT)
Dept: MRI IMAGING | Age: 77
Discharge: HOME | End: 2022-04-09
Payer: MEDICARE

## 2022-04-07 ENCOUNTER — HOSPITAL ENCOUNTER (OUTPATIENT)
Dept: PHARMACY | Age: 77
Setting detail: THERAPIES SERIES
Discharge: HOME | End: 2022-04-07
Payer: MEDICARE

## 2022-04-07 ENCOUNTER — HOSPITAL ENCOUNTER (OUTPATIENT)
Dept: CT IMAGING | Age: 77
Discharge: HOME | End: 2022-04-09
Payer: MEDICARE

## 2022-04-07 DIAGNOSIS — C79.51 BONE METASTASIS (HCC): ICD-10-CM

## 2022-04-07 DIAGNOSIS — D64.9 ANEMIA, UNSPECIFIED TYPE: ICD-10-CM

## 2022-04-07 DIAGNOSIS — R41.3 MEMORY LOSS: ICD-10-CM

## 2022-04-07 DIAGNOSIS — C34.90 ALK-POSITIVE NON-SMALL CELL LUNG CANCER (HCC): ICD-10-CM

## 2022-04-07 DIAGNOSIS — I48.0 PAROXYSMAL A-FIB (HCC): Primary | ICD-10-CM

## 2022-04-07 LAB
INR BLD: 1.8
PROTIME: 22.2 SECONDS

## 2022-04-07 PROCEDURE — 36416 COLLJ CAPILLARY BLOOD SPEC: CPT

## 2022-04-07 PROCEDURE — A9579 GAD-BASE MR CONTRAST NOS,1ML: HCPCS | Performed by: INTERNAL MEDICINE

## 2022-04-07 PROCEDURE — 85610 PROTHROMBIN TIME: CPT

## 2022-04-07 PROCEDURE — 6360000004 HC RX CONTRAST MEDICATION: Performed by: INTERNAL MEDICINE

## 2022-04-07 PROCEDURE — 99212 OFFICE O/P EST SF 10 MIN: CPT

## 2022-04-07 PROCEDURE — 2709999900 CT CHEST ABDOMEN PELVIS W CONTRAST

## 2022-04-07 PROCEDURE — 70553 MRI BRAIN STEM W/O & W/DYE: CPT

## 2022-04-07 RX ADMIN — IOPAMIDOL 100 ML: 755 INJECTION, SOLUTION INTRAVENOUS at 11:57

## 2022-04-07 RX ADMIN — GADOTERIDOL 20 ML: 279.3 INJECTION, SOLUTION INTRAVENOUS at 11:53

## 2022-04-07 NOTE — PROGRESS NOTES
ANTICOAGULATION SERVICE    Date of Clinic Visit:  4/7/2022    Lary Mak is a 77 y.o. male who presents to clinic today for anticoagulation monitoring and adjustment. Recent INR Results:  Internal QC passed  Lab Results   Component Value Date    INR 1.8 04/07/2022    INR 2 03/10/2022       Current Warfarin Dosage:  Dosing Plan  As of 4/7/2022    TTR:  56.2 % (1.1 y)   Full warfarin instructions:  4/7: 7.5 mg; Otherwise 7.5 mg every Mon, Wed, Fri; 5 mg all other days               Assessment/Plan:    Modify warfarin dose as noted above: Patient is just below target will boost today's dose and overall dose by 6.2% and re-check as normal.    Next Clinic Appointment:  Return date  As of 4/7/2022    TTR:  56.2 % (1.1 y)   Next INR check:  5/5/2022             Please call [facility] [[email or web address] Anticoagulation Clinic at [phone] with any questions. Thanks!   Skip Willingham, [address]  Anticoagulation Service Pharmacist  4/7/2022 12:16 PM  For Pharmacy Admin Tracking Only     Intervention Detail: Dose Adjustment: 1, reason: Therapy Optimization   Total # of Interventions Recommended: 1   Total # of Interventions Accepted: 1   Time Spent (min): 10

## 2022-04-07 NOTE — PATIENT INSTRUCTIONS
\"On day of next appointment, please screen for temperature and COVID-19 symptoms prior to you clinic appointment. If any symptoms present, please call [phone] to reschedule. \"

## 2022-04-12 ENCOUNTER — OFFICE VISIT (OUTPATIENT)
Dept: ONCOLOGY | Age: 77
End: 2022-04-12
Payer: MEDICARE

## 2022-04-12 VITALS
SYSTOLIC BLOOD PRESSURE: 128 MMHG | WEIGHT: 249.2 LBS | HEIGHT: 70 IN | DIASTOLIC BLOOD PRESSURE: 70 MMHG | TEMPERATURE: 97.9 F | BODY MASS INDEX: 35.68 KG/M2

## 2022-04-12 DIAGNOSIS — D64.9 ANEMIA, UNSPECIFIED TYPE: ICD-10-CM

## 2022-04-12 DIAGNOSIS — C79.51 BONE METASTASIS (HCC): ICD-10-CM

## 2022-04-12 DIAGNOSIS — J70.0 RADIATION PNEUMONITIS (HCC): ICD-10-CM

## 2022-04-12 DIAGNOSIS — C34.90 ALK-POSITIVE NON-SMALL CELL LUNG CANCER (HCC): Primary | ICD-10-CM

## 2022-04-12 PROCEDURE — G8427 DOCREV CUR MEDS BY ELIG CLIN: HCPCS | Performed by: INTERNAL MEDICINE

## 2022-04-12 PROCEDURE — 99214 OFFICE O/P EST MOD 30 MIN: CPT | Performed by: INTERNAL MEDICINE

## 2022-04-12 PROCEDURE — 4040F PNEUMOC VAC/ADMIN/RCVD: CPT | Performed by: INTERNAL MEDICINE

## 2022-04-12 PROCEDURE — 1123F ACP DISCUSS/DSCN MKR DOCD: CPT | Performed by: INTERNAL MEDICINE

## 2022-04-12 PROCEDURE — 1036F TOBACCO NON-USER: CPT | Performed by: INTERNAL MEDICINE

## 2022-04-12 PROCEDURE — 3017F COLORECTAL CA SCREEN DOC REV: CPT | Performed by: INTERNAL MEDICINE

## 2022-04-12 PROCEDURE — G8417 CALC BMI ABV UP PARAM F/U: HCPCS | Performed by: INTERNAL MEDICINE

## 2022-04-12 PROCEDURE — 99215 OFFICE O/P EST HI 40 MIN: CPT | Performed by: INTERNAL MEDICINE

## 2022-04-12 NOTE — PROGRESS NOTES
Melany Nieto Shock                                                                                                                  4/12/2022  MRN:   [id number]  YOB: 1945  PCP:                           Edy Floyd,   Referring Physician: No ref. provider found  Treating Physician Name: Mary Mclean MD      Reason for visit:  Patient presents to the clinic for toxicity check and to discuss results of lab work-up, imaging studies and further treatment plan. 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 Active and recent treatments:  concurrent chemoradiation using carboplatin and Taxol  Consolidation therapy with Imfinzi-1/2021  Carboplatin, Alimta and bevacizumab x1-8/2021 while awaiting results of NGS  Alectinib-10/8/2021    Summary of Case/History:    Tootie Pratt a 77 y. o.male is a patient with biopsy confirmed adenocarcinoma of right lung presents to the office to establish care and for further evaluation treatment recommendations. Patient was initially seen by pulmonary team for lung nodules. Patient CT scan from July 2019 showed a right-sided pleural calcification thickening concerning for asbestos exposure. Patient also noted to have multiple pulmonary nodules measuring between 1.5 x 1.3 cm. Follow-up CT chest from July 2020 showed increase in size of the right lower lobe lung nodule which now measured 2.1 cm. Subsequently patient underwent CT PET on 8/22 which showed FDG avid right lower lobe lung nodule with SUV of 4.6. Patient CT PET was also positive for subcarinal lymph node with SUV of 4.6. Patient underwent bronchoscopy with endobronchial ultrasound biopsy of the subcarinal lymph node confirmed adenocarcinoma.   Patient has significant history of tobacco dependence around 30-pack-year however he quit about 25 years ago. Patient does have coronary artery disease status post stent placement. Patient also gives history of occupational asthma and exposure to bacteria. Patient does give history of weight loss but describes it as intentional.  Denies headaches dizziness chest pain abdominal pain nausea bone pain     Interim History:     Patient presents to the clinic for a follow-up visit and to discuss results of his lab work-up and other relevant clinical data. Patient underwent CT scan which shows stable findings. Patient still has shortness of breath which worsens with exertion. Denies hospitalization or ER visit. Continues to be on alectinib without any unexpected or severe side effects. During this visit patient's allergy, social, medical, surgical history and medications were reviewed and updated. Past Medical History:   Past Medical History:   Diagnosis Date    Abdominal hernia     small, no significant symptomatology.  Abnormal PSA     with history of slight increase.  Adenocarcinoma, lung, right (HCC)     Allergic rhinitis     Benign prostatic hypertrophy     Chest pain     occasional.     Coronary artery disease     status post drug eluting stent placement, LAD, ostial obtuse marginal.     Dysphagia     Erectile dysfunction     Familial hyperlipidemia     with hypertriglyceridemia.  Gastroesophageal reflux disease     Hearing loss, bilateral     hearing aid in the left ear only. 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     Hypertension     Impaired fasting glucose     prediabetes.  Lumbar disc herniation     L3-L4, with chronic back pain.  Mild anemia     Nasal polyps     minimal symptoms.  Nasal septal deviation     right side.  Obesity     Palpitations     occasional.     Peptic ulcer disease     Pulmonary nodules     Reactive airway disease     asthma, industrial related, also a history of hypersensitivity pneumonitis.      Sleep apnea     wears CPAP nightly  Urinary frequency        Past Surgical History:     Past Surgical History:   Procedure Laterality Date    APPENDECTOMY  age 11    BRONCHOSCOPY  09/17/2020    BRONCHOSCOPY N/A 9/17/2020    EBUS- BRONCHOSCOPY ENDOBRONCHIAL ULTRASOUND, PATHOLOGY REQUESTED- CINDY NOTIFIED, GI UNIT SCHEDULED performed by Donna White MD at [address] [facility]  10/20/2011    occluded LAD and ostial obtuse marginal stenosis, underwent drug eluting stents to both, RCA small, nondominant, occluded, ejection fraction 45%.  CARDIAC CATHETERIZATION  08/2018    with stent placement    CATARACT REMOVAL Bilateral 03/2018    COLONOSCOPY  01/21/2011    mild sigmoid diverticulosis.  COLONOSCOPY  4/6/16    hemorrhoid; sigmoid diverticulosis    CT BIOPSY PERCUTANEOUS DEEP BONE  8/18/2021    CT BIOPSY PERCUTANEOUS DEEP BONE 8/18/2021 [facility] CT SCAN    KNEE ARTHROSCOPY Left 03/19/2010    partial medial and lateral synovectomies, partial medial meniscectomy, chondroplasty.  NASAL FRACTURE SURGERY  1960    OTHER SURGICAL HISTORY Left 02/12/2010    knee injection.  PORT SURGERY N/A 10/14/2020    RIGHT PORT INSERTION performed by Rosas Constantino DO at [address] [place] [address] Right 1/28/2022    Right PORT REMOVAL performed by Rosas Constantino DO at [address] [place] [address] PRE-MALIGNANT / BENIGN SKIN LESION EXCISION Left 01/16/2012    actinic keratoses, ear, liquid nitrogen.  PRE-MALIGNANT / BENIGN SKIN LESION EXCISION Left 07/19/2011    actinic keratosis, upper ear, liquid nitrogen.      PROSTATE BIOPSY Bilateral 09/08/2015    Benign    REPAIR UMBILICAL QVCZ,0+F/A,MXRNQ N/A 3/38/9542    UMBILICAL Hernia Repair w/ Mesh performed by Feliciano Chand MD at [address] [place] [address] SEPTOPLASTY  10/30/2015    with bilateral submucosal resection of the inferior turbinates, left middle turbinate elza bullosa resection done by Dr Radha Chaparro ARTHROSCOPY Left 10/17/14    W/ SUBACROMIAL DECOMPRESSION, DEBRIDEMENT ET CHONDROPLASTY    UPPER GASTROINTESTINAL ENDOSCOPY  2011    superficial ulcer of the fundus, erostive gastritis.  VASECTOMY Bilateral 1980       Patient Family Social History:     Social History     Socioeconomic History    Marital status:      Spouse name: None    Number of children: None    Years of education: None    Highest education level: None   Occupational History    None   Tobacco Use    Smoking status: Former Smoker     Packs/day: 2.00     Years: 15.00     Pack years: 30.00     Start date: 1966     Quit date: 1985     Years since quittin.3    Smokeless tobacco: Never Used    Tobacco comment: smoked 2 packs a day for 14 to 15 years, quit in    Vaping Use    Vaping Use: Never used   Substance and Sexual Activity    Alcohol use: No     Alcohol/week: 0.0 standard drinks     Comment: rare    Drug use: No    Sexual activity: None   Other Topics Concern    None   Social History Narrative    None     Social Determinants of Health     Financial Resource Strain: Low Risk     Difficulty of Paying Living Expenses: Not hard at all   Food Insecurity: No Food Insecurity    Worried About Running Out of Food in the Last Year: Never true    [place] of Food in the Last Year: Never true   Transportation Needs:     Lack of Transportation (Medical): Not on file    Lack of Transportation (Non-Medical):  Not on file   Physical Activity: Insufficiently Active    Days of Exercise per Week: 2 days    Minutes of Exercise per Session: 60 min   Stress:     Feeling of Stress : Not on file   Social Connections:     Frequency of Communication with Friends and Family: Not on file    Frequency of Social Gatherings with Friends and Family: Not on file    Attends Rastafari Services: Not on file    Active Member of Clubs or Organizations: Not on file    Attends Club or Organization Meetings: Not on file    Marital Status: Not on file   Intimate Partner Violence:     Fear of Current or Ex-Partner: Not on file    Emotionally Abused: Not on file    Physically Abused: Not on file    Sexually Abused: Not on file   Housing Stability:     Unable to Pay for Housing in the Last Year: Not on file    Number of Places Lived in the Last Year: Not on file    Unstable Housing in the Last Year: Not on file     Family History   Problem Relation Age of Onset    Cancer Mother         lymphoma    Thyroid Disease Mother         hypothyroidism    Stroke Mother     Heart Disease Mother     High Blood Pressure Mother     Heart Attack Mother         in her 46s    Heart Failure Father         congestive    Coronary Art Dis Father     Emphysema Father         was a smoker    Heart Disease Maternal Grandmother     Heart Disease Maternal Grandfather     Crohn's Disease Sister     High Cholesterol Sister     High Cholesterol Child        Current Medications:     Current Outpatient Medications   Medication Sig Dispense Refill    oxybutynin (DITROPAN-XL) 10 MG extended release tablet Take 1 tablet by mouth daily 90 tablet 1    furosemide (LASIX) 20 MG tablet TAKE 1 TABLET BY MOUTH EVERY DAY 90 tablet 1    levothyroxine (SYNTHROID) 125 MCG tablet Take 1 tablet by mouth Daily 90 tablet 1    warfarin (COUMADIN) 2.5 MG tablet Take 2 tablets by mouth daily Take 3 tablets (7.5 mg) by mouth on Tues. Take 2 tablets (5mg) by mouth all other days.  Or as directed by INR results 90 tablet 3    clopidogrel (PLAVIX) 75 MG tablet TAKE 1 TABLET BY MOUTH EVERY DAY 90 tablet 1    losartan (COZAAR) 50 MG tablet TAKE 1 TABLET BY MOUTH EVERY DAY 90 tablet 1    tamsulosin (FLOMAX) 0.4 MG capsule TAKE 1 CAPSULE BY MOUTH EVERY DAY 90 capsule 1    Alectinib HCl (ALECENSA) 150 MG CAPS Take 600 mg by mouth 2 times daily 4 caps  capsule 5    Omega-3 Fatty Acids (FISH OIL) 1200 MG CPDR Take 2,400 mg by mouth 2 times daily      niacin 500 MG extended release capsule Take 500 mg by mouth 2 times daily      Metoprolol Tartrate 75 MG TABS TAKE 1 TABLET BY MOUTH TWO TIMES A DAY      pravastatin (PRAVACHOL) 40 MG tablet TAKE ONE TABLET BY MOUTH ONCE EVERY EVENING 90 tablet 3    flecainide (TAMBOCOR) 50 MG tablet Take 50 mg by mouth 2 times daily      ECHINACEA HERB PO Take by mouth daily      albuterol sulfate HFA (VENTOLIN HFA) 108 (90 Base) MCG/ACT inhaler Inhale 2 puffs into the lungs every 6 hours as needed for Wheezing or Shortness of Breath 1 Inhaler 6    Multiple Vitamins-Minerals (MULTIVITAMIN PO) daily. No current facility-administered medications for this visit. Allergies:   Effient [prasugrel], Ace inhibitors, and Statins    Review of Systems:    Constitutional: No fever or chills. No night sweats, no weight loss. Positive fatigue  Eyes: No eye discharge, double vision, or eye pain   HEENT: negative for sore mouth, sore throat, hoarseness and voice change   Respiratory: negative for sputum, dyspnea, wheezing, hemoptysis, chest pain. Cardiovascular: negative for chest pain, dyspnea, palpitations, orthopnea, PND   Gastrointestinal: negative for nausea, vomiting, diarrhea, constipation, abdominal pain, Dysphagia, hematemesis and hematochezia   Genitourinary: negative for frequency, dysuria, nocturia, urinary incontinence, and hematuria   Integument: negative for rash, skin lesions, bruises. Hematologic/Lymphatic: negative for easy bruising, bleeding, lymphadenopathy, or petechiae   Endocrine: negative for heat or cold intolerance,weight changes, change in bowel habits and hair loss   Musculoskeletal: negative for myalgias, arthralgias, pain, joint swelling,and bone pain   Neurological: negative for headaches, dizziness, seizures, weakness, numbness.   Positive for memory loss    Physical Exam:  Vitals: /70   Temp 97.9 °F (36.6 °C)   Ht 5' 10\" (1.778 m)   Wt 249 lb 3.2 oz (113 kg)   BMI 35.76 kg/m²     General appearance - well appearing, no in pain or distress  Mental status - AAO X3  Eyes - pupils equal and reactive, extraocular eye movements intact  Mouth - mucous membranes moist, pharynx normal without lesions  Neck - supple, no significant adenopathy  Lymphatics - no palpable lymphadenopathy, no hepatosplenomegaly  Chest - clear to auscultation, no wheezes, rales or rhonchi, symmetric air entry  Heart - normal rate, regular rhythm, normal S1, S2, no murmurs  Abdomen - soft, nontender, nondistended, no masses or organomegaly  Neurological - alert, oriented, normal speech, no focal findings or movement disorder noted  Extremities - peripheral pulses normal, no pedal edema, no clubbing or cyanosis  Skin - normal coloration and turgor, no rashes, no suspicious skin lesions noted     DATA:    Results for orders placed or performed during the hospital encounter of 04/07/22   Protime-INR   Result Value Ref Range    INR 1.8     Protime 22.2 seconds     -- Diagnosis --   BAL RIGHT LOWER LOBE:           NEGATIVE FOR MALIGNANCY.             FINE NEEDLE ASPIRATION STATION 7 EBUS:           ADENOCARCINOMA, COMPATIBLE WITH LUNG PRIMARY. CT CHEST ABDOMEN PELVIS W CONTRAST    Result Date: 4/7/2022  EXAMINATION: CT OF THE CHEST, ABDOMEN, AND PELVIS WITH CONTRAST 4/7/2022 8:57 am TECHNIQUE: CT of the chest, abdomen and pelvis was performed with the administration of intravenous contrast. Multiplanar reformatted images are provided for review. Dose modulation, iterative reconstruction, and/or weight based adjustment of the mA/kV was utilized to reduce the radiation dose to as low as reasonably achievable. COMPARISON: 01/06/2022 08/03/2021 04/27/2021 HISTORY: ORDERING SYSTEM PROVIDED HISTORY: ALK-positive non-small cell lung cancer [facility]) TECHNOLOGIST PROVIDED HISTORY: STAT Creatinine as needed:->Yes assess disease status Reason for Exam: hx lung CA, bone metastasis FINDINGS: Chest: Mediastinum: Visualized thyroid unremarkable. No mediastinal or hilar lymphadenopathy identified.   Esophagus is unremarkable. Cardiac chambers unremarkable. Thoracic aorta normal in caliber without evidence of dissection. Lungs/pleura: There is redemonstration of chronic consolidation and architectural distortion within the right lower lobe. Bronchiectasis is similar when compared to the previous exam.  This process appears similar when compared to the previous exam. Small right pleural effusions/pleural thickening on the right is also again seen, similar when compared to the previous exam. Punctate 4-5 mm nodule within the right middle lobe is not substantially changed (series 6, image 83). Punctate subcentimeter nodules in the left lower lung are detected as well, unchanged (for example as seen on images 69 and 98). Bilateral calcified pleural plaques are seen. Emphysematous changes noted in the upper lungs. Soft Tissues/Bones: Chronic bilateral rib fractures noted. Sclerotic lesion within the right anterior 8th rib is unchanged. No new bony lesions are identified. Visualized extra thoracic soft tissues unremarkable. Abdomen/Pelvis: Organs: No acute or suspicious hepatic abnormality. Gallbladder unremarkable. Adrenals unremarkable. Spleen enhances normally. Pancreas is unremarkable. No acute or suspicious renal abnormalities. GI/Bowel: Stomach, duodenal sweep, and the remainder of the small bowel are unremarkable in appearance. Moderate prostatic hypertrophy. Moderate right inguinal hernia contains fat only. Urinary bladder unremarkable. No free pelvic fluid. Mild diverticulosis of the large bowel is present without CT evidence of diverticulitis. The large bowel is otherwise unremarkable. The appendix is not well visualized. No asymmetric pericecal inflammation is seen to suggest acute appendicitis. Pelvis: Prostate moderately enlarged. Urinary bladder unremarkable. No free pelvic fluid. Moderate right and small left inguinal hernias contain fat only.  Peritoneum/Retroperitoneum: Abdominal aorta normal in caliber. Superior mesenteric artery is enhancing. No lymphadenopathy. Bones/Soft Tissues: Bony lesions seen previously are now more sclerotic. For example, lesion within the rightward aspect of the L4 vertebral body measures approximately 2.7 cm, and is decreased in conspicuity. Lesion within the posterior left iliac wing again noted, decreased in conspicuity, and unchanged in size. Similar additional lesions within the lumbar spine and pelvis noted, also decreased in conspicuity. The extra-abdominal and extra pelvic soft tissues are unremarkable. Unchanged chronic appearing consolidation within the right lower lung, presumably reflecting scarring. Unchanged punctate nodules within the lower lungs bilaterally. Redemonstration of multiple bony lesions, with increased sclerosis, and with decreased conspicuity, suggesting continued healing. MRI BRAIN W WO CONTRAST    Result Date: 4/7/2022  EXAMINATION: MRI OF THE BRAIN WITHOUT AND WITH CONTRAST  4/7/2022 11:06 am TECHNIQUE: Multiplanar multisequence MRI of the head/brain was performed without and with the administration of intravenous contrast. COMPARISON: MRI brain performed 10/06/2020. HISTORY: ORDERING SYSTEM PROVIDED HISTORY: ALK-positive non-small cell lung cancer ([facility] [address] TECHNOLOGIST PROVIDED HISTORY: STAT Creatinine as needed:->Yes lung cancer FINDINGS: INTRACRANIAL STRUCTURES/VENTRICLES:  The sellar and suprasellar structures, optic chiasm, corpus callosum, pineal gland, tectum, and midline brainstem structures are unremarkable. The craniocervical junction is unremarkable. There is no acute hemorrhage, mass effect, or midline shift. There is satisfactory overall gray-white matter differentiation. Chronic microvascular disease. The ventricular structures are symmetric and unremarkable. The infratentorial structures including the cerebellopontine angles and internal auditory canals are unremarkable. There is no abnormal restricted diffusion. There is no abnormal blooming artifact on susceptibility weighted imaging. No abnormal postcontrast enhancement. ORBITS: The visualized portion of the orbits demonstrate no acute abnormality. SINUSES: The visualized paranasal sinuses and mastoid air cells demonstrate no acute abnormality. BONES/SOFT TISSUES: The bone marrow signal intensity appears normal. The soft tissues demonstrate no acute abnormality. Chronic microvascular disease without acute intracranial abnormality. No abnormal postcontrast enhancement. Impression:  Adenocarcinoma of right lung, clinical stage at least T1c, N2, M0 (stage IIIA)  Disease recurrence, biopsy-proven-8/2021  Radiation pneumonitis and shortness of breath  Thrombocytopenia  Anemia      Plan:  I had a detailed discussion with the patient and we went over results of lab work-up imaging studies and other relevant clinical data  Toxicity check performed. Patient is overall tolerating treatment without unexpected or severe side effects. Discussed natural history of ALK positive lung cancer. Reiterated treatment plan. Continue treatment until disease progression or intolerable toxicity  Labs are adequate for treatment. Reviewed results of CT scan. Reviewed images of CT scan with the patient and compared with the prior scans as well. Overall patient disease is stable with good response to treatment  MRI brain did not reveal any concerning findings  Consider starting Xgeva given with bone metastasis. Will discuss with patient on next visit. NCCN guidelines were reviewed and discussed with the patient. The diagnosis and care plan were discussed with the patient in detail. I discussed the natural history of the disease, prognosis, risks and goals of therapy and answered all the patients questions to the best of my ability. Patient expressed understanding and was in agreement.         Fany Hall MD          I spent more than 40 minutes examining, evaluating, reviewing data, counseling the patient and coordinating care. Greater than 50% of time was spent face-to-face with the patient this note is created with the assistance of a speech recognition program.  While intending to generate a document that actually reflects the content of the visit, the document can still have some errors including those of syntax and sound a like substitutions which may escape proof reading. It such instances, actual meaning can be extrapolated by contextual diversion. Tomas Holguin

## 2022-04-28 RX ORDER — PRAVASTATIN SODIUM 40 MG
TABLET ORAL
Qty: 90 TABLET | Refills: 3 | Status: SHIPPED | OUTPATIENT
Start: 2022-04-28

## 2022-04-28 NOTE — TELEPHONE ENCOUNTER
Alberto Man called requesting a refill of the below medication which has been pended for you:     Requested Prescriptions     Pending Prescriptions Disp Refills    pravastatin (PRAVACHOL) 40 MG tablet [Pharmacy Med Name: Pravastatin Sodium 40 MG Oral Tablet] 90 tablet 0     Sig: Take 1 tablet by mouth in the evening       Last Appointment Date: 3/18/2022  Next Appointment Date: 9/6/2022    Allergies   Allergen Reactions    Effient [Prasugrel] Other (See Comments)     Superficial skin bleeding.  Ace Inhibitors Other (See Comments)     Cough.  Statins Other (See Comments)     Myalgias.

## 2022-05-05 ENCOUNTER — HOSPITAL ENCOUNTER (OUTPATIENT)
Dept: PHARMACY | Age: 77
Setting detail: THERAPIES SERIES
Discharge: HOME | End: 2022-05-05
Payer: MEDICARE

## 2022-05-05 DIAGNOSIS — I48.0 PAROXYSMAL A-FIB (HCC): Primary | ICD-10-CM

## 2022-05-05 LAB
INR BLD: 2.1
PROTIME: 24.9 SECONDS

## 2022-05-05 PROCEDURE — 36416 COLLJ CAPILLARY BLOOD SPEC: CPT

## 2022-05-05 PROCEDURE — 99211 OFF/OP EST MAY X REQ PHY/QHP: CPT

## 2022-05-05 PROCEDURE — 85610 PROTHROMBIN TIME: CPT

## 2022-05-05 NOTE — PROGRESS NOTES
ANTICOAGULATION SERVICE    Date of Clinic Visit:  5/5/2022    Ermelinda Berman is a 77 y.o. male who presents to clinic today for anticoagulation monitoring and adjustment. Recent INR Results:  Internal QC passed  Lab Results   Component Value Date    INR 2.1 05/05/2022    INR 1.8 04/07/2022       Current Warfarin Dosage:  Dosing Plan  As of 5/5/2022    TTR:  54.7 % (1.2 y)   Full warfarin instructions:  7.5 mg every Mon, Wed, Fri; 5 mg all other days               Assessment/Plan:    Continue current regimen as INR remains stable. Next Clinic Appointment:  Return date  As of 5/5/2022    TTR:  54.7 % (1.2 y)   Next INR check:  6/8/2022             Please call [facility] at [phone] with any questions. Thanks!   Viviane Chow, [address] [facility]  Anticoagulation Service Pharmacist  5/5/2022 1:30 PM  For Pharmacy Admin Tracking Only        Total # of Interventions Recommended: 0   Total # of Interventions Accepted: 0   Time Spent (min): 15

## 2022-05-31 ENCOUNTER — OFFICE VISIT (OUTPATIENT)
Dept: ONCOLOGY | Age: 77
End: 2022-05-31
Payer: MEDICARE

## 2022-05-31 ENCOUNTER — HOSPITAL ENCOUNTER (OUTPATIENT)
Dept: LAB | Age: 77
Discharge: HOME | End: 2022-05-31
Payer: MEDICARE

## 2022-05-31 VITALS
HEIGHT: 70 IN | OXYGEN SATURATION: 95 % | HEART RATE: 72 BPM | DIASTOLIC BLOOD PRESSURE: 64 MMHG | RESPIRATION RATE: 16 BRPM | WEIGHT: 241 LBS | TEMPERATURE: 97.9 F | SYSTOLIC BLOOD PRESSURE: 120 MMHG | BODY MASS INDEX: 34.5 KG/M2

## 2022-05-31 DIAGNOSIS — C79.51 BONE METASTASIS (HCC): ICD-10-CM

## 2022-05-31 DIAGNOSIS — D64.9 ANEMIA, UNSPECIFIED TYPE: ICD-10-CM

## 2022-05-31 DIAGNOSIS — J70.0 RADIATION PNEUMONITIS (HCC): ICD-10-CM

## 2022-05-31 DIAGNOSIS — C34.90 ALK-POSITIVE NON-SMALL CELL LUNG CANCER (HCC): Primary | ICD-10-CM

## 2022-05-31 DIAGNOSIS — N18.31 STAGE 3A CHRONIC KIDNEY DISEASE (HCC): ICD-10-CM

## 2022-05-31 DIAGNOSIS — C34.90 ALK-POSITIVE NON-SMALL CELL LUNG CANCER (HCC): ICD-10-CM

## 2022-05-31 PROBLEM — N18.30 CHRONIC RENAL DISEASE, STAGE III (HCC): Status: ACTIVE | Noted: 2022-05-31

## 2022-05-31 LAB
ABSOLUTE EOS #: 0.07 K/UL (ref 0–0.44)
ABSOLUTE IMMATURE GRANULOCYTE: 0.03 K/UL (ref 0–0.3)
ABSOLUTE LYMPH #: 1.78 K/UL (ref 1.1–3.7)
ABSOLUTE MONO #: 0.53 K/UL (ref 0.1–1.2)
ALBUMIN SERPL-MCNC: 4.4 G/DL (ref 3.5–5.2)
ALBUMIN/GLOBULIN RATIO: 1.5 (ref 1–2.5)
ALP BLD-CCNC: 86 U/L (ref 40–129)
ALT SERPL-CCNC: 16 U/L (ref 5–41)
ANION GAP SERPL CALCULATED.3IONS-SCNC: 12 MMOL/L (ref 9–17)
AST SERPL-CCNC: 18 U/L
BASOPHILS # BLD: 0 % (ref 0–2)
BASOPHILS ABSOLUTE: 0.03 K/UL (ref 0–0.2)
BILIRUB SERPL-MCNC: 0.44 MG/DL (ref 0.3–1.2)
BUN BLDV-MCNC: 22 MG/DL (ref 8–23)
BUN/CREAT BLD: 17 (ref 9–20)
CALCIUM SERPL-MCNC: 9.2 MG/DL (ref 8.6–10.4)
CHLORIDE BLD-SCNC: 102 MMOL/L (ref 98–107)
CO2: 26 MMOL/L (ref 20–31)
CREAT SERPL-MCNC: 1.26 MG/DL (ref 0.7–1.2)
EOSINOPHILS RELATIVE PERCENT: 1 % (ref 1–4)
GFR AFRICAN AMERICAN: >60 ML/MIN
GFR NON-AFRICAN AMERICAN: 56 ML/MIN
GFR SERPL CREATININE-BSD FRML MDRD: ABNORMAL ML/MIN/{1.73_M2}
GLUCOSE BLD-MCNC: 186 MG/DL (ref 70–99)
HCT VFR BLD CALC: 31.1 % (ref 40.7–50.3)
HEMOGLOBIN: 10.1 G/DL (ref 13–17)
IMMATURE GRANULOCYTES: 0 %
LYMPHOCYTES # BLD: 24 % (ref 24–43)
MCH RBC QN AUTO: 33.4 PG (ref 25.2–33.5)
MCHC RBC AUTO-ENTMCNC: 32.5 G/DL (ref 25.2–33.5)
MCV RBC AUTO: 103 FL (ref 82.6–102.9)
MONOCYTES # BLD: 7 % (ref 3–12)
NRBC AUTOMATED: 0 PER 100 WBC
PDW BLD-RTO: 17.5 % (ref 11.8–14.4)
PLATELET # BLD: 179 K/UL (ref 138–453)
PMV BLD AUTO: 10.7 FL (ref 8.1–13.5)
POTASSIUM SERPL-SCNC: 3.9 MMOL/L (ref 3.7–5.3)
RBC # BLD: 3.02 M/UL (ref 4.21–5.77)
RBC # BLD: ABNORMAL 10*6/UL
SEG NEUTROPHILS: 68 % (ref 36–65)
SEGMENTED NEUTROPHILS ABSOLUTE COUNT: 5.15 K/UL (ref 1.5–8.1)
SODIUM BLD-SCNC: 140 MMOL/L (ref 135–144)
TOTAL PROTEIN: 7.4 G/DL (ref 6.4–8.3)
WBC # BLD: 7.6 K/UL (ref 3.5–11.3)

## 2022-05-31 PROCEDURE — 80053 COMPREHEN METABOLIC PANEL: CPT

## 2022-05-31 PROCEDURE — 85025 COMPLETE CBC W/AUTO DIFF WBC: CPT

## 2022-05-31 PROCEDURE — G8427 DOCREV CUR MEDS BY ELIG CLIN: HCPCS | Performed by: INTERNAL MEDICINE

## 2022-05-31 PROCEDURE — 1123F ACP DISCUSS/DSCN MKR DOCD: CPT | Performed by: INTERNAL MEDICINE

## 2022-05-31 PROCEDURE — 36415 COLL VENOUS BLD VENIPUNCTURE: CPT

## 2022-05-31 PROCEDURE — 3017F COLORECTAL CA SCREEN DOC REV: CPT | Performed by: INTERNAL MEDICINE

## 2022-05-31 PROCEDURE — 99214 OFFICE O/P EST MOD 30 MIN: CPT | Performed by: INTERNAL MEDICINE

## 2022-05-31 PROCEDURE — G8417 CALC BMI ABV UP PARAM F/U: HCPCS | Performed by: INTERNAL MEDICINE

## 2022-05-31 PROCEDURE — 1036F TOBACCO NON-USER: CPT | Performed by: INTERNAL MEDICINE

## 2022-05-31 NOTE — PROGRESS NOTES
Giulia Paredes Shock                                                                                                                  5/31/2022  MRN:   [id number]  YOB: 1945  PCP:                           Kyle Colindres DO  Referring Physician: No ref. provider found  Treating Physician Name: Shana Guerra MD      Reason for visit:  Chief Complaint   Patient presents with    Lung Cancer     6 week follow up   Discussed treatment plan. Toxicity check. 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 Active and recent treatments:  concurrent chemoradiation using carboplatin and Taxol  Consolidation therapy with Imfinzi-1/2021  Carboplatin, Alimta and bevacizumab x1-8/2021 while awaiting results of NGS  Alectinib-10/8/2021    Summary of Case/History:    Kris Thompson a 77 y. o.male is a patient with biopsy confirmed adenocarcinoma of right lung presents to the office to establish care and for further evaluation treatment recommendations. Patient was initially seen by pulmonary team for lung nodules. Patient CT scan from July 2019 showed a right-sided pleural calcification thickening concerning for asbestos exposure. Patient also noted to have multiple pulmonary nodules measuring between 1.5 x 1.3 cm. Follow-up CT chest from July 2020 showed increase in size of the right lower lobe lung nodule which now measured 2.1 cm. Subsequently patient underwent CT PET on 8/22 which showed FDG avid right lower lobe lung nodule with SUV of 4.6. Patient CT PET was also positive for subcarinal lymph node with SUV of 4.6. Patient underwent bronchoscopy with endobronchial ultrasound biopsy of the subcarinal lymph node confirmed adenocarcinoma.   Patient has significant history of tobacco dependence around 30-pack-year however he quit about 25 years ago.  Patient does have coronary artery disease status post stent placement. Patient also gives history of occupational asthma and exposure to bacteria. Patient does give history of weight loss but describes it as intentional.  Denies headaches dizziness chest pain abdominal pain nausea bone pain     Interim History:     Patient presents to the clinic for a follow-up visit and for toxicity check and to review results of her blood work-up. Patient has been tolerating treatment without any unexpected or severe side effects. Denies hospitalization or ER visit. Appetite is good. Weight is stable. Nausea is controlled. Does complain of shortness of breath with exertion. During this visit patient's allergy, social, medical, surgical history and medications were reviewed and updated. Past Medical History:   Past Medical History:   Diagnosis Date    Abdominal hernia     small, no significant symptomatology.  Abnormal PSA     with history of slight increase.  Adenocarcinoma, lung, right (HCC)     Allergic rhinitis     Benign prostatic hypertrophy     Chest pain     occasional.     Coronary artery disease     status post drug eluting stent placement, LAD, ostial obtuse marginal.     Dysphagia     Erectile dysfunction     Familial hyperlipidemia     with hypertriglyceridemia.  Gastroesophageal reflux disease     Hearing loss, bilateral     hearing aid in the left ear only. 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     Hypertension     Impaired fasting glucose     prediabetes.  Lumbar disc herniation     L3-L4, with chronic back pain.  Mild anemia     Nasal polyps     minimal symptoms.  Nasal septal deviation     right side.  Obesity     Palpitations     occasional.     Peptic ulcer disease     Pulmonary nodules     Reactive airway disease     asthma, industrial related, also a history of hypersensitivity pneumonitis.      Sleep apnea     wears CPAP nightly    Urinary frequency        Past Surgical History:     Past Surgical History:   Procedure Laterality Date    APPENDECTOMY  age 11    BRONCHOSCOPY  09/17/2020    BRONCHOSCOPY N/A 9/17/2020    EBUS- BRONCHOSCOPY ENDOBRONCHIAL ULTRASOUND, PATHOLOGY REQUESTED- CINDY NOTIFIED, GI UNIT SCHEDULED performed by Noble Ann MD at [facility]  10/20/2011    occluded LAD and ostial obtuse marginal stenosis, underwent drug eluting stents to both, RCA small, nondominant, occluded, ejection fraction 45%.  CARDIAC CATHETERIZATION  08/2018    with stent placement    CATARACT REMOVAL Bilateral 03/2018    COLONOSCOPY  01/21/2011    mild sigmoid diverticulosis.  COLONOSCOPY  4/6/16    hemorrhoid; sigmoid diverticulosis    CT BIOPSY PERCUTANEOUS DEEP BONE  8/18/2021    CT BIOPSY PERCUTANEOUS DEEP BONE 8/18/2021 [facility] CT SCAN    KNEE ARTHROSCOPY Left 03/19/2010    partial medial and lateral synovectomies, partial medial meniscectomy, chondroplasty.  NASAL FRACTURE SURGERY  1960    OTHER SURGICAL HISTORY Left 02/12/2010    knee injection.  PORT SURGERY N/A 10/14/2020    RIGHT PORT INSERTION performed by Abigail Funez DO at  [facility] Right 1/28/2022    Right PORT REMOVAL performed by Abigail Funez DO at [address] [place] [address] PRE-MALIGNANT / BENIGN SKIN LESION EXCISION Left 01/16/2012    actinic keratoses, ear, liquid nitrogen.  PRE-MALIGNANT / BENIGN SKIN LESION EXCISION Left 07/19/2011    actinic keratosis, upper ear, liquid nitrogen.      PROSTATE BIOPSY Bilateral 09/08/2015    Benign    REPAIR UMBILICAL DMJS,3+O/B,LKXZV N/A 0/08/2578    UMBILICAL Hernia Repair w/ Mesh performed by Viktor Cornelius MD at [address] [place] [address] SEPTOPLASTY  10/30/2015    with bilateral submucosal resection of the inferior turbinates, left middle turbinate elza bullosa resection done by Dr Nilsa Ponce ARTHROSCOPY Left 10/17/14    W/ SUBACROMIAL DECOMPRESSION, DEBRIDEMENT ET CHONDROPLASTY    UPPER GASTROINTESTINAL ENDOSCOPY  2011    superficial ulcer of the fundus, erostive gastritis.  VASECTOMY Bilateral 1980       Patient Family Social History:     Social History     Socioeconomic History    Marital status:      Spouse name: None    Number of children: None    Years of education: None    Highest education level: None   Occupational History    None   Tobacco Use    Smoking status: Former Smoker     Packs/day: 2.00     Years: 15.00     Pack years: 30.00     Start date: 1966     Quit date: 1985     Years since quittin.4    Smokeless tobacco: Never Used    Tobacco comment: smoked 2 packs a day for 14 to 15 years, quit in    Vaping Use    Vaping Use: Never used   Substance and Sexual Activity    Alcohol use: No     Alcohol/week: 0.0 standard drinks     Comment: rare    Drug use: No    Sexual activity: None   Other Topics Concern    None   Social History Narrative    None     Social Determinants of Health     Financial Resource Strain: Low Risk     Difficulty of Paying Living Expenses: Not hard at all   Food Insecurity: No Food Insecurity    Worried About Running Out of Food in the Last Year: Never true    Ochoa of Food in the Last Year: Never true   Transportation Needs:     Lack of Transportation (Medical): Not on file    Lack of Transportation (Non-Medical):  Not on file   Physical Activity: Insufficiently Active    Days of Exercise per Week: 2 days    Minutes of Exercise per Session: 60 min   Stress:     Feeling of Stress : Not on file   Social Connections:     Frequency of Communication with Friends and Family: Not on file    Frequency of Social Gatherings with Friends and Family: Not on file    Attends Gnosticist Services: Not on file    Active Member of Clubs or Organizations: Not on file    Attends Club or Organization Meetings: Not on file    Marital Status: Not on file   Intimate Partner Violence:     Fear of Current or Ex-Partner: Not on file    Emotionally Abused: Not on file    Physically Abused: Not on file    Sexually Abused: Not on file   Housing Stability:     Unable to Pay for Housing in the Last Year: Not on file    Number of Places Lived in the Last Year: Not on file    Unstable Housing in the Last Year: Not on file     Family History   Problem Relation Age of Onset    Cancer Mother         lymphoma    Thyroid Disease Mother         hypothyroidism    Stroke Mother     Heart Disease Mother     High Blood Pressure Mother     Heart Attack Mother         in her 46s    Heart Failure Father         congestive    Coronary Art Dis Father     Emphysema Father         was a smoker    Heart Disease Maternal Grandmother     Heart Disease Maternal Grandfather     Crohn's Disease Sister     High Cholesterol Sister     High Cholesterol Child        Current Medications:     Current Outpatient Medications   Medication Sig Dispense Refill    pravastatin (PRAVACHOL) 40 MG tablet Take 1 tablet by mouth in the evening 90 tablet 3    oxybutynin (DITROPAN-XL) 10 MG extended release tablet Take 1 tablet by mouth daily 90 tablet 1    furosemide (LASIX) 20 MG tablet TAKE 1 TABLET BY MOUTH EVERY DAY 90 tablet 1    levothyroxine (SYNTHROID) 125 MCG tablet Take 1 tablet by mouth Daily 90 tablet 1    warfarin (COUMADIN) 2.5 MG tablet Take 2 tablets by mouth daily Take 3 tablets (7.5 mg) by mouth on Tues. Take 2 tablets (5mg) by mouth all other days.  Or as directed by INR results 90 tablet 3    clopidogrel (PLAVIX) 75 MG tablet TAKE 1 TABLET BY MOUTH EVERY DAY 90 tablet 1    losartan (COZAAR) 50 MG tablet TAKE 1 TABLET BY MOUTH EVERY DAY 90 tablet 1    tamsulosin (FLOMAX) 0.4 MG capsule TAKE 1 CAPSULE BY MOUTH EVERY DAY 90 capsule 1    Alectinib HCl (ALECENSA) 150 MG CAPS Take 600 mg by mouth 2 times daily 4 caps  capsule 5    Omega-3 Fatty Acids (FISH OIL) 1200 MG CPDR Take 2,400 mg by mouth 2 times daily      niacin 500 MG kg)   SpO2 95%   BMI 34.58 kg/m²     General appearance - well appearing, no in pain or distress  Mental status - AAO X3  Eyes - pupils equal and reactive, extraocular eye movements intact  Mouth - mucous membranes moist, pharynx normal without lesions  Neck - supple, no significant adenopathy  Lymphatics - no palpable lymphadenopathy, no hepatosplenomegaly  Chest - clear to auscultation, no wheezes, rales or rhonchi, symmetric air entry  Heart - normal rate, regular rhythm, normal S1, S2, no murmurs  Abdomen - soft, nontender, nondistended, no masses or organomegaly  Neurological - alert, oriented, normal speech, no focal findings or movement disorder noted  Extremities - peripheral pulses normal, no pedal edema, no clubbing or cyanosis  Skin - normal coloration and turgor, no rashes, no suspicious skin lesions noted     DATA:    Results for orders placed or performed during the hospital encounter of 05/31/22   Comprehensive Metabolic Panel   Result Value Ref Range    Glucose 186 (H) 70 - 99 mg/dL    BUN 22 8 - 23 mg/dL    CREATININE 1.26 (H) 0.70 - 1.20 mg/dL    Bun/Cre Ratio 17 9 - 20    Calcium 9.2 8.6 - 10.4 mg/dL    Sodium 140 135 - 144 mmol/L    Potassium 3.9 3.7 - 5.3 mmol/L    Chloride 102 98 - 107 mmol/L    CO2 26 20 - 31 mmol/L    Anion Gap 12 9 - 17 mmol/L    Alkaline Phosphatase 86 40 - 129 U/L    ALT 16 5 - 41 U/L    AST 18 <40 U/L    Total Bilirubin 0.44 0.3 - 1.2 mg/dL    Total Protein 7.4 6.4 - 8.3 g/dL    Albumin 4.4 3.5 - 5.2 g/dL    Albumin/Globulin Ratio 1.5 1.0 - 2.5    GFR Non- 56 (L) >60 mL/min    GFR African American >60 >60 mL/min    GFR Comment         CBC with Auto Differential   Result Value Ref Range    WBC 7.6 3.5 - 11.3 k/uL    RBC 3.02 (L) 4.21 - 5.77 m/uL    Hemoglobin 10.1 (L) 13.0 - 17.0 g/dL    Hematocrit 31.1 (L) 40.7 - 50.3 %    .0 (H) 82.6 - 102.9 fL    MCH 33.4 25.2 - 33.5 pg    MCHC 32.5 25.2 - 33.5 g/dL    RDW 17.5 (H) 11.8 - 14.4 %    Platelets 073 138 - 453 k/uL    MPV 10.7 8.1 - 13.5 fL    NRBC Automated 0.0 0.0 per 100 WBC    Seg Neutrophils 68 (H) 36 - 65 %    Lymphocytes 24 24 - 43 %    Monocytes 7 3 - 12 %    Eosinophils % 1 1 - 4 %    Basophils 0 0 - 2 %    Immature Granulocytes 0 0 %    Segs Absolute 5.15 1.50 - 8.10 k/uL    Absolute Lymph # 1.78 1.10 - 3.70 k/uL    Absolute Mono # 0.53 0.10 - 1.20 k/uL    Absolute Eos # 0.07 0.00 - 0.44 k/uL    Basophils Absolute 0.03 0.00 - 0.20 k/uL    Absolute Immature Granulocyte 0.03 0.00 - 0.30 k/uL    RBC Morphology ANISOCYTOSIS PRESENT      -- Diagnosis --   BAL RIGHT LOWER LOBE:           NEGATIVE FOR MALIGNANCY.             FINE NEEDLE ASPIRATION STATION 7 EBUS:           ADENOCARCINOMA, COMPATIBLE WITH LUNG PRIMARY. CT CHEST ABDOMEN PELVIS W CONTRAST    Result Date: 4/7/2022  EXAMINATION: CT OF THE CHEST, ABDOMEN, AND PELVIS WITH CONTRAST 4/7/2022 8:57 am TECHNIQUE: CT of the chest, abdomen and pelvis was performed with the administration of intravenous contrast. Multiplanar reformatted images are provided for review. Dose modulation, iterative reconstruction, and/or weight based adjustment of the mA/kV was utilized to reduce the radiation dose to as low as reasonably achievable. COMPARISON: 01/06/2022 08/03/2021 04/27/2021 HISTORY: ORDERING SYSTEM PROVIDED HISTORY: ALK-positive non-small cell lung cancer [facility]) TECHNOLOGIST PROVIDED HISTORY: STAT Creatinine as needed:->Yes assess disease status Reason for Exam: hx lung CA, bone metastasis FINDINGS: Chest: Mediastinum: Visualized thyroid unremarkable. No mediastinal or hilar lymphadenopathy identified. Esophagus is unremarkable. Cardiac chambers unremarkable. Thoracic aorta normal in caliber without evidence of dissection. Lungs/pleura: There is redemonstration of chronic consolidation and architectural distortion within the right lower lobe.   Bronchiectasis is similar when compared to the previous exam.  This process appears similar when compared to the previous exam. Small right pleural effusions/pleural thickening on the right is also again seen, similar when compared to the previous exam. Punctate 4-5 mm nodule within the right middle lobe is not substantially changed (series 6, image 83). Punctate subcentimeter nodules in the left lower lung are detected as well, unchanged (for example as seen on images 69 and 98). Bilateral calcified pleural plaques are seen. Emphysematous changes noted in the upper lungs. Soft Tissues/Bones: Chronic bilateral rib fractures noted. Sclerotic lesion within the right anterior 8th rib is unchanged. No new bony lesions are identified. Visualized extra thoracic soft tissues unremarkable. Abdomen/Pelvis: Organs: No acute or suspicious hepatic abnormality. Gallbladder unremarkable. Adrenals unremarkable. Spleen enhances normally. Pancreas is unremarkable. No acute or suspicious renal abnormalities. GI/Bowel: Stomach, duodenal sweep, and the remainder of the small bowel are unremarkable in appearance. Moderate prostatic hypertrophy. Moderate right inguinal hernia contains fat only. Urinary bladder unremarkable. No free pelvic fluid. Mild diverticulosis of the large bowel is present without CT evidence of diverticulitis. The large bowel is otherwise unremarkable. The appendix is not well visualized. No asymmetric pericecal inflammation is seen to suggest acute appendicitis. Pelvis: Prostate moderately enlarged. Urinary bladder unremarkable. No free pelvic fluid. Moderate right and small left inguinal hernias contain fat only. Peritoneum/Retroperitoneum: Abdominal aorta normal in caliber. Superior mesenteric artery is enhancing. No lymphadenopathy. Bones/Soft Tissues: Bony lesions seen previously are now more sclerotic. For example, lesion within the rightward aspect of the L4 vertebral body measures approximately 2.7 cm, and is decreased in conspicuity.  Lesion within the posterior left iliac wing again noted, decreased in conspicuity, and unchanged in size. Similar additional lesions within the lumbar spine and pelvis noted, also decreased in conspicuity. The extra-abdominal and extra pelvic soft tissues are unremarkable. Unchanged chronic appearing consolidation within the right lower lung, presumably reflecting scarring. Unchanged punctate nodules within the lower lungs bilaterally. Redemonstration of multiple bony lesions, with increased sclerosis, and with decreased conspicuity, suggesting continued healing. MRI BRAIN W WO CONTRAST    Result Date: 4/7/2022  EXAMINATION: MRI OF THE BRAIN WITHOUT AND WITH CONTRAST  4/7/2022 11:06 am TECHNIQUE: Multiplanar multisequence MRI of the head/brain was performed without and with the administration of intravenous contrast. COMPARISON: MRI brain performed 10/06/2020. HISTORY: ORDERING SYSTEM PROVIDED HISTORY: ALK-positive non-small cell lung cancer ([facility] [address] TECHNOLOGIST PROVIDED HISTORY: STAT Creatinine as needed:->Yes lung cancer FINDINGS: INTRACRANIAL STRUCTURES/VENTRICLES:  The sellar and suprasellar structures, optic chiasm, corpus callosum, pineal gland, tectum, and midline brainstem structures are unremarkable. The craniocervical junction is unremarkable. There is no acute hemorrhage, mass effect, or midline shift. There is satisfactory overall gray-white matter differentiation. Chronic microvascular disease. The ventricular structures are symmetric and unremarkable. The infratentorial structures including the cerebellopontine angles and internal auditory canals are unremarkable. There is no abnormal restricted diffusion. There is no abnormal blooming artifact on susceptibility weighted imaging. No abnormal postcontrast enhancement. ORBITS: The visualized portion of the orbits demonstrate no acute abnormality. SINUSES: The visualized paranasal sinuses and mastoid air cells demonstrate no acute abnormality.  BONES/SOFT TISSUES: The bone marrow signal intensity appears normal. The soft tissues demonstrate no acute abnormality. Chronic microvascular disease without acute intracranial abnormality. No abnormal postcontrast enhancement. Impression:  Adenocarcinoma of right lung, clinical stage at least T1c, N2, M0 (stage IIIA)  Disease recurrence, biopsy-proven-8/2021  Radiation pneumonitis and shortness of breath  Thrombocytopenia  Anemia      Plan:  I had a detailed discussion with the patient and we went over results of lab work-up imaging studies and other relevant clinical data  Toxicity check performed. Patient is tolerating treatment without unexpected side effects  Labs are adequate for treatment. We will proceed with treatment   Reiterated treatment plan. Reviewed risk benefit profile and reiterated potential side-effects of ongoing treatment  Discussed natural history of ALK positive lung cancer  Continue treatment with alectinib until disease progression or intolerable toxicity  We will obtain scans before next office visit  Patient is having his teeth pulled we will hold Xeloda  NCCN guidelines were reviewed and discussed with the patient. The diagnosis and care plan were discussed with the patient in detail. I discussed the natural history of the disease, prognosis, risks and goals of therapy and answered all the patients questions to the best of my ability. Patient expressed understanding and was in agreement. Andrew Mills MD        This note is created with the assistance of a speech recognition program.  While intending to generate a document that actually reflects the content of the visit, the document can still have some errors including those of syntax and sound a like substitutions which may escape proof reading. It such instances, actual meaning can be extrapolated by contextual diversion. Tyron Judge

## 2022-06-08 ENCOUNTER — HOSPITAL ENCOUNTER (OUTPATIENT)
Dept: PHARMACY | Age: 77
Setting detail: THERAPIES SERIES
Discharge: HOME | End: 2022-06-08
Payer: MEDICARE

## 2022-06-08 DIAGNOSIS — I48.0 PAROXYSMAL A-FIB (HCC): Primary | ICD-10-CM

## 2022-06-08 LAB
INR BLD: 2.6
PROTIME: 30.9 SECONDS

## 2022-06-08 PROCEDURE — 99211 OFF/OP EST MAY X REQ PHY/QHP: CPT

## 2022-06-08 PROCEDURE — 85610 PROTHROMBIN TIME: CPT

## 2022-06-08 PROCEDURE — 36416 COLLJ CAPILLARY BLOOD SPEC: CPT

## 2022-06-08 NOTE — PROGRESS NOTES
ANTICOAGULATION SERVICE    Date of Clinic Visit:  6/8/2022    Debo Weaver is a 77 y.o. male who presents to clinic today for anticoagulation monitoring and adjustment. Recent INR Results:  Internal QC passed  Lab Results   Component Value Date    INR 2.6 06/08/2022    INR 2.1 05/05/2022       Current Warfarin Dosage:  Dosing Plan  As of 6/8/2022    TTR:  58.0 % (1.3 y)   Full warfarin instructions:  7.5 mg every Mon, Wed, Fri; 5 mg all other days               Assessment/Plan:    Continue current regimen as INR remains stable. Did change dose just for dental procedure on Monday. Next Clinic Appointment:  Return date  As of 6/8/2022    TTR:  58.0 % (1.3 y)   Next INR check:  7/6/2022             Please call [facility] Anticoagulation [facility] at [phone] with any questions. Thanks!   Jatinder Hernandez, [facility]  Anticoagulation Service Pharmacist  6/8/2022 1:28 PM  For Pharmacy Admin Tracking Only        Total # of Interventions Recommended: 0   Total # of Interventions Accepted: 0   Time Spent (min): 15

## 2022-06-08 NOTE — PATIENT INSTRUCTIONS
\"On day of next appointment, please screen for temperature and COVID-19 symptoms prior to you clinic appointment. If any symptoms present, please call [phone] to reschedule. \"

## 2022-06-22 ENCOUNTER — TELEPHONE (OUTPATIENT)
Dept: CASE MANAGEMENT | Age: 77
End: 2022-06-22

## 2022-06-22 NOTE — TELEPHONE ENCOUNTER
Name: Katherine Castro  : 1945  MRN: [id number]    Oncology Navigation Follow-Up Note  Oc De La Cruz spoke with pt. Offering assistance of needed. Pt. Continues on oral chemo agent. Pt. Denies needs today. Pt. Following MO in [place] office and will discuss case with Talya Pemberton.    Electronically signed by Alek Tamayo RN on 2022 at 9:18 AM

## 2022-06-24 ENCOUNTER — TELEPHONE (OUTPATIENT)
Dept: ONCOLOGY | Age: 77
End: 2022-06-24

## 2022-06-24 NOTE — TELEPHONE ENCOUNTER
Name: Corby Morgan  : 1945  MRN: [id number]    Oncology Navigation Follow-Up Note    Contact Type:  Telephone  Phone call to introduce writer as new navigator. Spoke with patient and wife on speaker. Notes:   Provided patient with new contact number and availability of navigator. Patient states he is doing well and denies any needs at present. He is knowledgeable of upcoming appointments. Care team updated.     Electronically signed by Jay Rodriguez RN on 2022 at 9:06 AM

## 2022-07-06 ENCOUNTER — HOSPITAL ENCOUNTER (OUTPATIENT)
Dept: PHARMACY | Age: 77
Setting detail: THERAPIES SERIES
Discharge: HOME | End: 2022-07-06
Payer: MEDICARE

## 2022-07-06 DIAGNOSIS — I48.0 PAROXYSMAL A-FIB (HCC): Primary | ICD-10-CM

## 2022-07-06 LAB
INR BLD: 2.3
PROTIME: 27.2 SECONDS

## 2022-07-06 PROCEDURE — 85610 PROTHROMBIN TIME: CPT

## 2022-07-06 PROCEDURE — 99211 OFF/OP EST MAY X REQ PHY/QHP: CPT

## 2022-07-06 PROCEDURE — 36416 COLLJ CAPILLARY BLOOD SPEC: CPT

## 2022-07-06 NOTE — PROGRESS NOTES
ANTICOAGULATION SERVICE    Date of Clinic Visit:  2022    Good Jerome is a 77 y.o. male who presents to clinic today for anticoagulation monitoring and adjustment. Recent INR Results:  Internal QC passed  Lab Results   Component Value Date    INR 2.3 2022    INR 2.6 2022       Current Warfarin Dosage:  Dosing Plan  As of 2022    TTR:  60.4 % (1.4 y)   Full warfarin instructions:  7.5 mg every Mon, Wed, Fri; 5 mg all other days               Assessment/Plan:    Continue current regimen as INR remains stable. Recheck 4 weeks. Next Clinic Appointment:  Return date  As of 2022    TTR:  60.4 % (1.4 y)   Next INR check:  8/3/2022             Please call [facility] Anticoagulation Clinic at [phone] with any questions. Thanks!   John Ramirez [facility]  Anticoagulation Service Pharmacist  2022 1:32 PM     For Pharmacy Admin Tracking Only     Intervention Detail: Adherence Monitorin   Total # of Interventions Recommended: 0   Total # of Interventions Accepted: 0   Time Spent (min): 15

## 2022-07-07 ENCOUNTER — HOSPITAL ENCOUNTER (OUTPATIENT)
Dept: LAB | Age: 77
Discharge: HOME | End: 2022-07-07
Payer: MEDICARE

## 2022-07-07 DIAGNOSIS — J70.0 RADIATION PNEUMONITIS (HCC): ICD-10-CM

## 2022-07-07 DIAGNOSIS — D64.9 ANEMIA, UNSPECIFIED TYPE: ICD-10-CM

## 2022-07-07 DIAGNOSIS — N18.31 STAGE 3A CHRONIC KIDNEY DISEASE (HCC): ICD-10-CM

## 2022-07-07 DIAGNOSIS — C79.51 BONE METASTASIS (HCC): ICD-10-CM

## 2022-07-07 DIAGNOSIS — C34.90 ALK-POSITIVE NON-SMALL CELL LUNG CANCER (HCC): ICD-10-CM

## 2022-07-07 LAB
ABSOLUTE EOS #: 0.11 K/UL (ref 0–0.44)
ABSOLUTE IMMATURE GRANULOCYTE: 0.07 K/UL (ref 0–0.3)
ABSOLUTE LYMPH #: 1.8 K/UL (ref 1.1–3.7)
ABSOLUTE MONO #: 0.53 K/UL (ref 0.1–1.2)
ALBUMIN SERPL-MCNC: 4.1 G/DL (ref 3.5–5.2)
ALBUMIN/GLOBULIN RATIO: 1.2 (ref 1–2.5)
ALP BLD-CCNC: 92 U/L (ref 40–129)
ALT SERPL-CCNC: 18 U/L (ref 5–41)
ANION GAP SERPL CALCULATED.3IONS-SCNC: 12 MMOL/L (ref 9–17)
AST SERPL-CCNC: 19 U/L
BASOPHILS # BLD: 0 % (ref 0–2)
BASOPHILS ABSOLUTE: 0.03 K/UL (ref 0–0.2)
BILIRUB SERPL-MCNC: 0.45 MG/DL (ref 0.3–1.2)
BUN BLDV-MCNC: 25 MG/DL (ref 8–23)
BUN/CREAT BLD: 18 (ref 9–20)
CALCIUM SERPL-MCNC: 9.4 MG/DL (ref 8.6–10.4)
CHLORIDE BLD-SCNC: 101 MMOL/L (ref 98–107)
CO2: 26 MMOL/L (ref 20–31)
CREAT SERPL-MCNC: 1.37 MG/DL (ref 0.7–1.2)
EOSINOPHILS RELATIVE PERCENT: 2 % (ref 1–4)
FERRITIN: 631 NG/ML (ref 30–400)
FOLATE: >20 NG/ML
GFR AFRICAN AMERICAN: >60 ML/MIN
GFR NON-AFRICAN AMERICAN: 51 ML/MIN
GFR SERPL CREATININE-BSD FRML MDRD: ABNORMAL ML/MIN/{1.73_M2}
GLUCOSE BLD-MCNC: 151 MG/DL (ref 70–99)
HCT VFR BLD CALC: 31.3 % (ref 40.7–50.3)
HEMOGLOBIN: 10.1 G/DL (ref 13–17)
IMMATURE GRANULOCYTES: 1 %
LYMPHOCYTES # BLD: 24 % (ref 24–43)
MCH RBC QN AUTO: 33.4 PG (ref 25.2–33.5)
MCHC RBC AUTO-ENTMCNC: 32.3 G/DL (ref 25.2–33.5)
MCV RBC AUTO: 103.6 FL (ref 82.6–102.9)
MONOCYTES # BLD: 7 % (ref 3–12)
NRBC AUTOMATED: 0 PER 100 WBC
PDW BLD-RTO: 16.8 % (ref 11.8–14.4)
PLATELET # BLD: 163 K/UL (ref 138–453)
PMV BLD AUTO: 10.3 FL (ref 8.1–13.5)
POTASSIUM SERPL-SCNC: 4.1 MMOL/L (ref 3.7–5.3)
RBC # BLD: 3.02 M/UL (ref 4.21–5.77)
RBC # BLD: ABNORMAL 10*6/UL
SEG NEUTROPHILS: 66 % (ref 36–65)
SEGMENTED NEUTROPHILS ABSOLUTE COUNT: 4.95 K/UL (ref 1.5–8.1)
SODIUM BLD-SCNC: 139 MMOL/L (ref 135–144)
TOTAL PROTEIN: 7.5 G/DL (ref 6.4–8.3)
VITAMIN B-12: 787 PG/ML (ref 232–1245)
WBC # BLD: 7.5 K/UL (ref 3.5–11.3)

## 2022-07-07 PROCEDURE — 82728 ASSAY OF FERRITIN: CPT

## 2022-07-07 PROCEDURE — 82746 ASSAY OF FOLIC ACID SERUM: CPT

## 2022-07-07 PROCEDURE — 82607 VITAMIN B-12: CPT

## 2022-07-07 PROCEDURE — 85025 COMPLETE CBC W/AUTO DIFF WBC: CPT

## 2022-07-07 PROCEDURE — 36415 COLL VENOUS BLD VENIPUNCTURE: CPT

## 2022-07-07 PROCEDURE — 80053 COMPREHEN METABOLIC PANEL: CPT

## 2022-07-12 ENCOUNTER — HOSPITAL ENCOUNTER (OUTPATIENT)
Dept: CT IMAGING | Age: 77
Discharge: HOME | End: 2022-07-14
Payer: MEDICARE

## 2022-07-12 DIAGNOSIS — D64.9 ANEMIA, UNSPECIFIED TYPE: ICD-10-CM

## 2022-07-12 DIAGNOSIS — J70.0 RADIATION PNEUMONITIS (HCC): ICD-10-CM

## 2022-07-12 DIAGNOSIS — C79.51 BONE METASTASIS (HCC): ICD-10-CM

## 2022-07-12 DIAGNOSIS — N18.31 STAGE 3A CHRONIC KIDNEY DISEASE (HCC): ICD-10-CM

## 2022-07-12 DIAGNOSIS — C34.90 ALK-POSITIVE NON-SMALL CELL LUNG CANCER (HCC): ICD-10-CM

## 2022-07-12 PROCEDURE — 71260 CT THORAX DX C+: CPT

## 2022-07-12 PROCEDURE — 6360000004 HC RX CONTRAST MEDICATION: Performed by: INTERNAL MEDICINE

## 2022-07-12 RX ADMIN — IOPAMIDOL 100 ML: 755 INJECTION, SOLUTION INTRAVENOUS at 08:51

## 2022-07-15 DIAGNOSIS — J70.0 RADIATION PNEUMONITIS (HCC): ICD-10-CM

## 2022-07-15 DIAGNOSIS — C79.51 BONE METASTASIS (HCC): ICD-10-CM

## 2022-07-15 DIAGNOSIS — C34.90 ALK-POSITIVE NON-SMALL CELL LUNG CANCER (HCC): ICD-10-CM

## 2022-07-15 DIAGNOSIS — D69.6 THROMBOCYTOPENIA, UNSPECIFIED (HCC): ICD-10-CM

## 2022-07-15 RX ORDER — ALECTINIB HYDROCHLORIDE 150 MG/1
600 CAPSULE ORAL 2 TIMES DAILY
Qty: 240 CAPSULE | Refills: 5 | Status: ACTIVE | OUTPATIENT
Start: 2022-07-15

## 2022-07-19 ENCOUNTER — TELEPHONE (OUTPATIENT)
Dept: ONCOLOGY | Age: 77
End: 2022-07-19

## 2022-07-19 ENCOUNTER — OFFICE VISIT (OUTPATIENT)
Dept: ONCOLOGY | Age: 77
End: 2022-07-19
Payer: MEDICARE

## 2022-07-19 VITALS
HEART RATE: 66 BPM | WEIGHT: 244 LBS | BODY MASS INDEX: 34.93 KG/M2 | OXYGEN SATURATION: 96 % | HEIGHT: 70 IN | DIASTOLIC BLOOD PRESSURE: 76 MMHG | SYSTOLIC BLOOD PRESSURE: 130 MMHG | TEMPERATURE: 97.7 F

## 2022-07-19 DIAGNOSIS — N18.31 STAGE 3A CHRONIC KIDNEY DISEASE (HCC): ICD-10-CM

## 2022-07-19 DIAGNOSIS — D64.9 ANEMIA, UNSPECIFIED TYPE: ICD-10-CM

## 2022-07-19 DIAGNOSIS — C34.90 ALK-POSITIVE NON-SMALL CELL LUNG CANCER (HCC): Primary | ICD-10-CM

## 2022-07-19 DIAGNOSIS — C79.51 BONE METASTASIS (HCC): ICD-10-CM

## 2022-07-19 PROCEDURE — G8417 CALC BMI ABV UP PARAM F/U: HCPCS | Performed by: INTERNAL MEDICINE

## 2022-07-19 PROCEDURE — 1036F TOBACCO NON-USER: CPT | Performed by: INTERNAL MEDICINE

## 2022-07-19 PROCEDURE — G8427 DOCREV CUR MEDS BY ELIG CLIN: HCPCS | Performed by: INTERNAL MEDICINE

## 2022-07-19 PROCEDURE — 99214 OFFICE O/P EST MOD 30 MIN: CPT | Performed by: INTERNAL MEDICINE

## 2022-07-19 PROCEDURE — 1123F ACP DISCUSS/DSCN MKR DOCD: CPT | Performed by: INTERNAL MEDICINE

## 2022-07-19 NOTE — TELEPHONE ENCOUNTER
Name: Doron Dodd  : 1945  MRN: [id number]    Oncology Navigation Follow-Up Note    Contact Type:  Medical Oncology  Met with patient and spouse at oncology appt. Notes:   Patient states he has been doing well. He denies any navigation needs at present. He gets Alectinib/Alecensa through the foundation. Writer provided printed contact information and reminded patient to call if assistance is needed. Understanding verbalized. Navigation following for continuity of care.     Electronically signed by Zi Joseph RN on 2022 at 10:04 AM

## 2022-07-19 NOTE — PROGRESS NOTES
years ago. Patient does have coronary artery disease status post stent placement. Patient also gives history of occupational asthma and exposure to bacteria. Patient does give history of weight loss but describes it as intentional.  Denies headaches dizziness chest pain abdominal pain nausea bone pain     Interim History:     Patient presents to the clinic for a follow-up visit and for toxicity check and to review results of her blood work-up. Patient has been tolerating treatment without any unexpected or severe side effects. Denies hospitalization or ER visit. Appetite is good. Weight is stable. Nausea is controlled. CT scans do not show any evidence of disease progression    During this visit patient's allergy, social, medical, surgical history and medications were reviewed and updated. Past Medical History:   Past Medical History:   Diagnosis Date    Abdominal hernia     small, no significant symptomatology. Abnormal PSA     with history of slight increase. Adenocarcinoma, lung, right (HCC)     Allergic rhinitis     Benign prostatic hypertrophy     Chest pain     occasional.     Coronary artery disease     status post drug eluting stent placement, LAD, ostial obtuse marginal.     Dysphagia     Erectile dysfunction     Familial hyperlipidemia     with hypertriglyceridemia. Gastroesophageal reflux disease     Hearing loss, bilateral     hearing aid in the left ear only. 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 Hypertension     Impaired fasting glucose     prediabetes. Lumbar disc herniation     L3-L4, with chronic back pain. Mild anemia     Nasal polyps     minimal symptoms. Nasal septal deviation     right side. Obesity     Palpitations     occasional.     Peptic ulcer disease     Pulmonary nodules     Reactive airway disease     asthma, industrial related, also a history of hypersensitivity pneumonitis.      Sleep apnea     wears CPAP nightly    Urinary frequency        Past Surgical History:     Past Surgical History:   Procedure Laterality Date    APPENDECTOMY  age 11    BRONCHOSCOPY  09/17/2020    BRONCHOSCOPY N/A 9/17/2020    EBUS- BRONCHOSCOPY ENDOBRONCHIAL ULTRASOUND, PATHOLOGY REQUESTED- CINDY NOTIFIED, GI UNIT SCHEDULED performed by Black Araujo MD at [address] [place] [address]  10/20/2011    occluded LAD and ostial obtuse marginal stenosis, underwent drug eluting stents to both, RCA small, nondominant, occluded, ejection fraction 45%. CARDIAC CATHETERIZATION  08/2018    with stent placement    CATARACT REMOVAL Bilateral 03/2018    COLONOSCOPY  01/21/2011    mild sigmoid diverticulosis. COLONOSCOPY  4/6/16    hemorrhoid; sigmoid diverticulosis    CT BIOPSY PERCUTANEOUS DEEP BONE  8/18/2021    CT BIOPSY PERCUTANEOUS DEEP BONE 8/18/2021 [facility] CT SCAN    KNEE ARTHROSCOPY Left 03/19/2010    partial medial and lateral synovectomies, partial medial meniscectomy, chondroplasty. NASAL FRACTURE SURGERY  1960    OTHER SURGICAL HISTORY Left 02/12/2010    knee injection. PORT SURGERY N/A 10/14/2020    RIGHT PORT INSERTION performed by Anita Corcoran DO at [address] [facility] Right 1/28/2022    Right PORT REMOVAL performed by Anita Corcoran DO at [address] [facility]    PRE-MALIGNANT / BENIGN SKIN LESION EXCISION Left 01/16/2012    actinic keratoses, ear, liquid nitrogen. PRE-MALIGNANT / BENIGN SKIN LESION EXCISION Left 07/19/2011    actinic keratosis, upper ear, liquid nitrogen.      PROSTATE BIOPSY Bilateral 09/08/2015    Benign    REPAIR UMBILICAL CHQM,7+B/Z,IESMJ N/A 8/67/7755    UMBILICAL Hernia Repair w/ Mesh performed by Re Pickett MD at [address] [place] [address]  10/30/2015    with bilateral submucosal resection of the inferior turbinates, left middle turbinate elza bullosa resection done by Dr Piyush Lo ARTHROSCOPY Left 10/17/14    W/ SUBACROMIAL DECOMPRESSION, DEBRIDEMENT ET CHONDROPLASTY    UPPER GASTROINTESTINAL ENDOSCOPY  01/21/2011 superficial ulcer of the fundus, erostive gastritis.      VASECTOMY Bilateral 1980       Patient Family Social History:     Social History     Socioeconomic History    Marital status:      Spouse name: None    Number of children: None    Years of education: None    Highest education level: None   Tobacco Use    Smoking status: Former     Packs/day: 2.00     Years: 15.00     Pack years: 30.00     Types: Cigarettes     Start date: 1966     Quit date: 1985     Years since quittin.5    Smokeless tobacco: Never    Tobacco comments:     smoked 2 packs a day for 14 to 15 years, quit in    Vaping Use    Vaping Use: Never used   Substance and Sexual Activity    Alcohol use: No     Alcohol/week: 0.0 standard drinks     Comment: rare    Drug use: No     Social Determinants of Health     Financial Resource Strain: Low Risk     Difficulty of Paying Living Expenses: Not hard at all   Food Insecurity: No Food Insecurity    Worried About Running Out of Food in the Last Year: Never true    Ran Out of Food in the Last Year: Never true   Physical Activity: Insufficiently Active    Days of Exercise per Week: 2 days    Minutes of Exercise per Session: 60 min     Family History   Problem Relation Age of Onset    Cancer Mother         lymphoma    Thyroid Disease Mother         hypothyroidism    Stroke Mother     Heart Disease Mother     High Blood Pressure Mother     Heart Attack Mother         in her 46s    Heart Failure Father         congestive    Coronary Art Dis Father     Emphysema Father         was a smoker    Heart Disease Maternal Grandmother     Heart Disease Maternal Grandfather     Crohn's Disease Sister     High Cholesterol Sister     High Cholesterol Child        Current Medications:     Current Outpatient Medications   Medication Sig Dispense Refill    Alectinib HCl (ALECENSA) 150 MG CAPS Take 600 mg by mouth 2 times daily 4 caps  capsule 5    pravastatin (PRAVACHOL) 40 MG tablet Take 1 tablet by mouth in the evening 90 tablet 3    oxybutynin (DITROPAN-XL) 10 MG extended release tablet Take 1 tablet by mouth daily 90 tablet 1    furosemide (LASIX) 20 MG tablet TAKE 1 TABLET BY MOUTH EVERY DAY 90 tablet 1    levothyroxine (SYNTHROID) 125 MCG tablet Take 1 tablet by mouth Daily 90 tablet 1    warfarin (COUMADIN) 2.5 MG tablet Take 2 tablets by mouth daily Take 3 tablets (7.5 mg) by mouth on Tues. Take 2 tablets (5mg) by mouth all other days. Or as directed by INR results 90 tablet 3    clopidogrel (PLAVIX) 75 MG tablet TAKE 1 TABLET BY MOUTH EVERY DAY 90 tablet 1    losartan (COZAAR) 50 MG tablet TAKE 1 TABLET BY MOUTH EVERY DAY 90 tablet 1    tamsulosin (FLOMAX) 0.4 MG capsule TAKE 1 CAPSULE BY MOUTH EVERY DAY 90 capsule 1    Omega-3 Fatty Acids (FISH OIL) 1200 MG CPDR Take 2,400 mg by mouth 2 times daily      niacin 500 MG extended release capsule Take 500 mg by mouth 2 times daily      Metoprolol Tartrate 75 MG TABS TAKE 1 TABLET BY MOUTH TWO TIMES A DAY      flecainide (TAMBOCOR) 50 MG tablet Take 50 mg by mouth 2 times daily      ECHINACEA HERB PO Take by mouth daily      albuterol sulfate HFA (VENTOLIN HFA) 108 (90 Base) MCG/ACT inhaler Inhale 2 puffs into the lungs every 6 hours as needed for Wheezing or Shortness of Breath 1 Inhaler 6    Multiple Vitamins-Minerals (MULTIVITAMIN PO) daily. No current facility-administered medications for this visit. Allergies:   Effient [prasugrel], Ace inhibitors, and Statins    Review of Systems:    Constitutional: No fever or chills. No night sweats, no weight loss. Positive fatigue  Eyes: No eye discharge, double vision, or eye pain   HEENT: negative for sore mouth, sore throat, hoarseness and voice change   Respiratory: negative for sputum, dyspnea, wheezing, hemoptysis, chest pain.   Positive shortness of breath with exertion  Cardiovascular: negative for chest pain, dyspnea, palpitations, orthopnea, PND   Gastrointestinal: negative for nausea, vomiting, diarrhea, constipation, abdominal pain, Dysphagia, hematemesis and hematochezia   Genitourinary: negative for frequency, dysuria, nocturia, urinary incontinence, and hematuria   Integument: negative for rash, skin lesions, bruises. Hematologic/Lymphatic: negative for easy bruising, bleeding, lymphadenopathy, or petechiae   Endocrine: negative for heat or cold intolerance,weight changes, change in bowel habits and hair loss   Musculoskeletal: negative for myalgias, arthralgias, pain, joint swelling,and bone pain   Neurological: negative for headaches, dizziness, seizures, weakness, numbness.   Positive for memory loss    Physical Exam:  Vitals: /76 (Site: Left Upper Arm, Position: Sitting, Cuff Size: Large Adult)   Pulse 66   Temp 97.7 °F (36.5 °C)   Ht 5' 10\" (1.778 m)   Wt 244 lb (110.7 kg)   SpO2 96%   BMI 35.01 kg/m²     General appearance - well appearing, no in pain or distress  Mental status - AAO X3  Eyes - pupils equal and reactive, extraocular eye movements intact  Mouth - mucous membranes moist, pharynx normal without lesions  Neck - supple, no significant adenopathy  Lymphatics - no palpable lymphadenopathy, no hepatosplenomegaly  Chest - clear to auscultation, no wheezes, rales or rhonchi, symmetric air entry  Heart - normal rate, regular rhythm, normal S1, S2, no murmurs  Abdomen - soft, nontender, nondistended, no masses or organomegaly  Neurological - alert, oriented, normal speech, no focal findings or movement disorder noted  Extremities - peripheral pulses normal, no pedal edema, no clubbing or cyanosis  Skin - normal coloration and turgor, no rashes, no suspicious skin lesions noted     DATA:    Results for orders placed or performed during the hospital encounter of 07/07/22   Vitamin B12 & Folate   Result Value Ref Range    Vitamin B-12 787 232 - 1245 pg/mL    Folate >20.0 >4.8 ng/mL   Ferritin   Result Value Ref Range    Ferritin 631 (H) 30 - 400 ng/mL   CBC with Auto Differential   Result Value Ref Range    WBC 7.5 3.5 - 11.3 k/uL    RBC 3.02 (L) 4.21 - 5.77 m/uL    Hemoglobin 10.1 (L) 13.0 - 17.0 g/dL    Hematocrit 31.3 (L) 40.7 - 50.3 %    .6 (H) 82.6 - 102.9 fL    MCH 33.4 25.2 - 33.5 pg    MCHC 32.3 25.2 - 33.5 g/dL    RDW 16.8 (H) 11.8 - 14.4 %    Platelets 864 721 - 875 k/uL    MPV 10.3 8.1 - 13.5 fL    NRBC Automated 0.0 0.0 per 100 WBC    Seg Neutrophils 66 (H) 36 - 65 %    Lymphocytes 24 24 - 43 %    Monocytes 7 3 - 12 %    Eosinophils % 2 1 - 4 %    Basophils 0 0 - 2 %    Immature Granulocytes 1 (H) 0 %    Segs Absolute 4.95 1.50 - 8.10 k/uL    Absolute Lymph # 1.80 1.10 - 3.70 k/uL    Absolute Mono # 0.53 0.10 - 1.20 k/uL    Absolute Eos # 0.11 0.00 - 0.44 k/uL    Basophils Absolute 0.03 0.00 - 0.20 k/uL    Absolute Immature Granulocyte 0.07 0.00 - 0.30 k/uL    RBC Morphology ANISOCYTOSIS PRESENT    Comprehensive Metabolic Panel   Result Value Ref Range    Glucose 151 (H) 70 - 99 mg/dL    BUN 25 (H) 8 - 23 mg/dL    CREATININE 1.37 (H) 0.70 - 1.20 mg/dL    Bun/Cre Ratio 18 9 - 20    Calcium 9.4 8.6 - 10.4 mg/dL    Sodium 139 135 - 144 mmol/L    Potassium 4.1 3.7 - 5.3 mmol/L    Chloride 101 98 - 107 mmol/L    CO2 26 20 - 31 mmol/L    Anion Gap 12 9 - 17 mmol/L    Alkaline Phosphatase 92 40 - 129 U/L    ALT 18 5 - 41 U/L    AST 19 <40 U/L    Total Bilirubin 0.45 0.3 - 1.2 mg/dL    Total Protein 7.5 6.4 - 8.3 g/dL    Albumin 4.1 3.5 - 5.2 g/dL    Albumin/Globulin Ratio 1.2 1.0 - 2.5    GFR Non- 51 (L) >60 mL/min    GFR African American >60 >60 mL/min    GFR Comment           -- Diagnosis --   BAL RIGHT LOWER LOBE:           NEGATIVE FOR MALIGNANCY. FINE NEEDLE ASPIRATION STATION 7 EBUS:           ADENOCARCINOMA, COMPATIBLE WITH LUNG PRIMARY.      CT CHEST ABDOMEN PELVIS W CONTRAST    Result Date: 7/14/2022  EXAMINATION: CT OF THE CHEST, ABDOMEN, AND PELVIS WITH CONTRAST 7/12/2022 8:51 am TECHNIQUE: CT of the chest, abdomen and pelvis was performed with the administration of intravenous contrast. Multiplanar reformatted images are provided for review. Automated exposure control, iterative reconstruction, and/or weight based adjustment of the mA/kV was utilized to reduce the radiation dose to as low as reasonably achievable. COMPARISON: 04/07/2022, 01/06/2022 HISTORY: ORDERING SYSTEM PROVIDED HISTORY: ALK-positive non-small cell lung cancer [facility]) TECHNOLOGIST PROVIDED HISTORY: STAT Creatinine as needed:->Yes assess responce to treatment Reason for Exam: assess responce to treatment FINDINGS: Chest: Mediastinum: Post treatment changes in the right hilum. Node distinct lymphadenopathy. Calcified atheromatous plaque and coronary calcification. No pericardial effusion. Lungs/pleura: Post treatment changes in the right hilum and infrahilar region extending into the right lower lobe again demonstrated and grossly unchanged. Sequela of old granulomatous disease and stable noncalcified bilateral micro nodules. No new or enlarging nodules. Bilateral pleural calcification again demonstrated. No effusion. The central airway is patent. Soft Tissues/Bones: No new findings. Sclerotic bone lesion in the anterolateral right 8th rib again demonstrated. Abdomen/Pelvis: Organs: Perfusion abnormality in the posterior right hepatic lobe is noted on the chest portion of the exam.  No suspicious liver lesion identified. The gallbladder, pancreas, spleen, adrenals and kidneys reveal no acute findings. GI/Bowel: There is no bowel dilatation or wall thickening identified. Diverticulosis. Pelvis: No acute findings. Prostatomegaly. Fat containing right inguinal hernia. Peritoneum/Retroperitoneum: No free air or free fluid. The aorta is normal in caliber. The visceral branches are patent. Calcified atheromatous plaque is present. No lymphadenopathy. Bones/Soft Tissues: No new findings.   Sclerotic osseous lesions notably at the L3 and L4 vertebral bodies again demonstrated. Few sclerotic lesions are also present in the pelvis. 1.  Stable exam of the chest with post treatment changes in the right perihilar region and right lower lobe. Stable calcified and noncalcified micro nodules. 2.  Stable exam of the abdomen and pelvis. Few scattered sclerotic bone lesions are again demonstrated and without significant change. Impression:  Adenocarcinoma of right lung, clinical stage at least T1c, N2, M0 (stage IIIA)  Disease recurrence, biopsy-proven-8/2021  Radiation pneumonitis and shortness of breath  Thrombocytopenia  Anemia      Plan:  I had a detailed discussion with the patient and we went over results of lab work-up imaging studies and other relevant clinical data  Toxicity check performed. Patient is tolerating treatment without unexpected side effects  Reviewed results of CT scan which show stable disease without any evidence of disease progression  Discussed natural history of ALK positive lung cancer  Continue treatment with alectinib  Return to clinic in 2 months with repeat lab work-up or sooner if clinically indicated  NCCN guidelines were reviewed and discussed with the patient. The diagnosis and care plan were discussed with the patient in detail. I discussed the natural history of the disease, prognosis, risks and goals of therapy and answered all the patients questions to the best of my ability. Patient expressed understanding and was in agreement. Chao Nguyễn MD        This note is created with the assistance of a speech recognition program.  While intending to generate a document that actually reflects the content of the visit, the document can still have some errors including those of syntax and sound a like substitutions which may escape proof reading. It such instances, actual meaning can be extrapolated by contextual diversion. Alta Cabrera

## 2022-08-01 ENCOUNTER — HOSPITAL ENCOUNTER (OUTPATIENT)
Dept: PHARMACY | Age: 77
Setting detail: THERAPIES SERIES
Discharge: HOME | End: 2022-08-01
Payer: MEDICARE

## 2022-08-01 DIAGNOSIS — I48.0 PAROXYSMAL A-FIB (HCC): Primary | ICD-10-CM

## 2022-08-01 LAB
INR BLD: 1.7
PROTIME: 20.6 SECONDS

## 2022-08-01 PROCEDURE — 85610 PROTHROMBIN TIME: CPT

## 2022-08-01 PROCEDURE — 99212 OFFICE O/P EST SF 10 MIN: CPT

## 2022-08-01 PROCEDURE — 36416 COLLJ CAPILLARY BLOOD SPEC: CPT

## 2022-08-01 RX ORDER — WARFARIN SODIUM 2.5 MG/1
TABLET ORAL
Qty: 90 TABLET | Refills: 5 | Status: SHIPPED | OUTPATIENT
Start: 2022-08-01

## 2022-08-01 RX ORDER — CLOPIDOGREL BISULFATE 75 MG/1
TABLET ORAL
Qty: 90 TABLET | Refills: 1 | Status: SHIPPED | OUTPATIENT
Start: 2022-08-01

## 2022-08-01 RX ORDER — TAMSULOSIN HYDROCHLORIDE 0.4 MG/1
CAPSULE ORAL
Qty: 90 CAPSULE | Refills: 1 | Status: SHIPPED | OUTPATIENT
Start: 2022-08-01

## 2022-08-01 NOTE — PROGRESS NOTES
ANTICOAGULATION SERVICE    Date of Clinic Visit:  8/1/2022    Mariya Cruz is a 69 y.o. male who presents to clinic today for anticoagulation monitoring and adjustment. Recent INR Results:  Internal QC passed  Lab Results   Component Value Date    INR 1.7 08/01/2022    INR 2.3 07/06/2022       Current Warfarin Dosage:  Dosing Plan  As of 8/1/2022      TTR:  59.9 % (1.4 y)   Full warfarin instructions:  8/1: 10 mg; Otherwise 7.5 mg every Mon, Wed, Fri; 5 mg all other days                 Assessment/Plan:    Modify warfarin dose as noted above:  Boost todays dose and re-check as normal.     Next Clinic Appointment:  Return date  As of 8/1/2022      TTR:  59.9 % (1.4 y)   Next INR check:  8/31/2022               Please call [facility] at [phone] with any questions. Thanks!   Shruthi Angulo [facility]  Anticoagulation Service Pharmacist  8/1/2022 10:57 AM  For Pharmacy Admin Tracking Only    Intervention Detail: Dose Adjustment: 1, reason: Therapy Optimization  Total # of Interventions Recommended: 1  Total # of Interventions Accepted: 1  Time Spent (min): 15

## 2022-08-01 NOTE — TELEPHONE ENCOUNTER
Fadi Godinez called requesting a refill of the below medication which has been pended for you:     Requested Prescriptions     Pending Prescriptions Disp Refills    tamsulosin (FLOMAX) 0.4 MG capsule [Pharmacy Med Name: Tamsulosin HCl 0.4 MG Oral Capsule] 90 capsule 0     Sig: Take 1 capsule by mouth once daily    warfarin (COUMADIN) 2.5 MG tablet [Pharmacy Med Name: Warfarin Sodium 2.5 MG Oral Tablet] 90 tablet 0     Sig: TAKE 3 TABLETS BY MOUTH ON TUESDAYS. TAKE 2 TABLETS BY MOUTH ALL OTHER DAYS OR AS DIRECTED BY INR RESULTS    clopidogrel (PLAVIX) 75 MG tablet [Pharmacy Med Name: Clopidogrel Bisulfate 75 MG Oral Tablet] 90 tablet 0     Sig: Take 1 tablet by mouth once daily       Last Appointment Date: 3/18/2022  Next Appointment Date: 9/6/2022    Allergies   Allergen Reactions    Effient [Prasugrel] Other (See Comments)     Superficial skin bleeding. Ace Inhibitors Other (See Comments)     Cough. Statins Other (See Comments)     Myalgias.

## 2022-08-09 DIAGNOSIS — I10 ESSENTIAL HYPERTENSION: ICD-10-CM

## 2022-08-09 RX ORDER — LOSARTAN POTASSIUM 50 MG/1
TABLET ORAL
Qty: 90 TABLET | Refills: 1 | Status: SHIPPED | OUTPATIENT
Start: 2022-08-09

## 2022-08-09 NOTE — TELEPHONE ENCOUNTER
Nancy Ward called requesting a refill of the below medication which has been pended for you:     Requested Prescriptions     Pending Prescriptions Disp Refills    losartan (COZAAR) 50 MG tablet 90 tablet 1     Sig: TAKE 1 TABLET BY MOUTH EVERY DAY       Last Appointment Date: 3/18/2022  Next Appointment Date: 9/6/2022    Allergies   Allergen Reactions    Effient [Prasugrel] Other (See Comments)     Superficial skin bleeding. Ace Inhibitors Other (See Comments)     Cough. Statins Other (See Comments)     Myalgias.

## 2022-08-31 ENCOUNTER — HOSPITAL ENCOUNTER (OUTPATIENT)
Dept: LAB | Age: 77
Discharge: HOME | End: 2022-08-31
Payer: MEDICARE

## 2022-08-31 DIAGNOSIS — I10 ESSENTIAL HYPERTENSION: ICD-10-CM

## 2022-08-31 DIAGNOSIS — E78.49 FAMILIAL HYPERLIPIDEMIA: ICD-10-CM

## 2022-08-31 DIAGNOSIS — E03.9 ACQUIRED HYPOTHYROIDISM: ICD-10-CM

## 2022-08-31 DIAGNOSIS — R73.01 IMPAIRED FASTING GLUCOSE: ICD-10-CM

## 2022-08-31 LAB
ABSOLUTE EOS #: 0.12 K/UL (ref 0–0.44)
ABSOLUTE IMMATURE GRANULOCYTE: 0.06 K/UL (ref 0–0.3)
ABSOLUTE LYMPH #: 1.76 K/UL (ref 1.1–3.7)
ABSOLUTE MONO #: 0.59 K/UL (ref 0.1–1.2)
ALBUMIN SERPL-MCNC: 4.1 G/DL (ref 3.5–5.2)
ALBUMIN/GLOBULIN RATIO: 1.3 (ref 1–2.5)
ALP BLD-CCNC: 97 U/L (ref 40–129)
ALT SERPL-CCNC: 25 U/L (ref 5–41)
ANION GAP SERPL CALCULATED.3IONS-SCNC: 14 MMOL/L (ref 9–17)
AST SERPL-CCNC: 25 U/L
BASOPHILS # BLD: 1 % (ref 0–2)
BASOPHILS ABSOLUTE: 0.04 K/UL (ref 0–0.2)
BILIRUB SERPL-MCNC: 0.41 MG/DL (ref 0.3–1.2)
BUN BLDV-MCNC: 25 MG/DL (ref 8–23)
BUN/CREAT BLD: 21 (ref 9–20)
CALCIUM SERPL-MCNC: 9.4 MG/DL (ref 8.6–10.4)
CHLORIDE BLD-SCNC: 102 MMOL/L (ref 98–107)
CHOLESTEROL/HDL RATIO: 6.1
CHOLESTEROL: 258 MG/DL
CO2: 24 MMOL/L (ref 20–31)
CREAT SERPL-MCNC: 1.19 MG/DL (ref 0.7–1.2)
EOSINOPHILS RELATIVE PERCENT: 2 % (ref 1–4)
ESTIMATED AVERAGE GLUCOSE: 154 MG/DL
GFR AFRICAN AMERICAN: >60 ML/MIN
GFR NON-AFRICAN AMERICAN: 59 ML/MIN
GFR SERPL CREATININE-BSD FRML MDRD: ABNORMAL ML/MIN/{1.73_M2}
GLUCOSE BLD-MCNC: 147 MG/DL (ref 70–99)
HBA1C MFR BLD: 7 % (ref 4–6)
HCT VFR BLD CALC: 32.2 % (ref 40.7–50.3)
HDLC SERPL-MCNC: 42 MG/DL
HEMOGLOBIN: 10.5 G/DL (ref 13–17)
IMMATURE GRANULOCYTES: 1 %
LDL CHOLESTEROL DIRECT: 81 MG/DL
LDL CHOLESTEROL: ABNORMAL MG/DL (ref 0–130)
LYMPHOCYTES # BLD: 24 % (ref 24–43)
MCH RBC QN AUTO: 33 PG (ref 25.2–33.5)
MCHC RBC AUTO-ENTMCNC: 32.6 G/DL (ref 25.2–33.5)
MCV RBC AUTO: 101.3 FL (ref 82.6–102.9)
MONOCYTES # BLD: 8 % (ref 3–12)
NRBC AUTOMATED: 0 PER 100 WBC
PDW BLD-RTO: 16.5 % (ref 11.8–14.4)
PLATELET # BLD: 166 K/UL (ref 138–453)
PMV BLD AUTO: 9.9 FL (ref 8.1–13.5)
POTASSIUM SERPL-SCNC: 5.7 MMOL/L (ref 3.7–5.3)
RBC # BLD: 3.18 M/UL (ref 4.21–5.77)
RBC # BLD: ABNORMAL 10*6/UL
SEG NEUTROPHILS: 64 % (ref 36–65)
SEGMENTED NEUTROPHILS ABSOLUTE COUNT: 4.92 K/UL (ref 1.5–8.1)
SODIUM BLD-SCNC: 140 MMOL/L (ref 135–144)
THYROXINE, FREE: 1.11 NG/DL (ref 0.93–1.7)
TOTAL PROTEIN: 7.3 G/DL (ref 6.4–8.3)
TRIGL SERPL-MCNC: 802 MG/DL
TSH SERPL DL<=0.05 MIU/L-ACNC: 5.87 UIU/ML (ref 0.3–5)
WBC # BLD: 7.5 K/UL (ref 3.5–11.3)

## 2022-08-31 PROCEDURE — 80061 LIPID PANEL: CPT

## 2022-08-31 PROCEDURE — 80053 COMPREHEN METABOLIC PANEL: CPT

## 2022-08-31 PROCEDURE — 83036 HEMOGLOBIN GLYCOSYLATED A1C: CPT

## 2022-08-31 PROCEDURE — 36415 COLL VENOUS BLD VENIPUNCTURE: CPT

## 2022-08-31 PROCEDURE — 84443 ASSAY THYROID STIM HORMONE: CPT

## 2022-08-31 PROCEDURE — 85025 COMPLETE CBC W/AUTO DIFF WBC: CPT

## 2022-08-31 PROCEDURE — 84439 ASSAY OF FREE THYROXINE: CPT

## 2022-08-31 PROCEDURE — 83721 ASSAY OF BLOOD LIPOPROTEIN: CPT

## 2022-09-06 ENCOUNTER — HOSPITAL ENCOUNTER (OUTPATIENT)
Dept: PHARMACY | Age: 77
Setting detail: THERAPIES SERIES
Discharge: HOME | End: 2022-09-06
Payer: MEDICARE

## 2022-09-06 DIAGNOSIS — I48.0 PAROXYSMAL A-FIB (HCC): Primary | ICD-10-CM

## 2022-09-06 LAB
INR BLD: 1.8
PROTIME: 21.1 SECONDS

## 2022-09-06 PROCEDURE — 85610 PROTHROMBIN TIME: CPT

## 2022-09-06 PROCEDURE — 36416 COLLJ CAPILLARY BLOOD SPEC: CPT

## 2022-09-06 PROCEDURE — 99212 OFFICE O/P EST SF 10 MIN: CPT

## 2022-09-06 NOTE — PATIENT INSTRUCTIONS
\"On day of next appointment, please screen for temperature and COVID-19 symptoms prior to you clinic appointment. If any symptoms present, please call [phone] to reschedule. \"

## 2022-09-06 NOTE — PROGRESS NOTES
ANTICOAGULATION SERVICE    Date of Clinic Visit:  9/6/2022    Alexis Pacheco is a 69 y.o. male who presents to clinic today for anticoagulation monitoring and adjustment. Recent INR Results:  Internal QC passed  Lab Results   Component Value Date    INR 1.8 09/06/2022    INR 1.7 08/01/2022       Current Warfarin Dosage:  Dosing Plan  As of 9/6/2022      TTR:  56.1 % (1.5 y)   Full warfarin instructions:  5 mg every Mon, Wed, Fri; 7.5 mg all other days                 Assessment/Plan:    Modify warfarin dose as noted above: Patient is below target. Will increase dose 5.9% and re-check as normal.    Next Clinic Appointment:  Return date  As of 9/6/2022      TTR:  56.1 % (1.5 y)   Next INR check:  10/4/2022               Please call [facility]ticoagulation [facility] at [phone] with any questions. Thanks!   Aracelis Seo, [facility]  Anticoagulation Service Pharmacist  9/6/2022 9:22 AM  For Pharmacy Admin Tracking Only    Intervention Detail: Dose Adjustment: 1, reason: Therapy Optimization  Total # of Interventions Recommended: 1  Total # of Interventions Accepted: 1  Time Spent (min): 5

## 2022-09-13 ENCOUNTER — OFFICE VISIT (OUTPATIENT)
Dept: ONCOLOGY | Age: 77
End: 2022-09-13
Payer: MEDICARE

## 2022-09-13 ENCOUNTER — OFFICE VISIT (OUTPATIENT)
Dept: FAMILY MEDICINE CLINIC | Age: 77
End: 2022-09-13
Payer: MEDICARE

## 2022-09-13 ENCOUNTER — HOSPITAL ENCOUNTER (OUTPATIENT)
Dept: LAB | Age: 77
Discharge: HOME | End: 2022-09-13
Payer: MEDICARE

## 2022-09-13 ENCOUNTER — TELEPHONE (OUTPATIENT)
Dept: ONCOLOGY | Age: 77
End: 2022-09-13

## 2022-09-13 ENCOUNTER — OFFICE VISIT (OUTPATIENT)
Dept: PULMONOLOGY | Age: 77
End: 2022-09-13
Payer: MEDICARE

## 2022-09-13 VITALS
TEMPERATURE: 98.2 F | WEIGHT: 247 LBS | HEIGHT: 70 IN | DIASTOLIC BLOOD PRESSURE: 72 MMHG | HEART RATE: 69 BPM | OXYGEN SATURATION: 95 % | BODY MASS INDEX: 35.36 KG/M2 | SYSTOLIC BLOOD PRESSURE: 128 MMHG

## 2022-09-13 VITALS
TEMPERATURE: 98.2 F | SYSTOLIC BLOOD PRESSURE: 128 MMHG | BODY MASS INDEX: 35.36 KG/M2 | HEIGHT: 70 IN | OXYGEN SATURATION: 95 % | DIASTOLIC BLOOD PRESSURE: 72 MMHG | HEART RATE: 69 BPM | WEIGHT: 247 LBS

## 2022-09-13 VITALS
DIASTOLIC BLOOD PRESSURE: 74 MMHG | HEART RATE: 71 BPM | WEIGHT: 247 LBS | BODY MASS INDEX: 35.36 KG/M2 | RESPIRATION RATE: 24 BRPM | OXYGEN SATURATION: 96 % | SYSTOLIC BLOOD PRESSURE: 138 MMHG | HEIGHT: 70 IN

## 2022-09-13 DIAGNOSIS — E11.8 CONTROLLED TYPE 2 DIABETES MELLITUS WITH COMPLICATION, WITHOUT LONG-TERM CURRENT USE OF INSULIN (HCC): ICD-10-CM

## 2022-09-13 DIAGNOSIS — G47.33 OBSTRUCTIVE SLEEP APNEA: ICD-10-CM

## 2022-09-13 DIAGNOSIS — J44.9 COPD WITH ASTHMA (HCC): ICD-10-CM

## 2022-09-13 DIAGNOSIS — J94.8 PLEURAL CALCIFICATION: ICD-10-CM

## 2022-09-13 DIAGNOSIS — I10 ESSENTIAL HYPERTENSION: Primary | ICD-10-CM

## 2022-09-13 DIAGNOSIS — R97.20 ELEVATED PSA: ICD-10-CM

## 2022-09-13 DIAGNOSIS — D64.9 ANEMIA, UNSPECIFIED TYPE: ICD-10-CM

## 2022-09-13 DIAGNOSIS — C34.90 ALK-POSITIVE NON-SMALL CELL LUNG CANCER (HCC): Primary | ICD-10-CM

## 2022-09-13 DIAGNOSIS — C79.51 BONE METASTASIS (HCC): ICD-10-CM

## 2022-09-13 DIAGNOSIS — J70.0 RADIATION PNEUMONITIS (HCC): ICD-10-CM

## 2022-09-13 DIAGNOSIS — E03.9 ACQUIRED HYPOTHYROIDISM: ICD-10-CM

## 2022-09-13 DIAGNOSIS — I27.20 PULMONARY HYPERTENSION (HCC): ICD-10-CM

## 2022-09-13 DIAGNOSIS — E78.49 FAMILIAL HYPERLIPIDEMIA: ICD-10-CM

## 2022-09-13 DIAGNOSIS — C34.90 ALK-POSITIVE NON-SMALL CELL LUNG CANCER (HCC): ICD-10-CM

## 2022-09-13 DIAGNOSIS — C34.91 ADENOCARCINOMA OF RIGHT LUNG (HCC): Primary | ICD-10-CM

## 2022-09-13 LAB
ABSOLUTE EOS #: 0.09 K/UL (ref 0–0.44)
ABSOLUTE IMMATURE GRANULOCYTE: 0.07 K/UL (ref 0–0.3)
ABSOLUTE LYMPH #: 1.19 K/UL (ref 1.1–3.7)
ABSOLUTE MONO #: 0.63 K/UL (ref 0.1–1.2)
ALBUMIN SERPL-MCNC: 4.2 G/DL (ref 3.5–5.2)
ALBUMIN/GLOBULIN RATIO: 1.3 (ref 1–2.5)
ALP BLD-CCNC: 90 U/L (ref 40–129)
ALT SERPL-CCNC: 23 U/L (ref 5–41)
ANION GAP SERPL CALCULATED.3IONS-SCNC: 11 MMOL/L (ref 9–17)
AST SERPL-CCNC: 22 U/L
BASOPHILS # BLD: 0 % (ref 0–2)
BASOPHILS ABSOLUTE: 0.04 K/UL (ref 0–0.2)
BILIRUB SERPL-MCNC: 0.5 MG/DL (ref 0.3–1.2)
BUN BLDV-MCNC: 26 MG/DL (ref 8–23)
BUN/CREAT BLD: 18 (ref 9–20)
CALCIUM SERPL-MCNC: 9.5 MG/DL (ref 8.6–10.4)
CHLORIDE BLD-SCNC: 101 MMOL/L (ref 98–107)
CO2: 28 MMOL/L (ref 20–31)
CREAT SERPL-MCNC: 1.45 MG/DL (ref 0.7–1.2)
EOSINOPHILS RELATIVE PERCENT: 1 % (ref 1–4)
GFR AFRICAN AMERICAN: 57 ML/MIN
GFR NON-AFRICAN AMERICAN: 47 ML/MIN
GFR SERPL CREATININE-BSD FRML MDRD: ABNORMAL ML/MIN/{1.73_M2}
GLUCOSE BLD-MCNC: 198 MG/DL (ref 70–99)
HCT VFR BLD CALC: 30.9 % (ref 40.7–50.3)
HEMOGLOBIN: 10.1 G/DL (ref 13–17)
IMMATURE GRANULOCYTES: 1 %
LYMPHOCYTES # BLD: 11 % (ref 24–43)
MCH RBC QN AUTO: 32.6 PG (ref 25.2–33.5)
MCHC RBC AUTO-ENTMCNC: 32.7 G/DL (ref 25.2–33.5)
MCV RBC AUTO: 99.7 FL (ref 82.6–102.9)
MONOCYTES # BLD: 6 % (ref 3–12)
NRBC AUTOMATED: 0 PER 100 WBC
PDW BLD-RTO: 16.6 % (ref 11.8–14.4)
PLATELET # BLD: 168 K/UL (ref 138–453)
PMV BLD AUTO: 10.6 FL (ref 8.1–13.5)
POTASSIUM SERPL-SCNC: 3.9 MMOL/L (ref 3.7–5.3)
RBC # BLD: 3.1 M/UL (ref 4.21–5.77)
RBC # BLD: ABNORMAL 10*6/UL
SEG NEUTROPHILS: 81 % (ref 36–65)
SEGMENTED NEUTROPHILS ABSOLUTE COUNT: 8.53 K/UL (ref 1.5–8.1)
SODIUM BLD-SCNC: 140 MMOL/L (ref 135–144)
TOTAL PROTEIN: 7.5 G/DL (ref 6.4–8.3)
WBC # BLD: 10.6 K/UL (ref 3.5–11.3)

## 2022-09-13 PROCEDURE — 3023F SPIROM DOC REV: CPT | Performed by: INTERNAL MEDICINE

## 2022-09-13 PROCEDURE — 99214 OFFICE O/P EST MOD 30 MIN: CPT | Performed by: INTERNAL MEDICINE

## 2022-09-13 PROCEDURE — G8417 CALC BMI ABV UP PARAM F/U: HCPCS | Performed by: INTERNAL MEDICINE

## 2022-09-13 PROCEDURE — G8427 DOCREV CUR MEDS BY ELIG CLIN: HCPCS | Performed by: FAMILY MEDICINE

## 2022-09-13 PROCEDURE — 1123F ACP DISCUSS/DSCN MKR DOCD: CPT | Performed by: INTERNAL MEDICINE

## 2022-09-13 PROCEDURE — 1036F TOBACCO NON-USER: CPT | Performed by: FAMILY MEDICINE

## 2022-09-13 PROCEDURE — 36415 COLL VENOUS BLD VENIPUNCTURE: CPT

## 2022-09-13 PROCEDURE — 80053 COMPREHEN METABOLIC PANEL: CPT

## 2022-09-13 PROCEDURE — 1036F TOBACCO NON-USER: CPT | Performed by: INTERNAL MEDICINE

## 2022-09-13 PROCEDURE — 1123F ACP DISCUSS/DSCN MKR DOCD: CPT | Performed by: FAMILY MEDICINE

## 2022-09-13 PROCEDURE — G8427 DOCREV CUR MEDS BY ELIG CLIN: HCPCS | Performed by: INTERNAL MEDICINE

## 2022-09-13 PROCEDURE — 99213 OFFICE O/P EST LOW 20 MIN: CPT | Performed by: FAMILY MEDICINE

## 2022-09-13 PROCEDURE — 3051F HG A1C>EQUAL 7.0%<8.0%: CPT | Performed by: FAMILY MEDICINE

## 2022-09-13 PROCEDURE — 85025 COMPLETE CBC W/AUTO DIFF WBC: CPT

## 2022-09-13 PROCEDURE — 99214 OFFICE O/P EST MOD 30 MIN: CPT | Performed by: FAMILY MEDICINE

## 2022-09-13 PROCEDURE — G8417 CALC BMI ABV UP PARAM F/U: HCPCS | Performed by: FAMILY MEDICINE

## 2022-09-13 RX ORDER — LEVOTHYROXINE SODIUM 137 UG/1
125 TABLET ORAL DAILY
Qty: 90 TABLET | Refills: 1 | Status: SHIPPED | OUTPATIENT
Start: 2022-09-13

## 2022-09-13 SDOH — ECONOMIC STABILITY: FOOD INSECURITY: WITHIN THE PAST 12 MONTHS, THE FOOD YOU BOUGHT JUST DIDN'T LAST AND YOU DIDN'T HAVE MONEY TO GET MORE.: NEVER TRUE

## 2022-09-13 SDOH — ECONOMIC STABILITY: FOOD INSECURITY: WITHIN THE PAST 12 MONTHS, YOU WORRIED THAT YOUR FOOD WOULD RUN OUT BEFORE YOU GOT MONEY TO BUY MORE.: NEVER TRUE

## 2022-09-13 ASSESSMENT — ENCOUNTER SYMPTOMS
CONSTIPATION: 0
SINUS PRESSURE: 0
DIARRHEA: 0
VOMITING: 0
WHEEZING: 0
COUGH: 0
SHORTNESS OF BREATH: 0
SORE THROAT: 0
EYE REDNESS: 0
EYE DISCHARGE: 0
NAUSEA: 0
ABDOMINAL PAIN: 0
TROUBLE SWALLOWING: 0
RHINORRHEA: 0

## 2022-09-13 ASSESSMENT — PATIENT HEALTH QUESTIONNAIRE - PHQ9
SUM OF ALL RESPONSES TO PHQ QUESTIONS 1-9: 0
2. FEELING DOWN, DEPRESSED OR HOPELESS: 0
SUM OF ALL RESPONSES TO PHQ QUESTIONS 1-9: 0
SUM OF ALL RESPONSES TO PHQ QUESTIONS 1-9: 0
SUM OF ALL RESPONSES TO PHQ9 QUESTIONS 1 & 2: 0
1. LITTLE INTEREST OR PLEASURE IN DOING THINGS: 0
SUM OF ALL RESPONSES TO PHQ QUESTIONS 1-9: 0

## 2022-09-13 ASSESSMENT — SOCIAL DETERMINANTS OF HEALTH (SDOH): HOW HARD IS IT FOR YOU TO PAY FOR THE VERY BASICS LIKE FOOD, HOUSING, MEDICAL CARE, AND HEATING?: NOT VERY HARD

## 2022-09-13 NOTE — TELEPHONE ENCOUNTER
Name: Jade Gibbs  : 1945  MRN: [id number]    Oncology Navigation Follow-Up Note    Contact Type:  Medical Oncology  Met with patient and spouse in office. Notes:   Patient states he is doing well. He is getting Alencensa through patient assistance. He denies any navigator needs at this time. Discussed navigator signing off from his care team. Reassured him he can still call navigator if needed but most questions should be directed to the oncology clinic. Patient and spouse verbalized agreement and understanding.     Electronically signed by Marjorie Milian RN on 2022 at 9:26 AM

## 2022-09-13 NOTE — PROGRESS NOTES
Patient does have coronary artery disease status post stent placement. Patient also gives history of occupational asthma and exposure to bacteria. Patient does give history of weight loss but describes it as intentional.  Denies headaches dizziness chest pain abdominal pain nausea bone pain     Interim History:     Patient presents to the clinic for follow-up visit and to discuss results of his lab work-up and the relevant clinical data. Patient overall continues to do well. Continues to be on alectinib. Denies hospitalization or ER visit. Does get tired easily. Complains of left hip pain when he walks but he is trying to walk on the treadmill. During this visit patient's allergy, social, medical, surgical history and medications were reviewed and updated. Past Medical History:   Past Medical History:   Diagnosis Date    Abdominal hernia     small, no significant symptomatology. Abnormal PSA     with history of slight increase. Adenocarcinoma, lung, right (HCC)     Allergic rhinitis     Benign prostatic hypertrophy     Chest pain     occasional.     Coronary artery disease     status post drug eluting stent placement, LAD, ostial obtuse marginal.     Dysphagia     Erectile dysfunction     Familial hyperlipidemia     with hypertriglyceridemia. Gastroesophageal reflux disease     Hearing loss, bilateral     hearing aid in the left ear only. 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 Hypertension     Impaired fasting glucose     prediabetes. Lumbar disc herniation     L3-L4, with chronic back pain. Mild anemia     Nasal polyps     minimal symptoms. Nasal septal deviation     right side. Obesity     Palpitations     occasional.     Peptic ulcer disease     Pulmonary nodules     Reactive airway disease     asthma, industrial related, also a history of hypersensitivity pneumonitis.      Sleep apnea     wears CPAP nightly    Urinary frequency        Past Surgical History:     Past Surgical History:   Procedure Laterality Date    APPENDECTOMY  age 11    BRONCHOSCOPY  09/17/2020    BRONCHOSCOPY N/A 9/17/2020    EBUS- BRONCHOSCOPY ENDOBRONCHIAL ULTRASOUND, PATHOLOGY REQUESTED- CINDY NOTIFIED, GI UNIT SCHEDULED performed by Nik Ruby MD at [address] [place] [address]  10/20/2011    occluded LAD and ostial obtuse marginal stenosis, underwent drug eluting stents to both, RCA small, nondominant, occluded, ejection fraction 45%. CARDIAC CATHETERIZATION  08/2018    with stent placement    CATARACT REMOVAL Bilateral 03/2018    COLONOSCOPY  01/21/2011    mild sigmoid diverticulosis. COLONOSCOPY  4/6/16    hemorrhoid; sigmoid diverticulosis    CT BIOPSY PERCUTANEOUS DEEP BONE  8/18/2021    CT BIOPSY PERCUTANEOUS DEEP BONE 8/18/2021 [facility] CT SCAN    KNEE ARTHROSCOPY Left 03/19/2010    partial medial and lateral synovectomies, partial medial meniscectomy, chondroplasty. NASAL FRACTURE SURGERY  1960    OTHER SURGICAL HISTORY Left 02/12/2010    knee injection. PORT SURGERY N/A 10/14/2020    RIGHT PORT INSERTION performed by Isidra Gonzalez DO at [address] [facility] Right 1/28/2022    Right PORT REMOVAL performed by Isidra Gonzalez DO at [address] [facility]    PRE-MALIGNANT / BENIGN SKIN LESION EXCISION Left 01/16/2012    actinic keratoses, ear, liquid nitrogen. PRE-MALIGNANT / BENIGN SKIN LESION EXCISION Left 07/19/2011    actinic keratosis, upper ear, liquid nitrogen.      PROSTATE BIOPSY Bilateral 09/08/2015    Benign    REPAIR UMBILICAL PSOD,5+T/L,[facility] N/A 0/49/6384    UMBILICAL Hernia Repair w/ Mesh performed by Benita Patton MD at [address] [place] [address]  10/30/2015    with bilateral submucosal resection of the inferior turbinates, left middle turbinate elza bullosa resection done by Dr Lia Krabbe ARTHROSCOPY Left 10/17/14    W/ SUBACROMIAL DECOMPRESSION, DEBRIDEMENT ET CHONDROPLASTY    UPPER GASTROINTESTINAL ENDOSCOPY  01/21/2011    superficial ulcer of the fundus, erostive gastritis. VASECTOMY Bilateral 1980       Patient Family Social History:     Social History     Socioeconomic History    Marital status:      Spouse name: None    Number of children: None    Years of education: None    Highest education level: None   Tobacco Use    Smoking status: Former     Packs/day: 2.00     Years: 15.00     Pack years: 30.00     Types: Cigarettes     Start date: 1966     Quit date: 1985     Years since quittin.7    Smokeless tobacco: Never    Tobacco comments:     smoked 2 packs a day for 14 to 15 years, quit in    Vaping Use    Vaping Use: Never used   Substance and Sexual Activity    Alcohol use: No     Alcohol/week: 0.0 standard drinks     Comment: rare    Drug use: No     Social Determinants of Health     Physical Activity: Insufficiently Active    Days of Exercise per Week: 2 days    Minutes of Exercise per Session: 60 min     Family History   Problem Relation Age of Onset    Cancer Mother         lymphoma    Thyroid Disease Mother         hypothyroidism    Stroke Mother     Heart Disease Mother     High Blood Pressure Mother     Heart Attack Mother         in her 46s    Heart Failure Father         congestive    Coronary Art Dis Father     Emphysema Father         was a smoker    Heart Disease Maternal Grandmother     Heart Disease Maternal Grandfather     Crohn's Disease Sister     High Cholesterol Sister     High Cholesterol Child        Current Medications:     Current Outpatient Medications   Medication Sig Dispense Refill    losartan (COZAAR) 50 MG tablet TAKE 1 TABLET BY MOUTH EVERY DAY 90 tablet 1    tamsulosin (FLOMAX) 0.4 MG capsule Take 1 capsule by mouth once daily 90 capsule 1    warfarin (COUMADIN) 2.5 MG tablet TAKE 3 TABLETS BY MOUTH ON .  TAKE 2 TABLETS BY MOUTH ALL OTHER DAYS OR AS DIRECTED BY INR RESULTS 90 tablet 5    clopidogrel (PLAVIX) 75 MG tablet Take 1 tablet by mouth once daily 90 tablet 1    Alectinib HCl (ALECENSA) 150 MG CAPS Take 600 mg by mouth 2 times daily 4 caps  capsule 5    pravastatin (PRAVACHOL) 40 MG tablet Take 1 tablet by mouth in the evening 90 tablet 3    oxybutynin (DITROPAN-XL) 10 MG extended release tablet Take 1 tablet by mouth daily 90 tablet 1    furosemide (LASIX) 20 MG tablet TAKE 1 TABLET BY MOUTH EVERY DAY 90 tablet 1    levothyroxine (SYNTHROID) 125 MCG tablet Take 1 tablet by mouth Daily 90 tablet 1    Omega-3 Fatty Acids (FISH OIL) 1200 MG CPDR Take 2,400 mg by mouth 2 times daily      niacin 500 MG extended release capsule Take 500 mg by mouth 2 times daily      Metoprolol Tartrate 75 MG TABS TAKE 1 TABLET BY MOUTH TWO TIMES A DAY      flecainide (TAMBOCOR) 50 MG tablet Take 50 mg by mouth 2 times daily      ECHINACEA HERB PO Take by mouth daily      albuterol sulfate HFA (VENTOLIN HFA) 108 (90 Base) MCG/ACT inhaler Inhale 2 puffs into the lungs every 6 hours as needed for Wheezing or Shortness of Breath 1 Inhaler 6    Multiple Vitamins-Minerals (MULTIVITAMIN PO) daily. No current facility-administered medications for this visit. Allergies:   Effient [prasugrel], Ace inhibitors, and Statins    Review of Systems:    Constitutional: No fever or chills. No night sweats, no weight loss. Positive fatigue  Eyes: No eye discharge, double vision, or eye pain   HEENT: negative for sore mouth, sore throat, hoarseness and voice change   Respiratory: negative for sputum, dyspnea, wheezing, hemoptysis, chest pain. Positive shortness of breath with exertion, stable  Cardiovascular: negative for chest pain, dyspnea, palpitations, orthopnea, PND   Gastrointestinal: negative for nausea, vomiting, diarrhea, constipation, abdominal pain, Dysphagia, hematemesis and hematochezia   Genitourinary: negative for frequency, dysuria, nocturia, urinary incontinence, and hematuria   Integument: negative for rash, skin lesions, bruises.    Hematologic/Lymphatic: negative for easy bruising, bleeding, lymphadenopathy, or petechiae   Endocrine: negative for heat or cold intolerance,weight changes, change in bowel habits and hair loss   Musculoskeletal: negative for myalgias, arthralgias, pain, joint swelling,and bone pain   Neurological: negative for headaches, dizziness, seizures, weakness, numbness.   Positive for memory loss    Physical Exam:  Vitals: /72 (Site: Left Upper Arm, Position: Sitting, Cuff Size: Large Adult)   Pulse 69   Temp 98.2 °F (36.8 °C)   Ht 5' 10\" (1.778 m)   Wt 247 lb (112 kg)   SpO2 95%   BMI 35.44 kg/m²     General appearance - well appearing, no in pain or distress  Mental status - AAO X3  Eyes - pupils equal and reactive, extraocular eye movements intact  Mouth - mucous membranes moist, pharynx normal without lesions  Neck - supple, no significant adenopathy  Lymphatics - no palpable lymphadenopathy, no hepatosplenomegaly  Chest - clear to auscultation, no wheezes, rales or rhonchi, symmetric air entry  Heart - normal rate, regular rhythm, normal S1, S2, no murmurs  Abdomen - soft, nontender, nondistended, no masses or organomegaly  Neurological - alert, oriented, normal speech, no focal findings or movement disorder noted  Extremities - peripheral pulses normal, no pedal edema, no clubbing or cyanosis  Skin - normal coloration and turgor, no rashes, no suspicious skin lesions noted     DATA:    Results for orders placed or performed during the hospital encounter of 09/13/22   Comprehensive Metabolic Panel   Result Value Ref Range    Glucose 198 (H) 70 - 99 mg/dL    BUN 26 (H) 8 - 23 mg/dL    Creatinine 1.45 (H) 0.70 - 1.20 mg/dL    Bun/Cre Ratio 18 9 - 20    Calcium 9.5 8.6 - 10.4 mg/dL    Sodium 140 135 - 144 mmol/L    Potassium 3.9 3.7 - 5.3 mmol/L    Chloride 101 98 - 107 mmol/L    CO2 28 20 - 31 mmol/L    Anion Gap 11 9 - 17 mmol/L    Alkaline Phosphatase 90 40 - 129 U/L    ALT 23 5 - 41 U/L    AST 22 <40 U/L    Total Bilirubin 0.5 0.3 - 1.2 mg/dL    Total needed:->Yes assess responce to treatment Reason for Exam: assess responce to treatment FINDINGS: Chest: Mediastinum: Post treatment changes in the right hilum. Node distinct lymphadenopathy. Calcified atheromatous plaque and coronary calcification. No pericardial effusion. Lungs/pleura: Post treatment changes in the right hilum and infrahilar region extending into the right lower lobe again demonstrated and grossly unchanged. Sequela of old granulomatous disease and stable noncalcified bilateral micro nodules. No new or enlarging nodules. Bilateral pleural calcification again demonstrated. No effusion. The central airway is patent. Soft Tissues/Bones: No new findings. Sclerotic bone lesion in the anterolateral right 8th rib again demonstrated. Abdomen/Pelvis: Organs: Perfusion abnormality in the posterior right hepatic lobe is noted on the chest portion of the exam.  No suspicious liver lesion identified. The gallbladder, pancreas, spleen, adrenals and kidneys reveal no acute findings. GI/Bowel: There is no bowel dilatation or wall thickening identified. Diverticulosis. Pelvis: No acute findings. Prostatomegaly. Fat containing right inguinal hernia. Peritoneum/Retroperitoneum: No free air or free fluid. The aorta is normal in caliber. The visceral branches are patent. Calcified atheromatous plaque is present. No lymphadenopathy. Bones/Soft Tissues: No new findings. Sclerotic osseous lesions notably at the L3 and L4 vertebral bodies again demonstrated. Few sclerotic lesions are also present in the pelvis. 1.  Stable exam of the chest with post treatment changes in the right perihilar region and right lower lobe. Stable calcified and noncalcified micro nodules. 2.  Stable exam of the abdomen and pelvis. Few scattered sclerotic bone lesions are again demonstrated and without significant change.           Impression:  Adenocarcinoma of right lung, clinical stage at least T1c, N2, M0 (stage IIIA)  Disease recurrence, biopsy-proven-8/2021  Radiation pneumonitis and shortness of breath  Thrombocytopenia  Anemia      Plan:  I had a detailed discussion with the patient and we went over results of lab work-up imaging studies and other relevant clinical data  Toxicity check performed. Patient is tolerating treatment without unexpected or severe side effects  Discussed natural history of ALK positive lung cancer  Continue alectinib until disease progression or intolerable toxicity  Will obtain scans in 2 months. Monitor kidney function  NCCN guidelines were reviewed and discussed with the patient. The diagnosis and care plan were discussed with the patient in detail. I discussed the natural history of the disease, prognosis, risks and goals of therapy and answered all the patients questions to the best of my ability. Patient expressed understanding and was in agreement. Virgen Yu MD        This note is created with the assistance of a speech recognition program.  While intending to generate a document that actually reflects the content of the visit, the document can still have some errors including those of syntax and sound a like substitutions which may escape proof reading. It such instances, actual meaning can be extrapolated by contextual diversion. Beto Cannon

## 2022-09-13 NOTE — PROGRESS NOTES
PULMONARY MEDICINE OUTPATIENT  FOLLOW UP NOTE                                                                       PATIENT:  Alexander Bills Shock  YOB: 1945  MRN: [id number]     REFERRED BY: Janis Howe DO   CHIEF COMPLIANT: 6 Month Follow-Up       HISTORY     HISTORY OF PRESENT ILLNESS:   Alexandr Degroot is a 69y.o. year old male here for follow-up. Metastatic right lung adenocarcinoma/history of asbestos exposure:  Initially seen after abnormal CT scan (7/16/2019) which showed right-sided pleural calcification/thickening raising the suspicion for asbestos exposure. There is no honeycombing or bronchiectasis. Patient also has multiple bilateral pulmonary nodules, largest one seen in the right lung base measuring about 1.5 X 1.3 cm at subpleural location  CT chest (7/21/2020) is reported to show an interval enlargement in the size of right lower lobe nodule to 2.1 x 1.7 cm. No other significant changes in other nodules or pleural thickening. PET scan (8/22/2020) which showed PET avidity within a maximum SUV of 4.6 in the right lung base, additionally patient had FDG avid subcarinal and right hilar which are reported showing maximum SUV of 4.6  S/p EBUS TBNA (9/17/2020) for PET avid right hilar and subcarinal mediastinal lymph node. His cytology turned out to be positive for adenocarcinoma in the subcarinal lymph node.   He was treated with chemo/immunotherapy  CT chest abdomen/pelvis (1/7/2021), reported favorable response to therapy with decrease in size of right lower lobe nodule, however had mixed response to mediastinal/hilar adenopathy  CT chest (4/27/2021) showed interval development of consolidative changes in the right lower lobe with associated bronchiectasis extending to the right hilum with interval progression of groundglass opacities in the right upper lobe and a small pleural effusion  CT chest abdomen/pelvis (8/3/2021) showed a new right eighth rib and T3 bone lesion, which was subsequently biopsied and the pathology is suggestive of metastatic spread  Patient is currently on Alecensa for metastatic lung adenocarcinoma under care of Dr. Jacqueline Alba from January, April and July 2022 shows stable changes in the right perihilar and lower lobe, thought to be related to treatment effect     Chronic obstructive pulmonary disease:  Tobacco history: Patient is a former smoker and  reports that he quit smoking about 37 years ago. His smoking use included cigarettes. He started smoking about 56 years ago. He has a 30.00 pack-year smoking history. He has never used smokeless tobacco.  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 Pulmonary function test: PFT (June 2019) showed nonspecific ventilatory impairment  Current symptoms: Patient denies any significant cough or shortness of breath  Current Rx: Patient is currently not using any bronchodilators     GRISEL:  Patient is morbidly obese and has history of sleep apnea, which is being treated with CPAP  Patient has had good compliance to the machine and is benefiting from the therapy  He denies any symptoms of excessive daytime sleepiness  He reports that his CPAP machine is not currently on recall and he has not received a new machine yet       PAST MEDICAL HISTORY:      Diagnosis Date    Abdominal hernia     small, no significant symptomatology. Abnormal PSA     with history of slight increase. Adenocarcinoma, lung, right (HCC)     Allergic rhinitis     Benign prostatic hypertrophy     Chest pain     occasional.     Coronary artery disease     status post drug eluting stent placement, LAD, ostial obtuse marginal.     Dysphagia     Erectile dysfunction     Familial hyperlipidemia     with hypertriglyceridemia.      Gastroesophageal reflux disease     Hearing loss, bilateral     hearing aid in the left ear only. 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 Hypertension     Impaired fasting glucose     prediabetes. Lumbar disc herniation     L3-L4, with chronic back pain. Mild anemia     Nasal polyps     minimal symptoms. Nasal septal deviation     right side. Obesity     Palpitations     occasional.     Peptic ulcer disease     Pulmonary nodules     Reactive airway disease     asthma, industrial related, also a history of hypersensitivity pneumonitis. Sleep apnea     wears CPAP nightly    Urinary frequency      PAST SURGICAL HISTORY:      Procedure Laterality Date    APPENDECTOMY  age 11    BRONCHOSCOPY  09/17/2020    BRONCHOSCOPY N/A 9/17/2020    EBUS- BRONCHOSCOPY ENDOBRONCHIAL ULTRASOUND, PATHOLOGY REQUESTED- CINDY NOTIFIED, GI UNIT SCHEDULED performed by Elli Tolliver MD at [address] [facility]  10/20/2011    occluded LAD and ostial obtuse marginal stenosis, underwent drug eluting stents to both, RCA small, nondominant, occluded, ejection fraction 45%. CARDIAC CATHETERIZATION  08/2018    with stent placement    CATARACT REMOVAL Bilateral 03/2018    COLONOSCOPY  01/21/2011    mild sigmoid diverticulosis. COLONOSCOPY  4/6/16    hemorrhoid; sigmoid diverticulosis    CT BIOPSY PERCUTANEOUS DEEP BONE  8/18/2021    CT BIOPSY PERCUTANEOUS DEEP BONE 8/18/2021 [facility] CT SCAN    KNEE ARTHROSCOPY Left 03/19/2010    partial medial and lateral synovectomies, partial medial meniscectomy, chondroplasty. NASAL FRACTURE SURGERY  1960    OTHER SURGICAL HISTORY Left 02/12/2010    knee injection. PORT SURGERY N/A 10/14/2020    RIGHT PORT INSERTION performed by Joyce Parra DO at [facility] Right 1/28/2022    Right PORT REMOVAL performed by Joyce Parra DO at [facility]    PRE-MALIGNANT / BENIGN SKIN LESION EXCISION Left 01/16/2012    actinic keratoses, ear, liquid nitrogen.      PRE-MALIGNANT / BENIGN SKIN LESION EXCISION Left 07/19/2011    actinic keratosis, upper ear, liquid nitrogen. PROSTATE BIOPSY Bilateral 09/08/2015    Benign    REPAIR UMBILICAL HBDL,4+E/O,KRXBD N/A 8/02/1242    UMBILICAL Hernia Repair w/ Mesh performed by Karley Anton MD at [address] [place] [address]  10/30/2015    with bilateral submucosal resection of the inferior turbinates, left middle turbinate elza bullosa resection done by Dr Rena Nguyen ARTHROSCOPY Left 10/17/14    W/ SUBACROMIAL DECOMPRESSION, DEBRIDEMENT ET CHONDROPLASTY    UPPER GASTROINTESTINAL ENDOSCOPY  01/21/2011    superficial ulcer of the fundus, erostive gastritis. VASECTOMY Bilateral 04/04/1980     SOCIAL HISTORY:  TOBACCO:   reports that he quit smoking about 37 years ago. His smoking use included cigarettes. He started smoking about 56 years ago. He has a 30.00 pack-year smoking history. He has never used smokeless tobacco.  ETOH:   reports current alcohol use. DRUGS: reports no history of drug use.   Regular for the fall patient repair     PHYSICAL EXAMINATION      VITAL SIGNS:   /72   Pulse 69   Temp 98.2 °F (36.8 °C)   Ht 5' 10\" (1.778 m)   Wt 247 lb (112 kg)   SpO2 95%   BMI 35.44 kg/m²   Wt Readings from Last 3 Encounters:   09/13/22 247 lb (112 kg)   09/13/22 247 lb (112 kg)   09/13/22 247 lb (112 kg)     SYSTEMIC EXAMINATION:  General appearance -  [x] well appearing  [x] overweight  [] obese [] cachectic [x] comfortable  [x] in no acute distress  [] chronically ill-appearing  [] in mild to moderate respiratory distress  Mental status -  [x] alert  [x] oriented to person, place, and time  [] anxious  [] depressed mood   Head-  [x] atraumatic  [x] normocephalic  Eyes -  [] pupils equal and reactive, extraocular eye movements intact  [] sclera anicteric  Ears -  [x] hearing grossly normal bilaterally [] bilateral TM's and external ear canals normal  Nose -  [x] normal and patent [] no erythema  [] discharge  Mouth -  [x] mucous membranes moist  [] pharynx normal without lesions [] erythematous  [] exudate noted  Mallampati Airway Score -  [] I (soft palate, uvula, fauces, tonsillar pillars visible) [] II (soft palate, uvula, fauces visible) []  III (soft palate, base of uvula visible) [] IV (only hard palate visible)  Neck -  [] supple  [] no significant adenopathy  [] carotids upstroke normal bilaterally [] no JVD  [] no bruit  Lymphatics -  [x] no palpable lymphadenopathy  [] no hepatosplenomegaly  Chest -   [x] normal chest excursion  [x] no chest wall tenderness  [] increased AP diameter[] pectus noted [] scoliosis noted [x] no tachypnea retraction or cyanosis [x] clear to auscultation, no wheezes, rales or rhonchi, symmetric air entry  [] wheezing noted  [] rales noted  []rhonchi noted [] decreased air entry noted bilaterally  Heart -  [x] normal rate,  [x] regular rhythm  [] irregularly irregular rhythm [x] normal S1  [x] normal S2  [x] no murmurs, rubs, clicks or gallops  [] S3 present  [] S4 present  [] systolic murmur  [] diastolic murmur  [] midsystolic click []pericardial rub present   Abdomen -  [] soft [] nontender  [] nondistended  [] no masses or organomegaly  Neurological -  [x] normal speech  [x] no focal findings or movement disorder noted  [] cranial nerves II through XII intact  [] DTR's normal and symmetric  [] Babinski sign negative,  [x] motor and sensory grossly normal bilaterally  [] normal muscle tone [x] no tremors  [] strength 5/5  [] Romberg sign negative [] normal gait   Musculoskeletal -  [x] no joint tenderness [x] no deformity or swelling  Extremities - [x] no clubbing or cyanosis,  [x] no pedal edema [] pedal edema  [] intact peripheral pulses  Skin -  [x] normal coloration and turgor  [x] no rashes  [] no suspicious skin lesions noted          MEDICAL DECISION MAKING     PROBLEMS ADDRESSED (NUMBER AND COMPLEXITY)       ICD-10-CM    1. Adenocarcinoma of right lung (HCC)  C34.91       2.  Obstructive sleep apnea  G47.33       3. Pulmonary hypertension (HCC)  I27.20       4. Pleural calcification  J94.8       5.  COPD with asthma ([facility] [address]  J44.9              2. DATA REVIEWED AND ANALYZED (AMOUNT AND/OR COMPLEXITY)   LABS:  ABG:   No results found for: PH, PHART, PCO2, HLD6VXB, PO2, PO2ART, HCO3, ZUU4GOJ, BE, BEART, THGB, L4XSWSNU  VBG:  No results found for: PHVEN, PMH6NIE, BEVEN, Z6SMMEOK  CBC:   Lab Results   Component Value Date    WBC 10.6 09/13/2022    RBC 3.10 (L) 09/13/2022    HGB 10.1 (L) 09/13/2022    HCT 30.9 (L) 09/13/2022     09/13/2022    MCV 99.7 09/13/2022    MCH 32.6 09/13/2022    MCHC 32.7 09/13/2022    RDW 16.6 (H) 09/13/2022    LYMPHOPCT 11 (L) 09/13/2022    MONOPCT 6 09/13/2022    BASOPCT 0 09/13/2022    MONOSABS 0.63 09/13/2022    LYMPHSABS 1.19 09/13/2022    EOSABS 0.09 09/13/2022    BASOSABS 0.04 09/13/2022    DIFFTYPE NOT REPORTED 12/30/2021     Eosinophils/IgE:   Lab Results   Component Value Date/Time    EOSABS 0.09 09/13/2022 08:19 AM     Alpha 1 antitrypsin: No results found for: A1A  CMP:   Lab Results   Component Value Date     09/13/2022    K 3.9 09/13/2022     09/13/2022    CO2 28 09/13/2022    BUN 26 (H) 09/13/2022    CREATININE 1.45 (H) 09/13/2022    GLUCOSE 198 (H) 09/13/2022    CALCIUM 9.5 09/13/2022    PROT 7.5 09/13/2022    LABALBU 4.2 09/13/2022    BILITOT 0.5 09/13/2022    ALT 23 09/13/2022    AST 22 09/13/2022    ALKPHOS 90 09/13/2022     Coagulation Profile:   Lab Results   Component Value Date    INR 2.5 10/04/2022    PROTIME 29.8 10/04/2022    APTT 38.2 (H) 08/18/2021     BNP:   No results found for: BNP, PROBNP  D-Dimer/Fibrinogen:  No results found for: DDIMER  Others labs:  Lab Results   Component Value Date    TSH 5.87 (H) 08/31/2022     No results found for: VICKI, ANATITER, RHEUMFACTOR, RF, C3, C4, MPO, PR3, SEDRATE, CRP  Lab Results   Component Value Date    IRON 58 (L) 12/30/2021    TIBC 245 (L) 12/30/2021    FERRITIN 631 (H) 07/07/2022    FOLATE >20.0 07/07/2022     Lab Results   Component Value Date    PSA 6.87 (H) 02/24/2022     No results found for: RPR, HIV    RADIOLOGY (CHEST X-RAY/CT SCAN/PET-CT SCAN/ECHO) RESULTS:  [] Not obtained  [x]Reviewed    CT chest/abdomen/pelvis 7/14/2022  Impression   1. Stable exam of the chest with post treatment changes in the right   perihilar region and right lower lobe. Stable calcified and noncalcified   micro nodules. 2.  Stable exam of the abdomen and pelvis. Few scattered sclerotic bone   lesions are again demonstrated and without significant change. CT chest/abdomen/pelvis 4/7/22  Impression   Unchanged chronic appearing consolidation within the right lower lung,   presumably reflecting scarring. Unchanged punctate nodules within the lower lungs bilaterally. Redemonstration of multiple bony lesions, with increased sclerosis, and with   decreased conspicuity, suggesting continued healing. CT chest/abdomen/pelvis 1/6/2022  Chest:       Improved aeration the right lung with decreased right-sided pleural effusion. A few scattered punctate noncalcified pulmonary nodules on both the right and   left appear unchanged       Lytic lesion right anterior rib now appears more sclerotic       Abdomen and pelvis:       Healing bony metastatic lesions     CT chest/abdomen/pelvis 8/3/2021  Impression   1. New anterior right 8th rib and L3 bone lesions, concerning for metastatic   deposits. 2.  Previously noted more diffuse airspace disease in the right lung has   improved. Localized ground-glass opacity in the inferior right upper lobe   and residual opacities in the superior segment of the right lower lobe   remain, which may represent resolving inflammatory process. Short-term   imaging follow-up is suggested. 3.  Less prominent geographic liver lesion near the right portal vein,   previously described as probably benign with MRI. This may represent focal   fat.        4.  Small right pleural effusion. CT chest/abdomen/pelvis 1/7/2021  Impression   1. CT CHEST: Favorable interval response to therapy seen as decreased size   of right lower lobe nodule. 2. Mixed response from therapy when correlating with subcarinal and right   hilar lymph nodes, subcarinal lymph node not significantly changed, right   hilar lymph node measuring slightly larger, though this may be reactive. 3. New patchy pulmonary infiltrates in the right lung may be related to post   radiation pneumonitis or other acute infectious process including possible   atypical/viral pneumonia. 4. CT ABDOMEN/PELVIS: New indeterminate right hepatic liver lesion I feel is   likely focal fatty infiltration, but could reflect a metastatic lesion. Correlation with MRI abdomen attention liver without and with gadolinium   correlation recommended. 5. Nonspecific prostate gland enlargement typical of BPH.     ECHOCARDIOGRAM:  Results for orders placed during the hospital encounter of 12/29/20   ECHO Complete 2D W Doppler W Color    Narrative   Summary  Normal left ventricular size with normal hyperdynamic function. Left ventricular ejection fraction 65 %. Mild concentric left ventricular hypertrophy. Grade I (mild) left ventricular diastolic dysfunction. Technically difficult visualization of the right ventricle, but it does  appear to be normal in size. Aortic valve not well visualized but appears normal.  Trivial tricuspid regurgitation. Estimated right ventricular systolic  pressure is 35 mmHg. Trivial pulmonic insufficiency. PULMONARY FUNCTION TEST:  [x] Not obtained  [] Ordered, but not yet obtained  [x] Previously taken on 6/25/2019  SPIROMETRY       Forced Vital Capacity maneuver:      There is no demonstrable obstructive defect and FVC is mildly   diminished, which in setting of normal TLC is suggestive of   \"non-specific\" ventilatory impairment, which is typically seen in   patient with asthma, obesity, CHF, chest wall limitations. Consider methacholine challenge test to evaluate for asthma. Flow-Volume Loop: is normal.       Following inhaled bronchodilator there was: no significant   response (<8% improvement in the FEV1)     LUNG VOLUMES     Measurement by body plethysmography and/or gas diffusion shows:   Lung volumes are noraml except for decreased SVC and  ERV. Neither hyperinflation nor air trapping are present. Airway resistance, a measure of airway function is: Airway   resistance is within normal limits. DIFFUSION CAPACITY (DLCO)     The diffusing capacity is:   Mildly reduced (>60% and <75%)   However DLCO corrected for alveolar volume is normal; which   suggests that reduction in the DLCO may be due to a reduced   measured lung volume rather than parenchymal or vascular disease;   this finding may be due to poor patient effort, previous lung   resection, obstruction of a major bronchus or small stature of   the patient     Impression: Nonspecific ventilatory impairment    POLYSOMNOGRAM:  BASELINE/TITRATION SLEEP STUDY:  [] Not obtained  [x]Unavailable    CPAP/BIPAP COMPLIANCE DATA:   [] Not obtained  [] Ordered, but not yet obtained  [x] Reviewed      ASSESSMENT OF EXCESSIVE DAYTIME SLEEPINESS (BY INDEPENDENT HISTORIAN):  [place] Sleepiness Scale:  [x] Not obtained  [] Obtained  [] Previously obtained  No flowsheet data found. PRIOR EXTERNAL NOTES:  [] Not obtained  [x] Reviewed    ORDERS PLACED:  No orders of the defined types were placed in this encounter. 3. RISK OF COMPLICATIONS AND/OR MORBIDITY OR MORTALITY:     ALLERGIES:  Allergies   Allergen Reactions    Effient [Prasugrel] Other (See Comments)     Superficial skin bleeding. Ace Inhibitors Other (See Comments)     Cough. Statins Other (See Comments)     Myalgias.        MEDICATIONS:  Outpatient Medications Prior to Visit   Medication Sig Dispense Refill    losartan (COZAAR) 50 MG tablet TAKE 1 TABLET BY MOUTH EVERY DAY 90 tablet 1    tamsulosin (FLOMAX) 0.4 MG capsule Take 1 capsule by mouth once daily 90 capsule 1    warfarin (COUMADIN) 2.5 MG tablet TAKE 3 TABLETS BY MOUTH ON TUESDAYS. TAKE 2 TABLETS BY MOUTH ALL OTHER DAYS OR AS DIRECTED BY INR RESULTS 90 tablet 5    clopidogrel (PLAVIX) 75 MG tablet Take 1 tablet by mouth once daily 90 tablet 1    Alectinib HCl (ALECENSA) 150 MG CAPS Take 600 mg by mouth 2 times daily 4 caps  capsule 5    pravastatin (PRAVACHOL) 40 MG tablet Take 1 tablet by mouth in the evening 90 tablet 3    oxybutynin (DITROPAN-XL) 10 MG extended release tablet Take 1 tablet by mouth daily 90 tablet 1    furosemide (LASIX) 20 MG tablet TAKE 1 TABLET BY MOUTH EVERY DAY 90 tablet 1    levothyroxine (SYNTHROID) 125 MCG tablet Take 1 tablet by mouth Daily 90 tablet 1    Omega-3 Fatty Acids (FISH OIL) 1200 MG CPDR Take 2,400 mg by mouth 2 times daily      niacin 500 MG extended release capsule Take 500 mg by mouth 2 times daily      Metoprolol Tartrate 75 MG TABS TAKE 1 TABLET BY MOUTH TWO TIMES A DAY      flecainide (TAMBOCOR) 50 MG tablet Take 50 mg by mouth 2 times daily      ECHINACEA HERB PO Take by mouth daily      albuterol sulfate HFA (VENTOLIN HFA) 108 (90 Base) MCG/ACT inhaler Inhale 2 puffs into the lungs every 6 hours as needed for Wheezing or Shortness of Breath 1 Inhaler 6    Multiple Vitamins-Minerals (MULTIVITAMIN PO) daily. No facility-administered medications prior to visit.        PRESCRIPTION DRUG MANAGEMENT/RECOMMENDATIONS:    Reviewed PFT and recent imaging data  Patient is currently on immunotherapy with Alecensa for metastatic lung adenocarcinoma and follows up with Dr. Rosi Null  Currently denies any pulmonary symptoms  Recommended as needed albuterol  Reviewed use of rescue vs controlling agents, oral and inhaled meds and potential side effects  Reviewed use, techniques, schedule and side effects of all inhaled medications  Refills were provided   Barriers to medication compliance addressed. Patient was recommended to have prednisone and an antibiotic available for use during an exacerbation    SUPPLEMENTAL OXYGEN NEEDS:  Patient is not on home oxygen therapy. SLEEP APNEA MANAGEMENT RECOMMENDATIONS:  Patient denies any excessive daytime sleepiness and reports refreshing and restorative sleep  Patient is using PAP as recommended and is benefiting from the therapy  Compliance data was reviewed  Patient reports that his CPAP machine is currently on recall and is awaiting a new machine  Continue to use CPAP/BiPAP for at least 4 hours every night  Use heated humidification, if needed  Weight loss was recommended and discussed  Recommended good sleep hygiene and instructions provided  Patient instructed not to drive or operate heavy machinery, if sleepy    SMOKING CESSATION/COUNSELING:  Recommended to continue smoking cessation    LIFESTYLE MODIFICATION RECOMMENDATIONS:  Follow healthy behaviors: nutrition, exercise and medication adherence  Maintain an active lifestyle    LUNG CANCER SCREENING RECOMMENDATIONS:  Lung Cancer Screening: After reviewing the patient's smoking history, age and other clinical criteria, the LOW DOSE CT is : NOT INDICATED;  Signs or symptoms of Lung cancer  Continue surveillance imaging as per oncology recommendations    IMMUNIZATION HISTORY/RECOMMENDATIONS:  Immunization History   Administered Date(s) Administered    COVID-19, MODERNA BLUE border, Primary or Immunocompromised, (age 12y+), IM, 100 mcg/0.5mL 01/28/2021, 02/25/2021, 04/13/2022    COVID-19, PFIZER PURPLE top, DILUTE for use, (age 15 y+), 30mcg/0.3mL 11/18/2021    Influenza A (A5X0-17) Vaccine PF IM 01/11/2010    Influenza Virus Vaccine 12/10/2009, 10/26/2011, 11/24/2012    Influenza, FLUAD, (age 72 y+), Adjuvanted, 0.5mL 09/11/2020    Influenza, High Dose (Fluzone 65 yrs and older) 10/14/2013, 11/10/2014, 11/09/2015, 10/14/2016, 11/03/2017, 10/18/2018, 10/08/2019, 10/15/2021    Pneumococcal Conjugate 13-valent ([id number]) 02/06/2017    Pneumococcal Polysaccharide ([id number]) 01/17/2012    Tdap (Boostrix, Adacel) 08/04/2015    Zoster Live (Zostavax) 07/31/2014    Zoster Recombinant (Shingrix) 03/01/2019, 05/08/2019     All the questions that the patient had were answered to his satisfaction  We'll see the patient back in six months  Thank you for having us involved in the care of your patient. Please call us if you have any questions or concerns. Elenita Solomon MD  Pulmonary and Critical Care Medicine            Please note that this chart was generated using voice recognition Dragon dictation software. Although every effort was made to ensure the accuracy of this automated transcription, some errors in transcription may have occurred.

## 2022-09-13 NOTE — PATIENT INSTRUCTIONS
Hospital Outpatient Visit on 09/13/2022   Component Date Value Ref Range Status    Glucose 09/13/2022 198 (A) 70 - 99 mg/dL Final    BUN 09/13/2022 26 (A) 8 - 23 mg/dL Final    Creatinine 09/13/2022 1.45 (A) 0.70 - 1.20 mg/dL Final    Bun/Cre Ratio 09/13/2022 18  9 - 20 Final    Calcium 09/13/2022 9.5  8.6 - 10.4 mg/dL Final    Sodium 09/13/2022 140  135 - 144 mmol/L Final    Potassium 09/13/2022 3.9  3.7 - 5.3 mmol/L Final    Chloride 09/13/2022 101  98 - 107 mmol/L Final    CO2 09/13/2022 28  20 - 31 mmol/L Final    Anion Gap 09/13/2022 11  9 - 17 mmol/L Final    Alkaline Phosphatase 09/13/2022 90  40 - 129 U/L Final    ALT 09/13/2022 23  5 - 41 U/L Final    AST 09/13/2022 22  <40 U/L Final    Total Bilirubin 09/13/2022 0.5  0.3 - 1.2 mg/dL Final    Total Protein 09/13/2022 7.5  6.4 - 8.3 g/dL Final    Albumin 09/13/2022 4.2  3.5 - 5.2 g/dL Final    Albumin/Globulin Ratio 09/13/2022 1.3  1.0 - 2.5 Final    GFR Non- 09/13/2022 47 (A) >60 mL/min Final    GFR  09/13/2022 57 (A) >60 mL/min Final    GFR Comment 09/13/2022        Final    Comment: Average GFR for 79or more years old:   76 mL/min/1.73sq m  Chronic Kidney Disease:   <60 mL/min/1.73sq m  Kidney failure:   <15 mL/min/1.73sq m              eGFR calculated using average adult body mass.  Additional eGFR calculator available at:        [email or web address]            WBC 09/13/2022 10.6  3.5 - 11.3 k/uL Final    RBC 09/13/2022 3.10 (A) 4.21 - 5.77 m/uL Final    Hemoglobin 09/13/2022 10.1 (A) 13.0 - 17.0 g/dL Final    Hematocrit 09/13/2022 30.9 (A) 40.7 - 50.3 % Final    MCV 09/13/2022 99.7  82.6 - 102.9 fL Final    MCH 09/13/2022 32.6  25.2 - 33.5 pg Final    MCHC 09/13/2022 32.7  25.2 - 33.5 g/dL Final    RDW 09/13/2022 16.6 (A) 11.8 - 14.4 % Final    Platelets 16/35/7923 168  138 - 453 k/uL Final    MPV 09/13/2022 10.6  8.1 - 13.5 fL Final    NRBC Automated 09/13/2022 0.0  0.0 per 100 WBC Final Seg Neutrophils 09/13/2022 81 (A) 36 - 65 % Final    Lymphocytes 09/13/2022 11 (A) 24 - 43 % Final    Monocytes 09/13/2022 6  3 - 12 % Final    Eosinophils % 09/13/2022 1  1 - 4 % Final    Basophils 09/13/2022 0  0 - 2 % Final    Immature Granulocytes 09/13/2022 1 (A) 0 % Final    Segs Absolute 09/13/2022 8.53 (A) 1.50 - 8.10 k/uL Final    Absolute Lymph # 09/13/2022 1.19  1.10 - 3.70 k/uL Final    Absolute Mono # 09/13/2022 0.63  0.10 - 1.20 k/uL Final    Absolute Eos # 09/13/2022 0.09  0.00 - 0.44 k/uL Final    Basophils Absolute 09/13/2022 0.04  0.00 - 0.20 k/uL Final    Absolute Immature Granulocyte 09/13/2022 0.07  0.00 - 0.30 k/uL Final    RBC Morphology 09/13/2022 ANISOCYTOSIS PRESENT   Final

## 2022-09-13 NOTE — PROGRESS NOTES
2022     Tomas Mak (:  1945) is a 69 y.o. male, here for evaluation of the following medical concerns:    HPI  Patient comes in today for follow up of chronic health issues Patient does have a known history of hypertension and his blood pressure is stable and controlled on his current medical therapy. Has known impaired fasting glucose and his blood sugar levels are now more in the range of diabetes. Did recommend strict low-carb/low sugar diet and increased exercise to keep this optimally controlled. Does have a known history of hypothyroidism and his thyroid levels are subtherapeutic at this time. We will make an adjustment to his thyroid dose. Does have a known history of familial hyperlipidemia and his cholesterol levels are more elevated than last check as well. He is to really work on lipid-lowering diet to keep this optimally controlled. Some of this may be more skewed related to his chemotherapeutic treatment for his lung cancer. He has a known history of obstructive sleep apnea and does use his CPAP machine consistently. Is getting ongoing medical benefit from its use. Has a history of elevated PSA and at this point he has preferred just to monitor as he has been asymptomatic. We will reassess with lab testing at his next office visit. Patient does report to me that when he carries something heavy in both hands his neck will tend to flex forward when walking. He has to make a conscious effort to pull his neck into extension. Likely he has some cervical neck muscle weakness and does need to work on developing some increased strength to the posterior neck musculature. He does go to the [facility] therapy department to do regular exercise and has talked to the therapist there about different exercises to do to build some strength in his neck muscles. He just started doing them so we will see if this improves his overall posturing with ambulation.   Patient otherwise has no other acute medical concerns. .  Patient's recent lab reports are as follows:    Lab Results   Component Value Date/Time    WBC 10.6 09/13/2022 08:19 AM    RBC 3.10 09/13/2022 08:19 AM    RBC 3.89 10/21/2011 06:11 AM    HGB 10.1 09/13/2022 08:19 AM    HCT 30.9 09/13/2022 08:19 AM    MCV 99.7 09/13/2022 08:19 AM    MCH 32.6 09/13/2022 08:19 AM    MCHC 32.7 09/13/2022 08:19 AM    RDW 16.6 09/13/2022 08:19 AM     09/13/2022 08:19 AM     10/21/2011 06:11 AM    MPV 10.6 09/13/2022 08:19 AM       Lab Results   Component Value Date/Time    CHOL 258 08/31/2022 09:44 AM    CHOL 222 05/01/2019 12:00 AM    HDL 42 08/31/2022 09:44 AM    CHOLHDLRATIO 6.1 08/31/2022 09:44 AM    TRIG 802 08/31/2022 09:44 AM    VLDL NOT REPORTED 08/27/2021 09:05 AM       Lab Results   Component Value Date/Time    TSH 5.87 08/31/2022 09:44 AM       Lab Results   Component Value Date/Time     09/13/2022 08:19 AM    K 3.9 09/13/2022 08:19 AM     09/13/2022 08:19 AM    CO2 28 09/13/2022 08:19 AM    BUN 26 09/13/2022 08:19 AM    CREATININE 1.45 09/13/2022 08:19 AM    GLUCOSE 198 09/13/2022 08:19 AM    GLUCOSE 104 10/21/2011 06:11 AM    CALCIUM 9.5 09/13/2022 08:19 AM       Lab Results   Component Value Date/Time    LABA1C 7.0 08/31/2022 09:44 AM      l   Other review of systems are as noted below. Preventative measures are reviewed today. See health maintenance section for complete preventative plan of care. Did review patient's med list, allergies, social history, fam history, pmhx and pshx today as noted in the record. Review of Systems   Constitutional:  Positive for fatigue. Negative for chills and fever. HENT:  Negative for congestion, ear pain, postnasal drip, rhinorrhea, sinus pressure, sore throat and trouble swallowing. Eyes:  Negative for discharge and redness. Respiratory:  Negative for cough, shortness of breath and wheezing. Cardiovascular:  Negative for chest pain. Gastrointestinal:  Negative for abdominal pain, constipation, diarrhea, nausea and vomiting. Genitourinary:  Negative for dysuria, flank pain, frequency and urgency. Musculoskeletal:  Negative for arthralgias, myalgias and neck pain. Skin:  Negative for rash and wound. Allergic/Immunologic: Negative for environmental allergies. Neurological:  Positive for weakness. Negative for dizziness, light-headedness and headaches. Hematological:  Negative for adenopathy. Psychiatric/Behavioral: Negative. Prior to Visit Medications    Medication Sig Taking? Authorizing Provider   losartan (COZAAR) 50 MG tablet TAKE 1 TABLET BY MOUTH EVERY DAY  Selvin Negron DO   tamsulosin (FLOMAX) 0.4 MG capsule Take 1 capsule by mouth once daily  Naty Lazcano DO   warfarin (COUMADIN) 2.5 MG tablet TAKE 3 TABLETS BY MOUTH ON TUESDAYS.  TAKE 2 TABLETS BY MOUTH ALL OTHER DAYS OR AS DIRECTED BY INR RESULTS  Selvin Negron DO   clopidogrel (PLAVIX) 75 MG tablet Take 1 tablet by mouth once daily  Naty Lazcano DO   Alectinib HCl (ALECENSA) 150 MG CAPS Take 600 mg by mouth 2 times daily 4 caps BID  Stella Quintero MD   pravastatin (PRAVACHOL) 40 MG tablet Take 1 tablet by mouth in the evening  Naty Lazcano DO   oxybutynin (DITROPAN-XL) 10 MG extended release tablet Take 1 tablet by mouth daily  Naty Lazcano DO   furosemide (LASIX) 20 MG tablet TAKE 1 TABLET BY MOUTH EVERY DAY  Naty Lazcano DO   levothyroxine (SYNTHROID) 125 MCG tablet Take 1 tablet by mouth Daily  eSlvin Negron DO   Omega-3 Fatty Acids (FISH OIL) 1200 MG CPDR Take 2,400 mg by mouth 2 times daily  Historical Provider, MD   niacin 500 MG extended release capsule Take 500 mg by mouth 2 times daily  Historical Provider, MD   Metoprolol Tartrate 75 MG TABS TAKE 1 TABLET BY MOUTH TWO TIMES A DAY  Historical Provider, MD   flecainide (TAMBOCOR) 50 MG tablet Take 50 mg by mouth 2 times daily  Historical Provider, MD wheezing or rales. Abdominal:      General: Bowel sounds are normal. There is no distension. Palpations: Abdomen is soft. Tenderness: There is no abdominal tenderness. Musculoskeletal:      Cervical back: Normal range of motion and neck supple. Lymphadenopathy:      Cervical: No cervical adenopathy. Skin:     General: Skin is warm and dry. Findings: No rash. Neurological:      Mental Status: He is alert and oriented to person, place, and time. Psychiatric:         Behavior: Behavior normal.         Thought Content: Thought content normal.         Judgment: Judgment normal.         ASSESSMENT/PLAN:  Encounter Diagnoses   Name Primary?     Essential hypertension Yes    Acquired hypothyroidism     Familial hyperlipidemia     Obstructive sleep apnea     Elevated PSA     Controlled type 2 diabetes mellitus with complication, without long-term current use of insulin ([address]      Orders Placed This Encounter   Medications    levothyroxine (SYNTHROID) 137 MCG tablet     Sig: Take 1 tablet by mouth Daily     Dispense:  90 tablet     Refill:  1     Orders Placed This Encounter   Procedures    CBC with Auto Differential     Standing Status:   Future     Standing Expiration Date:   9/13/2023    Comprehensive Metabolic Panel     Standing Status:   Future     Standing Expiration Date:   9/13/2023    Hemoglobin A1C     Standing Status:   Future     Standing Expiration Date:   9/13/2023    Lipid Panel     Standing Status:   Future     Standing Expiration Date:   9/13/2023     Order Specific Question:   Is Patient Fasting?/# of Hours     Answer:   12 hours    TSH     Standing Status:   Future     Standing Expiration Date:   9/13/2023    T4, Free     Standing Status:   Future     Standing Expiration Date:   9/13/2023    PSA, Diagnostic     Standing Status:   Future     Standing Expiration Date:   9/13/2023    Microalbumin, Ur     Standing Status:   Future     Standing Expiration Date:   9/13/2023

## 2022-09-25 ENCOUNTER — PATIENT MESSAGE (OUTPATIENT)
Dept: FAMILY MEDICINE CLINIC | Age: 77
End: 2022-09-25

## 2022-09-26 NOTE — TELEPHONE ENCOUNTER
From: Sukumar Davis Shock  To: Dr. Farooq Apple: 9/25/2022 12:32 PM EDT  Subject: Flu Shot    On Sept 14 I test positive for Covid and finally on Sept 23 I got my first negative test result. Sept 24 was also negative. Do you think I should wait to get a flu shot or go ahead and get it. Thank you for your time.  Have a good day

## 2022-10-03 RX ORDER — FUROSEMIDE 20 MG/1
TABLET ORAL
Qty: 90 TABLET | Refills: 1 | Status: SHIPPED | OUTPATIENT
Start: 2022-10-03

## 2022-10-03 RX ORDER — OXYBUTYNIN CHLORIDE 10 MG/1
TABLET, EXTENDED RELEASE ORAL
Qty: 90 TABLET | Refills: 1 | Status: SHIPPED | OUTPATIENT
Start: 2022-10-03

## 2022-10-03 NOTE — TELEPHONE ENCOUNTER
Akira Kirk called requesting a refill of the below medication which has been pended for you:     Requested Prescriptions     Pending Prescriptions Disp Refills    oxybutynin (DITROPAN-XL) 10 MG extended release tablet [Pharmacy Med Name: Oxybutynin Chloride ER 10 MG Oral Tablet Extended Release 24 Hour] 90 tablet 0     Sig: Take 1 tablet by mouth once daily    furosemide (LASIX) 20 MG tablet [Pharmacy Med Name: Furosemide 20 MG Oral Tablet] 90 tablet 0     Sig: Take 1 tablet by mouth once daily       Last Appointment Date: 9/13/2022  Next Appointment Date: 3/13/2023    Allergies   Allergen Reactions    Effient [Prasugrel] Other (See Comments)     Superficial skin bleeding. Ace Inhibitors Other (See Comments)     Cough. Statins Other (See Comments)     Myalgias.

## 2022-10-04 ENCOUNTER — HOSPITAL ENCOUNTER (OUTPATIENT)
Dept: PHARMACY | Age: 77
Setting detail: THERAPIES SERIES
Discharge: HOME | End: 2022-10-04
Payer: MEDICARE

## 2022-10-04 DIAGNOSIS — I48.0 PAROXYSMAL A-FIB (HCC): Primary | ICD-10-CM

## 2022-10-04 LAB
INR BLD: 2.5
PROTIME: 29.8 SECONDS

## 2022-10-04 PROCEDURE — 99211 OFF/OP EST MAY X REQ PHY/QHP: CPT

## 2022-10-04 PROCEDURE — 85610 PROTHROMBIN TIME: CPT

## 2022-10-04 PROCEDURE — 36416 COLLJ CAPILLARY BLOOD SPEC: CPT

## 2022-10-04 NOTE — PATIENT INSTRUCTIONS
\"On day of next appointment, please screen for temperature and COVID-19 symptoms prior to you clinic appointment. If any symptoms present, please call [phone] to reschedule. \"

## 2022-10-04 NOTE — PROGRESS NOTES
ANTICOAGULATION SERVICE    Date of Clinic Visit:  10/4/2022    Timi Walker is a 69 y.o. male who presents to clinic today for anticoagulation monitoring and adjustment. Recent INR Results:  Internal QC passed  Lab Results   Component Value Date    INR 2.5 10/04/2022    INR 1.8 09/06/2022       Current Warfarin Dosage:  Dosing Plan  As of 10/4/2022      TTR:  56.8 % (1.6 y)   Full warfarin instructions:  5 mg every Mon, Wed, Fri; 7.5 mg all other days                 Assessment/Plan:    Continue current regimen as INR remains stable. Next Clinic Appointment:  Return date  As of 10/4/2022      TTR:  56.8 % (1.6 y)   Next INR check:  11/1/2022               Please call [facility] at [phone] with any questions. Thanks!   Yazmin Clay, [facility]  Anticoagulation Service Pharmacist  10/4/2022 1:32 PM  For Pharmacy Admin Tracking Only    Intervention Detail: Dose Adjustment: 0, reason: Patient Preference  Total # of Interventions Recommended: 0  Total # of Interventions Accepted: 0  Time Spent (min): 20

## 2022-10-14 ENCOUNTER — TELEPHONE (OUTPATIENT)
Dept: CASE MANAGEMENT | Age: 77
End: 2022-10-14

## 2022-10-14 NOTE — TELEPHONE ENCOUNTER
Name: Savannah Ross  : 1945  MRN: [id number]    Oncology Navigation Follow-Up Note  Navigation completed, left VM offering assistance as needed in the future or pt. May touch base with Ignacio Hoang in [place] office.  Writer removing self from care team.   Electronically signed by Ankur Prater RN on 10/14/2022 at 9:15 AM

## 2022-11-01 ENCOUNTER — HOSPITAL ENCOUNTER (OUTPATIENT)
Dept: LAB | Age: 77
Discharge: HOME | End: 2022-11-01
Payer: MEDICARE

## 2022-11-01 ENCOUNTER — HOSPITAL ENCOUNTER (OUTPATIENT)
Dept: PHARMACY | Age: 77
Setting detail: THERAPIES SERIES
Discharge: HOME | End: 2022-11-01
Payer: MEDICARE

## 2022-11-01 DIAGNOSIS — C79.51 BONE METASTASIS (HCC): ICD-10-CM

## 2022-11-01 DIAGNOSIS — I48.0 PAROXYSMAL A-FIB (HCC): Primary | ICD-10-CM

## 2022-11-01 DIAGNOSIS — D64.9 ANEMIA, UNSPECIFIED TYPE: ICD-10-CM

## 2022-11-01 DIAGNOSIS — C34.90 ALK-POSITIVE NON-SMALL CELL LUNG CANCER (HCC): ICD-10-CM

## 2022-11-01 LAB
ABSOLUTE EOS #: 0.12 K/UL (ref 0–0.44)
ABSOLUTE IMMATURE GRANULOCYTE: 0.06 K/UL (ref 0–0.3)
ABSOLUTE LYMPH #: 2.08 K/UL (ref 1.1–3.7)
ABSOLUTE MONO #: 0.76 K/UL (ref 0.1–1.2)
ALBUMIN SERPL-MCNC: 4.3 G/DL (ref 3.5–5.2)
ALBUMIN/GLOBULIN RATIO: 1.3 (ref 1–2.5)
ALP BLD-CCNC: 105 U/L (ref 40–129)
ALT SERPL-CCNC: 20 U/L (ref 5–41)
ANION GAP SERPL CALCULATED.3IONS-SCNC: 11 MMOL/L (ref 9–17)
AST SERPL-CCNC: 23 U/L
BASOPHILS # BLD: 1 % (ref 0–2)
BASOPHILS ABSOLUTE: 0.04 K/UL (ref 0–0.2)
BILIRUB SERPL-MCNC: 0.4 MG/DL (ref 0.3–1.2)
BUN BLDV-MCNC: 22 MG/DL (ref 8–23)
BUN/CREAT BLD: 16 (ref 9–20)
CALCIUM SERPL-MCNC: 9.7 MG/DL (ref 8.6–10.4)
CHLORIDE BLD-SCNC: 103 MMOL/L (ref 98–107)
CO2: 26 MMOL/L (ref 20–31)
CREAT SERPL-MCNC: 1.36 MG/DL (ref 0.7–1.2)
EOSINOPHILS RELATIVE PERCENT: 1 % (ref 1–4)
GFR SERPL CREATININE-BSD FRML MDRD: 54 ML/MIN/1.73M2
GLUCOSE BLD-MCNC: 179 MG/DL (ref 70–99)
HCT VFR BLD CALC: 31.5 % (ref 40.7–50.3)
HEMOGLOBIN: 10.5 G/DL (ref 13–17)
IMMATURE GRANULOCYTES: 1 %
INR BLD: 2.4
LYMPHOCYTES # BLD: 23 % (ref 24–43)
MCH RBC QN AUTO: 32.9 PG (ref 25.2–33.5)
MCHC RBC AUTO-ENTMCNC: 33.3 G/DL (ref 25.2–33.5)
MCV RBC AUTO: 98.7 FL (ref 82.6–102.9)
MONOCYTES # BLD: 9 % (ref 3–12)
NRBC AUTOMATED: 0 PER 100 WBC
PDW BLD-RTO: 16.7 % (ref 11.8–14.4)
PLATELET # BLD: 189 K/UL (ref 138–453)
PMV BLD AUTO: 10.9 FL (ref 8.1–13.5)
POTASSIUM SERPL-SCNC: 4.3 MMOL/L (ref 3.7–5.3)
PROTIME: 28.9 SECONDS
RBC # BLD: 3.19 M/UL (ref 4.21–5.77)
RBC # BLD: ABNORMAL 10*6/UL
SEG NEUTROPHILS: 65 % (ref 36–65)
SEGMENTED NEUTROPHILS ABSOLUTE COUNT: 5.81 K/UL (ref 1.5–8.1)
SODIUM BLD-SCNC: 140 MMOL/L (ref 135–144)
TOTAL PROTEIN: 7.6 G/DL (ref 6.4–8.3)
WBC # BLD: 8.9 K/UL (ref 3.5–11.3)

## 2022-11-01 PROCEDURE — 85610 PROTHROMBIN TIME: CPT

## 2022-11-01 PROCEDURE — 99211 OFF/OP EST MAY X REQ PHY/QHP: CPT

## 2022-11-01 PROCEDURE — 85025 COMPLETE CBC W/AUTO DIFF WBC: CPT

## 2022-11-01 PROCEDURE — 80053 COMPREHEN METABOLIC PANEL: CPT

## 2022-11-01 PROCEDURE — 36416 COLLJ CAPILLARY BLOOD SPEC: CPT

## 2022-11-01 PROCEDURE — 36415 COLL VENOUS BLD VENIPUNCTURE: CPT

## 2022-11-01 NOTE — PATIENT INSTRUCTIONS
\"On day of next appointment, please screen for temperature and COVID-19 symptoms prior to you clinic appointment. If any symptoms present, please call [phone] to reschedule. \"

## 2022-11-01 NOTE — PROGRESS NOTES
ANTICOAGULATION SERVICE    Date of Clinic Visit:  11/1/2022    Sherren Burton is a 69 y.o. male who presents to clinic today for anticoagulation monitoring and adjustment. Recent INR Results:  Internal QC passed  Lab Results   Component Value Date    INR 2.4 11/01/2022    INR 2.5 10/04/2022       Current Warfarin Dosage:  Dosing Plan  As of 11/1/2022      TTR:  58.8 % (1.7 y)   Full warfarin instructions:  5 mg every Mon, Wed, Fri; 7.5 mg all other days; Starting 11/1/2022                 Assessment/Plan:    Continue current regimen as INR remains stable. Next Clinic Appointment:  Return date  As of 11/1/2022      TTR:  58.8 % (1.7 y)   Next INR check:  11/29/2022               Please call [facility] at [phone] with any questions. Thanks!   Damon Montero, [facility]  Anticoagulation Service Pharmacist  11/1/2022 1:35 PM  For Pharmacy Admin Tracking Only    Intervention Detail: Dose Adjustment: 0, reason: Patient Preference  Total # of Interventions Recommended: 0  Total # of Interventions Accepted: 0  Time Spent (min): 20

## 2022-11-08 ENCOUNTER — HOSPITAL ENCOUNTER (OUTPATIENT)
Dept: CT IMAGING | Age: 77
Discharge: HOME | End: 2022-11-10
Payer: MEDICARE

## 2022-11-08 DIAGNOSIS — C79.51 BONE METASTASIS (HCC): ICD-10-CM

## 2022-11-08 DIAGNOSIS — C34.90 ALK-POSITIVE NON-SMALL CELL LUNG CANCER (HCC): ICD-10-CM

## 2022-11-08 DIAGNOSIS — D64.9 ANEMIA, UNSPECIFIED TYPE: ICD-10-CM

## 2022-11-08 PROCEDURE — 6360000004 HC RX CONTRAST MEDICATION: Performed by: INTERNAL MEDICINE

## 2022-11-08 PROCEDURE — 71260 CT THORAX DX C+: CPT

## 2022-11-08 RX ADMIN — IOPAMIDOL 100 ML: 755 INJECTION, SOLUTION INTRAVENOUS at 10:52

## 2022-11-15 ENCOUNTER — OFFICE VISIT (OUTPATIENT)
Dept: ONCOLOGY | Age: 77
End: 2022-11-15
Payer: MEDICARE

## 2022-11-15 VITALS
TEMPERATURE: 97.3 F | SYSTOLIC BLOOD PRESSURE: 114 MMHG | HEART RATE: 92 BPM | OXYGEN SATURATION: 93 % | WEIGHT: 244 LBS | DIASTOLIC BLOOD PRESSURE: 68 MMHG | BODY MASS INDEX: 34.93 KG/M2 | HEIGHT: 70 IN

## 2022-11-15 DIAGNOSIS — K76.9 LIVER LESION: ICD-10-CM

## 2022-11-15 DIAGNOSIS — D64.9 ANEMIA, UNSPECIFIED TYPE: ICD-10-CM

## 2022-11-15 DIAGNOSIS — C79.51 BONE METASTASIS (HCC): ICD-10-CM

## 2022-11-15 DIAGNOSIS — C34.90 ALK-POSITIVE NON-SMALL CELL LUNG CANCER (HCC): Primary | ICD-10-CM

## 2022-11-15 PROCEDURE — 99214 OFFICE O/P EST MOD 30 MIN: CPT | Performed by: INTERNAL MEDICINE

## 2022-11-15 PROCEDURE — 3074F SYST BP LT 130 MM HG: CPT | Performed by: INTERNAL MEDICINE

## 2022-11-15 PROCEDURE — G8484 FLU IMMUNIZE NO ADMIN: HCPCS | Performed by: INTERNAL MEDICINE

## 2022-11-15 PROCEDURE — 3078F DIAST BP <80 MM HG: CPT | Performed by: INTERNAL MEDICINE

## 2022-11-15 PROCEDURE — G8417 CALC BMI ABV UP PARAM F/U: HCPCS | Performed by: INTERNAL MEDICINE

## 2022-11-15 PROCEDURE — 1036F TOBACCO NON-USER: CPT | Performed by: INTERNAL MEDICINE

## 2022-11-15 PROCEDURE — G8427 DOCREV CUR MEDS BY ELIG CLIN: HCPCS | Performed by: INTERNAL MEDICINE

## 2022-11-15 PROCEDURE — 1123F ACP DISCUSS/DSCN MKR DOCD: CPT | Performed by: INTERNAL MEDICINE

## 2022-11-15 NOTE — PROGRESS NOTES
Amber Yusuf Shock                                                                                                                  11/15/2022  MRN:   [id number]  YOB: 1945  PCP:                           Kaylynn Ann DO  Referring Physician: Mikhail Ortega  Treating Physician Name: Ronald Bryant MD      Reason for visit:  Chief Complaint   Patient presents with    Lung Cancer     2 month follow up with test results   Toxicity check. Discussed treatment plan. 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 Active and recent treatments:  concurrent chemoradiation using carboplatin and Taxol  Consolidation therapy with Imfinzi-1/2021  Carboplatin, Alimta and bevacizumab x1-8/2021 while awaiting results of NGS  Alectinib-10/8/2021    Summary of Case/History:    Jeovanny Cage a 69 y.o.male is a patient with biopsy confirmed adenocarcinoma of right lung presents to the office to establish care and for further evaluation treatment recommendations. Patient was initially seen by pulmonary team for lung nodules. Patient CT scan from July 2019 showed a right-sided pleural calcification thickening concerning for asbestos exposure. Patient also noted to have multiple pulmonary nodules measuring between 1.5 x 1.3 cm. Follow-up CT chest from July 2020 showed increase in size of the right lower lobe lung nodule which now measured 2.1 cm. Subsequently patient underwent CT PET on 8/22 which showed FDG avid right lower lobe lung nodule with SUV of 4.6. Patient CT PET was also positive for subcarinal lymph node with SUV of 4.6. Patient underwent bronchoscopy with endobronchial ultrasound biopsy of the subcarinal lymph node confirmed adenocarcinoma.   Patient has significant history of tobacco dependence around 30-pack-year however he quit about 25 years ago. Patient does have coronary artery disease status post stent placement. Patient also gives history of occupational asthma and exposure to bacteria. Patient does give history of weight loss but describes it as intentional.  Denies headaches dizziness chest pain abdominal pain nausea bone pain     Interim History:     Patient presents to the clinic for a follow-up visit accompanied by his wife. Overall continuing to do well. Continues to tolerate alectinib without any unexpected or severe side effects. Patient scan shows a subtle lesion in the liver he is very worried about it. During this visit patient's allergy, social, medical, surgical history and medications were reviewed and updated. Past Medical History:   Past Medical History:   Diagnosis Date    Abdominal hernia     small, no significant symptomatology. Abnormal PSA     with history of slight increase. Adenocarcinoma, lung, right (HCC)     Allergic rhinitis     Benign prostatic hypertrophy     Chest pain     occasional.     Coronary artery disease     status post drug eluting stent placement, LAD, ostial obtuse marginal.     Dysphagia     Erectile dysfunction     Familial hyperlipidemia     with hypertriglyceridemia. Gastroesophageal reflux disease     Hearing loss, bilateral     hearing aid in the left ear only. 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 Hypertension     Impaired fasting glucose     prediabetes. Lumbar disc herniation     L3-L4, with chronic back pain. Mild anemia     Nasal polyps     minimal symptoms. Nasal septal deviation     right side. Obesity     Palpitations     occasional.     Peptic ulcer disease     Pulmonary nodules     Reactive airway disease     asthma, industrial related, also a history of hypersensitivity pneumonitis.      Sleep apnea     wears CPAP nightly    Urinary frequency        Past Surgical History:     Past Surgical History:   Procedure Laterality Date    APPENDECTOMY  age 7    BRONCHOSCOPY  09/17/2020    BRONCHOSCOPY N/A 9/17/2020    EBUS- BRONCHOSCOPY ENDOBRONCHIAL ULTRASOUND, PATHOLOGY REQUESTED- CINDY NOTIFIED, GI UNIT SCHEDULED performed by Mac Arevalo MD at [address] [place] [address]  10/20/2011    occluded LAD and ostial obtuse marginal stenosis, underwent drug eluting stents to both, RCA small, nondominant, occluded, ejection fraction 45%. CARDIAC CATHETERIZATION  08/2018    with stent placement    CATARACT REMOVAL Bilateral 03/2018    COLONOSCOPY  01/21/2011    mild sigmoid diverticulosis. COLONOSCOPY  4/6/16    hemorrhoid; sigmoid diverticulosis    CT BIOPSY PERCUTANEOUS DEEP BONE  8/18/2021    CT BIOPSY PERCUTANEOUS DEEP BONE 8/18/2021 [facility] CT SCAN    KNEE ARTHROSCOPY Left 03/19/2010    partial medial and lateral synovectomies, partial medial meniscectomy, chondroplasty. NASAL FRACTURE SURGERY  1960    OTHER SURGICAL HISTORY Left 02/12/2010    knee injection. PORT SURGERY N/A 10/14/2020    RIGHT PORT INSERTION performed by Jillian Jones DO at [address] [facility] Right 1/28/2022    Right PORT REMOVAL performed by Jillian Jones DO at [facility]    PRE-MALIGNANT / BENIGN SKIN LESION EXCISION Left 01/16/2012    actinic keratoses, ear, liquid nitrogen. PRE-MALIGNANT / BENIGN SKIN LESION EXCISION Left 07/19/2011    actinic keratosis, upper ear, liquid nitrogen. PROSTATE BIOPSY Bilateral 09/08/2015    Benign    REPAIR UMBILICAL HBES,7+J/D,IUIXJ N/A 3/37/9209    UMBILICAL Hernia Repair w/ Mesh performed by Frank España MD at [address] [place] [address]  10/30/2015    with bilateral submucosal resection of the inferior turbinates, left middle turbinate elza bullosa resection done by Dr Jayna Crum ARTHROSCOPY Left 10/17/14    W/ SUBACROMIAL DECOMPRESSION, DEBRIDEMENT ET CHONDROPLASTY    UPPER GASTROINTESTINAL ENDOSCOPY  01/21/2011    superficial ulcer of the fundus, erostive gastritis.      VASECTOMY Bilateral 04/04/1980 Patient Family Social History:     Social History     Socioeconomic History    Marital status:      Spouse name: None    Number of children: None    Years of education: None    Highest education level: None   Tobacco Use    Smoking status: Former     Packs/day: 2.00     Years: 15.00     Pack years: 30.00     Types: Cigarettes     Start date: 1966     Quit date: 1985     Years since quittin.8    Smokeless tobacco: Never    Tobacco comments:     smoked 2 packs a day for 14 to 15 years, quit in    Vaping Use    Vaping Use: Never used   Substance and Sexual Activity    Alcohol use: Yes     Comment: rare    Drug use: No     Social Determinants of Health     Financial Resource Strain: Low Risk     Difficulty of Paying Living Expenses: Not very hard   Food Insecurity: No Food Insecurity    Worried About Running Out of Food in the Last Year: Never true    Ran Out of Food in the Last Year: Never true   Physical Activity: Insufficiently Active    Days of Exercise per Week: 2 days    Minutes of Exercise per Session: 60 min     Family History   Problem Relation Age of Onset    Cancer Mother         lymphoma    Thyroid Disease Mother         hypothyroidism    Stroke Mother     Heart Disease Mother     High Blood Pressure Mother     Heart Attack Mother         in her 46s    Heart Failure Father         congestive    Coronary Art Dis Father     Emphysema Father         was a smoker    Heart Disease Maternal Grandmother     Heart Disease Maternal Grandfather     Crohn's Disease Sister     High Cholesterol Sister     High Cholesterol Child        Current Medications:     Current Outpatient Medications   Medication Sig Dispense Refill    oxybutynin (DITROPAN-XL) 10 MG extended release tablet Take 1 tablet by mouth once daily 90 tablet 1    furosemide (LASIX) 20 MG tablet Take 1 tablet by mouth once daily 90 tablet 1    levothyroxine (SYNTHROID) 137 MCG tablet Take 1 tablet by mouth Daily 90 tablet 1 losartan (COZAAR) 50 MG tablet TAKE 1 TABLET BY MOUTH EVERY DAY 90 tablet 1    tamsulosin (FLOMAX) 0.4 MG capsule Take 1 capsule by mouth once daily 90 capsule 1    warfarin (COUMADIN) 2.5 MG tablet TAKE 3 TABLETS BY MOUTH ON TUESDAYS. TAKE 2 TABLETS BY MOUTH ALL OTHER DAYS OR AS DIRECTED BY INR RESULTS 90 tablet 5    clopidogrel (PLAVIX) 75 MG tablet Take 1 tablet by mouth once daily 90 tablet 1    Alectinib HCl (ALECENSA) 150 MG CAPS Take 600 mg by mouth 2 times daily 4 caps  capsule 5    pravastatin (PRAVACHOL) 40 MG tablet Take 1 tablet by mouth in the evening 90 tablet 3    Omega-3 Fatty Acids (FISH OIL) 1200 MG CPDR Take 2,400 mg by mouth 2 times daily      niacin 500 MG extended release capsule Take 500 mg by mouth 2 times daily      Metoprolol Tartrate 75 MG TABS TAKE 1 TABLET BY MOUTH TWO TIMES A DAY      flecainide (TAMBOCOR) 50 MG tablet Take 50 mg by mouth 2 times daily      ECHINACEA HERB PO Take by mouth daily      albuterol sulfate HFA (VENTOLIN HFA) 108 (90 Base) MCG/ACT inhaler Inhale 2 puffs into the lungs every 6 hours as needed for Wheezing or Shortness of Breath 1 Inhaler 6    Multiple Vitamins-Minerals (MULTIVITAMIN PO) daily. No current facility-administered medications for this visit. Allergies:   Effient [prasugrel], Ace inhibitors, and Statins    Review of Systems:    Constitutional: No fever or chills. No night sweats, no weight loss. Positive fatigue  Eyes: No eye discharge, double vision, or eye pain   HEENT: negative for sore mouth, sore throat, hoarseness and voice change   Respiratory: negative for sputum, dyspnea, wheezing, hemoptysis, chest pain.   Positive shortness of breath with exertion, stable  Cardiovascular: negative for chest pain, dyspnea, palpitations, orthopnea, PND   Gastrointestinal: negative for nausea, vomiting, diarrhea, constipation, abdominal pain, Dysphagia, hematemesis and hematochezia   Genitourinary: negative for frequency, dysuria, nocturia, urinary incontinence, and hematuria   Integument: negative for rash, skin lesions, bruises. Hematologic/Lymphatic: negative for easy bruising, bleeding, lymphadenopathy, or petechiae   Endocrine: negative for heat or cold intolerance,weight changes, change in bowel habits and hair loss   Musculoskeletal: negative for myalgias, arthralgias, pain, joint swelling,and bone pain   Neurological: negative for headaches, dizziness, seizures, weakness, numbness. Positive for memory loss    Physical Exam:  Vitals: /68 (Site: Right Upper Arm, Position: Sitting, Cuff Size: Large Adult)   Pulse 92   Temp 97.3 °F (36.3 °C)   Ht 5' 10\" (1.778 m)   Wt 244 lb (110.7 kg)   SpO2 93%   BMI 35.01 kg/m²     General appearance - well appearing, no in pain or distress  Mental status - AAO X3  Eyes - pupils equal and reactive, extraocular eye movements intact  Mouth - mucous membranes moist, pharynx normal without lesions  Neck - supple, no significant adenopathy  Lymphatics - no palpable lymphadenopathy, no hepatosplenomegaly  Chest - clear to auscultation, no wheezes, rales or rhonchi, symmetric air entry  Heart - normal rate, regular rhythm, normal S1, S2, no murmurs  Abdomen - soft, nontender, nondistended, no masses or organomegaly  Neurological - alert, oriented, normal speech, no focal findings or movement disorder noted  Extremities - peripheral pulses normal, no pedal edema, no clubbing or cyanosis  Skin - normal coloration and turgor, no rashes, no suspicious skin lesions noted     DATA:    Results for orders placed or performed during the hospital encounter of 11/01/22   Protime-INR   Result Value Ref Range    INR 2.4     Protime 28.9 seconds     -- Diagnosis --   BAL RIGHT LOWER LOBE:           NEGATIVE FOR MALIGNANCY. FINE NEEDLE ASPIRATION STATION 7 EBUS:           ADENOCARCINOMA, COMPATIBLE WITH LUNG PRIMARY.      CT CHEST ABDOMEN PELVIS W CONTRAST Additional Contrast? None    Result Date: 11/10/2022  EXAMINATION: CT OF THE CHEST, ABDOMEN, AND PELVIS WITH CONTRAST 11/8/2022 10:50 am TECHNIQUE: CT of the chest, abdomen and pelvis was performed with the administration of intravenous contrast. Multiplanar reformatted images are provided for review. Automated exposure control, iterative reconstruction, and/or weight based adjustment of the mA/kV was utilized to reduce the radiation dose to as low as reasonably achievable. COMPARISON: 07/12/2022, 04/07/2022, 01/06/2022 HISTORY: ORDERING SYSTEM PROVIDED HISTORY: ALK-positive non-small cell lung cancer ([facility] [address] TECHNOLOGIST PROVIDED HISTORY: STAT Creatinine as needed:->Yes assess responce to treatment Reason for Exam: hx lung CA, Bone metastasis FINDINGS: Chest: Mediastinum: The heart and great vessels are normal in size. Calcified atheromatous plaque and coronary calcifications are noted. No pericardial effusion. No enlarged or suspicious-appearing lymph nodes are identified. Post treatment changes in the right hilum again noted. Lungs/pleura: Post treatment changes in the right perihilar region extending into the right lower lobe again demonstrated and without significant change. Right pleural calcification again noted. Scattered calcified and noncalcified micro nodules in the left lung again noted. The largest of which measures 4 mm in the posterior left lower lobe on axial image 94, unchanged. No new airspace disease identified. No effusion. The central airway is patent. Soft Tissues/Bones: Sclerotic focus in the lateral right 8th rib again demonstrated. No new osseous abnormality identified. Abdomen/Pelvis: Organs: Ill-defined liver lesion in segment 8 on axial image 42 is noted, not previously demonstrated. The gallbladder, pancreas, spleen, adrenals and kidneys reveal no significant findings. Right renal cyst again noted. GI/Bowel: There is no bowel dilatation or wall thickening identified. Diverticulosis. Pelvis: No acute findings.   Prostate enlargement and fat containing right inguinal hernia. Peritoneum/Retroperitoneum: No free air or free fluid. The aorta is normal in caliber. The visceral branches are patent. Calcified atheromatous plaque is present. No lymphadenopathy. Bones/Soft Tissues: Sclerotic bone lesions at the L3 and L4 vertebral body level are again demonstrated. Sclerotic bone lesion in the posterosuperior left iliac bone again demonstrated no new osseous abnormality identified. 1.  Subtle liver lesion in segment 8 is newly demonstrated. Consider follow-up imaging with contrast-enhanced MRI if clinically appropriate. 2.  Stable post treatment and chronic findings in the chest. 3.  Redemonstration of scattered sclerotic bone lesions without new osseous abnormality identified. Impression:  Adenocarcinoma of right lung, clinical stage at least T1c, N2, M0 (stage IIIA)  Disease recurrence, biopsy-proven-8/2021  Radiation pneumonitis and shortness of breath  Liver lesion per CT-11/2022  Thrombocytopenia  Anemia      Plan:  I had a detailed discussion with the patient and we went over results of lab work-up imaging studies and other relevant clinical data  Toxicity check performed. Patient tolerating treatment well. Hemoglobin remained stable  Continue alectinib at current dose  Discussed natural history of ALK positive lung cancer  Reviewed results of CT scans reviewed images. There is a subtle liver lesion. I will order MRI with hepatic protocol. Monitor kidney function  NCCN guidelines were reviewed and discussed with the patient. The diagnosis and care plan were discussed with the patient in detail. I discussed the natural history of the disease, prognosis, risks and goals of therapy and answered all the patients questions to the best of my ability. Patient expressed understanding and was in agreement.       Elisa Boucher MD        This note is created with the assistance of a speech recognition program.  While intending to generate a document that actually reflects the content of the visit, the document can still have some errors including those of syntax and sound a like substitutions which may escape proof reading. It such instances, actual meaning can be extrapolated by contextual diversion. Zee Shepard

## 2022-11-29 ENCOUNTER — HOSPITAL ENCOUNTER (OUTPATIENT)
Dept: PHARMACY | Age: 77
Setting detail: THERAPIES SERIES
Discharge: HOME | End: 2022-11-29
Payer: MEDICARE

## 2022-11-29 DIAGNOSIS — I48.0 PAROXYSMAL A-FIB (HCC): Primary | ICD-10-CM

## 2022-11-29 LAB
INR BLD: 2.5
PROTIME: 29.7 SECONDS

## 2022-11-29 PROCEDURE — 99211 OFF/OP EST MAY X REQ PHY/QHP: CPT

## 2022-11-29 PROCEDURE — 85610 PROTHROMBIN TIME: CPT

## 2022-11-29 PROCEDURE — 36416 COLLJ CAPILLARY BLOOD SPEC: CPT

## 2022-11-29 NOTE — PROGRESS NOTES
ANTICOAGULATION SERVICE    Date of Clinic Visit:  11/29/2022    Sherren Burton is a 69 y.o. male who presents to clinic today for anticoagulation monitoring and adjustment. Recent INR Results:  Internal QC passed  Lab Results   Component Value Date    INR 2.5 11/29/2022    INR 2.4 11/01/2022       Current Warfarin Dosage:  Dosing Plan  As of 11/29/2022      TTR:  60.6 % (1.8 y)   Full warfarin instructions:  5 mg every Mon, Wed, Fri; 7.5 mg all other days; Starting 11/29/2022                 Assessment/Plan:    Continue current regimen as INR remains stable. Next Clinic Appointment:  Return date  As of 11/29/2022      TTR:  60.6 % (1.8 y)   Next INR check:  12/27/2022               Please call [facility] at [phone] with any questions. Thanks!   Damon Montero [facility]  Anticoagulation Service Pharmacist  11/29/2022 11:49 AM  For Pharmacy Admin Tracking Only    Intervention Detail: Dose Adjustment: 0, reason: Patient Preference  Total # of Interventions Recommended: 0  Total # of Interventions Accepted: 0  Time Spent (min): 20

## 2022-12-06 ENCOUNTER — HOSPITAL ENCOUNTER (OUTPATIENT)
Age: 77
Discharge: HOME | End: 2022-12-06
Payer: MEDICARE

## 2022-12-06 DIAGNOSIS — C34.90 ALK-POSITIVE NON-SMALL CELL LUNG CANCER (HCC): ICD-10-CM

## 2022-12-06 DIAGNOSIS — C79.51 BONE METASTASIS (HCC): ICD-10-CM

## 2022-12-06 DIAGNOSIS — D64.9 ANEMIA, UNSPECIFIED TYPE: ICD-10-CM

## 2022-12-06 LAB
ABSOLUTE EOS #: 0.15 K/UL (ref 0–0.44)
ABSOLUTE IMMATURE GRANULOCYTE: 0.06 K/UL (ref 0–0.3)
ABSOLUTE LYMPH #: 1.73 K/UL (ref 1.1–3.7)
ABSOLUTE MONO #: 0.67 K/UL (ref 0.1–1.2)
ALBUMIN SERPL-MCNC: 3.9 G/DL (ref 3.5–5.2)
ALBUMIN/GLOBULIN RATIO: 1.1 (ref 1–2.5)
ALP BLD-CCNC: 98 U/L (ref 40–129)
ALT SERPL-CCNC: 19 U/L (ref 5–41)
ANION GAP SERPL CALCULATED.3IONS-SCNC: 10 MMOL/L (ref 9–17)
AST SERPL-CCNC: 22 U/L
BASOPHILS # BLD: 1 % (ref 0–2)
BASOPHILS ABSOLUTE: 0.04 K/UL (ref 0–0.2)
BILIRUB SERPL-MCNC: 0.4 MG/DL (ref 0.3–1.2)
BUN BLDV-MCNC: 24 MG/DL (ref 8–23)
BUN/CREAT BLD: 19 (ref 9–20)
CALCIUM SERPL-MCNC: 9.2 MG/DL (ref 8.6–10.4)
CHLORIDE BLD-SCNC: 100 MMOL/L (ref 98–107)
CO2: 28 MMOL/L (ref 20–31)
CREAT SERPL-MCNC: 1.29 MG/DL (ref 0.7–1.2)
EOSINOPHILS RELATIVE PERCENT: 2 % (ref 1–4)
GFR SERPL CREATININE-BSD FRML MDRD: 57 ML/MIN/1.73M2
GLUCOSE BLD-MCNC: 232 MG/DL (ref 70–99)
HCT VFR BLD CALC: 31.3 % (ref 40.7–50.3)
HEMOGLOBIN: 10 G/DL (ref 13–17)
IMMATURE GRANULOCYTES: 1 %
LYMPHOCYTES # BLD: 21 % (ref 24–43)
MCH RBC QN AUTO: 31.9 PG (ref 25.2–33.5)
MCHC RBC AUTO-ENTMCNC: 31.9 G/DL (ref 25.2–33.5)
MCV RBC AUTO: 100 FL (ref 82.6–102.9)
MONOCYTES # BLD: 8 % (ref 3–12)
NRBC AUTOMATED: 0 PER 100 WBC
PDW BLD-RTO: 16.1 % (ref 11.8–14.4)
PLATELET # BLD: 169 K/UL (ref 138–453)
PMV BLD AUTO: 10.2 FL (ref 8.1–13.5)
POTASSIUM SERPL-SCNC: 3.8 MMOL/L (ref 3.7–5.3)
RBC # BLD: 3.13 M/UL (ref 4.21–5.77)
RBC # BLD: ABNORMAL 10*6/UL
SEG NEUTROPHILS: 67 % (ref 36–65)
SEGMENTED NEUTROPHILS ABSOLUTE COUNT: 5.54 K/UL (ref 1.5–8.1)
SODIUM BLD-SCNC: 138 MMOL/L (ref 135–144)
TOTAL PROTEIN: 7.4 G/DL (ref 6.4–8.3)
WBC # BLD: 8.2 K/UL (ref 3.5–11.3)

## 2022-12-06 PROCEDURE — 80053 COMPREHEN METABOLIC PANEL: CPT

## 2022-12-06 PROCEDURE — 85025 COMPLETE CBC W/AUTO DIFF WBC: CPT

## 2022-12-06 PROCEDURE — 36415 COLL VENOUS BLD VENIPUNCTURE: CPT

## 2022-12-08 ENCOUNTER — HOSPITAL ENCOUNTER (OUTPATIENT)
Dept: MRI IMAGING | Age: 77
Discharge: HOME | End: 2022-12-10
Payer: MEDICARE

## 2022-12-08 DIAGNOSIS — C34.90 ALK-POSITIVE NON-SMALL CELL LUNG CANCER (HCC): ICD-10-CM

## 2022-12-08 DIAGNOSIS — C79.51 BONE METASTASIS (HCC): ICD-10-CM

## 2022-12-08 DIAGNOSIS — D64.9 ANEMIA, UNSPECIFIED TYPE: ICD-10-CM

## 2022-12-08 DIAGNOSIS — K76.9 LIVER LESION: ICD-10-CM

## 2022-12-08 PROCEDURE — 6360000004 HC RX CONTRAST MEDICATION: Performed by: INTERNAL MEDICINE

## 2022-12-08 PROCEDURE — G1010 CDSM STANSON: HCPCS

## 2022-12-08 PROCEDURE — A9577 INJ MULTIHANCE: HCPCS | Performed by: INTERNAL MEDICINE

## 2022-12-08 RX ADMIN — GADOBENATE DIMEGLUMINE 20 ML: 529 INJECTION, SOLUTION INTRAVENOUS at 09:40

## 2022-12-13 ENCOUNTER — OFFICE VISIT (OUTPATIENT)
Dept: ONCOLOGY | Age: 77
End: 2022-12-13
Payer: MEDICARE

## 2022-12-13 VITALS
DIASTOLIC BLOOD PRESSURE: 68 MMHG | OXYGEN SATURATION: 96 % | WEIGHT: 241 LBS | HEART RATE: 60 BPM | HEIGHT: 70 IN | SYSTOLIC BLOOD PRESSURE: 132 MMHG | BODY MASS INDEX: 34.5 KG/M2

## 2022-12-13 DIAGNOSIS — D64.9 ANEMIA, UNSPECIFIED TYPE: ICD-10-CM

## 2022-12-13 DIAGNOSIS — C79.51 BONE METASTASIS (HCC): ICD-10-CM

## 2022-12-13 DIAGNOSIS — C34.90 ALK-POSITIVE NON-SMALL CELL LUNG CANCER (HCC): Primary | ICD-10-CM

## 2022-12-13 PROCEDURE — 1123F ACP DISCUSS/DSCN MKR DOCD: CPT | Performed by: INTERNAL MEDICINE

## 2022-12-13 PROCEDURE — G8427 DOCREV CUR MEDS BY ELIG CLIN: HCPCS | Performed by: INTERNAL MEDICINE

## 2022-12-13 PROCEDURE — 1036F TOBACCO NON-USER: CPT | Performed by: INTERNAL MEDICINE

## 2022-12-13 PROCEDURE — G8417 CALC BMI ABV UP PARAM F/U: HCPCS | Performed by: INTERNAL MEDICINE

## 2022-12-13 PROCEDURE — 99213 OFFICE O/P EST LOW 20 MIN: CPT | Performed by: INTERNAL MEDICINE

## 2022-12-13 PROCEDURE — 3078F DIAST BP <80 MM HG: CPT | Performed by: INTERNAL MEDICINE

## 2022-12-13 PROCEDURE — G8484 FLU IMMUNIZE NO ADMIN: HCPCS | Performed by: INTERNAL MEDICINE

## 2022-12-13 PROCEDURE — 99215 OFFICE O/P EST HI 40 MIN: CPT | Performed by: INTERNAL MEDICINE

## 2022-12-13 PROCEDURE — 3074F SYST BP LT 130 MM HG: CPT | Performed by: INTERNAL MEDICINE

## 2022-12-13 NOTE — PROGRESS NOTES
Mitch Mak                                                                                                                  12/13/2022  MRN:   [id number]  YOB: 1945  PCP:                           Hiram Kirk DO  Referring Physician: Efrain Mclaughlin  Treating Physician Name: Simón Herrera MD      Reason for visit:  Chief Complaint   Patient presents with    Follow-up   Patient presents to the clinic for toxicity check and to discuss results of lab work-up, imaging studies and further treatment plan. 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 Active and recent treatments:  concurrent chemoradiation using carboplatin and Taxol  Consolidation therapy with Imfinzi-1/2021  Carboplatin, Alimta and bevacizumab x1-8/2021 while awaiting results of NGS  Alectinib-10/8/2021    Summary of Case/History:    Yaniv Coello a 69 y.o.male is a patient with biopsy confirmed adenocarcinoma of right lung presents to the office to establish care and for further evaluation treatment recommendations. Patient was initially seen by pulmonary team for lung nodules. Patient CT scan from July 2019 showed a right-sided pleural calcification thickening concerning for asbestos exposure. Patient also noted to have multiple pulmonary nodules measuring between 1.5 x 1.3 cm. Follow-up CT chest from July 2020 showed increase in size of the right lower lobe lung nodule which now measured 2.1 cm. Subsequently patient underwent CT PET on 8/22 which showed FDG avid right lower lobe lung nodule with SUV of 4.6. Patient CT PET was also positive for subcarinal lymph node with SUV of 4.6. Patient underwent bronchoscopy with endobronchial ultrasound biopsy of the subcarinal lymph node confirmed adenocarcinoma.   Patient has significant history of tobacco dependence around 30-pack-year however he quit about 25 years ago. Patient does have coronary artery disease status post stent placement. Patient also gives history of occupational asthma and exposure to bacteria. Patient does give history of weight loss but describes it as intentional.  Denies headaches dizziness chest pain abdominal pain nausea bone pain     Interim History:     Patient presents to the clinic for a follow-up visit. Overall continues to do well. Does get short of breath with exertion but it is manageable. Patient MRI liver was reviewed shows questionable lesions. Denies any abdominal pain. Weight is stable. During this visit patient's allergy, social, medical, surgical history and medications were reviewed and updated. Past Medical History:   Past Medical History:   Diagnosis Date    Abdominal hernia     small, no significant symptomatology. Abnormal PSA     with history of slight increase. Adenocarcinoma, lung, right (HCC)     Allergic rhinitis     Benign prostatic hypertrophy     Chest pain     occasional.     Coronary artery disease     status post drug eluting stent placement, LAD, ostial obtuse marginal.     Dysphagia     Erectile dysfunction     Familial hyperlipidemia     with hypertriglyceridemia. Gastroesophageal reflux disease     Hearing loss, bilateral     hearing aid in the left ear only. 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 Hypertension     Impaired fasting glucose     prediabetes. Lumbar disc herniation     L3-L4, with chronic back pain. Mild anemia     Nasal polyps     minimal symptoms. Nasal septal deviation     right side. Obesity     Palpitations     occasional.     Peptic ulcer disease     Pulmonary nodules     Reactive airway disease     asthma, industrial related, also a history of hypersensitivity pneumonitis.      Sleep apnea     wears CPAP nightly    Urinary frequency        Past Surgical History:     Past Surgical History:   Procedure Laterality Date    APPENDECTOMY  age 11    BRONCHOSCOPY  09/17/2020    BRONCHOSCOPY N/A 9/17/2020    EBUS- BRONCHOSCOPY ENDOBRONCHIAL ULTRASOUND, PATHOLOGY REQUESTED- CINDY NOTIFIED, GI UNIT SCHEDULED performed by Mac Arevalo MD at [address] [facility]  10/20/2011    occluded LAD and ostial obtuse marginal stenosis, underwent drug eluting stents to both, RCA small, nondominant, occluded, ejection fraction 45%. CARDIAC CATHETERIZATION  08/2018    with stent placement    CATARACT REMOVAL Bilateral 03/2018    COLONOSCOPY  01/21/2011    mild sigmoid diverticulosis. COLONOSCOPY  4/6/16    hemorrhoid; sigmoid diverticulosis    CT BIOPSY PERCUTANEOUS DEEP BONE  8/18/2021    CT BIOPSY PERCUTANEOUS DEEP BONE 8/18/2021 [facility] CT SCAN    KNEE ARTHROSCOPY Left 03/19/2010    partial medial and lateral synovectomies, partial medial meniscectomy, chondroplasty. NASAL FRACTURE SURGERY  1960    OTHER SURGICAL HISTORY Left 02/12/2010    knee injection. PORT SURGERY N/A 10/14/2020    RIGHT PORT INSERTION performed by Jillian Jones DO at [address] [facility] Right 1/28/2022    Right PORT REMOVAL performed by Jillian Jones DO at [address] [facility]    PRE-MALIGNANT / BENIGN SKIN LESION EXCISION Left 01/16/2012    actinic keratoses, ear, liquid nitrogen. PRE-MALIGNANT / BENIGN SKIN LESION EXCISION Left 07/19/2011    actinic keratosis, upper ear, liquid nitrogen. PROSTATE BIOPSY Bilateral 09/08/2015    Benign    REPAIR UMBILICAL SOXC,0+G/M,IBTJL N/A 0/88/7099    UMBILICAL Hernia Repair w/ Mesh performed by Frank España MD at [address] [place] [address]  10/30/2015    with bilateral submucosal resection of the inferior turbinates, left middle turbinate elza bullosa resection done by Dr Jayna Crum ARTHROSCOPY Left 10/17/14    W/ SUBACROMIAL DECOMPRESSION, DEBRIDEMENT ET CHONDROPLASTY    UPPER GASTROINTESTINAL ENDOSCOPY  01/21/2011    superficial ulcer of the fundus, erostive gastritis. VASECTOMY Bilateral 1980       Patient Family Social History:     Social History     Socioeconomic History    Marital status:      Spouse name: None    Number of children: None    Years of education: None    Highest education level: None   Tobacco Use    Smoking status: Former     Packs/day: 2.00     Years: 15.00     Pack years: 30.00     Types: Cigarettes     Start date: 1966     Quit date: 1985     Years since quittin.9    Smokeless tobacco: Never    Tobacco comments:     smoked 2 packs a day for 14 to 15 years, quit in    Vaping Use    Vaping Use: Never used   Substance and Sexual Activity    Alcohol use: Yes     Comment: rare    Drug use: No     Social Determinants of Health     Financial Resource Strain: Low Risk     Difficulty of Paying Living Expenses: Not very hard   Food Insecurity: No Food Insecurity    Worried About Running Out of Food in the Last Year: Never true    Ran Out of Food in the Last Year: Never true   Physical Activity: Insufficiently Active    Days of Exercise per Week: 2 days    Minutes of Exercise per Session: 60 min     Family History   Problem Relation Age of Onset    Cancer Mother         lymphoma    Thyroid Disease Mother         hypothyroidism    Stroke Mother     Heart Disease Mother     High Blood Pressure Mother     Heart Attack Mother         in her 46s    Heart Failure Father         congestive    Coronary Art Dis Father     Emphysema Father         was a smoker    Heart Disease Maternal Grandmother     Heart Disease Maternal Grandfather     Crohn's Disease Sister     High Cholesterol Sister     High Cholesterol Child        Current Medications:     Current Outpatient Medications   Medication Sig Dispense Refill    oxybutynin (DITROPAN-XL) 10 MG extended release tablet Take 1 tablet by mouth once daily 90 tablet 1    furosemide (LASIX) 20 MG tablet Take 1 tablet by mouth once daily 90 tablet 1    levothyroxine (SYNTHROID) 137 MCG tablet Take 1 tablet by mouth Daily 90 tablet 1    losartan (COZAAR) 50 MG tablet TAKE 1 TABLET BY MOUTH EVERY DAY 90 tablet 1    tamsulosin (FLOMAX) 0.4 MG capsule Take 1 capsule by mouth once daily 90 capsule 1    warfarin (COUMADIN) 2.5 MG tablet TAKE 3 TABLETS BY MOUTH ON TUESDAYS. TAKE 2 TABLETS BY MOUTH ALL OTHER DAYS OR AS DIRECTED BY INR RESULTS 90 tablet 5    clopidogrel (PLAVIX) 75 MG tablet Take 1 tablet by mouth once daily 90 tablet 1    Alectinib HCl (ALECENSA) 150 MG CAPS Take 600 mg by mouth 2 times daily 4 caps  capsule 5    pravastatin (PRAVACHOL) 40 MG tablet Take 1 tablet by mouth in the evening 90 tablet 3    Omega-3 Fatty Acids (FISH OIL) 1200 MG CPDR Take 2,400 mg by mouth 2 times daily      niacin 500 MG extended release capsule Take 500 mg by mouth 2 times daily      Metoprolol Tartrate 75 MG TABS TAKE 1 TABLET BY MOUTH TWO TIMES A DAY      flecainide (TAMBOCOR) 50 MG tablet Take 50 mg by mouth 2 times daily      ECHINACEA HERB PO Take by mouth daily      albuterol sulfate HFA (VENTOLIN HFA) 108 (90 Base) MCG/ACT inhaler Inhale 2 puffs into the lungs every 6 hours as needed for Wheezing or Shortness of Breath 1 Inhaler 6    Multiple Vitamins-Minerals (MULTIVITAMIN PO) daily. No current facility-administered medications for this visit. Allergies:   Effient [prasugrel], Ace inhibitors, and Statins    Review of Systems:    Constitutional: No fever or chills. No night sweats, no weight loss. Positive fatigue  Eyes: No eye discharge, double vision, or eye pain   HEENT: negative for sore mouth, sore throat, hoarseness and voice change   Respiratory: negative for sputum, dyspnea, wheezing, hemoptysis, chest pain.   Positive for shortness of breath with exertion  Cardiovascular: negative for chest pain, dyspnea, palpitations, orthopnea, PND   Gastrointestinal: negative for nausea, vomiting, diarrhea, constipation, abdominal pain, Dysphagia, hematemesis and hematochezia   Genitourinary: negative for frequency, dysuria, nocturia, urinary incontinence, and hematuria   Integument: negative for rash, skin lesions, bruises. Hematologic/Lymphatic: negative for easy bruising, bleeding, lymphadenopathy, or petechiae   Endocrine: negative for heat or cold intolerance,weight changes, change in bowel habits and hair loss   Musculoskeletal: negative for myalgias, arthralgias, pain, joint swelling,and bone pain   Neurological: negative for headaches, dizziness, seizures, weakness, numbness.   Positive for memory loss    Physical Exam:  Vitals: /68   Pulse 60   Ht 5' 10\" (1.778 m)   Wt 241 lb (109.3 kg)   SpO2 96%   BMI 34.58 kg/m²     General appearance - well appearing, no in pain or distress  Mental status - AAO X3  Eyes - pupils equal and reactive, extraocular eye movements intact  Mouth - mucous membranes moist, pharynx normal without lesions  Neck - supple, no significant adenopathy  Lymphatics - no palpable lymphadenopathy, no hepatosplenomegaly  Chest - clear to auscultation, no wheezes, rales or rhonchi, symmetric air entry  Heart - normal rate, regular rhythm, normal S1, S2, no murmurs  Abdomen - soft, nontender, nondistended, no masses or organomegaly  Neurological - alert, oriented, normal speech, no focal findings or movement disorder noted  Extremities - peripheral pulses normal, no pedal edema, no clubbing or cyanosis  Skin - normal coloration and turgor, no rashes, no suspicious skin lesions noted     DATA:    Results for orders placed or performed during the hospital encounter of 12/06/22   CBC with Auto Differential   Result Value Ref Range    WBC 8.2 3.5 - 11.3 k/uL    RBC 3.13 (L) 4.21 - 5.77 m/uL    Hemoglobin 10.0 (L) 13.0 - 17.0 g/dL    Hematocrit 31.3 (L) 40.7 - 50.3 %    .0 82.6 - 102.9 fL    MCH 31.9 25.2 - 33.5 pg    MCHC 31.9 25.2 - 33.5 g/dL    RDW 16.1 (H) 11.8 - 14.4 %    Platelets 349 569 - 890 k/uL    MPV 10.2 8.1 - 13.5 fL    NRBC Automated 0.0 0.0 per 100 WBC    Seg Neutrophils 67 (H) 36 - 65 %    Lymphocytes 21 (L) 24 - 43 %    Monocytes 8 3 - 12 %    Eosinophils % 2 1 - 4 %    Basophils 1 0 - 2 %    Immature Granulocytes 1 (H) 0 %    Segs Absolute 5.54 1.50 - 8.10 k/uL    Absolute Lymph # 1.73 1.10 - 3.70 k/uL    Absolute Mono # 0.67 0.10 - 1.20 k/uL    Absolute Eos # 0.15 0.00 - 0.44 k/uL    Basophils Absolute 0.04 0.00 - 0.20 k/uL    Absolute Immature Granulocyte 0.06 0.00 - 0.30 k/uL    RBC Morphology ANISOCYTOSIS PRESENT    Comprehensive Metabolic Panel   Result Value Ref Range    Glucose 232 (H) 70 - 99 mg/dL    BUN 24 (H) 8 - 23 mg/dL    Creatinine 1.29 (H) 0.70 - 1.20 mg/dL    Est, Glom Filt Rate 57 (L) >60 mL/min/1.73m2    Bun/Cre Ratio 19 9 - 20    Calcium 9.2 8.6 - 10.4 mg/dL    Sodium 138 135 - 144 mmol/L    Potassium 3.8 3.7 - 5.3 mmol/L    Chloride 100 98 - 107 mmol/L    CO2 28 20 - 31 mmol/L    Anion Gap 10 9 - 17 mmol/L    Alkaline Phosphatase 98 40 - 129 U/L    ALT 19 5 - 41 U/L    AST 22 <40 U/L    Total Bilirubin 0.4 0.3 - 1.2 mg/dL    Total Protein 7.4 6.4 - 8.3 g/dL    Albumin 3.9 3.5 - 5.2 g/dL    Albumin/Globulin Ratio 1.1 1.0 - 2.5     -- Diagnosis --   BAL RIGHT LOWER LOBE:           NEGATIVE FOR MALIGNANCY. FINE NEEDLE ASPIRATION STATION 7 EBUS:           ADENOCARCINOMA, COMPATIBLE WITH LUNG PRIMARY. MRI ABDOMEN W WO CONTRAST    Result Date: 12/8/2022  EXAMINATION: MRI OF THE ABDOMEN WITHOUT AND WITH CONTRAST, 12/8/2022 8:35 am TECHNIQUE: Multiplanar multisequence MRI of the abdomen was performed without and with the administration of intravenous contrast. COMPARISON: None.  HISTORY: ORDERING SYSTEM PROVIDED HISTORY: ALK-positive non-small cell lung cancer ([facility] [address] TECHNOLOGIST PROVIDED HISTORY: STAT Creatinine as needed:->Yes liver lesion on ct Specify organ?->Liver FINDINGS: Within the anterior inferior portion of segment VIII there is 1.0 x 1.4 cm moderately T1 hyper pole intense and moderately T2 hyperintense nodule corresponding to finding on recent CT scan. Nodule shows restriction of diffusion. There is peripheral ring enhancement which appears early and progressive. There is some motion on the delayed coronal postcontrast images and therefore difficult to clearly determine if there is filling in on delayed images. Typical peripheral nodular enhancement of hemangioma is not seen. Further cephalad in segment VIII there is an 8 mm nodule of similar T1 and T2 characteristics. Not clearly visualized on the diffusion images due to artifact. Enhancement characteristics similar to the larger nodule. Likely of similar etiology. Spleen, pancreas, adrenal glands show no significant abnormalities. Small right renal cyst.  No cholelithiasis. No biliary ductal dilatation. Unremarkable pancreatic duct. Visualized bowel show no acute process. There is colonic diverticulosis. No lymphadenopathy. No ascites. Bone marrow signal age appropriate. 1. There are 2 rim enhancing nodules in segment VIII measuring 0.8 cm and 1.4 cm. The larger represents nodule reported as new on prior CT. No clear washout. Cannot exclude metastatic nodules although these could represent atypical hemangioma. Please correlate with tumor markers. Additionally three-month follow-up is recommended at which time later delayed images (hemangioma protocol) should be acquired. Impression:  Adenocarcinoma of right lung, clinical stage at least T1c, N2, M0 (stage IIIA)  Disease recurrence, biopsy-proven-8/2021  Radiation pneumonitis and shortness of breath  Liver lesion per CT-11/2022  Thrombocytopenia  Anemia      Plan:  I had a detailed discussion with the patient and we went over results of lab work-up imaging studies and other relevant clinical data  Toxicity check performed. Overall patient is tolerating treatment well. Labs are adequate for treatment. Reviewed results of MRI. Compared MRI images with the prior scans.   Liver lesions noted are indeterminate. We will continue to monitor closely  Hemoglobin remained stable  Continue alectinib at current dose  Discussed natural history of ALK positive lung cancer  Monitor kidney function  NCCN guidelines were reviewed and discussed with the patient. The diagnosis and care plan were discussed with the patient in detail. I discussed the natural history of the disease, prognosis, risks and goals of therapy and answered all the patients questions to the best of my ability. Patient expressed understanding and was in agreement. Yuriy Oropeza MD        I spent more than 40 minutes examining, evaluating, reviewing data, counseling the patient and coordinating care. Greater than 50% of time was spent face-to-face with the patient this note is created with the assistance of a speech recognition program.  While intending to generate a document that actually reflects the content of the visit, the document can still have some errors including those of syntax and sound a like substitutions which may escape proof reading. It such instances, actual meaning can be extrapolated by contextual diversion. Teena Garza

## 2022-12-27 ENCOUNTER — HOSPITAL ENCOUNTER (OUTPATIENT)
Dept: PHARMACY | Age: 77
Setting detail: THERAPIES SERIES
Discharge: HOME | End: 2022-12-27
Payer: MEDICARE

## 2022-12-27 DIAGNOSIS — I48.0 PAROXYSMAL A-FIB (HCC): Primary | ICD-10-CM

## 2022-12-27 LAB
INR BLD: 3
PROTIME: 36 SECONDS

## 2022-12-27 PROCEDURE — 85610 PROTHROMBIN TIME: CPT

## 2022-12-27 PROCEDURE — 99211 OFF/OP EST MAY X REQ PHY/QHP: CPT

## 2022-12-27 PROCEDURE — 36416 COLLJ CAPILLARY BLOOD SPEC: CPT

## 2022-12-27 NOTE — PROGRESS NOTES
ANTICOAGULATION SERVICE    Date of Clinic Visit:  2022    Coby Castillo is a 69 y.o. male who presents to clinic today for anticoagulation monitoring and adjustment. Recent INR Results:  Internal QC passed  Lab Results   Component Value Date    INR 3 2022    INR 2.5 2022       Current Warfarin Dosage:  Dosing Plan  As of 2022      TTR:  62.3 % (1.8 y)   Full warfarin instructions:  : 5 mg; Otherwise 5 mg every Mon, Wed, Fri; 7.5 mg all other days; Starting 2022                 Assessment/Plan:    Continue current regimen as INR remains stable. INR is at top of range today. I will not change weekly dose but will decrease dose today then back on regular dose. Recheck 4 weeks. Next Clinic Appointment:  Return date  As of 2022      TTR:  62.3 % (1.8 y)   Next INR check:  2023               Please call [facility] at [phone] with any questions. Thanks!   Mayte Liang [facility]  Anticoagulation Service Pharmacist  2022 11:39 AM    For Pharmacy Admin Tracking Only    Intervention Detail: Adherence Monitorin  Total # of Interventions Recommended: 0  Total # of Interventions Accepted: 0  Time Spent (min): 15

## 2023-01-01 ENCOUNTER — TELEPHONE (OUTPATIENT)
Dept: ONCOLOGY | Age: 78
End: 2023-01-01

## 2023-01-01 ENCOUNTER — APPOINTMENT (OUTPATIENT)
Dept: GENERAL RADIOLOGY | Age: 78
DRG: 291 | End: 2023-01-01
Payer: MEDICARE

## 2023-01-01 ENCOUNTER — APPOINTMENT (OUTPATIENT)
Dept: NON INVASIVE DIAGNOSTICS | Age: 78
DRG: 291 | End: 2023-01-01
Payer: MEDICARE

## 2023-01-01 ENCOUNTER — HOSPITAL ENCOUNTER (INPATIENT)
Age: 78
LOS: 7 days | Discharge: NURSING, REHAB OR LONG-TERM CARE | DRG: 291 | End: 2023-06-05
Attending: EMERGENCY MEDICINE | Admitting: INTERNAL MEDICINE
Payer: MEDICARE

## 2023-01-01 VITALS
BODY MASS INDEX: 31.9 KG/M2 | SYSTOLIC BLOOD PRESSURE: 164 MMHG | DIASTOLIC BLOOD PRESSURE: 87 MMHG | TEMPERATURE: 97.4 F | WEIGHT: 222.8 LBS | RESPIRATION RATE: 24 BRPM | HEART RATE: 85 BPM | OXYGEN SATURATION: 92 % | HEIGHT: 70 IN

## 2023-01-01 DIAGNOSIS — R06.02 SHORTNESS OF BREATH: Primary | ICD-10-CM

## 2023-01-01 DIAGNOSIS — I50.9 ACUTE CONGESTIVE HEART FAILURE, UNSPECIFIED HEART FAILURE TYPE (HCC): ICD-10-CM

## 2023-01-01 LAB
ABO + RH BLD: NORMAL
ABSOLUTE BANDS #: 0.02 K/UL
ABSOLUTE BANDS #: 0.02 K/UL
ANION GAP SERPL CALCULATED.3IONS-SCNC: 10 MMOL/L (ref 9–17)
ANION GAP SERPL CALCULATED.3IONS-SCNC: 11 MMOL/L (ref 9–17)
ANION GAP SERPL CALCULATED.3IONS-SCNC: 13 MMOL/L (ref 9–17)
ANION GAP SERPL CALCULATED.3IONS-SCNC: 14 MMOL/L (ref 9–17)
ANION GAP SERPL CALCULATED.3IONS-SCNC: 9 MMOL/L (ref 9–17)
ARM BAND NUMBER: NORMAL
BANDS: 1 %
BANDS: 2 %
BASOPHILS # BLD: 0 K/UL (ref 0–0.2)
BASOPHILS # BLD: <0.03 K/UL (ref 0–0.2)
BASOPHILS NFR BLD: 0 % (ref 0–2)
BLD PROD TYP BPU: NORMAL
BLD PROD TYP BPU: NORMAL
BLOOD BANK BLOOD PRODUCT EXPIRATION DATE: NORMAL
BLOOD BANK BLOOD PRODUCT EXPIRATION DATE: NORMAL
BLOOD BANK ISBT PRODUCT BLOOD TYPE: 6200
BLOOD BANK ISBT PRODUCT BLOOD TYPE: 6200
BLOOD BANK PRODUCT CODE: NORMAL
BLOOD BANK PRODUCT CODE: NORMAL
BLOOD BANK UNIT TYPE AND RH: NORMAL
BLOOD BANK UNIT TYPE AND RH: NORMAL
BLOOD GROUP ANTIBODIES SERPL: NEGATIVE
BNP SERPL-MCNC: 1557 PG/ML
BPU ID: NORMAL
BPU ID: NORMAL
BUN SERPL-MCNC: 42 MG/DL (ref 8–23)
BUN SERPL-MCNC: 42 MG/DL (ref 8–23)
BUN SERPL-MCNC: 44 MG/DL (ref 8–23)
BUN SERPL-MCNC: 47 MG/DL (ref 8–23)
BUN SERPL-MCNC: 48 MG/DL (ref 8–23)
BUN/CREAT SERPL: 40 (ref 9–20)
BUN/CREAT SERPL: 44 (ref 9–20)
BUN/CREAT SERPL: 46 (ref 9–20)
BUN/CREAT SERPL: 51 (ref 9–20)
BUN/CREAT SERPL: 53 (ref 9–20)
BUN/CREAT SERPL: 53 (ref 9–20)
BUN/CREAT SERPL: 56 (ref 9–20)
BUN/CREAT SERPL: 63 (ref 9–20)
CALCIUM SERPL-MCNC: 8.6 MG/DL (ref 8.6–10.4)
CALCIUM SERPL-MCNC: 8.7 MG/DL (ref 8.6–10.4)
CALCIUM SERPL-MCNC: 8.7 MG/DL (ref 8.6–10.4)
CALCIUM SERPL-MCNC: 8.8 MG/DL (ref 8.6–10.4)
CALCIUM SERPL-MCNC: 8.8 MG/DL (ref 8.6–10.4)
CALCIUM SERPL-MCNC: 8.9 MG/DL (ref 8.6–10.4)
CALCIUM SERPL-MCNC: 8.9 MG/DL (ref 8.6–10.4)
CALCIUM SERPL-MCNC: 9.2 MG/DL (ref 8.6–10.4)
CHLORIDE SERPL-SCNC: 102 MMOL/L (ref 98–107)
CHLORIDE SERPL-SCNC: 106 MMOL/L (ref 98–107)
CHLORIDE SERPL-SCNC: 106 MMOL/L (ref 98–107)
CHLORIDE SERPL-SCNC: 107 MMOL/L (ref 98–107)
CHLORIDE SERPL-SCNC: 108 MMOL/L (ref 98–107)
CHLORIDE SERPL-SCNC: 108 MMOL/L (ref 98–107)
CHLORIDE SERPL-SCNC: 109 MMOL/L (ref 98–107)
CHLORIDE SERPL-SCNC: 109 MMOL/L (ref 98–107)
CHOLEST SERPL-MCNC: 200 MG/DL
CHOLESTEROL/HDL RATIO: 3.3
CO2 SERPL-SCNC: 21 MMOL/L (ref 20–31)
CO2 SERPL-SCNC: 23 MMOL/L (ref 20–31)
CO2 SERPL-SCNC: 24 MMOL/L (ref 20–31)
CO2 SERPL-SCNC: 25 MMOL/L (ref 20–31)
CO2 SERPL-SCNC: 25 MMOL/L (ref 20–31)
CO2 SERPL-SCNC: 26 MMOL/L (ref 20–31)
CREAT SERPL-MCNC: 0.75 MG/DL (ref 0.7–1.2)
CREAT SERPL-MCNC: 0.86 MG/DL (ref 0.7–1.2)
CREAT SERPL-MCNC: 0.86 MG/DL (ref 0.7–1.2)
CREAT SERPL-MCNC: 0.9 MG/DL (ref 0.7–1.2)
CREAT SERPL-MCNC: 0.9 MG/DL (ref 0.7–1.2)
CREAT SERPL-MCNC: 0.96 MG/DL (ref 0.7–1.2)
CREAT SERPL-MCNC: 1.04 MG/DL (ref 0.7–1.2)
CREAT SERPL-MCNC: 1.04 MG/DL (ref 0.7–1.2)
DISPENSE STATUS BLOOD BANK: NORMAL
DISPENSE STATUS BLOOD BANK: NORMAL
EKG Q-T INTERVAL: 406 MS
EKG QRS DURATION: 106 MS
EKG QTC CALCULATION (BAZETT): 496 MS
EKG R AXIS: 22 DEGREES
EKG T AXIS: 46 DEGREES
EKG VENTRICULAR RATE: 90 BPM
EOSINOPHIL # BLD: 0.01 K/UL (ref 0–0.4)
EOSINOPHIL # BLD: 0.01 K/UL (ref 0–0.4)
EOSINOPHIL # BLD: 0.02 K/UL (ref 0–0.4)
EOSINOPHIL # BLD: <0.03 K/UL (ref 0–0.44)
EOSINOPHILS RELATIVE PERCENT: 0 % (ref 1–4)
EOSINOPHILS RELATIVE PERCENT: 1 % (ref 1–8)
EOSINOPHILS RELATIVE PERCENT: 3 % (ref 1–4)
ERYTHROCYTE [DISTWIDTH] IN BLOOD BY AUTOMATED COUNT: 15.8 % (ref 11.8–14.4)
ERYTHROCYTE [DISTWIDTH] IN BLOOD BY AUTOMATED COUNT: 15.8 % (ref 11.8–14.4)
ERYTHROCYTE [DISTWIDTH] IN BLOOD BY AUTOMATED COUNT: 15.9 % (ref 11.8–14.4)
ERYTHROCYTE [DISTWIDTH] IN BLOOD BY AUTOMATED COUNT: 16 % (ref 11.8–14.4)
ERYTHROCYTE [DISTWIDTH] IN BLOOD BY AUTOMATED COUNT: 16.1 % (ref 11.8–14.4)
ERYTHROCYTE [DISTWIDTH] IN BLOOD BY AUTOMATED COUNT: 16.4 % (ref 11.8–14.4)
EST. AVERAGE GLUCOSE BLD GHB EST-MCNC: 214 MG/DL
EXPIRATION DATE: NORMAL
GFR SERPL CREATININE-BSD FRML MDRD: >60 ML/MIN/1.73M2
GLUCOSE BLD-MCNC: 117 MG/DL (ref 75–110)
GLUCOSE BLD-MCNC: 126 MG/DL (ref 75–110)
GLUCOSE BLD-MCNC: 155 MG/DL (ref 75–110)
GLUCOSE BLD-MCNC: 161 MG/DL (ref 75–110)
GLUCOSE BLD-MCNC: 177 MG/DL (ref 75–110)
GLUCOSE BLD-MCNC: 180 MG/DL (ref 75–110)
GLUCOSE BLD-MCNC: 190 MG/DL (ref 75–110)
GLUCOSE BLD-MCNC: 192 MG/DL (ref 75–110)
GLUCOSE BLD-MCNC: 195 MG/DL (ref 75–110)
GLUCOSE BLD-MCNC: 195 MG/DL (ref 75–110)
GLUCOSE BLD-MCNC: 201 MG/DL (ref 75–110)
GLUCOSE BLD-MCNC: 225 MG/DL (ref 75–110)
GLUCOSE BLD-MCNC: 228 MG/DL (ref 75–110)
GLUCOSE BLD-MCNC: 244 MG/DL (ref 75–110)
GLUCOSE BLD-MCNC: 246 MG/DL (ref 75–110)
GLUCOSE BLD-MCNC: 249 MG/DL (ref 75–110)
GLUCOSE BLD-MCNC: 256 MG/DL (ref 75–110)
GLUCOSE BLD-MCNC: 269 MG/DL (ref 75–110)
GLUCOSE BLD-MCNC: 77 MG/DL (ref 75–110)
GLUCOSE BLD-MCNC: 93 MG/DL (ref 75–110)
GLUCOSE SERPL-MCNC: 130 MG/DL (ref 70–99)
GLUCOSE SERPL-MCNC: 165 MG/DL (ref 70–99)
GLUCOSE SERPL-MCNC: 170 MG/DL (ref 70–99)
GLUCOSE SERPL-MCNC: 171 MG/DL (ref 70–99)
GLUCOSE SERPL-MCNC: 210 MG/DL (ref 70–99)
GLUCOSE SERPL-MCNC: 219 MG/DL (ref 70–99)
GLUCOSE SERPL-MCNC: 222 MG/DL (ref 70–99)
GLUCOSE SERPL-MCNC: 225 MG/DL (ref 70–99)
HBA1C MFR BLD: 9.1 % (ref 4–6)
HCT VFR BLD AUTO: 25.3 % (ref 40.7–50.3)
HCT VFR BLD AUTO: 25.4 % (ref 40.7–50.3)
HCT VFR BLD AUTO: 25.4 % (ref 40.7–50.3)
HCT VFR BLD AUTO: 25.5 % (ref 40.7–50.3)
HCT VFR BLD AUTO: 26 % (ref 40.7–50.3)
HCT VFR BLD AUTO: 26.7 % (ref 40.7–50.3)
HCT VFR BLD AUTO: 26.7 % (ref 40.7–50.3)
HCT VFR BLD AUTO: 27.4 % (ref 40.7–50.3)
HCT VFR BLD AUTO: 30.7 % (ref 40.7–50.3)
HDLC SERPL-MCNC: 61 MG/DL
HGB BLD-MCNC: 7.9 G/DL (ref 13–17)
HGB BLD-MCNC: 8 G/DL (ref 13–17)
HGB BLD-MCNC: 8.1 G/DL (ref 13–17)
HGB BLD-MCNC: 8.3 G/DL (ref 13–17)
HGB BLD-MCNC: 9.5 G/DL (ref 13–17)
IMM GRANULOCYTES # BLD AUTO: 0 K/UL (ref 0–0.3)
IMM GRANULOCYTES # BLD AUTO: 0.04 K/UL (ref 0–0.3)
IMM GRANULOCYTES # BLD AUTO: <0.03 K/UL (ref 0–0.3)
IMM GRANULOCYTES NFR BLD: 0 %
IMM GRANULOCYTES NFR BLD: 5 %
IMM GRANULOCYTES NFR BLD: 8 %
INR PPP: 1.5
INR PPP: 1.6
INR PPP: 2.4
INR PPP: 3.1
INR PPP: 3.5
INR PPP: 3.6
INR PPP: 4.3
INR PPP: 6.5
IRON SATN MFR SERPL: ABNORMAL % (ref 20–55)
IRON SERPL-MCNC: 166 UG/DL (ref 59–158)
LACTATE BLDV-SCNC: 1.9 MMOL/L (ref 0.5–1.9)
LACTATE BLDV-SCNC: 2.4 MMOL/L (ref 0.5–1.9)
LDLC SERPL CALC-MCNC: 97 MG/DL (ref 0–130)
LV EF: 48 %
LVEF MODALITY: NORMAL
LYMPHOCYTES # BLD: 20 % (ref 15–43)
LYMPHOCYTES # BLD: 31 % (ref 15–43)
LYMPHOCYTES # BLD: 36 % (ref 24–43)
LYMPHOCYTES # BLD: 40 % (ref 15–43)
LYMPHOCYTES # BLD: 46 % (ref 24–43)
LYMPHOCYTES # BLD: 57 % (ref 24–43)
LYMPHOCYTES # BLD: 59 % (ref 24–43)
LYMPHOCYTES # BLD: 62 % (ref 24–43)
LYMPHOCYTES # BLD: 63 % (ref 24–43)
LYMPHOCYTES NFR BLD: 0.17 K/UL (ref 1.1–3.7)
LYMPHOCYTES NFR BLD: 0.17 K/UL (ref 1.1–3.7)
LYMPHOCYTES NFR BLD: 0.18 K/UL (ref 1.1–3.7)
LYMPHOCYTES NFR BLD: 0.19 K/UL (ref 1.1–3.7)
LYMPHOCYTES NFR BLD: 0.19 K/UL (ref 1.1–3.7)
LYMPHOCYTES NFR BLD: 0.21 K/UL (ref 1.1–3.7)
LYMPHOCYTES NFR BLD: 0.25 K/UL (ref 1–4.8)
LYMPHOCYTES NFR BLD: 0.34 K/UL (ref 1–4.8)
LYMPHOCYTES NFR BLD: 0.44 K/UL (ref 1–4.8)
MAGNESIUM SERPL-MCNC: 1.9 MG/DL (ref 1.6–2.6)
MAGNESIUM SERPL-MCNC: 2 MG/DL (ref 1.6–2.6)
MAGNESIUM SERPL-MCNC: 2 MG/DL (ref 1.6–2.6)
MAGNESIUM SERPL-MCNC: 2.1 MG/DL (ref 1.6–2.6)
MCH RBC QN AUTO: 28.2 PG (ref 25.2–33.5)
MCH RBC QN AUTO: 28.3 PG (ref 25.2–33.5)
MCH RBC QN AUTO: 28.4 PG (ref 25.2–33.5)
MCH RBC QN AUTO: 28.6 PG (ref 25.2–33.5)
MCH RBC QN AUTO: 28.7 PG (ref 25.2–33.5)
MCH RBC QN AUTO: 28.7 PG (ref 25.2–33.5)
MCH RBC QN AUTO: 28.9 PG (ref 25.2–33.5)
MCH RBC QN AUTO: 29 PG (ref 25.2–33.5)
MCH RBC QN AUTO: 29 PG (ref 25.2–33.5)
MCHC RBC AUTO-ENTMCNC: 30.3 G/DL (ref 25.2–33.5)
MCHC RBC AUTO-ENTMCNC: 30.4 G/DL (ref 25.2–33.5)
MCHC RBC AUTO-ENTMCNC: 30.9 G/DL (ref 25.2–33.5)
MCHC RBC AUTO-ENTMCNC: 31.1 G/DL (ref 25.2–33.5)
MCHC RBC AUTO-ENTMCNC: 31.1 G/DL (ref 25.2–33.5)
MCHC RBC AUTO-ENTMCNC: 31.4 G/DL (ref 25.2–33.5)
MCHC RBC AUTO-ENTMCNC: 31.5 G/DL (ref 25.2–33.5)
MCHC RBC AUTO-ENTMCNC: 31.6 G/DL (ref 25.2–33.5)
MCHC RBC AUTO-ENTMCNC: 31.9 G/DL (ref 25.2–33.5)
MCV RBC AUTO: 90.7 FL (ref 82.6–102.9)
MCV RBC AUTO: 90.8 FL (ref 82.6–102.9)
MCV RBC AUTO: 91.4 FL (ref 82.6–102.9)
MCV RBC AUTO: 93.4 FL (ref 82.6–102.9)
MCV RBC AUTO: 93.5 FL (ref 82.6–102.9)
MCV RBC AUTO: 93.5 FL (ref 82.6–102.9)
MCV RBC AUTO: 93.6 FL (ref 82.6–102.9)
METAMYELOCYTES ABSOLUTE COUNT: 0.01 K/UL
METAMYELOCYTES ABSOLUTE COUNT: 0.02 K/UL
METAMYELOCYTES: 1 %
METAMYELOCYTES: 1 %
MONOCYTES NFR BLD: 0.01 K/UL (ref 0.1–1.2)
MONOCYTES NFR BLD: 0.02 K/UL (ref 0.1–1.2)
MONOCYTES NFR BLD: 0.03 K/UL (ref 0.1–1.2)
MONOCYTES NFR BLD: 0.04 K/UL (ref 0.1–1.2)
MONOCYTES NFR BLD: 0.07 K/UL (ref 0.1–1.2)
MONOCYTES NFR BLD: 1 % (ref 6–14)
MONOCYTES NFR BLD: 10 % (ref 3–12)
MONOCYTES NFR BLD: 11 % (ref 3–12)
MONOCYTES NFR BLD: 11 % (ref 3–12)
MONOCYTES NFR BLD: 2 % (ref 6–14)
MONOCYTES NFR BLD: 4 % (ref 3–12)
MONOCYTES NFR BLD: 4 % (ref 6–14)
MONOCYTES NFR BLD: 6 % (ref 3–12)
MONOCYTES NFR BLD: 8 % (ref 3–12)
MONOCYTES NFR BLD: <0.03 K/UL (ref 0.1–1.2)
MONOCYTES NFR BLD: <0.03 K/UL (ref 0.1–1.2)
MORPHOLOGY: ABNORMAL
NEUTROPHILS NFR BLD: 26 % (ref 36–65)
NEUTROPHILS NFR BLD: 27 % (ref 36–65)
NEUTROPHILS NFR BLD: 28 % (ref 36–65)
NEUTROPHILS NFR BLD: 34 % (ref 36–65)
NEUTROPHILS NFR BLD: 43 % (ref 36–65)
NEUTROPHILS NFR BLD: 53 % (ref 36–65)
NEUTROPHILS NFR BLD: 58 % (ref 44–74)
NEUTROPHILS NFR BLD: 63 % (ref 44–74)
NEUTROPHILS NFR BLD: 73 % (ref 44–74)
NEUTS SEG NFR BLD: 0.07 K/UL (ref 1.5–8.1)
NEUTS SEG NFR BLD: 0.08 K/UL (ref 1.5–8.1)
NEUTS SEG NFR BLD: 0.1 K/UL (ref 1.5–8.1)
NEUTS SEG NFR BLD: 0.11 K/UL (ref 1.5–8.1)
NEUTS SEG NFR BLD: 0.16 K/UL (ref 1.5–8.1)
NEUTS SEG NFR BLD: 0.28 K/UL (ref 1.5–8.1)
NEUTS SEG NFR BLD: 0.49 K/UL (ref 1.5–8.1)
NEUTS SEG NFR BLD: 0.64 K/UL (ref 1.5–8.1)
NEUTS SEG NFR BLD: 1.23 K/UL (ref 1.5–8.1)
NRBC AUTOMATED: 0 PER 100 WBC
PLATELET # BLD AUTO: ABNORMAL K/UL (ref 138–453)
PLATELET, FLUORESCENCE: 12 K/UL (ref 138–453)
PLATELET, FLUORESCENCE: 15 K/UL (ref 138–453)
PLATELET, FLUORESCENCE: 17 K/UL (ref 138–453)
PLATELET, FLUORESCENCE: 26 K/UL (ref 138–453)
PLATELET, FLUORESCENCE: 39 K/UL (ref 138–453)
PLATELET, FLUORESCENCE: 5 K/UL (ref 138–453)
PLATELET, FLUORESCENCE: 56 K/UL (ref 138–453)
PLATELET, FLUORESCENCE: 8 K/UL (ref 138–453)
PLATELET, FLUORESCENCE: 9 K/UL (ref 138–453)
PLATELETS.RETICULATED NFR BLD AUTO: 1.5 % (ref 1.1–10.3)
PLATELETS.RETICULATED NFR BLD AUTO: 1.6 % (ref 1.1–10.3)
PLATELETS.RETICULATED NFR BLD AUTO: 2.1 % (ref 1.1–10.3)
PLATELETS.RETICULATED NFR BLD AUTO: 2.2 % (ref 1.1–10.3)
PLATELETS.RETICULATED NFR BLD AUTO: 2.8 % (ref 1.1–10.3)
PLATELETS.RETICULATED NFR BLD AUTO: 3.2 % (ref 1.1–10.3)
PLATELETS.RETICULATED NFR BLD AUTO: 3.4 % (ref 1.1–10.3)
PLATELETS.RETICULATED NFR BLD AUTO: 3.9 % (ref 1.1–10.3)
PLATELETS.RETICULATED NFR BLD AUTO: 4 % (ref 1.1–10.3)
PMV BLD AUTO: ABNORMAL FL (ref 8.1–13.5)
POTASSIUM SERPL-SCNC: 3.9 MMOL/L (ref 3.7–5.3)
POTASSIUM SERPL-SCNC: 4 MMOL/L (ref 3.7–5.3)
POTASSIUM SERPL-SCNC: 4 MMOL/L (ref 3.7–5.3)
POTASSIUM SERPL-SCNC: 4.1 MMOL/L (ref 3.7–5.3)
POTASSIUM SERPL-SCNC: 4.2 MMOL/L (ref 3.7–5.3)
POTASSIUM SERPL-SCNC: 4.6 MMOL/L (ref 3.7–5.3)
POTASSIUM SERPL-SCNC: 4.7 MMOL/L (ref 3.7–5.3)
POTASSIUM SERPL-SCNC: 5.1 MMOL/L (ref 3.7–5.3)
PROTHROMBIN TIME: 17.4 SEC (ref 11.5–14.2)
PROTHROMBIN TIME: 18.3 SEC (ref 11.5–14.2)
PROTHROMBIN TIME: 25.3 SEC (ref 11.5–14.2)
PROTHROMBIN TIME: 31 SEC (ref 11.5–14.2)
PROTHROMBIN TIME: 33.7 SEC (ref 11.5–14.2)
PROTHROMBIN TIME: 34.5 SEC (ref 11.5–14.2)
PROTHROMBIN TIME: 39.9 SEC (ref 11.5–14.2)
PROTHROMBIN TIME: 55.2 SEC (ref 11.5–14.2)
RBC # BLD AUTO: 2.78 M/UL (ref 4.21–5.77)
RBC # BLD AUTO: 2.79 M/UL (ref 4.21–5.77)
RBC # BLD AUTO: 2.79 M/UL (ref 4.21–5.77)
RBC # BLD AUTO: 2.8 M/UL (ref 4.21–5.77)
RBC # BLD AUTO: 2.8 M/UL (ref 4.21–5.77)
RBC # BLD AUTO: 2.86 M/UL (ref 4.21–5.77)
RBC # BLD AUTO: 2.93 M/UL (ref 4.21–5.77)
RBC # BLD AUTO: 2.94 M/UL (ref 4.21–5.77)
RBC # BLD AUTO: 3.28 M/UL (ref 4.21–5.77)
SARS-COV-2 RDRP RESP QL NAA+PROBE: NOT DETECTED
SODIUM SERPL-SCNC: 139 MMOL/L (ref 135–144)
SODIUM SERPL-SCNC: 140 MMOL/L (ref 135–144)
SODIUM SERPL-SCNC: 141 MMOL/L (ref 135–144)
SODIUM SERPL-SCNC: 141 MMOL/L (ref 135–144)
SODIUM SERPL-SCNC: 143 MMOL/L (ref 135–144)
SODIUM SERPL-SCNC: 144 MMOL/L (ref 135–144)
SPECIMEN DESCRIPTION: NORMAL
TIBC SERPL-MCNC: ABNORMAL UG/DL (ref 250–450)
TRANSFUSION STATUS: NORMAL
TRANSFUSION STATUS: NORMAL
TRIGL SERPL-MCNC: 210 MG/DL
TROPONIN I SERPL HS-MCNC: 41 NG/L (ref 0–22)
TROPONIN I SERPL HS-MCNC: 43 NG/L (ref 0–22)
TROPONIN I SERPL HS-MCNC: 45 NG/L (ref 0–22)
TROPONIN I SERPL HS-MCNC: 46 NG/L (ref 0–22)
TSH SERPL-MCNC: 2.9 UIU/ML (ref 0.3–5)
UNIT DIVISION: 0
UNIT DIVISION: 0
UNIT ISSUE DATE/TIME: NORMAL
UNIT ISSUE DATE/TIME: NORMAL
UNSATURATED IRON BINDING CAPACITY: <17 UG/DL (ref 112–347)
WBC OTHER # BLD: 0.3 K/UL (ref 3.5–11.3)
WBC OTHER # BLD: 0.4 K/UL (ref 3.5–11.3)
WBC OTHER # BLD: 0.4 K/UL (ref 3.5–11.3)
WBC OTHER # BLD: 0.5 K/UL (ref 3.5–11.3)
WBC OTHER # BLD: 0.8 K/UL (ref 3.5–11.3)
WBC OTHER # BLD: 1.1 K/UL (ref 3.5–11.3)
WBC OTHER # BLD: 1.7 K/UL (ref 3.5–11.3)

## 2023-01-01 PROCEDURE — 85610 PROTHROMBIN TIME: CPT

## 2023-01-01 PROCEDURE — 85025 COMPLETE CBC W/AUTO DIFF WBC: CPT

## 2023-01-01 PROCEDURE — 94660 CPAP INITIATION&MGMT: CPT

## 2023-01-01 PROCEDURE — 6360000002 HC RX W HCPCS

## 2023-01-01 PROCEDURE — 99232 SBSQ HOSP IP/OBS MODERATE 35: CPT | Performed by: FAMILY MEDICINE

## 2023-01-01 PROCEDURE — 84443 ASSAY THYROID STIM HORMONE: CPT

## 2023-01-01 PROCEDURE — 6370000000 HC RX 637 (ALT 250 FOR IP): Performed by: INTERNAL MEDICINE

## 2023-01-01 PROCEDURE — 94640 AIRWAY INHALATION TREATMENT: CPT

## 2023-01-01 PROCEDURE — 99231 SBSQ HOSP IP/OBS SF/LOW 25: CPT | Performed by: INTERNAL MEDICINE

## 2023-01-01 PROCEDURE — 2060000000 HC ICU INTERMEDIATE R&B

## 2023-01-01 PROCEDURE — 86901 BLOOD TYPING SEROLOGIC RH(D): CPT

## 2023-01-01 PROCEDURE — 93306 TTE W/DOPPLER COMPLETE: CPT

## 2023-01-01 PROCEDURE — 36415 COLL VENOUS BLD VENIPUNCTURE: CPT

## 2023-01-01 PROCEDURE — 30233R1 TRANSFUSION OF NONAUTOLOGOUS PLATELETS INTO PERIPHERAL VEIN, PERCUTANEOUS APPROACH: ICD-10-PCS | Performed by: INTERNAL MEDICINE

## 2023-01-01 PROCEDURE — 82947 ASSAY GLUCOSE BLOOD QUANT: CPT

## 2023-01-01 PROCEDURE — 6360000002 HC RX W HCPCS: Performed by: INTERNAL MEDICINE

## 2023-01-01 PROCEDURE — 99255 IP/OBS CONSLTJ NEW/EST HI 80: CPT | Performed by: INTERNAL MEDICINE

## 2023-01-01 PROCEDURE — 94760 N-INVAS EAR/PLS OXIMETRY 1: CPT

## 2023-01-01 PROCEDURE — 83880 ASSAY OF NATRIURETIC PEPTIDE: CPT

## 2023-01-01 PROCEDURE — 80048 BASIC METABOLIC PNL TOTAL CA: CPT

## 2023-01-01 PROCEDURE — 71045 X-RAY EXAM CHEST 1 VIEW: CPT

## 2023-01-01 PROCEDURE — 99238 HOSP IP/OBS DSCHRG MGMT 30/<: CPT | Performed by: INTERNAL MEDICINE

## 2023-01-01 PROCEDURE — 83605 ASSAY OF LACTIC ACID: CPT

## 2023-01-01 PROCEDURE — 85055 RETICULATED PLATELET ASSAY: CPT

## 2023-01-01 PROCEDURE — P9073 PLATELETS PHERESIS PATH REDU: HCPCS

## 2023-01-01 PROCEDURE — 2580000003 HC RX 258: Performed by: FAMILY MEDICINE

## 2023-01-01 PROCEDURE — 83735 ASSAY OF MAGNESIUM: CPT

## 2023-01-01 PROCEDURE — 6370000000 HC RX 637 (ALT 250 FOR IP): Performed by: EMERGENCY MEDICINE

## 2023-01-01 PROCEDURE — 2580000003 HC RX 258

## 2023-01-01 PROCEDURE — 92610 EVALUATE SWALLOWING FUNCTION: CPT

## 2023-01-01 PROCEDURE — 97116 GAIT TRAINING THERAPY: CPT

## 2023-01-01 PROCEDURE — 6370000000 HC RX 637 (ALT 250 FOR IP): Performed by: FAMILY MEDICINE

## 2023-01-01 PROCEDURE — 83036 HEMOGLOBIN GLYCOSYLATED A1C: CPT

## 2023-01-01 PROCEDURE — 85027 COMPLETE CBC AUTOMATED: CPT

## 2023-01-01 PROCEDURE — 36430 TRANSFUSION BLD/BLD COMPNT: CPT

## 2023-01-01 PROCEDURE — 93005 ELECTROCARDIOGRAM TRACING: CPT

## 2023-01-01 PROCEDURE — 6370000000 HC RX 637 (ALT 250 FOR IP)

## 2023-01-01 PROCEDURE — 2700000000 HC OXYGEN THERAPY PER DAY

## 2023-01-01 PROCEDURE — 2500000003 HC RX 250 WO HCPCS: Performed by: FAMILY MEDICINE

## 2023-01-01 PROCEDURE — 94761 N-INVAS EAR/PLS OXIMETRY MLT: CPT

## 2023-01-01 PROCEDURE — 5A09357 ASSISTANCE WITH RESPIRATORY VENTILATION, LESS THAN 24 CONSECUTIVE HOURS, CONTINUOUS POSITIVE AIRWAY PRESSURE: ICD-10-PCS | Performed by: INTERNAL MEDICINE

## 2023-01-01 PROCEDURE — 84484 ASSAY OF TROPONIN QUANT: CPT

## 2023-01-01 PROCEDURE — 80061 LIPID PANEL: CPT

## 2023-01-01 PROCEDURE — 6360000002 HC RX W HCPCS: Performed by: FAMILY MEDICINE

## 2023-01-01 PROCEDURE — 97530 THERAPEUTIC ACTIVITIES: CPT

## 2023-01-01 PROCEDURE — 86850 RBC ANTIBODY SCREEN: CPT

## 2023-01-01 PROCEDURE — 99222 1ST HOSP IP/OBS MODERATE 55: CPT

## 2023-01-01 PROCEDURE — 97161 PT EVAL LOW COMPLEX 20 MIN: CPT | Performed by: PHYSICAL THERAPIST

## 2023-01-01 PROCEDURE — 83550 IRON BINDING TEST: CPT

## 2023-01-01 PROCEDURE — 83540 ASSAY OF IRON: CPT

## 2023-01-01 PROCEDURE — 99285 EMERGENCY DEPT VISIT HI MDM: CPT

## 2023-01-01 PROCEDURE — 97110 THERAPEUTIC EXERCISES: CPT

## 2023-01-01 PROCEDURE — 2580000003 HC RX 258: Performed by: EMERGENCY MEDICINE

## 2023-01-01 PROCEDURE — 86900 BLOOD TYPING SEROLOGIC ABO: CPT

## 2023-01-01 PROCEDURE — 99232 SBSQ HOSP IP/OBS MODERATE 35: CPT | Performed by: INTERNAL MEDICINE

## 2023-01-01 PROCEDURE — 97165 OT EVAL LOW COMPLEX 30 MIN: CPT | Performed by: OCCUPATIONAL THERAPIST

## 2023-01-01 PROCEDURE — 87635 SARS-COV-2 COVID-19 AMP PRB: CPT

## 2023-01-01 RX ORDER — OXYBUTYNIN CHLORIDE 5 MG/1
10 TABLET, EXTENDED RELEASE ORAL DAILY
Status: DISCONTINUED | OUTPATIENT
Start: 2023-01-01 | End: 2023-01-01

## 2023-01-01 RX ORDER — LACTULOSE 10 G/15ML
20 SOLUTION ORAL 3 TIMES DAILY
Qty: 1 EACH | Refills: 0
Start: 2023-01-01

## 2023-01-01 RX ORDER — AZITHROMYCIN 250 MG/1
500 TABLET, FILM COATED ORAL DAILY
Status: DISCONTINUED | OUTPATIENT
Start: 2023-01-01 | End: 2023-01-01

## 2023-01-01 RX ORDER — WARFARIN SODIUM 2.5 MG/1
6.25 TABLET ORAL
Status: DISCONTINUED | OUTPATIENT
Start: 2023-01-01 | End: 2023-01-01

## 2023-01-01 RX ORDER — SODIUM CHLORIDE 9 MG/ML
INJECTION, SOLUTION INTRAVENOUS PRN
Status: DISCONTINUED | OUTPATIENT
Start: 2023-01-01 | End: 2023-01-01 | Stop reason: HOSPADM

## 2023-01-01 RX ORDER — ACETAMINOPHEN 325 MG/1
650 TABLET ORAL EVERY 6 HOURS PRN
Status: DISCONTINUED | OUTPATIENT
Start: 2023-01-01 | End: 2023-01-01 | Stop reason: HOSPADM

## 2023-01-01 RX ORDER — IPRATROPIUM BROMIDE AND ALBUTEROL SULFATE 2.5; .5 MG/3ML; MG/3ML
SOLUTION RESPIRATORY (INHALATION)
Status: COMPLETED
Start: 2023-01-01 | End: 2023-01-01

## 2023-01-01 RX ORDER — CALCIUM CARB/VITAMIN D3/VIT K1 500-500-40
1 TABLET,CHEWABLE ORAL DAILY
Status: DISCONTINUED | OUTPATIENT
Start: 2023-01-01 | End: 2023-01-01

## 2023-01-01 RX ORDER — INSULIN LISPRO 100 [IU]/ML
7 INJECTION, SOLUTION INTRAVENOUS; SUBCUTANEOUS
Status: DISCONTINUED | OUTPATIENT
Start: 2023-01-01 | End: 2023-01-01

## 2023-01-01 RX ORDER — WARFARIN SODIUM 2.5 MG/1
7.5 TABLET ORAL
Status: DISCONTINUED | OUTPATIENT
Start: 2023-01-01 | End: 2023-01-01

## 2023-01-01 RX ORDER — AZITHROMYCIN 500 MG/1
500 TABLET, FILM COATED ORAL DAILY
Status: COMPLETED | OUTPATIENT
Start: 2023-01-01 | End: 2023-01-01

## 2023-01-01 RX ORDER — LIDOCAINE HYDROCHLORIDE 20 MG/ML
15 SOLUTION OROPHARYNGEAL
Status: DISCONTINUED | OUTPATIENT
Start: 2023-01-01 | End: 2023-01-01 | Stop reason: HOSPADM

## 2023-01-01 RX ORDER — ALBUTEROL SULFATE 2.5 MG/3ML
2.5 SOLUTION RESPIRATORY (INHALATION)
Status: DISCONTINUED | OUTPATIENT
Start: 2023-01-01 | End: 2023-01-01 | Stop reason: HOSPADM

## 2023-01-01 RX ORDER — IPRATROPIUM BROMIDE AND ALBUTEROL SULFATE 2.5; .5 MG/3ML; MG/3ML
1 SOLUTION RESPIRATORY (INHALATION) ONCE
Status: COMPLETED | OUTPATIENT
Start: 2023-01-01 | End: 2023-01-01

## 2023-01-01 RX ORDER — INSULIN GLARGINE 100 [IU]/ML
60 INJECTION, SOLUTION SUBCUTANEOUS
Status: DISCONTINUED | OUTPATIENT
Start: 2023-01-01 | End: 2023-01-01

## 2023-01-01 RX ORDER — POLYETHYLENE GLYCOL 3350 17 G/17G
17 POWDER, FOR SOLUTION ORAL DAILY PRN
Status: DISCONTINUED | OUTPATIENT
Start: 2023-01-01 | End: 2023-01-01 | Stop reason: HOSPADM

## 2023-01-01 RX ORDER — LEVOTHYROXINE SODIUM 0.07 MG/1
150 TABLET ORAL DAILY
Status: DISCONTINUED | OUTPATIENT
Start: 2023-01-01 | End: 2023-01-01 | Stop reason: HOSPADM

## 2023-01-01 RX ORDER — POTASSIUM CHLORIDE 20 MEQ/1
40 TABLET, EXTENDED RELEASE ORAL PRN
Status: DISCONTINUED | OUTPATIENT
Start: 2023-01-01 | End: 2023-01-01 | Stop reason: HOSPADM

## 2023-01-01 RX ORDER — INSULIN LISPRO 100 [IU]/ML
20 INJECTION, SOLUTION INTRAVENOUS; SUBCUTANEOUS
Status: DISCONTINUED | OUTPATIENT
Start: 2023-01-01 | End: 2023-01-01

## 2023-01-01 RX ORDER — OXYCODONE HYDROCHLORIDE 5 MG/1
5 TABLET ORAL EVERY 4 HOURS PRN
Status: DISCONTINUED | OUTPATIENT
Start: 2023-01-01 | End: 2023-01-01

## 2023-01-01 RX ORDER — OXYCODONE HYDROCHLORIDE 5 MG/1
5 TABLET ORAL EVERY 4 HOURS PRN
Status: DISCONTINUED | OUTPATIENT
Start: 2023-01-01 | End: 2023-01-01 | Stop reason: HOSPADM

## 2023-01-01 RX ORDER — INSULIN GLARGINE 100 [IU]/ML
20 INJECTION, SOLUTION SUBCUTANEOUS
Status: DISCONTINUED | OUTPATIENT
Start: 2023-01-01 | End: 2023-01-01

## 2023-01-01 RX ORDER — DOCUSATE SODIUM 100 MG/1
200 CAPSULE, LIQUID FILLED ORAL 2 TIMES DAILY
Status: DISCONTINUED | OUTPATIENT
Start: 2023-01-01 | End: 2023-01-01 | Stop reason: HOSPADM

## 2023-01-01 RX ORDER — MULTIVITAMIN WITH IRON
1 TABLET ORAL DAILY
Status: DISCONTINUED | OUTPATIENT
Start: 2023-01-01 | End: 2023-01-01

## 2023-01-01 RX ORDER — DEXTROSE MONOHYDRATE 100 MG/ML
INJECTION, SOLUTION INTRAVENOUS CONTINUOUS PRN
Status: DISCONTINUED | OUTPATIENT
Start: 2023-01-01 | End: 2023-01-01 | Stop reason: HOSPADM

## 2023-01-01 RX ORDER — INSULIN LISPRO 100 [IU]/ML
5 INJECTION, SOLUTION INTRAVENOUS; SUBCUTANEOUS
Status: DISCONTINUED | OUTPATIENT
Start: 2023-01-01 | End: 2023-01-01 | Stop reason: HOSPADM

## 2023-01-01 RX ORDER — LOSARTAN POTASSIUM 50 MG/1
50 TABLET ORAL DAILY
Status: DISCONTINUED | OUTPATIENT
Start: 2023-01-01 | End: 2023-01-01 | Stop reason: HOSPADM

## 2023-01-01 RX ORDER — METOPROLOL TARTRATE 5 MG/5ML
2.5 INJECTION INTRAVENOUS EVERY 6 HOURS PRN
Status: DISCONTINUED | OUTPATIENT
Start: 2023-01-01 | End: 2023-01-01 | Stop reason: HOSPADM

## 2023-01-01 RX ORDER — OXYCODONE HYDROCHLORIDE 5 MG/1
10 TABLET ORAL EVERY 4 HOURS PRN
Status: DISCONTINUED | OUTPATIENT
Start: 2023-01-01 | End: 2023-01-01

## 2023-01-01 RX ORDER — SODIUM CHLORIDE FOR INHALATION 0.9 %
3 VIAL, NEBULIZER (ML) INHALATION
Status: DISCONTINUED | OUTPATIENT
Start: 2023-01-01 | End: 2023-01-01 | Stop reason: HOSPADM

## 2023-01-01 RX ORDER — WARFARIN SODIUM 1 MG/1
2 TABLET ORAL
Status: COMPLETED | OUTPATIENT
Start: 2023-01-01 | End: 2023-01-01

## 2023-01-01 RX ORDER — POTASSIUM CHLORIDE 7.45 MG/ML
10 INJECTION INTRAVENOUS PRN
Status: DISCONTINUED | OUTPATIENT
Start: 2023-01-01 | End: 2023-01-01 | Stop reason: HOSPADM

## 2023-01-01 RX ORDER — OXYCODONE HYDROCHLORIDE 5 MG/1
5 TABLET ORAL EVERY 6 HOURS PRN
Qty: 10 TABLET | Refills: 0 | Status: SHIPPED | OUTPATIENT
Start: 2023-01-01 | End: 2023-06-08

## 2023-01-01 RX ORDER — TAMSULOSIN HYDROCHLORIDE 0.4 MG/1
0.4 CAPSULE ORAL DAILY
Status: DISCONTINUED | OUTPATIENT
Start: 2023-01-01 | End: 2023-01-01 | Stop reason: HOSPADM

## 2023-01-01 RX ORDER — ACETAMINOPHEN 650 MG/1
650 SUPPOSITORY RECTAL EVERY 6 HOURS PRN
Status: DISCONTINUED | OUTPATIENT
Start: 2023-01-01 | End: 2023-01-01

## 2023-01-01 RX ORDER — IPRATROPIUM BROMIDE AND ALBUTEROL SULFATE 2.5; .5 MG/3ML; MG/3ML
1 SOLUTION RESPIRATORY (INHALATION)
Status: DISCONTINUED | OUTPATIENT
Start: 2023-01-01 | End: 2023-01-01

## 2023-01-01 RX ORDER — WARFARIN SODIUM 2.5 MG/1
2.5 TABLET ORAL
Status: COMPLETED | OUTPATIENT
Start: 2023-01-01 | End: 2023-01-01

## 2023-01-01 RX ORDER — INSULIN GLARGINE 100 [IU]/ML
55 INJECTION, SOLUTION SUBCUTANEOUS
Status: DISCONTINUED | OUTPATIENT
Start: 2023-01-01 | End: 2023-01-01

## 2023-01-01 RX ORDER — INSULIN GLARGINE 100 [IU]/ML
60 INJECTION, SOLUTION SUBCUTANEOUS DAILY
Status: DISCONTINUED | OUTPATIENT
Start: 2023-01-01 | End: 2023-01-01 | Stop reason: HOSPADM

## 2023-01-01 RX ORDER — SODIUM CHLORIDE 0.9 % (FLUSH) 0.9 %
5-40 SYRINGE (ML) INJECTION EVERY 12 HOURS SCHEDULED
Status: DISCONTINUED | OUTPATIENT
Start: 2023-01-01 | End: 2023-01-01 | Stop reason: HOSPADM

## 2023-01-01 RX ORDER — SODIUM CHLORIDE 0.9 % (FLUSH) 0.9 %
5-40 SYRINGE (ML) INJECTION PRN
Status: DISCONTINUED | OUTPATIENT
Start: 2023-01-01 | End: 2023-01-01 | Stop reason: HOSPADM

## 2023-01-01 RX ORDER — FOLIC ACID 1 MG/1
1000 TABLET ORAL DAILY
Status: DISCONTINUED | OUTPATIENT
Start: 2023-01-01 | End: 2023-01-01

## 2023-01-01 RX ORDER — FUROSEMIDE 10 MG/ML
20 INJECTION INTRAMUSCULAR; INTRAVENOUS 2 TIMES DAILY
Status: DISCONTINUED | OUTPATIENT
Start: 2023-01-01 | End: 2023-01-01 | Stop reason: HOSPADM

## 2023-01-01 RX ORDER — LIDOCAINE HYDROCHLORIDE 20 MG/ML
15 SOLUTION OROPHARYNGEAL
Qty: 150 ML | Refills: 0 | Status: SHIPPED | OUTPATIENT
Start: 2023-01-01 | End: 2023-06-12

## 2023-01-01 RX ORDER — CEFDINIR 300 MG/1
300 CAPSULE ORAL 2 TIMES DAILY
Status: COMPLETED | OUTPATIENT
Start: 2023-01-01 | End: 2023-01-01

## 2023-01-01 RX ORDER — FLUCONAZOLE 2 MG/ML
200 INJECTION, SOLUTION INTRAVENOUS EVERY 24 HOURS
Status: DISCONTINUED | OUTPATIENT
Start: 2023-01-01 | End: 2023-01-01

## 2023-01-01 RX ORDER — CEFDINIR 300 MG/1
300 CAPSULE ORAL 2 TIMES DAILY
Status: DISCONTINUED | OUTPATIENT
Start: 2023-01-01 | End: 2023-01-01

## 2023-01-01 RX ORDER — PHYTONADIONE 10 MG/ML
2.5 INJECTION, EMULSION INTRAMUSCULAR; INTRAVENOUS; SUBCUTANEOUS ONCE
Status: COMPLETED | OUTPATIENT
Start: 2023-01-01 | End: 2023-01-01

## 2023-01-01 RX ORDER — FLECAINIDE ACETATE 50 MG/1
100 TABLET ORAL 2 TIMES DAILY
Status: DISCONTINUED | OUTPATIENT
Start: 2023-01-01 | End: 2023-01-01 | Stop reason: HOSPADM

## 2023-01-01 RX ORDER — INSULIN LISPRO 100 [IU]/ML
0-4 INJECTION, SOLUTION INTRAVENOUS; SUBCUTANEOUS
Status: DISCONTINUED | OUTPATIENT
Start: 2023-01-01 | End: 2023-01-01 | Stop reason: HOSPADM

## 2023-01-01 RX ORDER — 0.9 % SODIUM CHLORIDE 0.9 %
1000 INTRAVENOUS SOLUTION INTRAVENOUS ONCE
Status: COMPLETED | OUTPATIENT
Start: 2023-01-01 | End: 2023-01-01

## 2023-01-01 RX ORDER — METOPROLOL TARTRATE 50 MG/1
50 TABLET, FILM COATED ORAL 2 TIMES DAILY
Status: DISCONTINUED | OUTPATIENT
Start: 2023-01-01 | End: 2023-01-01 | Stop reason: HOSPADM

## 2023-01-01 RX ORDER — METHYLPREDNISOLONE SODIUM SUCCINATE 125 MG/2ML
60 INJECTION, POWDER, LYOPHILIZED, FOR SOLUTION INTRAMUSCULAR; INTRAVENOUS EVERY 6 HOURS
Status: DISCONTINUED | OUTPATIENT
Start: 2023-01-01 | End: 2023-01-01

## 2023-01-01 RX ORDER — MAGNESIUM SULFATE IN WATER 40 MG/ML
2000 INJECTION, SOLUTION INTRAVENOUS PRN
Status: DISCONTINUED | OUTPATIENT
Start: 2023-01-01 | End: 2023-01-01 | Stop reason: HOSPADM

## 2023-01-01 RX ORDER — INSULIN LISPRO 100 [IU]/ML
0-4 INJECTION, SOLUTION INTRAVENOUS; SUBCUTANEOUS NIGHTLY
Status: DISCONTINUED | OUTPATIENT
Start: 2023-01-01 | End: 2023-01-01 | Stop reason: HOSPADM

## 2023-01-01 RX ORDER — OXYCODONE HYDROCHLORIDE 5 MG/1
10 TABLET ORAL EVERY 4 HOURS PRN
Status: DISCONTINUED | OUTPATIENT
Start: 2023-01-01 | End: 2023-01-01 | Stop reason: HOSPADM

## 2023-01-01 RX ORDER — IPRATROPIUM BROMIDE AND ALBUTEROL SULFATE 2.5; .5 MG/3ML; MG/3ML
1 SOLUTION RESPIRATORY (INHALATION) EVERY 4 HOURS
Status: DISCONTINUED | OUTPATIENT
Start: 2023-01-01 | End: 2023-01-01 | Stop reason: HOSPADM

## 2023-01-01 RX ORDER — FLUCONAZOLE 2 MG/ML
200 INJECTION, SOLUTION INTRAVENOUS EVERY 24 HOURS
Status: DISCONTINUED | OUTPATIENT
Start: 2023-01-01 | End: 2023-01-01 | Stop reason: HOSPADM

## 2023-01-01 RX ORDER — CLOPIDOGREL BISULFATE 75 MG/1
75 TABLET ORAL DAILY
Status: DISCONTINUED | OUTPATIENT
Start: 2023-01-01 | End: 2023-01-01 | Stop reason: HOSPADM

## 2023-01-01 RX ORDER — SODIUM CHLORIDE 9 MG/ML
INJECTION, SOLUTION INTRAVENOUS CONTINUOUS
Status: DISCONTINUED | OUTPATIENT
Start: 2023-01-01 | End: 2023-01-01 | Stop reason: HOSPADM

## 2023-01-01 RX ORDER — PROCHLORPERAZINE MALEATE 5 MG/1
10 TABLET ORAL EVERY 6 HOURS PRN
Status: DISCONTINUED | OUTPATIENT
Start: 2023-01-01 | End: 2023-01-01 | Stop reason: HOSPADM

## 2023-01-01 RX ORDER — METHYLPREDNISOLONE SODIUM SUCCINATE 125 MG/2ML
60 INJECTION, POWDER, LYOPHILIZED, FOR SOLUTION INTRAMUSCULAR; INTRAVENOUS EVERY 12 HOURS
Status: DISCONTINUED | OUTPATIENT
Start: 2023-01-01 | End: 2023-01-01

## 2023-01-01 RX ORDER — METHYLPREDNISOLONE SODIUM SUCCINATE 40 MG/ML
40 INJECTION, POWDER, LYOPHILIZED, FOR SOLUTION INTRAMUSCULAR; INTRAVENOUS EVERY 12 HOURS
Status: DISCONTINUED | OUTPATIENT
Start: 2023-01-01 | End: 2023-01-01 | Stop reason: HOSPADM

## 2023-01-01 RX ORDER — LACTULOSE 10 G/15ML
20 SOLUTION ORAL 3 TIMES DAILY
Status: DISCONTINUED | OUTPATIENT
Start: 2023-01-01 | End: 2023-01-01 | Stop reason: HOSPADM

## 2023-01-01 RX ORDER — PRAVASTATIN SODIUM 20 MG
40 TABLET ORAL EVERY EVENING
Status: DISCONTINUED | OUTPATIENT
Start: 2023-01-01 | End: 2023-01-01 | Stop reason: HOSPADM

## 2023-01-01 RX ADMIN — INSULIN GLARGINE 60 UNITS: 100 INJECTION, SOLUTION SUBCUTANEOUS at 09:19

## 2023-01-01 RX ADMIN — NYSTATIN 500000 UNITS: 100000 SUSPENSION ORAL at 22:45

## 2023-01-01 RX ADMIN — IPRATROPIUM BROMIDE AND ALBUTEROL SULFATE 1 DOSE: 2.5; .5 SOLUTION RESPIRATORY (INHALATION) at 20:13

## 2023-01-01 RX ADMIN — FILGRASTIM-AAFI 480 MCG: 480 INJECTION, SOLUTION SUBCUTANEOUS at 19:02

## 2023-01-01 RX ADMIN — PRAVASTATIN SODIUM 40 MG: 20 TABLET ORAL at 18:45

## 2023-01-01 RX ADMIN — PRAVASTATIN SODIUM 40 MG: 20 TABLET ORAL at 17:14

## 2023-01-01 RX ADMIN — FLECAINIDE ACETATE 100 MG: 50 TABLET ORAL at 09:23

## 2023-01-01 RX ADMIN — SODIUM CHLORIDE, PRESERVATIVE FREE 10 ML: 5 INJECTION INTRAVENOUS at 09:22

## 2023-01-01 RX ADMIN — INSULIN LISPRO 1 UNITS: 100 INJECTION, SOLUTION INTRAVENOUS; SUBCUTANEOUS at 10:38

## 2023-01-01 RX ADMIN — TAMSULOSIN HYDROCHLORIDE 0.4 MG: 0.4 CAPSULE ORAL at 07:54

## 2023-01-01 RX ADMIN — INSULIN LISPRO 1 UNITS: 100 INJECTION, SOLUTION INTRAVENOUS; SUBCUTANEOUS at 08:14

## 2023-01-01 RX ADMIN — INSULIN LISPRO 1 UNITS: 100 INJECTION, SOLUTION INTRAVENOUS; SUBCUTANEOUS at 18:46

## 2023-01-01 RX ADMIN — IPRATROPIUM BROMIDE AND ALBUTEROL SULFATE 1 DOSE: 2.5; .5 SOLUTION RESPIRATORY (INHALATION) at 23:40

## 2023-01-01 RX ADMIN — CEFDINIR 300 MG: 300 CAPSULE ORAL at 21:47

## 2023-01-01 RX ADMIN — INSULIN LISPRO 1 UNITS: 100 INJECTION, SOLUTION INTRAVENOUS; SUBCUTANEOUS at 09:23

## 2023-01-01 RX ADMIN — FLECAINIDE ACETATE 100 MG: 50 TABLET ORAL at 10:19

## 2023-01-01 RX ADMIN — IPRATROPIUM BROMIDE AND ALBUTEROL SULFATE 1 DOSE: 2.5; .5 SOLUTION RESPIRATORY (INHALATION) at 03:49

## 2023-01-01 RX ADMIN — METOPROLOL TARTRATE 50 MG: 50 TABLET ORAL at 09:21

## 2023-01-01 RX ADMIN — LACTULOSE 20 G: 20 SOLUTION ORAL at 14:31

## 2023-01-01 RX ADMIN — IPRATROPIUM BROMIDE AND ALBUTEROL SULFATE 1 DOSE: 2.5; .5 SOLUTION RESPIRATORY (INHALATION) at 16:13

## 2023-01-01 RX ADMIN — DOCUSATE SODIUM 200 MG: 100 CAPSULE, LIQUID FILLED ORAL at 01:03

## 2023-01-01 RX ADMIN — LOSARTAN POTASSIUM 50 MG: 50 TABLET, FILM COATED ORAL at 09:21

## 2023-01-01 RX ADMIN — FLECAINIDE ACETATE 100 MG: 50 TABLET ORAL at 01:03

## 2023-01-01 RX ADMIN — FLECAINIDE ACETATE 100 MG: 50 TABLET ORAL at 22:38

## 2023-01-01 RX ADMIN — OXYBUTYNIN CHLORIDE 10 MG: 5 TABLET, EXTENDED RELEASE ORAL at 08:25

## 2023-01-01 RX ADMIN — WARFARIN SODIUM 2.5 MG: 2.5 TABLET ORAL at 17:59

## 2023-01-01 RX ADMIN — THERA TABS 1 TABLET: TAB at 09:06

## 2023-01-01 RX ADMIN — PRAVASTATIN SODIUM 40 MG: 20 TABLET ORAL at 18:14

## 2023-01-01 RX ADMIN — FILGRASTIM-AAFI 480 MCG: 480 INJECTION, SOLUTION SUBCUTANEOUS at 14:14

## 2023-01-01 RX ADMIN — FUROSEMIDE 20 MG: 10 INJECTION, SOLUTION INTRAMUSCULAR; INTRAVENOUS at 18:09

## 2023-01-01 RX ADMIN — FLECAINIDE ACETATE 100 MG: 50 TABLET ORAL at 07:54

## 2023-01-01 RX ADMIN — THERA TABS 1 TABLET: TAB at 08:25

## 2023-01-01 RX ADMIN — IPRATROPIUM BROMIDE AND ALBUTEROL SULFATE 1 DOSE: .5; 2.5 SOLUTION RESPIRATORY (INHALATION) at 11:54

## 2023-01-01 RX ADMIN — SODIUM CHLORIDE: 9 INJECTION, SOLUTION INTRAVENOUS at 21:32

## 2023-01-01 RX ADMIN — IPRATROPIUM BROMIDE AND ALBUTEROL SULFATE 1 DOSE: 2.5; .5 SOLUTION RESPIRATORY (INHALATION) at 09:41

## 2023-01-01 RX ADMIN — LIDOCAINE HYDROCHLORIDE 15 ML: 20 SOLUTION ORAL at 15:50

## 2023-01-01 RX ADMIN — LEVOTHYROXINE SODIUM 150 MCG: 0.07 TABLET ORAL at 06:17

## 2023-01-01 RX ADMIN — PRAVASTATIN SODIUM 40 MG: 20 TABLET ORAL at 17:59

## 2023-01-01 RX ADMIN — FOLIC ACID 1000 MCG: 1 TABLET ORAL at 07:54

## 2023-01-01 RX ADMIN — FUROSEMIDE 20 MG: 10 INJECTION, SOLUTION INTRAMUSCULAR; INTRAVENOUS at 18:07

## 2023-01-01 RX ADMIN — NYSTATIN 500000 UNITS: 100000 SUSPENSION ORAL at 10:35

## 2023-01-01 RX ADMIN — FUROSEMIDE 20 MG: 10 INJECTION, SOLUTION INTRAMUSCULAR; INTRAVENOUS at 09:21

## 2023-01-01 RX ADMIN — FOLIC ACID 1000 MCG: 1 TABLET ORAL at 10:19

## 2023-01-01 RX ADMIN — METOPROLOL TARTRATE 50 MG: 50 TABLET ORAL at 21:12

## 2023-01-01 RX ADMIN — IPRATROPIUM BROMIDE AND ALBUTEROL SULFATE 1 DOSE: 2.5; .5 SOLUTION RESPIRATORY (INHALATION) at 00:33

## 2023-01-01 RX ADMIN — IPRATROPIUM BROMIDE AND ALBUTEROL SULFATE 1 DOSE: 2.5; .5 SOLUTION RESPIRATORY (INHALATION) at 03:38

## 2023-01-01 RX ADMIN — SODIUM CHLORIDE, PRESERVATIVE FREE 10 ML: 5 INJECTION INTRAVENOUS at 22:46

## 2023-01-01 RX ADMIN — METHYLPREDNISOLONE SODIUM SUCCINATE 60 MG: 125 INJECTION, POWDER, FOR SOLUTION INTRAMUSCULAR; INTRAVENOUS at 06:16

## 2023-01-01 RX ADMIN — METOPROLOL TARTRATE 50 MG: 50 TABLET ORAL at 09:23

## 2023-01-01 RX ADMIN — IPRATROPIUM BROMIDE AND ALBUTEROL SULFATE 1 DOSE: 2.5; .5 SOLUTION RESPIRATORY (INHALATION) at 15:53

## 2023-01-01 RX ADMIN — IPRATROPIUM BROMIDE AND ALBUTEROL SULFATE 1 DOSE: .5; 2.5 SOLUTION RESPIRATORY (INHALATION) at 22:13

## 2023-01-01 RX ADMIN — FLECAINIDE ACETATE 100 MG: 50 TABLET ORAL at 21:45

## 2023-01-01 RX ADMIN — NYSTATIN 500000 UNITS: 100000 SUSPENSION ORAL at 20:31

## 2023-01-01 RX ADMIN — OXYBUTYNIN CHLORIDE 10 MG: 5 TABLET, EXTENDED RELEASE ORAL at 10:19

## 2023-01-01 RX ADMIN — NYSTATIN 500000 UNITS: 100000 SUSPENSION ORAL at 09:34

## 2023-01-01 RX ADMIN — SODIUM CHLORIDE: 9 INJECTION, SOLUTION INTRAVENOUS at 00:22

## 2023-01-01 RX ADMIN — LEVOTHYROXINE SODIUM 150 MCG: 0.07 TABLET ORAL at 06:44

## 2023-01-01 RX ADMIN — IPRATROPIUM BROMIDE AND ALBUTEROL SULFATE 1 DOSE: 2.5; .5 SOLUTION RESPIRATORY (INHALATION) at 16:03

## 2023-01-01 RX ADMIN — NYSTATIN 500000 UNITS: 100000 SUSPENSION ORAL at 21:14

## 2023-01-01 RX ADMIN — IPRATROPIUM BROMIDE AND ALBUTEROL SULFATE 1 DOSE: 2.5; .5 SOLUTION RESPIRATORY (INHALATION) at 12:44

## 2023-01-01 RX ADMIN — DOCUSATE SODIUM 200 MG: 100 CAPSULE, LIQUID FILLED ORAL at 09:23

## 2023-01-01 RX ADMIN — DOCUSATE SODIUM 200 MG: 100 CAPSULE, LIQUID FILLED ORAL at 21:44

## 2023-01-01 RX ADMIN — FUROSEMIDE 20 MG: 10 INJECTION, SOLUTION INTRAMUSCULAR; INTRAVENOUS at 17:40

## 2023-01-01 RX ADMIN — IPRATROPIUM BROMIDE AND ALBUTEROL SULFATE 1 DOSE: 2.5; .5 SOLUTION RESPIRATORY (INHALATION) at 12:58

## 2023-01-01 RX ADMIN — IPRATROPIUM BROMIDE AND ALBUTEROL SULFATE 1 DOSE: 2.5; .5 SOLUTION RESPIRATORY (INHALATION) at 03:40

## 2023-01-01 RX ADMIN — METHYLPREDNISOLONE SODIUM SUCCINATE 60 MG: 125 INJECTION, POWDER, FOR SOLUTION INTRAMUSCULAR; INTRAVENOUS at 18:15

## 2023-01-01 RX ADMIN — NYSTATIN 500000 UNITS: 100000 SUSPENSION ORAL at 18:06

## 2023-01-01 RX ADMIN — IPRATROPIUM BROMIDE AND ALBUTEROL SULFATE 1 DOSE: 2.5; .5 SOLUTION RESPIRATORY (INHALATION) at 08:21

## 2023-01-01 RX ADMIN — INSULIN GLARGINE 20 UNITS: 100 INJECTION, SOLUTION SUBCUTANEOUS at 10:37

## 2023-01-01 RX ADMIN — OXYCODONE HYDROCHLORIDE 5 MG: 5 TABLET ORAL at 01:03

## 2023-01-01 RX ADMIN — INSULIN LISPRO 5 UNITS: 100 INJECTION, SOLUTION INTRAVENOUS; SUBCUTANEOUS at 12:31

## 2023-01-01 RX ADMIN — LOSARTAN POTASSIUM 50 MG: 50 TABLET, FILM COATED ORAL at 12:07

## 2023-01-01 RX ADMIN — SODIUM CHLORIDE: 9 INJECTION, SOLUTION INTRAVENOUS at 06:04

## 2023-01-01 RX ADMIN — METHYLPREDNISOLONE SODIUM SUCCINATE 60 MG: 125 INJECTION, POWDER, FOR SOLUTION INTRAMUSCULAR; INTRAVENOUS at 06:44

## 2023-01-01 RX ADMIN — PSYLLIUM HUSK 1 PACKET: 3.4 POWDER ORAL at 12:08

## 2023-01-01 RX ADMIN — SODIUM CHLORIDE: 9 INJECTION, SOLUTION INTRAVENOUS at 01:15

## 2023-01-01 RX ADMIN — FUROSEMIDE 20 MG: 10 INJECTION, SOLUTION INTRAMUSCULAR; INTRAVENOUS at 17:18

## 2023-01-01 RX ADMIN — METHYLPREDNISOLONE SODIUM SUCCINATE 40 MG: 40 INJECTION, POWDER, FOR SOLUTION INTRAMUSCULAR; INTRAVENOUS at 05:35

## 2023-01-01 RX ADMIN — IPRATROPIUM BROMIDE AND ALBUTEROL SULFATE 1 DOSE: .5; 2.5 SOLUTION RESPIRATORY (INHALATION) at 21:30

## 2023-01-01 RX ADMIN — CEFDINIR 300 MG: 300 CAPSULE ORAL at 21:12

## 2023-01-01 RX ADMIN — DOCUSATE SODIUM 200 MG: 100 CAPSULE, LIQUID FILLED ORAL at 20:13

## 2023-01-01 RX ADMIN — LEVOTHYROXINE SODIUM 150 MCG: 0.07 TABLET ORAL at 05:41

## 2023-01-01 RX ADMIN — METHYLPREDNISOLONE SODIUM SUCCINATE 60 MG: 125 INJECTION, POWDER, FOR SOLUTION INTRAMUSCULAR; INTRAVENOUS at 00:38

## 2023-01-01 RX ADMIN — TAMSULOSIN HYDROCHLORIDE 0.4 MG: 0.4 CAPSULE ORAL at 09:06

## 2023-01-01 RX ADMIN — IPRATROPIUM BROMIDE AND ALBUTEROL SULFATE 1 DOSE: 2.5; .5 SOLUTION RESPIRATORY (INHALATION) at 09:07

## 2023-01-01 RX ADMIN — METOPROLOL TARTRATE 50 MG: 50 TABLET ORAL at 20:22

## 2023-01-01 RX ADMIN — FUROSEMIDE 20 MG: 10 INJECTION, SOLUTION INTRAMUSCULAR; INTRAVENOUS at 12:07

## 2023-01-01 RX ADMIN — SODIUM CHLORIDE: 9 INJECTION, SOLUTION INTRAVENOUS at 18:54

## 2023-01-01 RX ADMIN — IPRATROPIUM BROMIDE AND ALBUTEROL SULFATE 1 DOSE: 2.5; .5 SOLUTION RESPIRATORY (INHALATION) at 21:02

## 2023-01-01 RX ADMIN — SODIUM CHLORIDE, PRESERVATIVE FREE 10 ML: 5 INJECTION INTRAVENOUS at 09:12

## 2023-01-01 RX ADMIN — THERA TABS 1 TABLET: TAB at 10:19

## 2023-01-01 RX ADMIN — METHYLPREDNISOLONE SODIUM SUCCINATE 60 MG: 125 INJECTION, POWDER, FOR SOLUTION INTRAMUSCULAR; INTRAVENOUS at 18:07

## 2023-01-01 RX ADMIN — CEFDINIR 300 MG: 300 CAPSULE ORAL at 12:15

## 2023-01-01 RX ADMIN — SODIUM CHLORIDE, PRESERVATIVE FREE 10 ML: 5 INJECTION INTRAVENOUS at 18:09

## 2023-01-01 RX ADMIN — INSULIN LISPRO 1 UNITS: 100 INJECTION, SOLUTION INTRAVENOUS; SUBCUTANEOUS at 08:38

## 2023-01-01 RX ADMIN — PSYLLIUM HUSK 1 PACKET: 3.4 POWDER ORAL at 09:17

## 2023-01-01 RX ADMIN — FLECAINIDE ACETATE 100 MG: 50 TABLET ORAL at 12:07

## 2023-01-01 RX ADMIN — DOCUSATE SODIUM 200 MG: 100 CAPSULE, LIQUID FILLED ORAL at 10:19

## 2023-01-01 RX ADMIN — IPRATROPIUM BROMIDE AND ALBUTEROL SULFATE 1 DOSE: 2.5; .5 SOLUTION RESPIRATORY (INHALATION) at 11:54

## 2023-01-01 RX ADMIN — TAMSULOSIN HYDROCHLORIDE 0.4 MG: 0.4 CAPSULE ORAL at 10:19

## 2023-01-01 RX ADMIN — CEFDINIR 300 MG: 300 CAPSULE ORAL at 07:54

## 2023-01-01 RX ADMIN — IPRATROPIUM BROMIDE AND ALBUTEROL SULFATE 1 DOSE: 2.5; .5 SOLUTION RESPIRATORY (INHALATION) at 03:37

## 2023-01-01 RX ADMIN — SODIUM CHLORIDE, PRESERVATIVE FREE 10 ML: 5 INJECTION INTRAVENOUS at 20:28

## 2023-01-01 RX ADMIN — FLECAINIDE ACETATE 100 MG: 50 TABLET ORAL at 09:21

## 2023-01-01 RX ADMIN — SODIUM CHLORIDE: 9 INJECTION, SOLUTION INTRAVENOUS at 09:42

## 2023-01-01 RX ADMIN — INSULIN GLARGINE 55 UNITS: 100 INJECTION, SOLUTION SUBCUTANEOUS at 12:15

## 2023-01-01 RX ADMIN — TAMSULOSIN HYDROCHLORIDE 0.4 MG: 0.4 CAPSULE ORAL at 07:50

## 2023-01-01 RX ADMIN — IPRATROPIUM BROMIDE AND ALBUTEROL SULFATE 1 DOSE: 2.5; .5 SOLUTION RESPIRATORY (INHALATION) at 04:39

## 2023-01-01 RX ADMIN — LOSARTAN POTASSIUM 50 MG: 50 TABLET, FILM COATED ORAL at 08:25

## 2023-01-01 RX ADMIN — IPRATROPIUM BROMIDE AND ALBUTEROL SULFATE 1 DOSE: 2.5; .5 SOLUTION RESPIRATORY (INHALATION) at 19:48

## 2023-01-01 RX ADMIN — NYSTATIN 500000 UNITS: 100000 SUSPENSION ORAL at 09:28

## 2023-01-01 RX ADMIN — CEFDINIR 300 MG: 300 CAPSULE ORAL at 09:30

## 2023-01-01 RX ADMIN — FLECAINIDE ACETATE 100 MG: 50 TABLET ORAL at 21:12

## 2023-01-01 RX ADMIN — METHYLPREDNISOLONE SODIUM SUCCINATE 60 MG: 125 INJECTION, POWDER, FOR SOLUTION INTRAMUSCULAR; INTRAVENOUS at 05:32

## 2023-01-01 RX ADMIN — IPRATROPIUM BROMIDE AND ALBUTEROL SULFATE 1 DOSE: 2.5; .5 SOLUTION RESPIRATORY (INHALATION) at 12:06

## 2023-01-01 RX ADMIN — CEFDINIR 300 MG: 300 CAPSULE ORAL at 22:38

## 2023-01-01 RX ADMIN — INSULIN LISPRO 1 UNITS: 100 INJECTION, SOLUTION INTRAVENOUS; SUBCUTANEOUS at 17:00

## 2023-01-01 RX ADMIN — LOSARTAN POTASSIUM 50 MG: 50 TABLET, FILM COATED ORAL at 09:23

## 2023-01-01 RX ADMIN — IPRATROPIUM BROMIDE AND ALBUTEROL SULFATE 1 DOSE: 2.5; .5 SOLUTION RESPIRATORY (INHALATION) at 19:58

## 2023-01-01 RX ADMIN — DOCUSATE SODIUM 200 MG: 100 CAPSULE, LIQUID FILLED ORAL at 09:21

## 2023-01-01 RX ADMIN — SODIUM CHLORIDE: 9 INJECTION, SOLUTION INTRAVENOUS at 15:49

## 2023-01-01 RX ADMIN — IPRATROPIUM BROMIDE AND ALBUTEROL SULFATE 1 DOSE: 2.5; .5 SOLUTION RESPIRATORY (INHALATION) at 11:44

## 2023-01-01 RX ADMIN — FILGRASTIM-AAFI 480 MCG: 480 INJECTION, SOLUTION SUBCUTANEOUS at 08:03

## 2023-01-01 RX ADMIN — FUROSEMIDE 20 MG: 10 INJECTION, SOLUTION INTRAMUSCULAR; INTRAVENOUS at 07:50

## 2023-01-01 RX ADMIN — IPRATROPIUM BROMIDE AND ALBUTEROL SULFATE 1 DOSE: 2.5; .5 SOLUTION RESPIRATORY (INHALATION) at 00:13

## 2023-01-01 RX ADMIN — IPRATROPIUM BROMIDE AND ALBUTEROL SULFATE 1 DOSE: 2.5; .5 SOLUTION RESPIRATORY (INHALATION) at 03:55

## 2023-01-01 RX ADMIN — FUROSEMIDE 20 MG: 10 INJECTION, SOLUTION INTRAMUSCULAR; INTRAVENOUS at 09:08

## 2023-01-01 RX ADMIN — LOSARTAN POTASSIUM 50 MG: 50 TABLET, FILM COATED ORAL at 10:19

## 2023-01-01 RX ADMIN — FLECAINIDE ACETATE 100 MG: 50 TABLET ORAL at 07:50

## 2023-01-01 RX ADMIN — AZITHROMYCIN 500 MG: 500 TABLET, FILM COATED ORAL at 09:14

## 2023-01-01 RX ADMIN — CEFDINIR 300 MG: 300 CAPSULE ORAL at 08:02

## 2023-01-01 RX ADMIN — CEFDINIR 300 MG: 300 CAPSULE ORAL at 22:05

## 2023-01-01 RX ADMIN — METHYLPREDNISOLONE SODIUM SUCCINATE 60 MG: 125 INJECTION, POWDER, FOR SOLUTION INTRAMUSCULAR; INTRAVENOUS at 06:10

## 2023-01-01 RX ADMIN — LEVOTHYROXINE SODIUM 150 MCG: 0.07 TABLET ORAL at 06:10

## 2023-01-01 RX ADMIN — NYSTATIN 500000 UNITS: 100000 SUSPENSION ORAL at 07:54

## 2023-01-01 RX ADMIN — FUROSEMIDE 20 MG: 10 INJECTION, SOLUTION INTRAMUSCULAR; INTRAVENOUS at 10:19

## 2023-01-01 RX ADMIN — NYSTATIN 500000 UNITS: 100000 SUSPENSION ORAL at 14:03

## 2023-01-01 RX ADMIN — METOPROLOL TARTRATE 50 MG: 50 TABLET ORAL at 21:45

## 2023-01-01 RX ADMIN — IPRATROPIUM BROMIDE AND ALBUTEROL SULFATE 1 DOSE: 2.5; .5 SOLUTION RESPIRATORY (INHALATION) at 15:47

## 2023-01-01 RX ADMIN — METOPROLOL TARTRATE 50 MG: 50 TABLET ORAL at 21:10

## 2023-01-01 RX ADMIN — METHYLPREDNISOLONE SODIUM SUCCINATE 40 MG: 40 INJECTION, POWDER, FOR SOLUTION INTRAMUSCULAR; INTRAVENOUS at 17:18

## 2023-01-01 RX ADMIN — SODIUM CHLORIDE 1000 ML: 9 INJECTION, SOLUTION INTRAVENOUS at 22:30

## 2023-01-01 RX ADMIN — FLECAINIDE ACETATE 100 MG: 50 TABLET ORAL at 09:07

## 2023-01-01 RX ADMIN — AZITHROMYCIN 500 MG: 500 TABLET, FILM COATED ORAL at 08:02

## 2023-01-01 RX ADMIN — NYSTATIN 500000 UNITS: 100000 SUSPENSION ORAL at 13:26

## 2023-01-01 RX ADMIN — METOPROLOL TARTRATE 50 MG: 50 TABLET ORAL at 12:07

## 2023-01-01 RX ADMIN — INSULIN GLARGINE 55 UNITS: 100 INJECTION, SOLUTION SUBCUTANEOUS at 06:13

## 2023-01-01 RX ADMIN — NYSTATIN 500000 UNITS: 100000 SUSPENSION ORAL at 12:18

## 2023-01-01 RX ADMIN — FLECAINIDE ACETATE 100 MG: 50 TABLET ORAL at 20:25

## 2023-01-01 RX ADMIN — SODIUM CHLORIDE, PRESERVATIVE FREE 10 ML: 5 INJECTION INTRAVENOUS at 21:54

## 2023-01-01 RX ADMIN — CEFDINIR 300 MG: 300 CAPSULE ORAL at 09:14

## 2023-01-01 RX ADMIN — NYSTATIN 500000 UNITS: 100000 SUSPENSION ORAL at 12:41

## 2023-01-01 RX ADMIN — FLUCONAZOLE 200 MG: 2 INJECTION, SOLUTION INTRAVENOUS at 17:03

## 2023-01-01 RX ADMIN — DOCUSATE SODIUM 200 MG: 100 CAPSULE, LIQUID FILLED ORAL at 21:10

## 2023-01-01 RX ADMIN — NYSTATIN 500000 UNITS: 100000 SUSPENSION ORAL at 21:46

## 2023-01-01 RX ADMIN — PHYTONADIONE 2.5 MG: 10 INJECTION, EMULSION INTRAMUSCULAR; INTRAVENOUS; SUBCUTANEOUS at 10:26

## 2023-01-01 RX ADMIN — SODIUM CHLORIDE: 9 INJECTION, SOLUTION INTRAVENOUS at 14:03

## 2023-01-01 RX ADMIN — FUROSEMIDE 20 MG: 10 INJECTION, SOLUTION INTRAMUSCULAR; INTRAVENOUS at 18:45

## 2023-01-01 RX ADMIN — METOPROLOL TARTRATE 50 MG: 50 TABLET ORAL at 08:25

## 2023-01-01 RX ADMIN — PSYLLIUM HUSK 1 PACKET: 3.4 POWDER ORAL at 10:24

## 2023-01-01 RX ADMIN — IPRATROPIUM BROMIDE AND ALBUTEROL SULFATE 1 DOSE: 2.5; .5 SOLUTION RESPIRATORY (INHALATION) at 07:44

## 2023-01-01 RX ADMIN — PRAVASTATIN SODIUM 40 MG: 20 TABLET ORAL at 17:40

## 2023-01-01 RX ADMIN — IPRATROPIUM BROMIDE AND ALBUTEROL SULFATE 1 DOSE: 2.5; .5 SOLUTION RESPIRATORY (INHALATION) at 23:38

## 2023-01-01 RX ADMIN — FUROSEMIDE 20 MG: 10 INJECTION, SOLUTION INTRAMUSCULAR; INTRAVENOUS at 09:23

## 2023-01-01 RX ADMIN — METOPROLOL TARTRATE 2.5 MG: 5 INJECTION, SOLUTION INTRAVENOUS at 12:40

## 2023-01-01 RX ADMIN — METOPROLOL TARTRATE 50 MG: 50 TABLET ORAL at 22:42

## 2023-01-01 RX ADMIN — FUROSEMIDE 20 MG: 10 INJECTION, SOLUTION INTRAMUSCULAR; INTRAVENOUS at 07:53

## 2023-01-01 RX ADMIN — IPRATROPIUM BROMIDE AND ALBUTEROL SULFATE 1 DOSE: 2.5; .5 SOLUTION RESPIRATORY (INHALATION) at 11:40

## 2023-01-01 RX ADMIN — OXYBUTYNIN CHLORIDE 10 MG: 5 TABLET, EXTENDED RELEASE ORAL at 09:06

## 2023-01-01 RX ADMIN — NYSTATIN 500000 UNITS: 100000 SUSPENSION ORAL at 19:04

## 2023-01-01 RX ADMIN — METOPROLOL TARTRATE 50 MG: 50 TABLET ORAL at 01:03

## 2023-01-01 RX ADMIN — CEFDINIR 300 MG: 300 CAPSULE ORAL at 10:34

## 2023-01-01 RX ADMIN — INSULIN LISPRO 7 UNITS: 100 INJECTION, SOLUTION INTRAVENOUS; SUBCUTANEOUS at 17:00

## 2023-01-01 RX ADMIN — DOCUSATE SODIUM 200 MG: 100 CAPSULE, LIQUID FILLED ORAL at 09:08

## 2023-01-01 RX ADMIN — IPRATROPIUM BROMIDE AND ALBUTEROL SULFATE 1 DOSE: 2.5; .5 SOLUTION RESPIRATORY (INHALATION) at 19:59

## 2023-01-01 RX ADMIN — DOCUSATE SODIUM 200 MG: 100 CAPSULE, LIQUID FILLED ORAL at 07:51

## 2023-01-01 RX ADMIN — IPRATROPIUM BROMIDE AND ALBUTEROL SULFATE 1 DOSE: 2.5; .5 SOLUTION RESPIRATORY (INHALATION) at 23:16

## 2023-01-01 RX ADMIN — FUROSEMIDE 20 MG: 10 INJECTION, SOLUTION INTRAMUSCULAR; INTRAVENOUS at 01:06

## 2023-01-01 RX ADMIN — LOSARTAN POTASSIUM 50 MG: 50 TABLET, FILM COATED ORAL at 07:50

## 2023-01-01 RX ADMIN — IPRATROPIUM BROMIDE AND ALBUTEROL SULFATE 1 DOSE: 2.5; .5 SOLUTION RESPIRATORY (INHALATION) at 15:50

## 2023-01-01 RX ADMIN — FLECAINIDE ACETATE 100 MG: 50 TABLET ORAL at 21:09

## 2023-01-01 RX ADMIN — DOCUSATE SODIUM 200 MG: 100 CAPSULE, LIQUID FILLED ORAL at 21:12

## 2023-01-01 RX ADMIN — PRAVASTATIN SODIUM 40 MG: 20 TABLET ORAL at 18:06

## 2023-01-01 RX ADMIN — INSULIN LISPRO 1 UNITS: 100 INJECTION, SOLUTION INTRAVENOUS; SUBCUTANEOUS at 12:39

## 2023-01-01 RX ADMIN — ALBUTEROL SULFATE 2.5 MG: 2.5 SOLUTION RESPIRATORY (INHALATION) at 08:14

## 2023-01-01 RX ADMIN — SODIUM CHLORIDE: 9 INJECTION, SOLUTION INTRAVENOUS at 12:47

## 2023-01-01 RX ADMIN — FUROSEMIDE 20 MG: 10 INJECTION, SOLUTION INTRAMUSCULAR; INTRAVENOUS at 18:15

## 2023-01-01 RX ADMIN — INSULIN GLARGINE 60 UNITS: 100 INJECTION, SOLUTION SUBCUTANEOUS at 10:37

## 2023-01-01 RX ADMIN — IPRATROPIUM BROMIDE AND ALBUTEROL SULFATE 1 DOSE: 2.5; .5 SOLUTION RESPIRATORY (INHALATION) at 16:15

## 2023-01-01 RX ADMIN — METHYLPREDNISOLONE SODIUM SUCCINATE 60 MG: 125 INJECTION, POWDER, FOR SOLUTION INTRAMUSCULAR; INTRAVENOUS at 12:41

## 2023-01-01 RX ADMIN — SODIUM CHLORIDE: 9 INJECTION, SOLUTION INTRAVENOUS at 02:28

## 2023-01-01 RX ADMIN — INSULIN GLARGINE 55 UNITS: 100 INJECTION, SOLUTION SUBCUTANEOUS at 06:44

## 2023-01-01 RX ADMIN — METHYLPREDNISOLONE SODIUM SUCCINATE 60 MG: 125 INJECTION, POWDER, FOR SOLUTION INTRAMUSCULAR; INTRAVENOUS at 05:41

## 2023-01-01 RX ADMIN — METOPROLOL TARTRATE 50 MG: 50 TABLET ORAL at 10:19

## 2023-01-01 RX ADMIN — METOPROLOL TARTRATE 50 MG: 50 TABLET ORAL at 07:50

## 2023-01-01 RX ADMIN — LOSARTAN POTASSIUM 50 MG: 50 TABLET, FILM COATED ORAL at 09:08

## 2023-01-01 RX ADMIN — FILGRASTIM-AAFI 480 MCG: 480 INJECTION, SOLUTION SUBCUTANEOUS at 15:51

## 2023-01-01 RX ADMIN — IPRATROPIUM BROMIDE AND ALBUTEROL SULFATE 1 DOSE: 2.5; .5 SOLUTION RESPIRATORY (INHALATION) at 08:03

## 2023-01-01 RX ADMIN — LEVOTHYROXINE SODIUM 150 MCG: 0.07 TABLET ORAL at 09:21

## 2023-01-01 RX ADMIN — NYSTATIN 500000 UNITS: 100000 SUSPENSION ORAL at 22:13

## 2023-01-01 RX ADMIN — METHYLPREDNISOLONE SODIUM SUCCINATE 60 MG: 125 INJECTION, POWDER, FOR SOLUTION INTRAMUSCULAR; INTRAVENOUS at 23:51

## 2023-01-01 RX ADMIN — TAMSULOSIN HYDROCHLORIDE 0.4 MG: 0.4 CAPSULE ORAL at 12:07

## 2023-01-01 RX ADMIN — METHYLPREDNISOLONE SODIUM SUCCINATE 60 MG: 125 INJECTION, POWDER, FOR SOLUTION INTRAMUSCULAR; INTRAVENOUS at 17:40

## 2023-01-01 RX ADMIN — SODIUM CHLORIDE: 9 INJECTION, SOLUTION INTRAVENOUS at 19:36

## 2023-01-01 RX ADMIN — TAMSULOSIN HYDROCHLORIDE 0.4 MG: 0.4 CAPSULE ORAL at 09:21

## 2023-01-01 RX ADMIN — METHYLPREDNISOLONE SODIUM SUCCINATE 60 MG: 125 INJECTION, POWDER, FOR SOLUTION INTRAMUSCULAR; INTRAVENOUS at 12:07

## 2023-01-01 RX ADMIN — CEFDINIR 300 MG: 300 CAPSULE ORAL at 21:11

## 2023-01-01 RX ADMIN — LEVOTHYROXINE SODIUM 150 MCG: 0.07 TABLET ORAL at 05:32

## 2023-01-01 RX ADMIN — METHYLPREDNISOLONE SODIUM SUCCINATE 60 MG: 125 INJECTION, POWDER, FOR SOLUTION INTRAMUSCULAR; INTRAVENOUS at 12:23

## 2023-01-01 RX ADMIN — NYSTATIN 500000 UNITS: 100000 SUSPENSION ORAL at 16:53

## 2023-01-01 RX ADMIN — METHYLPREDNISOLONE SODIUM SUCCINATE 60 MG: 125 INJECTION, POWDER, FOR SOLUTION INTRAMUSCULAR; INTRAVENOUS at 13:23

## 2023-01-01 RX ADMIN — INSULIN LISPRO 2 UNITS: 100 INJECTION, SOLUTION INTRAVENOUS; SUBCUTANEOUS at 11:57

## 2023-01-01 RX ADMIN — PSYLLIUM HUSK 1 PACKET: 3.4 POWDER ORAL at 09:04

## 2023-01-01 RX ADMIN — WARFARIN SODIUM 2 MG: 1 TABLET ORAL at 18:46

## 2023-01-01 RX ADMIN — INSULIN LISPRO 7 UNITS: 100 INJECTION, SOLUTION INTRAVENOUS; SUBCUTANEOUS at 10:39

## 2023-01-01 RX ADMIN — FOLIC ACID 1000 MCG: 1 TABLET ORAL at 09:06

## 2023-01-01 RX ADMIN — DOCUSATE SODIUM 200 MG: 100 CAPSULE, LIQUID FILLED ORAL at 07:54

## 2023-01-01 RX ADMIN — FLUCONAZOLE 200 MG: 2 INJECTION, SOLUTION INTRAVENOUS at 10:21

## 2023-01-01 RX ADMIN — METHYLPREDNISOLONE SODIUM SUCCINATE 60 MG: 125 INJECTION, POWDER, FOR SOLUTION INTRAMUSCULAR; INTRAVENOUS at 23:59

## 2023-01-01 RX ADMIN — NYSTATIN 500000 UNITS: 100000 SUSPENSION ORAL at 17:50

## 2023-01-01 RX ADMIN — IPRATROPIUM BROMIDE AND ALBUTEROL SULFATE 1 DOSE: 2.5; .5 SOLUTION RESPIRATORY (INHALATION) at 07:43

## 2023-01-01 RX ADMIN — CEFDINIR 300 MG: 300 CAPSULE ORAL at 01:07

## 2023-01-01 RX ADMIN — FLECAINIDE ACETATE 100 MG: 50 TABLET ORAL at 22:05

## 2023-01-01 RX ADMIN — METHYLPREDNISOLONE SODIUM SUCCINATE 60 MG: 125 INJECTION, POWDER, FOR SOLUTION INTRAMUSCULAR; INTRAVENOUS at 18:45

## 2023-01-01 RX ADMIN — METHYLPREDNISOLONE SODIUM SUCCINATE 60 MG: 125 INJECTION, POWDER, FOR SOLUTION INTRAMUSCULAR; INTRAVENOUS at 00:06

## 2023-01-01 RX ADMIN — FLUCONAZOLE 200 MG: 2 INJECTION, SOLUTION INTRAVENOUS at 15:50

## 2023-01-01 RX ADMIN — IPRATROPIUM BROMIDE AND ALBUTEROL SULFATE 1 DOSE: 2.5; .5 SOLUTION RESPIRATORY (INHALATION) at 22:24

## 2023-01-01 ASSESSMENT — PAIN - FUNCTIONAL ASSESSMENT: PAIN_FUNCTIONAL_ASSESSMENT: PREVENTS OR INTERFERES SOME ACTIVE ACTIVITIES AND ADLS

## 2023-01-01 ASSESSMENT — PAIN SCALES - GENERAL
PAINLEVEL_OUTOF10: 0
PAINLEVEL_OUTOF10: 4
PAINLEVEL_OUTOF10: 0
PAINLEVEL_OUTOF10: 2
PAINLEVEL_OUTOF10: 0
PAINLEVEL_OUTOF10: 5
PAINLEVEL_OUTOF10: 4

## 2023-01-01 ASSESSMENT — PAIN DESCRIPTION - ORIENTATION: ORIENTATION: RIGHT;LEFT;UPPER

## 2023-01-01 ASSESSMENT — PAIN DESCRIPTION - LOCATION: LOCATION: ABDOMEN

## 2023-01-01 ASSESSMENT — PAIN DESCRIPTION - DESCRIPTORS: DESCRIPTORS: PRESSURE

## 2023-01-03 DIAGNOSIS — D69.6 THROMBOCYTOPENIA, UNSPECIFIED (HCC): ICD-10-CM

## 2023-01-03 DIAGNOSIS — C34.90 ALK-POSITIVE NON-SMALL CELL LUNG CANCER (HCC): ICD-10-CM

## 2023-01-03 DIAGNOSIS — C79.51 BONE METASTASIS (HCC): ICD-10-CM

## 2023-01-03 DIAGNOSIS — J70.0 RADIATION PNEUMONITIS (HCC): ICD-10-CM

## 2023-01-05 RX ORDER — ALECTINIB HYDROCHLORIDE 150 MG/1
600 CAPSULE ORAL 2 TIMES DAILY
Qty: 240 CAPSULE | Refills: 5 | Status: SHIPPED | OUTPATIENT
Start: 2023-01-05

## 2023-01-10 DIAGNOSIS — C79.51 BONE METASTASIS (HCC): ICD-10-CM

## 2023-01-10 DIAGNOSIS — J70.0 RADIATION PNEUMONITIS (HCC): ICD-10-CM

## 2023-01-10 DIAGNOSIS — C34.90 ALK-POSITIVE NON-SMALL CELL LUNG CANCER (HCC): ICD-10-CM

## 2023-01-10 DIAGNOSIS — D69.6 THROMBOCYTOPENIA, UNSPECIFIED (HCC): ICD-10-CM

## 2023-01-10 RX ORDER — ALECTINIB HYDROCHLORIDE 150 MG/1
600 CAPSULE ORAL 2 TIMES DAILY
Qty: 240 CAPSULE | Refills: 5 | Status: ACTIVE | OUTPATIENT
Start: 2023-01-10

## 2023-01-10 NOTE — TELEPHONE ENCOUNTER
Last Appt:  12/13/2022  Next Appt:   1/24/2023  Med verified in [employer] request sent due to pharmacy stating they did not receive previous prescription.

## 2023-01-16 ENCOUNTER — TELEPHONE (OUTPATIENT)
Dept: CARDIOLOGY | Age: 78
End: 2023-01-16

## 2023-01-16 NOTE — TELEPHONE ENCOUNTER
Sav West MD  [facility]  Current referral for Med Mgmt treatment expires on 2/11/2023   Please place a new referral to continue treatment for the patient. Jasmyn Vu   1/16/23   11:46 AM     Pt states he sees Dr Andria Ahmadi at [facility] for cardiology. Please renew referral to anticoagulation here in [place].

## 2023-01-24 ENCOUNTER — HOSPITAL ENCOUNTER (OUTPATIENT)
Age: 78
Discharge: HOME | End: 2023-01-24
Payer: MEDICARE

## 2023-01-24 ENCOUNTER — HOSPITAL ENCOUNTER (OUTPATIENT)
Dept: PHARMACY | Age: 78
Setting detail: THERAPIES SERIES
Discharge: HOME | End: 2023-01-24
Payer: MEDICARE

## 2023-01-24 ENCOUNTER — OFFICE VISIT (OUTPATIENT)
Dept: ONCOLOGY | Age: 78
End: 2023-01-24
Payer: MEDICARE

## 2023-01-24 VITALS
SYSTOLIC BLOOD PRESSURE: 128 MMHG | TEMPERATURE: 98.8 F | WEIGHT: 240.4 LBS | DIASTOLIC BLOOD PRESSURE: 66 MMHG | OXYGEN SATURATION: 94 % | HEIGHT: 70 IN | BODY MASS INDEX: 34.41 KG/M2 | HEART RATE: 74 BPM

## 2023-01-24 DIAGNOSIS — N18.31 STAGE 3A CHRONIC KIDNEY DISEASE (HCC): ICD-10-CM

## 2023-01-24 DIAGNOSIS — C34.90 ALK-POSITIVE NON-SMALL CELL LUNG CANCER (HCC): ICD-10-CM

## 2023-01-24 DIAGNOSIS — D64.9 ANEMIA, UNSPECIFIED TYPE: ICD-10-CM

## 2023-01-24 DIAGNOSIS — C79.51 BONE METASTASIS (HCC): ICD-10-CM

## 2023-01-24 DIAGNOSIS — I48.0 PAROXYSMAL A-FIB (HCC): Primary | ICD-10-CM

## 2023-01-24 DIAGNOSIS — C34.90 ALK-POSITIVE NON-SMALL CELL LUNG CANCER (HCC): Primary | ICD-10-CM

## 2023-01-24 PROBLEM — D69.6 THROMBOCYTOPENIA, UNSPECIFIED (HCC): Status: RESOLVED | Noted: 2021-10-26 | Resolved: 2023-01-01

## 2023-01-24 LAB
ABSOLUTE EOS #: 0.15 K/UL (ref 0–0.44)
ABSOLUTE IMMATURE GRANULOCYTE: 0.05 K/UL (ref 0–0.3)
ABSOLUTE LYMPH #: 2.37 K/UL (ref 1.1–3.7)
ABSOLUTE MONO #: 0.81 K/UL (ref 0.1–1.2)
ALBUMIN SERPL-MCNC: 4.2 G/DL (ref 3.5–5.2)
ALBUMIN/GLOBULIN RATIO: 1.1 (ref 1–2.5)
ALP BLD-CCNC: 100 U/L (ref 40–129)
ALT SERPL-CCNC: 33 U/L (ref 5–41)
ANION GAP SERPL CALCULATED.3IONS-SCNC: 13 MMOL/L (ref 9–17)
AST SERPL-CCNC: 24 U/L
BASOPHILS # BLD: 1 % (ref 0–2)
BASOPHILS ABSOLUTE: 0.05 K/UL (ref 0–0.2)
BILIRUB SERPL-MCNC: 0.5 MG/DL (ref 0.3–1.2)
BUN BLDV-MCNC: 28 MG/DL (ref 8–23)
BUN/CREAT BLD: 21 (ref 9–20)
CALCIUM SERPL-MCNC: 9.7 MG/DL (ref 8.6–10.4)
CHLORIDE BLD-SCNC: 101 MMOL/L (ref 98–107)
CO2: 26 MMOL/L (ref 20–31)
CREAT SERPL-MCNC: 1.32 MG/DL (ref 0.7–1.2)
EOSINOPHILS RELATIVE PERCENT: 2 % (ref 1–4)
GFR SERPL CREATININE-BSD FRML MDRD: 56 ML/MIN/1.73M2
GLUCOSE BLD-MCNC: 203 MG/DL (ref 70–99)
HCT VFR BLD CALC: 31.9 % (ref 40.7–50.3)
HEMOGLOBIN: 10.6 G/DL (ref 13–17)
IMMATURE GRANULOCYTES: 1 %
INR BLD: 2.4
LYMPHOCYTES # BLD: 26 % (ref 24–43)
MCH RBC QN AUTO: 32.1 PG (ref 25.2–33.5)
MCHC RBC AUTO-ENTMCNC: 33.2 G/DL (ref 25.2–33.5)
MCV RBC AUTO: 96.7 FL (ref 82.6–102.9)
MONOCYTES # BLD: 9 % (ref 3–12)
NRBC AUTOMATED: 0 PER 100 WBC
PDW BLD-RTO: 16.7 % (ref 11.8–14.4)
PLATELET # BLD: 185 K/UL (ref 138–453)
PMV BLD AUTO: 10.7 FL (ref 8.1–13.5)
POTASSIUM SERPL-SCNC: 4 MMOL/L (ref 3.7–5.3)
PROTIME: 29.2 SECONDS
RBC # BLD: 3.3 M/UL (ref 4.21–5.77)
RBC # BLD: ABNORMAL 10*6/UL
SEG NEUTROPHILS: 61 % (ref 36–65)
SEGMENTED NEUTROPHILS ABSOLUTE COUNT: 5.77 K/UL (ref 1.5–8.1)
SODIUM BLD-SCNC: 140 MMOL/L (ref 135–144)
TOTAL PROTEIN: 8 G/DL (ref 6.4–8.3)
WBC # BLD: 9.2 K/UL (ref 3.5–11.3)

## 2023-01-24 PROCEDURE — 36415 COLL VENOUS BLD VENIPUNCTURE: CPT

## 2023-01-24 PROCEDURE — 85610 PROTHROMBIN TIME: CPT

## 2023-01-24 PROCEDURE — 99211 OFF/OP EST MAY X REQ PHY/QHP: CPT

## 2023-01-24 PROCEDURE — 80053 COMPREHEN METABOLIC PANEL: CPT

## 2023-01-24 PROCEDURE — 36416 COLLJ CAPILLARY BLOOD SPEC: CPT

## 2023-01-24 PROCEDURE — 99214 OFFICE O/P EST MOD 30 MIN: CPT | Performed by: INTERNAL MEDICINE

## 2023-01-24 PROCEDURE — 3074F SYST BP LT 130 MM HG: CPT | Performed by: INTERNAL MEDICINE

## 2023-01-24 PROCEDURE — 3078F DIAST BP <80 MM HG: CPT | Performed by: INTERNAL MEDICINE

## 2023-01-24 PROCEDURE — G8417 CALC BMI ABV UP PARAM F/U: HCPCS | Performed by: INTERNAL MEDICINE

## 2023-01-24 PROCEDURE — 1123F ACP DISCUSS/DSCN MKR DOCD: CPT | Performed by: INTERNAL MEDICINE

## 2023-01-24 PROCEDURE — G8428 CUR MEDS NOT DOCUMENT: HCPCS | Performed by: INTERNAL MEDICINE

## 2023-01-24 PROCEDURE — 85025 COMPLETE CBC W/AUTO DIFF WBC: CPT

## 2023-01-24 PROCEDURE — 1036F TOBACCO NON-USER: CPT | Performed by: INTERNAL MEDICINE

## 2023-01-24 PROCEDURE — G8484 FLU IMMUNIZE NO ADMIN: HCPCS | Performed by: INTERNAL MEDICINE

## 2023-01-24 NOTE — PROGRESS NOTES
ANTICOAGULATION SERVICE    Date of Clinic Visit:  1/24/2023    Bonita Mcrae is a 69 y.o. male who presents to clinic today for anticoagulation monitoring and adjustment. Recent INR Results:  Internal QC passed  Lab Results   Component Value Date    INR 2.4 01/24/2023    INR 3 12/27/2022       Current Warfarin Dosage:  Dosing Plan  As of 1/24/2023      TTR:  63.8 % (1.9 y)   Full warfarin instructions:  5 mg every Mon, Wed, Fri; 7.5 mg all other days; Starting 1/24/2023                 Assessment/Plan:    Continue current regimen as INR remains stable. Next Clinic Appointment:  Return date  As of 1/24/2023      TTR:  63.8 % (1.9 y)   Next INR check:  2/21/2023               Please call [facility] at [phone] with any questions. Thanks!   Cristina Amador [facility]  Anticoagulation Service Pharmacist  1/24/2023 10:47 AM  For Pharmacy Admin Tracking Only    Intervention Detail: Dose Adjustment: 0, reason: Patient Preference  Total # of Interventions Recommended: 0  Total # of Interventions Accepted: 0  Time Spent (min): 15

## 2023-01-24 NOTE — PROGRESS NOTES
Bang Mak                                                                                                                  1/24/2023  MRN:   [id number]  YOB: 1945  PCP:                           Naty Lazcano DO  Referring Physician: Noble  Treating Physician Name: MALLY EARLY MD      Reason for visit:  Chief Complaint   Patient presents with    Lung Cancer     6 week follow up    Patient presents to the clinic for toxicity check and to discuss results of lab work-up, imaging studies and further treatment plan.    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      Active and recent treatments:  concurrent chemoradiation using carboplatin and Taxol  Consolidation therapy with Imfinzi-1/2021  Carboplatin, Alimta and bevacizumab x1-8/2021 while awaiting results of NGS  Alectinib-10/8/2021    Summary of Case/History:    Bang Mak a 77 y.o.male is a patient with biopsy confirmed adenocarcinoma of right lung presents to the office to establish care and for further evaluation treatment recommendations.  Patient was initially seen by pulmonary team for lung nodules.  Patient CT scan from July 2019 showed a right-sided pleural calcification thickening concerning for asbestos exposure.  Patient also noted to have multiple pulmonary nodules measuring between 1.5 x 1.3 cm.  Follow-up CT chest from July 2020 showed increase in size of the right lower lobe lung nodule which now measured 2.1 cm.  Subsequently patient underwent CT PET on 8/22 which showed FDG avid right lower lobe lung nodule with SUV of 4.6.  Patient CT PET was also positive for subcarinal lymph node with SUV of 4.6.  Patient underwent bronchoscopy with endobronchial ultrasound biopsy of the subcarinal lymph node confirmed adenocarcinoma.  Patient has significant history  of tobacco dependence around 30-pack-year however he quit about 25 years ago. Patient does have coronary artery disease status post stent placement. Patient also gives history of occupational asthma and exposure to bacteria. Patient does give history of weight loss but describes it as intentional.  Denies headaches dizziness chest pain abdominal pain nausea bone pain     Interim History:     Patient presents to the clinic for a follow-up visit accompanied by his wife. Complains of area of tenderness over the right chest wall attributes to coughing. Weight is stable. Denies nausea vomiting fever chills. Gets short of breath with exertion. But continues to be active. He works out at [employer] on and off. During this visit patient's allergy, social, medical, surgical history and medications were reviewed and updated. Past Medical History:   Past Medical History:   Diagnosis Date    Abdominal hernia     small, no significant symptomatology. Abnormal PSA     with history of slight increase. Adenocarcinoma, lung, right (HCC)     Allergic rhinitis     Benign prostatic hypertrophy     Chest pain     occasional.     Coronary artery disease     status post drug eluting stent placement, LAD, ostial obtuse marginal.     Dysphagia     Erectile dysfunction     Familial hyperlipidemia     with hypertriglyceridemia. Gastroesophageal reflux disease     Hearing loss, bilateral     hearing aid in the left ear only. 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 Hypertension     Impaired fasting glucose     prediabetes. Lumbar disc herniation     L3-L4, with chronic back pain. Mild anemia     Nasal polyps     minimal symptoms. Nasal septal deviation     right side. Obesity     Palpitations     occasional.     Peptic ulcer disease     Pulmonary nodules     Reactive airway disease     asthma, industrial related, also a history of hypersensitivity pneumonitis.      Sleep apnea     wears CPAP nightly Urinary frequency        Past Surgical History:     Past Surgical History:   Procedure Laterality Date    APPENDECTOMY  age 11    BRONCHOSCOPY  09/17/2020    BRONCHOSCOPY N/A 9/17/2020    EBUS- BRONCHOSCOPY ENDOBRONCHIAL ULTRASOUND, PATHOLOGY REQUESTED- CINDY NOTIFIED, GI UNIT SCHEDULED performed by Taylor Lopez MD at [address] [place] [address]  10/20/2011    occluded LAD and ostial obtuse marginal stenosis, underwent drug eluting stents to both, RCA small, nondominant, occluded, ejection fraction 45%. CARDIAC CATHETERIZATION  08/2018    with stent placement    CATARACT REMOVAL Bilateral 03/2018    COLONOSCOPY  01/21/2011    mild sigmoid diverticulosis. COLONOSCOPY  4/6/16    hemorrhoid; sigmoid diverticulosis    CT BIOPSY PERCUTANEOUS DEEP BONE  8/18/2021    CT BIOPSY PERCUTANEOUS DEEP BONE 8/18/2021 [facility] CT SCAN    KNEE ARTHROSCOPY Left 03/19/2010    partial medial and lateral synovectomies, partial medial meniscectomy, chondroplasty. NASAL FRACTURE SURGERY  1960    OTHER SURGICAL HISTORY Left 02/12/2010    knee injection. PORT SURGERY N/A 10/14/2020    RIGHT PORT INSERTION performed by Yamini Stockton DO at [address] [facility] Right 1/28/2022    [facility] YRS/> REDUCIBLE N/A 2/76/3077    UMBILICAL Hernia Repair w/ Mesh performed by Dangelo Machado MD at [address] [facility]    PRE-MALIGNANT / [address] [place] [address] Left 01/16/2012    actinic keratoses, ear, liquid nitrogen. PRE-MALIGNANT / BENIGN SKIN LESION EXCISION Left 07/19/2011    actinic keratosis, upper ear, liquid nitrogen.      PROSTATE BIOPSY Bilateral 09/08/2015    Benign    SEPTOPLASTY  10/30/2015    with bilateral submucosal resection of the inferior turbinates, left middle turbinate elza bullosa resection done by Dr Luciana Moore ARTHROSCOPY Left 10/17/14    W/ SUBACROMIAL DECOMPRESSION, DEBRIDEMENT ET CHONDROPLASTY    UPPER GASTROINTESTINAL ENDOSCOPY  2011    superficial ulcer of the fundus, erostive gastritis.      VASECTOMY Bilateral 1980       Patient Family Social History:     Social History     Socioeconomic History    Marital status:      Spouse name: None    Number of children: None    Years of education: None    Highest education level: None   Tobacco Use    Smoking status: Former     Packs/day: 2.00     Years: 15.00     Pack years: 30.00     Types: Cigarettes     Start date: 1966     Quit date: 1985     Years since quittin.0    Smokeless tobacco: Never    Tobacco comments:     smoked 2 packs a day for 14 to 15 years, quit in    Vaping Use    Vaping Use: Never used   Substance and Sexual Activity    Alcohol use: Yes     Comment: rare    Drug use: No     Social Determinants of Health     Financial Resource Strain: Low Risk     Difficulty of Paying Living Expenses: Not very hard   Food Insecurity: No Food Insecurity    Worried About Running Out of Food in the Last Year: Never true    Ran Out of Food in the Last Year: Never true   Physical Activity: Insufficiently Active    Days of Exercise per Week: 2 days    Minutes of Exercise per Session: 60 min     Family History   Problem Relation Age of Onset    Cancer Mother         lymphoma    Thyroid Disease Mother         hypothyroidism    Stroke Mother     Heart Disease Mother     High Blood Pressure Mother     Heart Attack Mother         in her 46s    Heart Failure Father         congestive    Coronary Art Dis Father     Emphysema Father         was a smoker    Heart Disease Maternal Grandmother     Heart Disease Maternal Grandfather     Crohn's Disease Sister     High Cholesterol Sister     High Cholesterol Child        Current Medications:     Current Outpatient Medications   Medication Sig Dispense Refill    Alectinib HCl (ALECENSA) 150 MG CAPS Take 600 mg by mouth 2 times daily 4 caps  capsule 5    oxybutynin (DITROPAN-XL) 10 MG extended release tablet Take 1 tablet by mouth once daily 90 tablet 1    furosemide (LASIX) 20 MG tablet Take 1 tablet by mouth once daily 90 tablet 1    levothyroxine (SYNTHROID) 137 MCG tablet Take 1 tablet by mouth Daily 90 tablet 1    losartan (COZAAR) 50 MG tablet TAKE 1 TABLET BY MOUTH EVERY DAY 90 tablet 1    tamsulosin (FLOMAX) 0.4 MG capsule Take 1 capsule by mouth once daily 90 capsule 1    warfarin (COUMADIN) 2.5 MG tablet TAKE 3 TABLETS BY MOUTH ON TUESDAYS. TAKE 2 TABLETS BY MOUTH ALL OTHER DAYS OR AS DIRECTED BY INR RESULTS 90 tablet 5    clopidogrel (PLAVIX) 75 MG tablet Take 1 tablet by mouth once daily 90 tablet 1    pravastatin (PRAVACHOL) 40 MG tablet Take 1 tablet by mouth in the evening 90 tablet 3    Omega-3 Fatty Acids (FISH OIL) 1200 MG CPDR Take 2,400 mg by mouth 2 times daily      niacin 500 MG extended release capsule Take 500 mg by mouth 2 times daily      Metoprolol Tartrate 75 MG TABS TAKE 1 TABLET BY MOUTH TWO TIMES A DAY      flecainide (TAMBOCOR) 50 MG tablet Take 50 mg by mouth 2 times daily      ECHINACEA HERB PO Take by mouth daily      albuterol sulfate HFA (VENTOLIN HFA) 108 (90 Base) MCG/ACT inhaler Inhale 2 puffs into the lungs every 6 hours as needed for Wheezing or Shortness of Breath 1 Inhaler 6    Multiple Vitamins-Minerals (MULTIVITAMIN PO) daily. No current facility-administered medications for this visit. Allergies:   Effient [prasugrel], Ace inhibitors, and Statins    Review of Systems:    Constitutional: No fever or chills. No night sweats, no weight loss. Positive fatigue  Eyes: No eye discharge, double vision, or eye pain   HEENT: negative for sore mouth, sore throat, hoarseness and voice change   Respiratory: negative for sputum, dyspnea, wheezing, hemoptysis, chest pain.   Positive for shortness of breath with exertion  Cardiovascular: negative for chest pain, dyspnea, palpitations, orthopnea, PND   Gastrointestinal: negative for nausea, vomiting, diarrhea, constipation, abdominal pain, Dysphagia, hematemesis and hematochezia   Genitourinary: negative for frequency, dysuria, nocturia, urinary incontinence, and hematuria   Integument: negative for rash, skin lesions, bruises. Hematologic/Lymphatic: negative for easy bruising, bleeding, lymphadenopathy, or petechiae   Endocrine: negative for heat or cold intolerance,weight changes, change in bowel habits and hair loss   Musculoskeletal: negative for myalgias, arthralgias, pain, joint swelling,and bone pain. Tenderness of the right chest wall  Neurological: negative for headaches, dizziness, seizures, weakness, numbness. Positive for memory loss    Physical Exam:  Vitals: /66 (Site: Left Upper Arm, Position: Sitting, Cuff Size: Large Adult)   Pulse 74   Temp 98.8 °F (37.1 °C)   Ht 5' 10\" (1.778 m)   Wt 240 lb 6.4 oz (109 kg)   SpO2 94%   BMI 34.49 kg/m²     General appearance - well appearing, no in pain or distress  Mental status - AAO X3  Eyes - pupils equal and reactive, extraocular eye movements intact  Mouth - mucous membranes moist, pharynx normal without lesions  Neck - supple, no significant adenopathy  Lymphatics - no palpable lymphadenopathy, no hepatosplenomegaly  Chest - clear to auscultation, no wheezes, rales or rhonchi, symmetric air entry.   Positive for reproducible tenderness in the right lower chest wall  Heart - normal rate, regular rhythm, normal S1, S2, no murmurs  Abdomen - soft, nontender, nondistended, no masses or organomegaly  Neurological - alert, oriented, normal speech, no focal findings or movement disorder noted  Extremities - peripheral pulses normal, no pedal edema, no clubbing or cyanosis  Skin - normal coloration and turgor, no rashes, no suspicious skin lesions noted     DATA:    Results for orders placed or performed during the hospital encounter of 01/24/23   Protime-INR   Result Value Ref Range    INR 2.4     Protime 29.2 seconds     -- Diagnosis --   BAL RIGHT LOWER LOBE:           NEGATIVE FOR MALIGNANCY.             FINE NEEDLE ASPIRATION STATION 7 EBUS:           ADENOCARCINOMA, COMPATIBLE WITH LUNG PRIMARY.     MRI ABDOMEN W WO CONTRAST    Result Date: 12/8/2022  EXAMINATION: MRI OF THE ABDOMEN WITHOUT AND WITH CONTRAST, 12/8/2022 8:35 am TECHNIQUE: Multiplanar multisequence MRI of the abdomen was performed without and with the administration of intravenous contrast. COMPARISON: None. HISTORY: ORDERING SYSTEM PROVIDED HISTORY: ALK-positive non-small cell lung cancer (HCC) TECHNOLOGIST PROVIDED HISTORY: STAT Creatinine as needed:->Yes liver lesion on ct Specify organ?->Liver FINDINGS: Within the anterior inferior portion of segment VIII there is 1.0 x 1.4 cm moderately T1 hyper pole intense and moderately T2 hyperintense nodule corresponding to finding on recent CT scan.  Nodule shows restriction of diffusion.  There is peripheral ring enhancement which appears early and progressive.  There is some motion on the delayed coronal postcontrast images and therefore difficult to clearly determine if there is filling in on delayed images.  Typical peripheral nodular enhancement of hemangioma is not seen. Further cephalad in segment VIII there is an 8 mm nodule of similar T1 and T2 characteristics.  Not clearly visualized on the diffusion images due to artifact.  Enhancement characteristics similar to the larger nodule.  Likely of similar etiology. Spleen, pancreas, adrenal glands show no significant abnormalities.  Small right renal cyst.  No cholelithiasis.  No biliary ductal dilatation. Unremarkable pancreatic duct. Visualized bowel show no acute process.  There is colonic diverticulosis.  No lymphadenopathy.  No ascites.  Bone marrow signal age appropriate.     1. There are 2 rim enhancing nodules in segment VIII measuring 0.8 cm and 1.4 cm.  The larger represents nodule reported as new on prior CT.  No clear washout.  Cannot exclude metastatic nodules although these could  represent atypical hemangioma. Please correlate with tumor markers. Additionally three-month follow-up is recommended at which time later delayed images (hemangioma protocol) should be acquired. Impression:  Adenocarcinoma of right lung, clinical stage at least T1c, N2, M0 (stage IIIA)  Disease recurrence, biopsy-proven-8/2021  Radiation pneumonitis and shortness of breath  Liver lesion per CT-11/2022  Thrombocytopenia  Anemia      Plan:  I had a detailed discussion with the patient and we went over results of lab work-up imaging studies and other relevant clinical data  I believe patient chest wall tenderness musculoskeletal.  Take low-dose NSAID such as Motrin once a day or Advil once a day before going to the gym  Will obtain scans before next office visit  Will obtain CT abdomen with hepatoma/hemangioma protocol as recommended by radiology  Labs adequate for treatment  Continue to monitor hemoglobin no indication would transfusion at this point  Continue alectinib at current dose  Discussed natural history of ALK positive lung cancer  Monitor kidney function  NCCN guidelines were reviewed and discussed with the patient. The diagnosis and care plan were discussed with the patient in detail. I discussed the natural history of the disease, prognosis, risks and goals of therapy and answered all the patients questions to the best of my ability. Patient expressed understanding and was in agreement. Checo Love MD      This note is created with the assistance of a speech recognition program.  While intending to generate a document that actually reflects the content of the visit, the document can still have some errors including those of syntax and sound a like substitutions which may escape proof reading. It such instances, actual meaning can be extrapolated by contextual diversion. Wayne Seo

## 2023-01-25 DIAGNOSIS — I10 ESSENTIAL HYPERTENSION: ICD-10-CM

## 2023-01-25 RX ORDER — LOSARTAN POTASSIUM 50 MG/1
TABLET ORAL
Qty: 90 TABLET | Refills: 1 | Status: SHIPPED | OUTPATIENT
Start: 2023-01-25

## 2023-01-25 RX ORDER — TAMSULOSIN HYDROCHLORIDE 0.4 MG/1
CAPSULE ORAL
Qty: 90 CAPSULE | Refills: 1 | Status: SHIPPED | OUTPATIENT
Start: 2023-01-25

## 2023-01-25 NOTE — TELEPHONE ENCOUNTER
Check with patient regarding his plavix and coumadin. Does he still see cardiology. Not sure he needs to continue to stay on both of these meds.

## 2023-01-25 NOTE — TELEPHONE ENCOUNTER
Aaron Napier called requesting a refill of the below medication which has been pended for you:     Requested Prescriptions     Pending Prescriptions Disp Refills    tamsulosin (FLOMAX) 0.4 MG capsule [Pharmacy Med Name: Tamsulosin HCl 0.4 MG Oral Capsule] 90 capsule 0     Sig: Take 1 capsule by mouth once daily    clopidogrel (PLAVIX) 75 MG tablet [Pharmacy Med Name: Clopidogrel Bisulfate 75 MG Oral Tablet] 90 tablet 0     Sig: Take 1 tablet by mouth once daily    losartan (COZAAR) 50 MG tablet [Pharmacy Med Name: Losartan Potassium 50 MG Oral Tablet] 90 tablet 0     Sig: Take 1 tablet by mouth once daily       Last Appointment Date: 9/13/2022  Next Appointment Date: 3/13/2023    Allergies   Allergen Reactions    Effient [Prasugrel] Other (See Comments)     Superficial skin bleeding. Ace Inhibitors Other (See Comments)     Cough. Statins Other (See Comments)     Myalgias.

## 2023-01-26 RX ORDER — CLOPIDOGREL BISULFATE 75 MG/1
TABLET ORAL
Qty: 30 TABLET | Refills: 0 | Status: SHIPPED | OUTPATIENT
Start: 2023-01-26

## 2023-01-26 NOTE — TELEPHONE ENCOUNTER
Patient states he sees Dr Radha Herrera in [place] and has an appt with him in Feb. Advised him we would send in #30 of his Plavix to get him to that appt and then he will discuss with  if needs to remain on both

## 2023-02-21 ENCOUNTER — HOSPITAL ENCOUNTER (OUTPATIENT)
Dept: PHARMACY | Age: 78
Setting detail: THERAPIES SERIES
Discharge: HOME | End: 2023-02-21
Payer: MEDICARE

## 2023-02-21 DIAGNOSIS — I48.0 PAROXYSMAL A-FIB (HCC): Primary | ICD-10-CM

## 2023-02-21 LAB
INR BLD: 2.8
PROTIME: 33.3 SECONDS

## 2023-02-21 PROCEDURE — 85610 PROTHROMBIN TIME: CPT

## 2023-02-21 PROCEDURE — 36416 COLLJ CAPILLARY BLOOD SPEC: CPT

## 2023-02-21 PROCEDURE — 99211 OFF/OP EST MAY X REQ PHY/QHP: CPT

## 2023-02-21 NOTE — PROGRESS NOTES
ANTICOAGULATION SERVICE    Date of Clinic Visit:  2/21/2023    Justine Finn is a 69 y.o. male who presents to clinic today for anticoagulation monitoring and adjustment. Recent INR Results:  Internal QC passed  Lab Results   Component Value Date    INR 2.8 02/21/2023    INR 2.4 01/24/2023       Current Warfarin Dosage:  Dosing Plan  As of 2/21/2023      TTR:  65.2 % (2 y)   Full warfarin instructions:  5 mg every Mon, Wed, Fri; 7.5 mg all other days; Starting 2/21/2023                 Assessment/Plan:    Continue current regimen as INR remains stable. Next Clinic Appointment:  Return date  As of 2/21/2023      TTR:  65.2 % (2 y)   Next INR check:  3/21/2023               Please call [facility] at [phone] with any questions. Thanks!   Gerald Wallace [facility]  Anticoagulation Service Pharmacist  2/21/2023 11:03 AM  For Pharmacy Admin Tracking Only    Intervention Detail: Dose Adjustment: 0, reason: Patient Preference  Total # of Interventions Recommended: 0  Total # of Interventions Accepted: 0  Time Spent (min): 15

## 2023-02-24 ENCOUNTER — HOSPITAL ENCOUNTER (OUTPATIENT)
Age: 78
Discharge: HOME | End: 2023-02-24
Payer: MEDICARE

## 2023-02-24 DIAGNOSIS — D64.9 ANEMIA, UNSPECIFIED TYPE: ICD-10-CM

## 2023-02-24 DIAGNOSIS — C79.51 BONE METASTASIS (HCC): ICD-10-CM

## 2023-02-24 DIAGNOSIS — C34.90 ALK-POSITIVE NON-SMALL CELL LUNG CANCER (HCC): ICD-10-CM

## 2023-02-24 DIAGNOSIS — J70.0 RADIATION PNEUMONITIS (HCC): ICD-10-CM

## 2023-02-24 LAB
ABSOLUTE EOS #: 0.17 K/UL (ref 0–0.44)
ABSOLUTE IMMATURE GRANULOCYTE: 0.04 K/UL (ref 0–0.3)
ABSOLUTE LYMPH #: 2.54 K/UL (ref 1.1–3.7)
ABSOLUTE MONO #: 0.76 K/UL (ref 0.1–1.2)
ALBUMIN SERPL-MCNC: 4.3 G/DL (ref 3.5–5.2)
ALBUMIN/GLOBULIN RATIO: 1.4 (ref 1–2.5)
ALP SERPL-CCNC: 111 U/L (ref 40–129)
ALT SERPL-CCNC: 24 U/L (ref 5–41)
ANION GAP SERPL CALCULATED.3IONS-SCNC: 12 MMOL/L (ref 9–17)
AST SERPL-CCNC: 24 U/L
BASOPHILS # BLD: 1 % (ref 0–2)
BASOPHILS ABSOLUTE: 0.05 K/UL (ref 0–0.2)
BILIRUB SERPL-MCNC: 0.5 MG/DL (ref 0.3–1.2)
BUN SERPL-MCNC: 29 MG/DL (ref 8–23)
BUN/CREAT BLD: 19 (ref 9–20)
CALCIUM SERPL-MCNC: 9.3 MG/DL (ref 8.6–10.4)
CHLORIDE SERPL-SCNC: 102 MMOL/L (ref 98–107)
CO2 SERPL-SCNC: 27 MMOL/L (ref 20–31)
CREAT SERPL-MCNC: 1.54 MG/DL (ref 0.7–1.2)
EOSINOPHILS RELATIVE PERCENT: 2 % (ref 1–4)
GFR SERPL CREATININE-BSD FRML MDRD: 46 ML/MIN/1.73M2
GLUCOSE SERPL-MCNC: 161 MG/DL (ref 70–99)
HCT VFR BLD AUTO: 32.7 % (ref 40.7–50.3)
HGB BLD-MCNC: 10.6 G/DL (ref 13–17)
IMMATURE GRANULOCYTES: 0 %
LYMPHOCYTES # BLD: 28 % (ref 24–43)
MCH RBC QN AUTO: 32 PG (ref 25.2–33.5)
MCHC RBC AUTO-ENTMCNC: 32.4 G/DL (ref 25.2–33.5)
MCV RBC AUTO: 98.8 FL (ref 82.6–102.9)
MONOCYTES # BLD: 8 % (ref 3–12)
NRBC AUTOMATED: 0 PER 100 WBC
PDW BLD-RTO: 16.7 % (ref 11.8–14.4)
PLATELET # BLD AUTO: 193 K/UL (ref 138–453)
PMV BLD AUTO: 10.4 FL (ref 8.1–13.5)
POTASSIUM SERPL-SCNC: 4.5 MMOL/L (ref 3.7–5.3)
PROT SERPL-MCNC: 7.4 G/DL (ref 6.4–8.3)
RBC # BLD: 3.31 M/UL (ref 4.21–5.77)
RBC # BLD: ABNORMAL 10*6/UL
SEG NEUTROPHILS: 61 % (ref 36–65)
SEGMENTED NEUTROPHILS ABSOLUTE COUNT: 5.53 K/UL (ref 1.5–8.1)
SODIUM SERPL-SCNC: 141 MMOL/L (ref 135–144)
WBC # BLD AUTO: 9.1 K/UL (ref 3.5–11.3)

## 2023-02-24 PROCEDURE — 85025 COMPLETE CBC W/AUTO DIFF WBC: CPT

## 2023-02-24 PROCEDURE — 36415 COLL VENOUS BLD VENIPUNCTURE: CPT

## 2023-02-24 PROCEDURE — 80053 COMPREHEN METABOLIC PANEL: CPT

## 2023-03-01 ENCOUNTER — HOSPITAL ENCOUNTER (OUTPATIENT)
Dept: CT IMAGING | Age: 78
Discharge: HOME | End: 2023-03-03
Payer: MEDICARE

## 2023-03-01 ENCOUNTER — APPOINTMENT (OUTPATIENT)
Dept: CT IMAGING | Age: 78
End: 2023-03-01
Payer: MEDICARE

## 2023-03-01 DIAGNOSIS — C79.51 BONE METASTASIS (HCC): ICD-10-CM

## 2023-03-01 DIAGNOSIS — D64.9 ANEMIA, UNSPECIFIED TYPE: ICD-10-CM

## 2023-03-01 DIAGNOSIS — N18.31 STAGE 3A CHRONIC KIDNEY DISEASE (HCC): ICD-10-CM

## 2023-03-01 DIAGNOSIS — C34.90 ALK-POSITIVE NON-SMALL CELL LUNG CANCER (HCC): ICD-10-CM

## 2023-03-01 PROCEDURE — 6360000004 HC RX CONTRAST MEDICATION: Performed by: INTERNAL MEDICINE

## 2023-03-01 PROCEDURE — 74177 CT ABD & PELVIS W/CONTRAST: CPT

## 2023-03-01 RX ORDER — WARFARIN SODIUM 2.5 MG/1
TABLET ORAL
Qty: 90 TABLET | Refills: 5 | Status: SHIPPED | OUTPATIENT
Start: 2023-03-01

## 2023-03-01 RX ADMIN — IOPAMIDOL 100 ML: 755 INJECTION, SOLUTION INTRAVENOUS at 13:00

## 2023-03-01 NOTE — TELEPHONE ENCOUNTER
Erick العلي called requesting a refill of the below medication which has been pended for you:     Requested Prescriptions     Pending Prescriptions Disp Refills    warfarin (COUMADIN) 2.5 MG tablet [Pharmacy Med Name: Warfarin Sodium 2.5 MG Oral Tablet] 90 tablet 0     Sig: TAKE 3 TABLETS BY MOUTH ON TUESDAYS. TAKE 2 TABLETS ON ALL OTHER DAYS, OR AS DIRECTED BY INR RESULTS. Last Appointment Date: 9/13/2022  Next Appointment Date: 3/16/2023    Allergies   Allergen Reactions    Effient [Prasugrel] Other (See Comments)     Superficial skin bleeding. Ace Inhibitors Other (See Comments)     Cough. Statins Other (See Comments)     Myalgias.

## 2023-03-07 ENCOUNTER — OFFICE VISIT (OUTPATIENT)
Dept: ONCOLOGY | Age: 78
End: 2023-03-07
Payer: MEDICARE

## 2023-03-07 VITALS
RESPIRATION RATE: 16 BRPM | HEART RATE: 81 BPM | WEIGHT: 240 LBS | HEIGHT: 70 IN | DIASTOLIC BLOOD PRESSURE: 64 MMHG | SYSTOLIC BLOOD PRESSURE: 118 MMHG | OXYGEN SATURATION: 97 % | BODY MASS INDEX: 34.36 KG/M2 | TEMPERATURE: 97.4 F

## 2023-03-07 DIAGNOSIS — C34.90 ALK-POSITIVE NON-SMALL CELL LUNG CANCER (HCC): Primary | ICD-10-CM

## 2023-03-07 DIAGNOSIS — R97.8 OTHER ABNORMAL TUMOR MARKERS: ICD-10-CM

## 2023-03-07 DIAGNOSIS — C79.51 BONE METASTASIS (HCC): ICD-10-CM

## 2023-03-07 DIAGNOSIS — M89.9 BONE LESION: ICD-10-CM

## 2023-03-07 PROCEDURE — 3074F SYST BP LT 130 MM HG: CPT | Performed by: INTERNAL MEDICINE

## 2023-03-07 PROCEDURE — 1036F TOBACCO NON-USER: CPT | Performed by: INTERNAL MEDICINE

## 2023-03-07 PROCEDURE — G8427 DOCREV CUR MEDS BY ELIG CLIN: HCPCS | Performed by: INTERNAL MEDICINE

## 2023-03-07 PROCEDURE — 99215 OFFICE O/P EST HI 40 MIN: CPT | Performed by: INTERNAL MEDICINE

## 2023-03-07 PROCEDURE — 3078F DIAST BP <80 MM HG: CPT | Performed by: INTERNAL MEDICINE

## 2023-03-07 PROCEDURE — 99214 OFFICE O/P EST MOD 30 MIN: CPT | Performed by: INTERNAL MEDICINE

## 2023-03-07 PROCEDURE — 1123F ACP DISCUSS/DSCN MKR DOCD: CPT | Performed by: INTERNAL MEDICINE

## 2023-03-07 PROCEDURE — G8484 FLU IMMUNIZE NO ADMIN: HCPCS | Performed by: INTERNAL MEDICINE

## 2023-03-07 PROCEDURE — G8417 CALC BMI ABV UP PARAM F/U: HCPCS | Performed by: INTERNAL MEDICINE

## 2023-03-07 NOTE — PROGRESS NOTES
[facility]                                                                                                                  3/7/2023  MRN:   [id number]  YOB: 1945  PCP:                           Gray Bosworth, DO  Referring Physician: No ref. provider found  Treating Physician Name: Dylon Joseph MD      Reason for visit:  Chief Complaint   Patient presents with    Lung Cancer     Follow up    Patient presents to the clinic for toxicity check and to discuss results of lab work-up, imaging studies and further treatment plan. 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 Active and recent treatments:  concurrent chemoradiation using carboplatin and Taxol  Consolidation therapy with Imfinzi-1/2021  Carboplatin, Alimta and bevacizumab x1-8/2021 while awaiting results of NGS  Alectinib-10/8/2021    Summary of Case/History:    Estrellita eugene 69 y.o.male is a patient with biopsy confirmed adenocarcinoma of right lung presents to the office to establish care and for further evaluation treatment recommendations. Patient was initially seen by pulmonary team for lung nodules. Patient CT scan from July 2019 showed a right-sided pleural calcification thickening concerning for asbestos exposure. Patient also noted to have multiple pulmonary nodules measuring between 1.5 x 1.3 cm. Follow-up CT chest from July 2020 showed increase in size of the right lower lobe lung nodule which now measured 2.1 cm. Subsequently patient underwent CT PET on 8/22 which showed FDG avid right lower lobe lung nodule with SUV of 4.6. Patient CT PET was also positive for subcarinal lymph node with SUV of 4.6. Patient underwent bronchoscopy with endobronchial ultrasound biopsy of the subcarinal lymph node confirmed adenocarcinoma.   Patient has significant history of tobacco dependence around 30-pack-year however he quit about 25 years ago. Patient does have coronary artery disease status post stent placement. Patient also gives history of occupational asthma and exposure to bacteria. Patient does give history of weight loss but describes it as intentional.  Denies headaches dizziness chest pain abdominal pain nausea bone pain     Interim History:     Patient presents to the clinic for a follow-up visit accompanied by his wife. Patient has been tolerating alectinib without any unexpected or severe side effects. CT scans show concerning findings in the abdomen. Patient denies any weight loss denies abdominal pain. Complains of getting tired easily. But continues to workout at the gym. During this visit patient's allergy, social, medical, surgical history and medications were reviewed and updated. Past Medical History:   Past Medical History:   Diagnosis Date    Abdominal hernia     small, no significant symptomatology. Abnormal PSA     with history of slight increase. Adenocarcinoma, lung, right (HCC)     Allergic rhinitis     Benign prostatic hypertrophy     Chest pain     occasional.     Coronary artery disease     status post drug eluting stent placement, LAD, ostial obtuse marginal.     Dysphagia     Erectile dysfunction     Familial hyperlipidemia     with hypertriglyceridemia. Gastroesophageal reflux disease     Hearing loss, bilateral     hearing aid in the left ear only. 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 Hypertension     Impaired fasting glucose     prediabetes. Lumbar disc herniation     L3-L4, with chronic back pain. Mild anemia     Nasal polyps     minimal symptoms. Nasal septal deviation     right side. Obesity     Palpitations     occasional.     Peptic ulcer disease     Pulmonary nodules     Reactive airway disease     asthma, industrial related, also a history of hypersensitivity pneumonitis.      Sleep apnea     wears CPAP nightly    Urinary frequency        Past Surgical History:     Past Surgical History:   Procedure Laterality Date    APPENDECTOMY  age 7    BRONCHOSCOPY  09/17/2020    BRONCHOSCOPY N/A 9/17/2020    EBUS- BRONCHOSCOPY ENDOBRONCHIAL ULTRASOUND, PATHOLOGY REQUESTED- CINDY NOTIFIED, GI UNIT SCHEDULED performed by Tawanda Gamboa MD at [facility] OR    CARDIAC CATHETERIZATION  10/20/2011    occluded LAD and ostial obtuse marginal stenosis, underwent drug eluting stents to both, RCA small, nondominant, occluded, ejection fraction 45%.     CARDIAC CATHETERIZATION  08/2018    with stent placement    CATARACT REMOVAL Bilateral 03/2018    COLONOSCOPY  01/21/2011    mild sigmoid diverticulosis.     COLONOSCOPY  4/6/16    hemorrhoid; sigmoid diverticulosis    CT BIOPSY PERCUTANEOUS DEEP BONE  8/18/2021    CT BIOPSY PERCUTANEOUS DEEP BONE 8/18/2021 [facility] CT SCAN    KNEE ARTHROSCOPY Left 03/19/2010    partial medial and lateral synovectomies, partial medial meniscectomy, chondroplasty.     NASAL FRACTURE SURGERY  1960    OTHER SURGICAL HISTORY Left 02/12/2010    knee injection.     PORT SURGERY N/A 10/14/2020    RIGHT PORT INSERTION performed by Dylan Sparks DO at [facility] OR    PORT SURGERY Right 1/28/2022    Right PORT REMOVAL performed by Dylan Sparks DO at [facility] OR    CO RPR UMBILICAL HRNA 5 YRS/> REDUCIBLE N/A 1/11/2018    UMBILICAL Hernia Repair w/ Mesh performed by Brooks Cordon MD at [facility] OR    PRE-MALIGNANT / BENIGN SKIN LESION EXCISION Left 01/16/2012    actinic keratoses, ear, liquid nitrogen.     PRE-MALIGNANT / BENIGN SKIN LESION EXCISION Left 07/19/2011    actinic keratosis, upper ear, liquid nitrogen.     PROSTATE BIOPSY Bilateral 09/08/2015    Benign    SEPTOPLASTY  10/30/2015    with bilateral submucosal resection of the inferior turbinates, left middle turbinate elza bullosa resection done by Dr Varner    SHOULDER ARTHROSCOPY Left 10/17/14    W/ SUBACROMIAL DECOMPRESSION, DEBRIDEMENT  ET CHONDROPLASTY    UPPER GASTROINTESTINAL ENDOSCOPY  2011    superficial ulcer of the fundus, erostive gastritis. VASECTOMY Bilateral 1980       Patient Family Social History:     Social History     Socioeconomic History    Marital status:      Spouse name: None    Number of children: None    Years of education: None    Highest education level: None   Tobacco Use    Smoking status: Former     Packs/day: 2.00     Years: 15.00     Pack years: 30.00     Types: Cigarettes     Start date: 1966     Quit date: 1985     Years since quittin.2    Smokeless tobacco: Never    Tobacco comments:     smoked 2 packs a day for 14 to 15 years, quit in    Vaping Use    Vaping Use: Never used   Substance and Sexual Activity    Alcohol use: Yes     Comment: rare    Drug use: No     Social Determinants of Health     Financial Resource Strain: Low Risk     Difficulty of Paying Living Expenses: Not very hard   Food Insecurity: No Food Insecurity    Worried About Running Out of Food in the Last Year: Never true    Ran Out of Food in the Last Year: Never true   Physical Activity: Insufficiently Active    Days of Exercise per Week: 2 days    Minutes of Exercise per Session: 60 min     Family History   Problem Relation Age of Onset    Cancer Mother         lymphoma    Thyroid Disease Mother         hypothyroidism    Stroke Mother     Heart Disease Mother     High Blood Pressure Mother     Heart Attack Mother         in her 46s    Heart Failure Father         congestive    Coronary Art Dis Father     Emphysema Father         was a smoker    Heart Disease Maternal Grandmother     Heart Disease Maternal Grandfather     Crohn's Disease Sister     High Cholesterol Sister     High Cholesterol Child        Current Medications:     Current Outpatient Medications   Medication Sig Dispense Refill    warfarin (COUMADIN) 2.5 MG tablet TAKE 3 TABLETS BY MOUTH ON .  TAKE 2 TABLETS ON ALL OTHER DAYS, OR AS DIRECTED BY INR RESULTS. 90 tablet 5    clopidogrel (PLAVIX) 75 MG tablet Take 1 tablet by mouth once daily 30 tablet 0    tamsulosin (FLOMAX) 0.4 MG capsule Take 1 capsule by mouth once daily 90 capsule 1    losartan (COZAAR) 50 MG tablet Take 1 tablet by mouth once daily 90 tablet 1    Alectinib HCl (ALECENSA) 150 MG CAPS Take 600 mg by mouth 2 times daily 4 caps  capsule 5    oxybutynin (DITROPAN-XL) 10 MG extended release tablet Take 1 tablet by mouth once daily 90 tablet 1    furosemide (LASIX) 20 MG tablet Take 1 tablet by mouth once daily 90 tablet 1    levothyroxine (SYNTHROID) 137 MCG tablet Take 1 tablet by mouth Daily 90 tablet 1    pravastatin (PRAVACHOL) 40 MG tablet Take 1 tablet by mouth in the evening 90 tablet 3    Omega-3 Fatty Acids (FISH OIL) 1200 MG CPDR Take 2,400 mg by mouth 2 times daily      niacin 500 MG extended release capsule Take 500 mg by mouth 2 times daily      Metoprolol Tartrate 75 MG TABS TAKE 1 TABLET BY MOUTH TWO TIMES A DAY      flecainide (TAMBOCOR) 50 MG tablet Take 50 mg by mouth 2 times daily      ECHINACEA HERB PO Take by mouth daily      albuterol sulfate HFA (VENTOLIN HFA) 108 (90 Base) MCG/ACT inhaler Inhale 2 puffs into the lungs every 6 hours as needed for Wheezing or Shortness of Breath 1 Inhaler 6    Multiple Vitamins-Minerals (MULTIVITAMIN PO) daily. No current facility-administered medications for this visit. Allergies:   Effient [prasugrel], Ace inhibitors, and Statins    Review of Systems:    Constitutional: No fever or chills. No night sweats, no weight loss. Positive fatigue  Eyes: No eye discharge, double vision, or eye pain   HEENT: negative for sore mouth, sore throat, hoarseness and voice change   Respiratory: negative for sputum, dyspnea, wheezing, hemoptysis, chest pain.   Positive for shortness of breath with exertion  Cardiovascular: negative for chest pain, dyspnea, palpitations, orthopnea, PND   Gastrointestinal: negative for nausea, vomiting, diarrhea, constipation, abdominal pain, Dysphagia, hematemesis and hematochezia   Genitourinary: negative for frequency, dysuria, nocturia, urinary incontinence, and hematuria   Integument: negative for rash, skin lesions, bruises. Hematologic/Lymphatic: negative for easy bruising, bleeding, lymphadenopathy, or petechiae   Endocrine: negative for heat or cold intolerance,weight changes, change in bowel habits and hair loss   Musculoskeletal: negative for myalgias, arthralgias, pain, joint swelling,and bone pain. Neurological: negative for headaches, dizziness, seizures, weakness, numbness. Positive for memory loss    Physical Exam:  Vitals: /64 (Site: Right Upper Arm, Position: Sitting, Cuff Size: Large Adult)   Pulse 81   Temp 97.4 °F (36.3 °C) (Temporal)   Resp 16   Ht 5' 10\" (1.778 m)   Wt 240 lb (108.9 kg)   SpO2 97%   BMI 34.44 kg/m²     General appearance - well appearing, no in pain or distress  Mental status - AAO X3  Eyes - pupils equal and reactive, extraocular eye movements intact  Mouth - mucous membranes moist, pharynx normal without lesions  Neck - supple, no significant adenopathy  Lymphatics - no palpable lymphadenopathy, no hepatosplenomegaly  Chest - clear to auscultation, no wheezes, rales or rhonchi, symmetric air entry.     Heart - normal rate, regular rhythm, normal S1, S2, no murmurs  Abdomen - soft, nontender, nondistended, no masses or organomegaly  Neurological - alert, oriented, normal speech, no focal findings or movement disorder noted  Extremities - peripheral pulses normal, no pedal edema, no clubbing or cyanosis  Skin - normal coloration and turgor, no rashes, no suspicious skin lesions noted     DATA:    Results for orders placed or performed during the hospital encounter of 02/24/23   Comprehensive Metabolic Panel   Result Value Ref Range    Glucose 161 (H) 70 - 99 mg/dL    BUN 29 (H) 8 - 23 mg/dL    Creatinine 1.54 (H) 0.70 - 1.20 mg/dL    Est, Glom Filt Rate 46 (L) >60 mL/min/1.73m2    Bun/Cre Ratio 19 9 - 20    Calcium 9.3 8.6 - 10.4 mg/dL    Sodium 141 135 - 144 mmol/L    Potassium 4.5 3.7 - 5.3 mmol/L    Chloride 102 98 - 107 mmol/L    CO2 27 20 - 31 mmol/L    Anion Gap 12 9 - 17 mmol/L    Alkaline Phosphatase 111 40 - 129 U/L    ALT 24 5 - 41 U/L    AST 24 <40 U/L    Total Bilirubin 0.5 0.3 - 1.2 mg/dL    Total Protein 7.4 6.4 - 8.3 g/dL    Albumin 4.3 3.5 - 5.2 g/dL    Albumin/Globulin Ratio 1.4 1.0 - 2.5   CBC with Auto Differential   Result Value Ref Range    WBC 9.1 3.5 - 11.3 k/uL    RBC 3.31 (L) 4.21 - 5.77 m/uL    Hemoglobin 10.6 (L) 13.0 - 17.0 g/dL    Hematocrit 32.7 (L) 40.7 - 50.3 %    MCV 98.8 82.6 - 102.9 fL    MCH 32.0 25.2 - 33.5 pg    MCHC 32.4 25.2 - 33.5 g/dL    RDW 16.7 (H) 11.8 - 14.4 %    Platelets 023 956 - 972 k/uL    MPV 10.4 8.1 - 13.5 fL    NRBC Automated 0.0 0.0 per 100 WBC    Seg Neutrophils 61 36 - 65 %    Lymphocytes 28 24 - 43 %    Monocytes 8 3 - 12 %    Eosinophils % 2 1 - 4 %    Basophils 1 0 - 2 %    Immature Granulocytes 0 0 %    Segs Absolute 5.53 1.50 - 8.10 k/uL    Absolute Lymph # 2.54 1.10 - 3.70 k/uL    Absolute Mono # 0.76 0.10 - 1.20 k/uL    Absolute Eos # 0.17 0.00 - 0.44 k/uL    Basophils Absolute 0.05 0.00 - 0.20 k/uL    Absolute Immature Granulocyte 0.04 0.00 - 0.30 k/uL    RBC Morphology ANISOCYTOSIS PRESENT      -- Diagnosis --   BAL RIGHT LOWER LOBE:           NEGATIVE FOR MALIGNANCY. FINE NEEDLE ASPIRATION STATION 7 EBUS:           ADENOCARCINOMA, COMPATIBLE WITH LUNG PRIMARY. CT CHEST ABDOMEN PELVIS W CONTRAST Additional Contrast? None    Result Date: 3/4/2023  EXAMINATION: CT OF THE CHEST, ABDOMEN, AND PELVIS WITH CONTRAST 3/1/2023 12:45 pm TECHNIQUE: CT of the chest, abdomen and pelvis was performed with the administration of intravenous contrast. Multiplanar reformatted images are provided for review.  Automated exposure control, iterative reconstruction, and/or weight based adjustment of the mA/kV was utilized to reduce the radiation dose to as low as reasonably achievable. COMPARISON: CT exams 11/08/2022, 07/12/2022, 01/06/2022. MRI abdomen 12/08/2022. HISTORY: ORDERING SYSTEM PROVIDED HISTORY: ALK-positive non-small cell lung cancer ([facility] [address] TECHNOLOGIST PROVIDED HISTORY: STAT Creatinine as needed:->Yes assess responce to therapy Reason for Exam: assess response to therapy , h/o non small cell cancer of lung, bone and ? liver mets  abnormal MRI recommended hemangioma protocol FINDINGS: Chest: Mediastinum: The heart and great vessels are normal in size. Calcified atheromatous plaque and coronary calcifications are noted. No pericardial effusion. No enlarged or suspicious-appearing lymph nodes are identified. Lungs/pleura: Post treatment changes in the right hilum and right lower lobe again demonstrated. Triangular-shaped opacity in the anterior right lower lobe on axial image 83 is stable. No new airspace disease or effusion. The central airway is patent. Soft Tissues/Bones: No new findings identified. Abdomen/Pelvis: Organs: Hepatic steatosis. Recently described liver lesions in segment 8 have notably increased in size now measuring up to 3.0 cm superiorly and 2.2 cm inferiorly (1.4 cm and 0.8 cm on recent MRI). A new satellite lesion in the anterior superior segment 8 measures 1.2 cm (series 6, image 18). There are also new liver lesions suspected in the medial and lateral segments of the left hepatic lobe and segment 5. A new 1.6 cm hypoattenuating mass in the tail the pancreas is present. No surrounding inflammatory change or duct dilatation. The gallbladder, spleen, adrenals and kidneys reveal no new findings. GI/Bowel: There is no bowel dilatation or wall thickening identified. Diverticulosis. Pelvis: No acute findings. Prostatomegaly. Peritoneum/Retroperitoneum: No free air or free fluid. The aorta is normal in caliber. The visceral branches are patent.  Calcified atheromatous plaque is present. No lymphadenopathy. Bones/Soft Tissues: No new findings identified. Sclerotic bone lesion in the posterior L4 vertebral body and sclerotic bone lesions in the left ilium are again demonstrated. 1.  Interval progression of metastatic disease with interval increase in size and number of liver lesions. New 1.6 cm pancreatic tail mass is also likely a metastatic deposit. 2.  No new findings identified in the chest.          Impression:  Adenocarcinoma of right lung, clinical stage at least T1c, N2, M0 (stage IIIA)  Disease recurrence, biopsy-proven-8/2021  Radiation pneumonitis and shortness of breath  Liver lesion per CT-11/2022  Thrombocytopenia  Anemia      Plan:  I had a detailed discussion with the patient and we went over results of lab work-up imaging studies and other relevant clinical data  Reviewed results of CT scans. Reviewed images. Compared with the prior scan. Patient has concerning findings in the liver. Additionally there is a pancreatic tail mass. We will obtain biopsy. We will also obtain tumor markers  Clinically I am concerned about progression of lung disease but will need biopsy to confirm  Continue alectinib in the meanwhile. Discussed natural history of ALK positive lung cancer  Monitor kidney function  NCCN guidelines were reviewed and discussed with the patient. The diagnosis and care plan were discussed with the patient in detail. I discussed the natural history of the disease, prognosis, risks and goals of therapy and answered all the patients questions to the best of my ability. Patient expressed understanding and was in agreement. Wesley Stock MD      I spent more than 40 minutes examining, evaluating, reviewing data, counseling the patient and coordinating care.   Greater than 50% of time was spent face-to-face with the patient this note is created with the assistance of a speech recognition program.  While intending to generate a document that actually reflects the content of the visit, the document can still have some errors including those of syntax and sound a like substitutions which may escape proof reading. It such instances, actual meaning can be extrapolated by contextual diversion. Javier Londono

## 2023-03-09 ENCOUNTER — TELEPHONE (OUTPATIENT)
Dept: INTERVENTIONAL RADIOLOGY/VASCULAR | Age: 78
End: 2023-03-09

## 2023-03-10 ENCOUNTER — HOSPITAL ENCOUNTER (OUTPATIENT)
Age: 78
Discharge: HOME | End: 2023-03-10
Payer: MEDICARE

## 2023-03-10 DIAGNOSIS — R97.8 OTHER ABNORMAL TUMOR MARKERS: ICD-10-CM

## 2023-03-10 DIAGNOSIS — M89.9 BONE LESION: ICD-10-CM

## 2023-03-10 DIAGNOSIS — D64.9 ANEMIA, UNSPECIFIED TYPE: ICD-10-CM

## 2023-03-10 DIAGNOSIS — E11.8 CONTROLLED TYPE 2 DIABETES MELLITUS WITH COMPLICATION, WITHOUT LONG-TERM CURRENT USE OF INSULIN (HCC): ICD-10-CM

## 2023-03-10 DIAGNOSIS — E03.9 ACQUIRED HYPOTHYROIDISM: ICD-10-CM

## 2023-03-10 DIAGNOSIS — N18.31 STAGE 3A CHRONIC KIDNEY DISEASE (HCC): ICD-10-CM

## 2023-03-10 DIAGNOSIS — C34.90 ALK-POSITIVE NON-SMALL CELL LUNG CANCER (HCC): ICD-10-CM

## 2023-03-10 DIAGNOSIS — I10 ESSENTIAL HYPERTENSION: ICD-10-CM

## 2023-03-10 DIAGNOSIS — C79.51 BONE METASTASIS (HCC): ICD-10-CM

## 2023-03-10 DIAGNOSIS — E78.49 FAMILIAL HYPERLIPIDEMIA: ICD-10-CM

## 2023-03-10 DIAGNOSIS — R97.20 ELEVATED PSA: ICD-10-CM

## 2023-03-10 LAB
ABSOLUTE EOS #: 0.18 K/UL (ref 0–0.44)
ABSOLUTE EOS #: 0.18 K/UL (ref 0–0.44)
ABSOLUTE IMMATURE GRANULOCYTE: 0.04 K/UL (ref 0–0.3)
ABSOLUTE IMMATURE GRANULOCYTE: 0.04 K/UL (ref 0–0.3)
ABSOLUTE LYMPH #: 1.89 K/UL (ref 1.1–3.7)
ABSOLUTE LYMPH #: 1.89 K/UL (ref 1.1–3.7)
ABSOLUTE MONO #: 0.69 K/UL (ref 0.1–1.2)
ABSOLUTE MONO #: 0.69 K/UL (ref 0.1–1.2)
ALBUMIN SERPL-MCNC: 4.1 G/DL (ref 3.5–5.2)
ALBUMIN SERPL-MCNC: 4.1 G/DL (ref 3.5–5.2)
ALBUMIN/GLOBULIN RATIO: 1.2 (ref 1–2.5)
ALBUMIN/GLOBULIN RATIO: 1.2 (ref 1–2.5)
ALP SERPL-CCNC: 128 U/L (ref 40–129)
ALP SERPL-CCNC: 128 U/L (ref 40–129)
ALT SERPL-CCNC: 24 U/L (ref 5–41)
ALT SERPL-CCNC: 24 U/L (ref 5–41)
ANION GAP SERPL CALCULATED.3IONS-SCNC: 13 MMOL/L (ref 9–17)
ANION GAP SERPL CALCULATED.3IONS-SCNC: 13 MMOL/L (ref 9–17)
AST SERPL-CCNC: 26 U/L
AST SERPL-CCNC: 26 U/L
BASOPHILS # BLD: 1 % (ref 0–2)
BASOPHILS # BLD: 1 % (ref 0–2)
BASOPHILS ABSOLUTE: 0.05 K/UL (ref 0–0.2)
BASOPHILS ABSOLUTE: 0.05 K/UL (ref 0–0.2)
BILIRUB SERPL-MCNC: 0.6 MG/DL (ref 0.3–1.2)
BILIRUB SERPL-MCNC: 0.6 MG/DL (ref 0.3–1.2)
BUN SERPL-MCNC: 21 MG/DL (ref 8–23)
BUN SERPL-MCNC: 21 MG/DL (ref 8–23)
BUN/CREAT BLD: 16 (ref 9–20)
BUN/CREAT BLD: 16 (ref 9–20)
CALCIUM SERPL-MCNC: 9.8 MG/DL (ref 8.6–10.4)
CALCIUM SERPL-MCNC: 9.8 MG/DL (ref 8.6–10.4)
CANCER AG19-9 SERPL IA-ACNC: 2452 U/ML (ref 0–35)
CEA SERPL-MCNC: 4.4 NG/ML
CHLORIDE SERPL-SCNC: 100 MMOL/L (ref 98–107)
CHLORIDE SERPL-SCNC: 100 MMOL/L (ref 98–107)
CHOLEST SERPL-MCNC: 230 MG/DL
CHOLESTEROL/HDL RATIO: 5.5
CO2 SERPL-SCNC: 27 MMOL/L (ref 20–31)
CO2 SERPL-SCNC: 27 MMOL/L (ref 20–31)
CREAT SERPL-MCNC: 1.28 MG/DL (ref 0.7–1.2)
CREAT SERPL-MCNC: 1.28 MG/DL (ref 0.7–1.2)
CREATININE URINE: 108.3 MG/DL (ref 39–259)
EOSINOPHILS RELATIVE PERCENT: 2 % (ref 1–4)
EOSINOPHILS RELATIVE PERCENT: 2 % (ref 1–4)
GFR SERPL CREATININE-BSD FRML MDRD: 58 ML/MIN/1.73M2
GFR SERPL CREATININE-BSD FRML MDRD: 58 ML/MIN/1.73M2
GLUCOSE SERPL-MCNC: 173 MG/DL (ref 70–99)
GLUCOSE SERPL-MCNC: 173 MG/DL (ref 70–99)
HCT VFR BLD AUTO: 32.3 % (ref 40.7–50.3)
HCT VFR BLD AUTO: 32.3 % (ref 40.7–50.3)
HDLC SERPL-MCNC: 42 MG/DL
HGB BLD-MCNC: 10.6 G/DL (ref 13–17)
HGB BLD-MCNC: 10.6 G/DL (ref 13–17)
IMMATURE GRANULOCYTES: 1 %
IMMATURE GRANULOCYTES: 1 %
LDLC SERPL CALC-MCNC: ABNORMAL MG/DL (ref 0–130)
LDLC SERPL DIRECT ASSAY-MCNC: 98 MG/DL
LYMPHOCYTES # BLD: 22 % (ref 24–43)
LYMPHOCYTES # BLD: 22 % (ref 24–43)
MCH RBC QN AUTO: 32.2 PG (ref 25.2–33.5)
MCH RBC QN AUTO: 32.2 PG (ref 25.2–33.5)
MCHC RBC AUTO-ENTMCNC: 32.8 G/DL (ref 25.2–33.5)
MCHC RBC AUTO-ENTMCNC: 32.8 G/DL (ref 25.2–33.5)
MCV RBC AUTO: 98.2 FL (ref 82.6–102.9)
MCV RBC AUTO: 98.2 FL (ref 82.6–102.9)
MICROALBUMIN/CREAT 24H UR: 29 MG/L
MICROALBUMIN/CREAT UR-RTO: 27 MCG/MG CREAT
MONOCYTES # BLD: 8 % (ref 3–12)
MONOCYTES # BLD: 8 % (ref 3–12)
NRBC AUTOMATED: 0 PER 100 WBC
NRBC AUTOMATED: 0 PER 100 WBC
PDW BLD-RTO: 16.2 % (ref 11.8–14.4)
PDW BLD-RTO: 16.2 % (ref 11.8–14.4)
PLATELET # BLD AUTO: 183 K/UL (ref 138–453)
PLATELET # BLD AUTO: 183 K/UL (ref 138–453)
PMV BLD AUTO: 10.7 FL (ref 8.1–13.5)
PMV BLD AUTO: 10.7 FL (ref 8.1–13.5)
POTASSIUM SERPL-SCNC: 4 MMOL/L (ref 3.7–5.3)
POTASSIUM SERPL-SCNC: 4 MMOL/L (ref 3.7–5.3)
PROSTATE SPECIFIC ANTIGEN: 7.55 NG/ML
PROSTATE SPECIFIC ANTIGEN: 7.8 NG/ML
PROT SERPL-MCNC: 7.5 G/DL (ref 6.4–8.3)
PROT SERPL-MCNC: 7.5 G/DL (ref 6.4–8.3)
RBC # BLD: 3.29 M/UL (ref 4.21–5.77)
RBC # BLD: 3.29 M/UL (ref 4.21–5.77)
RBC # BLD: ABNORMAL 10*6/UL
RBC # BLD: ABNORMAL 10*6/UL
SEG NEUTROPHILS: 66 % (ref 36–65)
SEG NEUTROPHILS: 67 % (ref 36–65)
SEGMENTED NEUTROPHILS ABSOLUTE COUNT: 5.66 K/UL (ref 1.5–8.1)
SEGMENTED NEUTROPHILS ABSOLUTE COUNT: 5.66 K/UL (ref 1.5–8.1)
SODIUM SERPL-SCNC: 140 MMOL/L (ref 135–144)
SODIUM SERPL-SCNC: 140 MMOL/L (ref 135–144)
T4 FREE SERPL-MCNC: 1.28 NG/DL (ref 0.93–1.7)
TRIGL SERPL-MCNC: 613 MG/DL
TSH SERPL-ACNC: 6.95 UIU/ML (ref 0.3–5)
WBC # BLD AUTO: 8.5 K/UL (ref 3.5–11.3)
WBC # BLD AUTO: 8.5 K/UL (ref 3.5–11.3)

## 2023-03-10 PROCEDURE — 84439 ASSAY OF FREE THYROXINE: CPT

## 2023-03-10 PROCEDURE — 82570 ASSAY OF URINE CREATININE: CPT

## 2023-03-10 PROCEDURE — 86301 IMMUNOASSAY TUMOR CA 19-9: CPT

## 2023-03-10 PROCEDURE — 36415 COLL VENOUS BLD VENIPUNCTURE: CPT

## 2023-03-10 PROCEDURE — 83036 HEMOGLOBIN GLYCOSYLATED A1C: CPT

## 2023-03-10 PROCEDURE — 85025 COMPLETE CBC W/AUTO DIFF WBC: CPT

## 2023-03-10 PROCEDURE — 80053 COMPREHEN METABOLIC PANEL: CPT

## 2023-03-10 PROCEDURE — 84153 ASSAY OF PSA TOTAL: CPT

## 2023-03-10 PROCEDURE — 82378 CARCINOEMBRYONIC ANTIGEN: CPT

## 2023-03-10 PROCEDURE — 83721 ASSAY OF BLOOD LIPOPROTEIN: CPT

## 2023-03-10 PROCEDURE — 82043 UR ALBUMIN QUANTITATIVE: CPT

## 2023-03-10 PROCEDURE — 80061 LIPID PANEL: CPT

## 2023-03-10 PROCEDURE — 84443 ASSAY THYROID STIM HORMONE: CPT

## 2023-03-16 ENCOUNTER — OFFICE VISIT (OUTPATIENT)
Dept: FAMILY MEDICINE CLINIC | Age: 78
End: 2023-03-16
Payer: MEDICARE

## 2023-03-16 VITALS
RESPIRATION RATE: 18 BRPM | OXYGEN SATURATION: 97 % | WEIGHT: 243 LBS | DIASTOLIC BLOOD PRESSURE: 78 MMHG | HEART RATE: 60 BPM | HEIGHT: 70 IN | BODY MASS INDEX: 34.79 KG/M2 | TEMPERATURE: 99.3 F | SYSTOLIC BLOOD PRESSURE: 120 MMHG

## 2023-03-16 DIAGNOSIS — I48.0 PAROXYSMAL A-FIB (HCC): ICD-10-CM

## 2023-03-16 DIAGNOSIS — G89.29 CHRONIC PAIN OF BOTH KNEES: ICD-10-CM

## 2023-03-16 DIAGNOSIS — I27.20 PULMONARY HYPERTENSION (HCC): ICD-10-CM

## 2023-03-16 DIAGNOSIS — I10 ESSENTIAL HYPERTENSION: Primary | ICD-10-CM

## 2023-03-16 DIAGNOSIS — E03.9 ACQUIRED HYPOTHYROIDISM: ICD-10-CM

## 2023-03-16 DIAGNOSIS — G47.33 OBSTRUCTIVE SLEEP APNEA: ICD-10-CM

## 2023-03-16 DIAGNOSIS — E11.59 TYPE 2 DIABETES MELLITUS WITH OTHER CIRCULATORY COMPLICATION, WITHOUT LONG-TERM CURRENT USE OF INSULIN (HCC): ICD-10-CM

## 2023-03-16 DIAGNOSIS — M25.562 CHRONIC PAIN OF BOTH KNEES: ICD-10-CM

## 2023-03-16 DIAGNOSIS — R97.20 ELEVATED PSA: ICD-10-CM

## 2023-03-16 DIAGNOSIS — M17.0 BILATERAL PRIMARY OSTEOARTHRITIS OF KNEE: ICD-10-CM

## 2023-03-16 DIAGNOSIS — J44.9 COPD WITH ASTHMA (HCC): ICD-10-CM

## 2023-03-16 DIAGNOSIS — E78.49 FAMILIAL HYPERLIPIDEMIA: ICD-10-CM

## 2023-03-16 DIAGNOSIS — M25.561 CHRONIC PAIN OF BOTH KNEES: ICD-10-CM

## 2023-03-16 PROBLEM — E11.8 CONTROLLED TYPE 2 DIABETES MELLITUS WITH COMPLICATION, WITHOUT LONG-TERM CURRENT USE OF INSULIN (HCC): Status: ACTIVE | Noted: 2023-03-16

## 2023-03-16 PROCEDURE — 99213 OFFICE O/P EST LOW 20 MIN: CPT | Performed by: FAMILY MEDICINE

## 2023-03-16 PROCEDURE — 20610 DRAIN/INJ JOINT/BURSA W/O US: CPT | Performed by: FAMILY MEDICINE

## 2023-03-16 RX ORDER — METOPROLOL TARTRATE 50 MG/1
TABLET, FILM COATED ORAL
COMMUNITY
Start: 2023-03-02

## 2023-03-16 RX ORDER — TRIAMCINOLONE ACETONIDE 40 MG/ML
40 INJECTION, SUSPENSION INTRA-ARTICULAR; INTRAMUSCULAR ONCE
Status: COMPLETED | OUTPATIENT
Start: 2023-03-16 | End: 2023-03-16

## 2023-03-16 RX ORDER — FLECAINIDE ACETATE 100 MG/1
TABLET ORAL
COMMUNITY
Start: 2023-03-02

## 2023-03-16 RX ORDER — LEVOTHYROXINE SODIUM 0.15 MG/1
150 TABLET ORAL DAILY
Qty: 90 TABLET | Refills: 1 | Status: SHIPPED | OUTPATIENT
Start: 2023-03-16

## 2023-03-16 RX ADMIN — TRIAMCINOLONE ACETONIDE 40 MG: 40 INJECTION, SUSPENSION INTRA-ARTICULAR; INTRAMUSCULAR at 11:16

## 2023-03-16 RX ADMIN — TRIAMCINOLONE ACETONIDE 40 MG: 40 INJECTION, SUSPENSION INTRA-ARTICULAR; INTRAMUSCULAR at 11:15

## 2023-03-16 SDOH — ECONOMIC STABILITY: INCOME INSECURITY: HOW HARD IS IT FOR YOU TO PAY FOR THE VERY BASICS LIKE FOOD, HOUSING, MEDICAL CARE, AND HEATING?: NOT HARD AT ALL

## 2023-03-16 SDOH — ECONOMIC STABILITY: HOUSING INSECURITY
IN THE LAST 12 MONTHS, WAS THERE A TIME WHEN YOU DID NOT HAVE A STEADY PLACE TO SLEEP OR SLEPT IN A SHELTER (INCLUDING NOW)?: NO

## 2023-03-16 SDOH — ECONOMIC STABILITY: FOOD INSECURITY: WITHIN THE PAST 12 MONTHS, YOU WORRIED THAT YOUR FOOD WOULD RUN OUT BEFORE YOU GOT MONEY TO BUY MORE.: NEVER TRUE

## 2023-03-16 SDOH — ECONOMIC STABILITY: FOOD INSECURITY: WITHIN THE PAST 12 MONTHS, THE FOOD YOU BOUGHT JUST DIDN'T LAST AND YOU DIDN'T HAVE MONEY TO GET MORE.: NEVER TRUE

## 2023-03-16 ASSESSMENT — ENCOUNTER SYMPTOMS
DIARRHEA: 0
RHINORRHEA: 0
NAUSEA: 0
ABDOMINAL PAIN: 0
COUGH: 0
TROUBLE SWALLOWING: 0
CONSTIPATION: 0
EYE REDNESS: 0
EYE DISCHARGE: 0
SHORTNESS OF BREATH: 0
VOMITING: 0
SINUS PRESSURE: 0
WHEEZING: 0
SORE THROAT: 0

## 2023-03-16 ASSESSMENT — PATIENT HEALTH QUESTIONNAIRE - PHQ9
SUM OF ALL RESPONSES TO PHQ9 QUESTIONS 1 & 2: 0
SUM OF ALL RESPONSES TO PHQ QUESTIONS 1-9: 0
2. FEELING DOWN, DEPRESSED OR HOPELESS: 0
SUM OF ALL RESPONSES TO PHQ QUESTIONS 1-9: 0
1. LITTLE INTEREST OR PLEASURE IN DOING THINGS: 0

## 2023-03-16 NOTE — PATIENT INSTRUCTIONS
Hospital Outpatient Visit on 03/10/2023   Component Date Value Ref Range Status    PSA 03/10/2023 7.80 (A)  <4.1 ng/mL Final    Comment: The Roche \"ECLIA\" assay is used. Results obtained with different assay methods cannot be   used interchangeably. CEA 03/10/2023 4.4 (A)  <3.9 ng/mL Final    Comment: The Roche \"ECLIA\" assay is used. Results obtained with different assay methods cannot be   used interchangeably. CA 19-9 03/10/2023 2452 (A)  0 - 35 U/mL Final    Comment: The Roche \"ECLIA\" assay is used. Results obtained with different assay methods cannot be   used interchangeably. Glucose 03/10/2023 173 (A)  70 - 99 mg/dL Final    BUN 03/10/2023 21  8 - 23 mg/dL Final    Creatinine 03/10/2023 1.28 (A)  0.70 - 1.20 mg/dL Final    Est, Glom Filt Rate 03/10/2023 58 (A)  >60 mL/min/1.73m2 Final    Comment:       These results are not intended for use in patients <25years of age. eGFR results are calculated without a race factor using the 2021 CKD-EPI equation. Careful clinical correlation is recommended, particularly when comparing to results   calculated using previous equations. The CKD-EPI equation is less accurate in patients with extremes of muscle mass, extra-renal   metabolism of creatine, excessive creatine ingestion, or following therapy that affects   renal tubular secretion.       Bun/Cre Ratio 03/10/2023 16  9 - 20 Final    Calcium 03/10/2023 9.8  8.6 - 10.4 mg/dL Final    Sodium 03/10/2023 140  135 - 144 mmol/L Final    Potassium 03/10/2023 4.0  3.7 - 5.3 mmol/L Final    Chloride 03/10/2023 100  98 - 107 mmol/L Final    CO2 03/10/2023 27  20 - 31 mmol/L Final    Anion Gap 03/10/2023 13  9 - 17 mmol/L Final    Alkaline Phosphatase 03/10/2023 128  40 - 129 U/L Final    ALT 03/10/2023 24  5 - 41 U/L Final    AST 03/10/2023 26  <40 U/L Final    Total Bilirubin 03/10/2023 0.6  0.3 - 1.2 mg/dL Final    Total Protein 03/10/2023 7.5  6.4 - 8.3 g/dL Final    Albumin 03/10/2023 4.1  3.5 - 5.2 g/dL Final    Albumin/Globulin Ratio 03/10/2023 1.2  1.0 - 2.5 Final    WBC 03/10/2023 8.5  3.5 - 11.3 k/uL Final    RBC 03/10/2023 3.29 (A)  4.21 - 5.77 m/uL Final    Hemoglobin 03/10/2023 10.6 (A)  13.0 - 17.0 g/dL Final    Hematocrit 03/10/2023 32.3 (A)  40.7 - 50.3 % Final    MCV 03/10/2023 98.2  82.6 - 102.9 fL Final    MCH 03/10/2023 32.2  25.2 - 33.5 pg Final    MCHC 03/10/2023 32.8  25.2 - 33.5 g/dL Final    RDW 03/10/2023 16.2 (A)  11.8 - 14.4 % Final    Platelets 68/88/1093 183  138 - 453 k/uL Final    MPV 03/10/2023 10.7  8.1 - 13.5 fL Final    NRBC Automated 03/10/2023 0.0  0.0 per 100 WBC Final    RBC Morphology 03/10/2023 ANISOCYTOSIS PRESENT   Final    Seg Neutrophils 03/10/2023 67 (A)  36 - 65 % Final    Lymphocytes 03/10/2023 22 (A)  24 - 43 % Final    Monocytes 03/10/2023 8  3 - 12 % Final    Eosinophils % 03/10/2023 2  1 - 4 % Final    Basophils 03/10/2023 1  0 - 2 % Final    Immature Granulocytes 03/10/2023 1 (A)  0 % Final    Segs Absolute 03/10/2023 5.66  1.50 - 8.10 k/uL Final    Absolute Lymph # 03/10/2023 1.89  1.10 - 3.70 k/uL Final    Absolute Mono # 03/10/2023 0.69  0.10 - 1.20 k/uL Final    Absolute Eos # 03/10/2023 0.18  0.00 - 0.44 k/uL Final    Basophils Absolute 03/10/2023 0.05  0.00 - 0.20 k/uL Final    Absolute Immature Granulocyte 03/10/2023 0.04  0.00 - 0.30 k/uL Final

## 2023-03-16 NOTE — PROGRESS NOTES
3/16/2023     Esa Mka (:  1945) is a 69 y.o. male, here for evaluation of the following medical concerns:    HPI  Patient comes in today for follow up of chronic health issues Patient is seen today with his wife present. He apparently has an upcoming liver biopsy scheduled next week due to findings suggestive of metastatic lesions to both the liver and pancreas. Wife notes that he is complaining intermittently of feeling more full and distended in the abdomen and so this is certainly concerning. They are working with the oncologist regarding further management of his metastatic cancer but at this point he otherwise feels like he is doing relatively well. He has not noticed any change in his appetite or difficulty or pain with eating. His CA 19-9 and CEA were both elevated. I did make note as well that his PSA was elevated as well. Again we will just wait and see what the biopsy reports show and they can continue to work with the oncologist for further management of his known metastatic lung cancer. With regards to his other chronic health conditions he has a known history of hypertension his blood pressure is stable and controlled on his current medical regimen. He does have a known history of diabetes mellitus type 2 and his hemoglobin A1c continues to climb. He has been eating a fair amount of carbs and sugars according to his wife and I did discuss with him adding medical therapy but with the new findings on his CT imaging and his pancreas and hesitant to add additional medical therapy that may potentially cause significant fluctuation in his blood sugar. Would not add something like metformin due to his known chronic kidney disease and I would be afraid that other medical therapies may cause issues with hypoglycemia.   At this point I did discuss with him that stricter dietary management probably can get his blood sugars under better control and hopefully with increased exercise as the weather is warming up this will help as well. Does continue to elevate then we will add medical therapy but at this point he was also agreeable that he did not wish to add additional medical therapy unless necessary. He does have a known history of hypothyroidism and his thyroid levels are subtherapeutic at this time. Will increase his thyroid dose. Does have known familial hyperlipidemia and he is on pravastatin 40 mg daily. Higher doses have caused increased symptoms. His cholesterol levels do show very high triglycerides but improved compared to last check and his total cholesterol is better than last check. Did discuss with him lipid-lowering diet in order to keep his cholesterol as low as possible. Does have a known history of obstructive sleep apnea and is consistently using his CPAP machine. Is getting ongoing medical benefit from its use. Does have known history of pulmonary hypertension and paroxysmal atrial fibrillation and does follow with cardiology for management of these chronic issues. Is stable from a cardiac standpoint. Does have a known history of COPD and his respiratory status is stable at this time. He does have chronic pain in both knees from known osteoarthritis and is requesting injections today. Patient otherwise has no other acute medical concerns.   Patient's recent lab reports are as follows:    Lab Results   Component Value Date/Time    WBC 8.5 03/10/2023 10:01 AM    RBC 3.29 03/10/2023 10:01 AM    RBC 3.89 10/21/2011 06:11 AM    HGB 10.6 03/10/2023 10:01 AM    HCT 32.3 03/10/2023 10:01 AM    MCV 98.2 03/10/2023 10:01 AM    MCH 32.2 03/10/2023 10:01 AM    MCHC 32.8 03/10/2023 10:01 AM    RDW 16.2 03/10/2023 10:01 AM     03/10/2023 10:01 AM     10/21/2011 06:11 AM    MPV 10.7 03/10/2023 10:01 AM       Lab Results   Component Value Date/Time    CHOL 230 03/10/2023 09:58 AM    CHOL 222 05/01/2019 12:00 AM    HDL 42 03/10/2023 09:58 AM    CHOLHDLRATIO 5.5 03/10/2023 09:58 AM    TRIG 613 03/10/2023 09:58 AM    VLDL NOT REPORTED 08/27/2021 09:05 AM       Lab Results   Component Value Date/Time    TSH 6.95 03/10/2023 09:58 AM       Lab Results   Component Value Date/Time     03/10/2023 10:01 AM    K 4.0 03/10/2023 10:01 AM     03/10/2023 10:01 AM    CO2 27 03/10/2023 10:01 AM    BUN 21 03/10/2023 10:01 AM    CREATININE 1.28 03/10/2023 10:01 AM    GLUCOSE 173 03/10/2023 10:01 AM    GLUCOSE 104 10/21/2011 06:11 AM    CALCIUM 9.8 03/10/2023 10:01 AM       Lab Results   Component Value Date/Time    LABA1C 7.4 03/10/2023 09:58 AM          Other review of systems are as noted below. Preventative measures are reviewed today. See health maintenance section for complete preventative plan of care. Did review patient's med list, allergies, social history, fam history, pmhx and pshx today as noted in the record. Review of Systems   Constitutional:  Negative for chills, fatigue and fever. HENT:  Negative for congestion, ear pain, postnasal drip, rhinorrhea, sinus pressure, sore throat and trouble swallowing. Eyes:  Negative for discharge and redness. Respiratory:  Negative for cough, shortness of breath and wheezing. Cardiovascular:  Negative for chest pain. Gastrointestinal:  Negative for abdominal pain, constipation, diarrhea, nausea and vomiting. Genitourinary:  Negative for dysuria, flank pain, frequency and urgency. Musculoskeletal:  Positive for arthralgias and gait problem. Negative for myalgias and neck pain. Skin:  Negative for rash and wound. Allergic/Immunologic: Negative for environmental allergies. Neurological:  Negative for dizziness, weakness, light-headedness and headaches. Hematological:  Negative for adenopathy. Psychiatric/Behavioral: Negative. Prior to Visit Medications    Medication Sig Taking? Authorizing Provider   warfarin (COUMADIN) 2.5 MG tablet TAKE 3 TABLETS BY MOUTH ON TUESDAYS.  TAKE 2 TABLETS ON ALL OTHER DAYS, OR AS DIRECTED BY INR RESULTS. Eileen Mitchell,    clopidogrel (PLAVIX) 75 MG tablet Take 1 tablet by mouth once daily  Naty Lazcano DO   tamsulosin (FLOMAX) 0.4 MG capsule Take 1 capsule by mouth once daily  Naty Lazcano DO   losartan (COZAAR) 50 MG tablet Take 1 tablet by mouth once daily  Naty Lazcano DO   Alectinib HCl (ALECENSA) 150 MG CAPS Take 600 mg by mouth 2 times daily 4 caps BID  Alan Alegria MD   oxybutynin (DITROPAN-XL) 10 MG extended release tablet Take 1 tablet by mouth once daily  Naty Lazcano DO   furosemide (LASIX) 20 MG tablet Take 1 tablet by mouth once daily  Naty Lazcano DO   levothyroxine (SYNTHROID) 137 MCG tablet Take 1 tablet by mouth Daily  Naty Lazcano DO   pravastatin (PRAVACHOL) 40 MG tablet Take 1 tablet by mouth in the evening  Naty Lazcano DO   Omega-3 Fatty Acids (FISH OIL) 1200 MG CPDR Take 2,400 mg by mouth 2 times daily  Historical Provider, MD   niacin 500 MG extended release capsule Take 500 mg by mouth 2 times daily  Historical Provider, MD   Metoprolol Tartrate 75 MG TABS TAKE 1 TABLET BY MOUTH TWO TIMES A DAY  Historical Provider, MD   flecainide (TAMBOCOR) 50 MG tablet Take 50 mg by mouth 2 times daily  Historical Provider, MD   ECHINACEA HERB PO Take by mouth daily  Historical Provider, MD   albuterol sulfate HFA (VENTOLIN HFA) 108 (90 Base) MCG/ACT inhaler Inhale 2 puffs into the lungs every 6 hours as needed for Wheezing or Shortness of Breath  Naty Lazcano DO   Multiple Vitamins-Minerals (MULTIVITAMIN PO) daily.   Historical Provider, MD        Social History     Tobacco Use    Smoking status: Former     Packs/day: 2.00     Years: 15.00     Pack years: 30.00     Types: Cigarettes     Start date: 1966     Quit date: 1985     Years since quittin.2    Smokeless tobacco: Never    Tobacco comments:     smoked 2 packs a day for 14 to 15 years, quit in    Substance Use Topics    Alcohol use: Yes     Comment: rare        There were no vitals filed for this visit. Estimated body mass index is 34.44 kg/m² as calculated from the following:    Height as of 3/7/23: 5' 10\" (1.778 m). Weight as of 3/7/23: 240 lb (108.9 kg). Physical Exam  Vitals and nursing note reviewed. Constitutional:       General: He is not in acute distress. Appearance: Normal appearance. He is well-developed. He is not diaphoretic. HENT:      Head: Normocephalic and atraumatic. Right Ear: Tympanic membrane, ear canal and external ear normal.      Left Ear: Tympanic membrane, ear canal and external ear normal.      Nose: Nose normal.      Mouth/Throat:      Mouth: Mucous membranes are moist.      Pharynx: Oropharynx is clear. No oropharyngeal exudate. Eyes:      General:         Right eye: No discharge. Left eye: No discharge. Conjunctiva/sclera: Conjunctivae normal.      Pupils: Pupils are equal, round, and reactive to light. Neck:      Thyroid: No thyromegaly. Cardiovascular:      Rate and Rhythm: Normal rate and regular rhythm. Heart sounds: Normal heart sounds. Pulmonary:      Effort: Pulmonary effort is normal.      Breath sounds: Normal breath sounds. No wheezing or rales. Abdominal:      General: Bowel sounds are normal. There is no distension. Palpations: Abdomen is soft. Tenderness: There is no abdominal tenderness. Musculoskeletal:         General: Swelling and tenderness present. Cervical back: Normal range of motion and neck supple. Comments: Bilateral knees with pain with palpation to the medial and lateral aspects of the knee joints. Does move the knees in a normal range of motion without difficulty   Lymphadenopathy:      Cervical: No cervical adenopathy. Skin:     General: Skin is warm and dry. Findings: No rash. Neurological:      Mental Status: He is alert and oriented to person, place, and time.    Psychiatric:         Behavior: Behavior normal.         Thought Content: Thought content normal.         Judgment: Judgment normal.         ASSESSMENT/PLAN:    Encounter Diagnoses   Name Primary?     Essential hypertension Yes    Type 2 diabetes mellitus with other circulatory complication, without long-term current use of insulin (HCC)     Acquired hypothyroidism     Familial hyperlipidemia     Obstructive sleep apnea     Elevated PSA     Pulmonary hypertension (HCC)     Paroxysmal A-fib (HCC)     COPD with asthma (HCC)     Chronic pain of both knees     Bilateral primary osteoarthritis of knee      Orders Placed This Encounter   Medications    triamcinolone acetonide (KENALOG-40) injection 40 mg    triamcinolone acetonide (KENALOG-40) injection 40 mg    levothyroxine (SYNTHROID) 150 MCG tablet     Sig: Take 1 tablet by mouth Daily     Dispense:  90 tablet     Refill:  1     Orders Placed This Encounter   Procedures    CBC with Auto Differential     Standing Status:   Future     Standing Expiration Date:   3/15/2024    Comprehensive Metabolic Panel     Standing Status:   Future     Standing Expiration Date:   3/15/2024    Hemoglobin A1C     Standing Status:   Future     Standing Expiration Date:   3/15/2024    Lipid Panel     Standing Status:   Future     Standing Expiration Date:   3/15/2024     Order Specific Question:   Is Patient Fasting?/# of Hours     Answer:   12 hours    PSA, Diagnostic     Standing Status:   Future     Standing Expiration Date:   3/16/2024    TSH     Standing Status:   Future     Standing Expiration Date:   3/15/2024    T4, Free     Standing Status:   Future     Standing Expiration Date:   3/15/2024    NM ARTHROCENTESIS ASPIR&/INJ MAJOR JT/BURSA W/O US    NM ARTHROCENTESIS ASPIR&/INJ MAJOR JT/BURSA W/O US     Procedure Note    PREOP DIAGNOSIS:  [] Right []  Left    [x] Bilateral Knee Pain    POSTOP DIAGNOSIS:  [] Right []  Left    [x] Bilateral Knee Pain    OPERATION:  [] Right []  Left    [x] Bilateral INTRA-ARTICULAR KNEE INJECTION      PROCEDURE:  After sterile prep, an Anterior and Lateral approach was used. [x] 40 mg Kenalog  [x]  2 cc 1% Xylocaine without Epi       []  12 mg of Celestone     Injected intra-articularly without incident. Pre- and post neurovascular status verified. No complications noted. Informed consent and time out was completed prior to procedure    At this point did not add any further medical therapy for his blood sugar elevation. Would be concerned that he would be at risk for hypoglycemia in consideration of the fact that he does have a known new pancreatic lesion and may have to undergo further treatments for possible liver and pancreatic mets. He is getting a biopsy on Monday and once it is determined the etiology of these new lesions then can make further intervention if needed regarding further blood sugar control. At this point I did encourage him to really work on dietary efforts including following a low-carb/low sugar diet and maintaining increased exercise to try to help keep his blood sugars optimally controlled. He does express understanding of this. Did make an adjustment to his thyroid dose. Patient is to continue on the rest of his current medical therapy. No additional changes are made at this time. Patient is to return to my office in 6 months duration or sooner if any further problems or symptoms arise. (Please note that portions of this note were completed with a voice recognition program. Efforts were made to edit the dictations but occasionally words are mis-transcribed.)    No follow-ups on file. An electronic signature was used to authenticate this note.     --Herbert Walters, DO on 3/16/2023 at 6:45 AM

## 2023-03-20 ENCOUNTER — HOSPITAL ENCOUNTER (OUTPATIENT)
Dept: CT IMAGING | Age: 78
Discharge: HOME | End: 2023-03-22
Payer: MEDICARE

## 2023-03-20 ENCOUNTER — HOSPITAL ENCOUNTER (OUTPATIENT)
Dept: ULTRASOUND IMAGING | Age: 78
Discharge: HOME | End: 2023-03-22
Payer: MEDICARE

## 2023-03-20 VITALS
OXYGEN SATURATION: 98 % | RESPIRATION RATE: 21 BRPM | HEART RATE: 62 BPM | SYSTOLIC BLOOD PRESSURE: 155 MMHG | DIASTOLIC BLOOD PRESSURE: 70 MMHG

## 2023-03-20 VITALS
HEART RATE: 60 BPM | DIASTOLIC BLOOD PRESSURE: 70 MMHG | RESPIRATION RATE: 16 BRPM | OXYGEN SATURATION: 96 % | TEMPERATURE: 97.3 F | SYSTOLIC BLOOD PRESSURE: 129 MMHG

## 2023-03-20 DIAGNOSIS — C34.90 ALK-POSITIVE NON-SMALL CELL LUNG CANCER (HCC): ICD-10-CM

## 2023-03-20 DIAGNOSIS — R16.0 LIVER MASS: ICD-10-CM

## 2023-03-20 DIAGNOSIS — C79.51 BONE METASTASIS: ICD-10-CM

## 2023-03-20 DIAGNOSIS — M89.9 BONE LESION: ICD-10-CM

## 2023-03-20 LAB
INR PPP: 1
PARTIAL THROMBOPLASTIN TIME: 20.4 SEC (ref 20.5–30.5)
PLATELET # BLD AUTO: 188 K/UL (ref 138–453)
PROTHROMBIN TIME: 10.3 SEC (ref 9.1–12.3)
REASON FOR REJECTION: NORMAL
ZZ NTE CLEAN UP: ORDERED TEST: NORMAL
ZZ NTE WITH NAME CLEAN UP: SPECIMEN SOURCE: NORMAL

## 2023-03-20 PROCEDURE — 85730 THROMBOPLASTIN TIME PARTIAL: CPT

## 2023-03-20 PROCEDURE — 88307 TISSUE EXAM BY PATHOLOGIST: CPT

## 2023-03-20 PROCEDURE — 88333 PATH CONSLTJ SURG CYTO XM 1: CPT

## 2023-03-20 PROCEDURE — 7100000011 HC PHASE II RECOVERY - ADDTL 15 MIN

## 2023-03-20 PROCEDURE — 85049 AUTOMATED PLATELET COUNT: CPT

## 2023-03-20 PROCEDURE — 88342 IMHCHEM/IMCYTCHM 1ST ANTB: CPT

## 2023-03-20 PROCEDURE — 85610 PROTHROMBIN TIME: CPT

## 2023-03-20 PROCEDURE — 88341 IMHCHEM/IMCYTCHM EA ADD ANTB: CPT

## 2023-03-20 PROCEDURE — 6360000002 HC RX W HCPCS: Performed by: RADIOLOGY

## 2023-03-20 PROCEDURE — 2709999900 US BIOPSY LIVER PERCUTANEOUS

## 2023-03-20 PROCEDURE — 7100000010 HC PHASE II RECOVERY - FIRST 15 MIN

## 2023-03-20 PROCEDURE — 76942 ECHO GUIDE FOR BIOPSY: CPT

## 2023-03-20 PROCEDURE — 47000 NEEDLE BIOPSY OF LIVER PERQ: CPT | Performed by: INTERNAL MEDICINE

## 2023-03-20 RX ORDER — SODIUM CHLORIDE 9 MG/ML
INJECTION, SOLUTION INTRAVENOUS CONTINUOUS
Status: DISCONTINUED | OUTPATIENT
Start: 2023-03-20 | End: 2023-03-23 | Stop reason: HOSPADM

## 2023-03-20 RX ORDER — FENTANYL CITRATE 50 UG/ML
INJECTION, SOLUTION INTRAMUSCULAR; INTRAVENOUS
Status: COMPLETED | OUTPATIENT
Start: 2023-03-20 | End: 2023-03-20

## 2023-03-20 RX ADMIN — FENTANYL CITRATE 50 MCG: 50 INJECTION, SOLUTION INTRAMUSCULAR; INTRAVENOUS at 12:32

## 2023-03-20 RX ADMIN — FENTANYL CITRATE 50 MCG: 50 INJECTION, SOLUTION INTRAMUSCULAR; INTRAVENOUS at 12:21

## 2023-03-20 ASSESSMENT — PAIN SCALES - GENERAL
PAINLEVEL_OUTOF10: 4

## 2023-03-20 NOTE — PROGRESS NOTES
Patient and his wife given D/C instructions, both verbalized understanding, patient and his wife told that coumadin and Plavix can resume tonight

## 2023-03-20 NOTE — H&P
daily 10/3/22   Deri Du, DO   pravastatin (PRAVACHOL) 40 MG tablet Take 1 tablet by mouth in the evening 4/28/22   Deri Du, DO   Omega-3 Fatty Acids (FISH OIL) 1200 MG CPDR Take 2,400 mg by mouth 2 times daily    Historical Provider, MD   niacin 500 MG extended release capsule Take 500 mg by mouth 2 times daily    Historical Provider, MD   ECHINACEA HERB PO Take by mouth daily    Historical Provider, MD   albuterol sulfate HFA (VENTOLIN HFA) 108 (90 Base) MCG/ACT inhaler Inhale 2 puffs into the lungs every 6 hours as needed for Wheezing or Shortness of Breath 2/13/19   Deri Du, DO   Multiple Vitamins-Minerals (MULTIVITAMIN PO) daily. 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 Past Surgical History   has a past surgical history that includes Nasal fracture surgery (1960); Vasectomy (Bilateral, 04/04/1980); Upper gastrointestinal endoscopy (01/21/2011); Colonoscopy (01/21/2011); Cardiac catheterization (10/20/2011); pre-malignant / benign skin lesion excision (Left, 01/16/2012); pre-malignant / benign skin lesion excision (Left, 07/19/2011); Appendectomy (age 11); Knee arthroscopy (Left, 03/19/2010); other surgical history (Left, 02/12/2010); Shoulder arthroscopy (Left, 10/17/14); Septoplasty (10/30/2015);  Colonoscopy

## 2023-03-20 NOTE — PROGRESS NOTES
Patient remains in IR at this time for procedure patient's wife remains in patient 's room offers no complaints, updated

## 2023-03-20 NOTE — DISCHARGE INSTRUCTIONS
Liver Biopsy Discharge Instructions      Instructions:    Resume normal diet unless instructed otherwise by your doctor. No strenuous activity for 48 hours. Clean site with soap and water. Biopsy report will be available within one week. Follow-up visit. No aspirin, alcohol, or arthritis medicine for 1 week. Report any of the Following to your Physician    Temperature over 100 degrees. Bleeding or hematoma (blood under the skin) at biopsy site. Redness, swelling, tenderness/pain or drainage at biopsy site. Trouble bleeding. Persistent shoulder pain. Chest pain. Shortness of breath. Please do not hesitate to ask if there are any further questions we can answer for you or anything else we can do to make you more comfortable.

## 2023-03-23 LAB — SURGICAL PATHOLOGY REPORT: NORMAL

## 2023-03-28 ENCOUNTER — OFFICE VISIT (OUTPATIENT)
Dept: ONCOLOGY | Age: 78
End: 2023-03-28
Payer: MEDICARE

## 2023-03-28 ENCOUNTER — TELEPHONE (OUTPATIENT)
Dept: ONCOLOGY | Age: 78
End: 2023-03-28

## 2023-03-28 ENCOUNTER — OFFICE VISIT (OUTPATIENT)
Dept: PULMONOLOGY | Age: 78
End: 2023-03-28
Payer: MEDICARE

## 2023-03-28 VITALS
DIASTOLIC BLOOD PRESSURE: 70 MMHG | OXYGEN SATURATION: 98 % | WEIGHT: 236 LBS | BODY MASS INDEX: 33.79 KG/M2 | HEIGHT: 70 IN | SYSTOLIC BLOOD PRESSURE: 114 MMHG | TEMPERATURE: 97.3 F

## 2023-03-28 VITALS
WEIGHT: 236 LBS | SYSTOLIC BLOOD PRESSURE: 114 MMHG | OXYGEN SATURATION: 98 % | DIASTOLIC BLOOD PRESSURE: 70 MMHG | BODY MASS INDEX: 33.79 KG/M2 | HEIGHT: 70 IN | HEART RATE: 66 BPM

## 2023-03-28 DIAGNOSIS — G47.33 OBSTRUCTIVE SLEEP APNEA: ICD-10-CM

## 2023-03-28 DIAGNOSIS — C78.7 ADENOCARCINOMA OF RIGHT LUNG METASTATIC TO LIVER (HCC): ICD-10-CM

## 2023-03-28 DIAGNOSIS — D69.6 THROMBOCYTOPENIA, UNSPECIFIED (HCC): ICD-10-CM

## 2023-03-28 DIAGNOSIS — C34.91 ADENOCARCINOMA OF RIGHT LUNG METASTATIC TO LIVER (HCC): ICD-10-CM

## 2023-03-28 DIAGNOSIS — C79.51 BONE METASTASIS: ICD-10-CM

## 2023-03-28 DIAGNOSIS — J44.9 COPD WITH ASTHMA (HCC): Primary | ICD-10-CM

## 2023-03-28 DIAGNOSIS — D64.9 ANEMIA, UNSPECIFIED TYPE: ICD-10-CM

## 2023-03-28 DIAGNOSIS — I27.20 PULMONARY HYPERTENSION (HCC): ICD-10-CM

## 2023-03-28 DIAGNOSIS — C34.90 ALK-POSITIVE NON-SMALL CELL LUNG CANCER (HCC): Primary | ICD-10-CM

## 2023-03-28 PROCEDURE — G8427 DOCREV CUR MEDS BY ELIG CLIN: HCPCS | Performed by: INTERNAL MEDICINE

## 2023-03-28 PROCEDURE — 1123F ACP DISCUSS/DSCN MKR DOCD: CPT | Performed by: INTERNAL MEDICINE

## 2023-03-28 PROCEDURE — 99214 OFFICE O/P EST MOD 30 MIN: CPT | Performed by: INTERNAL MEDICINE

## 2023-03-28 PROCEDURE — G8484 FLU IMMUNIZE NO ADMIN: HCPCS | Performed by: INTERNAL MEDICINE

## 2023-03-28 PROCEDURE — 1036F TOBACCO NON-USER: CPT | Performed by: INTERNAL MEDICINE

## 2023-03-28 PROCEDURE — G8417 CALC BMI ABV UP PARAM F/U: HCPCS | Performed by: INTERNAL MEDICINE

## 2023-03-28 PROCEDURE — 3074F SYST BP LT 130 MM HG: CPT | Performed by: INTERNAL MEDICINE

## 2023-03-28 PROCEDURE — 3078F DIAST BP <80 MM HG: CPT | Performed by: INTERNAL MEDICINE

## 2023-03-28 PROCEDURE — 99215 OFFICE O/P EST HI 40 MIN: CPT | Performed by: INTERNAL MEDICINE

## 2023-03-28 PROCEDURE — 3023F SPIROM DOC REV: CPT | Performed by: INTERNAL MEDICINE

## 2023-03-28 NOTE — PROGRESS NOTES
Writer sent request per Dr. Pallavi Chavez for [place] suresh
hoarseness and voice change   Respiratory: negative for sputum, dyspnea, wheezing, hemoptysis, chest pain. Positive for shortness of breath with exertion  Cardiovascular: negative for chest pain, dyspnea, palpitations, orthopnea, PND   Gastrointestinal: negative for nausea, vomiting, diarrhea, constipation, abdominal pain, Dysphagia, hematemesis and hematochezia   Genitourinary: negative for frequency, dysuria, nocturia, urinary incontinence, and hematuria   Integument: negative for rash, skin lesions, bruises. Hematologic/Lymphatic: negative for easy bruising, bleeding, lymphadenopathy, or petechiae   Endocrine: negative for heat or cold intolerance,weight changes, change in bowel habits and hair loss   Musculoskeletal: negative for myalgias, arthralgias, pain, joint swelling,and bone pain. Neurological: negative for headaches, dizziness, seizures, weakness, numbness. Positive for memory loss    Physical Exam:  Vitals: /70   Temp 97.3 °F (36.3 °C)   Ht 5' 10\" (1.778 m)   Wt 236 lb (107 kg)   SpO2 98%   BMI 33.86 kg/m²     General appearance - well appearing, no in pain or distress  Mental status - AAO X3  Eyes - pupils equal and reactive, extraocular eye movements intact  Mouth - mucous membranes moist, pharynx normal without lesions  Neck - supple, no significant adenopathy  Lymphatics - no palpable lymphadenopathy, no hepatosplenomegaly  Chest - clear to auscultation, no wheezes, rales or rhonchi, symmetric air entry.     Heart - normal rate, regular rhythm, normal S1, S2, no murmurs  Abdomen - soft, nontender, nondistended, no masses or organomegaly  Neurological - alert, oriented, normal speech, no focal findings or movement disorder noted  Extremities - peripheral pulses normal, no pedal edema, no clubbing or cyanosis  Skin - normal coloration and turgor, no rashes, no suspicious skin lesions noted     DATA:    Results for orders placed or performed during the hospital encounter of 03/20/23

## 2023-03-28 NOTE — TELEPHONE ENCOUNTER
Name: Freedom Craig  : 1945  MRN: [id number]    Oncology Navigation- Initial Note:    Intake-  Contact Type: Medical Oncology    Diagnosis: Thoracic- malignant    Home Disposition: Lives with other who is able to assist    Patient needs and barriers to care: Financial Concerns/ Disability   High cost medication coverage  Referral Source:  pathology report    Receptive to Advanced Care Planning/ Palliative Care:  Not addressed    Interventions-   General Interventions: Navigator following for continuity of care. Education/Screenings:  yes - Instructed on medical oncology nurse navigation. Provided printed contact information. Instructed on navigator availability and how to access back up assistance for urgent needs. Patient was previously navigated by Gypsy Lyons. Currently on HRT: no     Referrals: oral compliance     Biopsy site status: NA     Continuum of Care: Diagnosis/Active Treatment Cancer now stage IV. Notes: notified by oral compliance that Lorbrena medication copay is $3000 per month. She will check on funding assistance and start application process for free medication. She will contact patient to complete. Care team updated. Navigator following for continuity of care.       Electronically signed by Juanito Sherwood RN on 3/28/2023 at 2:36 PM

## 2023-04-04 ENCOUNTER — HOSPITAL ENCOUNTER (OUTPATIENT)
Dept: PHARMACY | Age: 78
Setting detail: THERAPIES SERIES
Discharge: HOME | End: 2023-04-04
Payer: MEDICARE

## 2023-04-04 DIAGNOSIS — I48.0 PAROXYSMAL A-FIB (HCC): Primary | ICD-10-CM

## 2023-04-04 LAB
INR BLD: 1.3
PROTIME: 15.8 SECONDS

## 2023-04-04 PROCEDURE — 36416 COLLJ CAPILLARY BLOOD SPEC: CPT

## 2023-04-04 PROCEDURE — 99212 OFFICE O/P EST SF 10 MIN: CPT

## 2023-04-04 PROCEDURE — 85610 PROTHROMBIN TIME: CPT

## 2023-04-04 NOTE — PROGRESS NOTES
ANTICOAGULATION SERVICE    Date of Clinic Visit:  4/4/2023    Sherren Burton is a 69 y.o. male who presents to clinic today for anticoagulation monitoring and adjustment. Recent INR Results:  Internal QC passed  Lab Results   Component Value Date    INR 1.3 04/04/2023    INR 1.0 03/20/2023       Dosing Plan  As of 4/4/2023      TTR:  63.2 % (2.1 y)   Full warfarin instructions:  4/5: 7.5 mg; 4/7: 5 mg; Otherwise 5 mg every Wed; 7.5 mg all other days                 Assessment/Plan:      Modify warfarin dose as noted above:  Patient is well below target after restarting 2 weeks ago. Will increase dose 11.1 % and re-check 1 week. Patient hasn't missed that he can think though it is possible that he missed Sunday dose. Next Clinic Appointment:  Return date  As of 4/4/2023      TTR:  63.2 % (2.1 y)   Next INR check:  4/11/2023               Please call [facility] at [phone] with any questions. Thanks!   EVERARDO Cisneros [facility]  Anticoagulation Service Pharmacist  4/4/2023 11:01 AM  For Pharmacy Admin Tracking Only    Intervention Detail: Dose Adjustment: 1, reason: Therapy Optimization  Total # of Interventions Recommended: 1  Total # of Interventions Accepted: 1  Time Spent (min): 20

## 2023-04-05 ENCOUNTER — TELEMEDICINE (OUTPATIENT)
Dept: FAMILY MEDICINE CLINIC | Age: 78
End: 2023-04-05
Payer: MEDICARE

## 2023-04-05 DIAGNOSIS — Z00.00 MEDICARE ANNUAL WELLNESS VISIT, SUBSEQUENT: Primary | ICD-10-CM

## 2023-04-05 PROCEDURE — G0439 PPPS, SUBSEQ VISIT: HCPCS | Performed by: FAMILY MEDICINE

## 2023-04-05 PROCEDURE — 1123F ACP DISCUSS/DSCN MKR DOCD: CPT | Performed by: FAMILY MEDICINE

## 2023-04-05 ASSESSMENT — PATIENT HEALTH QUESTIONNAIRE - PHQ9
SUM OF ALL RESPONSES TO PHQ QUESTIONS 1-9: 0
SUM OF ALL RESPONSES TO PHQ QUESTIONS 1-9: 0
2. FEELING DOWN, DEPRESSED OR HOPELESS: 0
SUM OF ALL RESPONSES TO PHQ QUESTIONS 1-9: 0
1. LITTLE INTEREST OR PLEASURE IN DOING THINGS: 0
SUM OF ALL RESPONSES TO PHQ QUESTIONS 1-9: 0
SUM OF ALL RESPONSES TO PHQ9 QUESTIONS 1 & 2: 0

## 2023-04-05 ASSESSMENT — LIFESTYLE VARIABLES
HOW OFTEN DO YOU HAVE A DRINK CONTAINING ALCOHOL: NEVER
HOW MANY STANDARD DRINKS CONTAINING ALCOHOL DO YOU HAVE ON A TYPICAL DAY: PATIENT DOES NOT DRINK

## 2023-04-05 NOTE — PROGRESS NOTES
This encounter was performed under my, Loni Rogers, direct supervision, 4/5/2023.
Izabella Bustamante as PCP - Empaneled Provider  Du Lynne MD as Consulting Physician (Hematology and Oncology)  Nydia Yanez MD as Consulting Physician (Cardiology)  Shelia Govea MD as Consulting Physician (Pulmonary Disease)  Reji Cummins, JENSEN as Nurse Navigator (Oncology)     Reviewed and updated this visit:  Tobacco  Allergies  Meds  Med Hx  Surg Hx  Soc Hx  Fam Hx        Jaylin Rhodes RN, 4/5/2023, performed the documented evaluation under the direct supervision of the attending physician. Bang Mak, was evaluated through a synchronous (real-time) audio encounter. The patient (or guardian if applicable) is aware that this is a billable service, which includes applicable co-pays. This Virtual Visit was conducted with patient's (and/or legal guardian's) consent. The visit was conducted pursuant to the emergency declaration under the [address] [place] [address], [address] [place] [address] waiver authority and the [employer] General Act. Patient identification was verified, and a caregiver was present when appropriate.    The patient was located at Home: [address]  Provider was located at [facility] (Appt Dept): [address] [facility],  [phone] Julia Vincent RN

## 2023-04-05 NOTE — PATIENT INSTRUCTIONS
diabetes, high blood pressure, and high cholesterol. If you think you may have a problem with alcohol or drug use, talk to your doctor. Medicines    Take your medicines exactly as prescribed. Call your doctor if you think you are having a problem with your medicine.     If your doctor recommends aspirin, take the amount directed each day. Make sure you take aspirin and not another kind of pain reliever, such as acetaminophen (Tylenol). When should you call for help? Call 911 if you have symptoms of a heart attack. These may include:    Chest pain or pressure, or a strange feeling in the chest.     Sweating.     Shortness of breath.     Pain, pressure, or a strange feeling in the back, neck, jaw, or upper belly or in one or both shoulders or arms.     Lightheadedness or sudden weakness.     A fast or irregular heartbeat. After you call 911, the  may tell you to chew 1 adult-strength or 2 to 4 low-dose aspirin. Wait for an ambulance. Do not try to drive yourself. Watch closely for changes in your health, and be sure to contact your doctor if you have any problems. Where can you learn more? Go to http://www.mcdaniel.com/ and enter F075 to learn more about \"A Healthy Heart: Care Instructions. \"  Current as of: September 7, 2022               Content Version: 13.6  © 4053-5855 Healthwise, [employer]. Care instructions adapted under license by [facility] ([facility]). If you have questions about a medical condition or this instruction, always ask your healthcare professional. [place] [address] any warranty or liability for your use of this information. Personalized Preventive Plan for Phuong Waite Shock - 4/5/2023  Medicare offers a range of preventive health benefits. Some of the tests and screenings are paid in full while other may be subject to a deductible, co-insurance, and/or copay.     Some of these benefits include a comprehensive review of your medical history including

## 2023-04-07 ENCOUNTER — TELEPHONE (OUTPATIENT)
Dept: INFUSION THERAPY | Age: 78
End: 2023-04-07

## 2023-04-25 ENCOUNTER — TELEPHONE (OUTPATIENT)
Dept: ONCOLOGY | Age: 78
End: 2023-04-25

## 2023-04-25 ENCOUNTER — HOSPITAL ENCOUNTER (OUTPATIENT)
Dept: PHARMACY | Age: 78
Setting detail: THERAPIES SERIES
Discharge: HOME | End: 2023-04-25
Payer: MEDICARE

## 2023-04-25 ENCOUNTER — OFFICE VISIT (OUTPATIENT)
Dept: ONCOLOGY | Age: 78
End: 2023-04-25
Payer: MEDICARE

## 2023-04-25 ENCOUNTER — HOSPITAL ENCOUNTER (OUTPATIENT)
Age: 78
Discharge: HOME | End: 2023-04-25
Payer: MEDICARE

## 2023-04-25 VITALS
OXYGEN SATURATION: 95 % | BODY MASS INDEX: 33.58 KG/M2 | HEART RATE: 84 BPM | WEIGHT: 234.6 LBS | HEIGHT: 70 IN | DIASTOLIC BLOOD PRESSURE: 84 MMHG | TEMPERATURE: 98.1 F | SYSTOLIC BLOOD PRESSURE: 132 MMHG

## 2023-04-25 DIAGNOSIS — C34.90 ALK-POSITIVE NON-SMALL CELL LUNG CANCER (HCC): Primary | ICD-10-CM

## 2023-04-25 DIAGNOSIS — D64.9 ANEMIA, UNSPECIFIED TYPE: ICD-10-CM

## 2023-04-25 DIAGNOSIS — D69.6 THROMBOCYTOPENIA, UNSPECIFIED (HCC): ICD-10-CM

## 2023-04-25 DIAGNOSIS — C34.90 ALK-POSITIVE NON-SMALL CELL LUNG CANCER (HCC): ICD-10-CM

## 2023-04-25 DIAGNOSIS — I48.0 PAROXYSMAL A-FIB (HCC): Primary | ICD-10-CM

## 2023-04-25 LAB
ABSOLUTE EOS #: 0.23 K/UL (ref 0–0.44)
ABSOLUTE IMMATURE GRANULOCYTE: 0.11 K/UL (ref 0–0.3)
ABSOLUTE LYMPH #: 1.62 K/UL (ref 1.1–3.7)
ABSOLUTE MONO #: 0.94 K/UL (ref 0.1–1.2)
ALBUMIN SERPL-MCNC: 3.5 G/DL (ref 3.5–5.2)
ALBUMIN/GLOBULIN RATIO: 0.9 (ref 1–2.5)
ALP SERPL-CCNC: 154 U/L (ref 40–129)
ALT SERPL-CCNC: 30 U/L (ref 5–41)
ANION GAP SERPL CALCULATED.3IONS-SCNC: 10 MMOL/L (ref 9–17)
AST SERPL-CCNC: 32 U/L
BASOPHILS # BLD: 1 % (ref 0–2)
BASOPHILS ABSOLUTE: 0.05 K/UL (ref 0–0.2)
BILIRUB SERPL-MCNC: 0.3 MG/DL (ref 0.3–1.2)
BUN SERPL-MCNC: 18 MG/DL (ref 8–23)
BUN/CREAT BLD: 17 (ref 9–20)
CALCIUM SERPL-MCNC: 9.8 MG/DL (ref 8.6–10.4)
CHLORIDE SERPL-SCNC: 101 MMOL/L (ref 98–107)
CO2 SERPL-SCNC: 27 MMOL/L (ref 20–31)
CREAT SERPL-MCNC: 1.06 MG/DL (ref 0.7–1.2)
EOSINOPHILS RELATIVE PERCENT: 2 % (ref 1–4)
GFR SERPL CREATININE-BSD FRML MDRD: >60 ML/MIN/1.73M2
GLUCOSE SERPL-MCNC: 269 MG/DL (ref 70–99)
HCT VFR BLD AUTO: 30.2 % (ref 40.7–50.3)
HGB BLD-MCNC: 9.4 G/DL (ref 13–17)
IMMATURE GRANULOCYTES: 1 %
INR BLD: 1.8
LYMPHOCYTES # BLD: 15 % (ref 24–43)
MCH RBC QN AUTO: 30.7 PG (ref 25.2–33.5)
MCHC RBC AUTO-ENTMCNC: 31.1 G/DL (ref 25.2–33.5)
MCV RBC AUTO: 98.7 FL (ref 82.6–102.9)
MONOCYTES # BLD: 9 % (ref 3–12)
NRBC AUTOMATED: 0 PER 100 WBC
PDW BLD-RTO: 15.7 % (ref 11.8–14.4)
PLATELET # BLD AUTO: 212 K/UL (ref 138–453)
PMV BLD AUTO: 10.7 FL (ref 8.1–13.5)
POTASSIUM SERPL-SCNC: 4.2 MMOL/L (ref 3.7–5.3)
PROT SERPL-MCNC: 7.4 G/DL (ref 6.4–8.3)
RBC # BLD: 3.06 M/UL (ref 4.21–5.77)
RBC # BLD: ABNORMAL 10*6/UL
SEG NEUTROPHILS: 72 % (ref 36–65)
SEGMENTED NEUTROPHILS ABSOLUTE COUNT: 8.14 K/UL (ref 1.5–8.1)
SODIUM SERPL-SCNC: 138 MMOL/L (ref 135–144)
WBC # BLD AUTO: 11.1 K/UL (ref 3.5–11.3)

## 2023-04-25 PROCEDURE — G8427 DOCREV CUR MEDS BY ELIG CLIN: HCPCS | Performed by: INTERNAL MEDICINE

## 2023-04-25 PROCEDURE — 99214 OFFICE O/P EST MOD 30 MIN: CPT | Performed by: INTERNAL MEDICINE

## 2023-04-25 PROCEDURE — 85610 PROTHROMBIN TIME: CPT

## 2023-04-25 PROCEDURE — 36416 COLLJ CAPILLARY BLOOD SPEC: CPT

## 2023-04-25 PROCEDURE — 36415 COLL VENOUS BLD VENIPUNCTURE: CPT

## 2023-04-25 PROCEDURE — G8417 CALC BMI ABV UP PARAM F/U: HCPCS | Performed by: INTERNAL MEDICINE

## 2023-04-25 PROCEDURE — 3079F DIAST BP 80-89 MM HG: CPT | Performed by: INTERNAL MEDICINE

## 2023-04-25 PROCEDURE — 80053 COMPREHEN METABOLIC PANEL: CPT

## 2023-04-25 PROCEDURE — 1036F TOBACCO NON-USER: CPT | Performed by: INTERNAL MEDICINE

## 2023-04-25 PROCEDURE — 3075F SYST BP GE 130 - 139MM HG: CPT | Performed by: INTERNAL MEDICINE

## 2023-04-25 PROCEDURE — 99212 OFFICE O/P EST SF 10 MIN: CPT

## 2023-04-25 PROCEDURE — 85025 COMPLETE CBC W/AUTO DIFF WBC: CPT

## 2023-04-25 PROCEDURE — 1123F ACP DISCUSS/DSCN MKR DOCD: CPT | Performed by: INTERNAL MEDICINE

## 2023-04-25 NOTE — PROGRESS NOTES
ANTICOAGULATION SERVICE    Date of Clinic Visit:  4/25/2023    Chata Ford is a 69 y.o. male who presents to clinic today for anticoagulation monitoring and adjustment. Recent INR Results:  Internal QC passed  Lab Results   Component Value Date    INR 1.8 04/25/2023    INR 2.5 04/11/2023       Current Warfarin Dosage:  Dosing Plan  As of 4/25/2023      TTR:  63.1 % (2.2 y)   Full warfarin instructions:  6.25 mg every Mon, Wed, Fri; 7.5 mg all other days                 Assessment/Plan:    Modify warfarin dose as noted above: INR is low today after dose decrease of 5.5% last visit. I will increase dose back up 2.6% and recheck in two weeks. Next Clinic Appointment:  Return date  As of 4/25/2023      TTR:  63.1 % (2.2 y)   Next INR check:  5/9/2023               Please call [facility] at [phone] with any questions. Thanks!   Jayesh Gracia [facility]  Anticoagulation Service Pharmacist  4/25/2023 9:53 AM    For Pharmacy Admin Tracking Only    Intervention Detail: Dose Adjustment: 1, reason: Therapy Optimization  Total # of Interventions Recommended: 1  Total # of Interventions Accepted: 1  Time Spent (min): 15

## 2023-04-25 NOTE — TELEPHONE ENCOUNTER
Name: Asad Aguero  : 1945  MRN: [id number]    Oncology Navigation Follow-Up Note    Contact Type:  Medical Oncology    Notes:   Writer met with patient and wife while in office for oncology appt. He states he is doing well. He received Lorlatinib from pharmacy assistance program. He has started taking it. He denies any navigation needs at present. Writer provided contact information again. Encouraged them to call if they have any questions or concerns. Navigator following for continuity of care.     Per Dr. Mansfield Boas,  Refer to [place] [facility]   Cbc cmp today   Rv after being seen in [facility]       Electronically signed by Dorothy Loomis RN on 2023 at 11:05 AM

## 2023-04-25 NOTE — PROGRESS NOTES
Connie Mak                                                                                                                  4/25/2023  MRN:   [id number]  YOB: 1945  PCP:                           Herbert Walters DO  Referring Physician: Muriel Garrido  Treating Physician Name: Paige Machado MD      Reason for visit:  Chief Complaint   Patient presents with    Lung Cancer     4 week follow up    Discussed path report. 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 Disease progression, biopsy-proven-3/2023  Repeat NGS testing negative for ALK mutation on liquid biopsy, tumor tissue testing pending-4/2023    Active and recent treatments:  concurrent chemoradiation using carboplatin and Taxol  Consolidation therapy with Imfinzi-1/2021  Carboplatin, Alimta and bevacizumab x1-8/2021 while awaiting results of NGS  Alectinib-10/8/2021  Lorlatinib-4/2023    Summary of Case/History:    Connie Mak a 69 y.o.male is a patient with biopsy confirmed adenocarcinoma of right lung presents to the office to establish care and for further evaluation treatment recommendations. Patient was initially seen by pulmonary team for lung nodules. Patient CT scan from July 2019 showed a right-sided pleural calcification thickening concerning for asbestos exposure. Patient also noted to have multiple pulmonary nodules measuring between 1.5 x 1.3 cm. Follow-up CT chest from July 2020 showed increase in size of the right lower lobe lung nodule which now measured 2.1 cm. Subsequently patient underwent CT PET on 8/22 which showed FDG avid right lower lobe lung nodule with SUV of 4.6. Patient CT PET was also positive for subcarinal lymph node with SUV of 4.6.   Patient underwent bronchoscopy with endobronchial ultrasound biopsy of the subcarinal lymph node

## 2023-05-06 PROBLEM — C34.91 ADENOCARCINOMA OF RIGHT LUNG (HCC): Status: RESOLVED | Noted: 2020-09-29 | Resolved: 2023-01-01

## 2023-05-06 PROBLEM — C78.7 ADENOCARCINOMA OF RIGHT LUNG METASTATIC TO LIVER (HCC): Status: ACTIVE | Noted: 2020-10-15

## 2023-05-08 RX ORDER — PRAVASTATIN SODIUM 40 MG
TABLET ORAL
Qty: 90 TABLET | Refills: 3 | Status: SHIPPED | OUTPATIENT
Start: 2023-05-08

## 2023-05-08 NOTE — TELEPHONE ENCOUNTER
Bridgette Rowland called requesting a refill of the below medication which has been pended for you:     Requested Prescriptions     Pending Prescriptions Disp Refills    pravastatin (PRAVACHOL) 40 MG tablet [Pharmacy Med Name: Pravastatin Sodium 40 MG Oral Tablet] 90 tablet 0     Sig: Take 1 tablet by mouth in the evening       Last Appointment Date: 4/5/2023  Next Appointment Date: 9/18/2023    Allergies   Allergen Reactions    Effient [Prasugrel] Other (See Comments)     Superficial skin bleeding. Ace Inhibitors Other (See Comments)     Cough. Statins Other (See Comments)     Myalgias.

## 2023-05-09 ENCOUNTER — TELEPHONE (OUTPATIENT)
Dept: ONCOLOGY | Age: 78
End: 2023-05-09

## 2023-05-09 ENCOUNTER — HOSPITAL ENCOUNTER (OUTPATIENT)
Dept: PHARMACY | Age: 78
Setting detail: THERAPIES SERIES
Discharge: HOME | End: 2023-05-09
Payer: MEDICARE

## 2023-05-09 ENCOUNTER — OFFICE VISIT (OUTPATIENT)
Dept: ONCOLOGY | Age: 78
End: 2023-05-09
Payer: MEDICARE

## 2023-05-09 VITALS
BODY MASS INDEX: 32.01 KG/M2 | WEIGHT: 223.6 LBS | HEART RATE: 88 BPM | SYSTOLIC BLOOD PRESSURE: 118 MMHG | DIASTOLIC BLOOD PRESSURE: 62 MMHG | OXYGEN SATURATION: 94 % | RESPIRATION RATE: 16 BRPM | TEMPERATURE: 97.6 F | HEIGHT: 70 IN

## 2023-05-09 DIAGNOSIS — C34.90 ALK-POSITIVE NON-SMALL CELL LUNG CANCER (HCC): Primary | ICD-10-CM

## 2023-05-09 DIAGNOSIS — I48.0 PAROXYSMAL A-FIB (HCC): Primary | ICD-10-CM

## 2023-05-09 DIAGNOSIS — D64.9 ANEMIA, UNSPECIFIED TYPE: ICD-10-CM

## 2023-05-09 LAB — INR BLD: 2.4

## 2023-05-09 PROCEDURE — G8427 DOCREV CUR MEDS BY ELIG CLIN: HCPCS | Performed by: INTERNAL MEDICINE

## 2023-05-09 PROCEDURE — 99215 OFFICE O/P EST HI 40 MIN: CPT | Performed by: INTERNAL MEDICINE

## 2023-05-09 PROCEDURE — 3078F DIAST BP <80 MM HG: CPT | Performed by: INTERNAL MEDICINE

## 2023-05-09 PROCEDURE — 85610 PROTHROMBIN TIME: CPT

## 2023-05-09 PROCEDURE — 99211 OFF/OP EST MAY X REQ PHY/QHP: CPT

## 2023-05-09 PROCEDURE — G8417 CALC BMI ABV UP PARAM F/U: HCPCS | Performed by: INTERNAL MEDICINE

## 2023-05-09 PROCEDURE — 36416 COLLJ CAPILLARY BLOOD SPEC: CPT

## 2023-05-09 PROCEDURE — 1036F TOBACCO NON-USER: CPT | Performed by: INTERNAL MEDICINE

## 2023-05-09 PROCEDURE — 3074F SYST BP LT 130 MM HG: CPT | Performed by: INTERNAL MEDICINE

## 2023-05-09 PROCEDURE — 1123F ACP DISCUSS/DSCN MKR DOCD: CPT | Performed by: INTERNAL MEDICINE

## 2023-05-09 RX ORDER — ONDANSETRON 2 MG/ML
8 INJECTION INTRAMUSCULAR; INTRAVENOUS
OUTPATIENT
Start: 2023-05-09

## 2023-05-09 RX ORDER — CYANOCOBALAMIN 1000 UG/ML
1000 INJECTION, SOLUTION INTRAMUSCULAR; SUBCUTANEOUS ONCE
OUTPATIENT
Start: 2023-05-09 | End: 2023-05-09

## 2023-05-09 RX ORDER — MEPERIDINE HYDROCHLORIDE 50 MG/ML
12.5 INJECTION INTRAMUSCULAR; INTRAVENOUS; SUBCUTANEOUS PRN
OUTPATIENT
Start: 2023-05-09

## 2023-05-09 RX ORDER — PALONOSETRON 0.05 MG/ML
0.25 INJECTION, SOLUTION INTRAVENOUS ONCE
OUTPATIENT
Start: 2023-05-09 | End: 2023-05-09

## 2023-05-09 RX ORDER — EPINEPHRINE 1 MG/ML
0.3 INJECTION, SOLUTION, CONCENTRATE INTRAVENOUS PRN
OUTPATIENT
Start: 2023-05-09

## 2023-05-09 RX ORDER — SODIUM CHLORIDE 9 MG/ML
5-250 INJECTION, SOLUTION INTRAVENOUS PRN
OUTPATIENT
Start: 2023-05-09

## 2023-05-09 RX ORDER — CYANOCOBALAMIN 1000 UG/ML
1000 INJECTION, SOLUTION INTRAMUSCULAR; SUBCUTANEOUS
OUTPATIENT
Start: 2023-05-09

## 2023-05-09 RX ORDER — ACETAMINOPHEN 325 MG/1
650 TABLET ORAL
OUTPATIENT
Start: 2023-05-09

## 2023-05-09 RX ORDER — DIPHENHYDRAMINE HYDROCHLORIDE 50 MG/ML
50 INJECTION INTRAMUSCULAR; INTRAVENOUS
OUTPATIENT
Start: 2023-05-09

## 2023-05-09 RX ORDER — FAMOTIDINE 10 MG/ML
20 INJECTION, SOLUTION INTRAVENOUS
OUTPATIENT
Start: 2023-05-09

## 2023-05-09 RX ORDER — HEPARIN SODIUM (PORCINE) LOCK FLUSH IV SOLN 100 UNIT/ML 100 UNIT/ML
500 SOLUTION INTRAVENOUS PRN
OUTPATIENT
Start: 2023-05-09

## 2023-05-09 RX ORDER — ALBUTEROL SULFATE 90 UG/1
4 AEROSOL, METERED RESPIRATORY (INHALATION) PRN
OUTPATIENT
Start: 2023-05-09

## 2023-05-09 RX ORDER — SODIUM CHLORIDE 9 MG/ML
INJECTION, SOLUTION INTRAVENOUS CONTINUOUS
OUTPATIENT
Start: 2023-05-09

## 2023-05-09 RX ORDER — SODIUM CHLORIDE 0.9 % (FLUSH) 0.9 %
5-40 SYRINGE (ML) INJECTION PRN
OUTPATIENT
Start: 2023-05-09

## 2023-05-09 NOTE — PROGRESS NOTES
ANTICOAGULATION SERVICE    Date of Clinic Visit:  2023    García Ford is a 69 y.o. male who presents to clinic today for anticoagulation monitoring and adjustment. Recent INR Results:  Internal QC passed  Lab Results   Component Value Date    INR 2.4 2023    INR 1.8 2023       Current Warfarin Dosage:  Dosing Plan  As of 2023      TTR:  63.2 % (2.2 y)   Full warfarin instructions:  6.25 mg every Mon, Wed, Fri; 7.5 mg all other days                 Assessment/Plan:    Continue current regimen as INR remains stable. INR is back in range after dose adjustment last visit. I will continue same dose and recheck in 3 weeks. Next Clinic Appointment:  Return date  As of 2023      TTR:  63.2 % (2.2 y)   Next INR check:  2023               Please call [facility] at [phone] with any questions. Thanks!   Anais Almonte, [facility]  Anticoagulation Service Pharmacist  2023 9:00 AM    For Pharmacy Admin Tracking Only    Intervention Detail: Adherence Monitorin  Total # of Interventions Recommended: 0  Total # of Interventions Accepted: 0  Time Spent (min): 15

## 2023-05-09 NOTE — PROGRESS NOTES
Writer notified Myla for need for infusion, scan scheduled
reading. It such instances, actual meaning can be extrapolated by contextual diversion. Marin Lopez

## 2023-05-12 ENCOUNTER — HOSPITAL ENCOUNTER (OUTPATIENT)
Age: 78
Discharge: HOME | End: 2023-05-12
Payer: MEDICARE

## 2023-05-12 DIAGNOSIS — C34.90 ALK-POSITIVE NON-SMALL CELL LUNG CANCER (HCC): ICD-10-CM

## 2023-05-12 DIAGNOSIS — C79.51 MALIGNANT NEOPLASM METASTATIC TO BONE (HCC): ICD-10-CM

## 2023-05-12 DIAGNOSIS — M89.9 BONE LESION: ICD-10-CM

## 2023-05-12 LAB
ABSOLUTE EOS #: 0.22 K/UL (ref 0–0.44)
ABSOLUTE IMMATURE GRANULOCYTE: 0.07 K/UL (ref 0–0.3)
ABSOLUTE LYMPH #: 0.97 K/UL (ref 1.1–3.7)
ABSOLUTE MONO #: 0.75 K/UL (ref 0.1–1.2)
ALBUMIN SERPL-MCNC: 3.3 G/DL (ref 3.5–5.2)
ALBUMIN/GLOBULIN RATIO: 0.8 (ref 1–2.5)
ALP SERPL-CCNC: 205 U/L (ref 40–129)
ALT SERPL-CCNC: 36 U/L (ref 5–41)
ANION GAP SERPL CALCULATED.3IONS-SCNC: 13 MMOL/L (ref 9–17)
AST SERPL-CCNC: 58 U/L
BACTERIA: ABNORMAL
BASOPHILS # BLD: 0 % (ref 0–2)
BASOPHILS ABSOLUTE: 0.04 K/UL (ref 0–0.2)
BILIRUB SERPL-MCNC: 0.5 MG/DL (ref 0.3–1.2)
BILIRUBIN URINE: NEGATIVE
BUN SERPL-MCNC: 20 MG/DL (ref 8–23)
BUN/CREAT BLD: 19 (ref 9–20)
CALCIUM SERPL-MCNC: 9.2 MG/DL (ref 8.6–10.4)
CEA SERPL-MCNC: 6.7 NG/ML
CHLORIDE SERPL-SCNC: 101 MMOL/L (ref 98–107)
CO2 SERPL-SCNC: 24 MMOL/L (ref 20–31)
COLOR: YELLOW
CREAT SERPL-MCNC: 1.08 MG/DL (ref 0.7–1.2)
EOSINOPHILS RELATIVE PERCENT: 2 % (ref 1–4)
EPITHELIAL CELLS UA: ABNORMAL /HPF (ref 0–5)
GFR SERPL CREATININE-BSD FRML MDRD: >60 ML/MIN/1.73M2
GLUCOSE SERPL-MCNC: 236 MG/DL (ref 70–99)
GLUCOSE UR STRIP.AUTO-MCNC: NEGATIVE MG/DL
HCT VFR BLD AUTO: 29.2 % (ref 40.7–50.3)
HGB BLD-MCNC: 9 G/DL (ref 13–17)
IMMATURE GRANULOCYTES: 1 %
KETONES UR STRIP.AUTO-MCNC: NEGATIVE MG/DL
LEUKOCYTE ESTERASE UR QL STRIP.AUTO: NEGATIVE
LYMPHOCYTES # BLD: 9 % (ref 24–43)
MCH RBC QN AUTO: 29.9 PG (ref 25.2–33.5)
MCHC RBC AUTO-ENTMCNC: 30.8 G/DL (ref 25.2–33.5)
MCV RBC AUTO: 97 FL (ref 82.6–102.9)
MONOCYTES # BLD: 7 % (ref 3–12)
NITRITE UR QL STRIP.AUTO: NEGATIVE
NRBC AUTOMATED: 0 PER 100 WBC
OTHER OBSERVATIONS UA: ABNORMAL
PDW BLD-RTO: 15.8 % (ref 11.8–14.4)
PLATELET # BLD AUTO: 212 K/UL (ref 138–453)
PMV BLD AUTO: 10.5 FL (ref 8.1–13.5)
POTASSIUM SERPL-SCNC: 4.5 MMOL/L (ref 3.7–5.3)
PROT SERPL-MCNC: 7.5 G/DL (ref 6.4–8.3)
PROT UR STRIP.AUTO-MCNC: 5.5 MG/DL (ref 5–6)
PROT UR STRIP.AUTO-MCNC: ABNORMAL MG/DL
RBC # BLD: 3.01 M/UL (ref 4.21–5.77)
RBC # BLD: ABNORMAL 10*6/UL
RBC CLUMPS #/AREA URNS AUTO: ABNORMAL /HPF (ref 0–4)
SEG NEUTROPHILS: 81 % (ref 36–65)
SEGMENTED NEUTROPHILS ABSOLUTE COUNT: 8.27 K/UL (ref 1.5–8.1)
SODIUM SERPL-SCNC: 138 MMOL/L (ref 135–144)
SPECIFIC GRAVITY UA: 1.02 (ref 1.01–1.02)
TURBIDITY: CLEAR
URINE HGB: ABNORMAL
UROBILINOGEN, URINE: NORMAL
WBC # BLD AUTO: 10.3 K/UL (ref 3.5–11.3)
WBC UA: ABNORMAL /HPF (ref 0–4)

## 2023-05-12 PROCEDURE — 85025 COMPLETE CBC W/AUTO DIFF WBC: CPT

## 2023-05-12 PROCEDURE — 81001 URINALYSIS AUTO W/SCOPE: CPT

## 2023-05-12 PROCEDURE — 36415 COLL VENOUS BLD VENIPUNCTURE: CPT

## 2023-05-12 PROCEDURE — 80053 COMPREHEN METABOLIC PANEL: CPT

## 2023-05-12 PROCEDURE — 82378 CARCINOEMBRYONIC ANTIGEN: CPT

## 2023-05-15 ENCOUNTER — HOSPITAL ENCOUNTER (OUTPATIENT)
Dept: CT IMAGING | Age: 78
Discharge: HOME | End: 2023-05-17
Payer: MEDICARE

## 2023-05-15 DIAGNOSIS — D64.9 ANEMIA, UNSPECIFIED TYPE: ICD-10-CM

## 2023-05-15 DIAGNOSIS — C34.90 ALK-POSITIVE NON-SMALL CELL LUNG CANCER (HCC): ICD-10-CM

## 2023-05-15 PROCEDURE — 6360000004 HC RX CONTRAST MEDICATION: Performed by: INTERNAL MEDICINE

## 2023-05-15 PROCEDURE — 2709999900 CT CHEST ABDOMEN PELVIS W CONTRAST

## 2023-05-15 RX ORDER — ALBUTEROL SULFATE 90 UG/1
2 AEROSOL, METERED RESPIRATORY (INHALATION) EVERY 6 HOURS PRN
Qty: 1 EACH | Refills: 5 | Status: SHIPPED | OUTPATIENT
Start: 2023-05-15

## 2023-05-15 RX ADMIN — IOPAMIDOL 100 ML: 755 INJECTION, SOLUTION INTRAVENOUS at 08:56

## 2023-05-15 NOTE — TELEPHONE ENCOUNTER
Dustin Jameson called requesting a refill of the below medication which has been pended for you:     Requested Prescriptions     Pending Prescriptions Disp Refills    albuterol sulfate HFA (VENTOLIN HFA) 108 (90 Base) MCG/ACT inhaler 1 each 6     Sig: Inhale 2 puffs into the lungs every 6 hours as needed for Wheezing or Shortness of Breath       Last Appointment Date: 4/5/2023  Next Appointment Date: 9/18/2023    Allergies   Allergen Reactions    Effient [Prasugrel] Other (See Comments)     Superficial skin bleeding. Ace Inhibitors Other (See Comments)     Cough. Statins Other (See Comments)     Myalgias.

## 2023-05-16 ENCOUNTER — HOSPITAL ENCOUNTER (OUTPATIENT)
Dept: INFUSION THERAPY | Age: 78
Discharge: HOME | End: 2023-05-16
Payer: MEDICARE

## 2023-05-16 DIAGNOSIS — C34.90 ALK-POSITIVE NON-SMALL CELL LUNG CANCER (HCC): Primary | ICD-10-CM

## 2023-05-16 PROCEDURE — 96372 THER/PROPH/DIAG INJ SC/IM: CPT

## 2023-05-16 PROCEDURE — 6360000002 HC RX W HCPCS: Performed by: INTERNAL MEDICINE

## 2023-05-16 PROCEDURE — 99213 OFFICE O/P EST LOW 20 MIN: CPT

## 2023-05-16 RX ORDER — CYANOCOBALAMIN 1000 UG/ML
1000 INJECTION, SOLUTION INTRAMUSCULAR; SUBCUTANEOUS ONCE
Status: COMPLETED | OUTPATIENT
Start: 2023-05-16 | End: 2023-05-16

## 2023-05-16 RX ADMIN — CYANOCOBALAMIN 1000 MCG: 1000 INJECTION, SOLUTION INTRAMUSCULAR; SUBCUTANEOUS at 15:25

## 2023-05-16 NOTE — PROGRESS NOTES
Pt here for chemotherapy teaching. Spent 60 minutes with them. Teaching sheets from [facility] and verbal information given on chemotherapy agents, action, administration and side effects. Provided books chemotherapy and you and Eating Hints: Before During, and After Cancer treatment. Handout about unit with phone numbers for [facility] at [facility],  [facility], and [facility] hematology/oncology associates provided. Drugs discussed: Zirabev, carboplatin, alimta    Reviewed anti emetic medications, prescriptions for compazine 10mg PO, Q6H PRN for nausea, Q60 with 2 refills and Decadron 4mg PO twice daily starting the day prior to chemo for 3 total days Q30 with 2 refills, and Folic acid 6887OXP PO daily and continue taking for 3 weeks after completion of alimta. Scripts called to [facility] in [facility].     Questions answered, support given

## 2023-05-23 ENCOUNTER — HOSPITAL ENCOUNTER (OUTPATIENT)
Dept: INFUSION THERAPY | Age: 78
Discharge: HOME | DRG: 871 | End: 2023-05-23
Payer: MEDICARE

## 2023-05-23 ENCOUNTER — TELEMEDICINE (OUTPATIENT)
Dept: ONCOLOGY | Age: 78
End: 2023-05-23
Payer: MEDICARE

## 2023-05-23 VITALS
TEMPERATURE: 97 F | HEART RATE: 81 BPM | RESPIRATION RATE: 16 BRPM | DIASTOLIC BLOOD PRESSURE: 67 MMHG | BODY MASS INDEX: 30.98 KG/M2 | WEIGHT: 216.4 LBS | HEIGHT: 70 IN | OXYGEN SATURATION: 96 % | SYSTOLIC BLOOD PRESSURE: 121 MMHG

## 2023-05-23 DIAGNOSIS — C34.90 ALK-POSITIVE NON-SMALL CELL LUNG CANCER (HCC): Primary | ICD-10-CM

## 2023-05-23 DIAGNOSIS — Z51.11 CHEMOTHERAPY MANAGEMENT, ENCOUNTER FOR: ICD-10-CM

## 2023-05-23 DIAGNOSIS — D64.9 ANEMIA, UNSPECIFIED TYPE: ICD-10-CM

## 2023-05-23 LAB
ALBUMIN SERPL-MCNC: 3.2 G/DL (ref 3.5–5.2)
ALBUMIN/GLOB SERPL: 0.8 {RATIO} (ref 1–2.5)
ALP SERPL-CCNC: 368 U/L (ref 40–129)
ALT SERPL-CCNC: 85 U/L (ref 5–41)
ANION GAP SERPL CALCULATED.3IONS-SCNC: 14 MMOL/L (ref 9–17)
AST SERPL-CCNC: 95 U/L
BASOPHILS # BLD: <0.03 K/UL (ref 0–0.2)
BASOPHILS NFR BLD: 0 % (ref 0–2)
BILIRUB SERPL-MCNC: 0.6 MG/DL (ref 0.3–1.2)
BILIRUB UR QL STRIP: NEGATIVE
BUN SERPL-MCNC: 31 MG/DL (ref 8–23)
BUN/CREAT SERPL: 27 (ref 9–20)
CALCIUM SERPL-MCNC: 9.4 MG/DL (ref 8.6–10.4)
CHLORIDE SERPL-SCNC: 97 MMOL/L (ref 98–107)
CLARITY UR: ABNORMAL
CO2 SERPL-SCNC: 26 MMOL/L (ref 20–31)
COLOR UR: YELLOW
CREAT SERPL-MCNC: 1.14 MG/DL (ref 0.7–1.2)
EOSINOPHIL # BLD: <0.03 K/UL (ref 0–0.44)
EOSINOPHILS RELATIVE PERCENT: 0 % (ref 1–4)
ERYTHROCYTE [DISTWIDTH] IN BLOOD BY AUTOMATED COUNT: 15.8 % (ref 11.8–14.4)
GFR SERPL CREATININE-BSD FRML MDRD: >60 ML/MIN/1.73M2
GLUCOSE SERPL-MCNC: 271 MG/DL (ref 70–99)
GLUCOSE UR STRIP-MCNC: NEGATIVE MG/DL
HCT VFR BLD AUTO: 30.9 % (ref 40.7–50.3)
HGB BLD-MCNC: 9.6 G/DL (ref 13–17)
HGB UR QL STRIP.AUTO: NEGATIVE
IMM GRANULOCYTES # BLD AUTO: 0.12 K/UL (ref 0–0.3)
IMM GRANULOCYTES NFR BLD: 1 %
KETONES UR STRIP-MCNC: NEGATIVE MG/DL
LEUKOCYTE ESTERASE UR QL STRIP: NEGATIVE
LYMPHOCYTES # BLD: 6 % (ref 24–43)
LYMPHOCYTES NFR BLD: 0.72 K/UL (ref 1.1–3.7)
MCH RBC QN AUTO: 29.4 PG (ref 25.2–33.5)
MCHC RBC AUTO-ENTMCNC: 31.1 G/DL (ref 25.2–33.5)
MCV RBC AUTO: 94.8 FL (ref 82.6–102.9)
MONOCYTES NFR BLD: 0.54 K/UL (ref 0.1–1.2)
MONOCYTES NFR BLD: 4 % (ref 3–12)
NEUTROPHILS NFR BLD: 89 % (ref 36–65)
NEUTS SEG NFR BLD: 11.53 K/UL (ref 1.5–8.1)
NITRITE UR QL STRIP: NEGATIVE
NRBC AUTOMATED: 0 PER 100 WBC
PH UR STRIP: 5.5 [PH] (ref 5–6)
PLATELET # BLD AUTO: 227 K/UL (ref 138–453)
PMV BLD AUTO: 11.2 FL (ref 8.1–13.5)
POTASSIUM SERPL-SCNC: 4.3 MMOL/L (ref 3.7–5.3)
PROT SERPL-MCNC: 7.4 G/DL (ref 6.4–8.3)
PROT UR STRIP-MCNC: ABNORMAL MG/DL
RBC # BLD AUTO: 3.26 M/UL (ref 4.21–5.77)
RBC # BLD: ABNORMAL 10*6/UL
SODIUM SERPL-SCNC: 137 MMOL/L (ref 135–144)
SP GR UR STRIP: 1.03 (ref 1.01–1.02)
UROBILINOGEN UR STRIP-ACNC: NORMAL
WBC OTHER # BLD: 12.9 K/UL (ref 3.5–11.3)

## 2023-05-23 PROCEDURE — 80053 COMPREHEN METABOLIC PANEL: CPT

## 2023-05-23 PROCEDURE — 96375 TX/PRO/DX INJ NEW DRUG ADDON: CPT

## 2023-05-23 PROCEDURE — 1123F ACP DISCUSS/DSCN MKR DOCD: CPT | Performed by: INTERNAL MEDICINE

## 2023-05-23 PROCEDURE — G8428 CUR MEDS NOT DOCUMENT: HCPCS | Performed by: INTERNAL MEDICINE

## 2023-05-23 PROCEDURE — 96413 CHEMO IV INFUSION 1 HR: CPT

## 2023-05-23 PROCEDURE — 81003 URINALYSIS AUTO W/O SCOPE: CPT

## 2023-05-23 PROCEDURE — 85025 COMPLETE CBC W/AUTO DIFF WBC: CPT

## 2023-05-23 PROCEDURE — 99211 OFF/OP EST MAY X REQ PHY/QHP: CPT | Performed by: INTERNAL MEDICINE

## 2023-05-23 PROCEDURE — 99214 OFFICE O/P EST MOD 30 MIN: CPT | Performed by: INTERNAL MEDICINE

## 2023-05-23 PROCEDURE — 2580000003 HC RX 258: Performed by: INTERNAL MEDICINE

## 2023-05-23 PROCEDURE — 96417 CHEMO IV INFUS EACH ADDL SEQ: CPT

## 2023-05-23 PROCEDURE — 6360000002 HC RX W HCPCS: Performed by: INTERNAL MEDICINE

## 2023-05-23 PROCEDURE — 96411 CHEMO IV PUSH ADDL DRUG: CPT

## 2023-05-23 RX ORDER — SODIUM CHLORIDE 9 MG/ML
5-250 INJECTION, SOLUTION INTRAVENOUS PRN
OUTPATIENT
Start: 2023-06-13

## 2023-05-23 RX ORDER — DEXAMETHASONE SODIUM PHOSPHATE 10 MG/ML
10 INJECTION, SOLUTION INTRAMUSCULAR; INTRAVENOUS ONCE
Status: COMPLETED | OUTPATIENT
Start: 2023-05-23 | End: 2023-05-23

## 2023-05-23 RX ORDER — ONDANSETRON 2 MG/ML
8 INJECTION INTRAMUSCULAR; INTRAVENOUS
OUTPATIENT
Start: 2023-06-13

## 2023-05-23 RX ORDER — ACETAMINOPHEN 325 MG/1
650 TABLET ORAL
OUTPATIENT
Start: 2023-06-13

## 2023-05-23 RX ORDER — DIPHENHYDRAMINE HYDROCHLORIDE 50 MG/ML
50 INJECTION INTRAMUSCULAR; INTRAVENOUS
OUTPATIENT
Start: 2023-06-13

## 2023-05-23 RX ORDER — FAMOTIDINE 10 MG/ML
20 INJECTION, SOLUTION INTRAVENOUS
OUTPATIENT
Start: 2023-06-13

## 2023-05-23 RX ORDER — HEPARIN SODIUM (PORCINE) LOCK FLUSH IV SOLN 100 UNIT/ML 100 UNIT/ML
500 SOLUTION INTRAVENOUS PRN
OUTPATIENT
Start: 2023-06-13

## 2023-05-23 RX ORDER — PALONOSETRON 0.05 MG/ML
0.25 INJECTION, SOLUTION INTRAVENOUS ONCE
Status: COMPLETED | OUTPATIENT
Start: 2023-05-23 | End: 2023-05-23

## 2023-05-23 RX ORDER — PALONOSETRON 0.05 MG/ML
0.25 INJECTION, SOLUTION INTRAVENOUS ONCE
OUTPATIENT
Start: 2023-06-13 | End: 2023-06-13

## 2023-05-23 RX ORDER — SODIUM CHLORIDE 0.9 % (FLUSH) 0.9 %
5-40 SYRINGE (ML) INJECTION PRN
Status: DISCONTINUED | OUTPATIENT
Start: 2023-05-23 | End: 2023-05-24 | Stop reason: HOSPADM

## 2023-05-23 RX ORDER — MEPERIDINE HYDROCHLORIDE 50 MG/ML
12.5 INJECTION INTRAMUSCULAR; INTRAVENOUS; SUBCUTANEOUS PRN
OUTPATIENT
Start: 2023-06-13

## 2023-05-23 RX ORDER — DEXAMETHASONE SODIUM PHOSPHATE 10 MG/ML
10 INJECTION, SOLUTION INTRAMUSCULAR; INTRAVENOUS ONCE
OUTPATIENT
Start: 2023-06-13 | End: 2023-06-13

## 2023-05-23 RX ORDER — SODIUM CHLORIDE 0.9 % (FLUSH) 0.9 %
5-40 SYRINGE (ML) INJECTION PRN
OUTPATIENT
Start: 2023-06-13

## 2023-05-23 RX ORDER — HEPARIN SODIUM (PORCINE) LOCK FLUSH IV SOLN 100 UNIT/ML 100 UNIT/ML
500 SOLUTION INTRAVENOUS PRN
Status: DISCONTINUED | OUTPATIENT
Start: 2023-05-23 | End: 2023-05-24 | Stop reason: HOSPADM

## 2023-05-23 RX ORDER — SODIUM CHLORIDE 9 MG/ML
INJECTION, SOLUTION INTRAVENOUS CONTINUOUS
OUTPATIENT
Start: 2023-06-13

## 2023-05-23 RX ORDER — EPINEPHRINE 1 MG/ML
0.3 INJECTION, SOLUTION, CONCENTRATE INTRAVENOUS PRN
OUTPATIENT
Start: 2023-06-13

## 2023-05-23 RX ORDER — SODIUM CHLORIDE 9 MG/ML
5-250 INJECTION, SOLUTION INTRAVENOUS PRN
Status: DISCONTINUED | OUTPATIENT
Start: 2023-05-23 | End: 2023-05-24 | Stop reason: HOSPADM

## 2023-05-23 RX ORDER — ALBUTEROL SULFATE 90 UG/1
4 AEROSOL, METERED RESPIRATORY (INHALATION) PRN
OUTPATIENT
Start: 2023-06-13

## 2023-05-23 RX ADMIN — SODIUM CHLORIDE 1500 MG: 9 INJECTION, SOLUTION INTRAVENOUS at 12:03

## 2023-05-23 RX ADMIN — FOSAPREPITANT DIMEGLUMINE 150 MG: 150 INJECTION, POWDER, LYOPHILIZED, FOR SOLUTION INTRAVENOUS at 11:26

## 2023-05-23 RX ADMIN — PALONOSETRON 0.25 MG: 0.25 INJECTION, SOLUTION INTRAVENOUS at 10:33

## 2023-05-23 RX ADMIN — SODIUM CHLORIDE 20 ML/HR: 9 INJECTION, SOLUTION INTRAVENOUS at 09:00

## 2023-05-23 RX ADMIN — DEXAMETHASONE SODIUM PHOSPHATE 10 MG: 10 INJECTION, SOLUTION INTRAMUSCULAR; INTRAVENOUS at 10:36

## 2023-05-23 RX ADMIN — PEMETREXED DISODIUM 1100 MG: 500 INJECTION, POWDER, LYOPHILIZED, FOR SOLUTION INTRAVENOUS at 12:38

## 2023-05-23 RX ADMIN — CARBOPLATIN 450 MG: 10 INJECTION INTRAVENOUS at 12:56

## 2023-05-23 ASSESSMENT — PAIN SCALES - GENERAL: PAINLEVEL_OUTOF10: 0

## 2023-05-23 NOTE — PROGRESS NOTES
Patient getting first treatment today via IV. IN chair. Reports feeling tired and weak. Reinforce education as giving medications.

## 2023-05-23 NOTE — PROGRESS NOTES
Edy Mak                                                                                                                  5/23/2023  MRN:   [id number]  YOB: 1945  PCP:                           Milind Salazar DO  Referring Physician: No ref. provider found  Treating Physician Name: Anais Ortiz MD      Edy Mak, was evaluated through a synchronous (real-time) audio-video encounter. The patient (or guardian if applicable) is aware that this is a billable service, which includes applicable co-pays. This Virtual Visit was conducted with patient's (and/or legal guardian's) consent. Patient identification was verified, and a caregiver was present when appropriate. The patient was located at [facility] ([address] [place] [address]): [address] [place] [address],  [address] [facility] Momo Mendoza  Provider was located at [facility] ([address] [place] [address]): [place]  Total time spent for this encounter: Not billed by time    --Anais Ortiz MD on 5/23/2023 at 11:25 AM    An electronic signature was used to authenticate this note. 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   Disease progression, biopsy-proven-3/2023  Repeat NGS testing negative for ALK mutation on liquid biopsy and tumor-4/2023    Active and recent treatments:  concurrent chemoradiation using carboplatin and Taxol  Consolidation therapy with Imfinzi-1/2021  Carboplatin, Alimta and bevacizumab x1 cycle-8/2021 while awaiting results of NGS  Alectinib-10/8/2021  Lorlatinib-4/2023 (discontinued due to ALK -ve on  NGS)  Carboplatin Alimta bevacizumab-5/2023    Summary of Case/History:    Edy Mak a 69 y.o.male is a patient with biopsy confirmed adenocarcinoma of right

## 2023-05-23 NOTE — PATIENT INSTRUCTIONS
Start treatment today  CBC next Thursday with BMP.   Transfuse to keep hemoglobin above 8  Return visit in 3 weeks with treatment

## 2023-05-25 ENCOUNTER — HOSPITAL ENCOUNTER (INPATIENT)
Age: 78
LOS: 3 days | Discharge: HOSPICE | DRG: 871 | End: 2023-05-28
Attending: EMERGENCY MEDICINE | Admitting: INTERNAL MEDICINE
Payer: MEDICARE

## 2023-05-25 ENCOUNTER — APPOINTMENT (OUTPATIENT)
Dept: GENERAL RADIOLOGY | Age: 78
DRG: 871 | End: 2023-05-25
Payer: MEDICARE

## 2023-05-25 DIAGNOSIS — J18.9 PNEUMONIA OF RIGHT LOWER LOBE DUE TO INFECTIOUS ORGANISM: Primary | ICD-10-CM

## 2023-05-25 LAB
ALBUMIN SERPL-MCNC: 3.4 G/DL (ref 3.5–5.2)
ALBUMIN/GLOB SERPL: 0.9 {RATIO} (ref 1–2.5)
ALP SERPL-CCNC: 401 U/L (ref 40–129)
ALT SERPL-CCNC: 167 U/L (ref 5–41)
AMMONIA PLAS-SCNC: 17 UMOL/L (ref 16–60)
AMORPH SED URNS QL MICRO: ABNORMAL
AMYLASE SERPL-CCNC: 79 U/L (ref 28–100)
ANION GAP SERPL CALCULATED.3IONS-SCNC: 12 MMOL/L (ref 9–17)
ANION GAP SERPL CALCULATED.3IONS-SCNC: 13 MMOL/L (ref 9–17)
AST SERPL-CCNC: 181 U/L
BACTERIA URNS QL MICRO: ABNORMAL
BASOPHILS # BLD: 0 K/UL (ref 0–0.2)
BASOPHILS NFR BLD: 0 % (ref 0–2)
BILIRUB SERPL-MCNC: 0.7 MG/DL (ref 0.3–1.2)
BILIRUB UR QL STRIP: NEGATIVE
BUN SERPL-MCNC: 42 MG/DL (ref 8–23)
BUN SERPL-MCNC: 46 MG/DL (ref 8–23)
BUN/CREAT SERPL: 34 (ref 9–20)
BUN/CREAT SERPL: 34 (ref 9–20)
CALCIUM SERPL-MCNC: 8.1 MG/DL (ref 8.6–10.4)
CALCIUM SERPL-MCNC: 9.3 MG/DL (ref 8.6–10.4)
CHARACTER UR: ABNORMAL
CHLORIDE SERPL-SCNC: 103 MMOL/L (ref 98–107)
CHLORIDE SERPL-SCNC: 97 MMOL/L (ref 98–107)
CLARITY UR: CLEAR
CO2 SERPL-SCNC: 21 MMOL/L (ref 20–31)
CO2 SERPL-SCNC: 25 MMOL/L (ref 20–31)
COLOR UR: YELLOW
CREAT SERPL-MCNC: 1.25 MG/DL (ref 0.7–1.2)
CREAT SERPL-MCNC: 1.34 MG/DL (ref 0.7–1.2)
D DIMER PPP FEU-MCNC: 13.02 UG/ML FEU (ref 0–0.59)
EOSINOPHIL # BLD: 0 K/UL (ref 0–0.4)
EOSINOPHILS RELATIVE PERCENT: 0 % (ref 1–8)
EPI CELLS #/AREA URNS HPF: ABNORMAL /HPF (ref 0–5)
ERYTHROCYTE [DISTWIDTH] IN BLOOD BY AUTOMATED COUNT: 16.3 % (ref 11.8–14.4)
GFR SERPL CREATININE-BSD FRML MDRD: 55 ML/MIN/1.73M2
GFR SERPL CREATININE-BSD FRML MDRD: 60 ML/MIN/1.73M2
GLUCOSE SERPL-MCNC: 149 MG/DL (ref 70–99)
GLUCOSE SERPL-MCNC: 183 MG/DL (ref 70–99)
GLUCOSE UR STRIP-MCNC: NEGATIVE MG/DL
HCT VFR BLD AUTO: 31.1 % (ref 40.7–50.3)
HGB BLD-MCNC: 9.6 G/DL (ref 13–17)
HGB UR QL STRIP.AUTO: ABNORMAL
IMM GRANULOCYTES # BLD AUTO: 0 K/UL (ref 0–0.3)
IMM GRANULOCYTES NFR BLD: 0 %
INR PPP: 17.2
KETONES UR STRIP-MCNC: NEGATIVE MG/DL
LACTATE BLDV-SCNC: 1.9 MMOL/L (ref 0.5–2.2)
LACTATE BLDV-SCNC: 2.5 MMOL/L (ref 0.5–2.2)
LEUKOCYTE ESTERASE UR QL STRIP: NEGATIVE
LIPASE SERPL-CCNC: 77 U/L (ref 13–60)
LYMPHOCYTES # BLD: 4 % (ref 15–43)
LYMPHOCYTES NFR BLD: 0.42 K/UL (ref 1–4.8)
MCH RBC QN AUTO: 29.1 PG (ref 25.2–33.5)
MCHC RBC AUTO-ENTMCNC: 30.9 G/DL (ref 25.2–33.5)
MCV RBC AUTO: 94.2 FL (ref 82.6–102.9)
MONOCYTES NFR BLD: 0 % (ref 6–14)
MONOCYTES NFR BLD: 0 K/UL (ref 0.1–1.2)
MORPHOLOGY: ABNORMAL
NEUTROPHILS NFR BLD: 96 % (ref 44–74)
NEUTS SEG NFR BLD: 10.18 K/UL (ref 1.5–8.1)
NITRITE UR QL STRIP: NEGATIVE
NRBC AUTOMATED: 0 PER 100 WBC
PARTIAL THROMBOPLASTIN TIME: 80.8 SEC (ref 23.9–33.8)
PH UR STRIP: 5 [PH] (ref 5–6)
PLATELET # BLD AUTO: 201 K/UL (ref 138–453)
PMV BLD AUTO: 10.9 FL (ref 8.1–13.5)
POTASSIUM SERPL-SCNC: 5.2 MMOL/L (ref 3.7–5.3)
POTASSIUM SERPL-SCNC: 5.9 MMOL/L (ref 3.7–5.3)
PROT SERPL-MCNC: 7.3 G/DL (ref 6.4–8.3)
PROT UR STRIP-MCNC: ABNORMAL MG/DL
PROTHROMBIN TIME: 118 SEC (ref 11.5–14.2)
RBC # BLD AUTO: 3.3 M/UL (ref 4.21–5.77)
RBC #/AREA URNS HPF: ABNORMAL /HPF (ref 0–4)
SARS-COV-2 RDRP RESP QL NAA+PROBE: NOT DETECTED
SODIUM SERPL-SCNC: 135 MMOL/L (ref 135–144)
SODIUM SERPL-SCNC: 136 MMOL/L (ref 135–144)
SP GR UR STRIP: 1.03 (ref 1.01–1.02)
SPECIMEN DESCRIPTION: NORMAL
TROPONIN I SERPL HS-MCNC: 65 NG/L (ref 0–22)
TROPONIN I SERPL HS-MCNC: 67 NG/L (ref 0–22)
UROBILINOGEN UR STRIP-ACNC: NORMAL
WBC #/AREA URNS HPF: ABNORMAL /HPF (ref 0–4)
WBC OTHER # BLD: 10.6 K/UL (ref 3.5–11.3)

## 2023-05-25 PROCEDURE — 93005 ELECTROCARDIOGRAM TRACING: CPT | Performed by: EMERGENCY MEDICINE

## 2023-05-25 PROCEDURE — 84484 ASSAY OF TROPONIN QUANT: CPT

## 2023-05-25 PROCEDURE — 2060000000 HC ICU INTERMEDIATE R&B

## 2023-05-25 PROCEDURE — 87040 BLOOD CULTURE FOR BACTERIA: CPT

## 2023-05-25 PROCEDURE — 80048 BASIC METABOLIC PNL TOTAL CA: CPT

## 2023-05-25 PROCEDURE — 71045 X-RAY EXAM CHEST 1 VIEW: CPT

## 2023-05-25 PROCEDURE — 87635 SARS-COV-2 COVID-19 AMP PRB: CPT

## 2023-05-25 PROCEDURE — 83690 ASSAY OF LIPASE: CPT

## 2023-05-25 PROCEDURE — 80053 COMPREHEN METABOLIC PANEL: CPT

## 2023-05-25 PROCEDURE — 6370000000 HC RX 637 (ALT 250 FOR IP): Performed by: EMERGENCY MEDICINE

## 2023-05-25 PROCEDURE — 96375 TX/PRO/DX INJ NEW DRUG ADDON: CPT

## 2023-05-25 PROCEDURE — 81001 URINALYSIS AUTO W/SCOPE: CPT

## 2023-05-25 PROCEDURE — 6370000000 HC RX 637 (ALT 250 FOR IP)

## 2023-05-25 PROCEDURE — 36415 COLL VENOUS BLD VENIPUNCTURE: CPT

## 2023-05-25 PROCEDURE — 94640 AIRWAY INHALATION TREATMENT: CPT

## 2023-05-25 PROCEDURE — 6360000002 HC RX W HCPCS: Performed by: EMERGENCY MEDICINE

## 2023-05-25 PROCEDURE — 85379 FIBRIN DEGRADATION QUANT: CPT

## 2023-05-25 PROCEDURE — 2580000003 HC RX 258: Performed by: EMERGENCY MEDICINE

## 2023-05-25 PROCEDURE — 83605 ASSAY OF LACTIC ACID: CPT

## 2023-05-25 PROCEDURE — 99285 EMERGENCY DEPT VISIT HI MDM: CPT

## 2023-05-25 PROCEDURE — 85610 PROTHROMBIN TIME: CPT

## 2023-05-25 PROCEDURE — 96365 THER/PROPH/DIAG IV INF INIT: CPT

## 2023-05-25 PROCEDURE — 82150 ASSAY OF AMYLASE: CPT

## 2023-05-25 PROCEDURE — 99223 1ST HOSP IP/OBS HIGH 75: CPT | Performed by: NURSE PRACTITIONER

## 2023-05-25 PROCEDURE — 85730 THROMBOPLASTIN TIME PARTIAL: CPT

## 2023-05-25 PROCEDURE — 85025 COMPLETE CBC W/AUTO DIFF WBC: CPT

## 2023-05-25 PROCEDURE — 82140 ASSAY OF AMMONIA: CPT

## 2023-05-25 RX ORDER — IPRATROPIUM BROMIDE AND ALBUTEROL SULFATE 2.5; .5 MG/3ML; MG/3ML
SOLUTION RESPIRATORY (INHALATION)
Status: COMPLETED
Start: 2023-05-25 | End: 2023-05-25

## 2023-05-25 RX ORDER — 0.9 % SODIUM CHLORIDE 0.9 %
30 INTRAVENOUS SOLUTION INTRAVENOUS ONCE
Status: COMPLETED | OUTPATIENT
Start: 2023-05-25 | End: 2023-05-25

## 2023-05-25 RX ORDER — 0.9 % SODIUM CHLORIDE 0.9 %
1000 INTRAVENOUS SOLUTION INTRAVENOUS ONCE
Status: COMPLETED | OUTPATIENT
Start: 2023-05-25 | End: 2023-05-25

## 2023-05-25 RX ORDER — PROCHLORPERAZINE MALEATE 10 MG
10 TABLET ORAL EVERY 6 HOURS PRN
COMMUNITY
Start: 2023-05-16

## 2023-05-25 RX ORDER — DEXAMETHASONE 4 MG/1
4 TABLET ORAL 2 TIMES DAILY
Status: ON HOLD | COMMUNITY
Start: 2023-05-16 | End: 2023-06-05 | Stop reason: HOSPADM

## 2023-05-25 RX ORDER — IPRATROPIUM BROMIDE AND ALBUTEROL SULFATE 2.5; .5 MG/3ML; MG/3ML
1 SOLUTION RESPIRATORY (INHALATION) ONCE
Status: COMPLETED | OUTPATIENT
Start: 2023-05-25 | End: 2023-05-25

## 2023-05-25 RX ORDER — FOLIC ACID 1 MG/1
1000 TABLET ORAL DAILY
Status: ON HOLD | COMMUNITY
Start: 2023-05-16 | End: 2023-06-02 | Stop reason: HOSPADM

## 2023-05-25 RX ORDER — KETOROLAC TROMETHAMINE 30 MG/ML
15 INJECTION, SOLUTION INTRAMUSCULAR; INTRAVENOUS ONCE
Status: COMPLETED | OUTPATIENT
Start: 2023-05-25 | End: 2023-05-25

## 2023-05-25 RX ORDER — ONDANSETRON 2 MG/ML
4 INJECTION INTRAMUSCULAR; INTRAVENOUS ONCE
Status: COMPLETED | OUTPATIENT
Start: 2023-05-25 | End: 2023-05-25

## 2023-05-25 RX ADMIN — SODIUM CHLORIDE 2898 ML: 9 INJECTION, SOLUTION INTRAVENOUS at 19:47

## 2023-05-25 RX ADMIN — SODIUM CHLORIDE 1000 ML: 9 INJECTION, SOLUTION INTRAVENOUS at 19:07

## 2023-05-25 RX ADMIN — IPRATROPIUM BROMIDE AND ALBUTEROL SULFATE 1 AMPULE: .5; 2.5 SOLUTION RESPIRATORY (INHALATION) at 18:43

## 2023-05-25 RX ADMIN — IPRATROPIUM BROMIDE AND ALBUTEROL SULFATE 1 AMPULE: .5; 2.5 SOLUTION RESPIRATORY (INHALATION) at 21:04

## 2023-05-25 RX ADMIN — AZITHROMYCIN MONOHYDRATE 500 MG: 500 INJECTION, POWDER, LYOPHILIZED, FOR SOLUTION INTRAVENOUS at 22:07

## 2023-05-25 RX ADMIN — IPRATROPIUM BROMIDE AND ALBUTEROL SULFATE 1 AMPULE: 2.5; .5 SOLUTION RESPIRATORY (INHALATION) at 18:43

## 2023-05-25 RX ADMIN — ONDANSETRON 4 MG: 2 INJECTION INTRAMUSCULAR; INTRAVENOUS at 19:09

## 2023-05-25 RX ADMIN — KETOROLAC TROMETHAMINE 15 MG: 30 INJECTION, SOLUTION INTRAMUSCULAR at 19:13

## 2023-05-25 RX ADMIN — CEFTRIAXONE 1000 MG: 1 INJECTION, POWDER, FOR SOLUTION INTRAMUSCULAR; INTRAVENOUS at 22:05

## 2023-05-25 ASSESSMENT — PAIN DESCRIPTION - LOCATION
LOCATION: THROAT
LOCATION: THROAT
LOCATION: ABDOMEN
LOCATION: ABDOMEN

## 2023-05-25 ASSESSMENT — PAIN DESCRIPTION - PAIN TYPE
TYPE: ACUTE PAIN
TYPE: ACUTE PAIN

## 2023-05-25 ASSESSMENT — PAIN SCALES - GENERAL
PAINLEVEL_OUTOF10: 7
PAINLEVEL_OUTOF10: 7
PAINLEVEL_OUTOF10: 4
PAINLEVEL_OUTOF10: 5

## 2023-05-25 ASSESSMENT — PAIN DESCRIPTION - FREQUENCY
FREQUENCY: CONTINUOUS
FREQUENCY: CONTINUOUS

## 2023-05-25 ASSESSMENT — PAIN DESCRIPTION - ORIENTATION
ORIENTATION: MID
ORIENTATION: RIGHT;LEFT;UPPER
ORIENTATION: RIGHT;LEFT;LOWER
ORIENTATION: MID

## 2023-05-25 ASSESSMENT — PAIN - FUNCTIONAL ASSESSMENT
PAIN_FUNCTIONAL_ASSESSMENT: 0-10
PAIN_FUNCTIONAL_ASSESSMENT: 0-10
PAIN_FUNCTIONAL_ASSESSMENT: PREVENTS OR INTERFERES SOME ACTIVE ACTIVITIES AND ADLS
PAIN_FUNCTIONAL_ASSESSMENT: 0-10
PAIN_FUNCTIONAL_ASSESSMENT: 0-10

## 2023-05-25 ASSESSMENT — PAIN DESCRIPTION - ONSET
ONSET: ON-GOING
ONSET: ON-GOING

## 2023-05-25 ASSESSMENT — PAIN DESCRIPTION - DESCRIPTORS
DESCRIPTORS: SHARP;SORE
DESCRIPTORS: PRESSURE
DESCRIPTORS: ACHING

## 2023-05-26 PROBLEM — R79.1 SUPRATHERAPEUTIC INR: Status: ACTIVE | Noted: 2023-01-01

## 2023-05-26 PROBLEM — A41.9 SEPSIS DUE TO PNEUMONIA (HCC): Status: ACTIVE | Noted: 2023-01-01

## 2023-05-26 PROBLEM — K72.90 HEPATIC INSUFFICIENCY (HCC): Status: ACTIVE | Noted: 2023-01-01

## 2023-05-26 PROBLEM — E86.0 DEHYDRATION: Status: ACTIVE | Noted: 2023-01-01

## 2023-05-26 PROBLEM — R09.02 HYPOXIA: Status: ACTIVE | Noted: 2023-05-25

## 2023-05-26 PROBLEM — R79.1 SUPRATHERAPEUTIC INR: Status: RESOLVED | Noted: 2023-05-25 | Resolved: 2023-05-26

## 2023-05-26 PROBLEM — D64.9 ANEMIA: Status: ACTIVE | Noted: 2023-01-01

## 2023-05-26 PROBLEM — J18.9 SEPSIS DUE TO PNEUMONIA (HCC): Status: ACTIVE | Noted: 2023-01-01

## 2023-05-26 PROBLEM — J96.01 ACUTE RESPIRATORY FAILURE WITH HYPOXIA (HCC): Status: ACTIVE | Noted: 2023-01-01

## 2023-05-26 PROBLEM — E87.5 HYPERKALEMIA: Status: ACTIVE | Noted: 2023-01-01

## 2023-05-26 PROBLEM — R09.02 HYPOXIA: Status: ACTIVE | Noted: 2023-01-01

## 2023-05-26 PROBLEM — R79.89 ELEVATED TROPONIN: Status: ACTIVE | Noted: 2023-01-01

## 2023-05-26 PROBLEM — R77.8 ELEVATED TROPONIN: Status: ACTIVE | Noted: 2023-01-01

## 2023-05-26 LAB
ABSOLUTE BANDS #: 0.4 K/UL
ALBUMIN SERPL-MCNC: 2.5 G/DL (ref 3.5–5.2)
ALBUMIN/GLOB SERPL: 0.7 {RATIO} (ref 1–2.5)
ALP SERPL-CCNC: 328 U/L (ref 40–129)
ALT SERPL-CCNC: 126 U/L (ref 5–41)
ANION GAP SERPL CALCULATED.3IONS-SCNC: 12 MMOL/L (ref 9–17)
ANION GAP SERPL CALCULATED.3IONS-SCNC: 13 MMOL/L (ref 9–17)
AST SERPL-CCNC: 139 U/L
BANDS: 4 %
BASOPHILS # BLD: 0 K/UL (ref 0–0.2)
BASOPHILS NFR BLD: 0 % (ref 0–2)
BILIRUB DIRECT SERPL-MCNC: 0.3 MG/DL
BILIRUB INDIRECT SERPL-MCNC: 0.3 MG/DL (ref 0–1)
BILIRUB SERPL-MCNC: 0.6 MG/DL (ref 0.3–1.2)
BUN SERPL-MCNC: 42 MG/DL (ref 8–23)
BUN SERPL-MCNC: 43 MG/DL (ref 8–23)
BUN/CREAT SERPL: 32 (ref 9–20)
CALCIUM SERPL-MCNC: 8.5 MG/DL (ref 8.6–10.4)
CALCIUM SERPL-MCNC: 8.7 MG/DL (ref 8.6–10.4)
CHLORIDE SERPL-SCNC: 101 MMOL/L (ref 98–107)
CHLORIDE SERPL-SCNC: 101 MMOL/L (ref 98–107)
CO2 SERPL-SCNC: 21 MMOL/L (ref 20–31)
CO2 SERPL-SCNC: 23 MMOL/L (ref 20–31)
CREAT SERPL-MCNC: 1.31 MG/DL (ref 0.7–1.2)
CREAT SERPL-MCNC: 1.34 MG/DL (ref 0.7–1.2)
EOSINOPHIL # BLD: 0.3 K/UL (ref 0–0.4)
EOSINOPHILS RELATIVE PERCENT: 3 % (ref 1–8)
ERYTHROCYTE [DISTWIDTH] IN BLOOD BY AUTOMATED COUNT: 16.1 % (ref 11.8–14.4)
GFR SERPL CREATININE-BSD FRML MDRD: 55 ML/MIN/1.73M2
GFR SERPL CREATININE-BSD FRML MDRD: 56 ML/MIN/1.73M2
GLUCOSE BLD-MCNC: 139 MG/DL (ref 75–110)
GLUCOSE BLD-MCNC: 212 MG/DL (ref 75–110)
GLUCOSE BLD-MCNC: 228 MG/DL (ref 75–110)
GLUCOSE SERPL-MCNC: 144 MG/DL (ref 70–99)
GLUCOSE SERPL-MCNC: 203 MG/DL (ref 70–99)
HCT VFR BLD AUTO: 28.9 % (ref 40.7–50.3)
HGB BLD-MCNC: 8.9 G/DL (ref 13–17)
IMM GRANULOCYTES # BLD AUTO: 0 K/UL (ref 0–0.3)
IMM GRANULOCYTES NFR BLD: 0 %
INR PPP: 11.6
INR PPP: 3.4
LIPASE SERPL-CCNC: 74 U/L (ref 13–60)
LYMPHOCYTES # BLD: 2 % (ref 24–43)
LYMPHOCYTES NFR BLD: 0.2 K/UL (ref 1–4.8)
MAGNESIUM SERPL-MCNC: 2.1 MG/DL (ref 1.6–2.6)
MCH RBC QN AUTO: 29.2 PG (ref 25.2–33.5)
MCHC RBC AUTO-ENTMCNC: 30.8 G/DL (ref 25.2–33.5)
MCV RBC AUTO: 94.8 FL (ref 82.6–102.9)
MONOCYTES NFR BLD: 0 % (ref 6–14)
MONOCYTES NFR BLD: 0 K/UL (ref 0.1–1.2)
MORPHOLOGY: ABNORMAL
MORPHOLOGY: ABNORMAL
NEUTROPHILS NFR BLD: 91 % (ref 44–74)
NEUTS SEG NFR BLD: 9 K/UL (ref 1.5–8.1)
NRBC AUTOMATED: 0 PER 100 WBC
PHOSPHATE SERPL-MCNC: 3.8 MG/DL (ref 2.5–4.5)
PLATELET # BLD AUTO: 165 K/UL (ref 138–453)
PMV BLD AUTO: 11.1 FL (ref 8.1–13.5)
POTASSIUM SERPL-SCNC: 5.1 MMOL/L (ref 3.7–5.3)
POTASSIUM SERPL-SCNC: 5.7 MMOL/L (ref 3.7–5.3)
PROT SERPL-MCNC: 6.1 G/DL (ref 6.4–8.3)
PROTHROMBIN TIME: 32.9 SEC (ref 11.5–14.2)
PROTHROMBIN TIME: 86.9 SEC (ref 11.5–14.2)
RBC # BLD AUTO: 3.05 M/UL (ref 4.21–5.77)
SODIUM SERPL-SCNC: 134 MMOL/L (ref 135–144)
SODIUM SERPL-SCNC: 137 MMOL/L (ref 135–144)
TROPONIN I SERPL HS-MCNC: 61 NG/L (ref 0–22)
TROPONIN I SERPL HS-MCNC: 64 NG/L (ref 0–22)
TROPONIN I SERPL HS-MCNC: 71 NG/L (ref 0–22)
WBC OTHER # BLD: 9.9 K/UL (ref 3.5–11.3)

## 2023-05-26 PROCEDURE — 6370000000 HC RX 637 (ALT 250 FOR IP): Performed by: NURSE PRACTITIONER

## 2023-05-26 PROCEDURE — 94760 N-INVAS EAR/PLS OXIMETRY 1: CPT

## 2023-05-26 PROCEDURE — 84100 ASSAY OF PHOSPHORUS: CPT

## 2023-05-26 PROCEDURE — 82947 ASSAY GLUCOSE BLOOD QUANT: CPT

## 2023-05-26 PROCEDURE — 83690 ASSAY OF LIPASE: CPT

## 2023-05-26 PROCEDURE — 80181 DRUG ASSAY FLECAINIDE: CPT

## 2023-05-26 PROCEDURE — 99232 SBSQ HOSP IP/OBS MODERATE 35: CPT | Performed by: INTERNAL MEDICINE

## 2023-05-26 PROCEDURE — 85610 PROTHROMBIN TIME: CPT

## 2023-05-26 PROCEDURE — 85025 COMPLETE CBC W/AUTO DIFF WBC: CPT

## 2023-05-26 PROCEDURE — 94640 AIRWAY INHALATION TREATMENT: CPT

## 2023-05-26 PROCEDURE — 2700000000 HC OXYGEN THERAPY PER DAY

## 2023-05-26 PROCEDURE — 6370000000 HC RX 637 (ALT 250 FOR IP): Performed by: INTERNAL MEDICINE

## 2023-05-26 PROCEDURE — 80053 COMPREHEN METABOLIC PANEL: CPT

## 2023-05-26 PROCEDURE — 2060000000 HC ICU INTERMEDIATE R&B

## 2023-05-26 PROCEDURE — 6360000002 HC RX W HCPCS: Performed by: NURSE PRACTITIONER

## 2023-05-26 PROCEDURE — 83735 ASSAY OF MAGNESIUM: CPT

## 2023-05-26 PROCEDURE — 5A09357 ASSISTANCE WITH RESPIRATORY VENTILATION, LESS THAN 24 CONSECUTIVE HOURS, CONTINUOUS POSITIVE AIRWAY PRESSURE: ICD-10-PCS | Performed by: INTERNAL MEDICINE

## 2023-05-26 PROCEDURE — 84484 ASSAY OF TROPONIN QUANT: CPT

## 2023-05-26 PROCEDURE — 2580000003 HC RX 258: Performed by: NURSE PRACTITIONER

## 2023-05-26 PROCEDURE — 82248 BILIRUBIN DIRECT: CPT

## 2023-05-26 PROCEDURE — 80048 BASIC METABOLIC PNL TOTAL CA: CPT

## 2023-05-26 PROCEDURE — 93005 ELECTROCARDIOGRAM TRACING: CPT | Performed by: NURSE PRACTITIONER

## 2023-05-26 PROCEDURE — 36415 COLL VENOUS BLD VENIPUNCTURE: CPT

## 2023-05-26 RX ORDER — DEXTROSE MONOHYDRATE 100 MG/ML
INJECTION, SOLUTION INTRAVENOUS CONTINUOUS PRN
Status: DISCONTINUED | OUTPATIENT
Start: 2023-05-26 | End: 2023-05-28 | Stop reason: HOSPADM

## 2023-05-26 RX ORDER — FUROSEMIDE 20 MG/1
20 TABLET ORAL DAILY
Status: DISCONTINUED | OUTPATIENT
Start: 2023-05-26 | End: 2023-05-28 | Stop reason: HOSPADM

## 2023-05-26 RX ORDER — SODIUM POLYSTYRENE SULFONATE 15 G/60ML
30 SUSPENSION ORAL; RECTAL ONCE
Status: COMPLETED | OUTPATIENT
Start: 2023-05-26 | End: 2023-05-26

## 2023-05-26 RX ORDER — WARFARIN SODIUM 1 MG/1
2 TABLET ORAL
Status: COMPLETED | OUTPATIENT
Start: 2023-05-26 | End: 2023-05-26

## 2023-05-26 RX ORDER — SODIUM CHLORIDE 0.9 % (FLUSH) 0.9 %
5-40 SYRINGE (ML) INJECTION PRN
Status: DISCONTINUED | OUTPATIENT
Start: 2023-05-26 | End: 2023-05-28 | Stop reason: HOSPADM

## 2023-05-26 RX ORDER — SODIUM CHLORIDE 9 MG/ML
INJECTION, SOLUTION INTRAVENOUS CONTINUOUS
Status: DISCONTINUED | OUTPATIENT
Start: 2023-05-26 | End: 2023-05-27

## 2023-05-26 RX ORDER — FLECAINIDE ACETATE 50 MG/1
100 TABLET ORAL 2 TIMES DAILY
Status: DISCONTINUED | OUTPATIENT
Start: 2023-05-26 | End: 2023-05-28 | Stop reason: HOSPADM

## 2023-05-26 RX ORDER — FUROSEMIDE 10 MG/ML
20 INJECTION INTRAMUSCULAR; INTRAVENOUS ONCE
Status: COMPLETED | OUTPATIENT
Start: 2023-05-26 | End: 2023-05-26

## 2023-05-26 RX ORDER — INSULIN LISPRO 100 [IU]/ML
0-4 INJECTION, SOLUTION INTRAVENOUS; SUBCUTANEOUS NIGHTLY
Status: DISCONTINUED | OUTPATIENT
Start: 2023-05-26 | End: 2023-05-27

## 2023-05-26 RX ORDER — METOPROLOL TARTRATE 50 MG/1
50 TABLET, FILM COATED ORAL 2 TIMES DAILY
Status: DISCONTINUED | OUTPATIENT
Start: 2023-05-26 | End: 2023-05-28 | Stop reason: HOSPADM

## 2023-05-26 RX ORDER — LOSARTAN POTASSIUM 50 MG/1
50 TABLET ORAL DAILY
Status: DISCONTINUED | OUTPATIENT
Start: 2023-05-26 | End: 2023-05-28 | Stop reason: HOSPADM

## 2023-05-26 RX ORDER — OXYBUTYNIN CHLORIDE 5 MG/1
10 TABLET, EXTENDED RELEASE ORAL DAILY
Status: DISCONTINUED | OUTPATIENT
Start: 2023-05-26 | End: 2023-05-28 | Stop reason: HOSPADM

## 2023-05-26 RX ORDER — ONDANSETRON 4 MG/1
4 TABLET, ORALLY DISINTEGRATING ORAL EVERY 8 HOURS PRN
Status: DISCONTINUED | OUTPATIENT
Start: 2023-05-26 | End: 2023-05-28 | Stop reason: HOSPADM

## 2023-05-26 RX ORDER — DOCUSATE SODIUM 100 MG/1
200 CAPSULE, LIQUID FILLED ORAL 2 TIMES DAILY
Status: DISCONTINUED | OUTPATIENT
Start: 2023-05-26 | End: 2023-05-28 | Stop reason: HOSPADM

## 2023-05-26 RX ORDER — POLYETHYLENE GLYCOL 3350 17 G/17G
17 POWDER, FOR SOLUTION ORAL DAILY PRN
Status: DISCONTINUED | OUTPATIENT
Start: 2023-05-26 | End: 2023-05-28 | Stop reason: HOSPADM

## 2023-05-26 RX ORDER — SODIUM CHLORIDE 0.9 % (FLUSH) 0.9 %
5-40 SYRINGE (ML) INJECTION EVERY 12 HOURS SCHEDULED
Status: DISCONTINUED | OUTPATIENT
Start: 2023-05-26 | End: 2023-05-28 | Stop reason: HOSPADM

## 2023-05-26 RX ORDER — PROCHLORPERAZINE MALEATE 5 MG/1
10 TABLET ORAL EVERY 6 HOURS PRN
Status: DISCONTINUED | OUTPATIENT
Start: 2023-05-26 | End: 2023-05-28 | Stop reason: HOSPADM

## 2023-05-26 RX ORDER — FOLIC ACID 1 MG/1
1000 TABLET ORAL DAILY
Status: DISCONTINUED | OUTPATIENT
Start: 2023-05-26 | End: 2023-05-28 | Stop reason: HOSPADM

## 2023-05-26 RX ORDER — SODIUM CHLORIDE 9 MG/ML
25 INJECTION, SOLUTION INTRAVENOUS PRN
Status: DISCONTINUED | OUTPATIENT
Start: 2023-05-26 | End: 2023-05-28 | Stop reason: HOSPADM

## 2023-05-26 RX ORDER — LEVOTHYROXINE SODIUM 0.07 MG/1
150 TABLET ORAL DAILY
Status: DISCONTINUED | OUTPATIENT
Start: 2023-05-26 | End: 2023-05-28 | Stop reason: HOSPADM

## 2023-05-26 RX ORDER — METHYLPREDNISOLONE SODIUM SUCCINATE 125 MG/2ML
80 INJECTION, POWDER, LYOPHILIZED, FOR SOLUTION INTRAMUSCULAR; INTRAVENOUS EVERY 6 HOURS
Status: DISCONTINUED | OUTPATIENT
Start: 2023-05-26 | End: 2023-05-27

## 2023-05-26 RX ORDER — TAMSULOSIN HYDROCHLORIDE 0.4 MG/1
0.4 CAPSULE ORAL DAILY
Status: DISCONTINUED | OUTPATIENT
Start: 2023-05-26 | End: 2023-05-28 | Stop reason: HOSPADM

## 2023-05-26 RX ORDER — INSULIN GLARGINE 100 [IU]/ML
5 INJECTION, SOLUTION SUBCUTANEOUS
Status: DISCONTINUED | OUTPATIENT
Start: 2023-05-27 | End: 2023-05-27

## 2023-05-26 RX ORDER — INSULIN LISPRO 100 [IU]/ML
0-4 INJECTION, SOLUTION INTRAVENOUS; SUBCUTANEOUS
Status: DISCONTINUED | OUTPATIENT
Start: 2023-05-26 | End: 2023-05-27

## 2023-05-26 RX ORDER — ONDANSETRON 2 MG/ML
4 INJECTION INTRAMUSCULAR; INTRAVENOUS EVERY 6 HOURS PRN
Status: DISCONTINUED | OUTPATIENT
Start: 2023-05-26 | End: 2023-05-28 | Stop reason: HOSPADM

## 2023-05-26 RX ORDER — IPRATROPIUM BROMIDE AND ALBUTEROL SULFATE 2.5; .5 MG/3ML; MG/3ML
1 SOLUTION RESPIRATORY (INHALATION) EVERY 4 HOURS PRN
Status: DISCONTINUED | OUTPATIENT
Start: 2023-05-26 | End: 2023-05-28 | Stop reason: HOSPADM

## 2023-05-26 RX ORDER — IPRATROPIUM BROMIDE AND ALBUTEROL SULFATE 2.5; .5 MG/3ML; MG/3ML
1 SOLUTION RESPIRATORY (INHALATION) EVERY 4 HOURS
Status: DISCONTINUED | OUTPATIENT
Start: 2023-05-26 | End: 2023-05-27

## 2023-05-26 RX ORDER — PHYTONADIONE 10 MG/ML
5 INJECTION, EMULSION INTRAMUSCULAR; INTRAVENOUS; SUBCUTANEOUS ONCE
Status: COMPLETED | OUTPATIENT
Start: 2023-05-26 | End: 2023-05-26

## 2023-05-26 RX ADMIN — IPRATROPIUM BROMIDE AND ALBUTEROL SULFATE 1 AMPULE: .5; 3 SOLUTION RESPIRATORY (INHALATION) at 12:07

## 2023-05-26 RX ADMIN — SODIUM CHLORIDE: 9 INJECTION, SOLUTION INTRAVENOUS at 18:59

## 2023-05-26 RX ADMIN — OXYBUTYNIN CHLORIDE 10 MG: 5 TABLET, EXTENDED RELEASE ORAL at 09:16

## 2023-05-26 RX ADMIN — METHYLPREDNISOLONE SODIUM SUCCINATE 80 MG: 125 INJECTION, POWDER, FOR SOLUTION INTRAMUSCULAR; INTRAVENOUS at 23:18

## 2023-05-26 RX ADMIN — IPRATROPIUM BROMIDE AND ALBUTEROL SULFATE 1 AMPULE: .5; 3 SOLUTION RESPIRATORY (INHALATION) at 03:35

## 2023-05-26 RX ADMIN — FLECAINIDE ACETATE 100 MG: 50 TABLET ORAL at 00:53

## 2023-05-26 RX ADMIN — INSULIN LISPRO 1 UNITS: 100 INJECTION, SOLUTION INTRAVENOUS; SUBCUTANEOUS at 17:49

## 2023-05-26 RX ADMIN — FLECAINIDE ACETATE 100 MG: 50 TABLET ORAL at 20:55

## 2023-05-26 RX ADMIN — METHYLPREDNISOLONE SODIUM SUCCINATE 80 MG: 125 INJECTION, POWDER, FOR SOLUTION INTRAMUSCULAR; INTRAVENOUS at 05:42

## 2023-05-26 RX ADMIN — TAMSULOSIN HYDROCHLORIDE 0.4 MG: 0.4 CAPSULE ORAL at 09:15

## 2023-05-26 RX ADMIN — IPRATROPIUM BROMIDE AND ALBUTEROL SULFATE 1 AMPULE: .5; 3 SOLUTION RESPIRATORY (INHALATION) at 20:05

## 2023-05-26 RX ADMIN — FUROSEMIDE 20 MG: 10 INJECTION, SOLUTION INTRAMUSCULAR; INTRAVENOUS at 03:30

## 2023-05-26 RX ADMIN — CEFTRIAXONE 1000 MG: 1 INJECTION, POWDER, FOR SOLUTION INTRAMUSCULAR; INTRAVENOUS at 22:15

## 2023-05-26 RX ADMIN — METHYLPREDNISOLONE SODIUM SUCCINATE 80 MG: 125 INJECTION, POWDER, FOR SOLUTION INTRAMUSCULAR; INTRAVENOUS at 11:10

## 2023-05-26 RX ADMIN — IPRATROPIUM BROMIDE AND ALBUTEROL SULFATE 1 AMPULE: .5; 3 SOLUTION RESPIRATORY (INHALATION) at 08:13

## 2023-05-26 RX ADMIN — LEVOTHYROXINE SODIUM 150 MCG: 0.07 TABLET ORAL at 05:42

## 2023-05-26 RX ADMIN — SODIUM CHLORIDE, PRESERVATIVE FREE 10 ML: 5 INJECTION INTRAVENOUS at 00:53

## 2023-05-26 RX ADMIN — FUROSEMIDE 20 MG: 20 TABLET ORAL at 09:16

## 2023-05-26 RX ADMIN — WARFARIN SODIUM 2 MG: 1 TABLET ORAL at 18:58

## 2023-05-26 RX ADMIN — METHYLPREDNISOLONE SODIUM SUCCINATE 80 MG: 125 INJECTION, POWDER, FOR SOLUTION INTRAMUSCULAR; INTRAVENOUS at 17:49

## 2023-05-26 RX ADMIN — SODIUM CHLORIDE: 9 INJECTION, SOLUTION INTRAVENOUS at 00:55

## 2023-05-26 RX ADMIN — AZITHROMYCIN DIHYDRATE 500 MG: 500 INJECTION, POWDER, LYOPHILIZED, FOR SOLUTION INTRAVENOUS at 23:19

## 2023-05-26 RX ADMIN — SODIUM POLYSTYRENE SULFONATE 30 G: 15 SUSPENSION ORAL; RECTAL at 09:23

## 2023-05-26 RX ADMIN — METOPROLOL TARTRATE 50 MG: 50 TABLET ORAL at 00:53

## 2023-05-26 RX ADMIN — LOSARTAN POTASSIUM 50 MG: 50 TABLET, FILM COATED ORAL at 09:16

## 2023-05-26 RX ADMIN — FOLIC ACID 1000 MCG: 1 TABLET ORAL at 09:16

## 2023-05-26 RX ADMIN — METOPROLOL TARTRATE 50 MG: 50 TABLET ORAL at 09:16

## 2023-05-26 RX ADMIN — FLECAINIDE ACETATE 100 MG: 50 TABLET ORAL at 09:16

## 2023-05-26 RX ADMIN — IPRATROPIUM BROMIDE AND ALBUTEROL SULFATE 1 AMPULE: .5; 3 SOLUTION RESPIRATORY (INHALATION) at 23:30

## 2023-05-26 RX ADMIN — SODIUM CHLORIDE, PRESERVATIVE FREE 10 ML: 5 INJECTION INTRAVENOUS at 09:24

## 2023-05-26 RX ADMIN — IPRATROPIUM BROMIDE AND ALBUTEROL SULFATE 1 AMPULE: .5; 3 SOLUTION RESPIRATORY (INHALATION) at 16:16

## 2023-05-26 RX ADMIN — METOPROLOL TARTRATE 50 MG: 50 TABLET ORAL at 20:55

## 2023-05-26 RX ADMIN — PHYTONADIONE 5 MG: 10 INJECTION, EMULSION INTRAMUSCULAR; INTRAVENOUS; SUBCUTANEOUS at 03:43

## 2023-05-26 RX ADMIN — IPRATROPIUM BROMIDE AND ALBUTEROL SULFATE 1 AMPULE: .5; 3 SOLUTION RESPIRATORY (INHALATION) at 00:31

## 2023-05-27 ENCOUNTER — APPOINTMENT (OUTPATIENT)
Dept: GENERAL RADIOLOGY | Age: 78
DRG: 871 | End: 2023-05-27
Payer: MEDICARE

## 2023-05-27 LAB
ABSOLUTE BANDS #: 0.15 K/UL
ALBUMIN SERPL-MCNC: 2.5 G/DL (ref 3.5–5.2)
ALBUMIN/GLOB SERPL: 0.7 {RATIO} (ref 1–2.5)
ALP SERPL-CCNC: 341 U/L (ref 40–129)
ALT SERPL-CCNC: 107 U/L (ref 5–41)
ANION GAP SERPL CALCULATED.3IONS-SCNC: 13 MMOL/L (ref 9–17)
AST SERPL-CCNC: 106 U/L
BANDS: 2 %
BASOPHILS # BLD: 0 K/UL (ref 0–0.2)
BASOPHILS NFR BLD: 0 % (ref 0–2)
BILIRUB DIRECT SERPL-MCNC: 0.3 MG/DL
BILIRUB INDIRECT SERPL-MCNC: 0.1 MG/DL (ref 0–1)
BILIRUB SERPL-MCNC: 0.4 MG/DL (ref 0.3–1.2)
BUN SERPL-MCNC: 43 MG/DL (ref 8–23)
BUN/CREAT SERPL: 37 (ref 9–20)
CALCIUM SERPL-MCNC: 8.5 MG/DL (ref 8.6–10.4)
CHLORIDE SERPL-SCNC: 104 MMOL/L (ref 98–107)
CO2 SERPL-SCNC: 22 MMOL/L (ref 20–31)
CREAT SERPL-MCNC: 1.15 MG/DL (ref 0.7–1.2)
EOSINOPHIL # BLD: 0 K/UL (ref 0–0.4)
EOSINOPHILS RELATIVE PERCENT: 0 % (ref 1–8)
ERYTHROCYTE [DISTWIDTH] IN BLOOD BY AUTOMATED COUNT: 15.9 % (ref 11.8–14.4)
GFR SERPL CREATININE-BSD FRML MDRD: >60 ML/MIN/1.73M2
GLOBULIN SER CALC-MCNC: 3.4 G/DL (ref 1.5–3.8)
GLUCOSE BLD-MCNC: 224 MG/DL (ref 75–110)
GLUCOSE BLD-MCNC: 267 MG/DL (ref 75–110)
GLUCOSE BLD-MCNC: 286 MG/DL (ref 75–110)
GLUCOSE SERPL-MCNC: 245 MG/DL (ref 70–99)
HCT VFR BLD AUTO: 26.5 % (ref 40.7–50.3)
HGB BLD-MCNC: 8.2 G/DL (ref 13–17)
IMM GRANULOCYTES # BLD AUTO: 0 K/UL (ref 0–0.3)
IMM GRANULOCYTES NFR BLD: 0 %
INR PPP: 1.9
LYMPHOCYTES # BLD: 3 % (ref 15–43)
LYMPHOCYTES NFR BLD: 0.22 K/UL (ref 1–4.8)
MCH RBC QN AUTO: 28.9 PG (ref 25.2–33.5)
MCHC RBC AUTO-ENTMCNC: 30.9 G/DL (ref 25.2–33.5)
MCV RBC AUTO: 93.3 FL (ref 82.6–102.9)
MONOCYTES NFR BLD: 0.07 K/UL (ref 0.1–1.2)
MONOCYTES NFR BLD: 1 % (ref 6–14)
MORPHOLOGY: ABNORMAL
NEUTROPHILS NFR BLD: 94 % (ref 44–74)
NEUTS SEG NFR BLD: 6.86 K/UL (ref 1.5–8.1)
NRBC AUTOMATED: 0 PER 100 WBC
PLATELET # BLD AUTO: 143 K/UL (ref 138–453)
PMV BLD AUTO: 11.1 FL (ref 8.1–13.5)
POTASSIUM SERPL-SCNC: 4.2 MMOL/L (ref 3.7–5.3)
PROT SERPL-MCNC: 5.9 G/DL (ref 6.4–8.3)
PROTHROMBIN TIME: 21.5 SEC (ref 11.5–14.2)
RBC # BLD AUTO: 2.84 M/UL (ref 4.21–5.77)
SODIUM SERPL-SCNC: 139 MMOL/L (ref 135–144)
TSH SERPL-MCNC: 1.09 UIU/ML (ref 0.3–5)
WBC OTHER # BLD: 7.3 K/UL (ref 3.5–11.3)

## 2023-05-27 PROCEDURE — 84443 ASSAY THYROID STIM HORMONE: CPT

## 2023-05-27 PROCEDURE — 85025 COMPLETE CBC W/AUTO DIFF WBC: CPT

## 2023-05-27 PROCEDURE — 2060000000 HC ICU INTERMEDIATE R&B

## 2023-05-27 PROCEDURE — 80048 BASIC METABOLIC PNL TOTAL CA: CPT

## 2023-05-27 PROCEDURE — 6370000000 HC RX 637 (ALT 250 FOR IP): Performed by: NURSE PRACTITIONER

## 2023-05-27 PROCEDURE — 82947 ASSAY GLUCOSE BLOOD QUANT: CPT

## 2023-05-27 PROCEDURE — 80076 HEPATIC FUNCTION PANEL: CPT

## 2023-05-27 PROCEDURE — 6360000002 HC RX W HCPCS: Performed by: NURSE PRACTITIONER

## 2023-05-27 PROCEDURE — 6370000000 HC RX 637 (ALT 250 FOR IP): Performed by: INTERNAL MEDICINE

## 2023-05-27 PROCEDURE — 94760 N-INVAS EAR/PLS OXIMETRY 1: CPT

## 2023-05-27 PROCEDURE — 99232 SBSQ HOSP IP/OBS MODERATE 35: CPT | Performed by: FAMILY MEDICINE

## 2023-05-27 PROCEDURE — 6360000002 HC RX W HCPCS: Performed by: FAMILY MEDICINE

## 2023-05-27 PROCEDURE — 6370000000 HC RX 637 (ALT 250 FOR IP): Performed by: FAMILY MEDICINE

## 2023-05-27 PROCEDURE — 71045 X-RAY EXAM CHEST 1 VIEW: CPT

## 2023-05-27 PROCEDURE — 94640 AIRWAY INHALATION TREATMENT: CPT

## 2023-05-27 PROCEDURE — 85610 PROTHROMBIN TIME: CPT

## 2023-05-27 PROCEDURE — 36415 COLL VENOUS BLD VENIPUNCTURE: CPT

## 2023-05-27 PROCEDURE — 2580000003 HC RX 258: Performed by: NURSE PRACTITIONER

## 2023-05-27 RX ORDER — INSULIN LISPRO 100 [IU]/ML
0-16 INJECTION, SOLUTION INTRAVENOUS; SUBCUTANEOUS
Status: DISCONTINUED | OUTPATIENT
Start: 2023-05-27 | End: 2023-05-28 | Stop reason: HOSPADM

## 2023-05-27 RX ORDER — METHYLPREDNISOLONE SODIUM SUCCINATE 125 MG/2ML
60 INJECTION, POWDER, LYOPHILIZED, FOR SOLUTION INTRAMUSCULAR; INTRAVENOUS EVERY 12 HOURS
Status: DISCONTINUED | OUTPATIENT
Start: 2023-05-28 | End: 2023-05-28

## 2023-05-27 RX ORDER — INSULIN GLARGINE 100 [IU]/ML
5 INJECTION, SOLUTION SUBCUTANEOUS 2 TIMES DAILY
Status: DISCONTINUED | OUTPATIENT
Start: 2023-05-27 | End: 2023-05-28 | Stop reason: HOSPADM

## 2023-05-27 RX ORDER — WARFARIN SODIUM 2.5 MG/1
7.5 TABLET ORAL
Status: COMPLETED | OUTPATIENT
Start: 2023-05-27 | End: 2023-05-27

## 2023-05-27 RX ORDER — METHYLPREDNISOLONE SODIUM SUCCINATE 125 MG/2ML
80 INJECTION, POWDER, LYOPHILIZED, FOR SOLUTION INTRAMUSCULAR; INTRAVENOUS EVERY 8 HOURS
Status: DISCONTINUED | OUTPATIENT
Start: 2023-05-27 | End: 2023-05-27

## 2023-05-27 RX ORDER — IPRATROPIUM BROMIDE AND ALBUTEROL SULFATE 2.5; .5 MG/3ML; MG/3ML
1 SOLUTION RESPIRATORY (INHALATION) EVERY 4 HOURS
Status: DISCONTINUED | OUTPATIENT
Start: 2023-05-27 | End: 2023-05-28 | Stop reason: HOSPADM

## 2023-05-27 RX ORDER — INSULIN LISPRO 100 [IU]/ML
0-4 INJECTION, SOLUTION INTRAVENOUS; SUBCUTANEOUS NIGHTLY
Status: DISCONTINUED | OUTPATIENT
Start: 2023-05-27 | End: 2023-05-28 | Stop reason: HOSPADM

## 2023-05-27 RX ADMIN — INSULIN LISPRO 8 UNITS: 100 INJECTION, SOLUTION INTRAVENOUS; SUBCUTANEOUS at 12:56

## 2023-05-27 RX ADMIN — CEFTRIAXONE 1000 MG: 1 INJECTION, POWDER, FOR SOLUTION INTRAMUSCULAR; INTRAVENOUS at 21:44

## 2023-05-27 RX ADMIN — LEVOTHYROXINE SODIUM 150 MCG: 0.07 TABLET ORAL at 05:56

## 2023-05-27 RX ADMIN — FLECAINIDE ACETATE 100 MG: 50 TABLET ORAL at 20:28

## 2023-05-27 RX ADMIN — IPRATROPIUM BROMIDE AND ALBUTEROL SULFATE 1 AMPULE: .5; 3 SOLUTION RESPIRATORY (INHALATION) at 08:50

## 2023-05-27 RX ADMIN — LOSARTAN POTASSIUM 50 MG: 50 TABLET, FILM COATED ORAL at 08:04

## 2023-05-27 RX ADMIN — IPRATROPIUM BROMIDE AND ALBUTEROL SULFATE 1 AMPULE: .5; 3 SOLUTION RESPIRATORY (INHALATION) at 03:45

## 2023-05-27 RX ADMIN — INSULIN GLARGINE 5 UNITS: 100 INJECTION, SOLUTION SUBCUTANEOUS at 08:05

## 2023-05-27 RX ADMIN — AZITHROMYCIN DIHYDRATE 500 MG: 500 INJECTION, POWDER, LYOPHILIZED, FOR SOLUTION INTRAVENOUS at 22:27

## 2023-05-27 RX ADMIN — IPRATROPIUM BROMIDE AND ALBUTEROL SULFATE 1 AMPULE: .5; 3 SOLUTION RESPIRATORY (INHALATION) at 23:36

## 2023-05-27 RX ADMIN — FLECAINIDE ACETATE 100 MG: 50 TABLET ORAL at 08:04

## 2023-05-27 RX ADMIN — SODIUM CHLORIDE 25 ML: 9 INJECTION, SOLUTION INTRAVENOUS at 21:42

## 2023-05-27 RX ADMIN — INSULIN LISPRO 8 UNITS: 100 INJECTION, SOLUTION INTRAVENOUS; SUBCUTANEOUS at 16:42

## 2023-05-27 RX ADMIN — SODIUM CHLORIDE, PRESERVATIVE FREE 10 ML: 5 INJECTION INTRAVENOUS at 08:08

## 2023-05-27 RX ADMIN — IPRATROPIUM BROMIDE AND ALBUTEROL SULFATE 1 AMPULE: .5; 3 SOLUTION RESPIRATORY (INHALATION) at 19:50

## 2023-05-27 RX ADMIN — SODIUM CHLORIDE, PRESERVATIVE FREE 10 ML: 5 INJECTION INTRAVENOUS at 21:45

## 2023-05-27 RX ADMIN — TAMSULOSIN HYDROCHLORIDE 0.4 MG: 0.4 CAPSULE ORAL at 08:03

## 2023-05-27 RX ADMIN — INSULIN GLARGINE 5 UNITS: 100 INJECTION, SOLUTION SUBCUTANEOUS at 20:30

## 2023-05-27 RX ADMIN — FOLIC ACID 1000 MCG: 1 TABLET ORAL at 08:05

## 2023-05-27 RX ADMIN — SODIUM CHLORIDE: 9 INJECTION, SOLUTION INTRAVENOUS at 13:46

## 2023-05-27 RX ADMIN — IPRATROPIUM BROMIDE AND ALBUTEROL SULFATE 1 AMPULE: .5; 3 SOLUTION RESPIRATORY (INHALATION) at 12:23

## 2023-05-27 RX ADMIN — WARFARIN SODIUM 7.5 MG: 2.5 TABLET ORAL at 18:13

## 2023-05-27 RX ADMIN — IPRATROPIUM BROMIDE AND ALBUTEROL SULFATE 1 AMPULE: .5; 3 SOLUTION RESPIRATORY (INHALATION) at 17:25

## 2023-05-27 RX ADMIN — OXYBUTYNIN CHLORIDE 10 MG: 5 TABLET, EXTENDED RELEASE ORAL at 08:03

## 2023-05-27 RX ADMIN — METHYLPREDNISOLONE SODIUM SUCCINATE 80 MG: 125 INJECTION, POWDER, FOR SOLUTION INTRAMUSCULAR; INTRAVENOUS at 05:56

## 2023-05-27 RX ADMIN — METOPROLOL TARTRATE 50 MG: 50 TABLET ORAL at 08:02

## 2023-05-27 RX ADMIN — INSULIN LISPRO 1 UNITS: 100 INJECTION, SOLUTION INTRAVENOUS; SUBCUTANEOUS at 08:05

## 2023-05-27 RX ADMIN — METHYLPREDNISOLONE SODIUM SUCCINATE 80 MG: 125 INJECTION, POWDER, FOR SOLUTION INTRAMUSCULAR; INTRAVENOUS at 13:48

## 2023-05-27 RX ADMIN — METOPROLOL TARTRATE 50 MG: 50 TABLET ORAL at 20:28

## 2023-05-27 RX ADMIN — FUROSEMIDE 20 MG: 20 TABLET ORAL at 08:04

## 2023-05-27 ASSESSMENT — PAIN SCALES - GENERAL
PAINLEVEL_OUTOF10: 0

## 2023-05-28 VITALS
WEIGHT: 220.8 LBS | BODY MASS INDEX: 31.61 KG/M2 | RESPIRATION RATE: 16 BRPM | DIASTOLIC BLOOD PRESSURE: 83 MMHG | HEIGHT: 70 IN | OXYGEN SATURATION: 100 % | HEART RATE: 79 BPM | TEMPERATURE: 97.2 F | SYSTOLIC BLOOD PRESSURE: 145 MMHG

## 2023-05-28 PROBLEM — R09.02 HYPOXIA: Status: RESOLVED | Noted: 2023-01-01 | Resolved: 2023-01-01

## 2023-05-28 PROBLEM — R79.1 SUPRATHERAPEUTIC INR: Status: RESOLVED | Noted: 2023-01-01 | Resolved: 2023-01-01

## 2023-05-28 PROBLEM — J96.01 ACUTE RESPIRATORY FAILURE WITH HYPOXIA (HCC): Status: RESOLVED | Noted: 2023-01-01 | Resolved: 2023-01-01

## 2023-05-28 LAB
ALBUMIN SERPL-MCNC: 2.5 G/DL (ref 3.5–5.2)
ALBUMIN/GLOB SERPL: 0.7 {RATIO} (ref 1–2.5)
ALP SERPL-CCNC: 359 U/L (ref 40–129)
ALT SERPL-CCNC: 98 U/L (ref 5–41)
ANION GAP SERPL CALCULATED.3IONS-SCNC: 13 MMOL/L (ref 9–17)
AST SERPL-CCNC: 93 U/L
BASOPHILS # BLD: 0 K/UL (ref 0–0.2)
BASOPHILS NFR BLD: 0 % (ref 0–2)
BILIRUB SERPL-MCNC: 0.6 MG/DL (ref 0.3–1.2)
BUN SERPL-MCNC: 38 MG/DL (ref 8–23)
BUN/CREAT SERPL: 38 (ref 9–20)
CALCIUM SERPL-MCNC: 8.9 MG/DL (ref 8.6–10.4)
CHLORIDE SERPL-SCNC: 103 MMOL/L (ref 98–107)
CO2 SERPL-SCNC: 23 MMOL/L (ref 20–31)
CREAT SERPL-MCNC: 1 MG/DL (ref 0.7–1.2)
EKG ATRIAL RATE: 102 BPM
EKG ATRIAL RATE: 73 BPM
EKG P AXIS: 48 DEGREES
EKG P AXIS: 66 DEGREES
EKG P-R INTERVAL: 252 MS
EKG P-R INTERVAL: 260 MS
EKG Q-T INTERVAL: 374 MS
EKG Q-T INTERVAL: 400 MS
EKG QRS DURATION: 108 MS
EKG QRS DURATION: 140 MS
EKG QTC CALCULATION (BAZETT): 440 MS
EKG QTC CALCULATION (BAZETT): 487 MS
EKG R AXIS: 23 DEGREES
EKG R AXIS: 4 DEGREES
EKG T AXIS: 14 DEGREES
EKG T AXIS: 33 DEGREES
EKG VENTRICULAR RATE: 102 BPM
EKG VENTRICULAR RATE: 73 BPM
EOSINOPHIL # BLD: 0 K/UL (ref 0–0.4)
EOSINOPHILS RELATIVE PERCENT: 0 % (ref 1–8)
ERYTHROCYTE [DISTWIDTH] IN BLOOD BY AUTOMATED COUNT: 16 % (ref 11.8–14.4)
GFR SERPL CREATININE-BSD FRML MDRD: >60 ML/MIN/1.73M2
GLUCOSE SERPL-MCNC: 165 MG/DL (ref 70–99)
HCT VFR BLD AUTO: 26.5 % (ref 40.7–50.3)
HGB BLD-MCNC: 8.2 G/DL (ref 13–17)
IMM GRANULOCYTES # BLD AUTO: 0 K/UL (ref 0–0.3)
IMM GRANULOCYTES NFR BLD: 0 %
INR PPP: 1.5
LYMPHOCYTES # BLD: 6 % (ref 15–43)
LYMPHOCYTES NFR BLD: 0.29 K/UL (ref 1–4.8)
MCH RBC QN AUTO: 28.9 PG (ref 25.2–33.5)
MCHC RBC AUTO-ENTMCNC: 30.9 G/DL (ref 25.2–33.5)
MCV RBC AUTO: 93.3 FL (ref 82.6–102.9)
MONOCYTES NFR BLD: 0 % (ref 6–14)
MONOCYTES NFR BLD: 0 K/UL (ref 0.1–1.2)
MORPHOLOGY: ABNORMAL
NEUTROPHILS NFR BLD: 94 % (ref 44–74)
NEUTS SEG NFR BLD: 4.51 K/UL (ref 1.5–8.1)
NRBC AUTOMATED: 0 PER 100 WBC
PLATELET # BLD AUTO: 123 K/UL (ref 138–453)
PMV BLD AUTO: 10.9 FL (ref 8.1–13.5)
POTASSIUM SERPL-SCNC: 4.4 MMOL/L (ref 3.7–5.3)
PROT SERPL-MCNC: 6 G/DL (ref 6.4–8.3)
PROTHROMBIN TIME: 17.1 SEC (ref 11.5–14.2)
RBC # BLD AUTO: 2.84 M/UL (ref 4.21–5.77)
SODIUM SERPL-SCNC: 139 MMOL/L (ref 135–144)
WBC OTHER # BLD: 4.8 K/UL (ref 3.5–11.3)

## 2023-05-28 PROCEDURE — 94760 N-INVAS EAR/PLS OXIMETRY 1: CPT

## 2023-05-28 PROCEDURE — 6370000000 HC RX 637 (ALT 250 FOR IP): Performed by: NURSE PRACTITIONER

## 2023-05-28 PROCEDURE — 97161 PT EVAL LOW COMPLEX 20 MIN: CPT

## 2023-05-28 PROCEDURE — 85610 PROTHROMBIN TIME: CPT

## 2023-05-28 PROCEDURE — 99238 HOSP IP/OBS DSCHRG MGMT 30/<: CPT | Performed by: FAMILY MEDICINE

## 2023-05-28 PROCEDURE — 6370000000 HC RX 637 (ALT 250 FOR IP): Performed by: INTERNAL MEDICINE

## 2023-05-28 PROCEDURE — 36415 COLL VENOUS BLD VENIPUNCTURE: CPT

## 2023-05-28 PROCEDURE — 85025 COMPLETE CBC W/AUTO DIFF WBC: CPT

## 2023-05-28 PROCEDURE — 2580000003 HC RX 258: Performed by: NURSE PRACTITIONER

## 2023-05-28 PROCEDURE — 94640 AIRWAY INHALATION TREATMENT: CPT

## 2023-05-28 PROCEDURE — 6360000002 HC RX W HCPCS: Performed by: FAMILY MEDICINE

## 2023-05-28 PROCEDURE — 80053 COMPREHEN METABOLIC PANEL: CPT

## 2023-05-28 RX ORDER — PSEUDOEPHEDRINE HCL 30 MG
200 TABLET ORAL 2 TIMES DAILY
COMMUNITY
Start: 2023-05-28

## 2023-05-28 RX ORDER — WARFARIN SODIUM 2.5 MG/1
7.5 TABLET ORAL
Status: DISCONTINUED | OUTPATIENT
Start: 2023-05-28 | End: 2023-05-28 | Stop reason: HOSPADM

## 2023-05-28 RX ORDER — METHYLPREDNISOLONE SODIUM SUCCINATE 40 MG/ML
40 INJECTION, POWDER, LYOPHILIZED, FOR SOLUTION INTRAMUSCULAR; INTRAVENOUS EVERY 12 HOURS
Status: DISCONTINUED | OUTPATIENT
Start: 2023-05-28 | End: 2023-05-28 | Stop reason: HOSPADM

## 2023-05-28 RX ORDER — AZITHROMYCIN 500 MG/1
500 TABLET, FILM COATED ORAL DAILY
Qty: 1 PACKET | Refills: 0 | Status: ON HOLD | OUTPATIENT
Start: 2023-05-28 | End: 2023-06-02 | Stop reason: HOSPADM

## 2023-05-28 RX ORDER — CEFDINIR 300 MG/1
300 CAPSULE ORAL 2 TIMES DAILY
Qty: 14 CAPSULE | Refills: 0 | Status: ON HOLD | OUTPATIENT
Start: 2023-05-28 | End: 2023-06-05 | Stop reason: HOSPADM

## 2023-05-28 RX ADMIN — IPRATROPIUM BROMIDE AND ALBUTEROL SULFATE 1 AMPULE: .5; 3 SOLUTION RESPIRATORY (INHALATION) at 08:21

## 2023-05-28 RX ADMIN — SODIUM CHLORIDE, PRESERVATIVE FREE 10 ML: 5 INJECTION INTRAVENOUS at 08:43

## 2023-05-28 RX ADMIN — METOPROLOL TARTRATE 50 MG: 50 TABLET ORAL at 08:39

## 2023-05-28 RX ADMIN — FLECAINIDE ACETATE 100 MG: 50 TABLET ORAL at 08:39

## 2023-05-28 RX ADMIN — TAMSULOSIN HYDROCHLORIDE 0.4 MG: 0.4 CAPSULE ORAL at 08:40

## 2023-05-28 RX ADMIN — LEVOTHYROXINE SODIUM 150 MCG: 0.07 TABLET ORAL at 06:01

## 2023-05-28 RX ADMIN — OXYBUTYNIN CHLORIDE 10 MG: 5 TABLET, EXTENDED RELEASE ORAL at 08:39

## 2023-05-28 RX ADMIN — METHYLPREDNISOLONE SODIUM SUCCINATE 60 MG: 125 INJECTION, POWDER, FOR SOLUTION INTRAMUSCULAR; INTRAVENOUS at 01:30

## 2023-05-28 RX ADMIN — LOSARTAN POTASSIUM 50 MG: 50 TABLET, FILM COATED ORAL at 08:39

## 2023-05-28 RX ADMIN — INSULIN GLARGINE 5 UNITS: 100 INJECTION, SOLUTION SUBCUTANEOUS at 08:39

## 2023-05-28 RX ADMIN — FUROSEMIDE 20 MG: 20 TABLET ORAL at 08:39

## 2023-05-28 RX ADMIN — FOLIC ACID 1000 MCG: 1 TABLET ORAL at 08:40

## 2023-05-28 RX ADMIN — IPRATROPIUM BROMIDE AND ALBUTEROL SULFATE 1 AMPULE: .5; 3 SOLUTION RESPIRATORY (INHALATION) at 04:05

## 2023-05-29 ENCOUNTER — FOLLOWUP TELEPHONE ENCOUNTER (OUTPATIENT)
Dept: INPATIENT UNIT | Age: 78
End: 2023-05-29

## 2023-05-29 PROBLEM — T45.1X5A CHEMOTHERAPY-INDUCED NEUTROPENIA (HCC): Status: ACTIVE | Noted: 2023-05-29

## 2023-05-29 PROBLEM — D70.1 CHEMOTHERAPY-INDUCED NEUTROPENIA (HCC): Status: ACTIVE | Noted: 2023-01-01

## 2023-05-29 PROBLEM — I50.9 HEART FAILURE, UNSPECIFIED (HCC): Status: ACTIVE | Noted: 2023-01-01

## 2023-05-29 LAB — FLECAINIDE: 0.44 UG/ML (ref 0.2–1)

## 2023-05-30 ENCOUNTER — TELEPHONE (OUTPATIENT)
Dept: FAMILY MEDICINE CLINIC | Age: 78
End: 2023-05-30

## 2023-05-30 PROBLEM — R06.02 SHORTNESS OF BREATH: Status: ACTIVE | Noted: 2023-01-01

## 2023-05-30 LAB
MICROORGANISM SPEC CULT: NORMAL
MICROORGANISM SPEC CULT: NORMAL
SPECIMEN DESCRIPTION: NORMAL
SPECIMEN DESCRIPTION: NORMAL

## 2023-06-03 LAB — GLUCOSE BLD-MCNC: 185 MG/DL (ref 75–110)

## 2023-06-05 NOTE — PROGRESS NOTES
Warfarin Dosing - Pharmacy Consult Note  Consulting Provider: Zachariah Macedo NP  Indication:  Atrial Fibrillation  Warfarin Dose prior to admission: 6.25 mg MWF, 7.5 mg AOD   Concurrent anticoagulants/antiplatelets: none  Significant Drug Interactions: fluconazole (incr INR)  Recent Labs     06/03/23  0519 06/04/23  0500 06/04/23  1419 06/05/23  0510   INR 1.6 2.4  --   --    HGB 8.3* 8.0* 8.1* 8.0*   PLT See Reflexed IPF Result See Reflexed IPF Result See Reflexed IPF Result See Reflexed IPF Result     Recent warfarin administrations                     warfarin (COUMADIN) tablet 2.5 mg (mg) 2.5 mg Given 06/03/23 1759    warfarin (COUMADIN) tablet 2 mg (mg) 2 mg Given 06/02/23 1846                     Date INR Dose   05/29/23 3.1     05/30/23 3.5  HOLD   05/31/23 4.3 HOLD    06/01/23 6.5 HOLD Vit k 2.5 mg oral   06/02/23 1.5 2 mg   06/03/23 1.6 2.5 mg   6/4/23 2.4 HOLD   06/05/23 3.6 HOLD            Assessment/Plan  (Goal INR: 2 - 3)  Patient INR is climbing likely d/t elevated liver enzymes + Fluconazole. Despite having a dose of Vit K last week. D/t situation possibly discontinue warfarin therapy completely. Active problem list reviewed. INR orders are placed. Chart reviewed for pertinent labs, drug/diet interactions, and past doses. Documentation of patient's clinical condition was reviewed. Pharmacy Dosing:  Pharmacy will continue to follow.      Jasmyn Vu  6/5/2023  10:11 AM

## 2023-06-05 NOTE — DISCHARGE INSTR - DIET
Good nutrition is important when healing from an illness, injury, or surgery. Follow any nutrition recommendations given to you during your hospital stay. If you were given an oral nutrition supplement while in the hospital, continue to take this supplement at home. You can take it with meals, in-between meals, and/or before bedtime. These supplements can be purchased at most local grocery stores, pharmacies, and chain [employer]stores. If you have any questions about your diet or nutrition, call the hospital and ask for the dietitian.   Generalized diet

## 2023-06-05 NOTE — PROGRESS NOTES
Hospitalist Progress Note    Patient:  Tu Santiago Shock     YOB: 1945    MRN: [id number]   Admit date: 5/29/2023     Acct: [de-identified]     PCP: Jean Pierre Ashley,     CC--Interval History:   Pancytopenia---has had Granix, but not responding---likewise, has required PLT transfusion 6.4.2023 15 --> 8 6.5.2023. HO to see. Ultimately to [facility]---palliative care     ---> increased Lantus to 60 and added log---5-5-5    Mouth tenderness--nystatin s/s--Diflucan----major complaint today, however, is white rub spot anterolateral right side of tongue--where prior dental appliances have worn down    See note below     All other ROS negative except noted in HPI    Diet:  ADULT DIET; Regular; 4 carb choices (60 gm/meal);  Low Fat/Low Chol/High Fiber/2 gm Na; 1800 ml    Medications:  Scheduled Meds:   insulin lispro  5 Units SubCUTAneous TID WC    insulin glargine  60 Units SubCUTAneous Daily    fluconazole  200 mg IntraVENous Q24H    methylPREDNISolone  40 mg IntraVENous Q12H    nystatin  5 mL Oral 4x Daily    [Held by provider] clopidogrel  75 mg Oral Daily    docusate sodium  200 mg Oral BID    flecainide  100 mg Oral BID    levothyroxine  150 mcg Oral Daily    losartan  50 mg Oral Daily    metoprolol tartrate  50 mg Oral BID    pravastatin  40 mg Oral QPM    psyllium  1 packet Oral Daily    tamsulosin  0.4 mg Oral Daily    sodium chloride flush  5-40 mL IntraVENous 2 times per day    insulin lispro  0-4 Units SubCUTAneous TID WC    insulin lispro  0-4 Units SubCUTAneous Nightly    furosemide  20 mg IntraVENous BID    warfarin placeholder: dosing by pharmacy   Other RX Placeholder    ipratropium 0.5 mg-albuterol 2.5 mg  1 Dose Inhalation Q4H     Continuous Infusions:   sodium chloride      sodium chloride      dextrose      sodium chloride      sodium chloride 50 mL/hr at 06/05/23 0539     PRN Meds:sodium chloride, lidocaine viscous hcl, metoprolol, sodium chloride, prochlorperazine, glucose,

## 2023-06-05 NOTE — PROGRESS NOTES
yamila in PCU. Assessment: Patient was accompanied by his wife Blair Ferraro) and grandchildren Iqra Bermudez and Mike Das). Patient was waiting for doctor approval to be transferred to [facility] and reported that he was not feeling better. Patient's family does not attend [employer] but were receptive to prayer. 06/05/23 1540   Encounter Summary   Encounter Overview/Reason  Initial Encounter   Service Provided For: Family; Patient and family together   Referral/Consult From: Rounding   Support System Spouse; Family members   Last Encounter  06/05/23   Complexity of Encounter Low   Begin Time 1449   End Time  1455   Total Time Calculated 6 min   Spiritual/Emotional needs   Type Spiritual Support   Assessment/Intervention/Outcome   Assessment Coping; Hopeful   Intervention Active listening;Prayer (assurance of)/[place]   Outcome Acceptance;Engaged in conversation;Expressed Gratitude;Receptive         Intervention: Engaged in conversation and active listening. Prayed with Patient. Outcome: Patient expressed appreciation for visit and offer of continued prayer. Plan: Chaplains are available on site or on call 24/7 for spiritual and emotional support.     Electronically signed by Ceci Flood on 6/5/2023 at 3:42 PM

## 2023-06-05 NOTE — CONSULTS
06/04/23 0923    insulin lispro (HUMALOG) injection vial 0-4 Units  0-4 Units SubCUTAneous Nightly Hortencia BrittILYA NP        furosemide (LASIX) injection 20 mg  20 mg IntraVENous BID Hortencia BrittILYA NP   20 mg at 06/05/23 2630    warfarin placeholder: dosing by pharmacy   Other Ronnie DuronILYA NP        ipratropium 0.5 mg-albuterol 2.5 mg (DUONEB) nebulizer solution 1 Dose  1 Dose Inhalation Q4H Nadia Jack MD   1 Dose at 06/05/23 1258    oxyCODONE (ROXICODONE) immediate release tablet 5 mg  5 mg Oral Q4H PRN Nadia Jack MD        Or    oxyCODONE (ROXICODONE) immediate release tablet 10 mg  10 mg Oral Q4H PRN Nadia Jack MD           Allergies:  Effient [prasugrel], Ace inhibitors, and Statins    Social History:   reports that he quit smoking about 38 years ago. His smoking use included cigarettes. He started smoking about 57 years ago. He has a 30.00 pack-year smoking history. He has never used smokeless tobacco. He reports current alcohol use. He reports that he does not use drugs. Family History: family history includes Cancer in his mother; Coronary Art Dis in his father; Crohn's Disease in his sister; Emphysema in his father; Heart Attack in his mother; Heart Disease in his maternal grandfather, maternal grandmother, and mother; Heart Failure in his father; High Blood Pressure in his mother; High Cholesterol in his child and sister; Stroke in his mother; Thyroid Disease in his mother. REVIEW OF SYSTEMS:    Constitutional: No fever or chills.  No night sweats, no weight loss   Eyes: No eye discharge, double vision, or eye pain   HEENT: negative for sore mouth, sore throat, hoarseness and voice change   Respiratory: negative for cough , sputum, dyspnea, wheezing, hemoptysis, chest pain   Cardiovascular: negative for chest pain, dyspnea, palpitations, orthopnea, PND   Gastrointestinal: negative for nausea, vomiting, diarrhea, constipation, abdominal pain, Dysphagia,

## 2023-06-05 NOTE — PROGRESS NOTES
Occupational Therapy  Facility/Department: [facility]  PROGRESSIVE CARE  Daily Treatment Note  NAME: Charla Giron  : 1945  MRN: [id number]    Date of Service: 2023    Discharge Recommendations:  [address] with OT, [address], Patient would benefit from continued therapy after discharge         Patient Diagnosis(es): The primary encounter diagnosis was Shortness of breath. A diagnosis of Acute congestive heart failure, unspecified heart failure type [facility]) was also pertinent to this visit. Assessment    Activity Tolerance: Treatment limited secondary to medical complications; Patient limited by fatigue  Discharge Recommendations: [address] with OT;Subacute/Skilled Nursing Facility; Patient would benefit from continued therapy after discharge      Plan   Occupational Therapy Plan  Times Per Week: 1-2  Current Treatment Recommendations: Self-Care / ADL;Equipment evaluation, education, & procurement;Patient/Caregiver education & training;Functional mobility training     Restrictions       Subjective   Subjective  Subjective: Patient supine in bed upon arrival with spouse present, agreeable for therapy. PTA present for co-tx. Pain: Denies  Orientation  Overall Orientation Status: Within Functional Limits  Pain: Denies at rest; min pain with bed mobility noted  Cognition  Overall Cognitive Status: WFL        Objective    Vitals  Vitals  SpO2: 93 % (c/o SOB with standing)  O2 Device: None (Room air)  Bed Mobility Training  Bed Mobility Training: Yes  Overall Level of Assistance: Minimum assistance; Moderate assistance;Assist X2  Supine to Sit: Minimum assistance; Moderate assistance;Assist X2  Sit to Supine: Minimum assistance; Moderate assistance;Assist X2  Scooting: Minimum assistance  Balance  Sitting: Intact  Standing: With support  Standing - Static: Fair  Transfer Training  Transfer Training: Yes (completed 2 sit>stand's)  Overall Level of Assistance: Minimum assistance;Assist

## 2023-06-05 NOTE — PROGRESS NOTES
Physical Therapy  Facility/Department: [facility]  PROGRESSIVE CARE  Daily Treatment Note  NAME: Savannah Ross  : 1945  MRN: [id number]    Date of Service: 2023    Discharge Recommendations:  Continue to assess pending progress, Home with Home health PT, [address]        Patient Diagnosis(es): The primary encounter diagnosis was Shortness of breath. A diagnosis of Acute congestive heart failure, unspecified heart failure type [facility]) was also pertinent to this visit. Assessment   Activity Tolerance: Treatment limited secondary to medical complications; Patient limited by fatigue     Plan    Physcial Therapy Plan  General Plan: 5-7 times per week  Current Treatment Recommendations: Functional mobility training;Gait training;Transfer training     Restrictions  Restrictions/Precautions  Restrictions/Precautions: Isolation, Contact Precautions     Subjective    Subjective  Subjective: Pt in bed upon arrival .Wife present throughout session. Dr Edda Lam in to evaluate as well. Pt given small glass water at end of session with nursing approval  **Cp treat with DAVID this date due to time constraints and pt fatigue  Pain: 0/10, wincing with bed mobility  Orientation  Overall Orientation Status: Within Functional Limits     Objective   Vitals  SpO2: 93 % (pt with complaints of SOB with standing)  O2 Device: None (Room air)  Bed Mobility Training  Bed Mobility Training: Yes  Overall Level of Assistance: Minimum assistance; Moderate assistance;Assist X2  Supine to Sit: Minimum assistance; Moderate assistance;Assist X2  Sit to Supine: Minimum assistance; Moderate assistance;Assist X2  Scooting: Minimum assistance  Balance  Sitting: Intact  Standing: With support  Standing - Static: Fair  Transfer Training  Transfer Training: Yes (2 STS completed)  Overall Level of Assistance: Minimum assistance;Assist X2  Sit to Stand: Minimum assistance;Assist X2  Stand to Sit: Minimum assistance  Bed to Chair:

## 2023-06-05 NOTE — PROGRESS NOTES
[facility] Outpatient Speech Therapy   Phone: [phone]  Fax: [phone]       BEDSIDE SWALLOW EVALUATION    YOB: 1945  Gender: male  MRN:  [id number]  Referring Physician: Dr. Alexandra Kaiser  Diagnosis:  Metastatic lung cancer  Secondary Diagnosis: Oral dysphagia  History of Present Illness/Injury: Pt. 69 y.o male presenting to ED with shortness of breath. Pt. Admitted to hospital with acute congestive heart failure, unspecified heart failure type ([address] Pt. With metastatic lung cancer for which he was receiving chemoradiation for, he has declined any further treatment. Pt. Also with thrush.     Pain: [x]No     []Yes           Location:  N/A       Pain Rating (0-10 pain scale): 0       Pain Description:  N/A    TIME IN: 10:40  TIME OUT: 11:00  TOTAL TIME: 20 min     Current Diet: [] NPO [x] Regular diet [] Soft and bite-sized diet (Dysphagia III) [] Minced and moist diet (Dysphagia II)   [] Pureed diet  (dysphagia I)  [] Liquidised      Current Liquids: [x] Thin  [] Mildly Thick (Nectar thick) [] Moderately Thick (Honey thick) [] Extremely Thick (Spoon-Pudding)    Respiratory Status:[x] Independent [] Nasal Cannula [] Oxygen Mask                  [] Tracheostomy [] Other:      [] Ventilator/Settings:    Behavioral Observation: [x] Alert  [x] Oriented [] Confused      [] Lethargic [] Dysarthric       [] Limited Direction Following        [] Agitated      [] Other:    ORAL MECHANISM EVALUATION:               Comments:  Facial /Labial [x]WFL []Impaired []DNT    Lingual [x]WFL []Impaired []DNT    Dentition []WFL [x]Impaired []DNT Sporadic dentition   Velum [x]WFL []Impaired []DNT    Vocal Quality [x]WFL []Impaired []DNT    Sensation [x]WFL []Impaired []DNT    Cough []WFL []Impaired [x]DNT    Gag []WFL []Impaired [x]DNT    Other: []WFL []ImpaIred []DNT      PATIENT WAS EVALUATED USING:  [x] Thin liquid    [] Mildly thick (Nectar thick liquid)  [] Moderately thick (Honey thick liquid)/Liquidised  []

## 2023-06-05 NOTE — PLAN OF CARE
Problem: Discharge Planning  Goal: Discharge to home or other facility with appropriate resources  Outcome: Adequate for Discharge     Problem: Safety - Adult  Goal: Free from fall injury  Outcome: Adequate for Discharge     Problem: ABCDS Injury Assessment  Goal: Absence of physical injury  Outcome: Adequate for Discharge     Problem: Chronic Conditions and Co-morbidities  Goal: Patient's chronic conditions and co-morbidity symptoms are monitored and maintained or improved  Outcome: Adequate for Discharge     Problem: Pain  Goal: Verbalizes/displays adequate comfort level or baseline comfort level  Outcome: Adequate for Discharge     Problem: Skin/Tissue Integrity  Goal: Absence of new skin breakdown  Description: 1. Monitor for areas of redness and/or skin breakdown  2. Assess vascular access sites hourly  3. Every 4-6 hours minimum:  Change oxygen saturation probe site  4. Every 4-6 hours:  If on nasal continuous positive airway pressure, respiratory therapy assess nares and determine need for appliance change or resting period.   Outcome: Adequate for Discharge

## 2023-06-06 NOTE — DISCHARGE SUMMARY
2%  solution 15 mL by mouth every 3 hours for pain and irritation; nystatin  (Mycostatin) suspension 5 mL four times a day, Roxicodone (oxycodone) 5  mg immediate relief every 6 hours p.r.n. pain. FOLLOWING CONTINUED:  Albuterol sulfate HFA 2 puffs every 6 hours p.r.n.  shortness of breath, wheezing; Colace 200 mg p.o. twice daily;  flecainide (Tambocor) 100 mg p.o. b.i.d.; Lasix (furosemide) 20 mg p.o.  daily; Synthroid 150 mcg (0.15 mg) p.o. daily, may consider  discontinuance; patient is also losartan (Cozaar) 50 mg p.o. daily;  metoprolol tartrate (Lopressor) 50 mg p.o. twice daily, one may wish to  consider discontinuance of blood pressure medications; prochlorperazine  (Compazine) 10 mg p.o. q.6 hours p.r.n. nausea; Metamucil (psyllium)  58.12% pack, one packet daily with fluids; tamsulosin (Flomax) one p.o.  daily. FOLLOWING MEDICATIONS DISCONTINUED:  Zithromax, cefdinir (Omnicef),  clopidogrel (Plavix), dexamethasone, echinacea, fish oil, folic acid,  multivitamin, niacin, oxybutynin, pravastatin, and Coumadin. Follow up with the patient's personal physician, Esau Granados D.O.,  [facility] and/or medical director of  [address] [place] [address].         Yan Howard MD    D: 06/05/2023 16:11:30       T: 06/05/2023 16:15:27     [email or web address]  Job#: [id number]     Doc#: [id number]    CC:

## 2023-06-06 NOTE — TELEPHONE ENCOUNTER
Name: Jose C Mcgee  : 1945  MRN: [id number]    Oncology Navigation Follow-Up Note    Contact Type:   Chart review    Notes:   Patient discharged from hospital to [facility]. Per oncology note, patient is declining further treatment and is going to hospice.    Oncology nurse navigator is signing off from care team.    Electronically signed by Camilo Winkler RN on 2023 at 11:01 AM

## (undated) DEVICE — GOWN,AURORA,NONRNF,XL,30/CS: Brand: MEDLINE

## (undated) DEVICE — 3M™ TEGADERM™ TRANSPARENT FILM DRESSING FRAME STYLE, 1626W, 4 IN X 4-3/4 IN (10 CM X 12 CM), 50/CT 4CT/CASE: Brand: 3M™ TEGADERM™

## (undated) DEVICE — GLOVE ORANGE PI 7 1/2   MSG9075

## (undated) DEVICE — PACK,LAPAROTOMY,PK IV,AURORA: Brand: MEDLINE

## (undated) DEVICE — Device

## (undated) DEVICE — COVER LT HNDL BLU PLAS

## (undated) DEVICE — GARMENT,MEDLINE,DVT,INT,CALF,MED, GEN2: Brand: MEDLINE

## (undated) DEVICE — SOLUTION IV IRRIG POUR BRL 0.9% SODIUM CHL 2F7124

## (undated) DEVICE — PACK SURG PROC REMINDER N WVN DISPOSABLE BEAC TIME OUT

## (undated) DEVICE — Z DISCONTINUED BY MEDLINE USE 2711682 TRAY SKIN PREP DRY W/ PREM GLV

## (undated) DEVICE — GLOVE SURG SZ 6 THK91MIL LTX FREE SYN POLYISOPRENE ANTI

## (undated) DEVICE — GARMENT COMPR STD FOR 17IN CALF UNIF THER FLOTRN

## (undated) DEVICE — SUTURE VCRL SZ 4-0 L18IN ABSRB UD L16MM PS-4 1/2 CIR PRIM J507G

## (undated) DEVICE — SUTURE VCRL SZ 3-0 L27IN ABSRB VLT L26MM SH 1/2 CIR J316H

## (undated) DEVICE — CHLORAPREP 26ML ORANGE

## (undated) DEVICE — SOLUTION SCRB 4OZ 7.5% POVIDONE IOD FLIP TOP CAP

## (undated) DEVICE — SUTURE VCRL SZ 0 L27IN ABSRB VLT L26MM CT-2 1/2 CIR J334H

## (undated) DEVICE — TOWEL,OR,DSP,ST,NATURAL,DLX,4/PK,20PK/CS: Brand: MEDLINE

## (undated) DEVICE — Z DISCONTINUED USE 2624853 GLOVE SURG SZ 75 L12IN THK91MIL BRN LTX FREE

## (undated) DEVICE — BASIN SET: Brand: MEDLINE INDUSTRIES, INC.

## (undated) DEVICE — GAUZE,SPONGE,4"X4",12PLY,STERILE,LF,2'S: Brand: MEDLINE

## (undated) DEVICE — BLADE CLIPPER GEN PURP NS

## (undated) DEVICE — SUTURE VCRL SZ 3-0 L27IN ABSRB UD L26MM CT-2 1/2 CIR J232H

## (undated) DEVICE — KIT INTRO PTFE TEARAWAY 10FX15CM

## (undated) DEVICE — Z DISCONTINUED USE 2273271 SOLUTION PREP 4OZ 10% POVIDONE IOD BTL FLIP TOP CAP

## (undated) DEVICE — SUTURE NRLN SZ 0 L18IN NONABSORBABLE BLK L26MM CT-2 1/2 CIR C527D

## (undated) DEVICE — TOWEL,OR,DSP,ST,BLUE,STD,4/PK,20PK/CS: Brand: MEDLINE

## (undated) DEVICE — SUTURE VCRL SZ 4-0 L18IN ABSRB UD L19MM PS-2 3/8 CIR PRIM J496H

## (undated) DEVICE — GLOVE ORANGE PI 7   MSG9070

## (undated) DEVICE — SKIN AFFIX SURG ADHESIVE 72/CS 0.55ML: Brand: MEDLINE

## (undated) DEVICE — 4-PORT MANIFOLD: Brand: NEPTUNE 2

## (undated) DEVICE — TUBING, SUCTION, 3/16" X 20', STRAIGHT: Brand: MEDLINE

## (undated) DEVICE — MARKER W/PRE PRINTED CUSTOM LABEL

## (undated) DEVICE — 1200CC GUARDIAN II: Brand: GUARDIAN

## (undated) DEVICE — Z DISCONTINUED USE 2624850 GLOVE SURG SZ 6 L12IN THK91MIL BRN LTX FREE POLYCHLOROPRENE

## (undated) DEVICE — SYRINGE BLB 2 PC STER 50

## (undated) DEVICE — GOWN,AURORA,NONREINFORCED,LARGE: Brand: MEDLINE

## (undated) DEVICE — SINGLE USE ASPIRATION NEEDLE: Brand: SINGLE USE ASPIRATION NEEDLE

## (undated) DEVICE — 9165 UNIVERSAL PATIENT PLATE: Brand: 3M™

## (undated) DEVICE — SOLUTION IV 100ML 0.9% SOD CHL PLAS CONT USP VIAFLX 1 PER

## (undated) DEVICE — DECANTER FLD 9IN ST BG FOR ASEP TRNSF OF FLD

## (undated) DEVICE — DRAPE C ARM W104XL188CM FLD MOB XR

## (undated) DEVICE — GAUZE,SPONGE,2"X2",8PLY,STERILE,LF,2'S: Brand: MEDLINE

## (undated) DEVICE — Z DISCONTINUED USE 2624852 GLOVE SURG 7 PF TEXT NEOPRNE BRN STRL NEOLON 2G LF